# Patient Record
Sex: MALE | Race: WHITE | NOT HISPANIC OR LATINO | Employment: OTHER | URBAN - METROPOLITAN AREA
[De-identification: names, ages, dates, MRNs, and addresses within clinical notes are randomized per-mention and may not be internally consistent; named-entity substitution may affect disease eponyms.]

---

## 2017-12-01 ENCOUNTER — RECORDS - HEALTHEAST (OUTPATIENT)
Dept: LAB | Facility: CLINIC | Age: 62
End: 2017-12-01

## 2017-12-01 LAB
CHOLEST SERPL-MCNC: 260 MG/DL
FASTING STATUS PATIENT QL REPORTED: ABNORMAL
HDLC SERPL-MCNC: 42 MG/DL
LDLC SERPL CALC-MCNC: 181 MG/DL
PSA SERPL-MCNC: <0.1 NG/ML (ref 0–4.5)
TRIGL SERPL-MCNC: 187 MG/DL

## 2019-06-17 ENCOUNTER — RECORDS - HEALTHEAST (OUTPATIENT)
Dept: LAB | Facility: CLINIC | Age: 64
End: 2019-06-17

## 2019-06-17 LAB
ALBUMIN SERPL-MCNC: 3.8 G/DL (ref 3.5–5)
ALP SERPL-CCNC: 69 U/L (ref 45–120)
ALT SERPL W P-5'-P-CCNC: 16 U/L (ref 0–45)
ANION GAP SERPL CALCULATED.3IONS-SCNC: 6 MMOL/L (ref 5–18)
AST SERPL W P-5'-P-CCNC: 19 U/L (ref 0–40)
BILIRUB SERPL-MCNC: 0.8 MG/DL (ref 0–1)
BUN SERPL-MCNC: 14 MG/DL (ref 8–22)
CALCIUM SERPL-MCNC: 9.4 MG/DL (ref 8.5–10.5)
CHLORIDE BLD-SCNC: 108 MMOL/L (ref 98–107)
CHOLEST SERPL-MCNC: 209 MG/DL
CO2 SERPL-SCNC: 26 MMOL/L (ref 22–31)
CREAT SERPL-MCNC: 1.01 MG/DL (ref 0.7–1.3)
FASTING STATUS PATIENT QL REPORTED: ABNORMAL
GFR SERPL CREATININE-BSD FRML MDRD: >60 ML/MIN/1.73M2
GLUCOSE BLD-MCNC: 87 MG/DL (ref 70–125)
HDLC SERPL-MCNC: 48 MG/DL
LDLC SERPL CALC-MCNC: 133 MG/DL
POTASSIUM BLD-SCNC: 4.6 MMOL/L (ref 3.5–5)
PROT SERPL-MCNC: 6.8 G/DL (ref 6–8)
PSA SERPL-MCNC: <0.1 NG/ML (ref 0–4.5)
SODIUM SERPL-SCNC: 140 MMOL/L (ref 136–145)
TRIGL SERPL-MCNC: 140 MG/DL
URATE SERPL-MCNC: 7.4 MG/DL (ref 3–8)

## 2020-10-16 ENCOUNTER — HOSPITAL ENCOUNTER (OUTPATIENT)
Dept: LAB | Age: 65
Setting detail: SPECIMEN
Discharge: HOME | End: 2020-10-16

## 2020-10-16 ENCOUNTER — RECORDS - HEALTHEAST (OUTPATIENT)
Dept: LAB | Facility: CLINIC | Age: 65
End: 2020-10-16

## 2020-10-16 LAB
ALBUMIN SERPL-MCNC: 3.8 G/DL (ref 3.5–5)
ALP SERPL-CCNC: 71 U/L (ref 45–120)
ALT SERPL W P-5'-P-CCNC: 19 U/L (ref 0–45)
ANION GAP SERPL CALCULATED.3IONS-SCNC: 9 MMOL/L (ref 5–18)
AST SERPL W P-5'-P-CCNC: 20 U/L (ref 0–40)
BILIRUB SERPL-MCNC: 0.7 MG/DL (ref 0–1)
BUN SERPL-MCNC: 19 MG/DL (ref 8–22)
CALCIUM SERPL-MCNC: 9.3 MG/DL (ref 8.5–10.5)
CHLORIDE BLD-SCNC: 108 MMOL/L (ref 98–107)
CHOLEST SERPL-MCNC: 222 MG/DL
CO2 SERPL-SCNC: 24 MMOL/L (ref 22–31)
CREAT SERPL-MCNC: 1.1 MG/DL (ref 0.7–1.3)
FASTING STATUS PATIENT QL REPORTED: ABNORMAL
GFR SERPL CREATININE-BSD FRML MDRD: >60 ML/MIN/1.73M2
GLUCOSE BLD-MCNC: 75 MG/DL (ref 70–125)
HDLC SERPL-MCNC: 49 MG/DL
LDLC SERPL CALC-MCNC: 133 MG/DL
POTASSIUM BLD-SCNC: 4.9 MMOL/L (ref 3.5–5)
PROT SERPL-MCNC: 6.8 G/DL (ref 6–8)
PSA SERPL-MCNC: <0.1 NG/ML (ref 0–4.5)
SODIUM SERPL-SCNC: 141 MMOL/L (ref 136–145)
TRIGL SERPL-MCNC: 202 MG/DL
URATE SERPL-MCNC: 7.5 MG/DL (ref 3–8)

## 2020-10-19 LAB — COVID-19 ANTIBODY IGG: NEGATIVE

## 2021-10-06 ENCOUNTER — HOSPITAL ENCOUNTER (EMERGENCY)
Facility: CLINIC | Age: 66
Discharge: HOME | End: 2021-10-06
Attending: FAMILY MEDICINE | Admitting: FAMILY MEDICINE
Payer: COMMERCIAL

## 2021-10-06 ENCOUNTER — APPOINTMENT (OUTPATIENT)
Dept: CT IMAGING | Facility: CLINIC | Age: 66
End: 2021-10-06
Attending: FAMILY MEDICINE
Payer: COMMERCIAL

## 2021-10-06 VITALS
TEMPERATURE: 97.8 F | DIASTOLIC BLOOD PRESSURE: 95 MMHG | SYSTOLIC BLOOD PRESSURE: 144 MMHG | RESPIRATION RATE: 14 BRPM | OXYGEN SATURATION: 98 % | BODY MASS INDEX: 25.18 KG/M2 | HEIGHT: 73 IN | HEART RATE: 52 BPM | WEIGHT: 190 LBS

## 2021-10-06 DIAGNOSIS — R91.8 PULMONARY NODULES: ICD-10-CM

## 2021-10-06 DIAGNOSIS — R07.81 PLEURITIC CHEST PAIN: ICD-10-CM

## 2021-10-06 LAB
ALBUMIN SERPL-MCNC: 3.5 G/DL (ref 3.4–5)
ALBUMIN UR-MCNC: NEGATIVE MG/DL
ALP SERPL-CCNC: 56 U/L (ref 40–150)
ALT SERPL W P-5'-P-CCNC: 27 U/L (ref 0–70)
ANION GAP SERPL CALCULATED.3IONS-SCNC: 4 MMOL/L (ref 3–14)
APPEARANCE UR: CLEAR
AST SERPL W P-5'-P-CCNC: 23 U/L (ref 0–45)
BASOPHILS # BLD AUTO: 0.1 10E3/UL (ref 0–0.2)
BASOPHILS NFR BLD AUTO: 1 %
BILIRUB SERPL-MCNC: 1.1 MG/DL (ref 0.2–1.3)
BILIRUB UR QL STRIP: NEGATIVE
BUN SERPL-MCNC: 17 MG/DL (ref 7–30)
CALCIUM SERPL-MCNC: 8.8 MG/DL (ref 8.5–10.1)
CHLORIDE BLD-SCNC: 110 MMOL/L (ref 94–109)
CO2 SERPL-SCNC: 24 MMOL/L (ref 20–32)
COLOR UR AUTO: YELLOW
CREAT SERPL-MCNC: 1.02 MG/DL (ref 0.66–1.25)
D DIMER PPP FEU-MCNC: <0.27 UG/ML FEU (ref 0–0.5)
EOSINOPHIL # BLD AUTO: 0.2 10E3/UL (ref 0–0.7)
EOSINOPHIL NFR BLD AUTO: 3 %
ERYTHROCYTE [DISTWIDTH] IN BLOOD BY AUTOMATED COUNT: 13.3 % (ref 10–15)
GFR SERPL CREATININE-BSD FRML MDRD: 75 ML/MIN/1.73M2
GLUCOSE BLD-MCNC: 87 MG/DL (ref 70–99)
GLUCOSE UR STRIP-MCNC: NEGATIVE MG/DL
HCT VFR BLD AUTO: 44.4 % (ref 40–53)
HGB BLD-MCNC: 14.8 G/DL (ref 13.3–17.7)
HGB UR QL STRIP: NEGATIVE
IMM GRANULOCYTES # BLD: 0 10E3/UL
IMM GRANULOCYTES NFR BLD: 0 %
KETONES UR STRIP-MCNC: 5 MG/DL
LEUKOCYTE ESTERASE UR QL STRIP: NEGATIVE
LIPASE SERPL-CCNC: 121 U/L (ref 73–393)
LYMPHOCYTES # BLD AUTO: 2.2 10E3/UL (ref 0.8–5.3)
LYMPHOCYTES NFR BLD AUTO: 31 %
MCH RBC QN AUTO: 31.6 PG (ref 26.5–33)
MCHC RBC AUTO-ENTMCNC: 33.3 G/DL (ref 31.5–36.5)
MCV RBC AUTO: 95 FL (ref 78–100)
MONOCYTES # BLD AUTO: 0.5 10E3/UL (ref 0–1.3)
MONOCYTES NFR BLD AUTO: 7 %
MUCOUS THREADS #/AREA URNS LPF: PRESENT /LPF
NEUTROPHILS # BLD AUTO: 4.2 10E3/UL (ref 1.6–8.3)
NEUTROPHILS NFR BLD AUTO: 58 %
NITRATE UR QL: NEGATIVE
NRBC # BLD AUTO: 0 10E3/UL
NRBC BLD AUTO-RTO: 0 /100
PH UR STRIP: 6 [PH] (ref 5–7)
PLATELET # BLD AUTO: 232 10E3/UL (ref 150–450)
POTASSIUM BLD-SCNC: 4.2 MMOL/L (ref 3.4–5.3)
PROT SERPL-MCNC: 7.2 G/DL (ref 6.8–8.8)
RBC # BLD AUTO: 4.69 10E6/UL (ref 4.4–5.9)
RBC URINE: 0 /HPF
SODIUM SERPL-SCNC: 138 MMOL/L (ref 133–144)
SP GR UR STRIP: 1.01 (ref 1–1.03)
SQUAMOUS EPITHELIAL: <1 /HPF
UROBILINOGEN UR STRIP-MCNC: NORMAL MG/DL
WBC # BLD AUTO: 7.2 10E3/UL (ref 4–11)
WBC URINE: <1 /HPF

## 2021-10-06 PROCEDURE — 250N000011 HC RX IP 250 OP 636: Performed by: FAMILY MEDICINE

## 2021-10-06 PROCEDURE — 85025 COMPLETE CBC W/AUTO DIFF WBC: CPT | Performed by: FAMILY MEDICINE

## 2021-10-06 PROCEDURE — 83690 ASSAY OF LIPASE: CPT | Performed by: FAMILY MEDICINE

## 2021-10-06 PROCEDURE — 96374 THER/PROPH/DIAG INJ IV PUSH: CPT | Mod: 59 | Performed by: FAMILY MEDICINE

## 2021-10-06 PROCEDURE — 93010 ELECTROCARDIOGRAM REPORT: CPT | Performed by: FAMILY MEDICINE

## 2021-10-06 PROCEDURE — 36415 COLL VENOUS BLD VENIPUNCTURE: CPT | Performed by: FAMILY MEDICINE

## 2021-10-06 PROCEDURE — 250N000013 HC RX MED GY IP 250 OP 250 PS 637: Performed by: FAMILY MEDICINE

## 2021-10-06 PROCEDURE — 81001 URINALYSIS AUTO W/SCOPE: CPT | Performed by: FAMILY MEDICINE

## 2021-10-06 PROCEDURE — 99285 EMERGENCY DEPT VISIT HI MDM: CPT | Mod: 25 | Performed by: FAMILY MEDICINE

## 2021-10-06 PROCEDURE — 85379 FIBRIN DEGRADATION QUANT: CPT | Performed by: FAMILY MEDICINE

## 2021-10-06 PROCEDURE — 82040 ASSAY OF SERUM ALBUMIN: CPT | Performed by: FAMILY MEDICINE

## 2021-10-06 PROCEDURE — 71275 CT ANGIOGRAPHY CHEST: CPT

## 2021-10-06 PROCEDURE — 250N000009 HC RX 250: Performed by: FAMILY MEDICINE

## 2021-10-06 PROCEDURE — 93005 ELECTROCARDIOGRAM TRACING: CPT | Performed by: FAMILY MEDICINE

## 2021-10-06 RX ORDER — LIDOCAINE 4 G/G
1 PATCH TOPICAL
Status: DISCONTINUED | OUTPATIENT
Start: 2021-10-06 | End: 2021-10-06 | Stop reason: HOSPADM

## 2021-10-06 RX ORDER — KETOROLAC TROMETHAMINE 15 MG/ML
15 INJECTION, SOLUTION INTRAMUSCULAR; INTRAVENOUS ONCE
Status: COMPLETED | OUTPATIENT
Start: 2021-10-06 | End: 2021-10-06

## 2021-10-06 RX ORDER — IOPAMIDOL 755 MG/ML
81 INJECTION, SOLUTION INTRAVASCULAR ONCE
Status: COMPLETED | OUTPATIENT
Start: 2021-10-06 | End: 2021-10-06

## 2021-10-06 RX ADMIN — IOPAMIDOL 81 ML: 755 INJECTION, SOLUTION INTRAVENOUS at 10:26

## 2021-10-06 RX ADMIN — LIDOCAINE 1 PATCH: 560 PATCH PERCUTANEOUS; TOPICAL; TRANSDERMAL at 12:10

## 2021-10-06 RX ADMIN — KETOROLAC TROMETHAMINE 15 MG: 15 INJECTION, SOLUTION INTRAMUSCULAR; INTRAVENOUS at 09:42

## 2021-10-06 RX ADMIN — SODIUM CHLORIDE 100 ML: 9 INJECTION, SOLUTION INTRAVENOUS at 10:26

## 2021-10-06 ASSESSMENT — ENCOUNTER SYMPTOMS
SHORTNESS OF BREATH: 0
VOMITING: 0
BACK PAIN: 1
DIARRHEA: 0
FEVER: 0
SINUS PRESSURE: 0
FREQUENCY: 0
COUGH: 0
CHILLS: 0
ABDOMINAL PAIN: 0
CONSTIPATION: 0
NAUSEA: 0
BLOOD IN STOOL: 0
DIAPHORESIS: 0
PALPITATIONS: 0
DYSURIA: 0
HEADACHES: 0
SORE THROAT: 0
WHEEZING: 0

## 2021-10-06 NOTE — ED PROVIDER NOTES
"  History     Chief Complaint   Patient presents with     Back Pain     upper right, since Satuday woke up with, nothing tried to help pain     HPI  Saeid Santa is a 66 year old male who presents with onset on Saturday awakening with pain in the scapular region on the right side.  Severe with deep breathing.  No significant chest pain.  No obvious injury to the area.  He was doing lifting yesterday but his symptoms started well before this.  He also has pain in the flank region on the right side as well.  No history of VTE risks.  No known cardiovascular risk other than he does use chronic long-term tobacco about 20 years decreased recently.  Occasional alcohol.  No associated nausea vomiting.  No change in stools.  No blood in the stool or black tarry stools.  Pain does not appear to be exertional.  Noted no significant musculoskeletal pain and came because he thought there may be a secondary cause of his pain that was \"internal\"    Denies recent prolonged travel (>3 hours) by car or plane, history or FHx of venous thromboembolism, recent surgery (last 4 weeks), active cancer history, hypercoagulable state, estrogen or other medications/conditions causing VTE or  new unilateral swelling or pain in the legs or calves.      Allergies:  No Known Allergies    Problem List:    There are no problems to display for this patient.       Past Medical History:    No past medical history on file.    Past Surgical History:    No past surgical history on file.    Family History:    No family history on file.    Social History:  Marital Status:   [2]  Social History     Tobacco Use     Smoking status: Not on file   Substance Use Topics     Alcohol use: Not on file     Drug use: Not on file        Medications:    No current outpatient medications on file.        Review of Systems   Constitutional: Negative for chills, diaphoresis and fever.   HENT: Negative for ear pain, sinus pressure and sore throat.    Eyes: Negative " for visual disturbance.   Respiratory: Negative for cough, shortness of breath and wheezing.    Cardiovascular: Negative for chest pain and palpitations.   Gastrointestinal: Negative for abdominal pain, blood in stool, constipation, diarrhea, nausea and vomiting.   Genitourinary: Negative for dysuria, frequency and urgency.   Musculoskeletal: Positive for back pain.   Skin: Negative for rash.   Neurological: Negative for headaches.   All other systems reviewed and are negative.      Physical Exam   BP: (!) 150/98  Pulse: 66  Temp: 97.8  F (36.6  C)  Resp: 14  Weight: 86.2 kg (190 lb)  SpO2: 100 %      Physical Exam  Constitutional:       General: He is in acute distress.   HENT:      Head: Atraumatic.   Eyes:      Conjunctiva/sclera: Conjunctivae normal.   Cardiovascular:      Rate and Rhythm: Normal rate and regular rhythm.      Heart sounds: No murmur heard.     Pulmonary:      Effort: Pulmonary effort is normal. No respiratory distress.      Breath sounds: Normal breath sounds. No stridor. No wheezing or rhonchi.   Abdominal:      General: Abdomen is flat. Bowel sounds are normal. There is no distension.      Palpations: There is no mass.      Tenderness: There is no abdominal tenderness. There is no guarding.   Musculoskeletal:      Cervical back: Neck supple.      Left lower leg: No edema.   Skin:     Coloration: Skin is not pale.      Findings: No rash.   Neurological:      General: No focal deficit present.      Mental Status: He is alert.      Motor: No weakness.       There is significant nurse to palpation in the region of the lower thoracic spine midline and possibly paraspinous and one very focal area.  The remainder of his back is nontender and there is no obvious rash such as a shingles rash in this region.  Patient notes other than being tender in this area it is not the obvious  focus of pain    ED Course        Procedures                EKG Interpretation:      Interpreted by Deny Turk MD  EKG  done at 0903 demonstrates a sinus rhythm 52 bpm normal axis.  No ST change.  No T wave changes.  Normal R progression and no Q waves.  Normal intervals.  Normal conduction.  No ectopy.  Impression sinus rhythm 52 bpm no acute change      Critical Care time:  none               Results for orders placed or performed during the hospital encounter of 10/06/21 (from the past 24 hour(s))   CBC with platelets differential    Narrative    The following orders were created for panel order CBC with platelets differential.  Procedure                               Abnormality         Status                     ---------                               -----------         ------                     CBC with platelets and d...[[id number]]                      Final result                 Please view results for these tests on the individual orders.   Comprehensive metabolic panel   Result Value Ref Range    Sodium 138 133 - 144 mmol/L    Potassium 4.2 3.4 - 5.3 mmol/L    Chloride 110 (H) 94 - 109 mmol/L    Carbon Dioxide (CO2) 24 20 - 32 mmol/L    Anion Gap 4 3 - 14 mmol/L    Urea Nitrogen 17 7 - 30 mg/dL    Creatinine 1.02 0.66 - 1.25 mg/dL    Calcium 8.8 8.5 - 10.1 mg/dL    Glucose 87 70 - 99 mg/dL    Alkaline Phosphatase 56 40 - 150 U/L    AST 23 0 - 45 U/L    ALT 27 0 - 70 U/L    Protein Total 7.2 6.8 - 8.8 g/dL    Albumin 3.5 3.4 - 5.0 g/dL    Bilirubin Total 1.1 0.2 - 1.3 mg/dL    GFR Estimate 75 >60 mL/min/1.73m2   D dimer quantitative   Result Value Ref Range    D-Dimer Quantitative <0.27 0.00 - 0.50 ug/mL FEU    Narrative    This D-dimer assay is intended for use in conjunction with a clinical pretest probability assessment model to exclude pulmonary embolism (PE) and deep venous thrombosis (DVT) in outpatients suspected of PE or DVT. The cut-off value is 0.50 ug/mL FEU.   Lipase   Result Value Ref Range    Lipase 121 73 - 393 U/L   CBC with platelets and differential   Result Value Ref Range    WBC Count 7.2 4.0 - 11.0  10e3/uL    RBC Count 4.69 4.40 - 5.90 10e6/uL    Hemoglobin 14.8 13.3 - 17.7 g/dL    Hematocrit 44.4 40.0 - 53.0 %    MCV 95 78 - 100 fL    MCH 31.6 26.5 - 33.0 pg    MCHC 33.3 31.5 - 36.5 g/dL    RDW 13.3 10.0 - 15.0 %    Platelet Count 232 150 - 450 10e3/uL    % Neutrophils 58 %    % Lymphocytes 31 %    % Monocytes 7 %    % Eosinophils 3 %    % Basophils 1 %    % Immature Granulocytes 0 %    NRBCs per 100 WBC 0 <1 /100    Absolute Neutrophils 4.2 1.6 - 8.3 10e3/uL    Absolute Lymphocytes 2.2 0.8 - 5.3 10e3/uL    Absolute Monocytes 0.5 0.0 - 1.3 10e3/uL    Absolute Eosinophils 0.2 0.0 - 0.7 10e3/uL    Absolute Basophils 0.1 0.0 - 0.2 10e3/uL    Absolute Immature Granulocytes 0.0 <=0.0 10e3/uL    Absolute NRBCs 0.0 10e3/uL   CT Chest (PE) Abdomen Pelvis w Contrast    Narrative    CT CHEST PE ABDOMEN AND PELVIS WITH CONTRAST 10/6/2021 10:42 AM    CLINICAL HISTORY: Severe pleuritic chest pain, flank pain worse acute,  progressive over last 3 days.    TECHNIQUE: CT angiogram chest and routine CT abdomen pelvis with IV  contrast. Arterial phase through the chest and venous phase through  the abdomen and pelvis. 2D and 3D MIP reconstructions were performed  by the CT technologist. Dose reduction techniques were used.     CONTRAST: 81 mL Isovue 370    COMPARISON: None.    FINDINGS:  ANGIOGRAM CHEST: Pulmonary arteries are normal caliber and negative  for pulmonary emboli. Thoracic aorta is negative for dissection.    LUNGS AND PLEURA: No acute airspace disease. No lobar consolidation.  Mild bibasilar atelectasis. No effusions. No pneumothorax identified.  Anterior right lower lobe 0.6 cm solid nodule series 7 image 157.  Anterior right midlung subpleural nodule is 0.8 cm image 150. Anterior  right middle lobe 0.6 cm nodule image 194. There are several other  bilateral pulmonary nodules also identified.    MEDIASTINUM/AXILLAE: No suspicious enlarged lymph nodes. No acute  mediastinal abnormality identified.    CORONARY  ARTERY CALCIFICATION: Mild.    HEPATOBILIARY: Normal.    PANCREAS: Normal.    SPLEEN: Normal.    ADRENAL GLANDS: Normal.    KIDNEYS/BLADDER: No hydronephrosis or stones identified. A few  parapelvic renal cysts on the left not requiring specific imaging  follow-up. No bladder acute abnormality. No bladder stones.    BOWEL: No obstruction. Colonic diverticulosis distally without  convincing diverticulitis. No abscess or free air. Appendix not seen.  No signs of appendicitis.    LYMPH NODES: Normal.    PELVIC ORGANS: Normal.    OTHER: Vascular calcifications. No associated acute abnormality  identified.    MUSCULOSKELETAL: Mild spine degenerative changes. No acute  abnormality.      Impression    IMPRESSION:  1.  No evidence for pulmonary embolism, acute thoracic aortic  abnormality, or acute airspace disease. No acute abnormality seen  within the abdomen or pelvis.  2.  Several bilateral pulmonary nodules are indeterminant with one of  these measuring up to 0.8 cm as above. See below for follow up imaging  guidelines as neoplasm remains in the differential.  3.  Colonic diverticula without diverticulitis.  4.  Mild coronary artery calcification.    Recommendations for an incidental lung nodule = or > 6mm to 8mm:    Low risk patients: Initial follow-up CT at 6-12 months, then  consider CT at 18-24 months if no change.    High risk patients: Initial follow-up CT at 6-12 months, then CT at  18-24 months if no change.    *Low Risk: Minimal or absent history of smoking or other known risk  factors.  *Nonsolid (ground-glass) or partly solid nodules may require longer  follow-up to exclude indolent adenocarcinoma.  *Recommendations based on Guidelines for the Management of Incidental  Pulmonary Nodules Detected at CT: From the Fleischner Society 2017,  Radiology 2017.    NOBLE ESPOSITO MD         SYSTEM ID:  [id number]   UA with Microscopic reflex to Culture    Specimen: Urine, Clean Catch   Result Value Ref Range    Color Urine  Yellow Colorless, Straw, Light Yellow, Yellow    Appearance Urine Clear Clear    Glucose Urine Negative Negative mg/dL    Bilirubin Urine Negative Negative    Ketones Urine 5  (A) Negative mg/dL    Specific Gravity Urine 1.015 1.003 - 1.035    Blood Urine Negative Negative    pH Urine 6.0 5.0 - 7.0    Protein Albumin Urine Negative Negative mg/dL    Urobilinogen Urine Normal Normal, 2.0 mg/dL    Nitrite Urine Negative Negative    Leukocyte Esterase Urine Negative Negative    Mucus Urine Present (A) None Seen /LPF    RBC Urine 0 <=2 /HPF    WBC Urine <1 <=5 /HPF    Squamous Epithelials Urine <1 <=1 /HPF    Narrative    Urine Culture not indicated       Medications   ketorolac (TORADOL) injection 15 mg (has no administration in time range)   iopamidol (ISOVUE-370) solution 81 mL (has no administration in time range)   sodium chloride 0.9 % bag 500mL for CT scan flush use (has no administration in time range)       Assessments & Plan (with Medical Decision Making)     MDM: Saeid Santa is a 66 year old male presents with scapular pain and upper back pain worse with deep breathing.  Pain was severe on presentation and not completely reproduced on exam.  Possible associated dyspnea although difficult to determine related to the pleuritic pain.  Benign abdomen but tenderness in the upper back and flank region.  Differential diagnosis included pulmonary embolism.  No significant PE risks.  Coronary disease also possible, musculoskeletal pain etiologies and GI referred pain as well.    Laboratory testing was delayed and CT was obtained for PE protocol as well as flank region.  D-dimer was ultimately negative.  CT imaging was negative for acute changes.  We discussed differential diagnosis including musculoskeletal causes.  Have given precautions for return.  Additional recommendations as below.       I have reviewed the nursing notes.    I have reviewed the findings, diagnosis, plan and need for follow up with the  patient.       New Prescriptions    No medications on file       Final diagnoses:   Pleuritic chest pain - thoracic back - No serious findings on imaging or lab.  suspect this is due to either thoracic back trapezius/paraspinous muscles, lat dorsi muscle.  recommend applying lidoderm 4% patch.   take ibuprofen 400 mg orally every 6 hours with food or milk. take tylenol 1000 m,g every 6 hours.  maintain back range of motion. remove lidoderm patch in 12 hours, reapply every 12 of every 24 hours.   Pulmonary nodules - recheck 6-12 months - discuss with primary provider.       10/6/2021   [facility] EMERGENCY DEPT     Deny Turk MD  10/06/21 6630

## 2021-10-06 NOTE — DISCHARGE INSTRUCTIONS
ICD-10-CM    1. Pleuritic chest pain - thoracic back  R07.81     No serious findings on imaging or lab.  suspect this is due to either thoracic back trapezius/paraspinous muscles, lat dorsi muscle.  recommend applying lidoderm 4% patch.   take ibuprofen 400 mg orally every 6 hours with food or milk. take tylenol 1000 m,g every 6 hours.  maintain back range of motion. remove lidoderm patch in 12 hours, reapply every 12 of every 24 hours.   2. Pulmonary nodules  R91.8     recheck 6-12 months - discuss with primary provider.     Alysee - heal your own back, heal your own neck.

## 2021-10-29 ENCOUNTER — LAB REQUISITION (OUTPATIENT)
Dept: LAB | Facility: CLINIC | Age: 66
End: 2021-10-29

## 2021-10-29 DIAGNOSIS — E78.5 HYPERLIPIDEMIA, UNSPECIFIED: ICD-10-CM

## 2021-10-29 DIAGNOSIS — Z85.46 PERSONAL HISTORY OF MALIGNANT NEOPLASM OF PROSTATE: ICD-10-CM

## 2021-10-29 DIAGNOSIS — M1A.0790 IDIOPATHIC CHRONIC GOUT, UNSPECIFIED ANKLE AND FOOT, WITHOUT TOPHUS (TOPHI): ICD-10-CM

## 2021-10-29 LAB
ALBUMIN SERPL-MCNC: 3.7 G/DL (ref 3.5–5)
ALP SERPL-CCNC: 76 U/L (ref 45–120)
ALT SERPL W P-5'-P-CCNC: 18 U/L (ref 0–45)
ANION GAP SERPL CALCULATED.3IONS-SCNC: 11 MMOL/L (ref 5–18)
AST SERPL W P-5'-P-CCNC: 21 U/L (ref 0–40)
BILIRUB SERPL-MCNC: 0.5 MG/DL (ref 0–1)
BUN SERPL-MCNC: 16 MG/DL (ref 8–22)
CALCIUM SERPL-MCNC: 9.4 MG/DL (ref 8.5–10.5)
CHLORIDE BLD-SCNC: 108 MMOL/L (ref 98–107)
CHOLEST SERPL-MCNC: 231 MG/DL
CO2 SERPL-SCNC: 21 MMOL/L (ref 22–31)
CREAT SERPL-MCNC: 0.93 MG/DL (ref 0.7–1.3)
GFR SERPL CREATININE-BSD FRML MDRD: 84 ML/MIN/1.73M2
GLUCOSE BLD-MCNC: 97 MG/DL (ref 70–125)
HDLC SERPL-MCNC: 47 MG/DL
LDLC SERPL CALC-MCNC: 105 MG/DL
POTASSIUM BLD-SCNC: 4 MMOL/L (ref 3.5–5)
PROT SERPL-MCNC: 6.7 G/DL (ref 6–8)
PSA SERPL-MCNC: <0.1 UG/L (ref 0–4.5)
SODIUM SERPL-SCNC: 140 MMOL/L (ref 136–145)
TRIGL SERPL-MCNC: 396 MG/DL
URATE SERPL-MCNC: 7.5 MG/DL (ref 3–8)

## 2021-10-29 PROCEDURE — 80061 LIPID PANEL: CPT | Performed by: FAMILY MEDICINE

## 2021-10-29 PROCEDURE — 80053 COMPREHEN METABOLIC PANEL: CPT | Performed by: FAMILY MEDICINE

## 2021-10-29 PROCEDURE — 84550 ASSAY OF BLOOD/URIC ACID: CPT | Performed by: FAMILY MEDICINE

## 2021-10-29 PROCEDURE — 84153 ASSAY OF PSA TOTAL: CPT | Performed by: FAMILY MEDICINE

## 2022-11-29 ENCOUNTER — LAB REQUISITION (OUTPATIENT)
Dept: LAB | Facility: CLINIC | Age: 67
End: 2022-11-29

## 2022-11-29 DIAGNOSIS — Z85.46 PERSONAL HISTORY OF MALIGNANT NEOPLASM OF PROSTATE: ICD-10-CM

## 2022-11-29 DIAGNOSIS — E78.5 HYPERLIPIDEMIA, UNSPECIFIED: ICD-10-CM

## 2022-11-29 DIAGNOSIS — R03.0 ELEVATED BLOOD-PRESSURE READING, WITHOUT DIAGNOSIS OF HYPERTENSION: ICD-10-CM

## 2022-11-29 LAB
ALBUMIN SERPL BCG-MCNC: 4.3 G/DL (ref 3.5–5.2)
ALP SERPL-CCNC: 60 U/L (ref 40–129)
ALT SERPL W P-5'-P-CCNC: 22 U/L (ref 10–50)
ANION GAP SERPL CALCULATED.3IONS-SCNC: 13 MMOL/L (ref 7–15)
AST SERPL W P-5'-P-CCNC: 25 U/L (ref 10–50)
BILIRUB SERPL-MCNC: 0.9 MG/DL
BUN SERPL-MCNC: 17.3 MG/DL (ref 8–23)
CALCIUM SERPL-MCNC: 8.9 MG/DL (ref 8.8–10.2)
CHLORIDE SERPL-SCNC: 105 MMOL/L (ref 98–107)
CHOLEST SERPL-MCNC: 238 MG/DL
CREAT SERPL-MCNC: 1.08 MG/DL (ref 0.67–1.17)
DEPRECATED HCO3 PLAS-SCNC: 21 MMOL/L (ref 22–29)
GFR SERPL CREATININE-BSD FRML MDRD: 74 ML/MIN/1.73M2
GLUCOSE SERPL-MCNC: 80 MG/DL (ref 70–99)
HDLC SERPL-MCNC: 57 MG/DL
LDLC SERPL CALC-MCNC: 154 MG/DL
NONHDLC SERPL-MCNC: 181 MG/DL
POTASSIUM SERPL-SCNC: 4.1 MMOL/L (ref 3.4–5.3)
PROT SERPL-MCNC: 6.6 G/DL (ref 6.4–8.3)
PSA SERPL-MCNC: <0.01 NG/ML (ref 0–4.5)
SODIUM SERPL-SCNC: 139 MMOL/L (ref 136–145)
TRIGL SERPL-MCNC: 137 MG/DL

## 2022-11-29 PROCEDURE — 84153 ASSAY OF PSA TOTAL: CPT | Performed by: FAMILY MEDICINE

## 2022-11-29 PROCEDURE — 80053 COMPREHEN METABOLIC PANEL: CPT | Performed by: FAMILY MEDICINE

## 2022-11-29 PROCEDURE — 80061 LIPID PANEL: CPT | Performed by: FAMILY MEDICINE

## 2023-01-01 ENCOUNTER — INFUSION THERAPY VISIT (OUTPATIENT)
Dept: INFUSION THERAPY | Facility: CLINIC | Age: 68
End: 2023-01-01
Attending: FAMILY MEDICINE
Payer: COMMERCIAL

## 2023-01-01 ENCOUNTER — ANESTHESIA EVENT (OUTPATIENT)
Dept: SURGERY | Facility: CLINIC | Age: 68
DRG: 027 | End: 2023-01-01
Payer: COMMERCIAL

## 2023-01-01 ENCOUNTER — VIRTUAL VISIT (OUTPATIENT)
Dept: ONCOLOGY | Facility: CLINIC | Age: 68
End: 2023-01-01
Attending: STUDENT IN AN ORGANIZED HEALTH CARE EDUCATION/TRAINING PROGRAM
Payer: COMMERCIAL

## 2023-01-01 ENCOUNTER — TELEPHONE (OUTPATIENT)
Dept: NEUROSURGERY | Facility: CLINIC | Age: 68
End: 2023-01-01
Payer: COMMERCIAL

## 2023-01-01 ENCOUNTER — OFFICE VISIT (OUTPATIENT)
Dept: RADIATION ONCOLOGY | Facility: CLINIC | Age: 68
End: 2023-01-01
Attending: RADIOLOGY
Payer: COMMERCIAL

## 2023-01-01 ENCOUNTER — TELEPHONE (OUTPATIENT)
Dept: RADIATION ONCOLOGY | Facility: CLINIC | Age: 68
End: 2023-01-01
Payer: COMMERCIAL

## 2023-01-01 ENCOUNTER — HOSPITAL ENCOUNTER (INPATIENT)
Facility: CLINIC | Age: 68
LOS: 2 days | Discharge: HOME | DRG: 027 | End: 2023-12-23
Attending: NEUROLOGICAL SURGERY | Admitting: NEUROLOGICAL SURGERY
Payer: COMMERCIAL

## 2023-01-01 ENCOUNTER — HOSPITAL ENCOUNTER (OUTPATIENT)
Dept: ULTRASOUND IMAGING | Facility: HOSPITAL | Age: 68
Discharge: HOME | End: 2023-08-02
Attending: PHYSICIAN ASSISTANT | Admitting: PHYSICIAN ASSISTANT
Payer: COMMERCIAL

## 2023-01-01 ENCOUNTER — PRE VISIT (OUTPATIENT)
Dept: RADIATION ONCOLOGY | Facility: CLINIC | Age: 68
End: 2023-01-01

## 2023-01-01 ENCOUNTER — HOSPITAL ENCOUNTER (OUTPATIENT)
Dept: ULTRASOUND IMAGING | Facility: CLINIC | Age: 68
Discharge: HOME | End: 2023-09-27
Attending: FAMILY MEDICINE | Admitting: FAMILY MEDICINE
Payer: COMMERCIAL

## 2023-01-01 ENCOUNTER — MYC MEDICAL ADVICE (OUTPATIENT)
Dept: ONCOLOGY | Facility: CLINIC | Age: 68
End: 2023-01-01

## 2023-01-01 ENCOUNTER — HOSPITAL ENCOUNTER (OUTPATIENT)
Dept: MRI IMAGING | Facility: CLINIC | Age: 68
Discharge: HOME | End: 2023-12-07
Attending: RADIOLOGY
Payer: COMMERCIAL

## 2023-01-01 ENCOUNTER — PATIENT OUTREACH (OUTPATIENT)
Dept: ONCOLOGY | Facility: CLINIC | Age: 68
End: 2023-01-01
Payer: COMMERCIAL

## 2023-01-01 ENCOUNTER — TELEPHONE (OUTPATIENT)
Dept: FAMILY MEDICINE | Facility: CLINIC | Age: 68
End: 2023-01-01
Payer: COMMERCIAL

## 2023-01-01 ENCOUNTER — ONCOLOGY VISIT (OUTPATIENT)
Dept: ONCOLOGY | Facility: CLINIC | Age: 68
End: 2023-01-01
Attending: INTERNAL MEDICINE
Payer: COMMERCIAL

## 2023-01-01 ENCOUNTER — APPOINTMENT (OUTPATIENT)
Dept: OCCUPATIONAL THERAPY | Facility: CLINIC | Age: 68
DRG: 027 | End: 2023-01-01
Payer: COMMERCIAL

## 2023-01-01 ENCOUNTER — HOSPITAL ENCOUNTER (OUTPATIENT)
Dept: MRI IMAGING | Facility: CLINIC | Age: 68
Discharge: HOME | End: 2023-08-23
Attending: PHYSICIAN ASSISTANT | Admitting: PHYSICIAN ASSISTANT
Payer: COMMERCIAL

## 2023-01-01 ENCOUNTER — OFFICE VISIT (OUTPATIENT)
Dept: NEUROSURGERY | Facility: CLINIC | Age: 68
End: 2023-01-01
Payer: COMMERCIAL

## 2023-01-01 ENCOUNTER — OFFICE VISIT (OUTPATIENT)
Dept: FAMILY MEDICINE | Facility: CLINIC | Age: 68
End: 2023-01-01
Payer: COMMERCIAL

## 2023-01-01 ENCOUNTER — TELEPHONE (OUTPATIENT)
Dept: ONCOLOGY | Facility: CLINIC | Age: 68
End: 2023-01-01

## 2023-01-01 ENCOUNTER — DOCUMENTATION ONLY (OUTPATIENT)
Dept: NEUROSURGERY | Facility: CLINIC | Age: 68
End: 2023-01-01

## 2023-01-01 ENCOUNTER — ALLIED HEALTH/NURSE VISIT (OUTPATIENT)
Dept: NEUROSURGERY | Facility: CLINIC | Age: 68
End: 2023-01-01
Payer: COMMERCIAL

## 2023-01-01 ENCOUNTER — VIRTUAL VISIT (OUTPATIENT)
Dept: FAMILY MEDICINE | Facility: CLINIC | Age: 68
End: 2023-01-01
Payer: COMMERCIAL

## 2023-01-01 ENCOUNTER — LAB (OUTPATIENT)
Dept: LAB | Facility: CLINIC | Age: 68
End: 2023-01-01
Attending: RADIOLOGY
Payer: COMMERCIAL

## 2023-01-01 ENCOUNTER — HOSPITAL ENCOUNTER (OUTPATIENT)
Dept: MRI IMAGING | Facility: CLINIC | Age: 68
Discharge: HOME | End: 2023-09-08
Attending: RADIOLOGY
Payer: COMMERCIAL

## 2023-01-01 ENCOUNTER — HOSPITAL ENCOUNTER (OUTPATIENT)
Dept: PET IMAGING | Facility: CLINIC | Age: 68
Discharge: HOME | End: 2023-08-31
Attending: PHYSICIAN ASSISTANT
Payer: COMMERCIAL

## 2023-01-01 ENCOUNTER — LAB (OUTPATIENT)
Dept: LAB | Facility: CLINIC | Age: 68
End: 2023-01-01
Attending: FAMILY MEDICINE
Payer: COMMERCIAL

## 2023-01-01 ENCOUNTER — TELEPHONE (OUTPATIENT)
Dept: RADIATION ONCOLOGY | Facility: CLINIC | Age: 68
End: 2023-01-01

## 2023-01-01 ENCOUNTER — MYC MEDICAL ADVICE (OUTPATIENT)
Dept: FAMILY MEDICINE | Facility: CLINIC | Age: 68
End: 2023-01-01
Payer: COMMERCIAL

## 2023-01-01 ENCOUNTER — ANESTHESIA (OUTPATIENT)
Dept: SURGERY | Facility: CLINIC | Age: 68
DRG: 027 | End: 2023-01-01
Payer: COMMERCIAL

## 2023-01-01 ENCOUNTER — APPOINTMENT (OUTPATIENT)
Dept: PHYSICAL THERAPY | Facility: CLINIC | Age: 68
DRG: 027 | End: 2023-01-01
Payer: COMMERCIAL

## 2023-01-01 ENCOUNTER — HOSPITAL ENCOUNTER (OUTPATIENT)
Dept: CT IMAGING | Facility: CLINIC | Age: 68
Discharge: HOME | End: 2023-12-07
Attending: STUDENT IN AN ORGANIZED HEALTH CARE EDUCATION/TRAINING PROGRAM
Payer: COMMERCIAL

## 2023-01-01 ENCOUNTER — APPOINTMENT (OUTPATIENT)
Dept: LAB | Facility: CLINIC | Age: 68
End: 2023-01-01
Attending: INTERNAL MEDICINE
Payer: COMMERCIAL

## 2023-01-01 ENCOUNTER — LAB (OUTPATIENT)
Dept: LAB | Facility: CLINIC | Age: 68
End: 2023-01-01
Payer: COMMERCIAL

## 2023-01-01 ENCOUNTER — THERAPY VISIT (OUTPATIENT)
Dept: PHYSICAL THERAPY | Facility: CLINIC | Age: 68
End: 2023-01-01
Attending: FAMILY MEDICINE
Payer: COMMERCIAL

## 2023-01-01 ENCOUNTER — APPOINTMENT (OUTPATIENT)
Dept: MRI IMAGING | Facility: CLINIC | Age: 68
DRG: 027 | End: 2023-01-01
Payer: COMMERCIAL

## 2023-01-01 ENCOUNTER — PATIENT OUTREACH (OUTPATIENT)
Dept: CARE COORDINATION | Facility: CLINIC | Age: 68
End: 2023-01-01
Payer: COMMERCIAL

## 2023-01-01 VITALS
TEMPERATURE: 97.9 F | RESPIRATION RATE: 18 BRPM | WEIGHT: 180.2 LBS | HEART RATE: 108 BPM | BODY MASS INDEX: 23.77 KG/M2 | DIASTOLIC BLOOD PRESSURE: 69 MMHG | SYSTOLIC BLOOD PRESSURE: 106 MMHG

## 2023-01-01 VITALS
HEART RATE: 66 BPM | OXYGEN SATURATION: 94 % | SYSTOLIC BLOOD PRESSURE: 134 MMHG | BODY MASS INDEX: 27.89 KG/M2 | RESPIRATION RATE: 16 BRPM | HEIGHT: 73 IN | WEIGHT: 210.4 LBS | DIASTOLIC BLOOD PRESSURE: 80 MMHG | TEMPERATURE: 97.2 F

## 2023-01-01 VITALS
BODY MASS INDEX: 27.63 KG/M2 | OXYGEN SATURATION: 97 % | DIASTOLIC BLOOD PRESSURE: 82 MMHG | WEIGHT: 208 LBS | SYSTOLIC BLOOD PRESSURE: 138 MMHG | HEART RATE: 104 BPM

## 2023-01-01 VITALS
BODY MASS INDEX: 27.47 KG/M2 | SYSTOLIC BLOOD PRESSURE: 125 MMHG | RESPIRATION RATE: 16 BRPM | DIASTOLIC BLOOD PRESSURE: 75 MMHG | TEMPERATURE: 98.5 F | WEIGHT: 208.2 LBS | OXYGEN SATURATION: 99 % | HEART RATE: 98 BPM

## 2023-01-01 VITALS
DIASTOLIC BLOOD PRESSURE: 77 MMHG | WEIGHT: 208 LBS | HEART RATE: 75 BPM | OXYGEN SATURATION: 98 % | HEIGHT: 73 IN | BODY MASS INDEX: 27.57 KG/M2 | SYSTOLIC BLOOD PRESSURE: 128 MMHG

## 2023-01-01 VITALS
RESPIRATION RATE: 20 BRPM | HEART RATE: 95 BPM | OXYGEN SATURATION: 96 % | SYSTOLIC BLOOD PRESSURE: 126 MMHG | DIASTOLIC BLOOD PRESSURE: 87 MMHG

## 2023-01-01 VITALS
HEART RATE: 71 BPM | TEMPERATURE: 97.3 F | SYSTOLIC BLOOD PRESSURE: 132 MMHG | OXYGEN SATURATION: 100 % | DIASTOLIC BLOOD PRESSURE: 74 MMHG | WEIGHT: 208 LBS | RESPIRATION RATE: 16 BRPM | BODY MASS INDEX: 27.57 KG/M2 | HEIGHT: 73 IN

## 2023-01-01 VITALS — WEIGHT: 180 LBS | BODY MASS INDEX: 23.86 KG/M2 | HEIGHT: 73 IN

## 2023-01-01 VITALS — SYSTOLIC BLOOD PRESSURE: 134 MMHG | DIASTOLIC BLOOD PRESSURE: 88 MMHG | HEART RATE: 78 BPM | RESPIRATION RATE: 18 BRPM

## 2023-01-01 VITALS
BODY MASS INDEX: 26.64 KG/M2 | TEMPERATURE: 97.2 F | DIASTOLIC BLOOD PRESSURE: 86 MMHG | HEIGHT: 73 IN | RESPIRATION RATE: 24 BRPM | OXYGEN SATURATION: 99 % | SYSTOLIC BLOOD PRESSURE: 126 MMHG | HEART RATE: 96 BPM | WEIGHT: 201 LBS

## 2023-01-01 VITALS
OXYGEN SATURATION: 96 % | RESPIRATION RATE: 20 BRPM | DIASTOLIC BLOOD PRESSURE: 83 MMHG | HEART RATE: 92 BPM | SYSTOLIC BLOOD PRESSURE: 123 MMHG

## 2023-01-01 VITALS
OXYGEN SATURATION: 96 % | TEMPERATURE: 98.5 F | WEIGHT: 183.4 LBS | RESPIRATION RATE: 18 BRPM | SYSTOLIC BLOOD PRESSURE: 127 MMHG | DIASTOLIC BLOOD PRESSURE: 82 MMHG | BODY MASS INDEX: 24.2 KG/M2 | HEART RATE: 105 BPM

## 2023-01-01 VITALS
SYSTOLIC BLOOD PRESSURE: 180 MMHG | HEIGHT: 73 IN | WEIGHT: 180 LBS | DIASTOLIC BLOOD PRESSURE: 128 MMHG | HEART RATE: 110 BPM | BODY MASS INDEX: 23.86 KG/M2

## 2023-01-01 VITALS — HEART RATE: 110 BPM | DIASTOLIC BLOOD PRESSURE: 74 MMHG | SYSTOLIC BLOOD PRESSURE: 107 MMHG | OXYGEN SATURATION: 98 %

## 2023-01-01 VITALS
WEIGHT: 181 LBS | TEMPERATURE: 100 F | HEIGHT: 73 IN | DIASTOLIC BLOOD PRESSURE: 78 MMHG | HEART RATE: 107 BPM | RESPIRATION RATE: 24 BRPM | BODY MASS INDEX: 23.99 KG/M2 | SYSTOLIC BLOOD PRESSURE: 120 MMHG | OXYGEN SATURATION: 98 %

## 2023-01-01 VITALS — HEART RATE: 60 BPM | DIASTOLIC BLOOD PRESSURE: 75 MMHG | OXYGEN SATURATION: 98 % | SYSTOLIC BLOOD PRESSURE: 120 MMHG

## 2023-01-01 VITALS — WEIGHT: 200 LBS | BODY MASS INDEX: 26.51 KG/M2 | HEIGHT: 73 IN

## 2023-01-01 DIAGNOSIS — M79.89 LEG SWELLING: ICD-10-CM

## 2023-01-01 DIAGNOSIS — C79.31 METASTASIS TO BRAIN (H): Primary | ICD-10-CM

## 2023-01-01 DIAGNOSIS — G40.909 SEIZURE DISORDER (H): ICD-10-CM

## 2023-01-01 DIAGNOSIS — C43.9 METASTATIC MALIGNANT MELANOMA (H): ICD-10-CM

## 2023-01-01 DIAGNOSIS — C79.31 MALIGNANT NEOPLASM METASTATIC TO BRAIN (H): ICD-10-CM

## 2023-01-01 DIAGNOSIS — I10 PRIMARY HYPERTENSION: ICD-10-CM

## 2023-01-01 DIAGNOSIS — C79.31 METASTASIS TO BRAIN (H): ICD-10-CM

## 2023-01-01 DIAGNOSIS — G81.91 RIGHT HEMIPARESIS (H): ICD-10-CM

## 2023-01-01 DIAGNOSIS — G93.5 NEOPLASM OF BRAIN CAUSING MASS EFFECT AND BRAIN COMPRESSION ON ADJACENT STRUCTURES (H): ICD-10-CM

## 2023-01-01 DIAGNOSIS — I10 BENIGN ESSENTIAL HYPERTENSION: ICD-10-CM

## 2023-01-01 DIAGNOSIS — Z85.46 HISTORY OF PROSTATE CANCER: ICD-10-CM

## 2023-01-01 DIAGNOSIS — G93.5 NEOPLASM OF BRAIN CAUSING MASS EFFECT AND BRAIN COMPRESSION ON ADJACENT STRUCTURES (H): Primary | ICD-10-CM

## 2023-01-01 DIAGNOSIS — C43.9 METASTATIC MALIGNANT MELANOMA (H): Primary | ICD-10-CM

## 2023-01-01 DIAGNOSIS — D49.6 NEOPLASM OF BRAIN CAUSING MASS EFFECT AND BRAIN COMPRESSION ON ADJACENT STRUCTURES (H): ICD-10-CM

## 2023-01-01 DIAGNOSIS — D49.6 NEOPLASM OF BRAIN CAUSING MASS EFFECT AND BRAIN COMPRESSION ON ADJACENT STRUCTURES (H): Primary | ICD-10-CM

## 2023-01-01 DIAGNOSIS — C43.4 MALIGNANT MELANOMA OF SCALP (H): Primary | ICD-10-CM

## 2023-01-01 DIAGNOSIS — R50.9 FEVER, UNSPECIFIED FEVER CAUSE: ICD-10-CM

## 2023-01-01 DIAGNOSIS — C43.9 METASTATIC MELANOMA (H): Primary | ICD-10-CM

## 2023-01-01 DIAGNOSIS — I10 BENIGN ESSENTIAL HYPERTENSION: Primary | ICD-10-CM

## 2023-01-01 DIAGNOSIS — Z23 ENCOUNTER FOR IMMUNIZATION: ICD-10-CM

## 2023-01-01 DIAGNOSIS — M1A.9XX0 CHRONIC GOUT WITHOUT TOPHUS, UNSPECIFIED CAUSE, UNSPECIFIED SITE: Primary | ICD-10-CM

## 2023-01-01 DIAGNOSIS — C43.4 MALIGNANT MELANOMA OF SCALP (H): ICD-10-CM

## 2023-01-01 DIAGNOSIS — M1A.9XX0 CHRONIC GOUT WITHOUT TOPHUS, UNSPECIFIED CAUSE, UNSPECIFIED SITE: ICD-10-CM

## 2023-01-01 DIAGNOSIS — Z00.00 ENCOUNTER FOR MEDICARE ANNUAL WELLNESS EXAM: Primary | ICD-10-CM

## 2023-01-01 DIAGNOSIS — G81.91 RIGHT HEMIPARESIS (H): Primary | ICD-10-CM

## 2023-01-01 DIAGNOSIS — R53.83 OTHER FATIGUE: ICD-10-CM

## 2023-01-01 DIAGNOSIS — Z98.890 S/P CRANIOTOMY: Primary | ICD-10-CM

## 2023-01-01 DIAGNOSIS — D49.6 BRAIN TUMOR (H): ICD-10-CM

## 2023-01-01 DIAGNOSIS — C79.31 MALIGNANT NEOPLASM METASTATIC TO BRAIN (H): Primary | ICD-10-CM

## 2023-01-01 DIAGNOSIS — Z76.89 ENCOUNTER TO ESTABLISH CARE: Primary | ICD-10-CM

## 2023-01-01 DIAGNOSIS — R93.89 ABNORMAL FINDING ON IMAGING: ICD-10-CM

## 2023-01-01 DIAGNOSIS — E78.5 HYPERLIPIDEMIA LDL GOAL <100: ICD-10-CM

## 2023-01-01 DIAGNOSIS — G47.33 OSA (OBSTRUCTIVE SLEEP APNEA): ICD-10-CM

## 2023-01-01 DIAGNOSIS — M79.89 RIGHT LEG SWELLING: ICD-10-CM

## 2023-01-01 DIAGNOSIS — R53.83 OTHER FATIGUE: Primary | ICD-10-CM

## 2023-01-01 DIAGNOSIS — Z01.818 PREOP GENERAL PHYSICAL EXAM: Primary | ICD-10-CM

## 2023-01-01 DIAGNOSIS — R93.89 ABNORMAL FINDING ON IMAGING: Primary | ICD-10-CM

## 2023-01-01 DIAGNOSIS — E78.5 HYPERLIPIDEMIA LDL GOAL <100: Primary | ICD-10-CM

## 2023-01-01 DIAGNOSIS — Z79.2 NEED FOR PROPHYLACTIC ANTIBIOTIC: ICD-10-CM

## 2023-01-01 LAB
ABO/RH(D): NORMAL
ACTH PLAS-MCNC: 17 PG/ML
ALBUMIN SERPL BCG-MCNC: 3 G/DL (ref 3.5–5.2)
ALBUMIN SERPL BCG-MCNC: 3.5 G/DL (ref 3.5–5.2)
ALBUMIN SERPL BCG-MCNC: 3.7 G/DL (ref 3.5–5.2)
ALBUMIN SERPL BCG-MCNC: 4.3 G/DL (ref 3.5–5.2)
ALP SERPL-CCNC: 62 U/L (ref 40–129)
ALP SERPL-CCNC: 71 U/L (ref 40–129)
ALP SERPL-CCNC: 74 U/L (ref 40–150)
ALP SERPL-CCNC: 85 U/L (ref 40–129)
ALT SERPL W P-5'-P-CCNC: 14 U/L (ref 0–70)
ALT SERPL W P-5'-P-CCNC: 16 U/L (ref 0–70)
ALT SERPL W P-5'-P-CCNC: 33 U/L (ref 0–70)
ALT SERPL W P-5'-P-CCNC: 83 U/L (ref 0–70)
ANION GAP SERPL CALCULATED.3IONS-SCNC: 11 MMOL/L (ref 7–15)
ANION GAP SERPL CALCULATED.3IONS-SCNC: 14 MMOL/L (ref 7–15)
ANION GAP SERPL CALCULATED.3IONS-SCNC: 14 MMOL/L (ref 7–15)
ANION GAP SERPL CALCULATED.3IONS-SCNC: 9 MMOL/L (ref 7–15)
ANION GAP SERPL CALCULATED.3IONS-SCNC: 9 MMOL/L (ref 7–15)
ANTIBODY SCREEN: NEGATIVE
AST SERPL W P-5'-P-CCNC: 15 U/L (ref 0–45)
AST SERPL W P-5'-P-CCNC: 27 U/L (ref 0–45)
AST SERPL W P-5'-P-CCNC: 39 U/L (ref 0–45)
AST SERPL W P-5'-P-CCNC: 48 U/L (ref 0–45)
BASOPHILS # BLD AUTO: 0 10E3/UL (ref 0–0.2)
BASOPHILS # BLD AUTO: 0 10E3/UL (ref 0–0.2)
BASOPHILS # BLD AUTO: 0.1 10E3/UL (ref 0–0.2)
BASOPHILS # BLD AUTO: 0.1 10E3/UL (ref 0–0.2)
BASOPHILS # BLD MANUAL: 0 10E3/UL (ref 0–0.2)
BASOPHILS # BLD MANUAL: 0 10E3/UL (ref 0–0.2)
BASOPHILS NFR BLD AUTO: 0 %
BASOPHILS NFR BLD AUTO: 1 %
BASOPHILS NFR BLD MANUAL: 0 %
BASOPHILS NFR BLD MANUAL: 0 %
BILIRUB SERPL-MCNC: 0.4 MG/DL
BILIRUB SERPL-MCNC: 0.5 MG/DL
BILIRUB SERPL-MCNC: 0.5 MG/DL
BILIRUB SERPL-MCNC: 0.7 MG/DL
BUN SERPL-MCNC: 12.3 MG/DL (ref 8–23)
BUN SERPL-MCNC: 13.6 MG/DL (ref 8–23)
BUN SERPL-MCNC: 16.9 MG/DL (ref 8–23)
BUN SERPL-MCNC: 23.2 MG/DL (ref 8–23)
BUN SERPL-MCNC: 26.1 MG/DL (ref 8–23)
CALCIUM SERPL-MCNC: 8.8 MG/DL (ref 8.8–10.2)
CALCIUM SERPL-MCNC: 8.9 MG/DL (ref 8.8–10.2)
CALCIUM SERPL-MCNC: 9.3 MG/DL (ref 8.8–10.2)
CALCIUM SERPL-MCNC: 9.4 MG/DL (ref 8.8–10.2)
CALCIUM SERPL-MCNC: 9.4 MG/DL (ref 8.8–10.2)
CHLORIDE SERPL-SCNC: 102 MMOL/L (ref 98–107)
CHLORIDE SERPL-SCNC: 102 MMOL/L (ref 98–107)
CHLORIDE SERPL-SCNC: 105 MMOL/L (ref 98–107)
CHLORIDE SERPL-SCNC: 106 MMOL/L (ref 98–107)
CHLORIDE SERPL-SCNC: 106 MMOL/L (ref 98–107)
CHOLEST SERPL-MCNC: 322 MG/DL
CORTIS SERPL-MCNC: 29.7 UG/DL
CREAT SERPL-MCNC: 0.91 MG/DL (ref 0.67–1.17)
CREAT SERPL-MCNC: 0.93 MG/DL (ref 0.67–1.17)
CREAT SERPL-MCNC: 0.96 MG/DL (ref 0.67–1.17)
CREAT SERPL-MCNC: 0.97 MG/DL (ref 0.67–1.17)
CREAT SERPL-MCNC: 1.14 MG/DL (ref 0.67–1.17)
CRP SERPL-MCNC: 196.47 MG/L
DEPRECATED HCO3 PLAS-SCNC: 20 MMOL/L (ref 22–29)
DEPRECATED HCO3 PLAS-SCNC: 21 MMOL/L (ref 22–29)
DEPRECATED HCO3 PLAS-SCNC: 24 MMOL/L (ref 22–29)
DEPRECATED HCO3 PLAS-SCNC: 24 MMOL/L (ref 22–29)
DEPRECATED HCO3 PLAS-SCNC: 25 MMOL/L (ref 22–29)
EGFRCR SERPLBLD CKD-EPI 2021: 84 ML/MIN/1.73M2
EGFRCR SERPLBLD CKD-EPI 2021: 85 ML/MIN/1.73M2
EGFRCR SERPLBLD CKD-EPI 2021: 88 ML/MIN/1.73M2
EOSINOPHIL # BLD AUTO: 0 10E3/UL (ref 0–0.7)
EOSINOPHIL # BLD AUTO: 0.5 10E3/UL (ref 0–0.7)
EOSINOPHIL # BLD MANUAL: 0 10E3/UL (ref 0–0.7)
EOSINOPHIL # BLD MANUAL: 0 10E3/UL (ref 0–0.7)
EOSINOPHIL NFR BLD AUTO: 0 %
EOSINOPHIL NFR BLD AUTO: 4 %
EOSINOPHIL NFR BLD MANUAL: 0 %
EOSINOPHIL NFR BLD MANUAL: 0 %
ERYTHROCYTE [DISTWIDTH] IN BLOOD BY AUTOMATED COUNT: 13.4 % (ref 10–15)
ERYTHROCYTE [DISTWIDTH] IN BLOOD BY AUTOMATED COUNT: 13.8 % (ref 10–15)
ERYTHROCYTE [DISTWIDTH] IN BLOOD BY AUTOMATED COUNT: 13.9 % (ref 10–15)
ERYTHROCYTE [DISTWIDTH] IN BLOOD BY AUTOMATED COUNT: 15.3 % (ref 10–15)
ERYTHROCYTE [DISTWIDTH] IN BLOOD BY AUTOMATED COUNT: 15.6 % (ref 10–15)
ERYTHROCYTE [DISTWIDTH] IN BLOOD BY AUTOMATED COUNT: 16 % (ref 10–15)
ERYTHROCYTE [SEDIMENTATION RATE] IN BLOOD BY WESTERGREN METHOD: 64 MM/HR (ref 0–20)
GFR SERPL CREATININE-BSD FRML MDRD: 70 ML/MIN/1.73M2
GFR SERPL CREATININE-BSD FRML MDRD: >90 ML/MIN/1.73M2
GLUCOSE BLDC GLUCOMTR-MCNC: 115 MG/DL (ref 70–99)
GLUCOSE BLDC GLUCOMTR-MCNC: 115 MG/DL (ref 70–99)
GLUCOSE BLDC GLUCOMTR-MCNC: 125 MG/DL (ref 70–99)
GLUCOSE BLDC GLUCOMTR-MCNC: 132 MG/DL (ref 70–99)
GLUCOSE SERPL-MCNC: 107 MG/DL (ref 70–99)
GLUCOSE SERPL-MCNC: 107 MG/DL (ref 70–99)
GLUCOSE SERPL-MCNC: 128 MG/DL (ref 70–99)
GLUCOSE SERPL-MCNC: 134 MG/DL (ref 70–99)
GLUCOSE SERPL-MCNC: 92 MG/DL (ref 70–99)
GLUCOSE SERPL-MCNC: 92 MG/DL (ref 70–99)
HCT VFR BLD AUTO: 36.2 % (ref 40–53)
HCT VFR BLD AUTO: 36.6 % (ref 40–53)
HCT VFR BLD AUTO: 38 % (ref 40–53)
HCT VFR BLD AUTO: 38.6 % (ref 40–53)
HCT VFR BLD AUTO: 40.1 % (ref 40–53)
HCT VFR BLD AUTO: 43.5 % (ref 40–53)
HDLC SERPL-MCNC: 58 MG/DL
HGB BLD-MCNC: 11.8 G/DL (ref 13.3–17.7)
HGB BLD-MCNC: 12 G/DL (ref 13.3–17.7)
HGB BLD-MCNC: 12.5 G/DL (ref 13.3–17.7)
HGB BLD-MCNC: 12.7 G/DL (ref 13.3–17.7)
HGB BLD-MCNC: 13.2 G/DL (ref 13.3–17.7)
HGB BLD-MCNC: 14 G/DL (ref 13.3–17.7)
HOLD SPECIMEN: NORMAL
HOLD SPECIMEN: NORMAL
IMM GRANULOCYTES # BLD: 0.3 10E3/UL
IMM GRANULOCYTES # BLD: 0.3 10E3/UL
IMM GRANULOCYTES # BLD: 0.4 10E3/UL
IMM GRANULOCYTES # BLD: 0.5 10E3/UL
IMM GRANULOCYTES NFR BLD: 2 %
LDLC SERPL CALC-MCNC: 211 MG/DL
LYMPHOCYTES # BLD AUTO: 1.2 10E3/UL (ref 0.8–5.3)
LYMPHOCYTES # BLD AUTO: 1.4 10E3/UL (ref 0.8–5.3)
LYMPHOCYTES # BLD AUTO: 1.5 10E3/UL (ref 0.8–5.3)
LYMPHOCYTES # BLD AUTO: 2.4 10E3/UL (ref 0.8–5.3)
LYMPHOCYTES # BLD MANUAL: 1.6 10E3/UL (ref 0.8–5.3)
LYMPHOCYTES # BLD MANUAL: 2.5 10E3/UL (ref 0.8–5.3)
LYMPHOCYTES NFR BLD AUTO: 11 %
LYMPHOCYTES NFR BLD AUTO: 19 %
LYMPHOCYTES NFR BLD AUTO: 5 %
LYMPHOCYTES NFR BLD AUTO: 7 %
LYMPHOCYTES NFR BLD MANUAL: 21 %
LYMPHOCYTES NFR BLD MANUAL: 22 %
MAGNESIUM SERPL-MCNC: 2.5 MG/DL (ref 1.7–2.3)
MCH RBC QN AUTO: 29.2 PG (ref 26.5–33)
MCH RBC QN AUTO: 29.5 PG (ref 26.5–33)
MCH RBC QN AUTO: 29.6 PG (ref 26.5–33)
MCH RBC QN AUTO: 30.2 PG (ref 26.5–33)
MCH RBC QN AUTO: 30.4 PG (ref 26.5–33)
MCH RBC QN AUTO: 31.5 PG (ref 26.5–33)
MCHC RBC AUTO-ENTMCNC: 32.2 G/DL (ref 31.5–36.5)
MCHC RBC AUTO-ENTMCNC: 32.6 G/DL (ref 31.5–36.5)
MCHC RBC AUTO-ENTMCNC: 32.8 G/DL (ref 31.5–36.5)
MCHC RBC AUTO-ENTMCNC: 32.9 G/DL (ref 31.5–36.5)
MCV RBC AUTO: 90 FL (ref 78–100)
MCV RBC AUTO: 91 FL (ref 78–100)
MCV RBC AUTO: 91 FL (ref 78–100)
MCV RBC AUTO: 92 FL (ref 78–100)
MCV RBC AUTO: 93 FL (ref 78–100)
MCV RBC AUTO: 96 FL (ref 78–100)
MONOCYTES # BLD AUTO: 0.3 10E3/UL (ref 0–1.3)
MONOCYTES # BLD AUTO: 0.7 10E3/UL (ref 0–1.3)
MONOCYTES # BLD AUTO: 0.8 10E3/UL (ref 0–1.3)
MONOCYTES # BLD AUTO: 1.2 10E3/UL (ref 0–1.3)
MONOCYTES # BLD MANUAL: 0.3 10E3/UL (ref 0–1.3)
MONOCYTES # BLD MANUAL: 1.4 10E3/UL (ref 0–1.3)
MONOCYTES NFR BLD AUTO: 10 %
MONOCYTES NFR BLD AUTO: 3 %
MONOCYTES NFR BLD AUTO: 3 %
MONOCYTES NFR BLD AUTO: 4 %
MONOCYTES NFR BLD MANUAL: 12 %
MONOCYTES NFR BLD MANUAL: 4 %
NEUTROPHILS # BLD AUTO: 10.9 10E3/UL (ref 1.6–8.3)
NEUTROPHILS # BLD AUTO: 19 10E3/UL (ref 1.6–8.3)
NEUTROPHILS # BLD AUTO: 19.5 10E3/UL (ref 1.6–8.3)
NEUTROPHILS # BLD AUTO: 7.8 10E3/UL (ref 1.6–8.3)
NEUTROPHILS # BLD MANUAL: 5.8 10E3/UL (ref 1.6–8.3)
NEUTROPHILS # BLD MANUAL: 7.5 10E3/UL (ref 1.6–8.3)
NEUTROPHILS NFR BLD AUTO: 64 %
NEUTROPHILS NFR BLD AUTO: 84 %
NEUTROPHILS NFR BLD AUTO: 88 %
NEUTROPHILS NFR BLD AUTO: 89 %
NEUTROPHILS NFR BLD MANUAL: 66 %
NEUTROPHILS NFR BLD MANUAL: 75 %
NONHDLC SERPL-MCNC: 264 MG/DL
NRBC # BLD AUTO: 0 10E3/UL
NRBC BLD AUTO-RTO: 0 /100
PATH REPORT.COMMENTS IMP SPEC: ABNORMAL
PATH REPORT.COMMENTS IMP SPEC: YES
PATH REPORT.FINAL DX SPEC: ABNORMAL
PATH REPORT.GROSS SPEC: ABNORMAL
PATH REPORT.MICROSCOPIC SPEC OTHER STN: ABNORMAL
PATH REPORT.RELEVANT HX SPEC: ABNORMAL
PHOTO IMAGE: ABNORMAL
PLAT MORPH BLD: ABNORMAL
PLAT MORPH BLD: NORMAL
PLATELET # BLD AUTO: 241 10E3/UL (ref 150–450)
PLATELET # BLD AUTO: 302 10E3/UL (ref 150–450)
PLATELET # BLD AUTO: 314 10E3/UL (ref 150–450)
PLATELET # BLD AUTO: 317 10E3/UL (ref 150–450)
PLATELET # BLD AUTO: 338 10E3/UL (ref 150–450)
PLATELET # BLD AUTO: 352 10E3/UL (ref 150–450)
POTASSIUM SERPL-SCNC: 3.8 MMOL/L (ref 3.4–5.3)
POTASSIUM SERPL-SCNC: 3.9 MMOL/L (ref 3.4–5.3)
POTASSIUM SERPL-SCNC: 4 MMOL/L (ref 3.4–5.3)
POTASSIUM SERPL-SCNC: 4.1 MMOL/L (ref 3.4–5.3)
POTASSIUM SERPL-SCNC: 4.4 MMOL/L (ref 3.4–5.3)
POTASSIUM SERPL-SCNC: 4.5 MMOL/L (ref 3.4–5.3)
PROT SERPL-MCNC: 6.4 G/DL (ref 6.4–8.3)
PROT SERPL-MCNC: 6.5 G/DL (ref 6.4–8.3)
PROT SERPL-MCNC: 6.5 G/DL (ref 6.4–8.3)
PROT SERPL-MCNC: 7.3 G/DL (ref 6.4–8.3)
PSA SERPL DL<=0.01 NG/ML-MCNC: 0.03 NG/ML (ref 0–6.5)
RBC # BLD AUTO: 3.98 10E6/UL (ref 4.4–5.9)
RBC # BLD AUTO: 4 10E6/UL (ref 4.4–5.9)
RBC # BLD AUTO: 4.11 10E6/UL (ref 4.4–5.9)
RBC # BLD AUTO: 4.19 10E6/UL (ref 4.4–5.9)
RBC # BLD AUTO: 4.29 10E6/UL (ref 4.4–5.9)
RBC # BLD AUTO: 4.79 10E6/UL (ref 4.4–5.9)
RBC MORPH BLD: ABNORMAL
RBC MORPH BLD: NORMAL
SODIUM SERPL-SCNC: 137 MMOL/L (ref 135–145)
SODIUM SERPL-SCNC: 137 MMOL/L (ref 136–145)
SODIUM SERPL-SCNC: 139 MMOL/L (ref 136–145)
SODIUM SERPL-SCNC: 139 MMOL/L (ref 136–145)
SODIUM SERPL-SCNC: 140 MMOL/L (ref 135–145)
SPECIMEN EXPIRATION DATE: NORMAL
TESTOST SERPL-MCNC: 422 NG/DL (ref 240–950)
TRIGL SERPL-MCNC: 265 MG/DL
TSH SERPL DL<=0.005 MIU/L-ACNC: 1.46 UIU/ML (ref 0.3–4.2)
TSH SERPL DL<=0.005 MIU/L-ACNC: 1.81 UIU/ML (ref 0.3–4.2)
WBC # BLD AUTO: 11.3 10E3/UL (ref 4–11)
WBC # BLD AUTO: 12.3 10E3/UL (ref 4–11)
WBC # BLD AUTO: 12.9 10E3/UL (ref 4–11)
WBC # BLD AUTO: 21.8 10E3/UL (ref 4–11)
WBC # BLD AUTO: 22 10E3/UL (ref 4–11)
WBC # BLD AUTO: 7.7 10E3/UL (ref 4–11)

## 2023-01-01 PROCEDURE — 99442 PR PHYSICIAN TELEPHONE EVALUATION 11-20 MIN: CPT | Mod: 95 | Performed by: FAMILY MEDICINE

## 2023-01-01 PROCEDURE — 00U20JZ SUPPLEMENT DURA MATER WITH SYNTHETIC SUBSTITUTE, OPEN APPROACH: ICD-10-PCS | Performed by: NEUROLOGICAL SURGERY

## 2023-01-01 PROCEDURE — A9585 GADOBUTROL INJECTION: HCPCS | Mod: JZ | Performed by: RADIOLOGY

## 2023-01-01 PROCEDURE — 85025 COMPLETE CBC W/AUTO DIFF WBC: CPT | Performed by: STUDENT IN AN ORGANIZED HEALTH CARE EDUCATION/TRAINING PROGRAM

## 2023-01-01 PROCEDURE — 74177 CT ABD & PELVIS W/CONTRAST: CPT

## 2023-01-01 PROCEDURE — 250N000012 HC RX MED GY IP 250 OP 636 PS 637

## 2023-01-01 PROCEDURE — 250N000013 HC RX MED GY IP 250 OP 250 PS 637

## 2023-01-01 PROCEDURE — 99207 PR NO BILLABLE SERVICE THIS VISIT: CPT | Performed by: INTERNAL MEDICINE

## 2023-01-01 PROCEDURE — 77300 RADIATION THERAPY DOSE PLAN: CPT | Mod: 26 | Performed by: RADIOLOGY

## 2023-01-01 PROCEDURE — 250N000025 HC SEVOFLURANE, PER MIN: Performed by: NEUROLOGICAL SURGERY

## 2023-01-01 PROCEDURE — 36415 COLL VENOUS BLD VENIPUNCTURE: CPT | Performed by: ANESTHESIOLOGY

## 2023-01-01 PROCEDURE — 82947 ASSAY GLUCOSE BLOOD QUANT: CPT | Performed by: ANESTHESIOLOGY

## 2023-01-01 PROCEDURE — 250N000011 HC RX IP 250 OP 636: Mod: JZ | Performed by: NURSE ANESTHETIST, CERTIFIED REGISTERED

## 2023-01-01 PROCEDURE — 93970 EXTREMITY STUDY: CPT

## 2023-01-01 PROCEDURE — 90677 PCV20 VACCINE IM: CPT | Performed by: FAMILY MEDICINE

## 2023-01-01 PROCEDURE — 258N000003 HC RX IP 258 OP 636

## 2023-01-01 PROCEDURE — 250N000011 HC RX IP 250 OP 636: Performed by: STUDENT IN AN ORGANIZED HEALTH CARE EDUCATION/TRAINING PROGRAM

## 2023-01-01 PROCEDURE — 97165 OT EVAL LOW COMPLEX 30 MIN: CPT | Mod: GO | Performed by: OCCUPATIONAL THERAPIST

## 2023-01-01 PROCEDURE — 255N000002 HC RX 255 OP 636: Performed by: RADIOLOGY

## 2023-01-01 PROCEDURE — 99214 OFFICE O/P EST MOD 30 MIN: CPT | Mod: VID | Performed by: PHYSICIAN ASSISTANT

## 2023-01-01 PROCEDURE — 250N000009 HC RX 250: Performed by: ANESTHESIOLOGY

## 2023-01-01 PROCEDURE — 250N000011 HC RX IP 250 OP 636: Performed by: ANESTHESIOLOGY

## 2023-01-01 PROCEDURE — 96413 CHEMO IV INFUSION 1 HR: CPT

## 2023-01-01 PROCEDURE — 36415 COLL VENOUS BLD VENIPUNCTURE: CPT

## 2023-01-01 PROCEDURE — 70491 CT SOFT TISSUE NECK W/DYE: CPT

## 2023-01-01 PROCEDURE — 85007 BL SMEAR W/DIFF WBC COUNT: CPT

## 2023-01-01 PROCEDURE — 250N000013 HC RX MED GY IP 250 OP 250 PS 637: Performed by: NURSE PRACTITIONER

## 2023-01-01 PROCEDURE — 85007 BL SMEAR W/DIFF WBC COUNT: CPT | Performed by: FAMILY MEDICINE

## 2023-01-01 PROCEDURE — 80048 BASIC METABOLIC PNL TOTAL CA: CPT | Performed by: STUDENT IN AN ORGANIZED HEALTH CARE EDUCATION/TRAINING PROGRAM

## 2023-01-01 PROCEDURE — 99215 OFFICE O/P EST HI 40 MIN: CPT | Mod: VID | Performed by: STUDENT IN AN ORGANIZED HEALTH CARE EDUCATION/TRAINING PROGRAM

## 2023-01-01 PROCEDURE — C1763 CONN TISS, NON-HUMAN: HCPCS | Performed by: NEUROLOGICAL SURGERY

## 2023-01-01 PROCEDURE — 99215 OFFICE O/P EST HI 40 MIN: CPT | Mod: 95 | Performed by: STUDENT IN AN ORGANIZED HEALTH CARE EDUCATION/TRAINING PROGRAM

## 2023-01-01 PROCEDURE — A9552 F18 FDG: HCPCS | Performed by: PHYSICIAN ASSISTANT

## 2023-01-01 PROCEDURE — 255N000002 HC RX 255 OP 636: Performed by: NEUROLOGICAL SURGERY

## 2023-01-01 PROCEDURE — 97535 SELF CARE MNGMENT TRAINING: CPT | Mod: GO | Performed by: OCCUPATIONAL THERAPIST

## 2023-01-01 PROCEDURE — G0463 HOSPITAL OUTPT CLINIC VISIT: HCPCS | Performed by: INTERNAL MEDICINE

## 2023-01-01 PROCEDURE — 250N000013 HC RX MED GY IP 250 OP 250 PS 637: Performed by: INTERNAL MEDICINE

## 2023-01-01 PROCEDURE — 80061 LIPID PANEL: CPT | Performed by: FAMILY MEDICINE

## 2023-01-01 PROCEDURE — 255N000002 HC RX 255 OP 636: Performed by: PHYSICIAN ASSISTANT

## 2023-01-01 PROCEDURE — 77370 RADIATION PHYSICS CONSULT: CPT | Performed by: RADIOLOGY

## 2023-01-01 PROCEDURE — G0463 HOSPITAL OUTPT CLINIC VISIT: HCPCS | Performed by: RADIOLOGY

## 2023-01-01 PROCEDURE — 99223 1ST HOSP IP/OBS HIGH 75: CPT | Performed by: STUDENT IN AN ORGANIZED HEALTH CARE EDUCATION/TRAINING PROGRAM

## 2023-01-01 PROCEDURE — 70553 MRI BRAIN STEM W/O & W/DYE: CPT

## 2023-01-01 PROCEDURE — 70491 CT SOFT TISSUE NECK W/DYE: CPT | Mod: 26 | Performed by: RADIOLOGY

## 2023-01-01 PROCEDURE — 250N000011 HC RX IP 250 OP 636: Mod: JZ | Performed by: RADIOLOGY

## 2023-01-01 PROCEDURE — 99215 OFFICE O/P EST HI 40 MIN: CPT | Performed by: RADIOLOGY

## 2023-01-01 PROCEDURE — 250N000013 HC RX MED GY IP 250 OP 250 PS 637: Performed by: STUDENT IN AN ORGANIZED HEALTH CARE EDUCATION/TRAINING PROGRAM

## 2023-01-01 PROCEDURE — 77470 SPECIAL RADIATION TREATMENT: CPT | Performed by: RADIOLOGY

## 2023-01-01 PROCEDURE — P9045 ALBUMIN (HUMAN), 5%, 250 ML: HCPCS | Mod: JZ | Performed by: NURSE ANESTHETIST, CERTIFIED REGISTERED

## 2023-01-01 PROCEDURE — A9585 GADOBUTROL INJECTION: HCPCS | Performed by: PHYSICIAN ASSISTANT

## 2023-01-01 PROCEDURE — 99214 OFFICE O/P EST MOD 30 MIN: CPT | Performed by: FAMILY MEDICINE

## 2023-01-01 PROCEDURE — 85652 RBC SED RATE AUTOMATED: CPT | Performed by: FAMILY MEDICINE

## 2023-01-01 PROCEDURE — 82533 TOTAL CORTISOL: CPT | Performed by: PHYSICIAN ASSISTANT

## 2023-01-01 PROCEDURE — 36415 COLL VENOUS BLD VENIPUNCTURE: CPT | Performed by: INTERNAL MEDICINE

## 2023-01-01 PROCEDURE — 258N000003 HC RX IP 258 OP 636: Performed by: PHYSICIAN ASSISTANT

## 2023-01-01 PROCEDURE — 36415 COLL VENOUS BLD VENIPUNCTURE: CPT | Performed by: FAMILY MEDICINE

## 2023-01-01 PROCEDURE — 80053 COMPREHEN METABOLIC PANEL: CPT | Performed by: NURSE PRACTITIONER

## 2023-01-01 PROCEDURE — 8E09XBZ COMPUTER ASSISTED PROCEDURE OF HEAD AND NECK REGION: ICD-10-PCS | Performed by: NEUROLOGICAL SURGERY

## 2023-01-01 PROCEDURE — G0439 PPPS, SUBSEQ VISIT: HCPCS | Performed by: FAMILY MEDICINE

## 2023-01-01 PROCEDURE — 99024 POSTOP FOLLOW-UP VISIT: CPT | Performed by: NEUROLOGICAL SURGERY

## 2023-01-01 PROCEDURE — 99207 PR NO CHARGE NURSE ONLY: CPT

## 2023-01-01 PROCEDURE — 250N000009 HC RX 250: Performed by: NURSE ANESTHETIST, CERTIFIED REGISTERED

## 2023-01-01 PROCEDURE — 70552 MRI BRAIN STEM W/DYE: CPT | Mod: 26 | Performed by: RADIOLOGY

## 2023-01-01 PROCEDURE — 370N000017 HC ANESTHESIA TECHNICAL FEE, PER MIN: Performed by: NEUROLOGICAL SURGERY

## 2023-01-01 PROCEDURE — 86900 BLOOD TYPING SEROLOGIC ABO: CPT | Performed by: ANESTHESIOLOGY

## 2023-01-01 PROCEDURE — 78816 PET IMAGE W/CT FULL BODY: CPT | Mod: PS

## 2023-01-01 PROCEDURE — G0463 HOSPITAL OUTPT CLINIC VISIT: HCPCS | Performed by: STUDENT IN AN ORGANIZED HEALTH CARE EDUCATION/TRAINING PROGRAM

## 2023-01-01 PROCEDURE — 85027 COMPLETE CBC AUTOMATED: CPT | Performed by: FAMILY MEDICINE

## 2023-01-01 PROCEDURE — 77334 RADIATION TREATMENT AID(S): CPT | Performed by: RADIOLOGY

## 2023-01-01 PROCEDURE — 255N000002 HC RX 255 OP 636: Mod: JZ | Performed by: RADIOLOGY

## 2023-01-01 PROCEDURE — 86140 C-REACTIVE PROTEIN: CPT | Performed by: FAMILY MEDICINE

## 2023-01-01 PROCEDURE — 200N000001 HC R&B ICU

## 2023-01-01 PROCEDURE — 99213 OFFICE O/P EST LOW 20 MIN: CPT | Performed by: NEUROLOGICAL SURGERY

## 2023-01-01 PROCEDURE — 85025 COMPLETE CBC W/AUTO DIFF WBC: CPT | Performed by: INTERNAL MEDICINE

## 2023-01-01 PROCEDURE — 61796 SRS CRANIAL LESION SIMPLE: CPT | Mod: 58 | Performed by: NEUROLOGICAL SURGERY

## 2023-01-01 PROCEDURE — 77295 3-D RADIOTHERAPY PLAN: CPT | Performed by: RADIOLOGY

## 2023-01-01 PROCEDURE — 250N000009 HC RX 250: Performed by: RADIOLOGY

## 2023-01-01 PROCEDURE — 250N000011 HC RX IP 250 OP 636: Mod: JZ | Performed by: PHYSICIAN ASSISTANT

## 2023-01-01 PROCEDURE — 250N000011 HC RX IP 250 OP 636: Mod: JZ

## 2023-01-01 PROCEDURE — 77432 STEREOTACTIC RADIATION TRMT: CPT | Performed by: RADIOLOGY

## 2023-01-01 PROCEDURE — 250N000013 HC RX MED GY IP 250 OP 250 PS 637: Performed by: NURSE ANESTHETIST, CERTIFIED REGISTERED

## 2023-01-01 PROCEDURE — 250N000013 HC RX MED GY IP 250 OP 250 PS 637: Performed by: RADIOLOGY

## 2023-01-01 PROCEDURE — A9585 GADOBUTROL INJECTION: HCPCS | Performed by: RADIOLOGY

## 2023-01-01 PROCEDURE — 272N000001 HC OR GENERAL SUPPLY STERILE: Performed by: NEUROLOGICAL SURGERY

## 2023-01-01 PROCEDURE — 88341 IMHCHEM/IMCYTCHM EA ADD ANTB: CPT | Mod: 26 | Performed by: PATHOLOGY

## 2023-01-01 PROCEDURE — 77371 SRS MULTISOURCE: CPT | Performed by: RADIOLOGY

## 2023-01-01 PROCEDURE — 70553 MRI BRAIN STEM W/O & W/DYE: CPT | Mod: 26 | Performed by: RADIOLOGY

## 2023-01-01 PROCEDURE — 250N000013 HC RX MED GY IP 250 OP 250 PS 637: Performed by: HOSPITALIST

## 2023-01-01 PROCEDURE — 99215 OFFICE O/P EST HI 40 MIN: CPT | Performed by: INTERNAL MEDICINE

## 2023-01-01 PROCEDURE — 82310 ASSAY OF CALCIUM: CPT

## 2023-01-01 PROCEDURE — 120N000013 HC R&B IMCU

## 2023-01-01 PROCEDURE — 250N000011 HC RX IP 250 OP 636: Mod: JZ | Performed by: NEUROLOGICAL SURGERY

## 2023-01-01 PROCEDURE — 61800 APPLY SRS HEADFRAME ADD-ON: CPT | Performed by: NEUROLOGICAL SURGERY

## 2023-01-01 PROCEDURE — 88307 TISSUE EXAM BY PATHOLOGIST: CPT | Mod: 26 | Performed by: PATHOLOGY

## 2023-01-01 PROCEDURE — G0103 PSA SCREENING: HCPCS | Performed by: FAMILY MEDICINE

## 2023-01-01 PROCEDURE — 71260 CT THORAX DX C+: CPT | Mod: 26 | Performed by: RADIOLOGY

## 2023-01-01 PROCEDURE — 85025 COMPLETE CBC W/AUTO DIFF WBC: CPT | Performed by: NURSE PRACTITIONER

## 2023-01-01 PROCEDURE — 61510 CRNEC TREPH EXC BRN TUM STTL: CPT | Performed by: NEUROLOGICAL SURGERY

## 2023-01-01 PROCEDURE — 77334 RADIATION TREATMENT AID(S): CPT | Mod: 26 | Performed by: RADIOLOGY

## 2023-01-01 PROCEDURE — 77300 RADIATION THERAPY DOSE PLAN: CPT | Performed by: RADIOLOGY

## 2023-01-01 PROCEDURE — 80053 COMPREHEN METABOLIC PANEL: CPT | Performed by: PHYSICIAN ASSISTANT

## 2023-01-01 PROCEDURE — 97161 PT EVAL LOW COMPLEX 20 MIN: CPT | Mod: GP

## 2023-01-01 PROCEDURE — 00B70ZZ EXCISION OF CEREBRAL HEMISPHERE, OPEN APPROACH: ICD-10-PCS | Performed by: NEUROLOGICAL SURGERY

## 2023-01-01 PROCEDURE — 70552 MRI BRAIN STEM W/DYE: CPT

## 2023-01-01 PROCEDURE — 97110 THERAPEUTIC EXERCISES: CPT | Mod: GP | Performed by: PHYSICAL THERAPIST

## 2023-01-01 PROCEDURE — 82024 ASSAY OF ACTH: CPT | Performed by: PHYSICIAN ASSISTANT

## 2023-01-01 PROCEDURE — 36415 COLL VENOUS BLD VENIPUNCTURE: CPT | Performed by: PHYSICIAN ASSISTANT

## 2023-01-01 PROCEDURE — G0009 ADMIN PNEUMOCOCCAL VACCINE: HCPCS | Performed by: FAMILY MEDICINE

## 2023-01-01 PROCEDURE — 93971 EXTREMITY STUDY: CPT | Mod: RT

## 2023-01-01 PROCEDURE — 710N000009 HC RECOVERY PHASE 1, LEVEL 1, PER MIN: Performed by: NEUROLOGICAL SURGERY

## 2023-01-01 PROCEDURE — 88342 IMHCHEM/IMCYTCHM 1ST ANTB: CPT | Mod: 26 | Performed by: PATHOLOGY

## 2023-01-01 PROCEDURE — 99417 PROLNG OP E/M EACH 15 MIN: CPT | Performed by: STUDENT IN AN ORGANIZED HEALTH CARE EDUCATION/TRAINING PROGRAM

## 2023-01-01 PROCEDURE — 84443 ASSAY THYROID STIM HORMONE: CPT | Mod: GZ

## 2023-01-01 PROCEDURE — 250N000011 HC RX IP 250 OP 636

## 2023-01-01 PROCEDURE — 343N000001 HC RX 343: Performed by: PHYSICIAN ASSISTANT

## 2023-01-01 PROCEDURE — 83735 ASSAY OF MAGNESIUM: CPT | Performed by: STUDENT IN AN ORGANIZED HEALTH CARE EDUCATION/TRAINING PROGRAM

## 2023-01-01 PROCEDURE — 84132 ASSAY OF SERUM POTASSIUM: CPT | Performed by: ANESTHESIOLOGY

## 2023-01-01 PROCEDURE — 84403 ASSAY OF TOTAL TESTOSTERONE: CPT

## 2023-01-01 PROCEDURE — 250N000012 HC RX MED GY IP 250 OP 636 PS 637: Performed by: NURSE PRACTITIONER

## 2023-01-01 PROCEDURE — 99214 OFFICE O/P EST MOD 30 MIN: CPT | Performed by: NURSE PRACTITIONER

## 2023-01-01 PROCEDURE — 360N000079 HC SURGERY LEVEL 6, PER MIN: Performed by: NEUROLOGICAL SURGERY

## 2023-01-01 PROCEDURE — 999N000141 HC STATISTIC PRE-PROCEDURE NURSING ASSESSMENT: Performed by: NEUROLOGICAL SURGERY

## 2023-01-01 PROCEDURE — 258N000003 HC RX IP 258 OP 636: Performed by: NURSE ANESTHETIST, CERTIFIED REGISTERED

## 2023-01-01 PROCEDURE — 278N000051 HC OR IMPLANT GENERAL: Performed by: NEUROLOGICAL SURGERY

## 2023-01-01 PROCEDURE — 99215 OFFICE O/P EST HI 40 MIN: CPT | Performed by: STUDENT IN AN ORGANIZED HEALTH CARE EDUCATION/TRAINING PROGRAM

## 2023-01-01 PROCEDURE — 74177 CT ABD & PELVIS W/CONTRAST: CPT | Mod: 26 | Performed by: RADIOLOGY

## 2023-01-01 PROCEDURE — 36415 COLL VENOUS BLD VENIPUNCTURE: CPT | Performed by: NURSE PRACTITIONER

## 2023-01-01 PROCEDURE — 99213 OFFICE O/P EST LOW 20 MIN: CPT | Mod: 25 | Performed by: FAMILY MEDICINE

## 2023-01-01 PROCEDURE — 77295 3-D RADIOTHERAPY PLAN: CPT | Mod: 26 | Performed by: RADIOLOGY

## 2023-01-01 PROCEDURE — 84443 ASSAY THYROID STIM HORMONE: CPT | Performed by: PHYSICIAN ASSISTANT

## 2023-01-01 PROCEDURE — 99214 OFFICE O/P EST MOD 30 MIN: CPT | Mod: 95 | Performed by: RADIOLOGY

## 2023-01-01 PROCEDURE — 97530 THERAPEUTIC ACTIVITIES: CPT | Mod: GP | Performed by: PHYSICAL THERAPIST

## 2023-01-01 PROCEDURE — 80053 COMPREHEN METABOLIC PANEL: CPT | Performed by: INTERNAL MEDICINE

## 2023-01-01 PROCEDURE — A9585 GADOBUTROL INJECTION: HCPCS | Performed by: NEUROLOGICAL SURGERY

## 2023-01-01 PROCEDURE — 61781 SCAN PROC CRANIAL INTRA: CPT | Performed by: NEUROLOGICAL SURGERY

## 2023-01-01 PROCEDURE — 85027 COMPLETE CBC AUTOMATED: CPT

## 2023-01-01 PROCEDURE — 99232 SBSQ HOSP IP/OBS MODERATE 35: CPT | Performed by: HOSPITALIST

## 2023-01-01 PROCEDURE — C1713 ANCHOR/SCREW BN/BN,TIS/BN: HCPCS | Performed by: NEUROLOGICAL SURGERY

## 2023-01-01 PROCEDURE — 258N000003 HC RX IP 258 OP 636: Performed by: ANESTHESIOLOGY

## 2023-01-01 PROCEDURE — 88342 IMHCHEM/IMCYTCHM 1ST ANTB: CPT | Mod: TC | Performed by: NEUROLOGICAL SURGERY

## 2023-01-01 PROCEDURE — 250N000009 HC RX 250: Performed by: NEUROLOGICAL SURGERY

## 2023-01-01 DEVICE — IMPLANTABLE DEVICE: Type: IMPLANTABLE DEVICE | Site: CRANIAL | Status: FUNCTIONAL

## 2023-01-01 DEVICE — SCREW BONE NEURO 3 AXIS 4X1.5MM 56-15934: Type: IMPLANTABLE DEVICE | Site: CRANIAL | Status: FUNCTIONAL

## 2023-01-01 DEVICE — DURAGEN® DURAL GRAFT MATRIX 5PK, 2X2 DOM
Type: IMPLANTABLE DEVICE | Site: CRANIAL | Status: FUNCTIONAL
Brand: DURAGEN®

## 2023-01-01 RX ORDER — OXYCODONE HYDROCHLORIDE 5 MG/1
5 TABLET ORAL EVERY 4 HOURS PRN
Status: DISCONTINUED | OUTPATIENT
Start: 2023-01-01 | End: 2023-01-01 | Stop reason: HOSPADM

## 2023-01-01 RX ORDER — ROSUVASTATIN CALCIUM 20 MG/1
40 TABLET, COATED ORAL EVERY EVENING
Status: DISCONTINUED | OUTPATIENT
Start: 2023-01-01 | End: 2023-01-01 | Stop reason: HOSPADM

## 2023-01-01 RX ORDER — FENTANYL CITRATE 50 UG/ML
INJECTION, SOLUTION INTRAMUSCULAR; INTRAVENOUS PRN
Status: DISCONTINUED | OUTPATIENT
Start: 2023-01-01 | End: 2023-01-01

## 2023-01-01 RX ORDER — LORAZEPAM 0.5 MG/1
.5-1 TABLET ORAL
Status: DISCONTINUED | OUTPATIENT
Start: 2023-01-01 | End: 2023-01-01 | Stop reason: HOSPADM

## 2023-01-01 RX ORDER — ACETAMINOPHEN 325 MG/1
650 TABLET ORAL EVERY 4 HOURS PRN
Status: DISCONTINUED | OUTPATIENT
Start: 2023-01-01 | End: 2023-01-01 | Stop reason: HOSPADM

## 2023-01-01 RX ORDER — METHYLPREDNISOLONE SODIUM SUCCINATE 125 MG/2ML
125 INJECTION, POWDER, LYOPHILIZED, FOR SOLUTION INTRAMUSCULAR; INTRAVENOUS
Status: CANCELLED
Start: 2023-01-01

## 2023-01-01 RX ORDER — GADOBUTROL 604.72 MG/ML
8 INJECTION INTRAVENOUS ONCE
Status: COMPLETED | OUTPATIENT
Start: 2023-01-01 | End: 2023-01-01

## 2023-01-01 RX ORDER — DIPHENHYDRAMINE HYDROCHLORIDE 50 MG/ML
50 INJECTION INTRAMUSCULAR; INTRAVENOUS
Status: CANCELLED
Start: 2023-01-01

## 2023-01-01 RX ORDER — NALOXONE HYDROCHLORIDE 0.4 MG/ML
0.4 INJECTION, SOLUTION INTRAMUSCULAR; INTRAVENOUS; SUBCUTANEOUS
Status: DISCONTINUED | OUTPATIENT
Start: 2023-01-01 | End: 2023-01-01 | Stop reason: HOSPADM

## 2023-01-01 RX ORDER — ACETAMINOPHEN 325 MG/1
975 TABLET ORAL EVERY 8 HOURS
Qty: 27 TABLET | Refills: 0 | Status: DISCONTINUED | OUTPATIENT
Start: 2023-01-01 | End: 2023-01-01 | Stop reason: HOSPADM

## 2023-01-01 RX ORDER — ACETAMINOPHEN 325 MG/1
650 TABLET ORAL EVERY 4 HOURS PRN
Qty: 15 TABLET | Refills: 0 | COMMUNITY
Start: 2023-01-01

## 2023-01-01 RX ORDER — VARDENAFIL HYDROCHLORIDE 10 MG/1
10 TABLET ORAL DAILY PRN
COMMUNITY
End: 2023-01-01

## 2023-01-01 RX ORDER — LEVETIRACETAM 1000 MG/1
TABLET ORAL
Qty: 180 TABLET | Refills: 3 | Status: ON HOLD | OUTPATIENT
Start: 2023-01-01 | End: 2024-01-01

## 2023-01-01 RX ORDER — PREDNISONE 10 MG/1
TABLET ORAL
Qty: 259 TABLET | Refills: 0 | Status: SHIPPED | OUTPATIENT
Start: 2023-01-01 | End: 2023-01-01

## 2023-01-01 RX ORDER — EPINEPHRINE 1 MG/ML
0.3 INJECTION, SOLUTION INTRAMUSCULAR; SUBCUTANEOUS EVERY 5 MIN PRN
Status: CANCELLED | OUTPATIENT
Start: 2023-01-01

## 2023-01-01 RX ORDER — GADOBUTROL 604.72 MG/ML
8 INJECTION INTRAVENOUS ONCE
Status: CANCELLED | OUTPATIENT
Start: 2023-01-01 | End: 2023-01-01

## 2023-01-01 RX ORDER — DEXAMETHASONE 2 MG/1
TABLET ORAL
Qty: 60 TABLET | Refills: 1 | Status: SHIPPED | OUTPATIENT
Start: 2023-01-01 | End: 2024-01-01

## 2023-01-01 RX ORDER — POLYETHYLENE GLYCOL 3350 17 G/17G
17 POWDER, FOR SOLUTION ORAL DAILY
Status: DISCONTINUED | OUTPATIENT
Start: 2023-01-01 | End: 2023-01-01 | Stop reason: HOSPADM

## 2023-01-01 RX ORDER — OXYCODONE HYDROCHLORIDE 5 MG/1
10 TABLET ORAL EVERY 4 HOURS PRN
Status: DISCONTINUED | OUTPATIENT
Start: 2023-01-01 | End: 2023-01-01 | Stop reason: HOSPADM

## 2023-01-01 RX ORDER — HYDROMORPHONE HCL IN WATER/PF 6 MG/30 ML
0.4 PATIENT CONTROLLED ANALGESIA SYRINGE INTRAVENOUS
Status: DISCONTINUED | OUTPATIENT
Start: 2023-01-01 | End: 2023-01-01 | Stop reason: HOSPADM

## 2023-01-01 RX ORDER — ACETAMINOPHEN AND CODEINE PHOSPHATE 300; 30 MG/1; MG/1
1 TABLET ORAL EVERY 4 HOURS PRN
Status: DISCONTINUED | OUTPATIENT
Start: 2023-01-01 | End: 2023-01-01 | Stop reason: HOSPADM

## 2023-01-01 RX ORDER — PROPOFOL 10 MG/ML
INJECTION, EMULSION INTRAVENOUS CONTINUOUS PRN
Status: DISCONTINUED | OUTPATIENT
Start: 2023-01-01 | End: 2023-01-01

## 2023-01-01 RX ORDER — LIDOCAINE 40 MG/G
CREAM TOPICAL
Status: DISCONTINUED | OUTPATIENT
Start: 2023-01-01 | End: 2023-01-01 | Stop reason: HOSPADM

## 2023-01-01 RX ORDER — SODIUM CHLORIDE, SODIUM LACTATE, POTASSIUM CHLORIDE, CALCIUM CHLORIDE 600; 310; 30; 20 MG/100ML; MG/100ML; MG/100ML; MG/100ML
INJECTION, SOLUTION INTRAVENOUS CONTINUOUS
Status: DISCONTINUED | OUTPATIENT
Start: 2023-01-01 | End: 2023-01-01 | Stop reason: HOSPADM

## 2023-01-01 RX ORDER — GADOBUTROL 604.72 MG/ML
9 INJECTION INTRAVENOUS ONCE
Status: COMPLETED | OUTPATIENT
Start: 2023-01-01 | End: 2023-01-01

## 2023-01-01 RX ORDER — HEPARIN SODIUM (PORCINE) LOCK FLUSH IV SOLN 100 UNIT/ML 100 UNIT/ML
5 SOLUTION INTRAVENOUS
Status: CANCELLED | OUTPATIENT
Start: 2023-01-01

## 2023-01-01 RX ORDER — INDOMETHACIN 25 MG/1
CAPSULE ORAL
Qty: 30 CAPSULE | Refills: 5 | Status: SHIPPED | OUTPATIENT
Start: 2023-01-01 | End: 2023-01-01

## 2023-01-01 RX ORDER — DEXAMETHASONE SODIUM PHOSPHATE 4 MG/ML
4 INJECTION, SOLUTION INTRA-ARTICULAR; INTRALESIONAL; INTRAMUSCULAR; INTRAVENOUS; SOFT TISSUE EVERY 6 HOURS SCHEDULED
Status: DISCONTINUED | OUTPATIENT
Start: 2023-01-01 | End: 2023-01-01

## 2023-01-01 RX ORDER — FENTANYL CITRATE 0.05 MG/ML
50 INJECTION, SOLUTION INTRAMUSCULAR; INTRAVENOUS
Status: DISCONTINUED | OUTPATIENT
Start: 2023-01-01 | End: 2023-01-01 | Stop reason: HOSPADM

## 2023-01-01 RX ORDER — AMLODIPINE BESYLATE 10 MG/1
10 TABLET ORAL EVERY MORNING
Status: DISCONTINUED | OUTPATIENT
Start: 2023-01-01 | End: 2023-01-01 | Stop reason: HOSPADM

## 2023-01-01 RX ORDER — NALOXONE HYDROCHLORIDE 0.4 MG/ML
0.2 INJECTION, SOLUTION INTRAMUSCULAR; INTRAVENOUS; SUBCUTANEOUS
Status: DISCONTINUED | OUTPATIENT
Start: 2023-01-01 | End: 2023-01-01 | Stop reason: HOSPADM

## 2023-01-01 RX ORDER — HYDRALAZINE HYDROCHLORIDE 20 MG/ML
10-20 INJECTION INTRAMUSCULAR; INTRAVENOUS EVERY 30 MIN PRN
Status: DISCONTINUED | OUTPATIENT
Start: 2023-01-01 | End: 2023-01-01 | Stop reason: HOSPADM

## 2023-01-01 RX ORDER — HYDROMORPHONE HCL IN WATER/PF 6 MG/30 ML
0.2 PATIENT CONTROLLED ANALGESIA SYRINGE INTRAVENOUS
Status: DISCONTINUED | OUTPATIENT
Start: 2023-01-01 | End: 2023-01-01 | Stop reason: HOSPADM

## 2023-01-01 RX ORDER — LEVETIRACETAM 100 MG/ML
SOLUTION ORAL PRN
Status: DISCONTINUED | OUTPATIENT
Start: 2023-01-01 | End: 2023-01-01

## 2023-01-01 RX ORDER — LORAZEPAM 2 MG/ML
0.5 INJECTION INTRAMUSCULAR EVERY 4 HOURS PRN
Status: CANCELLED | OUTPATIENT
Start: 2023-01-01

## 2023-01-01 RX ORDER — MEPERIDINE HYDROCHLORIDE 25 MG/ML
25 INJECTION INTRAMUSCULAR; INTRAVENOUS; SUBCUTANEOUS EVERY 30 MIN PRN
Status: CANCELLED | OUTPATIENT
Start: 2023-01-01

## 2023-01-01 RX ORDER — PANTOPRAZOLE SODIUM 40 MG/1
40 TABLET, DELAYED RELEASE ORAL
Status: DISCONTINUED | OUTPATIENT
Start: 2023-01-01 | End: 2023-01-01 | Stop reason: HOSPADM

## 2023-01-01 RX ORDER — DEXMEDETOMIDINE HYDROCHLORIDE 4 UG/ML
INJECTION, SOLUTION INTRAVENOUS PRN
Status: DISCONTINUED | OUTPATIENT
Start: 2023-01-01 | End: 2023-01-01

## 2023-01-01 RX ORDER — PROPOFOL 10 MG/ML
INJECTION, EMULSION INTRAVENOUS PRN
Status: DISCONTINUED | OUTPATIENT
Start: 2023-01-01 | End: 2023-01-01

## 2023-01-01 RX ORDER — GADOBUTROL 604.72 MG/ML
10 INJECTION INTRAVENOUS ONCE
Status: COMPLETED | OUTPATIENT
Start: 2023-01-01 | End: 2023-01-01

## 2023-01-01 RX ORDER — SODIUM CHLORIDE, SODIUM LACTATE, POTASSIUM CHLORIDE, CALCIUM CHLORIDE 600; 310; 30; 20 MG/100ML; MG/100ML; MG/100ML; MG/100ML
INJECTION, SOLUTION INTRAVENOUS CONTINUOUS PRN
Status: DISCONTINUED | OUTPATIENT
Start: 2023-01-01 | End: 2023-01-01

## 2023-01-01 RX ORDER — DEXAMETHASONE 4 MG/1
4 TABLET ORAL EVERY 8 HOURS SCHEDULED
Status: DISCONTINUED | OUTPATIENT
Start: 2023-01-01 | End: 2023-01-01 | Stop reason: HOSPADM

## 2023-01-01 RX ORDER — ROSUVASTATIN CALCIUM 40 MG/1
40 TABLET, COATED ORAL DAILY
Qty: 90 TABLET | Refills: 3 | Status: SHIPPED | OUTPATIENT
Start: 2023-01-01

## 2023-01-01 RX ORDER — CEFAZOLIN SODIUM/WATER 2 G/20 ML
2 SYRINGE (ML) INTRAVENOUS
Status: COMPLETED | OUTPATIENT
Start: 2023-01-01 | End: 2023-01-01

## 2023-01-01 RX ORDER — EPHEDRINE SULFATE 50 MG/ML
INJECTION, SOLUTION INTRAMUSCULAR; INTRAVENOUS; SUBCUTANEOUS PRN
Status: DISCONTINUED | OUTPATIENT
Start: 2023-01-01 | End: 2023-01-01

## 2023-01-01 RX ORDER — ONDANSETRON 2 MG/ML
4 INJECTION INTRAMUSCULAR; INTRAVENOUS EVERY 30 MIN PRN
Status: DISCONTINUED | OUTPATIENT
Start: 2023-01-01 | End: 2023-01-01 | Stop reason: HOSPADM

## 2023-01-01 RX ORDER — LABETALOL 20 MG/4 ML (5 MG/ML) INTRAVENOUS SYRINGE
PRN
Status: DISCONTINUED | OUTPATIENT
Start: 2023-01-01 | End: 2023-01-01

## 2023-01-01 RX ORDER — METHOCARBAMOL 500 MG/1
500 TABLET, FILM COATED ORAL 4 TIMES DAILY
Status: DISCONTINUED | OUTPATIENT
Start: 2023-01-01 | End: 2023-01-01 | Stop reason: HOSPADM

## 2023-01-01 RX ORDER — HEPARIN SODIUM,PORCINE 10 UNIT/ML
5 VIAL (ML) INTRAVENOUS
Status: CANCELLED | OUTPATIENT
Start: 2023-01-01

## 2023-01-01 RX ORDER — PROCHLORPERAZINE MALEATE 5 MG
5 TABLET ORAL EVERY 6 HOURS PRN
Status: DISCONTINUED | OUTPATIENT
Start: 2023-01-01 | End: 2023-01-01 | Stop reason: HOSPADM

## 2023-01-01 RX ORDER — DEXAMETHASONE 4 MG/1
4 TABLET ORAL EVERY 6 HOURS SCHEDULED
Status: DISCONTINUED | OUTPATIENT
Start: 2023-01-01 | End: 2023-01-01

## 2023-01-01 RX ORDER — ALBUTEROL SULFATE 0.83 MG/ML
2.5 SOLUTION RESPIRATORY (INHALATION)
Status: CANCELLED | OUTPATIENT
Start: 2023-01-01

## 2023-01-01 RX ORDER — AMOXICILLIN 250 MG
1 CAPSULE ORAL 2 TIMES DAILY
Status: DISCONTINUED | OUTPATIENT
Start: 2023-01-01 | End: 2023-01-01 | Stop reason: HOSPADM

## 2023-01-01 RX ORDER — LIDOCAINE HYDROCHLORIDE 10 MG/ML
INJECTION, SOLUTION INFILTRATION; PERINEURAL
Status: COMPLETED | OUTPATIENT
Start: 2023-01-01 | End: 2023-01-01

## 2023-01-01 RX ORDER — INDOMETHACIN 25 MG/1
CAPSULE ORAL
Qty: 30 CAPSULE | Refills: 5 | Status: SHIPPED | OUTPATIENT
Start: 2023-01-01 | End: 2024-01-01

## 2023-01-01 RX ORDER — ONDANSETRON 4 MG/1
4 TABLET, ORALLY DISINTEGRATING ORAL EVERY 30 MIN PRN
Status: DISCONTINUED | OUTPATIENT
Start: 2023-01-01 | End: 2023-01-01 | Stop reason: HOSPADM

## 2023-01-01 RX ORDER — BISACODYL 10 MG
10 SUPPOSITORY, RECTAL RECTAL DAILY PRN
Status: DISCONTINUED | OUTPATIENT
Start: 2023-01-01 | End: 2023-01-01 | Stop reason: HOSPADM

## 2023-01-01 RX ORDER — LIDOCAINE HYDROCHLORIDE 20 MG/ML
INJECTION, SOLUTION INFILTRATION; PERINEURAL PRN
Status: DISCONTINUED | OUTPATIENT
Start: 2023-01-01 | End: 2023-01-01

## 2023-01-01 RX ORDER — ROSUVASTATIN CALCIUM 40 MG/1
40 TABLET, COATED ORAL DAILY
Qty: 90 TABLET | Refills: 3 | Status: SHIPPED | OUTPATIENT
Start: 2023-01-01 | End: 2023-01-01

## 2023-01-01 RX ORDER — HYDROCODONE BITARTRATE AND ACETAMINOPHEN 5; 325 MG/1; MG/1
1-2 TABLET ORAL EVERY 6 HOURS PRN
Status: DISCONTINUED | OUTPATIENT
Start: 2023-01-01 | End: 2023-01-01 | Stop reason: HOSPADM

## 2023-01-01 RX ORDER — FAMOTIDINE 10 MG
10 TABLET ORAL 2 TIMES DAILY PRN
Status: DISCONTINUED | OUTPATIENT
Start: 2023-01-01 | End: 2023-01-01

## 2023-01-01 RX ORDER — IOPAMIDOL 755 MG/ML
10-135 INJECTION, SOLUTION INTRAVASCULAR ONCE
Status: COMPLETED | OUTPATIENT
Start: 2023-01-01 | End: 2023-01-01

## 2023-01-01 RX ORDER — AMLODIPINE BESYLATE 10 MG/1
10 TABLET ORAL EVERY MORNING
Qty: 90 TABLET | Refills: 3 | Status: SHIPPED | OUTPATIENT
Start: 2023-01-01 | End: 2024-01-01

## 2023-01-01 RX ORDER — GABAPENTIN 300 MG/1
300 CAPSULE ORAL 3 TIMES DAILY PRN
Status: DISCONTINUED | OUTPATIENT
Start: 2023-01-01 | End: 2023-01-01 | Stop reason: HOSPADM

## 2023-01-01 RX ORDER — CEFAZOLIN SODIUM 1 G/3ML
1 INJECTION, POWDER, FOR SOLUTION INTRAMUSCULAR; INTRAVENOUS EVERY 8 HOURS
Qty: 15 ML | Refills: 0 | Status: COMPLETED | OUTPATIENT
Start: 2023-01-01 | End: 2023-01-01

## 2023-01-01 RX ORDER — HYDROMORPHONE HCL IN WATER/PF 6 MG/30 ML
0.4 PATIENT CONTROLLED ANALGESIA SYRINGE INTRAVENOUS EVERY 5 MIN PRN
Status: DISCONTINUED | OUTPATIENT
Start: 2023-01-01 | End: 2023-01-01 | Stop reason: HOSPADM

## 2023-01-01 RX ORDER — FENTANYL CITRATE 0.05 MG/ML
50 INJECTION, SOLUTION INTRAMUSCULAR; INTRAVENOUS EVERY 5 MIN PRN
Status: DISCONTINUED | OUTPATIENT
Start: 2023-01-01 | End: 2023-01-01 | Stop reason: HOSPADM

## 2023-01-01 RX ORDER — AMLODIPINE BESYLATE 10 MG/1
10 TABLET ORAL EVERY MORNING
Qty: 90 TABLET | Refills: 3 | Status: SHIPPED | OUTPATIENT
Start: 2023-01-01 | End: 2023-01-01 | Stop reason: DRUGHIGH

## 2023-01-01 RX ORDER — SODIUM CHLORIDE 9 MG/ML
INJECTION, SOLUTION INTRAVENOUS CONTINUOUS
Status: DISCONTINUED | OUTPATIENT
Start: 2023-01-01 | End: 2023-01-01 | Stop reason: HOSPADM

## 2023-01-01 RX ORDER — MAGNESIUM HYDROXIDE 1200 MG/15ML
LIQUID ORAL PRN
Status: DISCONTINUED | OUTPATIENT
Start: 2023-01-01 | End: 2023-01-01 | Stop reason: HOSPADM

## 2023-01-01 RX ORDER — ALBUTEROL SULFATE 90 UG/1
1-2 AEROSOL, METERED RESPIRATORY (INHALATION)
Status: CANCELLED
Start: 2023-01-01

## 2023-01-01 RX ORDER — ONDANSETRON 2 MG/ML
INJECTION INTRAMUSCULAR; INTRAVENOUS PRN
Status: DISCONTINUED | OUTPATIENT
Start: 2023-01-01 | End: 2023-01-01

## 2023-01-01 RX ORDER — ONDANSETRON 2 MG/ML
4 INJECTION INTRAMUSCULAR; INTRAVENOUS EVERY 6 HOURS PRN
Status: DISCONTINUED | OUTPATIENT
Start: 2023-01-01 | End: 2023-01-01 | Stop reason: HOSPADM

## 2023-01-01 RX ORDER — LORAZEPAM 0.5 MG/1
.5-2 TABLET ORAL
Status: COMPLETED | OUTPATIENT
Start: 2023-01-01 | End: 2023-01-01

## 2023-01-01 RX ORDER — FENTANYL CITRATE 0.05 MG/ML
25 INJECTION, SOLUTION INTRAMUSCULAR; INTRAVENOUS EVERY 5 MIN PRN
Status: DISCONTINUED | OUTPATIENT
Start: 2023-01-01 | End: 2023-01-01 | Stop reason: HOSPADM

## 2023-01-01 RX ORDER — ACETAMINOPHEN 325 MG/1
650 TABLET ORAL EVERY 4 HOURS PRN
Status: DISCONTINUED | OUTPATIENT
Start: 2023-01-01 | End: 2023-01-01

## 2023-01-01 RX ORDER — LIDOCAINE 40 MG/G
1 CREAM TOPICAL SEE ADMIN INSTRUCTIONS
Status: COMPLETED | OUTPATIENT
Start: 2023-01-01 | End: 2023-01-01

## 2023-01-01 RX ORDER — ONDANSETRON 4 MG/1
8 TABLET, ORALLY DISINTEGRATING ORAL EVERY 6 HOURS PRN
Status: DISCONTINUED | OUTPATIENT
Start: 2023-01-01 | End: 2023-01-01 | Stop reason: HOSPADM

## 2023-01-01 RX ORDER — PANTOPRAZOLE SODIUM 40 MG/1
40 TABLET, DELAYED RELEASE ORAL
Qty: 60 TABLET | Refills: 1 | Status: SHIPPED | OUTPATIENT
Start: 2023-01-01 | End: 2024-01-01

## 2023-01-01 RX ORDER — DEXAMETHASONE 4 MG/1
4 TABLET ORAL 2 TIMES DAILY WITH MEALS
Qty: 20 TABLET | Refills: 0 | Status: ON HOLD | OUTPATIENT
Start: 2023-01-01 | End: 2023-01-01

## 2023-01-01 RX ORDER — CALCIUM CARBONATE 500 MG/1
500 TABLET, CHEWABLE ORAL DAILY PRN
Status: DISCONTINUED | OUTPATIENT
Start: 2023-01-01 | End: 2023-01-01 | Stop reason: HOSPADM

## 2023-01-01 RX ORDER — ONDANSETRON 2 MG/ML
4 INJECTION INTRAMUSCULAR; INTRAVENOUS
Status: DISCONTINUED | OUTPATIENT
Start: 2023-01-01 | End: 2023-01-01 | Stop reason: HOSPADM

## 2023-01-01 RX ORDER — CEFAZOLIN SODIUM/WATER 2 G/20 ML
2 SYRINGE (ML) INTRAVENOUS SEE ADMIN INSTRUCTIONS
Status: DISCONTINUED | OUTPATIENT
Start: 2023-01-01 | End: 2023-01-01 | Stop reason: HOSPADM

## 2023-01-01 RX ORDER — SULFAMETHOXAZOLE AND TRIMETHOPRIM 400; 80 MG/1; MG/1
1 TABLET ORAL
Qty: 20 TABLET | Refills: 0 | Status: SHIPPED | OUTPATIENT
Start: 2023-01-01 | End: 2023-01-01

## 2023-01-01 RX ORDER — HYDROMORPHONE HCL IN WATER/PF 6 MG/30 ML
0.2 PATIENT CONTROLLED ANALGESIA SYRINGE INTRAVENOUS EVERY 5 MIN PRN
Status: DISCONTINUED | OUTPATIENT
Start: 2023-01-01 | End: 2023-01-01 | Stop reason: HOSPADM

## 2023-01-01 RX ORDER — DEXAMETHASONE SODIUM PHOSPHATE 4 MG/ML
INJECTION, SOLUTION INTRA-ARTICULAR; INTRALESIONAL; INTRAMUSCULAR; INTRAVENOUS; SOFT TISSUE PRN
Status: DISCONTINUED | OUTPATIENT
Start: 2023-01-01 | End: 2023-01-01

## 2023-01-01 RX ORDER — LEVETIRACETAM 500 MG/1
1000 TABLET ORAL 2 TIMES DAILY
Status: DISCONTINUED | OUTPATIENT
Start: 2023-01-01 | End: 2023-01-01 | Stop reason: HOSPADM

## 2023-01-01 RX ORDER — VASOPRESSIN IN 0.9 % NACL 2 UNIT/2ML
SYRINGE (ML) INTRAVENOUS PRN
Status: DISCONTINUED | OUTPATIENT
Start: 2023-01-01 | End: 2023-01-01

## 2023-01-01 RX ORDER — LABETALOL HYDROCHLORIDE 5 MG/ML
10-40 INJECTION, SOLUTION INTRAVENOUS EVERY 10 MIN PRN
Status: DISCONTINUED | OUTPATIENT
Start: 2023-01-01 | End: 2023-01-01 | Stop reason: HOSPADM

## 2023-01-01 RX ORDER — IOPAMIDOL 755 MG/ML
110 INJECTION, SOLUTION INTRAVASCULAR ONCE
Status: COMPLETED | OUTPATIENT
Start: 2023-01-01 | End: 2023-01-01

## 2023-01-01 RX ORDER — BACLOFEN 10 MG/1
1 TABLET ORAL 2 TIMES DAILY PRN
COMMUNITY

## 2023-01-01 RX ORDER — ONDANSETRON 4 MG/1
4 TABLET, ORALLY DISINTEGRATING ORAL EVERY 6 HOURS PRN
Status: DISCONTINUED | OUTPATIENT
Start: 2023-01-01 | End: 2023-01-01 | Stop reason: HOSPADM

## 2023-01-01 RX ORDER — BACITRACIN ZINC 500 [USP'U]/G
OINTMENT TOPICAL PRN
Status: DISCONTINUED | OUTPATIENT
Start: 2023-01-01 | End: 2023-01-01 | Stop reason: HOSPADM

## 2023-01-01 RX ADMIN — METHOCARBAMOL 500 MG: 500 TABLET ORAL at 19:35

## 2023-01-01 RX ADMIN — PROPOFOL 200 MG: 10 INJECTION, EMULSION INTRAVENOUS at 07:57

## 2023-01-01 RX ADMIN — ROCURONIUM BROMIDE 20 MG: 50 INJECTION, SOLUTION INTRAVENOUS at 08:36

## 2023-01-01 RX ADMIN — Medication 2 G: at 08:08

## 2023-01-01 RX ADMIN — Medication 1 LOZENGE: at 15:12

## 2023-01-01 RX ADMIN — SUGAMMADEX 200 MG: 100 INJECTION, SOLUTION INTRAVENOUS at 11:07

## 2023-01-01 RX ADMIN — ONDANSETRON 4 MG: 2 INJECTION INTRAMUSCULAR; INTRAVENOUS at 10:19

## 2023-01-01 RX ADMIN — SODIUM CHLORIDE, POTASSIUM CHLORIDE, SODIUM LACTATE AND CALCIUM CHLORIDE: 600; 310; 30; 20 INJECTION, SOLUTION INTRAVENOUS at 13:03

## 2023-01-01 RX ADMIN — CEFAZOLIN 1 G: 1 INJECTION, POWDER, FOR SOLUTION INTRAMUSCULAR; INTRAVENOUS at 15:51

## 2023-01-01 RX ADMIN — DOCUSATE SODIUM 50 MG AND SENNOSIDES 8.6 MG 1 TABLET: 8.6; 5 TABLET, FILM COATED ORAL at 20:13

## 2023-01-01 RX ADMIN — ACETAMINOPHEN 975 MG: 325 TABLET, FILM COATED ORAL at 14:03

## 2023-01-01 RX ADMIN — OXYCODONE HYDROCHLORIDE 5 MG: 5 TABLET ORAL at 19:34

## 2023-01-01 RX ADMIN — METHOCARBAMOL 500 MG: 500 TABLET ORAL at 20:13

## 2023-01-01 RX ADMIN — OXYCODONE HYDROCHLORIDE 5 MG: 5 TABLET ORAL at 02:06

## 2023-01-01 RX ADMIN — BENZOCAINE 6 MG-MENTHOL 10 MG LOZENGES 1 LOZENGE: at 14:23

## 2023-01-01 RX ADMIN — ROCURONIUM BROMIDE 20 MG: 50 INJECTION, SOLUTION INTRAVENOUS at 09:51

## 2023-01-01 RX ADMIN — Medication 5 MG: at 09:49

## 2023-01-01 RX ADMIN — DEXAMETHASONE 4 MG: 4 TABLET ORAL at 06:10

## 2023-01-01 RX ADMIN — OXYCODONE HYDROCHLORIDE 10 MG: 5 TABLET ORAL at 13:21

## 2023-01-01 RX ADMIN — DEXAMETHASONE 4 MG: 4 TABLET ORAL at 23:25

## 2023-01-01 RX ADMIN — PANTOPRAZOLE SODIUM 40 MG: 40 TABLET, DELAYED RELEASE ORAL at 08:20

## 2023-01-01 RX ADMIN — Medication 1 UNITS: at 09:14

## 2023-01-01 RX ADMIN — Medication 2.5 MG: at 10:34

## 2023-01-01 RX ADMIN — Medication 0.25 UNITS: at 08:46

## 2023-01-01 RX ADMIN — PHENYLEPHRINE HYDROCHLORIDE 100 MCG: 10 INJECTION INTRAVENOUS at 08:45

## 2023-01-01 RX ADMIN — DEXAMETHASONE SODIUM PHOSPHATE 10 MG: 4 INJECTION, SOLUTION INTRA-ARTICULAR; INTRALESIONAL; INTRAMUSCULAR; INTRAVENOUS; SOFT TISSUE at 08:15

## 2023-01-01 RX ADMIN — HYDROMORPHONE HYDROCHLORIDE 0.2 MG: 0.2 INJECTION, SOLUTION INTRAMUSCULAR; INTRAVENOUS; SUBCUTANEOUS at 04:36

## 2023-01-01 RX ADMIN — LIDOCAINE HYDROCHLORIDE,EPINEPHRINE BITARTRATE 30 ML: 20; .01 INJECTION, SOLUTION INFILTRATION; PERINEURAL at 05:36

## 2023-01-01 RX ADMIN — Medication 2.5 MG: at 09:07

## 2023-01-01 RX ADMIN — CEFAZOLIN 1 G: 1 INJECTION, POWDER, FOR SOLUTION INTRAMUSCULAR; INTRAVENOUS at 08:05

## 2023-01-01 RX ADMIN — GADOBUTROL 8 ML: 604.72 INJECTION INTRAVENOUS at 16:02

## 2023-01-01 RX ADMIN — PHENYLEPHRINE HYDROCHLORIDE 100 MCG: 10 INJECTION INTRAVENOUS at 08:34

## 2023-01-01 RX ADMIN — ACETAMINOPHEN 975 MG: 325 TABLET, FILM COATED ORAL at 06:10

## 2023-01-01 RX ADMIN — ROSUVASTATIN CALCIUM 40 MG: 20 TABLET, FILM COATED ORAL at 19:35

## 2023-01-01 RX ADMIN — LEVETIRACETAM 1000 MG: 500 TABLET, FILM COATED ORAL at 20:13

## 2023-01-01 RX ADMIN — DOCUSATE SODIUM 50 MG AND SENNOSIDES 8.6 MG 1 TABLET: 8.6; 5 TABLET, FILM COATED ORAL at 07:52

## 2023-01-01 RX ADMIN — SODIUM CHLORIDE, PRESERVATIVE FREE: 5 INJECTION INTRAVENOUS at 14:03

## 2023-01-01 RX ADMIN — LABETALOL 20 MG/4 ML (5 MG/ML) INTRAVENOUS SYRINGE 10 MG: at 11:14

## 2023-01-01 RX ADMIN — Medication 2.5 MG: at 09:11

## 2023-01-01 RX ADMIN — CALCIUM CARBONATE (ANTACID) CHEW TAB 500 MG 500 MG: 500 CHEW TAB at 23:08

## 2023-01-01 RX ADMIN — DEXAMETHASONE 4 MG: 4 TABLET ORAL at 18:23

## 2023-01-01 RX ADMIN — LEVETIRACETAM 1000 MG: 500 TABLET, FILM COATED ORAL at 07:51

## 2023-01-01 RX ADMIN — SODIUM CHLORIDE, PRESERVATIVE FREE: 5 INJECTION INTRAVENOUS at 04:01

## 2023-01-01 RX ADMIN — PANTOPRAZOLE SODIUM 40 MG: 40 TABLET, DELAYED RELEASE ORAL at 15:51

## 2023-01-01 RX ADMIN — PANTOPRAZOLE SODIUM 40 MG: 40 TABLET, DELAYED RELEASE ORAL at 06:10

## 2023-01-01 RX ADMIN — METHOCARBAMOL 500 MG: 500 TABLET ORAL at 12:26

## 2023-01-01 RX ADMIN — ALBUMIN HUMAN: 0.05 INJECTION, SOLUTION INTRAVENOUS at 10:04

## 2023-01-01 RX ADMIN — SODIUM CHLORIDE, POTASSIUM CHLORIDE, SODIUM LACTATE AND CALCIUM CHLORIDE: 600; 310; 30; 20 INJECTION, SOLUTION INTRAVENOUS at 06:48

## 2023-01-01 RX ADMIN — Medication 2.5 MG: at 10:30

## 2023-01-01 RX ADMIN — SODIUM CHLORIDE, POTASSIUM CHLORIDE, SODIUM LACTATE AND CALCIUM CHLORIDE: 600; 310; 30; 20 INJECTION, SOLUTION INTRAVENOUS at 08:05

## 2023-01-01 RX ADMIN — ACETAMINOPHEN 975 MG: 325 TABLET, FILM COATED ORAL at 22:05

## 2023-01-01 RX ADMIN — AMLODIPINE BESYLATE 10 MG: 10 TABLET ORAL at 08:01

## 2023-01-01 RX ADMIN — FENTANYL CITRATE 25 MCG: 50 INJECTION INTRAMUSCULAR; INTRAVENOUS at 10:59

## 2023-01-01 RX ADMIN — LIDOCAINE HYDROCHLORIDE 100 MG: 20 INJECTION, SOLUTION INFILTRATION; PERINEURAL at 07:56

## 2023-01-01 RX ADMIN — PROPOFOL 75 MCG/KG/MIN: 10 INJECTION, EMULSION INTRAVENOUS at 08:15

## 2023-01-01 RX ADMIN — CEFAZOLIN 1 G: 1 INJECTION, POWDER, FOR SOLUTION INTRAMUSCULAR; INTRAVENOUS at 23:25

## 2023-01-01 RX ADMIN — DOCUSATE SODIUM 50 MG AND SENNOSIDES 8.6 MG 1 TABLET: 8.6; 5 TABLET, FILM COATED ORAL at 19:35

## 2023-01-01 RX ADMIN — DEXAMETHASONE 4 MG: 4 TABLET ORAL at 00:42

## 2023-01-01 RX ADMIN — LIDOCAINE HYDROCHLORIDE 2 ML: 10 INJECTION, SOLUTION INFILTRATION; PERINEURAL at 07:00

## 2023-01-01 RX ADMIN — GADOBUTROL 9 ML: 604.72 INJECTION INTRAVENOUS at 13:13

## 2023-01-01 RX ADMIN — HYDROMORPHONE HYDROCHLORIDE 0.4 MG: 0.2 INJECTION, SOLUTION INTRAMUSCULAR; INTRAVENOUS; SUBCUTANEOUS at 13:20

## 2023-01-01 RX ADMIN — ROCURONIUM BROMIDE 60 MG: 50 INJECTION, SOLUTION INTRAVENOUS at 07:57

## 2023-01-01 RX ADMIN — DEXAMETHASONE 4 MG: 4 TABLET ORAL at 18:07

## 2023-01-01 RX ADMIN — METHOCARBAMOL 500 MG: 500 TABLET ORAL at 07:51

## 2023-01-01 RX ADMIN — PHENYLEPHRINE HYDROCHLORIDE 0.5 MCG/KG/MIN: 10 INJECTION INTRAVENOUS at 08:34

## 2023-01-01 RX ADMIN — LIDOCAINE 1 G: 40 CREAM TOPICAL at 05:36

## 2023-01-01 RX ADMIN — SODIUM CHLORIDE 250 ML: 9 INJECTION, SOLUTION INTRAVENOUS at 09:25

## 2023-01-01 RX ADMIN — ROCURONIUM BROMIDE 20 MG: 50 INJECTION, SOLUTION INTRAVENOUS at 09:02

## 2023-01-01 RX ADMIN — ACETAMINOPHEN 975 MG: 325 TABLET, FILM COATED ORAL at 06:02

## 2023-01-01 RX ADMIN — DEXMEDETOMIDINE HYDROCHLORIDE 8 MCG: 200 INJECTION INTRAVENOUS at 11:10

## 2023-01-01 RX ADMIN — METHOCARBAMOL 500 MG: 500 TABLET ORAL at 14:03

## 2023-01-01 RX ADMIN — MIDAZOLAM 2 MG: 1 INJECTION INTRAMUSCULAR; INTRAVENOUS at 07:14

## 2023-01-01 RX ADMIN — METHOCARBAMOL 500 MG: 500 TABLET ORAL at 08:01

## 2023-01-01 RX ADMIN — GADOBUTROL 8 ML: 604.72 INJECTION INTRAVENOUS at 07:07

## 2023-01-01 RX ADMIN — LORAZEPAM 1 MG: 0.5 TABLET ORAL at 05:36

## 2023-01-01 RX ADMIN — METHOCARBAMOL 500 MG: 500 TABLET ORAL at 16:35

## 2023-01-01 RX ADMIN — ACETAMINOPHEN 975 MG: 325 TABLET, FILM COATED ORAL at 22:11

## 2023-01-01 RX ADMIN — ACETAMINOPHEN 975 MG: 325 TABLET, FILM COATED ORAL at 15:09

## 2023-01-01 RX ADMIN — Medication 2.5 MG: at 10:07

## 2023-01-01 RX ADMIN — Medication 0.25 UNITS: at 08:48

## 2023-01-01 RX ADMIN — Medication 2.5 MG: at 10:04

## 2023-01-01 RX ADMIN — FENTANYL CITRATE 25 MCG: 50 INJECTION INTRAMUSCULAR; INTRAVENOUS at 11:01

## 2023-01-01 RX ADMIN — AMLODIPINE BESYLATE 10 MG: 10 TABLET ORAL at 07:51

## 2023-01-01 RX ADMIN — DEXAMETHASONE 4 MG: 4 TABLET ORAL at 14:03

## 2023-01-01 RX ADMIN — DEXAMETHASONE 4 MG: 4 TABLET ORAL at 12:26

## 2023-01-01 RX ADMIN — ROSUVASTATIN CALCIUM 40 MG: 20 TABLET, FILM COATED ORAL at 20:13

## 2023-01-01 RX ADMIN — Medication 5 MG: at 08:51

## 2023-01-01 RX ADMIN — LEVETIRACETAM 1000 MG: 500 TABLET, FILM COATED ORAL at 19:35

## 2023-01-01 RX ADMIN — IOPAMIDOL 110 ML: 755 INJECTION, SOLUTION INTRAVENOUS at 11:32

## 2023-01-01 RX ADMIN — FENTANYL CITRATE 50 MCG: 50 INJECTION, SOLUTION INTRAMUSCULAR; INTRAVENOUS at 11:52

## 2023-01-01 RX ADMIN — GABAPENTIN 300 MG: 300 CAPSULE ORAL at 18:23

## 2023-01-01 RX ADMIN — PHENYLEPHRINE HYDROCHLORIDE 100 MCG: 10 INJECTION INTRAVENOUS at 08:36

## 2023-01-01 RX ADMIN — IOPAMIDOL 100 ML: 755 INJECTION, SOLUTION INTRAVENOUS at 08:58

## 2023-01-01 RX ADMIN — LEVETIRACETAM 1000 MG: 500 TABLET, FILM COATED ORAL at 08:03

## 2023-01-01 RX ADMIN — LEVETIRACETAM 500 MG: 100 SOLUTION ORAL at 08:15

## 2023-01-01 RX ADMIN — DEXAMETHASONE 4 MG: 4 TABLET ORAL at 06:02

## 2023-01-01 RX ADMIN — DEXMEDETOMIDINE HYDROCHLORIDE 8 MCG: 200 INJECTION INTRAVENOUS at 08:11

## 2023-01-01 RX ADMIN — FLUDEOXYGLUCOSE F-18 13.15 MILLICURIE: 500 INJECTION, SOLUTION INTRAVENOUS at 08:58

## 2023-01-01 RX ADMIN — PHENYLEPHRINE HYDROCHLORIDE 50 MCG: 10 INJECTION INTRAVENOUS at 08:32

## 2023-01-01 RX ADMIN — Medication 0.5 UNITS: at 09:22

## 2023-01-01 RX ADMIN — GADOBUTROL 9 ML: 604.72 INJECTION INTRAVENOUS at 05:31

## 2023-01-01 RX ADMIN — PHENYLEPHRINE HYDROCHLORIDE 100 MCG: 10 INJECTION INTRAVENOUS at 08:39

## 2023-01-01 RX ADMIN — FENTANYL CITRATE 100 MCG: 50 INJECTION INTRAMUSCULAR; INTRAVENOUS at 07:57

## 2023-01-01 RX ADMIN — PROPOFOL 70 MG: 10 INJECTION, EMULSION INTRAVENOUS at 08:10

## 2023-01-01 RX ADMIN — SODIUM CHLORIDE 200 MG: 9 INJECTION, SOLUTION INTRAVENOUS at 09:25

## 2023-01-01 RX ADMIN — PROPOFOL 30 MG: 10 INJECTION, EMULSION INTRAVENOUS at 09:16

## 2023-01-01 RX ADMIN — DOCUSATE SODIUM 50 MG AND SENNOSIDES 8.6 MG 1 TABLET: 8.6; 5 TABLET, FILM COATED ORAL at 08:01

## 2023-01-01 ASSESSMENT — ENCOUNTER SYMPTOMS
FEVER: 0
JOINT SWELLING: 0
MUSCULOSKELETAL NEGATIVE: 1
PALPITATIONS: 0
HEMATURIA: 0
ALLERGIC/IMMUNOLOGIC NEGATIVE: 1
GASTROINTESTINAL NEGATIVE: 1
HEADACHES: 1
PSYCHIATRIC NEGATIVE: 1
DIZZINESS: 0
PARESTHESIAS: 0
SHORTNESS OF BREATH: 1
DIARRHEA: 0
DYSURIA: 0
ABDOMINAL PAIN: 0
NERVOUS/ANXIOUS: 0
SHORTNESS OF BREATH: 0
NEUROLOGICAL NEGATIVE: 1
HEMATOCHEZIA: 0
COUGH: 0
SHORTNESS OF BREATH: 0
NAUSEA: 0
MYALGIAS: 0
CONSTITUTIONAL NEGATIVE: 1
ABDOMINAL PAIN: 0
HEARTBURN: 0
COUGH: 0
EYES NEGATIVE: 1
SORE THROAT: 0
JOINT SWELLING: 0
PARESTHESIAS: 0
EYES NEGATIVE: 1
BLURRED VISION: 1
DIARRHEA: 0
CONSTIPATION: 0
EYE PAIN: 0
CONSTIPATION: 0
COUGH: 1
ARTHRALGIAS: 0
FREQUENCY: 0
CARDIOVASCULAR NEGATIVE: 1
WEAKNESS: 1
ALLERGIC/IMMUNOLOGIC NEGATIVE: 1
DIZZINESS: 0
PSYCHIATRIC NEGATIVE: 1
MYALGIAS: 0
WEAKNESS: 1
HEADACHES: 1
DYSURIA: 0
DIZZINESS: 0
ENDOCRINE NEGATIVE: 1
GASTROINTESTINAL NEGATIVE: 1
HEMATOCHEZIA: 0
FATIGUE: 1
HEADACHES: 0
ARTHRALGIAS: 1
FREQUENCY: 0
HEADACHES: 1
RESPIRATORY NEGATIVE: 1
SORE THROAT: 0
CARDIOVASCULAR NEGATIVE: 1
ENDOCRINE NEGATIVE: 1
FEVER: 1
EYE PAIN: 0
NERVOUS/ANXIOUS: 0
HEARTBURN: 0
WEAKNESS: 1
HEMATOLOGIC/LYMPHATIC NEGATIVE: 1
MUSCULOSKELETAL NEGATIVE: 1
NAUSEA: 0
CARDIOVASCULAR NEGATIVE: 1
CHILLS: 0
HEMATURIA: 0
SEIZURES: 1
CHILLS: 0
VOMITING: 0
FEVER: 0
RESPIRATORY NEGATIVE: 1
JOINT SWELLING: 1
PALPITATIONS: 0
NAUSEA: 0
ARTHRALGIAS: 0

## 2023-01-01 ASSESSMENT — ACTIVITIES OF DAILY LIVING (ADL)
ADLS_ACUITY_SCORE: 23
ADLS_ACUITY_SCORE: 20
ADLS_ACUITY_SCORE: 20
ADLS_ACUITY_SCORE: 26
ADLS_ACUITY_SCORE: 20
ADLS_ACUITY_SCORE: 26
ADLS_ACUITY_SCORE: 23
ADLS_ACUITY_SCORE: 26
ADLS_ACUITY_SCORE: 23
ADLS_ACUITY_SCORE: 20
ADLS_ACUITY_SCORE: 20
CURRENT_FUNCTION: TRANSPORTATION REQUIRES ASSISTANCE
CURRENT_FUNCTION: TRANSPORTATION REQUIRES ASSISTANCE
ADLS_ACUITY_SCORE: 26
ADLS_ACUITY_SCORE: 20
ADLS_ACUITY_SCORE: 20
ADLS_ACUITY_SCORE: 23
ADLS_ACUITY_SCORE: 22
ADLS_ACUITY_SCORE: 22
ADLS_ACUITY_SCORE: 26
ADLS_ACUITY_SCORE: 23
ADLS_ACUITY_SCORE: 23
ADLS_ACUITY_SCORE: 20
ADLS_ACUITY_SCORE: 26
ADLS_ACUITY_SCORE: 22
ADLS_ACUITY_SCORE: 23
ADLS_ACUITY_SCORE: 22
ADLS_ACUITY_SCORE: 22

## 2023-01-01 ASSESSMENT — PAIN SCALES - GENERAL
PAINLEVEL: NO PAIN (0)
PAINLEVEL: SEVERE PAIN (6)
PAINLEVEL: NO PAIN (0)
PAINLEVEL: MODERATE PAIN (4)
PAINLEVEL: NO PAIN (0)
PAINLEVEL: WORST PAIN (10)

## 2023-04-15 ENCOUNTER — APPOINTMENT (OUTPATIENT)
Dept: CT IMAGING | Facility: CLINIC | Age: 68
End: 2023-04-15
Attending: FAMILY MEDICINE
Payer: COMMERCIAL

## 2023-04-15 ENCOUNTER — APPOINTMENT (OUTPATIENT)
Dept: MRI IMAGING | Facility: CLINIC | Age: 68
End: 2023-04-15
Attending: FAMILY MEDICINE
Payer: COMMERCIAL

## 2023-04-15 ENCOUNTER — HOSPITAL ENCOUNTER (EMERGENCY)
Facility: CLINIC | Age: 68
Discharge: HOME | End: 2023-04-15
Attending: FAMILY MEDICINE | Admitting: FAMILY MEDICINE
Payer: COMMERCIAL

## 2023-04-15 VITALS
WEIGHT: 194 LBS | RESPIRATION RATE: 10 BRPM | HEIGHT: 73 IN | BODY MASS INDEX: 25.71 KG/M2 | SYSTOLIC BLOOD PRESSURE: 186 MMHG | DIASTOLIC BLOOD PRESSURE: 104 MMHG | HEART RATE: 62 BPM | OXYGEN SATURATION: 98 %

## 2023-04-15 DIAGNOSIS — R03.0 ELEVATED BLOOD PRESSURE READING WITHOUT DIAGNOSIS OF HYPERTENSION: ICD-10-CM

## 2023-04-15 DIAGNOSIS — G96.89 CNS MASS: ICD-10-CM

## 2023-04-15 DIAGNOSIS — R56.9 FOCAL SEIZURES (H): ICD-10-CM

## 2023-04-15 DIAGNOSIS — R51.9 ACUTE NONINTRACTABLE HEADACHE, UNSPECIFIED HEADACHE TYPE: ICD-10-CM

## 2023-04-15 LAB
ALBUMIN UR-MCNC: NEGATIVE MG/DL
AMPHETAMINES UR QL SCN: ABNORMAL
ANION GAP SERPL CALCULATED.3IONS-SCNC: 10 MMOL/L (ref 7–15)
APAP SERPL-MCNC: <5 UG/ML (ref 10–30)
APPEARANCE UR: CLEAR
APTT PPP: 29 SECONDS (ref 22–38)
BARBITURATES UR QL SCN: ABNORMAL
BASOPHILS # BLD AUTO: 0.1 10E3/UL (ref 0–0.2)
BASOPHILS NFR BLD AUTO: 1 %
BENZODIAZ UR QL SCN: ABNORMAL
BILIRUB UR QL STRIP: NEGATIVE
BUN SERPL-MCNC: 16 MG/DL (ref 8–23)
BZE UR QL SCN: ABNORMAL
CALCIUM SERPL-MCNC: 8.7 MG/DL (ref 8.8–10.2)
CANNABINOIDS UR QL SCN: ABNORMAL
CHLORIDE SERPL-SCNC: 105 MMOL/L (ref 98–107)
COHGB MFR BLD: 2.6 % (ref 0–2)
COLOR UR AUTO: NORMAL
CREAT SERPL-MCNC: 1.16 MG/DL (ref 0.67–1.17)
DEPRECATED HCO3 PLAS-SCNC: 23 MMOL/L (ref 22–29)
EOSINOPHIL # BLD AUTO: 0.3 10E3/UL (ref 0–0.7)
EOSINOPHIL NFR BLD AUTO: 3 %
ERYTHROCYTE [DISTWIDTH] IN BLOOD BY AUTOMATED COUNT: 13.4 % (ref 10–15)
ETHANOL SERPL-MCNC: <0.01 G/DL
GFR SERPL CREATININE-BSD FRML MDRD: 68 ML/MIN/1.73M2
GLUCOSE BLDC GLUCOMTR-MCNC: 140 MG/DL (ref 70–99)
GLUCOSE SERPL-MCNC: 115 MG/DL (ref 70–99)
GLUCOSE UR STRIP-MCNC: NEGATIVE MG/DL
HCT VFR BLD AUTO: 42.9 % (ref 40–53)
HGB BLD-MCNC: 14.2 G/DL (ref 13.3–17.7)
HGB UR QL STRIP: NEGATIVE
IMM GRANULOCYTES # BLD: 0 10E3/UL
IMM GRANULOCYTES NFR BLD: 0 %
INR PPP: 0.97 (ref 0.85–1.15)
KETONES UR STRIP-MCNC: NEGATIVE MG/DL
LACTATE SERPL-SCNC: 1.2 MMOL/L (ref 0.7–2)
LEUKOCYTE ESTERASE UR QL STRIP: NEGATIVE
LYMPHOCYTES # BLD AUTO: 2 10E3/UL (ref 0.8–5.3)
LYMPHOCYTES NFR BLD AUTO: 25 %
MCH RBC QN AUTO: 31.1 PG (ref 26.5–33)
MCHC RBC AUTO-ENTMCNC: 33.1 G/DL (ref 31.5–36.5)
MCV RBC AUTO: 94 FL (ref 78–100)
MONOCYTES # BLD AUTO: 0.6 10E3/UL (ref 0–1.3)
MONOCYTES NFR BLD AUTO: 7 %
NEUTROPHILS # BLD AUTO: 5.2 10E3/UL (ref 1.6–8.3)
NEUTROPHILS NFR BLD AUTO: 64 %
NITRATE UR QL: NEGATIVE
NRBC # BLD AUTO: 0 10E3/UL
NRBC BLD AUTO-RTO: 0 /100
OPIATES UR QL SCN: ABNORMAL
PCP QUAL URINE (ROCHE): ABNORMAL
PH UR STRIP: 7 [PH] (ref 5–7)
PLATELET # BLD AUTO: 239 10E3/UL (ref 150–450)
POTASSIUM SERPL-SCNC: 3.6 MMOL/L (ref 3.4–5.3)
RBC # BLD AUTO: 4.57 10E6/UL (ref 4.4–5.9)
SALICYLATES SERPL-MCNC: <0.3 MG/DL
SODIUM SERPL-SCNC: 138 MMOL/L (ref 136–145)
SP GR UR STRIP: 1.01 (ref 1–1.03)
TROPONIN T SERPL HS-MCNC: 12 NG/L
UROBILINOGEN UR STRIP-MCNC: NORMAL MG/DL
WBC # BLD AUTO: 8.1 10E3/UL (ref 4–11)

## 2023-04-15 PROCEDURE — 80179 DRUG ASSAY SALICYLATE: CPT | Performed by: FAMILY MEDICINE

## 2023-04-15 PROCEDURE — 70553 MRI BRAIN STEM W/O & W/DYE: CPT

## 2023-04-15 PROCEDURE — 250N000009 HC RX 250: Performed by: FAMILY MEDICINE

## 2023-04-15 PROCEDURE — 80048 BASIC METABOLIC PNL TOTAL CA: CPT | Performed by: FAMILY MEDICINE

## 2023-04-15 PROCEDURE — 85730 THROMBOPLASTIN TIME PARTIAL: CPT | Performed by: FAMILY MEDICINE

## 2023-04-15 PROCEDURE — 93010 ELECTROCARDIOGRAM REPORT: CPT | Performed by: FAMILY MEDICINE

## 2023-04-15 PROCEDURE — 70496 CT ANGIOGRAPHY HEAD: CPT

## 2023-04-15 PROCEDURE — 83605 ASSAY OF LACTIC ACID: CPT | Performed by: FAMILY MEDICINE

## 2023-04-15 PROCEDURE — 258N000003 HC RX IP 258 OP 636: Performed by: FAMILY MEDICINE

## 2023-04-15 PROCEDURE — 85025 COMPLETE CBC W/AUTO DIFF WBC: CPT | Performed by: FAMILY MEDICINE

## 2023-04-15 PROCEDURE — 84153 ASSAY OF PSA TOTAL: CPT | Performed by: NEUROLOGICAL SURGERY

## 2023-04-15 PROCEDURE — 250N000011 HC RX IP 250 OP 636: Performed by: FAMILY MEDICINE

## 2023-04-15 PROCEDURE — 255N000002 HC RX 255 OP 636: Performed by: FAMILY MEDICINE

## 2023-04-15 PROCEDURE — 93005 ELECTROCARDIOGRAM TRACING: CPT | Performed by: FAMILY MEDICINE

## 2023-04-15 PROCEDURE — 70450 CT HEAD/BRAIN W/O DYE: CPT

## 2023-04-15 PROCEDURE — 84484 ASSAY OF TROPONIN QUANT: CPT | Performed by: FAMILY MEDICINE

## 2023-04-15 PROCEDURE — 96365 THER/PROPH/DIAG IV INF INIT: CPT | Mod: 59 | Performed by: FAMILY MEDICINE

## 2023-04-15 PROCEDURE — 82375 ASSAY CARBOXYHB QUANT: CPT | Performed by: FAMILY MEDICINE

## 2023-04-15 PROCEDURE — 85610 PROTHROMBIN TIME: CPT | Performed by: FAMILY MEDICINE

## 2023-04-15 PROCEDURE — 36415 COLL VENOUS BLD VENIPUNCTURE: CPT | Performed by: FAMILY MEDICINE

## 2023-04-15 PROCEDURE — 250N000013 HC RX MED GY IP 250 OP 250 PS 637: Performed by: FAMILY MEDICINE

## 2023-04-15 PROCEDURE — 87040 BLOOD CULTURE FOR BACTERIA: CPT | Mod: XS | Performed by: FAMILY MEDICINE

## 2023-04-15 PROCEDURE — 80143 DRUG ASSAY ACETAMINOPHEN: CPT | Performed by: FAMILY MEDICINE

## 2023-04-15 PROCEDURE — 71250 CT THORAX DX C-: CPT

## 2023-04-15 PROCEDURE — 99285 EMERGENCY DEPT VISIT HI MDM: CPT | Performed by: FAMILY MEDICINE

## 2023-04-15 PROCEDURE — 82962 GLUCOSE BLOOD TEST: CPT

## 2023-04-15 PROCEDURE — 81003 URINALYSIS AUTO W/O SCOPE: CPT | Performed by: FAMILY MEDICINE

## 2023-04-15 PROCEDURE — 82077 ASSAY SPEC XCP UR&BREATH IA: CPT | Performed by: FAMILY MEDICINE

## 2023-04-15 PROCEDURE — 70498 CT ANGIOGRAPHY NECK: CPT

## 2023-04-15 PROCEDURE — 80307 DRUG TEST PRSMV CHEM ANLYZR: CPT | Performed by: FAMILY MEDICINE

## 2023-04-15 PROCEDURE — A9585 GADOBUTROL INJECTION: HCPCS | Performed by: FAMILY MEDICINE

## 2023-04-15 PROCEDURE — 99285 EMERGENCY DEPT VISIT HI MDM: CPT | Mod: 25 | Performed by: FAMILY MEDICINE

## 2023-04-15 RX ORDER — IOPAMIDOL 755 MG/ML
168 INJECTION, SOLUTION INTRAVASCULAR ONCE
Status: COMPLETED | OUTPATIENT
Start: 2023-04-15 | End: 2023-04-15

## 2023-04-15 RX ORDER — RIZATRIPTAN BENZOATE 5 MG/1
5 TABLET ORAL
Status: ON HOLD | COMMUNITY
Start: 2023-04-10 | End: 2023-04-25

## 2023-04-15 RX ORDER — TADALAFIL 5 MG/1
TABLET ORAL
COMMUNITY
Start: 2023-01-03 | End: 2023-04-19

## 2023-04-15 RX ORDER — LEVETIRACETAM 750 MG/1
750 TABLET ORAL 2 TIMES DAILY
Qty: 60 TABLET | Refills: 0 | Status: ON HOLD | OUTPATIENT
Start: 2023-04-15 | End: 2023-04-25

## 2023-04-15 RX ORDER — LABETALOL HYDROCHLORIDE 5 MG/ML
10 INJECTION, SOLUTION INTRAVENOUS ONCE
Status: DISCONTINUED | OUTPATIENT
Start: 2023-04-15 | End: 2023-04-15 | Stop reason: HOSPADM

## 2023-04-15 RX ORDER — GADOBUTROL 604.72 MG/ML
8.5 INJECTION INTRAVENOUS ONCE
Status: COMPLETED | OUTPATIENT
Start: 2023-04-15 | End: 2023-04-15

## 2023-04-15 RX ORDER — AMLODIPINE BESYLATE 5 MG/1
5 TABLET ORAL ONCE
Status: COMPLETED | OUTPATIENT
Start: 2023-04-15 | End: 2023-04-15

## 2023-04-15 RX ORDER — AMLODIPINE BESYLATE 5 MG/1
5 TABLET ORAL DAILY
Qty: 30 TABLET | Refills: 0 | Status: ON HOLD | OUTPATIENT
Start: 2023-04-15 | End: 2023-04-25

## 2023-04-15 RX ORDER — ACETAMINOPHEN 500 MG
1000 TABLET ORAL ONCE
Status: COMPLETED | OUTPATIENT
Start: 2023-04-15 | End: 2023-04-15

## 2023-04-15 RX ORDER — OXYCODONE HYDROCHLORIDE 5 MG/1
5 TABLET ORAL EVERY 6 HOURS PRN
Qty: 6 TABLET | Refills: 0 | Status: SHIPPED | OUTPATIENT
Start: 2023-04-15 | End: 2023-04-19

## 2023-04-15 RX ADMIN — GADOBUTROL 8.5 ML: 604.72 INJECTION INTRAVENOUS at 13:40

## 2023-04-15 RX ADMIN — SODIUM CHLORIDE 100 ML: 9 INJECTION, SOLUTION INTRAVENOUS at 12:11

## 2023-04-15 RX ADMIN — ACETAMINOPHEN 1000 MG: 500 TABLET ORAL at 14:29

## 2023-04-15 RX ADMIN — AMLODIPINE BESYLATE 5 MG: 5 TABLET ORAL at 15:57

## 2023-04-15 RX ADMIN — SODIUM CHLORIDE 1000 MG: 9 INJECTION, SOLUTION INTRAVENOUS at 15:03

## 2023-04-15 RX ADMIN — IOPAMIDOL 68 ML: 755 INJECTION, SOLUTION INTRAVENOUS at 12:10

## 2023-04-15 ASSESSMENT — ENCOUNTER SYMPTOMS
WHEEZING: 0
BLOOD IN STOOL: 0
COUGH: 0
PALPITATIONS: 0
FREQUENCY: 0
SORE THROAT: 0
NAUSEA: 1
CHILLS: 0
SHORTNESS OF BREATH: 0
ABDOMINAL PAIN: 0
CONSTIPATION: 0
DYSURIA: 0
DIARRHEA: 0
VOMITING: 0
HEADACHES: 1
FEVER: 0
DIAPHORESIS: 0
WEAKNESS: 1
SINUS PRESSURE: 0

## 2023-04-15 ASSESSMENT — ACTIVITIES OF DAILY LIVING (ADL)
ADLS_ACUITY_SCORE: 35
ADLS_ACUITY_SCORE: 35

## 2023-04-15 NOTE — ED NOTES
Bed: ED03  Expected date: 4/15/23  Expected time: 11:57 AM  Means of arrival:   Comments:  EMS - confusion

## 2023-04-15 NOTE — CONSULTS
"[facility]    Stroke Telephone Note    I was called by Deny Turk on 04/15/23 regarding patient Saeid Santa. The patient is a 67 year old male with hx of migraine presents to the hospital with headache , R leg weakness and confusion. Pt had a headache this morning at 0930. He left work at 11 AM and told his colleagues that he is going home. But later colleagues found him confused on the sitting on the grass.       Stroke Code Data (for stroke code without tele)  Stroke code activated 04/15/23   1205   Stroke provider first response  04/15/23   1206    04/15/23   1206   Last known normal 04/15/23   0930        Time of discovery   (or onset of symptoms) 04/15/23   0930   Head CT read by Stroke Neuro Dr/Provider 04/15/23   1224   Was stroke code de-escalated? Yes 04/15/23 1229          Imaging Findings   CT head showed hypodensity in L frontal region and L occipital region with associated vasogenic edema  CTA showed     Intravenous Thrombolysis  Not given due to:   - intracranial changes on CT already could be tumor    Endovascular Treatment  Not initiated due to absence of proximal vessel occlusion    Impression  Headache    Recommendations   Mri brain with and without contrast  CT chest abdomen and pelvis  Neurosurgery consultation for further recommendations and disposition  SBP goal about 20% reduction from initial BP which will be goal <160. Consider using single dose anti hypertensive med first before starting nicardipine.     My recommendations are based on the information provided over the phone by Saeid Santa's in-person providers. They are not intended to replace the clinical judgment of his in-person providers. I was not requested to personally see or examine the patient at this time.    Veda Garay MD  Vascular Neurology    To page me or covering stroke neurology team member, click here: [facility]  Choose \"On Call\" tab at top, then select \"NEUROLOGY/ALL SITES\" from " "middle drop-down box, press Enter, then look for \"stroke\" or \"telestroke\" for your site.           "

## 2023-04-15 NOTE — DISCHARGE INSTRUCTIONS
ICD-10-CM    1. CNS mass  G96.89 Neurosurgery Referral    unclear cause.  follow-up neurolosurgery monday - Dr. Tan will be facilitating follow-up monday, tuesday.   keep the dermatology appt for follow-up skin lesion needs biopsy      2. Focal seizures (H)  R56.9 Neurosurgery Referral    I suspect the leg symptoms are focal seizures and today's confusion was fue to post-ictal period.  you were given keppra load here 1000 mg.  then 750 orally twice daily.  no driving for 3 months from last seizure      3. Elevated blood pressure reading without diagnosis of hypertension  R03.0     start norvasc 5 mg orally daily and monitor BP.  goal <160 mmHg      4. Acute nonintractable headache, unspecified headache type  R51.9     this may be tumor related. take tylenol 650 mg orally every 6 hours.  take oxycodone for breakthrough pain

## 2023-04-15 NOTE — ED TRIAGE NOTES
HEADACHE STARTED AT 0930 THIS AM, HAD DOUBLE VISION, LIGHTS FLASHING AND TEMPORAL HEADACHE. PT TOOK MIGRAING MEDICATION. PT STATES LEFT WORK AT 1100, HAD TO PULL OVER AND WAS FOUND IN THE GRASS BEHIND ANOTHER BUSINESS LYING DOWN.      Triage Assessment     Row Name 04/15/23 1201       Triage Assessment (Adult)    Airway WDL WDL       Respiratory WDL    Respiratory WDL WDL       Skin Circulation/Temperature WDL    Skin Circulation/Temperature WDL WDL       Cardiac WDL    Cardiac WDL WDL       Peripheral/Neurovascular WDL    Peripheral Neurovascular WDL WDL       Cognitive/Neuro/Behavioral WDL    Cognitive/Neuro/Behavioral WDL WDL

## 2023-04-15 NOTE — ED PROVIDER NOTES
History     Chief Complaint   Patient presents with     Altered Mental Status     HPI  Saeid Santa is a 67 year old male who presents with a history of migraine headaches -and had another 1 onset today at 930 while he was at work at [employer].  He has migraine with aura and had a onset of scintillating scotomata in the right eye that was typical for his prior headaches.  Associated light sensitivity nausea.  He then drove back to work after he gotten his medication and continued working.  At 11:00 he felt he needed to go home so went out to the parking lot of the building.  He was found by coworkers sitting in the grass outside the building and appeared to be confused.  That history is unclear.  Glucose 160 in the ambulance.  Arrived here by EMS.  No known history of CVA.  Thursday, 3 to 4 days ago he was at his chiropractor and did have neck manipulation.  Headache more generalized and at the right orbit.  Also had today had spasms in the right lower extremity and a sense of heaviness or more weakness on this right leg.  No obvious other weakness.    Allergies:  No Known Allergies    Problem List:    There are no problems to display for this patient.       Past Medical History:    No past medical history on file.    Past Surgical History:    No past surgical history on file.    Family History:    No family history on file.    Social History:  Marital Status:   [2]        Medications:    No current outpatient medications on file.        Review of Systems   Constitutional: Negative for chills, diaphoresis and fever.   HENT: Negative for ear pain, sinus pressure and sore throat.    Eyes: Positive for visual disturbance.   Respiratory: Negative for cough, shortness of breath and wheezing.    Cardiovascular: Negative for chest pain and palpitations.   Gastrointestinal: Positive for nausea. Negative for abdominal pain, blood in stool, constipation, diarrhea and vomiting.   Genitourinary: Negative for dysuria,  "frequency and urgency.   Skin: Negative for rash.   Neurological: Positive for weakness and headaches.   All other systems reviewed and are negative.      Physical Exam   BP: (!) 202/110  Pulse: 80  Height: 185.4 cm (6' 1\")  Weight: 88 kg (194 lb)  SpO2: 98 %      Physical Exam  Constitutional:       General: He is in acute distress.      Appearance: He is not diaphoretic.   HENT:      Head: Atraumatic.   Eyes:      General: No visual field deficit.     Extraocular Movements: Extraocular movements intact.      Conjunctiva/sclera: Conjunctivae normal.   Cardiovascular:      Rate and Rhythm: Normal rate and regular rhythm.      Heart sounds: No murmur heard.  Pulmonary:      Effort: Pulmonary effort is normal. No respiratory distress.      Breath sounds: Normal breath sounds. No stridor. No wheezing or rhonchi.   Abdominal:      General: There is no distension.   Musculoskeletal:      Cervical back: Neck supple.      Right lower leg: No edema.      Left lower leg: No edema.   Skin:     Coloration: Skin is not pale.      Findings: No rash.   Neurological:      Mental Status: He is alert.      GCS: GCS eye subscore is 4. GCS verbal subscore is 5. GCS motor subscore is 6.      Cranial Nerves: No cranial nerve deficit, dysarthria or facial asymmetry.      Sensory: No sensory deficit.      Motor: Weakness (right leg) present.         ED Course                 Procedures                EKG Interpretation:      Interpreted by Deny Turk MD  EKG done at 1341 hrs. demonstrates a sinus rhythm at 62 bpm with a normal axis.  There is no significant ST change.  There may be a very marginal change of the ST segments laterally in V5 V6 V4.  Her current there is a normal R progression.  No Q waves.  Normal intervals.  Normal conduction with exception of an RSR prime lead V1.  No ectopy.  Impression sinus rhythm 62 bpm with very marginal ST change laterally.  This is nearly identical to what was seen in October 2021    Critical " Care time:  none     The patient has stroke symptoms:         ED Stroke specific documentation           NIHSS PDF     Patient last known well time: 0930  ED Provider first to bedside at: 1200  CT Results received at: 1218    Thrombolytics:   Not given due to:   - cns lesion    If treating with thrombolytics: Ensure SBP<180 and DBP<105 prior to treatment with thrombolytics.  Administering thrombolytics after treatment with IV labetalol, hydralazine, or nicardipine is reasonable once BP control is established.    Endovascular Retrieval:  Not initiated due to absence of proximal vessel occlusion    National Institutes of Health Stroke Scale (Baseline)  Time Performed: 1200     Score    Level of consciousness: (0)   Alert, keenly responsive    LOC questions: (0)   Answers both questions correctly    LOC commands: (0)   Performs both tasks correctly    Best gaze: (0)   Normal    Visual: (0)   No visual loss    Facial palsy: (0)   Normal symmetrical movements    Motor arm (left): (0)   No drift    Motor arm (right): (0)   No drift    Motor leg (left): (0)   No drift    Motor leg (right): (1)   Drift    Limb ataxia: (0)   Absent    Sensory: (0)   Normal- no sensory loss    Best language: (0)   Normal- no aphasia    Dysarthria: (0)   Normal    Extinction and inattention: (0)   No abnormality        Total Score:  1        Stroke Mimics were considered (including migraine headache, seizure disorder, hypoglycemia (or hyperglycemia), head or spinal trauma, CNS infection, Toxin ingestion and shock state (e.g. sepsis) .                     Results for orders placed or performed during the hospital encounter of 04/15/23 (from the past 24 hour(s))   Glucose by meter   Result Value Ref Range    GLUCOSE BY METER POCT 140 (H) 70 - 99 mg/dL   CBC with Platelets & Differential    Narrative    The following orders were created for panel order CBC with Platelets & Differential.  Procedure                               Abnormality          Status                     ---------                               -----------         ------                     CBC with platelets and d...[[id number]]                      Final result                 Please view results for these tests on the individual orders.   Basic metabolic panel   Result Value Ref Range    Sodium 138 136 - 145 mmol/L    Potassium 3.6 3.4 - 5.3 mmol/L    Chloride 105 98 - 107 mmol/L    Carbon Dioxide (CO2) 23 22 - 29 mmol/L    Anion Gap 10 7 - 15 mmol/L    Urea Nitrogen 16.0 8.0 - 23.0 mg/dL    Creatinine 1.16 0.67 - 1.17 mg/dL    Calcium 8.7 (L) 8.8 - 10.2 mg/dL    Glucose 115 (H) 70 - 99 mg/dL    GFR Estimate 68 >60 mL/min/1.73m2   INR   Result Value Ref Range    INR 0.97 0.85 - 1.15   Partial thromboplastin time   Result Value Ref Range    aPTT 29 22 - 38 Seconds   Troponin T, High Sensitivity   Result Value Ref Range    Troponin T, High Sensitivity 12 <=22 ng/L   Salicylate level   Result Value Ref Range    Salicylate <0.3   mg/dL   Acetaminophen level   Result Value Ref Range    Acetaminophen <5.0 (L) 10.0 - 30.0 ug/mL   Carbon monoxide   Result Value Ref Range    Carbon Monoxide 2.6 (H) 0.0 - 2.0 %   Alcohol level blood   Result Value Ref Range    Alcohol ethyl <0.01 <=0.01 g/dL   CBC with platelets and differential   Result Value Ref Range    WBC Count 8.1 4.0 - 11.0 10e3/uL    RBC Count 4.57 4.40 - 5.90 10e6/uL    Hemoglobin 14.2 13.3 - 17.7 g/dL    Hematocrit 42.9 40.0 - 53.0 %    MCV 94 78 - 100 fL    MCH 31.1 26.5 - 33.0 pg    MCHC 33.1 31.5 - 36.5 g/dL    RDW 13.4 10.0 - 15.0 %    Platelet Count 239 150 - 450 10e3/uL    % Neutrophils 64 %    % Lymphocytes 25 %    % Monocytes 7 %    % Eosinophils 3 %    % Basophils 1 %    % Immature Granulocytes 0 %    NRBCs per 100 WBC 0 <1 /100    Absolute Neutrophils 5.2 1.6 - 8.3 10e3/uL    Absolute Lymphocytes 2.0 0.8 - 5.3 10e3/uL    Absolute Monocytes 0.6 0.0 - 1.3 10e3/uL    Absolute Eosinophils 0.3 0.0 - 0.7 10e3/uL    Absolute Basophils  0.1 0.0 - 0.2 10e3/uL    Absolute Immature Granulocytes 0.0 <=0.4 10e3/uL    Absolute NRBCs 0.0 10e3/uL   Lactic acid whole blood   Result Value Ref Range    Lactic Acid 1.2 0.7 - 2.0 mmol/L   CT Head w/o Contrast    Narrative    EXAM: CT HEAD W/O CONTRAST  LOCATION: [facility]  DATE/TIME: 4/15/2023 12:18 PM CDT    INDICATION: Altered mental status.  COMPARISON: None.  TECHNIQUE: Routine CT Head without IV contrast. Multiplanar reformats. Dose reduction techniques were used.    FINDINGS:  INTRACRANIAL CONTENTS: There is an ovoid hypodense nodule in the high left frontal lobe measuring 1.5 cm in diameter with surrounding vasogenic edema. There is an ovoid hypodense nodule in the left occipital lobe measuring 1.7 cm long axis with   surrounding vasogenic edema. These represent intracranial cystic metastases or abscesses until proven. Brain MRI with contrast recommended for further evaluation. No CT evidence of acute infarct. Other than the hypodense nodules and vasogenic edema   detailed above, gray-white differentiation is normal. Normal ventricles and sulci.     VISUALIZED ORBITS/SINUSES/MASTOIDS: No intraorbital abnormality. No paranasal sinus mucosal disease. No middle ear or mastoid effusion.    BONES/SOFT TISSUES: There is an ovoid soft tissue density nodule in the left parietal scalp measuring 1.5 cm in diameter that could represent a sebaceous cyst.      Impression    IMPRESSION:  1.  Hypodense nodules in the left frontal and left occipital lobes with surrounding vasogenic edema. Differential diagnosis includes cystic metastases versus abscesses. Brain MRI with contrast recommended for better characterization.  2.  1.5 cm nodule in the left parietal scalp that could represent a sebaceous cyst.  3.  Otherwise, normal head CT.    These findings, conclusions and recommendations were discussed with the ordering physician, Dr. Deny Turk, on 04/15/2023 at 1226 hours.   CTA Head Neck  with Contrast    Narrative    EXAM: CTA HEAD NECK WITH CONTRAST  LOCATION: [facility]  DATE/TIME: 04/15/2023, 12:33 PM CDT    INDICATION: Altered mental status.  COMPARISON: None.  CONTRAST: 68 mL of Isovue 370.  TECHNIQUE: Head and neck CT angiogram with IV contrast. Axial helical CT images of the head and neck vessels obtained during the arterial phase of intravenous contrast administration. Axial 2D reconstructed images and multiplanar 3D MIP reconstructed   images of the head and neck vessels were performed by the technologist. Dose reduction techniques were used. All stenosis measurements made according to NASCET criteria unless otherwise specified.    FINDINGS:   HEAD CTA:  ANTERIOR CIRCULATION: There is mild short segment irregularity in contour and mild narrowing of the mid and distal aspects of the M1 segment of the left middle cerebral artery that could be atherosclerotic in nature. The anterior circulation is otherwise   unremarkable. Standard [place] anatomy.    POSTERIOR CIRCULATION: No stenosis/occlusion, aneurysm, or high-flow vascular malformation. Balanced vertebral arteries supply a normal basilar artery.     DURAL VENOUS SINUSES: Expected enhancement of the major dural venous sinuses.    NECK CTA:  RIGHT CAROTID: No measurable stenosis or dissection.    LEFT CAROTID: No measurable stenosis or dissection.    VERTEBRAL ARTERIES: No focal stenosis or dissection. Balanced vertebral arteries.    AORTIC ARCH: Classic aortic arch anatomy with no significant stenosis at the origin of the great vessels.    NONVASCULAR STRUCTURES: There is rim enhancement of the hypodense nodules noted on the accompanying head CT.      Impression    IMPRESSION:   HEAD CTA:   1.  Short segment mildly irregularity in contour and mild narrowing of the mid and distal aspects of the M1 segment of the left middle cerebral artery that could be atherosclerotic in nature.  2.  Incidentally noted  rim enhancement of the hypodense nodules in the left frontal and left occipital lobes that were described on the comparison head CT.  3.  Otherwise, normal head CTA.    NECK CTA:  1.  Normal neck CTA.       CT Chest Abdomen Pelvis w/o Contrast    Narrative    EXAM: CT CHEST ABDOMEN PELVIS W/O CONTRAST  LOCATION: [facility]  DATE/TIME: 4/15/2023 12:42 PM CDT    INDICATION: Metastatic brain lesions versus abscess.  COMPARISON: None.  TECHNIQUE: CT scan of the chest, abdomen, and pelvis was performed without IV contrast. Multiplanar reformats were obtained. Dose reduction techniques were used.   CONTRAST: None.    FINDINGS:   LUNGS AND PLEURA: No acute airspace disease, pleural effusion, or pneumothorax. Subpleural fibrosis in the lower lobes. 3 mm right lower lobe nodule image 216 and 6 mm right lower lobe nodule on image 195. There is a 6 mm right middle lobe nodule image   232. Additional pulmonary nodules are seen in the left upper lobe on images 156 and 194.    MEDIASTINUM/AXILLAE: No lymphadenopathy. Heart size and vasculature are normal. Normal esophagus. The axilla are normal.    CORONARY ARTERY CALCIFICATION: None.    HEPATOBILIARY: No significant mass or bile duct dilatation. No calcified gallstones.     PANCREAS: Normal.    SPLEEN: Normal.    ADRENAL GLANDS: Normal.    KIDNEYS/BLADDER: No significant mass, stone, or hydronephrosis. Normal bladder.    BOWEL: Diverticulosis of the colon. No acute inflammatory change. No obstruction.     LYMPH NODES: Normal.    VASCULATURE: Unremarkable.    PELVIC ORGANS: Prostatectomy.    MUSCULOSKELETAL: Normal.      Impression    IMPRESSION:  1.  There are bilateral pulmonary nodules measuring up to 6 mm. These are indeterminant and recommend continued surveillance.  2.  No mass or area of inflammation evident.  3.  Diverticulosis.   Urine Drugs of Abuse Screen    Narrative    The following orders were created for panel order Urine Drugs of Abuse  Screen.  Procedure                               Abnormality         Status                     ---------                               -----------         ------                     Drug abuse screen 77 uri...[[id number]]  Abnormal            Final result                 Please view results for these tests on the individual orders.   UA reflex to Microscopic and Culture    Specimen: Urine, Midstream   Result Value Ref Range    Color Urine Straw Colorless, Straw, Light Yellow, Yellow    Appearance Urine Clear Clear    Glucose Urine Negative Negative mg/dL    Bilirubin Urine Negative Negative    Ketones Urine Negative Negative mg/dL    Specific Gravity Urine 1.010 1.003 - 1.035    Blood Urine Negative Negative    pH Urine 7.0 5.0 - 7.0    Protein Albumin Urine Negative Negative mg/dL    Urobilinogen Urine Normal Normal, 2.0 mg/dL    Nitrite Urine Negative Negative    Leukocyte Esterase Urine Negative Negative    Narrative    Microscopic not indicated   Drug abuse screen 77 urine ([place], [place], SH)   Result Value Ref Range    Amphetamines Urine Screen Negative Screen Negative    Barbituates Urine Screen Negative Screen Negative    Benzodiazepine Urine Screen Negative Screen Negative    Cannabinoids Urine Screen Positive (A) Screen Negative    Opiates Urine Screen Negative Screen Negative    PCP Urine Screen Negative Screen Negative    Cocaine Urine Screen Negative Screen Negative   MR Brain w/o & w Contrast    Narrative    EXAM: MR BRAIN W/O and W CONTRAST  LOCATION: [facility]  DATE/TIME: 4/15/2023 1:40 PM CDT    INDICATION: cns lesions, confusion  COMPARISON: CT and CTA head neck same day.  CONTRAST: 8.5 mL Gadavist  TECHNIQUE: Routine multiplanar multisequence head MRI without and with intravenous contrast.    FINDINGS:    INTRACRANIAL CONTENTS: No diffusion restriction to suggest acute/subacute ischemia. There are 2 discrete ring-enhancing lesions corresponding with same-day comparison CT head  findings, which reside in the left paramedian posterior frontal lobe near the   precentral gyrus measuring approximately 2.2 x 1.7 x 1.9 cm and left occipital lobe near the lingual gyrus measuring approximately 2.2 x 2.1 x 2.0 cm. Occipital lesion encroaches upon the subependymal surface of the left lateral ventricle occipital horn   as well as contacts the superior surface of the medial left tentorium. The lesions are accompanied by moderate surrounding T2 prolongation compatible with vasogenic edema. Internally, lesions exhibit T1 hyperintense signal with incomplete signal   suppression and absence of diffusion restriction to suggest purulent contents. Mild local mass effect includes surrounding sulcal effacement. No herniation or extra-axial collection. Scattered nonspecific T2/FLAIR hyperintensities within the cerebral   white matter most consistent with mild chronic microvascular ischemic change. No ventriculomegaly. Normal position of the cerebellar tonsils.    SELLA: No abnormality accounting for technique.    OSSEOUS STRUCTURES/SOFT TISSUES: Normal marrow signal. The major intracranial vascular flow voids are maintained. Redemonstrated enhancing soft tissue left parietal scalp lesion measuring approximately 1.5 cm and corresponding fluid signal compatible   with sebaceous cyst.    ORBITS: No visible intraorbital abnormality.     SINUSES/MASTOIDS: Mild mucosal thickening scattered about the paranasal sinuses. No middle ear or mastoid effusion.      Impression    IMPRESSION:  1.  Ring-enhancing lesions in the left posterior frontal and occipital lobes corresponding with same day comparison CT findings are most suspicious for metastases, as detailed.   2.  Moderate edema surrounding lesions without gavi herniation.           Medications - No data to display    Assessments & Plan (with Medical Decision Making)     MDM: Saeid Santa is a 67 year old male presented with previously last known well time at around  9:30 AM.  No obvious head or neck injury although recently had chiropractic manipulation onset of the start of migraine with aura at around 930 and went home to take migraine meds.  Arrived here by EMS after he had returned to work but could only stand or until around 11 AM and then went back out to drive home but was found by coworker sitting in the grass.  He appeared to be confused.  Glucose normal in the ambulance.  His only physical finding now is the right leg weakness that is about a 4 out of 5 weakness.  He has an NIH stroke scale of 1.  I spoke to Jarrod   He recommended lowering patient's blood pressure -by about 20% with a goal BP less than 160.  I had intended to use labetalol but his heart rate is already 60 or less and therefore I will order Norvasc for discharge.    Findings on imaging are consistent with either metastases or with infectious lesions.  Radiologist based on MRI favors metastases.  CT chest abdomen pelvis was done and demonstrates no obvious sources.  He has a few lung nodules but nothing that is appreciable size.  He does have a scalp lesion that could be consistent with a malignancy.  Could be a squamous cell or an atypical non-pigmented melanoma.  There is a dermatology consultation that is pending for the 20th, 5 days from now in [place].  That may reveal underlying source of metastases.      I spoke with Dr. Monge in neurosurgery -he is forwarding the patient's information to get followed up in tumor clinic likely Monday or Tuesday.  I have also placed a consult to connect this.  We discussed the patient's episodic movement of his right leg and the possible postictal period today and this could be associated with seizure.  We will initiate Keppra load 1000 mg here and then 750 mg twice daily.      We did perform blood cultures here.  There is the potential that this could be a endocarditis source or similar.  Other testing demonstrated no obvious etiology.                  I  have reviewed the nursing notes.    I have reviewed the findings, diagnosis, plan and need for follow up with the patient.           Medical Decision Making  The patient's presentation was of high complexity (an acute health issue posing potential threat to life or bodily function).    The patient's evaluation involved:  ordering and/or review of 3+ test(s) in this encounter (see separate area of note for details)  discussion of management or test interpretation with another health professional (see separate area of note for details)    The patient's management necessitated moderate risk (prescription drug management including medications given in the ED).        Discharge Medication List as of 4/15/2023  3:50 PM      START taking these medications    Details   amLODIPine (NORVASC) 5 MG tablet Take 1 tablet (5 mg) by mouth daily, Disp-30 tablet, R-0, E-Prescribe      levETIRAcetam (KEPPRA) 750 MG tablet Take 1 tablet (750 mg) by mouth 2 times daily, Disp-60 tablet, R-0, E-Prescribe      oxyCODONE (ROXICODONE) 5 MG tablet Take 1 tablet (5 mg) by mouth every 6 hours as needed for severe pain, Disp-6 tablet, R-0, E-Prescribe             Final diagnoses:   CNS mass - unclear cause.  follow-up neurolosurgery monday - Dr. Tan will be facilitating follow-up monday, tuesday.   keep the dermatology appt for follow-up skin lesion needs biopsy   Focal seizures (H) - I suspect the leg symptoms are focal seizures and today's confusion was fue to post-ictal period.  you were given keppra load here 1000 mg.  then 750 orally twice daily.  no driving for 3 months from last seizure   Elevated blood pressure reading without diagnosis of hypertension - start norvasc 5 mg orally daily and monitor BP.  goal <160 mmHg   Acute nonintractable headache, unspecified headache type - this may be tumor related. take tylenol 650 mg orally every 6 hours.  take oxycodone for breakthrough pain       4/15/2023   [facility]  EMERGENCY DEPT     Deny Turk MD  04/15/23 [id number]

## 2023-04-17 ENCOUNTER — OFFICE VISIT (OUTPATIENT)
Dept: NEUROSURGERY | Facility: CLINIC | Age: 68
End: 2023-04-17
Attending: FAMILY MEDICINE
Payer: COMMERCIAL

## 2023-04-17 VITALS — SYSTOLIC BLOOD PRESSURE: 154 MMHG | OXYGEN SATURATION: 98 % | DIASTOLIC BLOOD PRESSURE: 81 MMHG | HEART RATE: 68 BPM

## 2023-04-17 DIAGNOSIS — L72.3 SEBACEOUS CYST: ICD-10-CM

## 2023-04-17 DIAGNOSIS — R56.9 FOCAL SEIZURES (H): ICD-10-CM

## 2023-04-17 DIAGNOSIS — G96.89 CNS MASS: Primary | ICD-10-CM

## 2023-04-17 LAB — PSA SERPL DL<=0.01 NG/ML-MCNC: <0.01 NG/ML (ref 0–4.5)

## 2023-04-17 PROCEDURE — 99205 OFFICE O/P NEW HI 60 MIN: CPT | Performed by: NEUROLOGICAL SURGERY

## 2023-04-17 RX ORDER — SULFAMETHOXAZOLE/TRIMETHOPRIM 800-160 MG
1 TABLET ORAL 2 TIMES DAILY
Qty: 14 TABLET | Refills: 0 | Status: ON HOLD | OUTPATIENT
Start: 2023-04-17 | End: 2023-04-25

## 2023-04-17 ASSESSMENT — PAIN SCALES - GENERAL: PAINLEVEL: WORST PAIN (10)

## 2023-04-17 NOTE — PATIENT INSTRUCTIONS
Please review COMPLETE information about your proposed surgery, pre-operative requirements, post-operative course and expectations - available in a folder provided to you in clinic!    Your surgery scheduler will call you within 3 business days to begin the process of scheduling your surgery and appointments.     Pre-Operative    Pre-operative physical / Lab work with primary care physician within 30 days of surgical date. We prefer the pre-op exam to be done 2 weeks prior to surgery. We also prefer pre-op lab work be done at [facility] outpatient lab, 2 weeks prior to surgery.     If all pre-op appointments/test are not completed prior to your surgery date, you will be asked to reschedule your surgery.           As part of preparation for your upcoming procedure your primary care doctor may order a test to rule out a COVID-19 infection. This is no longer a requirement prior to surgery.     Readiness Visits    Prior to surgery, you may have a telephone or in person readiness visit with our RN team to discuss your upcomming surgery, results of your pre-op physical, and lab work.   If you will require a collar/neck brace after surgery, you will be fitted for one at your readiness visit prior to surgery (scheduled by the surgery scheduler).     Shower procedure    Hibiclens shower: Use one packet the night before surgery and one packet the morning of surgery for a whole body shower. Avoid face, hair, and genitals.      Eating/Drinking    Stop all solid foods 8 hours before surgery.  Stop all clear liquids 2 hours prior to arrival time     Clear liquids include water, clear juice, black coffee, or clear tea without milk, Gatorade, clear soda.     Medications - please refer to the pre-operative medication instructions sheet in your folder    Hold Aspirin, NSAIDs (Advil/Ibuprofen, Indocin, Naproxen,Nuprin,Relafen/Nabumetone, Diclofenac,Meloxicam, Aleve, Celebrex) and all vitamins and supplements x  7-10 days prior to surgical date  You can take Tylenol (Acetaminophen) for pain up until the date of your surgery   Do not exceed 3,000 mg per day   Any other medications prescribed, please discuss with your primary care provider at your pre-operative physical. Please discuss when/if it is safe for you to stop taking blood thinners with your primary care provider.   We will NOT provide pain medications prior to surgery. We will prescribe post-op pain medications for up to 6 weeks after surgery.       FMLA/Short-term disability    If you are currently employed, you will likely need to be off work for 4-6 weeks for post-op recovery and healing.  Please fax any FMLA/short term disability paperwork to [phone], mail it into the clinic, drop it off in person, or send via a [employer] system message.   You may also call our clinic with the date in which you'd like to return to work, and we can provide a work letter to your employer  We will support Short-Term Disability up to 12 weeks, beginning the date of your surgery. We do NOT support Long-Term Disability/Social Security Benefits.     Pain Management after surgery    Dealing with pain    As your body heals, you might feel a stabbing, burning, or aching pain. You may also have some numbness.  Everyone feels pain differently, we may ask you to rate your pain using a pain scale. This will let us know how much pain you feel.   Keep in mind that medicine won't take away all of your pain. It helps to try other ways to relax and ease pain.     Things to help with pain    After surgery, we will give you medicine for your pain. These medications work well, but they can make you drowsy, itchy, or sick to your stomach, and constipated. Try to take narcotics with food if they cause nausea.   For mild to moderate pain, you can take medication such as Tylenol or Ibuprofen. These can be used with narcotics and muscle relaxants. *If you have had a fusion: do NOT use NSAIDs for 6 months  after surgery.   Do NOT drive while taking narcotic pain medication  Do NOT drink alcohol while using narcotic pain medication  You can utilize ice as needed (no longer than 20 minutes at one time) you may apply this over your covered incision  Utilize heat for muscle spasms, do not apply heat over your incision  If a muscle relaxer is prescribed, please do NOT take it at the same time as your narcotic pain medication. Take them at least 90 minutes apart.   You may also use pain cream/patches on sore muscles. Do NOT apply these on your incision. Patches may be cut in 1/2 if needed.     *After your surgery, if you will be staying in-patient, a nursing team will be monitoring you closely throughout your stay and communicate your health status to your surgeon and other providers.  You will be seen by Advanced Practice Providers (e.g., nurse practitioners, clinic nurse specialists, and physician assistants) who will check on you regularly to assess the status of your surgical recovery.     Incision Care    Look at your incision site every day. You  may need a mirror, or family member to help you.   Do not submerge your incision in water such as pools, hot tubs, baths for at least 6 weeks or until incision is healed  You may get your incision wet in the shower. Allow water and soap to run over incision, and gently pat dry.   Remove the dressing the day after you are discharged from the hospital. Keep the incision clean and dry at all times. This may require several bandage changes.   Contact us right away if you have:   Fever over 101 degrees farenheit  Green or yellow drainage (pus) from your incision or increased bloody drainage   Redness, swelling, or warmth at your surgery site   Notify the clinic [phone].    Activity Restrictions    For the first 6 weeks, no lifting,pushing, or pulling > 5-10 pounds, no bending, twisting.  Use the stairs in moderation   Walking: Walking is the best way to start exercise after  surgery. Take short frequent walks. You may gradually increase the distance as tolerated. If you feel pain, decrease your activity, but DO NOT stop walking. Walking will help you regain strength.  Avoid prolonged positioning for longer than 30 minutes (change positions frequently while awake)  No contact sports until after follow up visit  No high impact activities such as; running/jogging, snowmobile or 4 nesbitt riding or any other recreational vehicles until deemed safe by your surgeon/care team.   Please call the clinic if you develop any of the following symptoms:  Swelling and/or warmth in one or both legs  Pain or tenderness in your leg, ankle, foot, or arm   Red or discolored/pale skin     Post-Op Follow Up Appointments    We will call you to schedule these appointments after your discharge from the hospital.   Incision assessment within 2 weeks with a Registered Nurse   6 week post-op with a Nurse Practitioner/Physician Assistant. Your surgeon will be available on this day.

## 2023-04-17 NOTE — PROGRESS NOTES
NEUROSURGERY CONSULTATION NOTE  Neurosurgery was asked to see this patient by Deny Turk MD for evaluation of new onset seizure and imaging revealed left motor strip and left lingual gyrus lesion.      CONSULTATION ASSESSMENT AND PLAN:    Mr Santa is a 67-year-old right-handed gentleman with a significant past medical history of prostate cancer diagnosed back in 2009 underwent prostatectomy with no other adjuvant treatment.   He started noticing abnormal movement of the right lower extremity likely seizure with no loss of consciousness.  He had 3 such episodes the last 1 was 3 days ago.  He went to the ER and underwent MRI which showed 2 ring-enhancing lesion involving his left motor strip and left lingula with perilesional edema.    Given his history of prostate cancer and new onset seizures and MRI showing ring-enhancing lesions metastatic disease is highly likely.  I would recommend resection of his left frontal lesion with the goal of achieving diagnosis and possible to relieve seizures.  I recommend to do this as awake craniotomy with resection of the lesion.  I discussed the risk and benefits of the procedure including but not limited to seizures, hemorrhage, stroke with paralysis, weakness of the right leg hematoma, infection, incomplete resection, need for further surgeries, need for adjuvant treatment, dissemination of the tumor, MI, DVT/PE, or even death. He understands the risks and understands that the potential benefits far exceed the risks at this time.  He and his wife would like to proceed with the surgery.  I would schedule him for awake craniotomy and inform the family accordingly.    I would also get his serum PSA levels and would prescribe him Bactrim double strength for his sebaceous cyst in the area of the scalp overlying the tumor.  All the questions were answered and patient sounded understanding.  They can contact us if there are any questions or concerns.    I spent more than 60  minutes in this apt, examining the pt, reviewing the scans, reviewing notes from chart, discussing treatment options with risks and benefits and coordinating care.     Kye Garrido MD      HPI:   Mr Santa is a 67-year-old right-handed gentleman with a significant past medical history of prostate cancer diagnosed back in 2009 underwent prostatectomy with no other adjuvant treatment.  He was following with his medical oncologist for up to 5 years and then he is doing annual PSA the last PSA was done in November 2022 which showed levels within normal limits.    He started noticing abnormal movement of the right lower extremity a month ago which lasted for few minutes.  He did not go to the ER for the same he had another similar episode a week later.  Both of these episodes were not not associated with loss of consciousness, bladder or bowel incontinence or involvement of right upper extremity.  His most recent episode was on 4/15/2023 when he had similar episode at around 11 AM which involves abnormal movement of the right lower extremity which lasted for approximately 2 minutes he reports worsening headache after the ictus which lasted for few hours along with visual symptoms in the form of seeing floaters, stars following diabetes.  His episodes were also associated with confusion that lasted for approximately 20 to 30 minutes.  He went to the ER at this time following by episode where MRI was done which showed 2 ring-enhancing lesion involving the left motor strip in the leg area and left lingula measuring approximately 2 cm in size with significant perilesional edema.    He does not have any post ictal residual weakness in the right lower extremity.  He denies any weakness in his upper extremities or bladder or bowel symptoms.    He smokes approximately half a pack per day for last 20 years consumes alcohol socially and occasional use of cannabis.    He is retired but working a shop.  He denies any significant  cardiac or pulmonary history.  He is not on aspirin Plavix or any other anticoagulants.    He has a significant family history of pancreatic cancer in dad.   No other significant cardiac or pulmonary history.    No past medical history on file.    No past surgical history on file.    REVIEW OF SYSTEMS:  Review Of Systems  Skin: negative  Eyes: negative  Ears/Nose/Throat: negative  Respiratory: No shortness of breath, dyspnea on exertion, cough, or hemoptysis  Cardiovascular: negative  Gastrointestinal: negative  Genitourinary: negative  Musculoskeletal: negative  Neurologic: negative  Psychiatric: negative  Hematologic/Lymphatic/Immunologic: negative  Endocrine: negative    MEDICATIONS:    Current Outpatient Medications   Medication Sig Dispense Refill     amLODIPine (NORVASC) 5 MG tablet Take 1 tablet (5 mg) by mouth daily 30 tablet 0     levETIRAcetam (KEPPRA) 750 MG tablet Take 1 tablet (750 mg) by mouth 2 times daily 60 tablet 0     oxyCODONE (ROXICODONE) 5 MG tablet Take 1 tablet (5 mg) by mouth every 6 hours as needed for severe pain 6 tablet 0     sulfamethoxazole-trimethoprim (BACTRIM DS) 800-160 MG tablet Take 1 tablet by mouth 2 times daily 14 tablet 0     rizatriptan (MAXALT) 5 MG tablet 1 tablet Orally Once a day prn migraine (Patient not taking: Reported on 4/17/2023)       tadalafil (CIALIS) 5 MG tablet TAKE 1 TABLET BY MOUTH EVERY DAY 30 (Patient not taking: Reported on 4/17/2023)           ALLERGIES/SENSITIVITIES:     No Known Allergies    PERTINENT SOCIAL HISTORY: Chronic smoker  Social History     Socioeconomic History     Marital status:          FAMILY HISTORY:  No family history on file.     PHYSICAL EXAM:   Constitutional: BP (!) 154/81   Pulse 68   SpO2 98%      Mental Status: A & O in no acute distress.  Affect is appropriate.  Speech is fluent.  Recent and remote memory are intact.  Attention span and concentration are normal.        Cranial Nerves:   CN1: grossly intact per patient  recall.   CN2: No funduscopic exam performed.   CN3,4 & 6: Pupillary light response, lateral and vertical gaze normal.  No nystagmus.  Visual fields are full to confrontation.   CN5: Intact to touch   CN7: No facial weakness, smile, facial symmetry intact.   CN8: Intact to spoken voice.   CN9&10: Gag reflex, uvula midline, palate rises with phonation.   CN11: Shoulder shrug 5/5 intact bilaterally.   CN12: Tongue midline and moves freely from side to side.     Motor: No pronator drift of upper extremity.   Normal bulk and tone all muscle groups of upper and lower extremities.       Delt Bi Tri Hand Flex/  Ext Iliopsoas Quadriceps Tibialis Anterior EHL Gastroc     C5 C6 C7 C8/T1 L2 L3 L4 L5 S1   R 5 5 5 5 5 5 5 5 5   L 5 5 5 5 5 5 5 5 5          Sensory: Sensation intact bilaterally to light touch.     Coordination; finger to nose, heel to shin, rapid alternating movements smooth and rhythmic.   Romberg intact.   Heel/toe/tandem gait intact.    Normal gait and station.     Reflexes;                       Right              Left  Brachioradialis (C5,6)      2+                 2+  Biceps   (C5,6)                 2+                 2+  Triceps  (C7,8)                 2+                2+  Knee (L3,4)                      2+                2+  Ankle jerk (S1,2)              2+                2+    No hoffmans/babinski/ clonus.    IMAGING:  I personally reviewed all radiographic images and agree with neuroradiology report of left posterior frontal and occipital lobes lesions likely metastatic disease.     04/15/2023: MRI Brain:   1.  Ring-enhancing lesions in the left posterior frontal and occipital lobes corresponding with same day comparison CT findings are most suspicious for metastases, as detailed.   2.  Moderate edema surrounding lesions without gavi herniation.      04/15/2023: CT CAP:   1.  There are bilateral pulmonary nodules measuring up to 6 mm. These are indeterminant and recommend continued surveillance.  2.   No mass or area of inflammation evident.  3.  Diverticulosis.    Cc:   Asim Cervantes

## 2023-04-17 NOTE — LETTER
4/17/2023         RE: Saeid Santa  [address] [facility] [id number]        Dear Colleague,    Thank you for referring your patient, Saeid Santa, to the [facility]. Please see a copy of my visit note below.    NEUROSURGERY CONSULTATION NOTE  Neurosurgery was asked to see this patient by Deny Turk MD for evaluation of new onset seizure and imaging revealed left motor strip and left lingual gyrus lesion.      CONSULTATION ASSESSMENT AND PLAN:    Mr Santa is a 67-year-old right-handed gentleman with a significant past medical history of prostate cancer diagnosed back in 2009 underwent prostatectomy with no other adjuvant treatment.   He started noticing abnormal movement of the right lower extremity likely seizure with no loss of consciousness.  He had 3 such episodes the last 1 was 3 days ago.  He went to the ER and underwent MRI which showed 2 ring-enhancing lesion involving his left motor strip and left lingula with perilesional edema.    Given his history of prostate cancer and new onset seizures and MRI showing ring-enhancing lesions metastatic disease is highly likely.  I would recommend resection of his left frontal lesion with the goal of achieving diagnosis and possible to relieve seizures.  I recommend to do this as awake craniotomy with resection of the lesion.  I discussed the risk and benefits of the procedure including but not limited to seizures, hemorrhage, stroke with paralysis, weakness of the right leg hematoma, infection, incomplete resection, need for further surgeries, need for adjuvant treatment, dissemination of the tumor, MI, DVT/PE, or even death. He understands the risks and understands that the potential benefits far exceed the risks at this time.  He and his wife would like to proceed with the surgery.  I would schedule him for awake craniotomy and inform the family accordingly.    I would also get his serum PSA levels and would prescribe him  Bactrim double strength for his sebaceous cyst in the area of the scalp overlying the tumor.  All the questions were answered and patient sounded understanding.  They can contact us if there are any questions or concerns.    I spent more than 60 minutes in this apt, examining the pt, reviewing the scans, reviewing notes from chart, discussing treatment options with risks and benefits and coordinating care.     Kye Garrido MD      HPI:   Mr Santa is a 67-year-old right-handed gentleman with a significant past medical history of prostate cancer diagnosed back in 2009 underwent prostatectomy with no other adjuvant treatment.  He was following with his medical oncologist for up to 5 years and then he is doing annual PSA the last PSA was done in November 2022 which showed levels within normal limits.    He started noticing abnormal movement of the right lower extremity a month ago which lasted for few minutes.  He did not go to the ER for the same he had another similar episode a week later.  Both of these episodes were not not associated with loss of consciousness, bladder or bowel incontinence or involvement of right upper extremity.  His most recent episode was on 4/15/2023 when he had similar episode at around 11 AM which involves abnormal movement of the right lower extremity which lasted for approximately 2 minutes he reports worsening headache after the ictus which lasted for few hours along with visual symptoms in the form of seeing floaters, stars following diabetes.  His episodes were also associated with confusion that lasted for approximately 20 to 30 minutes.  He went to the ER at this time following by episode where MRI was done which showed 2 ring-enhancing lesion involving the left motor strip in the leg area and left lingula measuring approximately 2 cm in size with significant perilesional edema.    He does not have any post ictal residual weakness in the right lower extremity.  He denies any  weakness in his upper extremities or bladder or bowel symptoms.    He smokes approximately half a pack per day for last 20 years consumes alcohol socially and occasional use of cannabis.    He is retired but working a shop.  He denies any significant cardiac or pulmonary history.  He is not on aspirin Plavix or any other anticoagulants.    He has a significant family history of pancreatic cancer in dad.   No other significant cardiac or pulmonary history.    No past medical history on file.    No past surgical history on file.    REVIEW OF SYSTEMS:  Review Of Systems  Skin: negative  Eyes: negative  Ears/Nose/Throat: negative  Respiratory: No shortness of breath, dyspnea on exertion, cough, or hemoptysis  Cardiovascular: negative  Gastrointestinal: negative  Genitourinary: negative  Musculoskeletal: negative  Neurologic: negative  Psychiatric: negative  Hematologic/Lymphatic/Immunologic: negative  Endocrine: negative    MEDICATIONS:    Current Outpatient Medications   Medication Sig Dispense Refill     amLODIPine (NORVASC) 5 MG tablet Take 1 tablet (5 mg) by mouth daily 30 tablet 0     levETIRAcetam (KEPPRA) 750 MG tablet Take 1 tablet (750 mg) by mouth 2 times daily 60 tablet 0     oxyCODONE (ROXICODONE) 5 MG tablet Take 1 tablet (5 mg) by mouth every 6 hours as needed for severe pain 6 tablet 0     sulfamethoxazole-trimethoprim (BACTRIM DS) 800-160 MG tablet Take 1 tablet by mouth 2 times daily 14 tablet 0     rizatriptan (MAXALT) 5 MG tablet 1 tablet Orally Once a day prn migraine (Patient not taking: Reported on 4/17/2023)       tadalafil (CIALIS) 5 MG tablet TAKE 1 TABLET BY MOUTH EVERY DAY 30 (Patient not taking: Reported on 4/17/2023)           ALLERGIES/SENSITIVITIES:     No Known Allergies    PERTINENT SOCIAL HISTORY: Chronic smoker  Social History     Socioeconomic History     Marital status:          FAMILY HISTORY:  No family history on file.     PHYSICAL EXAM:   Constitutional: BP (!) 154/81    Pulse 68   SpO2 98%      Mental Status: A & O in no acute distress.  Affect is appropriate.  Speech is fluent.  Recent and remote memory are intact.  Attention span and concentration are normal.        Cranial Nerves:   CN1: grossly intact per patient recall.   CN2: No funduscopic exam performed.   CN3,4 & 6: Pupillary light response, lateral and vertical gaze normal.  No nystagmus.  Visual fields are full to confrontation.   CN5: Intact to touch   CN7: No facial weakness, smile, facial symmetry intact.   CN8: Intact to spoken voice.   CN9&10: Gag reflex, uvula midline, palate rises with phonation.   CN11: Shoulder shrug 5/5 intact bilaterally.   CN12: Tongue midline and moves freely from side to side.     Motor: No pronator drift of upper extremity.   Normal bulk and tone all muscle groups of upper and lower extremities.       Delt Bi Tri Hand Flex/  Ext Iliopsoas Quadriceps Tibialis Anterior EHL Gastroc     C5 C6 C7 C8/T1 L2 L3 L4 L5 S1   R 5 5 5 5 5 5 5 5 5   L 5 5 5 5 5 5 5 5 5          Sensory: Sensation intact bilaterally to light touch.     Coordination; finger to nose, heel to shin, rapid alternating movements smooth and rhythmic.   Romberg intact.   Heel/toe/tandem gait intact.    Normal gait and station.     Reflexes;                       Right              Left  Brachioradialis (C5,6)      2+                 2+  Biceps   (C5,6)                 2+                 2+  Triceps  (C7,8)                 2+                2+  Knee (L3,4)                      2+                2+  Ankle jerk (S1,2)              2+                2+    No hoffmans/babinski/ clonus.    IMAGING:  I personally reviewed all radiographic images and agree with neuroradiology report of left posterior frontal and occipital lobes lesions likely metastatic disease.     04/15/2023: MRI Brain:   1.  Ring-enhancing lesions in the left posterior frontal and occipital lobes corresponding with same day comparison CT findings are most suspicious  for metastases, as detailed.   2.  Moderate edema surrounding lesions without gavi herniation.      04/15/2023: CT CAP:   1.  There are bilateral pulmonary nodules measuring up to 6 mm. These are indeterminant and recommend continued surveillance.  2.  No mass or area of inflammation evident.  3.  Diverticulosis.    Cc:   Asim Cervantes               Again, thank you for allowing me to participate in the care of your patient.        Sincerely,        Kye Garrido MD

## 2023-04-19 ENCOUNTER — OFFICE VISIT (OUTPATIENT)
Dept: FAMILY MEDICINE | Facility: CLINIC | Age: 68
End: 2023-04-19
Payer: COMMERCIAL

## 2023-04-19 VITALS
HEIGHT: 73 IN | SYSTOLIC BLOOD PRESSURE: 138 MMHG | RESPIRATION RATE: 16 BRPM | HEART RATE: 84 BPM | DIASTOLIC BLOOD PRESSURE: 86 MMHG | BODY MASS INDEX: 25.45 KG/M2 | WEIGHT: 192 LBS | TEMPERATURE: 97.6 F | OXYGEN SATURATION: 98 %

## 2023-04-19 DIAGNOSIS — I10 HYPERTENSION, UNSPECIFIED TYPE: ICD-10-CM

## 2023-04-19 DIAGNOSIS — G96.89 CNS MASS: ICD-10-CM

## 2023-04-19 DIAGNOSIS — Z01.818 PREOP GENERAL PHYSICAL EXAM: Primary | ICD-10-CM

## 2023-04-19 PROCEDURE — 99203 OFFICE O/P NEW LOW 30 MIN: CPT | Performed by: FAMILY MEDICINE

## 2023-04-19 ASSESSMENT — PAIN SCALES - GENERAL: PAINLEVEL: NO PAIN (0)

## 2023-04-19 NOTE — PATIENT INSTRUCTIONS
For informational purposes only. Not to replace the advice of your health care provider. Copyright   2003,  [employer] [facility]. All rights reserved. Clinically reviewed by Meredith Atkins MD. [employer]ync[employer] [id number] - REV .  Preparing for Your Surgery  Getting started  A nurse will call you to review your health history and instructions. They will give you an arrival time based on your scheduled surgery time. Please be ready to share:    Your doctor's clinic name and phone number    Your medical, surgical, and anesthesia history    A list of allergies and sensitivities    A list of medicines, including herbal treatments and over-the-counter drugs    Whether the patient has a legal guardian (ask how to send us the papers in advance)  Please tell us if you're pregnant--or if there's any chance you might be pregnant. Some surgeries may injure a fetus (unborn baby), so they require a pregnancy test. Surgeries that are safe for a fetus don't always need a test, and you can choose whether to have one.   If you have a child who's having surgery, please ask for a copy of Preparing for Your Child's Surgery.    Preparing for surgery    Within 10 to 30 days of surgery: Have a pre-op exam (sometimes called an H&P, or History and Physical). This can be done at a clinic or pre-operative center.  ? If you're having a , you may not need this exam. Talk to your care team.    At your pre-op exam, talk to your care team about all medicines you take. If you need to stop any medicines before surgery, ask when to start taking them again.  ? We do this for your safety. Many medicines can make you bleed too much during surgery. Some change how well surgery (anesthesia) drugs work.    Call your insurance company to let them know you're having surgery. (If you don't have insurance, call [phone].)    Call your clinic if there's any change in your health. This includes signs of a cold or flu (sore throat, runny nose,  cough, rash, fever). It also includes a scrape or scratch near the surgery site.    If you have questions on the day of surgery, call your hospital or surgery center.  Eating and drinking guidelines  For your safety: Unless your surgeon tells you otherwise, follow the guidelines below.    Eat and drink as usual until 8 hours before you arrive for surgery. After that, no food or milk.    Drink clear liquids until 2 hours before you arrive. These are liquids you can see through, like water, Gatorade, and Propel Water. They also include plain black coffee and tea (no cream or milk), candy, and breath mints. You can spit out gum when you arrive.    If you drink alcohol: Stop drinking it the night before surgery.    If your care team tells you to take medicine on the morning of surgery, it's okay to take it with a sip of water.  Preventing infection    Shower or bathe the night before and morning of your surgery. Follow the instructions your clinic gave you. (If no instructions, use regular soap.)    Don't shave or clip hair near your surgery site. We'll remove the hair if needed.    Don't smoke or vape the morning of surgery. You may chew nicotine gum up to 2 hours before surgery. A nicotine patch is okay.  ? Note: Some surgeries require you to completely quit smoking and nicotine. Check with your surgeon.    Your care team will make every effort to keep you safe from infection. We will:  ? Clean our hands often with soap and water (or an alcohol-based hand rub).  ? Clean the skin at your surgery site with a special soap that kills germs.  ? Give you a special gown to keep you warm. (Cold raises the risk of infection.)  ? Wear special hair covers, masks, gowns and gloves during surgery.  ? Give antibiotic medicine, if prescribed. Not all surgeries need antibiotics.  What to bring on the day of surgery    Photo ID and insurance card    Copy of your health care directive, if you have one    Glasses and hearing aids (bring  cases)  ? You can't wear contacts during surgery    Inhaler and eye drops, if you use them (tell us about these when you arrive)    CPAP machine or breathing device, if you use them    A few personal items, if spending the night    If you have . . .  ? A pacemaker, ICD (cardiac defibrillator) or other implant: Bring the ID card.  ? An implanted stimulator: Bring the remote control.  ? A legal guardian: Bring a copy of the certified (court-stamped) guardianship papers.  Please remove any jewelry, including body piercings. Leave jewelry and other valuables at home.  If you're going home the day of surgery    You must have a responsible adult drive you home. They should stay with you overnight as well.    If you don't have someone to stay with you, and you aren't safe to go home alone, we may keep you overnight. Insurance often won't pay for this.  After surgery  If it's hard to control your pain or you need more pain medicine, please call your surgeon's office.  Questions?   If you have any questions for your care team, list them here: _________________________________________________________________________________________________________________________________________________________________________ ____________________________________ ____________________________________ ____________________________________

## 2023-04-19 NOTE — PROGRESS NOTES
[facility]  [address]  [facility] [address]  Phone: [phone]  Primary Provider: Asim Cervantes  Pre-op Performing Provider: CHARISSE PARRA    PREOPERATIVE EVALUATION:  Today's date: 4/19/2023    Saeid Santa is a 67 year old male who presents for a preoperative evaluation.      4/19/2023     2:57 PM   Additional Questions   Roomed by Kelly RUIZ MA   Accompanied by Self     Surgical Information:  Surgery/Procedure: stealth assisted awake craniotomy resection of motor strip tumor  Surgery Location: [facility]  Surgeon: Kye Garrido MD  Surgery Date: 4/21/2023  Time of Surgery: 1:50 PM  Where patient plans to recover: At home with family  Fax number for surgical facility: Note does not need to be faxed, will be available electronically in Epic.    Assessment & Plan     The proposed surgical procedure is considered HIGH risk.    Preop general physical exam      CNS mass  Following with neurosurgery.  On imaging in emergency room did not pulmonary nodules, referral to oncology for ongoing treatment.  Will likely need biopsies from his upcoming procedures to tailor treatment  - Adult Oncology/Hematology  Referral    Hypertension, unspecified type  Well-controlled.  Continue current regimen        Possible Sleep Apnea: Reports was diagnosed with sleep apnea about 20 years ago, he has been off of his CPAP for about that time.  Noted for risk during anesthesia       Risks and Recommendations:  The patient has the following additional risks and recommendations for perioperative complications:   - No identified additional risk factors other than previously addressed    Medication Instructions:  Patient is to take all scheduled medications on the day of surgery    RECOMMENDATION:  APPROVAL GIVEN to proceed with proposed procedure, without further diagnostic evaluation.        Subjective     HPI related to upcoming procedure:     When seeing his PCP-BP in 140s.  Most  recent reading at they are was elevated 200s over 100.  Blood pressure today much improved.  He is tolerating medication.    Met with neurosurgery and they are planning for CT scan morning of surgery.    No new symptoms or recurrent episodes of seizure-like activity.  He continues on his Keppra          4/19/2023     2:57 PM   Preop Questions   1. Have you ever had a heart attack or stroke? No   2. Have you ever had surgery on your heart or blood vessels, such as a stent placement, a coronary artery bypass, or surgery on an artery in your head, neck, heart, or legs? No   3. Do you have chest pain with activity? No   4. Do you have a history of  heart failure? No   5. Do you currently have a cold, bronchitis or symptoms of other infection? No   6. Do you have a cough, shortness of breath, or wheezing? No   7. Do you or anyone in your family have previous history of blood clots? No   8. Do you or does anyone in your family have a serious bleeding problem such as prolonged bleeding following surgeries or cuts? No   9. Have you ever had problems with anemia or been told to take iron pills? No   10. Have you had any abnormal blood loss such as black, tarry or bloody stools? No   11. Have you ever had a blood transfusion? YES -after prostate surgery   11a. Have you ever had a transfusion reaction? No   12. Are you willing to have a blood transfusion if it is medically needed before, during, or after your surgery? Yes   13. Have you or any of your relatives ever had problems with anesthesia? No   14. Do you have sleep apnea, excessive snoring or daytime drowsiness? YES - he has known JUSTEN-diagnosed 24 years ago, does not have CPAP    14a. Do you have a CPAP machine? No   15. Do you have any artifical heart valves or other implanted medical devices like a pacemaker, defibrillator, or continuous glucose monitor? No   16. Do you have artificial joints? No   17. Are you allergic to latex? No       Health Care  "Directive:  Patient does not have a Health Care Directive or Living Will: Discussed advance care planning with patient; however, patient declined at this time.    Preoperative Review of :   reviewed - controlled substances reflected in medication list.      Review of Systems  Constitutional, neuro, ENT, endocrine, pulmonary, cardiac, gastrointestinal, genitourinary, musculoskeletal, integument and psychiatric systems are negative, except as otherwise noted.    There are no problems to display for this patient.     No past medical history on file.  No past surgical history on file.  Current Outpatient Medications   Medication Sig Dispense Refill     amLODIPine (NORVASC) 5 MG tablet Take 1 tablet (5 mg) by mouth daily 30 tablet 0     levETIRAcetam (KEPPRA) 750 MG tablet Take 1 tablet (750 mg) by mouth 2 times daily 60 tablet 0     sulfamethoxazole-trimethoprim (BACTRIM DS) 800-160 MG tablet Take 1 tablet by mouth 2 times daily 14 tablet 0     rizatriptan (MAXALT) 5 MG tablet 1 tablet Orally Once a day prn migraine (Patient not taking: Reported on 4/17/2023)         No Known Allergies     Social History     Tobacco Use     Smoking status: Former     Types: Cigarettes     Smokeless tobacco: Never   Vaping Use     Vaping status: Never Used   Substance Use Topics     Alcohol use: Not on file     No family history on file.  History   Drug Use Not on file         Objective     /86 (BP Location: Right arm, Patient Position: Chair, Cuff Size: Adult Large)   Pulse 84   Temp 97.6  F (36.4  C) (Tympanic)   Resp 16   Ht 1.854 m (6' 1\")   Wt 87.1 kg (192 lb)   SpO2 98%   BMI 25.33 kg/m      Physical Exam  Constitutional:       Appearance: Normal appearance.   HENT:      Head: Normocephalic.   Eyes:      Conjunctiva/sclera: Conjunctivae normal.   Cardiovascular:      Rate and Rhythm: Normal rate and regular rhythm.   Pulmonary:      Effort: Pulmonary effort is normal.      Breath sounds: Normal breath sounds. "   Abdominal:      General: Bowel sounds are normal.   Skin:     General: Skin is warm and dry.   Neurological:      Mental Status: He is alert.   Psychiatric:         Thought Content: Thought content normal.         Judgment: Judgment normal.         Recent Labs   Lab Test 04/15/23  1206 11/29/22  1025 10/29/21  1632 10/06/21  0942   HGB 14.2  --   --  14.8     --   --  232   INR 0.97  --   --   --     139   < > 138   POTASSIUM 3.6 4.1   < > 4.2   CR 1.16 1.08   < > 1.02    < > = values in this interval not displayed.        Diagnostics:  No labs were ordered during this visit.   No EKG this visit, completed in the last 90 days.    Revised Cardiac Risk Index (RCRI):  The patient has the following serious cardiovascular risks for perioperative complications:   - No serious cardiac risks = 0 points     RCRI Interpretation: 0 points: Class I (very low risk - 0.4% complication rate)           Signed Electronically by: CHARISSE PARRA DO  Copy of this evaluation report is provided to requesting physician.

## 2023-04-20 ENCOUNTER — TELEPHONE (OUTPATIENT)
Dept: NEUROSURGERY | Facility: CLINIC | Age: 68
End: 2023-04-20
Payer: COMMERCIAL

## 2023-04-20 ENCOUNTER — ANESTHESIA EVENT (OUTPATIENT)
Dept: SURGERY | Facility: CLINIC | Age: 68
DRG: 025 | End: 2023-04-20
Payer: COMMERCIAL

## 2023-04-20 LAB
BACTERIA BLD CULT: NO GROWTH
BACTERIA BLD CULT: NO GROWTH

## 2023-04-20 NOTE — TELEPHONE ENCOUNTER
Called patient gave him new check in time for 4/21 surgery of 10:30 am . He verbalized understanding.

## 2023-04-21 ENCOUNTER — APPOINTMENT (OUTPATIENT)
Dept: CT IMAGING | Facility: CLINIC | Age: 68
DRG: 025 | End: 2023-04-21
Attending: STUDENT IN AN ORGANIZED HEALTH CARE EDUCATION/TRAINING PROGRAM
Payer: COMMERCIAL

## 2023-04-21 ENCOUNTER — ANESTHESIA (OUTPATIENT)
Dept: SURGERY | Facility: CLINIC | Age: 68
DRG: 025 | End: 2023-04-21
Payer: COMMERCIAL

## 2023-04-21 ENCOUNTER — HOSPITAL ENCOUNTER (INPATIENT)
Facility: CLINIC | Age: 68
LOS: 6 days | Discharge: NURSING, REHAB OR LONG-TERM CARE | DRG: 025 | End: 2023-04-27
Attending: NEUROLOGICAL SURGERY | Admitting: NEUROLOGICAL SURGERY
Payer: COMMERCIAL

## 2023-04-21 DIAGNOSIS — Z98.890 S/P CRANIOTOMY: Primary | ICD-10-CM

## 2023-04-21 PROBLEM — D49.6 BRAIN TUMOR (H): Status: ACTIVE | Noted: 2023-04-21

## 2023-04-21 LAB
ABO/RH(D): NORMAL
ANTIBODY SCREEN: NEGATIVE
GLUCOSE BLDC GLUCOMTR-MCNC: 118 MG/DL (ref 70–99)
GLUCOSE BLDC GLUCOMTR-MCNC: 87 MG/DL (ref 70–99)
SPECIMEN EXPIRATION DATE: NORMAL

## 2023-04-21 PROCEDURE — 250N000013 HC RX MED GY IP 250 OP 250 PS 637: Performed by: STUDENT IN AN ORGANIZED HEALTH CARE EDUCATION/TRAINING PROGRAM

## 2023-04-21 PROCEDURE — 258N000003 HC RX IP 258 OP 636: Performed by: STUDENT IN AN ORGANIZED HEALTH CARE EDUCATION/TRAINING PROGRAM

## 2023-04-21 PROCEDURE — 250N000011 HC RX IP 250 OP 636: Performed by: PHYSICIAN ASSISTANT

## 2023-04-21 PROCEDURE — 250N000011 HC RX IP 250 OP 636: Performed by: STUDENT IN AN ORGANIZED HEALTH CARE EDUCATION/TRAINING PROGRAM

## 2023-04-21 PROCEDURE — 250N000012 HC RX MED GY IP 250 OP 636 PS 637: Performed by: STUDENT IN AN ORGANIZED HEALTH CARE EDUCATION/TRAINING PROGRAM

## 2023-04-21 PROCEDURE — 70450 CT HEAD/BRAIN W/O DYE: CPT | Mod: 26 | Performed by: RADIOLOGY

## 2023-04-21 PROCEDURE — C1713 ANCHOR/SCREW BN/BN,TIS/BN: HCPCS | Performed by: NEUROLOGICAL SURGERY

## 2023-04-21 PROCEDURE — 360N000079 HC SURGERY LEVEL 6, PER MIN: Performed by: NEUROLOGICAL SURGERY

## 2023-04-21 PROCEDURE — 88305 TISSUE EXAM BY PATHOLOGIST: CPT | Mod: 26 | Performed by: SPECIALIST

## 2023-04-21 PROCEDURE — 250N000009 HC RX 250: Performed by: STUDENT IN AN ORGANIZED HEALTH CARE EDUCATION/TRAINING PROGRAM

## 2023-04-21 PROCEDURE — C1763 CONN TISS, NON-HUMAN: HCPCS | Performed by: NEUROLOGICAL SURGERY

## 2023-04-21 PROCEDURE — 88341 IMHCHEM/IMCYTCHM EA ADD ANTB: CPT | Mod: 26 | Performed by: SPECIALIST

## 2023-04-21 PROCEDURE — 250N000011 HC RX IP 250 OP 636: Performed by: ANESTHESIOLOGY

## 2023-04-21 PROCEDURE — 250N000009 HC RX 250: Performed by: NEUROLOGICAL SURGERY

## 2023-04-21 PROCEDURE — 88307 TISSUE EXAM BY PATHOLOGIST: CPT | Mod: 26 | Performed by: SPECIALIST

## 2023-04-21 PROCEDURE — 61781 SCAN PROC CRANIAL INTRA: CPT | Performed by: NEUROLOGICAL SURGERY

## 2023-04-21 PROCEDURE — 61510 CRNEC TREPH EXC BRN TUM STTL: CPT | Mod: GC | Performed by: NEUROLOGICAL SURGERY

## 2023-04-21 PROCEDURE — 4A1034Z MONITORING OF CENTRAL NERVOUS ELECTRICAL ACTIVITY, PERCUTANEOUS APPROACH: ICD-10-PCS | Performed by: NEUROLOGICAL SURGERY

## 2023-04-21 PROCEDURE — 272N000002 HC OR SUPPLY OTHER OPNP: Performed by: NEUROLOGICAL SURGERY

## 2023-04-21 PROCEDURE — 200N000002 HC R&B ICU UMMC

## 2023-04-21 PROCEDURE — 86850 RBC ANTIBODY SCREEN: CPT | Performed by: STUDENT IN AN ORGANIZED HEALTH CARE EDUCATION/TRAINING PROGRAM

## 2023-04-21 PROCEDURE — 8E09XBZ COMPUTER ASSISTED PROCEDURE OF HEAD AND NECK REGION: ICD-10-PCS | Performed by: NEUROLOGICAL SURGERY

## 2023-04-21 PROCEDURE — 36415 COLL VENOUS BLD VENIPUNCTURE: CPT | Performed by: STUDENT IN AN ORGANIZED HEALTH CARE EDUCATION/TRAINING PROGRAM

## 2023-04-21 PROCEDURE — 88305 TISSUE EXAM BY PATHOLOGIST: CPT | Mod: TC | Performed by: NEUROLOGICAL SURGERY

## 2023-04-21 PROCEDURE — 250N000011 HC RX IP 250 OP 636: Performed by: NEUROLOGICAL SURGERY

## 2023-04-21 PROCEDURE — 710N000010 HC RECOVERY PHASE 1, LEVEL 2, PER MIN: Performed by: NEUROLOGICAL SURGERY

## 2023-04-21 PROCEDURE — 88331 PATH CONSLTJ SURG 1 BLK 1SPC: CPT | Mod: 26 | Performed by: PATHOLOGY

## 2023-04-21 PROCEDURE — 00B70ZZ EXCISION OF CEREBRAL HEMISPHERE, OPEN APPROACH: ICD-10-PCS | Performed by: NEUROLOGICAL SURGERY

## 2023-04-21 PROCEDURE — 0HB0XZZ EXCISION OF SCALP SKIN, EXTERNAL APPROACH: ICD-10-PCS | Performed by: NEUROLOGICAL SURGERY

## 2023-04-21 PROCEDURE — 250N000011 HC RX IP 250 OP 636: Performed by: NURSE ANESTHETIST, CERTIFIED REGISTERED

## 2023-04-21 PROCEDURE — 272N000480 CT HEAD W/O CONTRAST

## 2023-04-21 PROCEDURE — 370N000017 HC ANESTHESIA TECHNICAL FEE, PER MIN: Performed by: NEUROLOGICAL SURGERY

## 2023-04-21 PROCEDURE — 272N000001 HC OR GENERAL SUPPLY STERILE: Performed by: NEUROLOGICAL SURGERY

## 2023-04-21 PROCEDURE — 88342 IMHCHEM/IMCYTCHM 1ST ANTB: CPT | Mod: 26 | Performed by: SPECIALIST

## 2023-04-21 PROCEDURE — 999N000141 HC STATISTIC PRE-PROCEDURE NURSING ASSESSMENT: Performed by: NEUROLOGICAL SURGERY

## 2023-04-21 PROCEDURE — 88331 PATH CONSLTJ SURG 1 BLK 1SPC: CPT | Mod: TC | Performed by: NEUROLOGICAL SURGERY

## 2023-04-21 DEVICE — SCREW BONE NEURO 3 AXIS 4X1.5MM 56-15934: Type: IMPLANTABLE DEVICE | Site: CRANIAL | Status: FUNCTIONAL

## 2023-04-21 DEVICE — GRAFT DURAGEN 2X2" ID220: Type: IMPLANTABLE DEVICE | Site: BRAIN | Status: FUNCTIONAL

## 2023-04-21 DEVICE — IMPLANTABLE DEVICE: Type: IMPLANTABLE DEVICE | Site: CRANIAL | Status: FUNCTIONAL

## 2023-04-21 RX ORDER — SODIUM CHLORIDE, SODIUM LACTATE, POTASSIUM CHLORIDE, CALCIUM CHLORIDE 600; 310; 30; 20 MG/100ML; MG/100ML; MG/100ML; MG/100ML
INJECTION, SOLUTION INTRAVENOUS CONTINUOUS
Status: DISCONTINUED | OUTPATIENT
Start: 2023-04-21 | End: 2023-04-21 | Stop reason: HOSPADM

## 2023-04-21 RX ORDER — LIDOCAINE 40 MG/G
CREAM TOPICAL
Status: DISCONTINUED | OUTPATIENT
Start: 2023-04-21 | End: 2023-04-21 | Stop reason: HOSPADM

## 2023-04-21 RX ORDER — ACETAMINOPHEN 325 MG/1
975 TABLET ORAL ONCE
Status: COMPLETED | OUTPATIENT
Start: 2023-04-21 | End: 2023-04-21

## 2023-04-21 RX ORDER — CEFAZOLIN SODIUM 1 G/3ML
1 INJECTION, POWDER, FOR SOLUTION INTRAMUSCULAR; INTRAVENOUS EVERY 8 HOURS
Status: COMPLETED | OUTPATIENT
Start: 2023-04-21 | End: 2023-04-22

## 2023-04-21 RX ORDER — LIDOCAINE HYDROCHLORIDE 20 MG/ML
JELLY TOPICAL PRN
Status: DISCONTINUED | OUTPATIENT
Start: 2023-04-21 | End: 2023-04-21 | Stop reason: HOSPADM

## 2023-04-21 RX ORDER — SODIUM CHLORIDE 9 MG/ML
INJECTION, SOLUTION INTRAVENOUS CONTINUOUS
Status: DISCONTINUED | OUTPATIENT
Start: 2023-04-21 | End: 2023-04-22

## 2023-04-21 RX ORDER — FENTANYL CITRATE 50 UG/ML
50 INJECTION, SOLUTION INTRAMUSCULAR; INTRAVENOUS EVERY 5 MIN PRN
Status: DISCONTINUED | OUTPATIENT
Start: 2023-04-21 | End: 2023-04-21 | Stop reason: HOSPADM

## 2023-04-21 RX ORDER — HYDRALAZINE HYDROCHLORIDE 20 MG/ML
10-20 INJECTION INTRAMUSCULAR; INTRAVENOUS EVERY 30 MIN PRN
Status: DISCONTINUED | OUTPATIENT
Start: 2023-04-21 | End: 2023-04-27 | Stop reason: HOSPADM

## 2023-04-21 RX ORDER — LIDOCAINE HYDROCHLORIDE 20 MG/ML
INJECTION, SOLUTION INFILTRATION; PERINEURAL PRN
Status: DISCONTINUED | OUTPATIENT
Start: 2023-04-21 | End: 2023-04-21

## 2023-04-21 RX ORDER — ONDANSETRON 2 MG/ML
4 INJECTION INTRAMUSCULAR; INTRAVENOUS EVERY 6 HOURS PRN
Status: DISCONTINUED | OUTPATIENT
Start: 2023-04-21 | End: 2023-04-27 | Stop reason: HOSPADM

## 2023-04-21 RX ORDER — FENTANYL CITRATE 50 UG/ML
25 INJECTION, SOLUTION INTRAMUSCULAR; INTRAVENOUS EVERY 5 MIN PRN
Status: DISCONTINUED | OUTPATIENT
Start: 2023-04-21 | End: 2023-04-21 | Stop reason: HOSPADM

## 2023-04-21 RX ORDER — POLYETHYLENE GLYCOL 3350 17 G/17G
17 POWDER, FOR SOLUTION ORAL DAILY
Status: DISCONTINUED | OUTPATIENT
Start: 2023-04-22 | End: 2023-04-27 | Stop reason: HOSPADM

## 2023-04-21 RX ORDER — AMLODIPINE BESYLATE 5 MG/1
5 TABLET ORAL DAILY
Status: DISCONTINUED | OUTPATIENT
Start: 2023-04-22 | End: 2023-04-27 | Stop reason: HOSPADM

## 2023-04-21 RX ORDER — CEFAZOLIN SODIUM/WATER 2 G/20 ML
2 SYRINGE (ML) INTRAVENOUS
Status: COMPLETED | OUTPATIENT
Start: 2023-04-21 | End: 2023-04-21

## 2023-04-21 RX ORDER — ONDANSETRON 2 MG/ML
4 INJECTION INTRAMUSCULAR; INTRAVENOUS EVERY 30 MIN PRN
Status: DISCONTINUED | OUTPATIENT
Start: 2023-04-21 | End: 2023-04-21 | Stop reason: HOSPADM

## 2023-04-21 RX ORDER — LEVETIRACETAM 10 MG/ML
1000 INJECTION INTRAVASCULAR
Status: COMPLETED | OUTPATIENT
Start: 2023-04-21 | End: 2023-04-21

## 2023-04-21 RX ORDER — LABETALOL HYDROCHLORIDE 5 MG/ML
10-40 INJECTION, SOLUTION INTRAVENOUS EVERY 10 MIN PRN
Status: DISCONTINUED | OUTPATIENT
Start: 2023-04-21 | End: 2023-04-27 | Stop reason: HOSPADM

## 2023-04-21 RX ORDER — CEFAZOLIN SODIUM/WATER 2 G/20 ML
2 SYRINGE (ML) INTRAVENOUS SEE ADMIN INSTRUCTIONS
Status: DISCONTINUED | OUTPATIENT
Start: 2023-04-21 | End: 2023-04-21 | Stop reason: HOSPADM

## 2023-04-21 RX ORDER — ONDANSETRON 4 MG/1
4 TABLET, ORALLY DISINTEGRATING ORAL EVERY 6 HOURS PRN
Status: DISCONTINUED | OUTPATIENT
Start: 2023-04-21 | End: 2023-04-27 | Stop reason: HOSPADM

## 2023-04-21 RX ORDER — ONDANSETRON 2 MG/ML
INJECTION INTRAMUSCULAR; INTRAVENOUS PRN
Status: DISCONTINUED | OUTPATIENT
Start: 2023-04-21 | End: 2023-04-21

## 2023-04-21 RX ORDER — OXYCODONE HYDROCHLORIDE 10 MG/1
10 TABLET ORAL EVERY 4 HOURS PRN
Status: DISCONTINUED | OUTPATIENT
Start: 2023-04-21 | End: 2023-04-27 | Stop reason: HOSPADM

## 2023-04-21 RX ORDER — PROCHLORPERAZINE MALEATE 5 MG
5 TABLET ORAL EVERY 6 HOURS PRN
Status: DISCONTINUED | OUTPATIENT
Start: 2023-04-21 | End: 2023-04-27 | Stop reason: HOSPADM

## 2023-04-21 RX ORDER — DEXMEDETOMIDINE HYDROCHLORIDE 4 UG/ML
.2-1 INJECTION, SOLUTION INTRAVENOUS CONTINUOUS
Status: DISCONTINUED | OUTPATIENT
Start: 2023-04-21 | End: 2023-04-21 | Stop reason: HOSPADM

## 2023-04-21 RX ORDER — DEXAMETHASONE 4 MG/1
4 TABLET ORAL EVERY 12 HOURS SCHEDULED
Status: DISCONTINUED | OUTPATIENT
Start: 2023-04-23 | End: 2023-04-22

## 2023-04-21 RX ORDER — PROPOFOL 10 MG/ML
INJECTION, EMULSION INTRAVENOUS PRN
Status: DISCONTINUED | OUTPATIENT
Start: 2023-04-21 | End: 2023-04-21

## 2023-04-21 RX ORDER — NALOXONE HYDROCHLORIDE 0.4 MG/ML
0.4 INJECTION, SOLUTION INTRAMUSCULAR; INTRAVENOUS; SUBCUTANEOUS
Status: DISCONTINUED | OUTPATIENT
Start: 2023-04-21 | End: 2023-04-27 | Stop reason: HOSPADM

## 2023-04-21 RX ORDER — LIDOCAINE 40 MG/G
CREAM TOPICAL
Status: DISCONTINUED | OUTPATIENT
Start: 2023-04-21 | End: 2023-04-27 | Stop reason: HOSPADM

## 2023-04-21 RX ORDER — BISACODYL 10 MG
10 SUPPOSITORY, RECTAL RECTAL DAILY PRN
Status: DISCONTINUED | OUTPATIENT
Start: 2023-04-21 | End: 2023-04-27 | Stop reason: HOSPADM

## 2023-04-21 RX ORDER — AMOXICILLIN 250 MG
1 CAPSULE ORAL 2 TIMES DAILY
Status: DISCONTINUED | OUTPATIENT
Start: 2023-04-21 | End: 2023-04-27 | Stop reason: HOSPADM

## 2023-04-21 RX ORDER — ACETAMINOPHEN 325 MG/1
975 TABLET ORAL EVERY 8 HOURS
Status: COMPLETED | OUTPATIENT
Start: 2023-04-21 | End: 2023-04-24

## 2023-04-21 RX ORDER — DEXAMETHASONE 4 MG/1
4 TABLET ORAL EVERY 8 HOURS SCHEDULED
Status: DISCONTINUED | OUTPATIENT
Start: 2023-04-23 | End: 2023-04-22

## 2023-04-21 RX ORDER — NALOXONE HYDROCHLORIDE 0.4 MG/ML
0.2 INJECTION, SOLUTION INTRAMUSCULAR; INTRAVENOUS; SUBCUTANEOUS
Status: DISCONTINUED | OUTPATIENT
Start: 2023-04-21 | End: 2023-04-27 | Stop reason: HOSPADM

## 2023-04-21 RX ORDER — DEXAMETHASONE 4 MG/1
4 TABLET ORAL EVERY 6 HOURS SCHEDULED
Status: COMPLETED | OUTPATIENT
Start: 2023-04-22 | End: 2023-04-22

## 2023-04-21 RX ORDER — DEXAMETHASONE SODIUM PHOSPHATE 4 MG/ML
10 INJECTION, SOLUTION INTRA-ARTICULAR; INTRALESIONAL; INTRAMUSCULAR; INTRAVENOUS; SOFT TISSUE
Status: DISCONTINUED | OUTPATIENT
Start: 2023-04-21 | End: 2023-04-21 | Stop reason: HOSPADM

## 2023-04-21 RX ORDER — OXYCODONE HYDROCHLORIDE 5 MG/1
5 TABLET ORAL EVERY 4 HOURS PRN
Status: DISCONTINUED | OUTPATIENT
Start: 2023-04-21 | End: 2023-04-27 | Stop reason: HOSPADM

## 2023-04-21 RX ORDER — ACETAMINOPHEN 325 MG/1
650 TABLET ORAL EVERY 4 HOURS PRN
Status: DISCONTINUED | OUTPATIENT
Start: 2023-04-24 | End: 2023-04-27 | Stop reason: HOSPADM

## 2023-04-21 RX ORDER — HYDROMORPHONE HCL IN WATER/PF 6 MG/30 ML
0.4 PATIENT CONTROLLED ANALGESIA SYRINGE INTRAVENOUS EVERY 5 MIN PRN
Status: DISCONTINUED | OUTPATIENT
Start: 2023-04-21 | End: 2023-04-21 | Stop reason: HOSPADM

## 2023-04-21 RX ORDER — FENTANYL CITRATE 50 UG/ML
INJECTION, SOLUTION INTRAMUSCULAR; INTRAVENOUS PRN
Status: DISCONTINUED | OUTPATIENT
Start: 2023-04-21 | End: 2023-04-21

## 2023-04-21 RX ORDER — PROPOFOL 10 MG/ML
INJECTION, EMULSION INTRAVENOUS CONTINUOUS PRN
Status: DISCONTINUED | OUTPATIENT
Start: 2023-04-21 | End: 2023-04-21

## 2023-04-21 RX ORDER — DEXAMETHASONE 4 MG/1
4 TABLET ORAL DAILY
Status: DISCONTINUED | OUTPATIENT
Start: 2023-04-25 | End: 2023-04-22

## 2023-04-21 RX ORDER — DEXAMETHASONE SODIUM PHOSPHATE 4 MG/ML
INJECTION, SOLUTION INTRA-ARTICULAR; INTRALESIONAL; INTRAMUSCULAR; INTRAVENOUS; SOFT TISSUE PRN
Status: DISCONTINUED | OUTPATIENT
Start: 2023-04-21 | End: 2023-04-21

## 2023-04-21 RX ORDER — LEVETIRACETAM 750 MG/1
750 TABLET ORAL 2 TIMES DAILY
Status: DISCONTINUED | OUTPATIENT
Start: 2023-04-21 | End: 2023-04-22

## 2023-04-21 RX ORDER — ONDANSETRON 4 MG/1
4 TABLET, ORALLY DISINTEGRATING ORAL EVERY 30 MIN PRN
Status: DISCONTINUED | OUTPATIENT
Start: 2023-04-21 | End: 2023-04-21 | Stop reason: HOSPADM

## 2023-04-21 RX ORDER — HYDROMORPHONE HCL IN WATER/PF 6 MG/30 ML
0.2 PATIENT CONTROLLED ANALGESIA SYRINGE INTRAVENOUS EVERY 5 MIN PRN
Status: DISCONTINUED | OUTPATIENT
Start: 2023-04-21 | End: 2023-04-21 | Stop reason: HOSPADM

## 2023-04-21 RX ORDER — DEXMEDETOMIDINE HYDROCHLORIDE 4 UG/ML
INJECTION, SOLUTION INTRAVENOUS CONTINUOUS PRN
Status: DISCONTINUED | OUTPATIENT
Start: 2023-04-21 | End: 2023-04-21

## 2023-04-21 RX ADMIN — FENTANYL CITRATE 25 MCG: 50 INJECTION, SOLUTION INTRAMUSCULAR; INTRAVENOUS at 17:03

## 2023-04-21 RX ADMIN — LEVETIRACETAM 750 MG: 750 TABLET, FILM COATED ORAL at 20:30

## 2023-04-21 RX ADMIN — FENTANYL CITRATE 25 MCG: 50 INJECTION, SOLUTION INTRAMUSCULAR; INTRAVENOUS at 17:06

## 2023-04-21 RX ADMIN — FENTANYL CITRATE 25 MCG: 50 INJECTION, SOLUTION INTRAMUSCULAR; INTRAVENOUS at 18:08

## 2023-04-21 RX ADMIN — FENTANYL CITRATE 25 MCG: 50 INJECTION, SOLUTION INTRAMUSCULAR; INTRAVENOUS at 17:17

## 2023-04-21 RX ADMIN — PROPOFOL 30 MG: 10 INJECTION, EMULSION INTRAVENOUS at 13:54

## 2023-04-21 RX ADMIN — PROPOFOL 30 MG: 10 INJECTION, EMULSION INTRAVENOUS at 14:04

## 2023-04-21 RX ADMIN — FENTANYL CITRATE 25 MCG: 50 INJECTION, SOLUTION INTRAMUSCULAR; INTRAVENOUS at 17:15

## 2023-04-21 RX ADMIN — SENNOSIDES AND DOCUSATE SODIUM 1 TABLET: 50; 8.6 TABLET ORAL at 20:30

## 2023-04-21 RX ADMIN — DEXAMETHASONE 4 MG: 4 TABLET ORAL at 23:10

## 2023-04-21 RX ADMIN — OXYCODONE HYDROCHLORIDE 5 MG: 5 TABLET ORAL at 18:29

## 2023-04-21 RX ADMIN — ACETAMINOPHEN 975 MG: 325 TABLET ORAL at 11:32

## 2023-04-21 RX ADMIN — LEVETIRACETAM 1 G: 10 INJECTION INTRAVENOUS at 13:52

## 2023-04-21 RX ADMIN — PHENYLEPHRINE HYDROCHLORIDE 0.5 MCG/KG/MIN: 10 INJECTION INTRAVENOUS at 14:54

## 2023-04-21 RX ADMIN — PROPOFOL 50 MCG/KG/MIN: 10 INJECTION, EMULSION INTRAVENOUS at 13:25

## 2023-04-21 RX ADMIN — ACETAMINOPHEN 975 MG: 325 TABLET ORAL at 18:29

## 2023-04-21 RX ADMIN — Medication 2 G: at 14:03

## 2023-04-21 RX ADMIN — PROPOFOL 20 MG: 10 INJECTION, EMULSION INTRAVENOUS at 14:23

## 2023-04-21 RX ADMIN — CEFAZOLIN 1 G: 1 INJECTION, POWDER, FOR SOLUTION INTRAMUSCULAR; INTRAVENOUS at 22:12

## 2023-04-21 RX ADMIN — HYDRALAZINE HYDROCHLORIDE 10 MG: 20 INJECTION INTRAMUSCULAR; INTRAVENOUS at 23:10

## 2023-04-21 RX ADMIN — SODIUM CHLORIDE: 9 INJECTION, SOLUTION INTRAVENOUS at 18:12

## 2023-04-21 RX ADMIN — PHENYLEPHRINE HYDROCHLORIDE 100 MCG: 10 INJECTION INTRAVENOUS at 14:39

## 2023-04-21 RX ADMIN — SODIUM CHLORIDE, POTASSIUM CHLORIDE, SODIUM LACTATE AND CALCIUM CHLORIDE: 600; 310; 30; 20 INJECTION, SOLUTION INTRAVENOUS at 13:11

## 2023-04-21 RX ADMIN — ONDANSETRON 4 MG: 2 INJECTION INTRAMUSCULAR; INTRAVENOUS at 16:58

## 2023-04-21 RX ADMIN — Medication 0.2 MCG/KG/HR: at 14:12

## 2023-04-21 RX ADMIN — LIDOCAINE HYDROCHLORIDE 80 MG: 20 INJECTION, SOLUTION INFILTRATION; PERINEURAL at 13:25

## 2023-04-21 RX ADMIN — DEXAMETHASONE SODIUM PHOSPHATE 10 MG: 4 INJECTION, SOLUTION INTRA-ARTICULAR; INTRALESIONAL; INTRAMUSCULAR; INTRAVENOUS; SOFT TISSUE at 14:07

## 2023-04-21 RX ADMIN — REMIFENTANIL HYDROCHLORIDE 0.01 MCG/KG/MIN: 1 INJECTION, POWDER, LYOPHILIZED, FOR SOLUTION INTRAVENOUS at 13:26

## 2023-04-21 ASSESSMENT — VISUAL ACUITY
OU: NORMAL ACUITY

## 2023-04-21 ASSESSMENT — ACTIVITIES OF DAILY LIVING (ADL)
ADLS_ACUITY_SCORE: 20
ADLS_ACUITY_SCORE: 18

## 2023-04-21 ASSESSMENT — ENCOUNTER SYMPTOMS: SEIZURES: 1

## 2023-04-21 ASSESSMENT — LIFESTYLE VARIABLES: TOBACCO_USE: 1

## 2023-04-21 NOTE — ANESTHESIA PREPROCEDURE EVALUATION
Anesthesia Pre-Procedure Evaluation    Patient: Saeid Santa   MRN: [id number] : 1955        Procedure : Procedure(s):  stealth assisted awake craniotomy resection of motor strip tumor          History reviewed. No pertinent past medical history.   History reviewed. No pertinent surgical history.   No Known Allergies   Social History     Tobacco Use     Smoking status: Former     Packs/day: 0.50     Years: 20.00     Pack years: 10.00     Types: Cigarettes     Smokeless tobacco: Never   Vaping Use     Vaping status: Never Used   Substance Use Topics     Alcohol use: Not Currently      Wt Readings from Last 1 Encounters:   23 87.1 kg (192 lb)        Anesthesia Evaluation   Pt has had prior anesthetic. Type: General.    No history of anesthetic complications       ROS/MED HX  ENT/Pulmonary:     (+) sleep apnea, mild, doesn't use CPAP, tobacco use, Current use, 10  Pack-Year Hx,      Neurologic:     (+) seizures, last seizure: ~ per chart revire, features: RLE abnormal movement,     Cardiovascular:     (+) hypertension-range: -200s/ ----Previous cardiac testing   Echo: Date: Results:    Stress Test: Date: Results:    ECG Reviewed: Date: 4/15/2023 Results:  RSR', SR, 62 bpm  Cath: Date: Results:      METS/Exercise Tolerance:     Hematologic:     (+) history of blood transfusion, no previous transfusion reaction,  (-) anemia   Musculoskeletal:  - neg musculoskeletal ROS     GI/Hepatic:  - neg GI/hepatic ROS     Renal/Genitourinary:  - neg Renal ROS     Endo:  - neg endo ROS  (-) obesity   Psychiatric/Substance Use:     (+) Recreational drug usage: Cannabis.    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        Other:            Physical Exam    Airway        Mallampati:  II    Neck ROM: full     Respiratory Devices and Support         Dental       (+) Minor Abnormalities - some fillings, tiny chips      Cardiovascular   cardiovascular exam normal          Pulmonary   pulmonary exam normal                OUTSIDE LABS:  CBC:   Lab Results   Component Value Date    WBC 8.1 04/15/2023    WBC 7.2 10/06/2021    HGB 14.2 04/15/2023    HGB 14.8 10/06/2021    HCT 42.9 04/15/2023    HCT 44.4 10/06/2021     04/15/2023     10/06/2021     BMP:   Lab Results   Component Value Date     04/15/2023     11/29/2022    POTASSIUM 3.6 04/15/2023    POTASSIUM 4.1 11/29/2022    CHLORIDE 105 04/15/2023    CHLORIDE 105 11/29/2022    CO2 23 04/15/2023    CO2 21 (L) 11/29/2022    BUN 16.0 04/15/2023    BUN 17.3 11/29/2022    CR 1.16 04/15/2023    CR 1.08 11/29/2022     (H) 04/15/2023     (H) 04/15/2023     COAGS:   Lab Results   Component Value Date    PTT 29 04/15/2023    INR 0.97 04/15/2023     POC: No results found for: BGM, HCG, HCGS  HEPATIC:   Lab Results   Component Value Date    ALBUMIN 4.3 11/29/2022    PROTTOTAL 6.6 11/29/2022    ALT 22 11/29/2022    AST 25 11/29/2022    ALKPHOS 60 11/29/2022    BILITOTAL 0.9 11/29/2022     OTHER:   Lab Results   Component Value Date    LACT 1.2 04/15/2023    JOHN PAUL 8.7 (L) 04/15/2023    LIPASE 121 10/06/2021       Anesthesia Plan    ASA Status:  3   NPO Status:  NPO Appropriate    Anesthesia Type: MAC.     - Reason for MAC: straight local not clinically adequate   Induction: Intravenous.   Maintenance: TIVA.   Techniques and Equipment:     - Lines/Monitors: 2nd IV, Arterial Line     - Drips/Meds: Dexmed. infusion, Phenylephrine, Remifentanil (propofol gtt)     Consents    Anesthesia Plan(s) and associated risks, benefits, and realistic alternatives discussed. Questions answered and patient/representative(s) expressed understanding.     - Discussed: Risks, Benefits and Alternatives for BOTH SEDATION and the PROCEDURE were  discussed     - Discussed with:  Patient    Use of blood products discussed: Yes.     - Discussed with: Patient.     Postoperative Care    Pain management: IV analgesics, Oral pain medications.   PONV prophylaxis: Ondansetron (or other 5HT-3)     Comments:                Shadi Hills MD

## 2023-04-21 NOTE — ANESTHESIA POSTPROCEDURE EVALUATION
Patient: Saeid Santa    Procedure: Procedure(s):  stealth assisted awake craniotomy resection of motor strip tumor       Anesthesia Type:  MAC    Note:  Disposition: Inpatient   Postop Pain Control: Uneventful            Sign Out: Well controlled pain   PONV: No   Neuro/Psych: Uneventful            Sign Out: Acceptable/Baseline neuro status   Airway/Respiratory: Uneventful            Sign Out: Acceptable/Baseline resp. status   CV/Hemodynamics: Uneventful            Sign Out: Acceptable CV status; No obvious hypovolemia; No obvious fluid overload   Other NRE: NONE   DID A NON-ROUTINE EVENT OCCUR? No           Last vitals:  Vitals Value Taken Time   /80 04/21/23 1745   Temp     Pulse 72 04/21/23 1751   Resp 13 04/21/23 1751   SpO2 94 % 04/21/23 1751   Vitals shown include unvalidated device data.    Electronically Signed By: Humble Sanchez MD  April 21, 2023  5:52 PM

## 2023-04-21 NOTE — OP NOTE
Procedure Date: 04/21/2023     PREOPERATIVE DIAGNOSIS:   Left post-central tumor  Cerebral Oedema     POSTOPERATIVE DIAGNOSIS:    1.Left post-central tumor  2.Cerebral Oedema     PROCEDURE:    1. Left stealth awake craniotomy (sleep-awake-sleep) for tumor resection.  2. Resection of scalp lesion  3. Intraoperative Neuro-monitoring using phase reversal, cortical stimulation, electrocorticography and neurophysiology.  4.  Intraoperative microdissection using operative microscope.  5.  Intraoperative ultrasound  6. Scalp block using 1% lidocaine with 1:100,000 epi + 0.25% bupivacaine (1:1)     ANESTHESIA:  Monitored anesthesia care plus local anesthetic.     SURGEON:  Kye Garrido MD     ASSISTANT:  Shantel Castro MD     INDICATIONS FOR PROCEDURE: Mr Santa is a 67-year-old right-handed gentleman with a significant past medical history of prostate cancer diagnosed back in 2009 underwent prostatectomy with no other adjuvant treatment. He started noticing abnormal movement of the right lower extremity likely seizure with no loss of consciousness. He had 3 such episodes the last 1 was 3 days ago. He went to the ER and underwent MRI which showed 2 ring-enhancing lesion involving his left motor strip and left lingula with perilesional edema. Given his history of prostate cancer and new onset seizures and MRI showing ring-enhancing lesions, surgery was recommended. Of note, patient also had a scalp lesion that has been draining and was noted to be increasing in size. Plan was offered resect it during the surgery. He agreed to proceed after signing informed consent.      DESCRIPTION OF PROCEDURE:  The patient was brought to the operating room. Monitored anesthesia care was begun. A correa was placed. Scalp block was performed using 1% lidocaine with 1:100,000 epi + 0.25% bupivacaine (1:1) blocking 7 nerves on each side. The patient was then  placed in the [place] head quintanilla and the Stealth neuronavigation system was  registered and used to esa the superior sagittal sinus and the tumor, as well as to plan the incision and craniotomy. Copious local anesthetic was used to perform field and local blockade of the planned incision. The head was prepped and draped in a sterile fashion and a lazy S-shaped incision was made over the left frontal region, behind the coronal suture, slightly crossing midline. Timeout was performed.     Incision was made using #10 blade down to the bone. The scalp was held with retractors. The Stealth system was used to confirm the area of the craniotomy.  Three small sandee holes were then made with the high speed drill, one lateral and two medial adjacent to the superior sagittal sinus. The dura was stripped with penfield #3. A craniotomy flap was elevated with the B1 footplate. It was saved for replacement later. The dura was anesthetized with local anesthetic and then opened in a C-shaped fashion and reflected medially towards the superior sagittal sinus with neurolon sutures. We used stealth navigation and intraoperative ultrasound to identify the tumor. We placed a strip electrode on the cortical surface directed posteriorly but did not get any signal, so we re-directed the strip anteriorly and we were able to obtain phase reversal thus confirming that we were in the sensory cortex. We also used direct stimulation and obtained sensory responses with tingling in the right calf and ankle, but no motor movement in the right leg or foot. Using stealth navigation, we proceeded with corticectomy using bipolar cautery. At this point, we brought the microscope for microscopic dissection. Going deeper into the cortex, we encountered the cystic fluid and evacuated it, then identified the tumor capsule and resected it in a piecemeal fashion with bipolar and suction. The tumor was very vascular and we coagulated several feeding vessels. We the inspected the cavity in all direction and removed any residual tumor.  Samples were sent for frozen pathology which came back as differential of metastatic carcinoma, lymphoma, or melanoma. The rest of the samples were sent for permanent pathology. After complete removal of the tumor, we achieved hemostasis with bipolar cautery, floseal, and patties. We then laid surgical circumferentially in the resection cavity. A duragen layer was placed as an inlay underneath the dura, which was then reapproximated with 4-0 Nurolon. The bone flap was plated back in place with the [employer] cranial plating system.  Galea was closed with 2-0 inverted interrupted Vicryl and a running 3-0 monocryl stitch was used for skin.The incision was cleaned with wet and dry gauze, followed by chloraprep. Exofin was applied followed by sterile dressing. We then excised the scalp lesion using elliptical incision and lesion was excised in total and sent for pathological examination. Incision was closed using 2-0 vicryl and 3-0 monocryl. The patient was then [place] of [place] and taken to the recovery room in good condition.     Dr. Garrido scrubbed throughout the entirety of the procedure.     All counts of instruments, needles, and gauze, were correct x2.     -----------------------------------  Shantel Castro MD, MS  Neurosurgery PGY-6

## 2023-04-21 NOTE — BRIEF OP NOTE
"[facility]    Brief Operative Note    Pre-operative diagnosis: CNS mass [G96.89]  Post-operative diagnosis Left parietal mass    Procedure: Procedure(s):  stealth assisted awake craniotomy resection of motor strip tumor  Surgeon: Surgeon(s) and Role:     * Kye Garrido MD - Primary     * Shantel Castro MD - Resident - Assisting  Anesthesia: MAC   Estimated Blood Loss: Less than 50 ml    Drains: None  Specimens:   ID Type Source Tests Collected by Time Destination   1 : Left Frontal Brain Tumor Tissue Other SURGICAL PATHOLOGY EXAM Kye Garrido MD 4/21/2023  3:36 PM    2 : Left Frontal Brain Tumor Tissue Other SURGICAL PATHOLOGY EXAM Kye Garrido MD 4/21/2023  4:30 PM    3 : Skin Lesion Tissue Scalp SURGICAL PATHOLOGY EXAM Kye Garrido MD 4/21/2023  5:01 PM      Findings:   Tumor was vascular, well defined and distinct from the white matter.  Complications: None.  Implants:   Implant Name Type Inv. Item Serial No.  Lot No. LRB No. Used Action   GRAFT DURAGEN 2X2\"  - [id number]  GRAFT DURAGEN 2X2\"   [employer] [id number] Left 1 Implanted   [id number]    [employer] N/A Left 3 Implanted   SCREW BN 4MM 1.5MM SLF DRL AX STAB NS UNV NEURO 3 CRNL LF - [id number] Metallic Hardware/[place] SCREW BN 4MM 1.5MM SLF DRL AX STAB NS UNV NEURO 3 CRNL   [place] N/A Left 9 Implanted           "

## 2023-04-21 NOTE — LETTER
Transition Communication Hand-off for Care Transitions to Next Level of Care Provider    Name: Saeid Santa  : 1955  MRN #: [id number]  Primary Care Provider: Asim Cervantes     Primary Clinic: [address]  [facility] [address]     Reason for Hospitalization:  CNS mass [G96.89]  Brain tumor (H) [D49.6]  Admit Date/Time: 2023 10:25 AM  Discharge Date:   2023  Payor Source: Payor: UCARE / Plan: [employer] / Product Type: HMO /              Reason for Communication Hand-off Referral:   Notification of the discharge plan    Discharge Plan:     Care Management Discharge Note     Discharge Date:  2023        Discharge Disposition:   [facility] ([phone])     Discharge Services:  Acute rehab placement at [facility]     Discharge DME:  Not applicable at this time     Discharge Transportation:  PETE spoke with pt's floor nurse (Eduardo) who states that pt is an assist of 1 and is appropriate to travel by w/c or by car with family.    PETE updated pt who states that he is not willing to pay privately for transport and states that the only mode of transportation he will except is traveling by car with family.  SW asked to call pt's family to arrange transport.  Pt was opposed to this stating that he will indep arrange the transport with his family.  PETE explained that [facility] has indicated that pt needs to arrive no later than 2pm.  PETE has informed pt that he would need to leave [facility] by 11am to make this deadline.   PETE has also informed pt that he cannot leave [facility] until confirmation of receipt of prior auth from insurance is received.  PETE has suggested that pt's family arrive to [facility] between 9 and 11am on 2023.     Private pay costs discussed:  Not applicable at this time     PAS Confirmation Code:    Not required as pt is admitting to a ARU setting  Patient/family educated on Medicare website which has  "current facility and service quality ratings:  yes     Education Provided on the Discharge Plan:  Yes   Persons Notified of Discharge Plans:   Pt, 6A nursing, and Edouard Guzman NP  Patient/Family in Agreement with the Plan:    yes     Handoff Referral Completed: Yes     Additional Information:  - Edouard Guzman NP has confirmed readiness for discharge, Edouard has confirmed that pt will not receive chemo or radiation during ARU stay  - Admissions (Gray) at [facility] has confirmed acceptance for admit pending receipt of prior auth from insurance  -  will fax pt's completed/signed discharge orders and discharge summary to [facility] in the am when documents become available.  -  has asked 6A Community Health Worker (Tania) to complete \"Important Message from Medicare\" with pt  -  will arrange for pt's 4/27/2023 floor nurse to phone report to [facility].       BETTE Marie  Social Work, 6A  Phone:  [phone]  Pager:  [phone]  4/26/2023     "

## 2023-04-21 NOTE — ANESTHESIA CARE TRANSFER NOTE
Patient: Saeid Santa    Procedure: Procedure(s):  stealth assisted awake craniotomy resection of motor strip tumor       Diagnosis: CNS mass [G96.89]  Diagnosis Additional Information: No value filed.    Anesthesia Type:   MAC     Note:    Oropharynx: oropharynx clear of all foreign objects and spontaneously breathing  Level of Consciousness: awake  Oxygen Supplementation: room air    Independent Airway: airway patency satisfactory and stable  Dentition: dentition unchanged  Vital Signs Stable: post-procedure vital signs reviewed and stable  Report to RN Given: handoff report given  Patient transferred to: PACU    Handoff Report: Identifed the Patient, Identified the Reponsible Provider, Reviewed the pertinent medical history, Discussed the surgical course, Reviewed Intra-OP anesthesia mangement and issues during anesthesia, Set expectations for post-procedure period and Allowed opportunity for questions and acknowledgement of understanding      Vitals:  Vitals Value Taken Time   /71 04/21/23 1723   Temp     Pulse 65 04/21/23 1724   Resp 12 04/21/23 1724   SpO2 96 % 04/21/23 1724   Vitals shown include unvalidated device data.    Electronically Signed By: ALIREZA Solares CRNA  April 21, 2023  5:25 PM

## 2023-04-22 ENCOUNTER — APPOINTMENT (OUTPATIENT)
Dept: MRI IMAGING | Facility: CLINIC | Age: 68
DRG: 025 | End: 2023-04-22
Attending: STUDENT IN AN ORGANIZED HEALTH CARE EDUCATION/TRAINING PROGRAM
Payer: COMMERCIAL

## 2023-04-22 ENCOUNTER — APPOINTMENT (OUTPATIENT)
Dept: NEUROLOGY | Facility: CLINIC | Age: 68
DRG: 025 | End: 2023-04-22
Attending: STUDENT IN AN ORGANIZED HEALTH CARE EDUCATION/TRAINING PROGRAM
Payer: COMMERCIAL

## 2023-04-22 ENCOUNTER — APPOINTMENT (OUTPATIENT)
Dept: OCCUPATIONAL THERAPY | Facility: CLINIC | Age: 68
DRG: 025 | End: 2023-04-22
Attending: STUDENT IN AN ORGANIZED HEALTH CARE EDUCATION/TRAINING PROGRAM
Payer: COMMERCIAL

## 2023-04-22 LAB
ANION GAP SERPL CALCULATED.3IONS-SCNC: 13 MMOL/L (ref 7–15)
BASOPHILS # BLD AUTO: 0 10E3/UL (ref 0–0.2)
BASOPHILS NFR BLD AUTO: 0 %
BUN SERPL-MCNC: 15.4 MG/DL (ref 8–23)
CALCIUM SERPL-MCNC: 8.3 MG/DL (ref 8.8–10.2)
CHLORIDE SERPL-SCNC: 109 MMOL/L (ref 98–107)
CREAT SERPL-MCNC: 1.02 MG/DL (ref 0.67–1.17)
DEPRECATED HCO3 PLAS-SCNC: 15 MMOL/L (ref 22–29)
EOSINOPHIL # BLD AUTO: 0 10E3/UL (ref 0–0.7)
EOSINOPHIL NFR BLD AUTO: 0 %
ERYTHROCYTE [DISTWIDTH] IN BLOOD BY AUTOMATED COUNT: 13.5 % (ref 10–15)
GFR SERPL CREATININE-BSD FRML MDRD: 80 ML/MIN/1.73M2
GLUCOSE BLDC GLUCOMTR-MCNC: 119 MG/DL (ref 70–99)
GLUCOSE SERPL-MCNC: 113 MG/DL (ref 70–99)
HCT VFR BLD AUTO: 43.1 % (ref 40–53)
HGB BLD-MCNC: 14.4 G/DL (ref 13.3–17.7)
IMM GRANULOCYTES # BLD: 0.1 10E3/UL
IMM GRANULOCYTES NFR BLD: 1 %
LYMPHOCYTES # BLD AUTO: 0.7 10E3/UL (ref 0.8–5.3)
LYMPHOCYTES NFR BLD AUTO: 6 %
MAGNESIUM SERPL-MCNC: 1.8 MG/DL (ref 1.7–2.3)
MCH RBC QN AUTO: 31.2 PG (ref 26.5–33)
MCHC RBC AUTO-ENTMCNC: 33.4 G/DL (ref 31.5–36.5)
MCV RBC AUTO: 93 FL (ref 78–100)
MONOCYTES # BLD AUTO: 0.2 10E3/UL (ref 0–1.3)
MONOCYTES NFR BLD AUTO: 2 %
NEUTROPHILS # BLD AUTO: 9.6 10E3/UL (ref 1.6–8.3)
NEUTROPHILS NFR BLD AUTO: 91 %
NRBC # BLD AUTO: 0 10E3/UL
NRBC BLD AUTO-RTO: 0 /100
PHOSPHATE SERPL-MCNC: 3.1 MG/DL (ref 2.5–4.5)
PLATELET # BLD AUTO: 246 10E3/UL (ref 150–450)
POTASSIUM SERPL-SCNC: 4 MMOL/L (ref 3.4–5.3)
RBC # BLD AUTO: 4.62 10E6/UL (ref 4.4–5.9)
SODIUM SERPL-SCNC: 137 MMOL/L (ref 136–145)
WBC # BLD AUTO: 10.6 10E3/UL (ref 4–11)

## 2023-04-22 PROCEDURE — 97165 OT EVAL LOW COMPLEX 30 MIN: CPT | Mod: GO

## 2023-04-22 PROCEDURE — 255N000002 HC RX 255 OP 636: Performed by: NEUROLOGICAL SURGERY

## 2023-04-22 PROCEDURE — 99207 EEG VIDEO 2-12 HRS UNMONITORED: CPT | Performed by: PSYCHIATRY & NEUROLOGY

## 2023-04-22 PROCEDURE — 85025 COMPLETE CBC W/AUTO DIFF WBC: CPT | Performed by: STUDENT IN AN ORGANIZED HEALTH CARE EDUCATION/TRAINING PROGRAM

## 2023-04-22 PROCEDURE — 250N000011 HC RX IP 250 OP 636: Performed by: STUDENT IN AN ORGANIZED HEALTH CARE EDUCATION/TRAINING PROGRAM

## 2023-04-22 PROCEDURE — 258N000003 HC RX IP 258 OP 636: Performed by: STUDENT IN AN ORGANIZED HEALTH CARE EDUCATION/TRAINING PROGRAM

## 2023-04-22 PROCEDURE — 70553 MRI BRAIN STEM W/O & W/DYE: CPT

## 2023-04-22 PROCEDURE — 95718 EEG PHYS/QHP 2-12 HR W/VEEG: CPT | Performed by: PSYCHIATRY & NEUROLOGY

## 2023-04-22 PROCEDURE — 84100 ASSAY OF PHOSPHORUS: CPT | Performed by: NEUROLOGICAL SURGERY

## 2023-04-22 PROCEDURE — 250N000012 HC RX MED GY IP 250 OP 636 PS 637: Performed by: STUDENT IN AN ORGANIZED HEALTH CARE EDUCATION/TRAINING PROGRAM

## 2023-04-22 PROCEDURE — 95711 VEEG 2-12 HR UNMONITORED: CPT

## 2023-04-22 PROCEDURE — 97530 THERAPEUTIC ACTIVITIES: CPT | Mod: GO

## 2023-04-22 PROCEDURE — 250N000013 HC RX MED GY IP 250 OP 250 PS 637: Performed by: STUDENT IN AN ORGANIZED HEALTH CARE EDUCATION/TRAINING PROGRAM

## 2023-04-22 PROCEDURE — 83735 ASSAY OF MAGNESIUM: CPT | Performed by: NEUROLOGICAL SURGERY

## 2023-04-22 PROCEDURE — 200N000002 HC R&B ICU UMMC

## 2023-04-22 PROCEDURE — 97535 SELF CARE MNGMENT TRAINING: CPT | Mod: GO

## 2023-04-22 PROCEDURE — 80048 BASIC METABOLIC PNL TOTAL CA: CPT | Performed by: STUDENT IN AN ORGANIZED HEALTH CARE EDUCATION/TRAINING PROGRAM

## 2023-04-22 PROCEDURE — A9585 GADOBUTROL INJECTION: HCPCS | Performed by: NEUROLOGICAL SURGERY

## 2023-04-22 PROCEDURE — 70553 MRI BRAIN STEM W/O & W/DYE: CPT | Mod: 26 | Performed by: RADIOLOGY

## 2023-04-22 RX ORDER — DEXAMETHASONE 1.5 MG/1
3 TABLET ORAL EVERY 12 HOURS SCHEDULED
Status: DISCONTINUED | OUTPATIENT
Start: 2023-04-30 | End: 2023-04-23

## 2023-04-22 RX ORDER — DEXAMETHASONE 2 MG/1
2 TABLET ORAL EVERY 12 HOURS SCHEDULED
Status: DISCONTINUED | OUTPATIENT
Start: 2023-05-04 | End: 2023-04-23

## 2023-04-22 RX ORDER — GADOBUTROL 604.72 MG/ML
0.1 INJECTION INTRAVENOUS ONCE
Status: COMPLETED | OUTPATIENT
Start: 2023-04-22 | End: 2023-04-22

## 2023-04-22 RX ORDER — LEVETIRACETAM 500 MG/1
1000 TABLET ORAL 2 TIMES DAILY
Status: DISCONTINUED | OUTPATIENT
Start: 2023-04-22 | End: 2023-04-27 | Stop reason: HOSPADM

## 2023-04-22 RX ORDER — DEXAMETHASONE 4 MG/1
4 TABLET ORAL EVERY 8 HOURS SCHEDULED
Status: DISCONTINUED | OUTPATIENT
Start: 2023-04-22 | End: 2023-04-23

## 2023-04-22 RX ORDER — DEXAMETHASONE 4 MG/1
4 TABLET ORAL EVERY 12 HOURS SCHEDULED
Status: DISCONTINUED | OUTPATIENT
Start: 2023-04-26 | End: 2023-04-23

## 2023-04-22 RX ADMIN — DEXAMETHASONE 4 MG: 4 TABLET ORAL at 22:13

## 2023-04-22 RX ADMIN — CEFAZOLIN 1 G: 1 INJECTION, POWDER, FOR SOLUTION INTRAMUSCULAR; INTRAVENOUS at 05:16

## 2023-04-22 RX ADMIN — ACETAMINOPHEN 975 MG: 325 TABLET ORAL at 02:10

## 2023-04-22 RX ADMIN — CEFAZOLIN 1 G: 1 INJECTION, POWDER, FOR SOLUTION INTRAMUSCULAR; INTRAVENOUS at 14:01

## 2023-04-22 RX ADMIN — HYDRALAZINE HYDROCHLORIDE 10 MG: 20 INJECTION INTRAMUSCULAR; INTRAVENOUS at 00:22

## 2023-04-22 RX ADMIN — ACETAMINOPHEN 975 MG: 325 TABLET ORAL at 17:08

## 2023-04-22 RX ADMIN — SENNOSIDES AND DOCUSATE SODIUM 1 TABLET: 50; 8.6 TABLET ORAL at 20:27

## 2023-04-22 RX ADMIN — DEXAMETHASONE 4 MG: 4 TABLET ORAL at 17:08

## 2023-04-22 RX ADMIN — OXYCODONE HYDROCHLORIDE 5 MG: 5 TABLET ORAL at 14:07

## 2023-04-22 RX ADMIN — DEXAMETHASONE 4 MG: 4 TABLET ORAL at 05:16

## 2023-04-22 RX ADMIN — LEVETIRACETAM 750 MG: 750 TABLET, FILM COATED ORAL at 07:29

## 2023-04-22 RX ADMIN — LABETALOL HYDROCHLORIDE 20 MG: 5 INJECTION, SOLUTION INTRAVENOUS at 05:21

## 2023-04-22 RX ADMIN — LEVETIRACETAM 1000 MG: 500 TABLET, FILM COATED ORAL at 20:27

## 2023-04-22 RX ADMIN — AMLODIPINE BESYLATE 5 MG: 5 TABLET ORAL at 07:29

## 2023-04-22 RX ADMIN — GADOBUTROL 8.5 ML: 604.72 INJECTION INTRAVENOUS at 12:55

## 2023-04-22 RX ADMIN — OXYCODONE HYDROCHLORIDE 5 MG: 5 TABLET ORAL at 21:21

## 2023-04-22 RX ADMIN — HYDRALAZINE HYDROCHLORIDE 20 MG: 20 INJECTION INTRAMUSCULAR; INTRAVENOUS at 08:51

## 2023-04-22 RX ADMIN — LEVETIRACETAM 2000 MG: 100 INJECTION, SOLUTION INTRAVENOUS at 11:08

## 2023-04-22 RX ADMIN — LABETALOL HYDROCHLORIDE 20 MG: 5 INJECTION, SOLUTION INTRAVENOUS at 01:04

## 2023-04-22 RX ADMIN — ACETAMINOPHEN 975 MG: 325 TABLET ORAL at 07:29

## 2023-04-22 RX ADMIN — DEXAMETHASONE 4 MG: 4 TABLET ORAL at 11:51

## 2023-04-22 RX ADMIN — ACETAMINOPHEN 975 MG: 325 TABLET ORAL at 23:31

## 2023-04-22 RX ADMIN — SENNOSIDES AND DOCUSATE SODIUM 1 TABLET: 50; 8.6 TABLET ORAL at 07:29

## 2023-04-22 ASSESSMENT — ACTIVITIES OF DAILY LIVING (ADL)
ADLS_ACUITY_SCORE: 20

## 2023-04-22 ASSESSMENT — VISUAL ACUITY
OU: NORMAL ACUITY

## 2023-04-22 NOTE — PLAN OF CARE
Admitted/transferred from: PACU  Reason for admission/transfer: Post op monitoring  2 RN skin assessment: completed by Margareth WALKER and Yolanad HUDSON  Result of skin assessment and interventions/actions: Midline head incision covered w/primopore dressing. R wrist scab. Scattered bruising. Bruising around art line insertion point.   Height, weight, drug calc weight: Done  Patient belongings (see Flowsheet)  MDRO education added to care planN/A  ?

## 2023-04-22 NOTE — PLAN OF CARE
Major Shift Events:  Q 1hr neuro checks. Neuro intact. PERRLA intact. Moves all extremities. Slight weakness and numbness/tingling in RLE, baseline before surgery. Sinus/sinus verónica. SBP <140. PRNS hydralazine and labetalol given to meet goal. L radial Art line in place. Afebrile. VS stable. Room air. Aragon removed. Adequate urine output. No BM this shift. Tolerating clear liquids.   Plan: MRI today. Continue neuro assessments. Monitor and treat per plan of care.   For vital signs and complete assessments, please see documentation flowsheets.     The patient is a 1y7m Female complaining of ankle pain/injury.

## 2023-04-22 NOTE — PROGRESS NOTES
[facility], [place]   04/22/2023  Neurosurgery Progress Note:    Assessment:  Saeid Santa is a 67-year-old right-handed gentleman with a significant past medical history of prostate cancer diagnosed back in 2009 underwent prostatectomy with no other adjuvant treatment. He started noticing abnormal movement of the right lower extremity likely seizure with no loss of consciousness. He had 3 such episodes the last 1 was 3 days ago. He went to the ER and underwent MRI which showed 2 ring-enhancing lesion involving his left motor strip and left lingula with perilesional edema. Given his history of prostate cancer and new onset seizures and MRI showing ring-enhancing lesions, surgery was recommended.     Patient is POD-1 s/p Left stealth awake craniotomy (sleep-awake-sleep) for tumor resection, and Resection of scalp lesion.    Plan:  - MRI Brain with/without contrast- today  - Decadron 4q6h- 4 day taper  - Serial neuro exams  - Pain control  - HOB > 30 degrees  - Advance diet as tolerates  - Bowel regimen  - PRN antiemetics  - IVF until taking adequate PO  - PT/OT  - SCDs for DVT proph  - Okay to transfer to the floor later today  -----------------------------------  Oscar Dodson  Neurosurgery Resident PGY2    Interval History: No acute events overnight. Stable exam.    Objective:   Temp:  [97.3  F (36.3  C)-98  F (36.7  C)] 98  F (36.7  C)  Pulse:  [48-95] 85  Resp:  [10-24] 18  BP: (115-148)/() 136/89  MAP:  [67 mmHg-100 mmHg] 100 mmHg  Arterial Line BP: (118-157)/(45-74) 135/66  SpO2:  [94 %-99 %] 97 %  I/O last 3 completed shifts:  In: 2862.86 [P.O.:480; I.V.:2282.86; IV Piggyback:100]  Out: 3340 [Urine:3290; Blood:50]    Gen: Appears comfortable, NAD  Neurologic:  - Alert & Oriented to person, place, time, and situation  - Follows commands briskly  - Speech fluent, spontaneous. No aphasia or dysarthria.  - No gaze preference. No apparent hemineglect.  - PERRL, EOMI  - Face  symmetric with sensation intact to light touch  - Palate elevates symmetrically, uvula midline, tongue protrudes midline  - Trapezii muscles 5/5 bilaterally  - No pronator drift     Del Tr Bi WE WF Gr   R 5 5 5 5 5 5   L 5 5 5 5 5 5    HF KE KF DF PF EHL   R 4+ 5 5 5 5 5   L 5 5 5 5 5 5     Reflexes 2+ throughout    Baseline RLE numbness.    LABS:  Recent Labs   Lab 04/22/23  0612 04/22/23  0408 04/21/23  1959 04/21/23  1127 04/15/23  1206   NA  --  137  --   --  138   POTASSIUM  --  4.0  --   --  3.6   CHLORIDE  --  109*  --   --  105   CO2  --  15*  --   --  23   ANIONGAP  --  13  --   --  10   * 113* 118*   < > 115*   BUN  --  15.4  --   --  16.0   CR  --  1.02  --   --  1.16   JOHN PAUL  --  8.3*  --   --  8.7*    < > = values in this interval not displayed.       Recent Labs   Lab 04/22/23  0408   WBC 10.6   RBC 4.62   HGB 14.4   HCT 43.1   MCV 93   MCH 31.2   MCHC 33.4   RDW 13.5          IMAGING:  Recent Results (from the past 24 hour(s))   CT Head w/o Contrast    Narrative    Stealth CT imaging for purposes of stereotactic evaluation    Provided History: CT stealth with fiducials, for pre-op planning  Comparison: 4/15/2023 MRI, 4/15/2023 CT and CTA    Technique: CT imaging performed with axial, sagittal, and coronal  reconstructed images obtained without intravenous contrast.    Findings: Limited imaging for stereotactic localization demonstrates:    Ill-defined lesions in the high left parietal and low left occipital  region with associated hypodensity and surrounding hypodense edema is  grossly unchanged from prior exam. No resultant midline shift.  Ventricles are unchanged in size from prior and normal in size.  Unchanged scalp nodule along the left frontal vertex. No acute  calvarial fractures. Mild mucosal thickening in the ethmoid air cells  and frontal sinuses. Mastoid air cells are clear.      Impression    Impression: Limited imaging performed primarily for the purposes of  stereotactic  localization. Unchanged cystic intracranial lesions in  the left frontal and left occipital lobes. Unchanged high left frontal  scalp lesion.    I have personally reviewed the examination and initial interpretation  and I agree with the findings.    DANNY ADORNO MD         SYSTEM ID:  [id number]

## 2023-04-22 NOTE — PROGRESS NOTES
04/22/23 0935   Appointment Info   Signing Clinician's Name / Credentials (OT) Hui Heard OTR/L   Rehab Comments (OT) crani, R lateral lean       Present no   Living Environment   People in Home spouse   Current Living Arrangements house   Home Accessibility stairs to enter home   Number of Stairs, Main Entrance 8   Stair Railings, Main Entrance railing on right side (ascending)   Transportation Anticipated car, drives self;family or friend will provide   Living Environment Comments Pt reports living in house with spouse, ~8 MARY with handrail on R-side, tub/shower unit.   Self-Care   Usual Activity Tolerance good   Current Activity Tolerance moderate   Equipment Currently Used at Home none   Fall history within last six months yes   Number of times patient has fallen within last six months 3  (per chart)   Activity/Exercise/Self-Care Comment Pt reports ADL IND at baseline, typically no AD for functional mobility.   Instrumental Activities of Daily Living (IADL)   Previous Responsibilities driving;meal prep;yardwork   IADL Comments Pt reports IADL IND at baseline, drives self, does not work.   General Information   Onset of Illness/Injury or Date of Surgery 04/21/23   Referring Physician Shantel Castro MD   Patient/Family Therapy Goal Statement (OT) Return home and to PLOF   Additional Occupational Profile Info/Pertinent History of Current Problem Per EMR,Saeid Santa is a 67-year-old right-handed gentleman with a significant past medical history of prostate cancer diagnosed back in 2009 underwent prostatectomy with no other adjuvant treatment. He started noticing abnormal movement of the right lower extremity likely seizure with no loss of consciousness. He had 3 such episodes the last 1 was 3 days ago. He went to the ER and underwent MRI which showed 2 ring-enhancing lesion involving his left motor strip and left lingula with perilesional edema. Given his history of prostate cancer  [address] [address]  Phone: [phone]  Fax: [phone]    Rahul Aguilar MD        January 24, 2023     Patient: Kameron Castillo   YOB: 2008   Date of Visit: 1/24/2023       To Whom It May Concern: It is my medical opinion that Kameron Castillo may return to school  on  01/24/2023 with no physical activity and or physical therapy or dance. If you have any questions or concerns, please don't hesitate to call.     Sincerely,        Rahul Aguilar MD and new onset seizures and MRI showing ring-enhancing lesions, surgery was recommended.      Patient is POD-1 s/p Left stealth awake craniotomy (sleep-awake-sleep) for tumor resection, and Resection of scalp lesion.   Existing Precautions/Restrictions fall;lifting;seizures;other (see comments)  (crani)   Limitations/Impairments safety/cognitive   Left Upper Extremity (Weight-bearing Status) partial weight-bearing (PWB)  (no > 10 lb)   Right Upper Extremity (Weight-bearing Status) partial weight-bearing (PWB)  (no > 10 lb)   Left Lower Extremity (Weight-bearing Status) full weight-bearing (FWB)   Right Lower Extremity (Weight-bearing Status) full weight-bearing (FWB)   General Observations and Info Activity: Up With Assist   Cognitive Status Examination   Orientation Status person;place   Cognitive Status Comments Pt cognition appears grossly intact, however slight increased processing speed and decreased awareness of deficits, will monitor   Visual Perception   Visual Impairment/Limitations WFL;corrective lenses for reading   Impact of Vision Impairment on Function (Vision) Denies acute vision changes   Sensory   Sensory Quick Adds left-sided sensation intact   Sensory Comments Denies L UE/LE numbness/tingling; R UE/LE numbness, increased in LE compared to UE   Pain Assessment   Patient Currently in Pain Yes, see Vital Sign flowsheet   Posture   Posture forward head position   Posture Comments While sitting at EOB, intermittent significant R lateral lean, delayed response of R UE/LE placement to correct posture   Range of Motion Comprehensive   Comment, General Range of Motion L UE ROM WFL, R UE ROM presents with decreased motor control and speed of movement   Strength Comprehensive (MMT)   Comment, General Manual Muscle Testing (MMT) Assessment L UE strength WFL, R UE presents with decreased strength, formal MMT not assessed due to post-surgical precautions   Muscle Tone Assessment   Muscle Tone Quick Adds No  deficits were identified   Coordination   Coordination Comments R UE GMC and FMC impaired, will monitor   Bed Mobility   Bed Mobility sit-supine   Comment (Bed Mobility) mod A for sit>supine, assist to maneuver R LE into bed   Transfers   Transfers sit-stand transfer;toilet transfer   Sit-Stand Transfer   Sit/Stand Transfer Comments mod A STS from EOB, R LE knee buckling   Toilet Transfer   Type (Toilet Transfer) sit-stand   [place] Level (Toilet Transfer) moderate assist (50% patient effort);verbal cues   Toilet Transfer Comments per clinical judgement   Balance   Balance Assessment sitting balance: static   Balance Comments min-mod A static sitting balance at EOB, R lateral lean   Activities of Daily Living   BADL Assessment/Intervention bathing;lower body dressing;toileting   Bathing Assessment/Intervention   [place] Level (Bathing) moderate assist (50% patient effort);set up   Comment, (Bathing) per clinical judgement   Assistive Devices (Bathing) shower chair   Lower Body Dressing Assessment/Training   Comment, (Lower Body Dressing) per clinical judgement   [place] Level (Lower Body Dressing) moderate assist (50% patient effort)   Toileting   Comment, (Toileting) per clinical judgement   [place] Level (Toileting) moderate assist (50% patient effort);maximum assist (25% patient effort)   Clinical Impression   Criteria for Skilled Therapeutic Interventions Met (OT) Yes, treatment indicated   OT Diagnosis Decreased ADL IND/safety, cognition, functional mobility, R-side neuro re-education, muscular strength/endurance   OT Problem List-Impairments impacting ADL problems related to;activity tolerance impaired;balance;cognition;coordination;mobility;motor control;range of motion (ROM);strength;sensation;pain;post-surgical precautions;postural control   Assessment of Occupational Performance 5 or more Performance Deficits   Identified Performance Deficits dressing, bathing, toileting, functional  mobility, standing g/h tasks, cognition, IADL tasks, leisure   Planned Therapy Interventions (OT) ADL retraining;IADL retraining;balance training;bed mobility training;cognition;motor coordination training;neuromuscular re-education;ROM;strengthening;transfer training;progressive activity/exercise   Clinical Decision Making Complexity (OT) low complexity   Anticipated Equipment Needs Upon Discharge (OT) other (see comments)  (TBD)   Risk & Benefits of therapy have been explained evaluation/treatment results reviewed;care plan/treatment goals reviewed;participants included;patient   Clinical Impression Comments Pt below baseline function and will benefit from continued skilled OT during IP stay to progress ADL IND/safety, cognition, and return to PLOF   OT Total Evaluation Time   OT Eval, Low Complexity Minutes (70620) 6   OT Goals   Therapy Frequency (OT) 6 times/wk   OT Predicted Duration/Target Date for Goal Attainment 05/05/23   OT Goals Hygiene/Grooming;Lower Body Dressing;Toilet Transfer/Toileting;Cognition;OT Goal 1   OT: Hygiene/Grooming supervision/stand-by assist;within precautions;while standing   OT: Lower Body Dressing Supervision/stand-by assist;including set-up/clothing retrieval;within precautions   OT: Toilet Transfer/Toileting Supervision/stand-by assist;toilet transfer;cleaning and garment management;within precautions   OT: Cognitive Patient/caregiver will verbalize understanding of cognitive assessment results/recommendations as needed for safe discharge planning   OT: Goal 1 Pt will be able to verbalize and demonstrate 100% post-surgical craniotomy precautions in regard to ADL/IADL tasks prior to discharge home.   OT Discharge Planning   OT Plan OT: review precautions (handout), EOB sitting balance, seated ADLs, R-side neuro re-education   OT Discharge Recommendation (DC Rec) Acute Rehab Center-Motivated patient will benefit from intensive, interdisciplinary therapy.  Anticipate will be able to  tolerate 3 hours of therapy per day;home with assist;home with home care occupational therapy   OT Rationale for DC Rec Pt below baseline function for ADL tasks, functional mobility, cognition. Pt reports IND with ADL tasks and functional mobility at baseline. Currently, pt significantly limited by R UE/LE numbness and requires mod A for STS from EOB, presents as high falls risk. Would recommend continued intense rehab at ARU this date to progress IND/safety and return to PLOF. Pending on IP progress and LOS, may progress to discharge home with assist from spouse and home OT/PT. Will monitor   OT Brief overview of current status mod A STS from EOB, mod A supine>sit EOB   Total Session Time   Total Session Time (sum of timed and untimed services) 6

## 2023-04-22 NOTE — PLAN OF CARE
Major Shift Events: Pt reported right-sided weakness/numbness today after working w/ OT, RN unable to feel weakness compared to left side, this is a normal finding after the surgery per Dr. Garrido and should resolve. Pt reported RLE muscle jerking again (see provider notification for previous episode), EEG ordered and to be put on tonight. Pt unable to stand safely d/t numbness in RLE. Hydralazine given x1. Other VSS. Voiding adequately. No BM. Regular diet, drinking lots of fluids.   Plan: Switch neuros to Q4hr. Transfer to floor when a bed becomes available.   For vital signs and complete assessments, please see documentation flowsheets.       Goal Outcome Evaluation:      Plan of Care Reviewed With: patient    Overall Patient Progress: improving

## 2023-04-22 NOTE — PROVIDER NOTIFICATION
Notified Neurosurgery MD that pt told RN that he has been having RLE muscle jerking for approx. 10 min, thinks it's part of his seizures. 2g Keppra load ordered.

## 2023-04-23 ENCOUNTER — APPOINTMENT (OUTPATIENT)
Dept: PHYSICAL THERAPY | Facility: CLINIC | Age: 68
DRG: 025 | End: 2023-04-23
Attending: STUDENT IN AN ORGANIZED HEALTH CARE EDUCATION/TRAINING PROGRAM
Payer: COMMERCIAL

## 2023-04-23 ENCOUNTER — APPOINTMENT (OUTPATIENT)
Dept: NEUROLOGY | Facility: CLINIC | Age: 68
DRG: 025 | End: 2023-04-23
Attending: STUDENT IN AN ORGANIZED HEALTH CARE EDUCATION/TRAINING PROGRAM
Payer: COMMERCIAL

## 2023-04-23 LAB
ANION GAP SERPL CALCULATED.3IONS-SCNC: 17 MMOL/L (ref 7–15)
BUN SERPL-MCNC: 23.4 MG/DL (ref 8–23)
CALCIUM SERPL-MCNC: 9.7 MG/DL (ref 8.8–10.2)
CHLORIDE SERPL-SCNC: 106 MMOL/L (ref 98–107)
CREAT SERPL-MCNC: 0.97 MG/DL (ref 0.67–1.17)
DEPRECATED HCO3 PLAS-SCNC: 15 MMOL/L (ref 22–29)
GFR SERPL CREATININE-BSD FRML MDRD: 85 ML/MIN/1.73M2
GLUCOSE BLDC GLUCOMTR-MCNC: 120 MG/DL (ref 70–99)
GLUCOSE SERPL-MCNC: 123 MG/DL (ref 70–99)
MAGNESIUM SERPL-MCNC: 2.2 MG/DL (ref 1.7–2.3)
POTASSIUM SERPL-SCNC: 4 MMOL/L (ref 3.4–5.3)
SARS-COV-2 RNA RESP QL NAA+PROBE: NEGATIVE
SODIUM SERPL-SCNC: 138 MMOL/L (ref 136–145)

## 2023-04-23 PROCEDURE — 83735 ASSAY OF MAGNESIUM: CPT | Performed by: NEUROLOGICAL SURGERY

## 2023-04-23 PROCEDURE — 250N000011 HC RX IP 250 OP 636: Performed by: STUDENT IN AN ORGANIZED HEALTH CARE EDUCATION/TRAINING PROGRAM

## 2023-04-23 PROCEDURE — 95720 EEG PHY/QHP EA INCR W/VEEG: CPT | Performed by: PSYCHIATRY & NEUROLOGY

## 2023-04-23 PROCEDURE — 250N000013 HC RX MED GY IP 250 OP 250 PS 637: Performed by: STUDENT IN AN ORGANIZED HEALTH CARE EDUCATION/TRAINING PROGRAM

## 2023-04-23 PROCEDURE — 97161 PT EVAL LOW COMPLEX 20 MIN: CPT | Mod: GP | Performed by: PHYSICAL THERAPIST

## 2023-04-23 PROCEDURE — 97530 THERAPEUTIC ACTIVITIES: CPT | Mod: GP | Performed by: PHYSICAL THERAPIST

## 2023-04-23 PROCEDURE — 80048 BASIC METABOLIC PNL TOTAL CA: CPT | Performed by: NEUROLOGICAL SURGERY

## 2023-04-23 PROCEDURE — 250N000011 HC RX IP 250 OP 636

## 2023-04-23 PROCEDURE — 97110 THERAPEUTIC EXERCISES: CPT | Mod: GP | Performed by: PHYSICAL THERAPIST

## 2023-04-23 PROCEDURE — 95714 VEEG EA 12-26 HR UNMNTR: CPT

## 2023-04-23 PROCEDURE — 36415 COLL VENOUS BLD VENIPUNCTURE: CPT | Performed by: NEUROLOGICAL SURGERY

## 2023-04-23 PROCEDURE — U0005 INFEC AGEN DETEC AMPLI PROBE: HCPCS | Performed by: NEUROLOGICAL SURGERY

## 2023-04-23 PROCEDURE — 120N000002 HC R&B MED SURG/OB UMMC

## 2023-04-23 PROCEDURE — 250N000012 HC RX MED GY IP 250 OP 636 PS 637: Performed by: STUDENT IN AN ORGANIZED HEALTH CARE EDUCATION/TRAINING PROGRAM

## 2023-04-23 RX ORDER — DEXAMETHASONE SODIUM PHOSPHATE 4 MG/ML
4 INJECTION, SOLUTION INTRA-ARTICULAR; INTRALESIONAL; INTRAMUSCULAR; INTRAVENOUS; SOFT TISSUE EVERY 6 HOURS
Status: DISCONTINUED | OUTPATIENT
Start: 2023-04-23 | End: 2023-04-25

## 2023-04-23 RX ADMIN — ACETAMINOPHEN 975 MG: 325 TABLET ORAL at 16:02

## 2023-04-23 RX ADMIN — AMLODIPINE BESYLATE 5 MG: 5 TABLET ORAL at 07:47

## 2023-04-23 RX ADMIN — DEXAMETHASONE SODIUM PHOSPHATE 4 MG: 4 INJECTION, SOLUTION INTRA-ARTICULAR; INTRALESIONAL; INTRAMUSCULAR; INTRAVENOUS; SOFT TISSUE at 18:24

## 2023-04-23 RX ADMIN — HYDRALAZINE HYDROCHLORIDE 20 MG: 20 INJECTION INTRAMUSCULAR; INTRAVENOUS at 10:44

## 2023-04-23 RX ADMIN — SENNOSIDES AND DOCUSATE SODIUM 1 TABLET: 50; 8.6 TABLET ORAL at 20:01

## 2023-04-23 RX ADMIN — SENNOSIDES AND DOCUSATE SODIUM 1 TABLET: 50; 8.6 TABLET ORAL at 07:46

## 2023-04-23 RX ADMIN — LEVETIRACETAM 1000 MG: 500 TABLET, FILM COATED ORAL at 07:47

## 2023-04-23 RX ADMIN — DEXAMETHASONE SODIUM PHOSPHATE 4 MG: 4 INJECTION, SOLUTION INTRA-ARTICULAR; INTRALESIONAL; INTRAMUSCULAR; INTRAVENOUS; SOFT TISSUE at 12:47

## 2023-04-23 RX ADMIN — LABETALOL HYDROCHLORIDE 20 MG: 5 INJECTION, SOLUTION INTRAVENOUS at 12:47

## 2023-04-23 RX ADMIN — ACETAMINOPHEN 975 MG: 325 TABLET ORAL at 23:59

## 2023-04-23 RX ADMIN — HYDRALAZINE HYDROCHLORIDE 20 MG: 20 INJECTION INTRAMUSCULAR; INTRAVENOUS at 10:35

## 2023-04-23 RX ADMIN — LABETALOL HYDROCHLORIDE 20 MG: 5 INJECTION, SOLUTION INTRAVENOUS at 14:11

## 2023-04-23 RX ADMIN — DEXAMETHASONE 4 MG: 4 TABLET ORAL at 05:57

## 2023-04-23 RX ADMIN — POLYETHYLENE GLYCOL 3350 17 G: 17 POWDER, FOR SOLUTION ORAL at 07:47

## 2023-04-23 RX ADMIN — DEXAMETHASONE SODIUM PHOSPHATE 4 MG: 4 INJECTION, SOLUTION INTRA-ARTICULAR; INTRALESIONAL; INTRAMUSCULAR; INTRAVENOUS; SOFT TISSUE at 23:58

## 2023-04-23 RX ADMIN — ACETAMINOPHEN 975 MG: 325 TABLET ORAL at 07:47

## 2023-04-23 RX ADMIN — LEVETIRACETAM 1000 MG: 500 TABLET, FILM COATED ORAL at 20:01

## 2023-04-23 ASSESSMENT — ACTIVITIES OF DAILY LIVING (ADL)
ADLS_ACUITY_SCORE: 20
ADLS_ACUITY_SCORE: 22
ADLS_ACUITY_SCORE: 20

## 2023-04-23 ASSESSMENT — VISUAL ACUITY
OU: NORMAL ACUITY

## 2023-04-23 NOTE — PHARMACY-ADMISSION MEDICATION HISTORY
Pharmacist Admission Medication History    Admission medication history is complete. The information provided in this note is only as accurate as the sources available at the time of the update.    Medication reconciliation/reorder completed by provider prior to medication history? Yes    Information Source(s): Patient, Patient's pharmacy and Clinic records via phone    Pertinent Information: None    Changes made to PTA medication list:None      Allergies reviewed with patient and updates made in EHR: yes    Medication History Completed By: Tri Muse RP 4/23/2023 3:34 PM    Prior to Admission medications    Medication Sig Last Dose Taking? Auth Provider Long Term End Date   amLODIPine (NORVASC) 5 MG tablet Take 1 tablet (5 mg) by mouth daily 4/21/2023 at 0620 Yes Deny Turk MD Yes    levETIRAcetam (KEPPRA) 750 MG tablet Take 1 tablet (750 mg) by mouth 2 times daily 4/21/2023 at 0620 Yes Deny Turk MD Yes    rizatriptan (MAXALT) 5 MG tablet Take 5 mg by mouth at onset of headache for migraine Past Week Yes Reported, Patient     sulfamethoxazole-trimethoprim (BACTRIM DS) 800-160 MG tablet Take 1 tablet by mouth 2 times daily 4/21/2023 at 0620 Yes Kye Garrido MD

## 2023-04-23 NOTE — PLAN OF CARE
Status: POD #2 of Left parietal craniotomy for tumor resection. Decrease RLE movement . vEEG monitor d/t muscle jerking.   Vitals: VSS on RA.  PRN Labetalol and hydralazine for SBP>140.  Neuros: A&O X4. RLE 3/5, 5/5 strength throughout. N/T at RLE from Toes>hip at baseline. Weakness on R side of the body. HA worsen with movement.   IV: PIV X 2 R and L, both  SL.    Labs/Electrolytes:  WNL   Resp/trach: LSC. Denies SOB. Continue pulse ox in high 90s.   Diet: Regular. Pt didn't order hospital food instead ate food brought from home. Fair appetite.   Bowel status: LBM PTA. Senna given.   : Voiding via urinal w/o difficulty at bedside.   Skin: Crani incision, L wrist bruise, R wrist scab. EEG leads in place.   Pain: Denies.   Activity: AX2 and GB for pivot to bedside commode. R knee jesse when weight bearing position.   Social: No visitor this shift.   Plan: Continue to monitor neurological status, EEG. PTA keppra increased 1g BID. No events of seizure this shift.   Arrived from: 4A  Belongings/meds:Bag, Clothes, Phone.   2 RN Skin Assessment Completed by: Humaira CAMPUZANO and Сергей CAMPUZANO.     Non-intact findings documented (yes/no/NA): Yes

## 2023-04-23 NOTE — PROVIDER NOTIFICATION
Unwitnessed fall without injury. Patient was sitting at side of bed to use urinal when he slipped and fell on his bottom. Patient denies hitting head or pain associated with fall. Neuro status unchanged, remains intact. Neuro surg provider notified.

## 2023-04-23 NOTE — PROGRESS NOTES
[facility], [place]   04/23/2023  Neurosurgery Progress Note:    Assessment:  Saeid Santa is a 67-year-old right-handed gentleman with a significant past medical history of prostate cancer diagnosed back in 2009 underwent prostatectomy with no other adjuvant treatment. He started noticing abnormal movement of the right lower extremity likely seizure with no loss of consciousness. He had 3 such episodes the last 1 was 3 days ago. He went to the ER and underwent MRI which showed 2 ring-enhancing lesion involving his left motor strip and left lingula with perilesional edema. Given his history of prostate cancer and new onset seizures and MRI showing ring-enhancing lesions, surgery was recommended.     Patient is POD-2 s/p Left stealth awake craniotomy (sleep-awake-sleep) for tumor resection, and Resection of scalp lesion.    Plan:  - Decadron 4q6h- 8 day taper  - PTA keppra increased 1g BID  - EEG   - Serial neuro exams  - Pain control  - HOB > 30 degrees  - Advance diet as tolerates  - Bowel regimen  - PRN antiemetics  - PT/OT  - SCDs for DVT proph    -----------------------------------  Damaris Farrell MD  Neurosurgery PGY3    Please contact neurosurgery resident on call with questions.    Dial * * *[phone], enter [phone] when prompted.      Interval History: Controlled fall with no head strike overnight, frustrated with RLE sensory changes.     Objective:   Temp:  [97.9  F (36.6  C)-98.1  F (36.7  C)] 97.9  F (36.6  C)  Pulse:  [] 73  Resp:  [10-48] 20  BP: (110-151)/(56-90) 129/73  SpO2:  [94 %-97 %] 96 %  I/O last 3 completed shifts:  In: 585 [P.O.:540; I.V.:45]  Out: 1725 [Urine:1725]    Gen: Appears comfortable, NAD  Neurologic:  - Alert & Oriented to person, place, time, and situation  - Follows commands briskly  - Speech fluent, spontaneous. No aphasia or dysarthria.  - No gaze preference. No apparent hemineglect.  - PERRL, EOMI  - Face symmetric with sensation intact to light  touch  - Palate elevates symmetrically, uvula midline, tongue protrudes midline  - Trapezii muscles 5/5 bilaterally  - No pronator drift     Del Tr Bi WE WF Gr   R 5 5 5 5 5 5   L 5 5 5 5 5 5    HF KE KF DF PF EHL   R 4+ 5 5 5 5 5   L 5 5 5 5 5 5     Reflexes 2+ throughout    RLE numbness.    LABS:  Recent Labs   Lab 04/23/23  1117 04/23/23  0600 04/22/23  0612 04/22/23  0408     --   --  137   POTASSIUM 4.0  --   --  4.0   CHLORIDE 106  --   --  109*   CO2 15*  --   --  15*   ANIONGAP 17*  --   --  13   * 120* 119* 113*   BUN 23.4*  --   --  15.4   CR 0.97  --   --  1.02   JOHN PAUL 9.7  --   --  8.3*       Recent Labs   Lab 04/22/23  0408   WBC 10.6   RBC 4.62   HGB 14.4   HCT 43.1   MCV 93   MCH 31.2   MCHC 33.4   RDW 13.5          IMAGING:  No results found for this or any previous visit (from the past 24 hour(s)).

## 2023-04-23 NOTE — PLAN OF CARE
Neuro: vEEG in place. A&Ox4, intact neurologically. 5/5 strength in all extremities except RLE - numbness, tingling, weakness and instability present.     CV: SR 80, ST up to 130s with activity. Afebrile. 20mg labetalol given x 2, hydralazine for systolic goal <140.     Resp: LS clear bilaterally, on RA,     GI/: Adequate UO via urinal. No BM this shift. Tolerating regular diet. Pt c/o hiccups.     Access: PIV x 2 on L and R, both saline locked.    Skin: Crani incision, L wrist bruise, R wrist scab.     Plan: Continue to monitor neurological status, notify team of significant changes.     Samantha Little RN on 4/23/2023 at 2:42 PM

## 2023-04-23 NOTE — PLAN OF CARE
Major Shift Events:  EEG started at the beginning of shift for muscle jerking during the day. Neuro status remains unchanged. Weakness, numbness and tingling remains in RLE. Patient had unwitnessed fall while sitting at edge of bed to use urinal. Patient stated R leg gave out from underneath him. Patient denies hitting head or any pain associated with fall. Sinus/sinus verónica when sleeping. VS stable. SBP <140. Afebrile. Room air. Reg diet, poor appetite today. Uses urinal to void. No BM. Pain managed with PRN oxycodone and tylenol.   Plan: Continue EEG. Work with PT/OT. Manage pain. Monitor and treat per plan of care.  For vital signs and complete assessments, please see documentation flowsheets.

## 2023-04-24 ENCOUNTER — APPOINTMENT (OUTPATIENT)
Dept: PHYSICAL THERAPY | Facility: CLINIC | Age: 68
DRG: 025 | End: 2023-04-24
Attending: NEUROLOGICAL SURGERY
Payer: COMMERCIAL

## 2023-04-24 ENCOUNTER — APPOINTMENT (OUTPATIENT)
Dept: NEUROLOGY | Facility: CLINIC | Age: 68
DRG: 025 | End: 2023-04-24
Attending: STUDENT IN AN ORGANIZED HEALTH CARE EDUCATION/TRAINING PROGRAM
Payer: COMMERCIAL

## 2023-04-24 ENCOUNTER — APPOINTMENT (OUTPATIENT)
Dept: OCCUPATIONAL THERAPY | Facility: CLINIC | Age: 68
DRG: 025 | End: 2023-04-24
Attending: NEUROLOGICAL SURGERY
Payer: COMMERCIAL

## 2023-04-24 PROCEDURE — 250N000011 HC RX IP 250 OP 636: Performed by: STUDENT IN AN ORGANIZED HEALTH CARE EDUCATION/TRAINING PROGRAM

## 2023-04-24 PROCEDURE — 95720 EEG PHY/QHP EA INCR W/VEEG: CPT | Performed by: PSYCHIATRY & NEUROLOGY

## 2023-04-24 PROCEDURE — 250N000013 HC RX MED GY IP 250 OP 250 PS 637: Performed by: STUDENT IN AN ORGANIZED HEALTH CARE EDUCATION/TRAINING PROGRAM

## 2023-04-24 PROCEDURE — 97116 GAIT TRAINING THERAPY: CPT | Mod: GP | Performed by: PHYSICAL THERAPIST

## 2023-04-24 PROCEDURE — 999N000128 HC STATISTIC PERIPHERAL IV START W/O US GUIDANCE

## 2023-04-24 PROCEDURE — 120N000002 HC R&B MED SURG/OB UMMC

## 2023-04-24 PROCEDURE — 99024 POSTOP FOLLOW-UP VISIT: CPT

## 2023-04-24 PROCEDURE — 250N000011 HC RX IP 250 OP 636

## 2023-04-24 PROCEDURE — 97110 THERAPEUTIC EXERCISES: CPT | Mod: GO

## 2023-04-24 PROCEDURE — 97530 THERAPEUTIC ACTIVITIES: CPT | Mod: GP | Performed by: PHYSICAL THERAPIST

## 2023-04-24 PROCEDURE — 97530 THERAPEUTIC ACTIVITIES: CPT | Mod: GO

## 2023-04-24 PROCEDURE — 95714 VEEG EA 12-26 HR UNMNTR: CPT

## 2023-04-24 PROCEDURE — 97110 THERAPEUTIC EXERCISES: CPT | Mod: GP | Performed by: PHYSICAL THERAPIST

## 2023-04-24 RX ORDER — HEPARIN SODIUM 5000 [USP'U]/.5ML
5000 INJECTION, SOLUTION INTRAVENOUS; SUBCUTANEOUS EVERY 8 HOURS
Status: DISCONTINUED | OUTPATIENT
Start: 2023-04-24 | End: 2023-04-27 | Stop reason: HOSPADM

## 2023-04-24 RX ADMIN — HEPARIN SODIUM 5000 UNITS: 5000 INJECTION, SOLUTION INTRAVENOUS; SUBCUTANEOUS at 12:34

## 2023-04-24 RX ADMIN — ACETAMINOPHEN 650 MG: 325 TABLET ORAL at 05:08

## 2023-04-24 RX ADMIN — LEVETIRACETAM 1000 MG: 500 TABLET, FILM COATED ORAL at 08:27

## 2023-04-24 RX ADMIN — LEVETIRACETAM 1000 MG: 500 TABLET, FILM COATED ORAL at 19:53

## 2023-04-24 RX ADMIN — AMLODIPINE BESYLATE 5 MG: 5 TABLET ORAL at 08:27

## 2023-04-24 RX ADMIN — HEPARIN SODIUM 5000 UNITS: 5000 INJECTION, SOLUTION INTRAVENOUS; SUBCUTANEOUS at 18:30

## 2023-04-24 RX ADMIN — DEXAMETHASONE SODIUM PHOSPHATE 4 MG: 4 INJECTION, SOLUTION INTRA-ARTICULAR; INTRALESIONAL; INTRAMUSCULAR; INTRAVENOUS; SOFT TISSUE at 18:17

## 2023-04-24 RX ADMIN — SENNOSIDES AND DOCUSATE SODIUM 1 TABLET: 50; 8.6 TABLET ORAL at 08:26

## 2023-04-24 RX ADMIN — DEXAMETHASONE SODIUM PHOSPHATE 4 MG: 4 INJECTION, SOLUTION INTRA-ARTICULAR; INTRALESIONAL; INTRAMUSCULAR; INTRAVENOUS; SOFT TISSUE at 05:01

## 2023-04-24 RX ADMIN — HYDRALAZINE HYDROCHLORIDE 20 MG: 20 INJECTION INTRAMUSCULAR; INTRAVENOUS at 11:07

## 2023-04-24 RX ADMIN — ACETAMINOPHEN 975 MG: 325 TABLET ORAL at 08:26

## 2023-04-24 RX ADMIN — SENNOSIDES AND DOCUSATE SODIUM 1 TABLET: 50; 8.6 TABLET ORAL at 19:53

## 2023-04-24 RX ADMIN — DEXAMETHASONE SODIUM PHOSPHATE 4 MG: 4 INJECTION, SOLUTION INTRA-ARTICULAR; INTRALESIONAL; INTRAMUSCULAR; INTRAVENOUS; SOFT TISSUE at 12:34

## 2023-04-24 ASSESSMENT — ACTIVITIES OF DAILY LIVING (ADL)
ADLS_ACUITY_SCORE: 22
ADLS_ACUITY_SCORE: 24
ADLS_ACUITY_SCORE: 22
ADLS_ACUITY_SCORE: 24
ADLS_ACUITY_SCORE: 24
ADLS_ACUITY_SCORE: 22

## 2023-04-24 NOTE — PLAN OF CARE
Status: POD #2 of Left parietal craniotomy for tumor resection. Decrease RLE movement . VEEG monitor d/t muscle jerking.   Vitals: Htn and given hydralazine x1 with effectiveness to keep SBP under 140  Neuros: A&O X4. RLE 3/5, 5/5 strength throughout. N/T at RLE from Toes>hip at baseline. Weakness on R side of the body. HA worsen with movement.   IV: PIV SL'd  Labs/Electrolytes: WNL  Resp/trach: WNL  Diet: tolerating regular diet without difficulty  Bowel status: BM this shift, loose, continent and large. Uses commode  : voiding without difficulty  Skin: Crani incision, L wrist bruise, R wrist scab. EEG leads in place  Pain: denied HA this shift  Activity: up with assist of 2, GB, walker to pivot  Social: wife and siblings at bedside, supportive and updated  Plan: continue with VEEG monitoring for events.   Updates this shift: pt pleasant and cooperative

## 2023-04-24 NOTE — PROGRESS NOTES
[facility], [place]   04/24/2023  Neurosurgery Progress Note:    Assessment:  Saeid Santa is a 67-year-old right-handed gentleman with a significant past medical history of prostate cancer diagnosed back in 2009 underwent prostatectomy with no other adjuvant treatment. He started noticing abnormal movement of the right lower extremity likely seizure with no loss of consciousness. He had 3 such episodes the last 1 was 3 days ago. He went to the ER and underwent MRI which showed 2 ring-enhancing lesion involving his left motor strip and left lingula with perilesional edema. Given his history of prostate cancer and new onset seizures and MRI showing ring-enhancing lesions, surgery was recommended.     Patient is POD-3 s/p Left stealth awake craniotomy (sleep-awake-sleep) for tumor resection, and Resection of scalp lesion.    Plan:  - Decadron 4q6h  - PTA keppra increased 1g BID  - Continue to follow EEG   - Serial neuro exams  - Pain control  - HOB > 30 degrees  - Advance diet as tolerates  - Bowel regimen  - PRN antiemetics  - PT/OT  - Subcutaneous Heparin and SCDs for DVT proph    -----------------------------------  Kellen Alvares PA-C  Neurosurgery Department  Pager: [phone]        Interval History: Patient reports continued, unchanged decreased sensation and weakness of right lower extremity. Denies headaches, nausea, or vision changes. Stable neurologic exam. Will continue to follow EEG read.     Objective:   Temp:  [97.4  F (36.3  C)-98  F (36.7  C)] 97.6  F (36.4  C)  Pulse:  [] 56  Resp:  [15-48] 18  BP: (119-153)/(65-98) 153/98  SpO2:  [95 %-97 %] 96 %  I/O last 3 completed shifts:  In: 350 [P.O.:350]  Out: 1150 [Urine:1150]    Gen: Appears comfortable, NAD  Neurologic:  - Alert & Oriented to person, place, time, and situation  - Follows commands briskly  - Speech fluent, spontaneous. No aphasia or dysarthria.  - No gaze preference. No apparent hemineglect.  -  PERRL, EOMI  - Face symmetric with sensation intact to light touch  - Palate elevates symmetrically, uvula midline, tongue protrudes midline  - Trapezii muscles 5/5 bilaterally  - No pronator drift     Del Tr Bi WE WF Gr   R 5 5 5 5 5 5   L 5 5 5 5 5 5    HF KE KF DF PF EHL   R 4+ 4+ 4+ 4+ 4+ 5   L 5 5 5 5 5 5     Reflexes 3+ bilateral patellar. 1 beat of clonus right side    RLE numbness from level of hip to toe.    LABS:  Recent Labs   Lab 04/23/23  1117 04/23/23  0600 04/22/23  0612 04/22/23  0408     --   --  137   POTASSIUM 4.0  --   --  4.0   CHLORIDE 106  --   --  109*   CO2 15*  --   --  15*   ANIONGAP 17*  --   --  13   * 120* 119* 113*   BUN 23.4*  --   --  15.4   CR 0.97  --   --  1.02   JOHN PAUL 9.7  --   --  8.3*       Recent Labs   Lab 04/22/23  0408   WBC 10.6   RBC 4.62   HGB 14.4   HCT 43.1   MCV 93   MCH 31.2   MCHC 33.4   RDW 13.5          IMAGING:  No results found for this or any previous visit (from the past 24 hour(s)).

## 2023-04-24 NOTE — PROGRESS NOTES
"EEG CLINICAL NEUROPHYSIOLOGY PRELIMINARY REPORT    EEG through 6 AM today reviewed.  Normal background.  Intermittent left frontal parietal slowing.  No epileptiform discharges.  Patient pressed event marker on more than 60 occasions reporting a \"seizure-like feeling in his legs\".  EEG did not show seizure discharges during these periods of time.  Video during many of these was reviewed and no clear clinical changes were noted.  A hypnic jerk was seen prior to event marked on 1 occasion.  Electrographic seizures were not seen during other portions of the recording either.    Findings are abnormal.  1) some left frontoparietal slowing is seen consistent with known structural abnormalities in this area.  2) there were no epileptiform discharges.  3) patient marked multiple events.  No seizure activity was seen on EEG during these events.  Video of a sample of these was reviewed and no clear clinical changes consistent with seizures were noted either.  Lack of video EEG changes does not rule out seizures because simple partial (focal aware) sensory seizures may lack ictal EEG changes or clinical features that may be noted on video.  The findings argue strongly against complex partial or generalized seizures.  Clinical correlation is needed to delineate patient experiences and confirm that target events are being recorded.    Gurvinder Hoffman MD  Contact information for physicians covering Epilepsy and EEG is available on [facility].  Click search, enter neurology adult/[facility] in group name box, click on neurology adult/[facility], then click Staff Epilepsy and EEG.       "

## 2023-04-24 NOTE — ANESTHESIA PROCEDURE NOTES
Arterial Line Procedure Note    Pre-Procedure   Staff -        Anesthesiologist:  Aguilar Diaz MD       Resident/Fellow: Shadi Hills MD       Performed By: resident       Location: OR       Pre-Anesthestic Checklist: patient identified, IV checked, risks and benefits discussed, informed consent, monitors and equipment checked, pre-op evaluation and at physician/surgeon's request  Timeout:       Correct Patient: Yes        Correct Procedure: Yes        Correct Site: Yes        Correct Position: Yes   Line Placement:   This line was placed Pre Induction starting at 4/21/2023 1:30 PM and ending at 4/24/2023 1:45 PM  Procedure   Procedure: arterial line       Diagnosis: Hemodynamic monitoring       Laterality: left       Insertion Site: radial.  Sterile Prep        Standard elements of sterile barrier followed       Skin prep: Chloraprep  Insertion/Injection        Technique: ultrasound guided        1. Ultrasound was used to evaluate the access site.       2. Artery evaluated via ultrasound for patency/adequacy.       3. Using real-time ultrasound the needle/catheter was observed entering the artery/vein.       5. The visualized structures were anatomically normal.       6. There were no apparent abnormal pathologic findings.       Catheter Type/Size: 20 G, 12 cm  Narrative        Tegaderm dressing used.       Complications: None apparent,        Arterial waveform: Yes        IBP within 10% of NIBP: Yes

## 2023-04-24 NOTE — PROGRESS NOTES
Status: Hx of Left parietal craniotomy for tumor resection this admission.   Vitals: VSS,Has PRN Labetalol and hydralazine available for SBP>140. Did not need this shift.  Neuros: Alert and oriented x4, Neuros intact, (see flowsheet).   Labs/Electrolytes:  WDL  Resp/trach: LSC. Denies SOB. Continue pulse ox in high 90s.   Diet: Regular. Did not eat overnight.   Resp: Lung sounds CTA, on room air.   Bowel status: Last BM-   : Voiding spontaneously via urinal w/o difficulty  Skin: EEG leads, craniotomy incision - covered.   Pain: PRN tylenol given for headache, sleeping between cares without difficulty.    Activity: AX2 and GB for pivot to bedside commode. Did not get out of bed this shift. Per nursing report knees buckle when walking/transfering.   Plan: Continue POC, EEG for 24 hours.

## 2023-04-25 ENCOUNTER — APPOINTMENT (OUTPATIENT)
Dept: PHYSICAL THERAPY | Facility: CLINIC | Age: 68
DRG: 025 | End: 2023-04-25
Attending: NEUROLOGICAL SURGERY
Payer: COMMERCIAL

## 2023-04-25 ENCOUNTER — APPOINTMENT (OUTPATIENT)
Dept: OCCUPATIONAL THERAPY | Facility: CLINIC | Age: 68
DRG: 025 | End: 2023-04-25
Attending: NEUROLOGICAL SURGERY
Payer: COMMERCIAL

## 2023-04-25 ENCOUNTER — APPOINTMENT (OUTPATIENT)
Dept: NEUROLOGY | Facility: CLINIC | Age: 68
DRG: 025 | End: 2023-04-25
Attending: STUDENT IN AN ORGANIZED HEALTH CARE EDUCATION/TRAINING PROGRAM
Payer: COMMERCIAL

## 2023-04-25 PROCEDURE — 97112 NEUROMUSCULAR REEDUCATION: CPT | Mod: GP | Performed by: PHYSICAL THERAPIST

## 2023-04-25 PROCEDURE — 250N000011 HC RX IP 250 OP 636

## 2023-04-25 PROCEDURE — 250N000013 HC RX MED GY IP 250 OP 250 PS 637: Performed by: STUDENT IN AN ORGANIZED HEALTH CARE EDUCATION/TRAINING PROGRAM

## 2023-04-25 PROCEDURE — 97530 THERAPEUTIC ACTIVITIES: CPT | Mod: GP | Performed by: PHYSICAL THERAPIST

## 2023-04-25 PROCEDURE — 97116 GAIT TRAINING THERAPY: CPT | Mod: GP | Performed by: PHYSICAL THERAPIST

## 2023-04-25 PROCEDURE — 97535 SELF CARE MNGMENT TRAINING: CPT | Mod: GO

## 2023-04-25 PROCEDURE — 99024 POSTOP FOLLOW-UP VISIT: CPT

## 2023-04-25 PROCEDURE — 95711 VEEG 2-12 HR UNMONITORED: CPT

## 2023-04-25 PROCEDURE — 95718 EEG PHYS/QHP 2-12 HR W/VEEG: CPT | Performed by: PSYCHIATRY & NEUROLOGY

## 2023-04-25 PROCEDURE — 250N000012 HC RX MED GY IP 250 OP 636 PS 637: Performed by: STUDENT IN AN ORGANIZED HEALTH CARE EDUCATION/TRAINING PROGRAM

## 2023-04-25 PROCEDURE — 120N000002 HC R&B MED SURG/OB UMMC

## 2023-04-25 RX ORDER — DEXAMETHASONE 4 MG/1
4 TABLET ORAL EVERY 6 HOURS SCHEDULED
Status: DISCONTINUED | OUTPATIENT
Start: 2023-04-25 | End: 2023-04-27 | Stop reason: HOSPADM

## 2023-04-25 RX ORDER — POLYETHYLENE GLYCOL 3350 17 G/17G
17 POWDER, FOR SOLUTION ORAL DAILY
Qty: 510 G | Refills: 0 | DISCHARGE
Start: 2023-04-25 | End: 2023-05-12

## 2023-04-25 RX ORDER — FAMOTIDINE 20 MG/1
20 TABLET, FILM COATED ORAL 2 TIMES DAILY
Status: DISCONTINUED | OUTPATIENT
Start: 2023-04-25 | End: 2023-04-27 | Stop reason: HOSPADM

## 2023-04-25 RX ORDER — HEPARIN SODIUM 5000 [USP'U]/.5ML
5000 INJECTION, SOLUTION INTRAVENOUS; SUBCUTANEOUS EVERY 8 HOURS
DISCHARGE
Start: 2023-04-25 | End: 2023-05-12

## 2023-04-25 RX ORDER — AMLODIPINE BESYLATE 5 MG/1
5 TABLET ORAL DAILY
Qty: 30 TABLET | Refills: 0 | COMMUNITY
Start: 2023-04-25 | End: 2023-05-15

## 2023-04-25 RX ORDER — ACETAMINOPHEN 325 MG/1
650 TABLET ORAL EVERY 4 HOURS PRN
DISCHARGE
Start: 2023-04-25 | End: 2023-07-07

## 2023-04-25 RX ORDER — LEVETIRACETAM 1000 MG/1
1000 TABLET ORAL 2 TIMES DAILY
DISCHARGE
Start: 2023-04-25 | End: 2023-05-15

## 2023-04-25 RX ORDER — AMOXICILLIN 250 MG
1 CAPSULE ORAL 2 TIMES DAILY
DISCHARGE
Start: 2023-04-25 | End: 2023-05-12

## 2023-04-25 RX ORDER — DEXAMETHASONE 4 MG/1
TABLET ORAL
Refills: 0 | DISCHARGE
Start: 2023-04-25 | End: 2023-05-12

## 2023-04-25 RX ORDER — FAMOTIDINE 20 MG/1
20 TABLET, FILM COATED ORAL 2 TIMES DAILY
DISCHARGE
Start: 2023-04-25 | End: 2023-05-12

## 2023-04-25 RX ADMIN — LEVETIRACETAM 1000 MG: 500 TABLET, FILM COATED ORAL at 08:54

## 2023-04-25 RX ADMIN — AMLODIPINE BESYLATE 5 MG: 5 TABLET ORAL at 08:54

## 2023-04-25 RX ADMIN — DEXAMETHASONE 4 MG: 4 TABLET ORAL at 17:47

## 2023-04-25 RX ADMIN — HEPARIN SODIUM 5000 UNITS: 5000 INJECTION, SOLUTION INTRAVENOUS; SUBCUTANEOUS at 19:46

## 2023-04-25 RX ADMIN — HEPARIN SODIUM 5000 UNITS: 5000 INJECTION, SOLUTION INTRAVENOUS; SUBCUTANEOUS at 12:37

## 2023-04-25 RX ADMIN — DEXAMETHASONE 4 MG: 4 TABLET ORAL at 12:37

## 2023-04-25 RX ADMIN — OXYCODONE HYDROCHLORIDE 5 MG: 5 TABLET ORAL at 03:37

## 2023-04-25 RX ADMIN — LEVETIRACETAM 1000 MG: 500 TABLET, FILM COATED ORAL at 19:46

## 2023-04-25 RX ADMIN — FAMOTIDINE 20 MG: 20 TABLET ORAL at 06:50

## 2023-04-25 RX ADMIN — HEPARIN SODIUM 5000 UNITS: 5000 INJECTION, SOLUTION INTRAVENOUS; SUBCUTANEOUS at 03:38

## 2023-04-25 RX ADMIN — DEXAMETHASONE 4 MG: 4 TABLET ORAL at 06:49

## 2023-04-25 RX ADMIN — ACETAMINOPHEN 650 MG: 325 TABLET ORAL at 00:26

## 2023-04-25 RX ADMIN — DEXAMETHASONE SODIUM PHOSPHATE 4 MG: 4 INJECTION, SOLUTION INTRA-ARTICULAR; INTRALESIONAL; INTRAMUSCULAR; INTRAVENOUS; SOFT TISSUE at 00:19

## 2023-04-25 RX ADMIN — ACETAMINOPHEN 650 MG: 325 TABLET ORAL at 19:46

## 2023-04-25 RX ADMIN — FAMOTIDINE 20 MG: 20 TABLET ORAL at 19:46

## 2023-04-25 ASSESSMENT — ACTIVITIES OF DAILY LIVING (ADL)
ADLS_ACUITY_SCORE: 25
ADLS_ACUITY_SCORE: 24
ADLS_ACUITY_SCORE: 24
ADLS_ACUITY_SCORE: 25
ADLS_ACUITY_SCORE: 25
ADLS_ACUITY_SCORE: 24
ADLS_ACUITY_SCORE: 24
ADLS_ACUITY_SCORE: 25
ADLS_ACUITY_SCORE: 24
ADLS_ACUITY_SCORE: 24
DEPENDENT_IADLS:: INDEPENDENT
ADLS_ACUITY_SCORE: 24
ADLS_ACUITY_SCORE: 24

## 2023-04-25 NOTE — DISCHARGE SUMMARY
[facility] Discharge Summary and Instructions    Saeid Santa MRN# [id number]   Age: 67 year old YOB: 1955     Date of Admission:  4/21/2023  Date of Discharge::  4/27/2023  Admitting Physician:  Kye Garrido MD  Discharge Physician:  Kye Garrido MD          Admission Diagnoses:   CNS mass [G96.89]  Brain tumor (H) [D49.6]          Discharge Diagnosis:     CNS mass [G96.89]  Brain tumor (H) [D49.6]            Procedures:   4/21/23  1. Left stealth awake craniotomy (sleep-awake-sleep) for tumor resection.  2. Resection of scalp lesion  3. Intraoperative Neuro-monitoring using phase reversal, cortical stimulation, electrocorticography and neurophysiology.  4.  Intraoperative microdissection using operative microscope.  5.  Intraoperative ultrasound  6. Scalp block using 1% lidocaine with 1:100,000 epi + 0.25% bupivacaine (1:1)           Brief History of Illness:   Mr Santa is a 67-year-old right-handed gentleman with a significant past medical history of prostate cancer diagnosed back in 2009 underwent prostatectomy with no other adjuvant treatment. He started noticing abnormal movement of the right lower extremity likely seizure with no loss of consciousness. He had 3 such episodes the last 1 was 3 days ago. He went to the ER and underwent MRI which showed 2 ring-enhancing lesion involving his left motor strip and left lingula with perilesional edema. Given his history of prostate cancer and new onset seizures and MRI showing ring-enhancing lesions, surgery was recommended. Of note, patient also had a scalp lesion that has been draining and was noted to be increasing in size. Plan was offered resect it during the surgery. Patient has elected to undergo above-mentioned procedure.           Hospital Course:   Patient underwent above-mentioned procedure on 4/21/23.   Following surgery the patient was monitored in the Neurosurgical ICU, patient remained medically and neurologically stable.   Patient complained of incisional pain and headache, denied any nausea/vomiting or aphasia. Following surgery, patient does endorse continued RLE weakness with decreased sensation of right leg from level of hip to toes.  On POD 2, patient developed RLE twitching and was started on Keppra, EEG was negative for seizure activity.  Post operatively MRI was obtained and revealed expected postoperative changes with no obvious residual tumor and stable peripherally enhancing cystic lesion in the left occipital lobe.    Patient was started on Decadron taper post operatively and will continue this upon discharge.  Patient also started on Protonix for GI prophylaxis.   Patient was evaluated by PT and OT who felt the patient would benefit from ARU rehab stay.   Surgical pathology was pending upon discharge.   Patient will need to follow up in Neurosurgical clinic in 2 weeks for wound check.    Patient will follow up with Neuro Oncology and Radiation Oncology in 2-3 weeks to discuss future treatment plan.   Prior to discharge patient was medically stable and at neurologic baseline, tolerating diet, ambulating safely, voiding, passing flatus, and pain control was achieved with oral medications.     Exam at Discharge:    Temp:  [97.7  F (36.5  C)-98.4  F (36.9  C)] 97.7  F (36.5  C)  Pulse:  [55-78] 55  Resp:  [16-18] 18  BP: (131-157)/(85-93) 152/89  SpO2:  [95 %-99 %] 96 %  No intake/output data recorded.    .Gen: Appears comfortable, NAD  Neurologic:  - Alert & Oriented to person, place, time, and situation  - Follows commands briskly  - Speech fluent, spontaneous. No aphasia or dysarthria.  - No gaze preference. No apparent hemineglect.  - PERRL, EOMI  - Face symmetric with sensation intact to light touch  - Palate elevates symmetrically, uvula midline, tongue protrudes midline  - Trapezii muscles 5/5 bilaterally  - No pronator drift       Del Tr Bi WE WF Gr   R 5 5 5 5 5 5   L 5 5 5 5 5 5     HF KE KF DF PF EHL   R 4+ 4+ 4+  4+ 4+ 5   L 5 5 5 5 5 5         RLE decreased sensation from level of hip to toe.            Discharge Medications:     Current Discharge Medication List      START taking these medications    Details   acetaminophen (TYLENOL) 325 MG tablet Take 2 tablets (650 mg) by mouth every 4 hours as needed for other (For optimal non-opioid multimodal pain management to improve pain control.)    Associated Diagnoses: S/P craniotomy      dexamethasone (DECADRON) 4 MG tablet Take 1 tablet (4 mg) by mouth every 8 hours for 3 days, THEN 1 tablet (4 mg) 2 times daily (with meals) for 3 days, THEN 0.75 tablets (3 mg) 2 times daily (with meals) for 2 days, THEN 0.5 tablets (2 mg) 2 times daily (with meals) for 2 days, THEN 0.5 tablets (2 mg) daily (with breakfast) for 2 days, THEN 0.25 tablets (1 mg) daily (with breakfast) for 2 days.  Refills: 0    Comments: For swelling  Associated Diagnoses: S/P craniotomy      famotidine (PEPCID) 20 MG tablet Take 1 tablet (20 mg) by mouth 2 times daily    Comments: For GI protection  Associated Diagnoses: S/P craniotomy      Heparin Sodium, Porcine, (HEPARIN ANTICOAGULANT) 5000 UNIT/0.5ML injection Inject 0.5 mLs (5,000 Units) Subcutaneous every 8 hours    Comments: For DVT prophylaxis  Associated Diagnoses: S/P craniotomy      polyethylene glycol (MIRALAX) 17 GM/Dose powder Take 17 g by mouth daily  Qty: 510 g, Refills: 0    Comments: For constipation  Associated Diagnoses: S/P craniotomy      senna-docusate (SENOKOT-S/PERICOLACE) 8.6-50 MG tablet Take 1 tablet by mouth 2 times daily    Comments: For constipation  Associated Diagnoses: S/P craniotomy         CONTINUE these medications which have CHANGED    Details   amLODIPine (NORVASC) 5 MG tablet Take 1 tablet (5 mg) by mouth daily  Qty: 30 tablet, Refills: 0      levETIRAcetam (KEPPRA) 1000 MG tablet 1 tablet (1,000 mg) by Oral or Feeding Tube route 2 times daily    Comments: For seizures  Associated Diagnoses: S/P craniotomy         STOP  taking these medications       rizatriptan (MAXALT) 5 MG tablet Comments:   Reason for Stopping:         sulfamethoxazole-trimethoprim (BACTRIM DS) 800-160 MG tablet Comments:   Reason for Stopping:                       Discharge Instructions and Follow-Up:     Discharge diet: Regular   Discharge activity: You may advance activity as tolerated. No strenuous exercise or heay lifting greater than 10 lbs for 4 weeks or until seen and cleared in clinic.     Discharge follow-up: Follow-up with Neurosurgery nurse on 5/1/23 for wound check/post-hospital evaluation. If still in rehab facility, OK for ARU provider to check wound.    Follow-up Dr. Kye Garrido MD on 6/5/2023, all additional follow-up visits to be determined by Dr. Kye Garrido MD.    Neurology referral placed for 3 month follow up.    Radiation oncology and hematology oncology referrals placed for 2-3 week follow up.       Wound care: Ok to shower,however no scrubbing of the wound and no soaking of the wound, meaning no bathtubs or swimming pools. Pat dry only. Leave wound open to air.  Patient to have wound checked 2 weeks following surgery.    Wound location: Scalp  Closure technique: Absorbable sutures  Dressing needs: None  Post-op care at follow-up: Keep dry and clean         Please call if you have:  1. increased pain, redness, drainage, swelling at your incision  2. fevers > 101.5 F degrees  3. with any questions or concerns.  You may reach the Neurosurgery clinic at [phone] during regular work hours. ER at [phone].    and ask for the Neurosurgery Resident on call at [phone], during off hours or weekends.         Discharge Disposition:     Discharged to short-term care facility        Kellen Alvares PA-C  Neurosurgery Department  Pager: [phone]

## 2023-04-25 NOTE — CONSULTS
Care Management Initial Consult    General Information  Assessment completed with: Patient, VM-chart reviewTim  Type of CM/SW Visit: Initial Assessment    Primary Care Provider verified and updated as needed: Yes   Readmission within the last 30 days: no previous admission in last 30 days      Reason for Consult: discharge planning  Advance Care Planning: Advance Care Planning Reviewed: no concerns identified, other (see comments) (Pt reports having a HCD at home. SW encouraged pt to have spouse bring it in to upload to pt's chart.)          Communication Assessment  Patient's communication style: spoken language (English or Bilingual)    Hearing Difficulty or Deaf: no   Wear Glasses or Blind: yes    Cognitive  Cognitive/Neuro/Behavioral: WDL  Level of Consciousness: alert  Arousal Level: opens eyes spontaneously  Orientation: oriented x 4  Mood/Behavior: calm, cooperative  Best Language: 0 - No aphasia  Speech: clear, spontaneous, logical    Living Environment:   People in home: spouse  Smiley (Spouse)  Current living Arrangements: house      Able to return to prior arrangements: yes       Family/Social Support:  Care provided by: self, spouse/significant other  Provides care for: no one, pet(s)  Marital Status:   Wife, Children, Sibling(s)  Smiley       Description of Support System: Supportive, Involved         Current Resources:   Patient receiving home care services: No     Community Resources: None  Equipment currently used at home: none  Supplies currently used at home: None    Employment/Financial:  Employment Status: retired        Financial Concerns: No concerns identified, other (see comments) (Pt reports having some concerns about medical bills but reports that pt spouse has gotten some resources from [employer]. Notes that he also has a limit to his deductible.)   Referral to Financial Worker: No    Functional Status:  Prior to admission patient needed assistance:   Dependent ADLs::  "Independent  Dependent IADLs:: Independent       Mental Health Status:  Mental Health Status: Current Concern (Pt reports feeling more \"emotional\" lately d/t health. Declined psych visit at this time.)  Mental Health Management: Other (see comment) (Pt reports that he is coping with emotions by crying.)    Chemical Dependency Status:  Chemical Dependency Status: No Current Concerns             Values/Beliefs:  Spiritual, Cultural Beliefs, Baptism Practices, Values that affect care:                 Additional Information:  Per H&P, \"Saeid Santa is a 67-year-old right-handed gentleman with a significant past medical history of prostate cancer diagnosed back in 2009 underwent prostatectomy with no other adjuvant treatment. He started noticing abnormal movement of the right lower extremity likely seizure with no loss of consciousness. He had 3 such episodes the last 1 was 3 days ago. He went to the ER and underwent MRI which showed 2 ring-enhancing lesion involving his left motor strip and left lingula with perilesional edema. Given his history of prostate cancer and new onset seizures and MRI showing ring-enhancing lesions, surgery was recommended. \"     PETE met with pt at bedside to complete case management assessment d/t discharge planning. PT/OT currently recommending ARU at discharge. SW discussed this recommendation with pt and provided him with a Medicare Care Compare list. Pt is agreeable to going to ARU and is particularly interested in [facility]. Pt will review list with his spouse (Smiley) when she comes to the hospital later today. SW discussed that a requirement of ARU is having 24/7 supervision at discharge from ARU. Per pt, Smiley is currently recovering from frozen shoulder injury and receiving therapies for this. She would likely not be able to provide physical assistance at discharge. PETE discussed potential other family members or supportive people in pt's life that could provide some support. Pt reported " "that he has 3 siblings around the [facility] area that have offered assistance or possibly letting pt stay with them at discharge. Pt reported that he also has 4 adult children in the [facility] that may be able to assist. Pt to discuss 24/7 assist plan with Smiley and other family members tonight.     Pt was living at home PTA with his spouse (Smiley) and two dogs. Pt reports about 8 MARY house with railing on the right side and all needs can be met on the main level. Pt's children assisted with moving furniture around main floor of home. Pt reports being independent with all ADLs and IADLs PTA and no hx of home care or community services. Pt also reported no medical equipment or supplies at home PTA. Pt reports having a HCD at home and SW encouraged pt to have family bring copy in when they visit for SW to upload to chart. Pt reports feeling more \"emotional\" d/t health changes and likes to cope with emotions by crying and letting feelings out. Pt made aware of health psych services while in hospital but declined at this time. Pt reported feeling more hopeful today than he has in the last four days. No chemical dependency concerns at this time.     Pt interested in referral being sent to  ARU. SW encouraged pt to review Medicare Care Compare list for other ARU preferences. Pt to discuss 24/7 assistance/supervision plan at discharge from ARU with Smiley and family annalise.     PETE reached out to  Rehab Admissions (Eddie) to see if she could review pt for ARU. Eddie reported that pt's insurance is out of network with  ARU. Pt will need to go to an [facility] ARU.     Chinyere Bliss, PETE  [facility]   [facility]       "

## 2023-04-25 NOTE — PLAN OF CARE
Status: underwent Left parietal craniotomy for tumor resection on 4/21. Decrease RLE movement . VEEG monitor d/t muscle jerking.   Vitals: Htn and given hydralazine x1 with effectiveness to keep SBP under 140  Neuros: A&O X4. RLE 3/5, 5/5 strength throughout. N/T at RLE from Toes>hip at baseline. Weakness on R side of the body. HA worsen with movement.   IV: PIV SL'd  Labs/Electrolytes: WNL  Resp/trach: WNL  Diet: tolerating regular diet without difficulty  Bowel status: BM this shift, loose, continent and large. Uses commode  : voiding without difficulty  Skin: Crani incision, L wrist bruise, R wrist scab. EEG leads in place  Pain: denied HA this shift  Activity: up with assist of 2, GB, walker to pivot  Social: wife and siblings at bedside, supportive and updated  Plan: continue with VEEG monitoring for events.   Updates this shift: pt pleasant and cooperative

## 2023-04-25 NOTE — PROGRESS NOTES
[facility], [place]   04/25/2023  Neurosurgery Progress Note:    Assessment:  Saeid Santa is a 67-year-old right-handed gentleman with a significant past medical history of prostate cancer diagnosed back in 2009 underwent prostatectomy with no other adjuvant treatment. He started noticing abnormal movement of the right lower extremity likely seizure with no loss of consciousness. He had 3 such episodes the last 1 was 3 days ago. He went to the ER and underwent MRI which showed 2 ring-enhancing lesion involving his left motor strip and left lingula with perilesional edema. Given his history of prostate cancer and new onset seizures and MRI showing ring-enhancing lesions, surgery was recommended.     Patient is POD-4 s/p Left stealth awake craniotomy (sleep-awake-sleep) for tumor resection, and Resection of scalp lesion.    Plan:  - Decadron 4q6h  - PTA keppra increased 1g BID  - Continue to follow EEG   - Serial neuro exams  - Pain control  - HOB > 30 degrees  - Advance diet as tolerates  - Bowel regimen  - PRN antiemetics  - PT/OT  - Subcutaneous Heparin and SCDs for DVT proph    -----------------------------------  Kellen Alvares PA-C  Neurosurgery Department  Pager: [phone]        Interval History: Patient reports continued, unchanged decreased sensation and weakness of right lower extremity. Denies headaches, nausea, or vision changes. Stable neurologic exam. Will continue to follow EEG read.     Objective:   Temp:  [97.6  F (36.4  C)-98.8  F (37.1  C)] 98.2  F (36.8  C)  Pulse:  [] 65  Resp:  [16-18] 16  BP: (122-163)/() 153/110  SpO2:  [95 %-98 %] 95 %  I/O last 3 completed shifts:  In: 530 [P.O.:530]  Out: 1150 [Urine:1150]    Gen: Appears comfortable, NAD  Neurologic:  - Alert & Oriented to person, place, time, and situation  - Follows commands briskly  - Speech fluent, spontaneous. No aphasia or dysarthria.  - No gaze preference. No apparent hemineglect.  -  PERRL, EOMI  - Face symmetric with sensation intact to light touch  - Palate elevates symmetrically, uvula midline, tongue protrudes midline  - Trapezii muscles 5/5 bilaterally  - No pronator drift     Del Tr Bi WE WF Gr   R 5 5 5 5 5 5   L 5 5 5 5 5 5    HF KE KF DF PF EHL   R 4+ 4+ 4+ 4+ 4+ 5   L 5 5 5 5 5 5     Reflexes 3+ bilateral patellar. 1 beat of clonus right side    RLE numbness from level of hip to toe.    LABS:  Recent Labs   Lab 04/23/23  1117 04/23/23  0600 04/22/23  0612 04/22/23  0408     --   --  137   POTASSIUM 4.0  --   --  4.0   CHLORIDE 106  --   --  109*   CO2 15*  --   --  15*   ANIONGAP 17*  --   --  13   * 120* 119* 113*   BUN 23.4*  --   --  15.4   CR 0.97  --   --  1.02   JOHN PAUL 9.7  --   --  8.3*       Recent Labs   Lab 04/22/23  0408   WBC 10.6   RBC 4.62   HGB 14.4   HCT 43.1   MCV 93   MCH 31.2   MCHC 33.4   RDW 13.5          IMAGING:  No results found for this or any previous visit (from the past 24 hour(s)).

## 2023-04-25 NOTE — PLAN OF CARE
VSS on RA.  PRN Tylenol and Oxy given x 1 for incisional head pain and HA.  A&O x 4.  RLE strength 4/5 and n/t to RLE.  VEEG leads in place; no RLE jerking episodes noted or reported.  Head incision covered with primapore; c/d/I.  C/o intermittent hiccups t/o the night.  R PIV SL.  On a regular diet.  Voiding spontaneously.  Last BM on 4/24.  Up with 2, GB, walker to pivot.  Did not sleep well overnight.  PT/OT following.  ARU when medically ready.  Continue with POC.       Goal Outcome Evaluation:      Plan of Care Reviewed With: patient    Overall Patient Progress: no change

## 2023-04-25 NOTE — PLAN OF CARE
Status: POD #3 of Left parietal craniotomy for tumor resection. Decrease RLE movement . vEEG monitor d/t muscle jerking.   Vitals: VSS on RA.  PRN Labetalol and hydralazine for SBP>160.  Neuros: A&O X4. RLE 4/5, 5/5 strength throughout. N/T at RLE from Toes>hip at baseline. Weakness on R side of the body.   IV: R PIV SL. Get HA with movement.   Labs/Electrolytes:  WNL   Resp/trach: LSC. Denies SOB. Continue pulse ox in high 90s.   Diet: Regular.   Bowel status: LBM 4/24. Senna given.   : Voiding via urinal w/o difficulty at bedside.   Skin: Crani incision, L wrist bruise, R wrist scab. EEG leads in place.   Pain: Denies.   Activity: AX2 and GB for pivot to bedside commode. R knee jesse when weight bearing position.   Social:  Pt's wife at bedside this evening.  Plan: Continue to monitor neurological status, EEG for events. PTA keppra increased 1g BID.     Updates: NO events this shift.

## 2023-04-26 ENCOUNTER — APPOINTMENT (OUTPATIENT)
Dept: PHYSICAL THERAPY | Facility: CLINIC | Age: 68
DRG: 025 | End: 2023-04-26
Attending: NEUROLOGICAL SURGERY
Payer: COMMERCIAL

## 2023-04-26 ENCOUNTER — APPOINTMENT (OUTPATIENT)
Dept: OCCUPATIONAL THERAPY | Facility: CLINIC | Age: 68
DRG: 025 | End: 2023-04-26
Attending: NEUROLOGICAL SURGERY
Payer: COMMERCIAL

## 2023-04-26 PROCEDURE — 120N000002 HC R&B MED SURG/OB UMMC

## 2023-04-26 PROCEDURE — 250N000013 HC RX MED GY IP 250 OP 250 PS 637: Performed by: STUDENT IN AN ORGANIZED HEALTH CARE EDUCATION/TRAINING PROGRAM

## 2023-04-26 PROCEDURE — 250N000011 HC RX IP 250 OP 636

## 2023-04-26 PROCEDURE — 97116 GAIT TRAINING THERAPY: CPT | Mod: GP | Performed by: PHYSICAL THERAPIST

## 2023-04-26 PROCEDURE — 250N000012 HC RX MED GY IP 250 OP 636 PS 637: Performed by: STUDENT IN AN ORGANIZED HEALTH CARE EDUCATION/TRAINING PROGRAM

## 2023-04-26 PROCEDURE — 97112 NEUROMUSCULAR REEDUCATION: CPT | Mod: GP | Performed by: PHYSICAL THERAPIST

## 2023-04-26 PROCEDURE — 97535 SELF CARE MNGMENT TRAINING: CPT | Mod: GO

## 2023-04-26 RX ADMIN — LEVETIRACETAM 1000 MG: 500 TABLET, FILM COATED ORAL at 07:25

## 2023-04-26 RX ADMIN — HEPARIN SODIUM 5000 UNITS: 5000 INJECTION, SOLUTION INTRAVENOUS; SUBCUTANEOUS at 12:40

## 2023-04-26 RX ADMIN — ACETAMINOPHEN 650 MG: 325 TABLET ORAL at 05:14

## 2023-04-26 RX ADMIN — FAMOTIDINE 20 MG: 20 TABLET ORAL at 21:06

## 2023-04-26 RX ADMIN — DEXAMETHASONE 4 MG: 4 TABLET ORAL at 12:40

## 2023-04-26 RX ADMIN — ACETAMINOPHEN 650 MG: 325 TABLET ORAL at 16:30

## 2023-04-26 RX ADMIN — FAMOTIDINE 20 MG: 20 TABLET ORAL at 07:25

## 2023-04-26 RX ADMIN — DEXAMETHASONE 4 MG: 4 TABLET ORAL at 06:40

## 2023-04-26 RX ADMIN — DEXAMETHASONE 4 MG: 4 TABLET ORAL at 23:40

## 2023-04-26 RX ADMIN — POLYETHYLENE GLYCOL 3350 17 G: 17 POWDER, FOR SOLUTION ORAL at 07:25

## 2023-04-26 RX ADMIN — HEPARIN SODIUM 5000 UNITS: 5000 INJECTION, SOLUTION INTRAVENOUS; SUBCUTANEOUS at 21:06

## 2023-04-26 RX ADMIN — DEXAMETHASONE 4 MG: 4 TABLET ORAL at 00:26

## 2023-04-26 RX ADMIN — SENNOSIDES AND DOCUSATE SODIUM 1 TABLET: 50; 8.6 TABLET ORAL at 07:26

## 2023-04-26 RX ADMIN — LEVETIRACETAM 1000 MG: 500 TABLET, FILM COATED ORAL at 21:06

## 2023-04-26 RX ADMIN — DEXAMETHASONE 4 MG: 4 TABLET ORAL at 18:59

## 2023-04-26 RX ADMIN — ACETAMINOPHEN 650 MG: 325 TABLET ORAL at 00:28

## 2023-04-26 RX ADMIN — AMLODIPINE BESYLATE 5 MG: 5 TABLET ORAL at 07:25

## 2023-04-26 RX ADMIN — SENNOSIDES AND DOCUSATE SODIUM 1 TABLET: 50; 8.6 TABLET ORAL at 21:06

## 2023-04-26 RX ADMIN — HEPARIN SODIUM 5000 UNITS: 5000 INJECTION, SOLUTION INTRAVENOUS; SUBCUTANEOUS at 04:57

## 2023-04-26 ASSESSMENT — ACTIVITIES OF DAILY LIVING (ADL)
ADLS_ACUITY_SCORE: 25
ADLS_ACUITY_SCORE: 22
ADLS_ACUITY_SCORE: 25
ADLS_ACUITY_SCORE: 24
ADLS_ACUITY_SCORE: 22
ADLS_ACUITY_SCORE: 22
ADLS_ACUITY_SCORE: 24
ADLS_ACUITY_SCORE: 25
ADLS_ACUITY_SCORE: 24
ADLS_ACUITY_SCORE: 24

## 2023-04-26 ASSESSMENT — VISUAL ACUITY: OU: GLASSES

## 2023-04-26 NOTE — PROGRESS NOTES
Care Management Follow Up    Length of Stay (days): 5    Expected Discharge Date: 04/26/2023     Concerns to be Addressed:     Disposition planning  Patient plan of care discussed at interdisciplinary rounds: Yes    Anticipated Discharge Disposition:   Occupational and Physical Therapy are recommending an acute rehab stay     Anticipated Discharge Services: Short term acute rehab placement  Anticipated Discharge DME:  Not applicable at this time    Patient/family educated on Medicare website which has current facility and service quality ratings:   yes  Education Provided on the Discharge Plan:  yes  Patient/Family in Agreement with the Plan:  yes    Referrals Placed by CM/SW:  Post acute care facility's  Private pay costs discussed:  Private pay transport to the accepting facility was discussed.  Pt is hopeful that he would be performing well enough to transport with family    Additional Information:  SW is following pt for discharge planning .  OT and Physical Therapy are recommending an acute rehab stay.  Per Meghan Alberto NP (on 4/25/2023), pt will not have any chemo/radiation  (if indicated) for at least 2 weeks.  Per Edouard Guzman NP (on 4/26/2023) pt is medically ready for discharge.  Pt has a [facility] Plan policy.  Within a 250 mile radius of pt's home zip code, there is only 1 in-network ARU ([facility] in [place]).  PETE met with pt this am and informed of this.  Pt disappointed that there is not a in-network ARU closer to home.  Pt is agreeable to a referral.  PETE phoned Admissions ([facility] [phone]) and referred pt.  PETE informed [facility] that they are the only in -network ARU within 250 miles of pt's home zip code.   Gray states that he will keep this in mind when assessing.   [facility]E informed [facility] that pt is medically ready for discharge.   PETE faxed assessment materials to [facility].   PETE will await a decision from [facility] regarding acceptance.      PETE will continue to follow for  discharge planning.    BETTE Marie  Social Work, 6A  Phone:  [phone]  Pager:  [phone]  4/26/2023          TARI Dey

## 2023-04-26 NOTE — PLAN OF CARE
[id number]      Pt admitted after L parietal crani for tumor resection on 4/21. AOx4. RLE 4/5 with moderate dorsi/plantar. RLE numb at baseline. PIV saline locked. Regular diet. BM 4/26. A1 pivot to commode, A2 for longer distances. Tylenol x1 for 2/10 headache pain. Plan to discharge to ARU tomorrow. Continue with POC.

## 2023-04-26 NOTE — PLAN OF CARE
Status: POD 4 L parietal craniotomy for tumor resection  Vitals: VSS on RA, HTN within parameters   Neuros: A&Ox4. Strengths 5/5 t/o ex RLE 4/5. Baseline n/t in RLE from toes to hip improving per pt.    IV: PIV SL  Resp/trach: LSC. On continuous pulse ox.   Diet: Regular diet, adequate intake  Bowel status: BS+. LBM 4/25, loose stools. Bowel meds held  : Voiding spontaneously  Skin: Crani incision   Pain: HA with activity. Managed with prn tylenol    Activity: A1-2 GB W   Social: Son visited this shift.  Plan: Discharge tomorrow to [facility].   Updates: vEEG discontinued this evening

## 2023-04-26 NOTE — PROGRESS NOTES
[facility], [place]   04/26/2023  Neurosurgery Progress Note:    Assessment:  Saeid Santa is a 67-year-old right-handed gentleman with a significant past medical history of prostate cancer diagnosed back in 2009 underwent prostatectomy with no other adjuvant treatment. He started noticing abnormal movement of the right lower extremity likely seizure with no loss of consciousness. He had 3 such episodes the last 1 was 3 days ago. He went to the ER and underwent MRI which showed 2 ring-enhancing lesion involving his left motor strip and left lingula with perilesional edema. Given his history of prostate cancer and new onset seizures and MRI showing ring-enhancing lesions, surgery was recommended.     Patient is POD-5 s/p Left stealth awake craniotomy (sleep-awake-sleep) for tumor resection, and Resection of scalp lesion.    Plan:  - Decadron 4q6h  - Keppra 1g BID  - Continue to follow EEG   - Serial neuro exams  - Pain control  - HOB > 30 degrees  - Advance diet as tolerates  - Bowel regimen  - PRN antiemetics  - PT/OT  - Subcutaneous Heparin and SCDs for DVT proph    -----------------------------------  ALIREZA Orona, CNP  Neurosurgery  Pager [phone]      Interval History: Patient reports mild improvement in decreased sensation and weakness of right lower extremity. Denies headaches, nausea, or vision changes. Stable neurologic exam. Pending possible ARU placement.     Objective:   Temp:  [97.7  F (36.5  C)-98.4  F (36.9  C)] 97.9  F (36.6  C)  Pulse:  [61-95] 87  Resp:  [16-18] 16  BP: (135-159)/(78-96) 141/78  SpO2:  [95 %-98 %] 98 %  I/O last 3 completed shifts:  In: 170 [P.O.:170]  Out: -     Gen: Appears comfortable, NAD  Neurologic:  - Alert & Oriented to person, place, time, and situation  - Follows commands briskly  - Speech fluent, spontaneous. No aphasia or dysarthria.  - No gaze preference. No apparent hemineglect.  - PERRL, EOMI  - Face symmetric with sensation intact to  light touch  - Palate elevates symmetrically, uvula midline, tongue protrudes midline  - Trapezii muscles 5/5 bilaterally  - No pronator drift     Del Tr Bi WE WF Gr   R 5 5 5 5 5 5   L 5 5 5 5 5 5    HF KE KF DF PF EHL   R 4+ 4+ 4+ 4+ 4+ 5   L 5 5 5 5 5 5     Reflexes 3+ bilateral patellar. 1 beat of clonus right side    RLE numbness from level of hip to toe.    LABS:  Recent Labs   Lab 04/23/23  1117 04/23/23  0600 04/22/23  0612 04/22/23  0408     --   --  137   POTASSIUM 4.0  --   --  4.0   CHLORIDE 106  --   --  109*   CO2 15*  --   --  15*   ANIONGAP 17*  --   --  13   * 120* 119* 113*   BUN 23.4*  --   --  15.4   CR 0.97  --   --  1.02   JOHN PAUL 9.7  --   --  8.3*       Recent Labs   Lab 04/22/23  0408   WBC 10.6   RBC 4.62   HGB 14.4   HCT 43.1   MCV 93   MCH 31.2   MCHC 33.4   RDW 13.5          IMAGING:  No results found for this or any previous visit (from the past 24 hour(s)).

## 2023-04-26 NOTE — PLAN OF CARE
VSS on RA.  PRN Tylenol given x 2 for dull frontal headache.  A&O x 4.  RLE strength 4/5 and RLE n/t-improving.  Head incision covered with primapore; c/d/I.  R PIV SL.  On a regular diet, denied nausea.  Voiding spontaneously.  Last BM on 4/25.  Up with 1-2, GB, walker.  PT/OT following.  ARU when medically ready.  Continue with POC.      Goal Outcome Evaluation:      Plan of Care Reviewed With: patient    Overall Patient Progress: improving

## 2023-04-26 NOTE — PROGRESS NOTES
Care Management Discharge Note    Discharge Date:  4/27/2023       Discharge Disposition:   [facility] ([phone])    Discharge Services:  Acute rehab placement at [facility]    Discharge DME:  Not applicable at this time    Discharge Transportation:  PETE spoke with pt's floor nurse (Eduardo) who states that pt is an assist of 1 and is appropriate to travel by w/c or by car with family.    PETE updated pt who states that he is not willing to pay privately for transport and states that the only mode of transportation he will except is traveling by car with family.  SW asked to call pt's family to arrange transport.  Pt was opposed to this stating that he will indep arrange the transport with his family.  PETE explained that [facility] has indicated that pt needs to arrive no later than 2pm.  PETE has informed pt that he would need to leave [facility] by 11am to make this deadline.   PETE has also informed pt that he cannot leave [facility] until confirmation of receipt of prior auth from insurance is received.  PETE has suggested that pt's family arrive to [facility] between 9 and 11am on 4/27/2023.    Private pay costs discussed:  Not applicable at this time    PAS Confirmation Code:    Not required as pt is admitting to a ARU setting  Patient/family educated on Medicare website which has current facility and service quality ratings:  yes    Education Provided on the Discharge Plan:  Yes   Persons Notified of Discharge Plans:   Pt, 6A nursing, and Edouard Guzman NP  Patient/Family in Agreement with the Plan:    yes    Handoff Referral Completed: Yes    Additional Information:  - Edouard Guzman NP has confirmed readiness for discharge, Edouard has confirmed that pt will not receive chemo or radiation during ARU stay  - Admissions ([facility]) at [facility] has confirmed acceptance for admit pending receipt of prior auth from insurance  - PETE will fax pt's  "completed/signed discharge orders and discharge summary to [facility] in the am when documents become available.  -  has asked 6A Community Health Worker (Tania) to complete \"Important Message from Medicare\" with pt  - PETE will arrange for pt's 4/27/2023 floor nurse to phone report to [facility].      BETTE Marie  Social Work, 6A  Phone:  [phone]  Pager:  [phone]  4/26/2023            TARI Dey      "

## 2023-04-26 NOTE — PLAN OF CARE
Status: underwent Left parietal craniotomy for tumor resection on 4/21. Decrease RLE movement . VEEG monitor d/t muscle jerking.   Vitals: Htn within parameters except during activity at times. Pt SBP returns WDL after 5-10 min of rest. NSG aware.  Neuros: A&O X4. RLE 4/5, 5/5 strength throughout. N/T at RLE from hip to toes.  IV: PIV SL'd  Labs/Electrolytes: WNL  Resp/trach: WNL  Diet: tolerating regular diet without difficulty  Bowel status: BM this shift, loose, continent. Walks to bathroom.  : voiding without difficulty  Skin: Crani incision covered with primapore, old dried drainage.  Pain: denied HA this shift  Activity: up with assist of 1, GB, walker to pivot  Social: wife and siblings at bedside, supportive and updated  Plan: continue with VEEG monitoring for events.   Updates this shift: pt pleasant and cooperative

## 2023-04-27 VITALS
DIASTOLIC BLOOD PRESSURE: 89 MMHG | TEMPERATURE: 97.7 F | WEIGHT: 189.82 LBS | BODY MASS INDEX: 25.16 KG/M2 | HEIGHT: 73 IN | SYSTOLIC BLOOD PRESSURE: 152 MMHG | HEART RATE: 55 BPM | RESPIRATION RATE: 18 BRPM | OXYGEN SATURATION: 96 %

## 2023-04-27 PROCEDURE — 250N000013 HC RX MED GY IP 250 OP 250 PS 637: Performed by: STUDENT IN AN ORGANIZED HEALTH CARE EDUCATION/TRAINING PROGRAM

## 2023-04-27 PROCEDURE — 250N000012 HC RX MED GY IP 250 OP 636 PS 637: Performed by: STUDENT IN AN ORGANIZED HEALTH CARE EDUCATION/TRAINING PROGRAM

## 2023-04-27 PROCEDURE — 250N000011 HC RX IP 250 OP 636

## 2023-04-27 RX ORDER — OXYCODONE HYDROCHLORIDE 5 MG/1
5 TABLET ORAL EVERY 4 HOURS PRN
Refills: 0 | Status: SHIPPED | DISCHARGE
Start: 2023-04-27 | End: 2023-05-12

## 2023-04-27 RX ADMIN — HEPARIN SODIUM 5000 UNITS: 5000 INJECTION, SOLUTION INTRAVENOUS; SUBCUTANEOUS at 03:37

## 2023-04-27 RX ADMIN — LEVETIRACETAM 1000 MG: 500 TABLET, FILM COATED ORAL at 08:57

## 2023-04-27 RX ADMIN — DEXAMETHASONE 4 MG: 4 TABLET ORAL at 05:38

## 2023-04-27 RX ADMIN — AMLODIPINE BESYLATE 5 MG: 5 TABLET ORAL at 08:57

## 2023-04-27 RX ADMIN — ACETAMINOPHEN 650 MG: 325 TABLET ORAL at 05:40

## 2023-04-27 RX ADMIN — FAMOTIDINE 20 MG: 20 TABLET ORAL at 08:57

## 2023-04-27 RX ADMIN — OXYCODONE HYDROCHLORIDE 5 MG: 5 TABLET ORAL at 08:57

## 2023-04-27 ASSESSMENT — ACTIVITIES OF DAILY LIVING (ADL)
ADLS_ACUITY_SCORE: 22

## 2023-04-27 NOTE — PROGRESS NOTES
Brief entry:  SW received a 9:04am call from Admissions ([facility]) at [facility] stating that he received prior auth from pt's insurance company for pt's ARU stay.  PETE updated pt, pt's brother (Jose Luis) who is present to provide transport, 6A nursing and Edouard Guzman NP.  Edouard states that he will complete discharge orders and summary.  Would anticipate that pt will be able to discharge by 10am today to [facility].  PETE has provided pt/brother with address and phone number to [facility] and have instructed them to call upon arrival so that a nurse can come down with a w/c to assist pt out of the car and into the facility.  PETE has informed pt/brother that pt should remain in the car the entire length of the trip and use urinal (which pt plans to take from [facility]) for toileting.   Pt and brother confirm that pt will adhere to this instruction.   PETE faxed pt's completed discharge orders, discharge summary and narcotic script to [facility].  PETE provided pt's floor nurse (Kinza) with the phone number to call RN to RN report.    BETTE Marie  Social Work, 6A  Phone:  [phone]  Pager:  [phone]  4/27/2023

## 2023-04-27 NOTE — PLAN OF CARE
Occupational Therapy Discharge Summary    Reason for therapy discharge:    Discharged to acute rehabilitation facility.    Progress towards therapy goal(s). See goals on Care Plan in [facility] electronic health record for goal details.  Goals partially met.  Barriers to achieving goals:   discharge from facility.    Therapy recommendation(s):    Continued therapy is recommended.  Rationale/Recommendations:  Patient would benefit from continued OT services to promote strength, safety, and independence in ADLs.

## 2023-04-27 NOTE — PLAN OF CARE
Status: S/p left parietal craniotomy for tumor resection on 4/21.   Vitals: VSS.  Neuros: RLE 4/5 with N/T at RLE from hip to toes.  IV: PIV SL  Labs/Electrolytes: WNL  Resp/trach: WNL  Diet: Regular die.  Bowel status: Last BM 4/26  : voiding without difficulty  Skin: Crani incision covered with primapore, old dried drainage.  Pain: Denies  Activity: up 1/gb/walker  Plan: discharge rehab in [place] between 9am to 11am.   Updates this shift: Uneventful.

## 2023-04-27 NOTE — PLAN OF CARE
Discharge time/date: 4/27 @1045  Walked or Wheelchair: wheelchair  PIV removed: yes  Reviewed AVS with patient: paperwork faxed & sent to facility     Pt admitted after L parietal crani for tumor resection on 4/21. AOx4. RLE 4/5 with moderate dorsi/plantar. RLE numb at baseline. Regular diet. BM 4/26. A1 pivot to commode, A2 for longer distances. Oxycodone given for HA this morning. [facility]. Family transporting patient to [place] today. Continue with POC.

## 2023-04-28 ENCOUNTER — CARE COORDINATION (OUTPATIENT)
Dept: ONCOLOGY | Facility: CLINIC | Age: 68
End: 2023-04-28
Payer: COMMERCIAL

## 2023-04-28 NOTE — PROGRESS NOTES
[facility]: Cancer Care                                                                   Hem/Onc  Referral reviewed:    Medical Oncology  Radiation Oncology    PLAN                                                      Oncology Navigation team aware of referrals, path from brain biopsy is pending  Consults in 2-3 weeks will be arranged with pt / ARU staff when pathology result is available.  Care team members can call the Patient Access number below for interval questions/concerns.    Syl Santiago, RN, BSN, OCN  Hematology/Oncology Nurse Navigator  [facility]  [phone]

## 2023-05-01 ENCOUNTER — PATIENT OUTREACH (OUTPATIENT)
Dept: ONCOLOGY | Facility: CLINIC | Age: 68
End: 2023-05-01

## 2023-05-01 LAB
INTERPRETATION: NORMAL
LAB DIRECTOR COMMENTS: NORMAL
LAB DIRECTOR DISCLAIMER: NORMAL
LAB DIRECTOR INTERPRETATION: NORMAL
LAB DIRECTOR METHODOLOGY: NORMAL
LAB DIRECTOR RESULTS: NORMAL
PATH REPORT.COMMENTS IMP SPEC: ABNORMAL
PATH REPORT.COMMENTS IMP SPEC: YES
PATH REPORT.FINAL DX SPEC: ABNORMAL
PATH REPORT.GROSS SPEC: ABNORMAL
PATH REPORT.INTRAOP OBS SPEC DOC: ABNORMAL
PATH REPORT.MICROSCOPIC SPEC OTHER STN: ABNORMAL
PATH REPORT.RELEVANT HX SPEC: ABNORMAL
PHOTO IMAGE: ABNORMAL
SIGNIFICANT RESULTS: NORMAL
SPECIMEN DESCRIPTION: NORMAL
SPECIMEN DESCRIPTION: NORMAL
TEST DETAILS, MDL: NORMAL

## 2023-05-01 PROCEDURE — G0452 MOLECULAR PATHOLOGY INTERPR: HCPCS | Mod: 26 | Performed by: PATHOLOGY

## 2023-05-01 PROCEDURE — 81455 SO/HL 51/>GSAP DNA/DNA&RNA: CPT | Performed by: NEUROLOGICAL SURGERY

## 2023-05-01 NOTE — PROGRESS NOTES
Addendum     Writer spoke with [facility] Path dept and dx on brain bx is melanoma per Dr Ritter, pathologist. Report will be finalized in the next few days.  Syl Santiago, RN, BSN, OCN  Hematology/Oncology Nurse Navigator  [facility] [place] [facility]  [phone] / [phone]

## 2023-05-01 NOTE — PROGRESS NOTES
[facility]: Cancer Care                                                                   Hem/Onc  Referral reviewed:    Referred by:  Kellen Alvares PA-C   [address] [address]   Phone: [phone]   Fax: [phone]   Diagnoses: S/P craniotomy   Order: Radiation Therapy Referral     Referred by:    Kellen Alvares PA-C   [address] [id number]   Phone: [phone]   Fax: [phone]   Diagnoses: S/P craniotomy   Order: Adult Oncology/Hematology            ASSESSMENT      Clinical History (per Nurse review of records provided):    67 year old male patient with metastatic melanoma to brain.     Lives:  [id number]  [facility] [address]    Insurance:  Payor: [employer] / Plan: [employer] / Product Type: HMO /     PCP:   Asim Cervantes      Records Location: [facility]     Pertinent labs -- BOOKMARKED    Pertinent imaging -- BOOKMARKED    Referring provider note(s)-- BOOKMARKED    INTERVENTION(S)                                                      Oncology Nurse Navigator called patient to introduce the role or the nurse navigator and to inform them that a referral for [facility] Hematology/Oncology/Radiation department has been received for dx of metastatic melanoma from Kellen Alvares PA-C.     Patient did not answer the phone so a detailed message was left for them including NPS number. Requested callback to speak to a  to set the consult date/time/location. Explained to patient in the message that they will receive a call from our new patient scheduling team (NPS number below, hours are Monday - Friday 8am - 4:30 pm) in the next 1-2 business days to schedule the consultation. Encouraged them to call back with any questions.       Preferred [facility] Hem/Onc clinic location:  [facility] with [place] or patient's preference.   Radiation patient's preference.     Records team will contact pt directly if ROIs  needed for records, imaging, slides    PLAN                                                      Hem/Onc consult:   Message sent to NPS to schedule within 1-2 weeks at location of patient's preference.     5/2/23: Message received from NPS. Patient is currently in a rehab facility and would like to wait until he is discharged from there before scheduling. At this time patient is unsure of when that will be. Message sent to referring MD as a head up.     5/9/23: Nurse Navigator called patient this morning in follow up referrals to Medical Oncology and Radiation Oncology. Was noted that patient is not yet scheduled for these referrals. Patient did not answer his phone so a detailed message was left for him requesting him to call back.       Jayla Dunn, RN, BSN  Hematology/Oncology New Patient Nurse Navigator   [facility]  [phone]  [phone]    Next 5 appointments (look out 90 days)    May 01, 2023  1:00 PM  (Arrive by 12:45 PM)  Nurse Only with HETAL MPLW NURSE  [facility] ([facility] ) [address] [place] [id number]  [phone]   Jun 05, 2023  1:20 PM  (Arrive by 1:05 PM)  Return Visit with Kye Garrido MD  [facility] ([facility] ) [address] [place] [id number]  [phone]   Jul 17, 2023  1:20 PM  (Arrive by 1:05 PM)  Return Visit with Kye Garrido MD  [facility] ([facility] ) [address] [place] [phone]  [phone]

## 2023-05-02 ENCOUNTER — TELEPHONE (OUTPATIENT)
Dept: NEUROSURGERY | Facility: CLINIC | Age: 68
End: 2023-05-02
Payer: COMMERCIAL

## 2023-05-02 PROCEDURE — G0452 MOLECULAR PATHOLOGY INTERPR: HCPCS | Mod: 26 | Performed by: PATHOLOGY

## 2023-05-02 PROCEDURE — 81456 SO/HL 51/>GSAP RNA ALYS: CPT | Performed by: NEUROLOGICAL SURGERY

## 2023-05-02 NOTE — TELEPHONE ENCOUNTER
I called Mr. Santa at 11:15 AM to discuss the pathology findings and further steps.  He is doing well in the rehab and reported improvement in his right lower extremity strength and sensations.  He mentioned that his incision is healing well as well.    I went over the surgical pathology findings with him and explained him the diagnosis of metastatic melanoma.  I discussed with him the need to schedule appointments with medical oncologist and radiation oncologist to discuss further steps.  I also explained to the patient that we discussed his case in the tumor board on 5/1/2023.    He is likely to be discharged from acute rehabilitation next week on 5/9/2023.  I will schedule an appointment with me on 5/10/2023 at the [facility].  Patient agreed with the plan to discuss the treatment options for the left lingula lesion.  All questions were answered.

## 2023-05-02 NOTE — TELEPHONE ENCOUNTER
Writer called patient to r/s wound check appt with nurse and to Count includes the [facility] with Dr. Garrido on 5/8/2023 for pathology results .

## 2023-05-05 ENCOUNTER — TELEPHONE (OUTPATIENT)
Dept: NEUROSURGERY | Facility: CLINIC | Age: 68
End: 2023-05-05

## 2023-05-05 NOTE — TELEPHONE ENCOUNTER
Patient was told to reschedule his appt with  and patient is not available until after May 9th and said he doesn't want to be seen in the next available appt on 5/15 he said to call him back when next available appt is before 5/15 and after 5/10. He hung up the phone when he said that. Patient said he's not available on 5/9/23 because he will be in rehab. Not sure what to do about this.

## 2023-05-05 NOTE — CONFIDENTIAL NOTE
Per chart review patient was scheduled on 5/10 per Dr. Garrido. Appointment needs to be rescheduled due to change in provider schedule.     Keyade message sent to patient encouraging him to call to reschedule.

## 2023-05-08 ENCOUNTER — TRANSCRIBE ORDERS (OUTPATIENT)
Dept: OTHER | Age: 68
End: 2023-05-08

## 2023-05-08 DIAGNOSIS — D49.6 BRAIN TUMOR (H): ICD-10-CM

## 2023-05-08 DIAGNOSIS — G81.91 RIGHT HEMIPARESIS (H): ICD-10-CM

## 2023-05-08 DIAGNOSIS — Z98.890 S/P BRAIN SURGERY: Primary | ICD-10-CM

## 2023-05-10 ENCOUNTER — TRANSCRIBE ORDERS (OUTPATIENT)
Dept: OTHER | Age: 68
End: 2023-05-10

## 2023-05-10 DIAGNOSIS — G81.91 RIGHT HEMIPARESIS (H): ICD-10-CM

## 2023-05-10 DIAGNOSIS — Z98.890 S/P BRAIN SURGERY: Primary | ICD-10-CM

## 2023-05-10 DIAGNOSIS — D49.6 BRAIN TUMOR (H): ICD-10-CM

## 2023-05-10 DIAGNOSIS — R41.89 COGNITIVE DEFICITS: ICD-10-CM

## 2023-05-11 NOTE — TELEPHONE ENCOUNTER
RECORDS STATUS - ALL OTHER DIAGNOSIS      RECORDS RECEIVED FROM: Epic   DATE RECEIVED:    NOTES STATUS DETAILS   OFFICE NOTE from referring provider [facility] Kellen Alvares PA-C   OFFICE NOTE from other specialist Epic 04/17/23: Dr. 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

## 2023-05-12 ENCOUNTER — ONCOLOGY VISIT (OUTPATIENT)
Dept: ONCOLOGY | Facility: CLINIC | Age: 68
End: 2023-05-12
Payer: COMMERCIAL

## 2023-05-12 ENCOUNTER — PRE VISIT (OUTPATIENT)
Dept: ONCOLOGY | Facility: CLINIC | Age: 68
End: 2023-05-12
Payer: COMMERCIAL

## 2023-05-12 VITALS
TEMPERATURE: 98 F | SYSTOLIC BLOOD PRESSURE: 125 MMHG | WEIGHT: 181.8 LBS | DIASTOLIC BLOOD PRESSURE: 78 MMHG | BODY MASS INDEX: 24.09 KG/M2 | HEIGHT: 73 IN | OXYGEN SATURATION: 98 % | HEART RATE: 97 BPM

## 2023-05-12 DIAGNOSIS — C79.31 MALIGNANT NEOPLASM METASTATIC TO BRAIN (H): Primary | ICD-10-CM

## 2023-05-12 DIAGNOSIS — C43.9 METASTATIC MALIGNANT MELANOMA (H): ICD-10-CM

## 2023-05-12 DIAGNOSIS — C79.31 MALIGNANT MELANOMA METASTATIC TO BRAIN (H): ICD-10-CM

## 2023-05-12 DIAGNOSIS — C43.4 MALIGNANT MELANOMA OF SKIN OF SCALP (H): ICD-10-CM

## 2023-05-12 DIAGNOSIS — Z98.890 S/P CRANIOTOMY: ICD-10-CM

## 2023-05-12 PROBLEM — Z85.46 HISTORY OF PROSTATE CANCER: Status: ACTIVE | Noted: 2023-04-27

## 2023-05-12 PROBLEM — I10 PRIMARY HYPERTENSION: Status: ACTIVE | Noted: 2023-04-27

## 2023-05-12 PROBLEM — L72.9 SCALP CYST: Status: ACTIVE | Noted: 2022-08-16

## 2023-05-12 PROBLEM — G47.33 OSA (OBSTRUCTIVE SLEEP APNEA): Status: ACTIVE | Noted: 2023-04-27

## 2023-05-12 PROBLEM — G40.909 SEIZURE DISORDER (H): Status: ACTIVE | Noted: 2023-03-15

## 2023-05-12 PROCEDURE — G0463 HOSPITAL OUTPT CLINIC VISIT: HCPCS | Performed by: INTERNAL MEDICINE

## 2023-05-12 PROCEDURE — 99205 OFFICE O/P NEW HI 60 MIN: CPT | Performed by: INTERNAL MEDICINE

## 2023-05-12 RX ORDER — LEVETIRACETAM 1000 MG/1
TABLET ORAL
COMMUNITY
Start: 2023-05-09 | End: 2023-01-01

## 2023-05-12 RX ORDER — AMLODIPINE BESYLATE 10 MG/1
TABLET ORAL
Status: ON HOLD | COMMUNITY
Start: 2023-05-09 | End: 2023-06-16

## 2023-05-12 ASSESSMENT — PAIN SCALES - GENERAL: PAINLEVEL: NO PAIN (0)

## 2023-05-12 NOTE — PROGRESS NOTES
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 Saeid Santa is a very pleasant 67 year old man with a diagnosis of melanoma of the scalp with metastasis to the brain. We reviewed the association between scalp melanoma primary tumors and brain metastasis, and we discussed the likelihood that the scalp is the primary origin of the melanoma. Pathologically, it also appears possible the resected scalp lesion may be metastatic, given lack of an identifiable epidermal component.     Staging studies performed during his recent hospitalization include CT-CAP, which shows small, bilateral indeterminate pulmonary nodules and MRI-brain. A neck CT-angio was obtained, which also appears to show some enlarged lymph nodes and additional scalp lesions consistent with head and neck local regional disease. I would like to obtain a PET-CT to better stage the extent of disease.    He will see radiation oncology regarding treatment for the occipital lobe brain lesion as well as likely post-op RT to the left frontal resection cavity. I have indicated to Mr. Santa that radiation therapy will help maximize local control in brain. Once he has completed the recommended course of radiation therapy I would plan to immediately follow with systemic immunotherapy for metastatic disease. While additional surgeries could be considered, primary surgery is unlikely to play role in management of his disease. We only briefly discussed the options for systemic therapy, which could take the form of checkpoint inhibitor monotherapy or dual checkpoint blockade. We will be better able to address following PET-CT scan.    PLAN  1. Obtain staging PET-CT scan ASAP  2. Radiation oncology consult pending  3. See back in clinic on 5/18 virtual visit for review  4. Hermila Nova RN to reach out re: coordination of care    #2  Hypertension  Continue amlodipine daily    #3 Seizure, secondary to brain metastases  Continue Keppra 1000 mg twice daily    #3 History of prostate cancer ([place] 3 + 3) s/p prostatectomy (2009)  He had no adverse features and did not receive adjuvant therapy. PSA has remained negative. Continue with yearly PSA.    Abdullahi Lynch M.D.   of Medicine  Hematology, Oncology and Transplantation  Pager: [phone]    ------------------------------------------------------------    Chief Complaint: metastatic melanoma to brain    History of Present Illness:  Saeid Santa is a 67 year old right-handed male from [place], [place] with history of prostate cancer ([place] 3 + 3)  status-post prostatectomy (2009) with no adjuvant therapy and without evidence of recurrence, as well as a very recent diagnosis of metastatic melanoma. His recent oncologic history follows:    --8/16/22, he brings a left parietal scalp lesion to attention of his GP. It is initially observed.    --November 2022, the left parietal scalp lesion was felt to be a cyst, and the left parietal lesion was lanced and drained via scalp incision.    --January 2023, the left parietal scalp cyst would occasionally break open, drain, and bleed, largely at night.     --February 2023, he notes some mild difficulty clearing obstacles with right leg and foot.    --March/April 2023, he has 3 progressively worsening episodes over a span 3 weeks including the right leg. At first he notes the onset of right leg numbness and heaviness, like he was carrying 50 lbs of water on the leg. Then, about a week or so later, the leg began to jerk and converse while he was in the basement. Neither episode associated with loss of consciousness    --4/15/23, he recalls the right leg numbness, heaviness, jerking while driving between work. He had been found by coworkers in the parking lot, sitting in the grass, appearing confused. He was brought to the ER by  EMS. CT-CAP, showed small (<6mm), bilateral indeterminate pulmonary nodules. CT-head and MRI brain showed 2 discrete ring-enhancing lesions in the left paramedian posterior frontal lobe near the precentral gyrus measuring approximately 2.2 x 1.7 x 1.9 cm and left occipital lobe near the lingual gyrus measuring approximately 2.2 x 2.1 x 2.0 cm. The occipital lesion encroaches upon the subependymal surface of the left lateral ventricle occipital horn and contacts the superior surface of the medial left tentorium. The lesions are accompanied by moderate surrounding vasogenic edema. Surgery was recommended.    --4/21/23, patient underwent left awake (sleep-awake-sleep) craniotomy for tumor resection, he has resection of the left frontal, motor strip tumor as well asan elliptical incision of the left parietal scalp lesion. On pathology, both the scalp excision and brain mass are positive for malignant melanoma. Tumor cells are positive for S-100 and Melan-A, while they are negative for cytokeratin AE1/AE3, NKX3.1, GFAP, ERG, CD3, and CD20.  CD3 highlights brisk infiltration of the tumor by T lymphocytes, while CD20 highlights scattered positive B lymphocytes. NGS identifies a BRAF G466A mutation, a class 3 mutation insensitive to BRAF kinase inhibitor monotherapy. TMB is high at 48.754 muts/Mb.     --4/22/23, post-operative MRI shows post-operative changes of the parietal craniotomy of the left frontal lobe metastasis. The other 1.7 x 1.1 cm cystic lesion in the left occipital lobe is also seen. No new lesions appreciated.    Patient was subsequently discharged from the hospital on 4/25/23 to acute rehab stay at [facility] in [place]. He participated in a comprehensive rehabilitation program consisting of PT, OT, Speech, Psychology and Recreation Therapy. He did well and was discharged home with use of a walker on 5/9/23. He notes his last dose of steroids was on 5/10. He is on Keppra 1000 mg bod for seizure prophylaxis  and amlodipine for hypertension. Blood pressures now typically running in the normal range I.e. 111/77 pulse 80s this morning.    He presents to medical oncology for evaluation and planning of next steps in his care. He is scheduled to meet with radiation oncology in the coming week.    Review of Systems:  A 12-point ROS negative except as in HPI    Current Outpatient Medications   Medication Sig Dispense Refill     amLODIPine (NORVASC) 10 MG tablet 1 tablet Orally Once a day       levETIRAcetam (KEPPRA) 1000 MG tablet 1 tablet Orally every 12 hrs       acetaminophen (TYLENOL) 325 MG tablet Take 2 tablets (650 mg) by mouth every 4 hours as needed for other (For optimal non-opioid multimodal pain management to improve pain control.)       amLODIPine (NORVASC) 5 MG tablet Take 1 tablet (5 mg) by mouth daily 30 tablet 0     levETIRAcetam (KEPPRA) 1000 MG tablet 1 tablet (1,000 mg) by Oral or Feeding Tube route 2 times daily         No Known Allergies  Immunization History   Administered Date(s) Administered     COVID-19 Bivalent 12+ (Pfizer) 12/26/2022     COVID-19 Monovalent 12+ (Pfizer 2022) 05/10/2022     COVID-19 Vaccine ([employer]) 03/08/2021       No past medical history on file.    Past Surgical History:   Procedure Laterality Date     OPTICAL TRACKING SYSTEM CRANIOTOMY, EXCISE TUMOR, COMBINED Left 4/21/2023    Procedure: stealth assisted awake craniotomy resection of motor strip tumor;  Surgeon: Kye Garrido MD;  Location:  OR       Social History: He is . Works for [employer]. His wife has BRCA gene mutation in her family and has managed cancer in the family previously.  History   Smoking Status     Former     Packs/day: 0.50     Years: 20.00     Types: Cigarettes   Smokeless Tobacco     Never    Social History    Substance and Sexual Activity      Alcohol use: Not Currently     History   Drug Use     Types: Marijuana     Comment: typically smoked daily but no use for past two weeks as of 4/20/23  "      Family History:  No family history of melanoma.    Physical Examination:  /78 (BP Location: Right arm, Patient Position: Sitting, Cuff Size: Adult Regular)   Pulse 97   Temp 98  F (36.7  C) (Oral)   Ht 1.85 m (6' 0.84\")   Wt 82.5 kg (181 lb 12.8 oz)   SpO2 98%   BMI 24.09 kg/m    Wt Readings from Last 5 Encounters:   04/23/23 86.1 kg (189 lb 13.1 oz)   04/19/23 87.1 kg (192 lb)   04/15/23 88 kg (194 lb)   10/06/21 86.2 kg (190 lb)   GENERAL: Well appearing, alert and no distress  EYES: Eyes grossly normal to inspection.  No discharge or erythema, or obvious scleral/conjunctival abnormalities.  HENT: Normal cephalic/atraumatic. Craniotomy incision evident left paramedian fronto-parietal scalp, cdi.  External ears, nose and mouth without ulcers or lesions.  No nasal drainage visible.  NECK: No asymmetry, visible masses or scars. No significant palpable adenopathy.  RESP: No audible wheeze, cough, or visible cyanosis.  No visible retractions or increased work of breathing.    SKIN: Visible skin clear. No significant rash, abnormal pigmentation or lesions. There are palpable subcutaneous scalp lesions in the right and left parietal scalp.  NEURO: Cranial nerves grossly intact.  Mentation and speech appropriate for age. Uses a walker to get around and transfers to wheelchair well. Has some mild foot drop on the right.  PSYCH: Mentation appears normal, affect normal/bright, judgement and insight intact, normal speech and appearance well-groomed.      Laboratory Data:  Case Report   Surgical Pathology Report                         Case: [id number]                                   Authorizing Provider:  Kye Garrido MD          Collected:           04/21/2023 03:36 PM           Ordering Location:     [facility]                 Received:            04/21/2023 03:41 PM           Pathologist:           Dima Ritter MD                                                         Intraop:               " Joseph Melara MD                                                       Specimens:   A) - Other, Left Frontal Brain Tumor                                                                 B) - Other, Left Frontal Brain Tumor                                                                 C) - Scalp, Skin Lesion                                                                    Final Diagnosis   A-B. Brain, left frontal brain tumor, biopsy and resection:     - Malignant melanoma. See comment.     C. Skin, left parietal scalp, excision:     - Malignant melanoma. See comment.     - Tumor measures 10 mm in greatest dimension on histologic slides.     - Deep and lateral margins are involved by tumor.   Electronically signed by Dima Ritter MD on 5/1/2023 at 12:17 PM         Comments:   This is an appended report. These results have been appended to a previously preliminary verified report.      Comment     Both the scalp excision and brain mass are positive for malignant melanoma, though neither lesion represents a definitive primary melanoma. Clinical evaluation to exclude a different potential primary site is recommended.     The scalp lesion appears histologically as a well-circumscribed nodule based in the dermis with epidermotropism and in the absence of an epidermal melanoma in situ, suggesting the possibility that this represents a cutaneous metastasis rather than a true primary invasive melanoma of the scalp. The brain mass is favored to represent a metastatic rather than primary melanoma, as multiple discrete intracranial lesions are present on brain MRI and primary intracranial melanoma is rare. NGS study is pending and the results will be reported in an addendum.     Slides from part C (left parietal scalp) were reviewed with Dr. John Wood (dermatologist/dermatopathologist), who agrees with the provided impression.   Comment: This is an appended report. These results have been appended to a  "previously preliminary verified report.   Clinical Information     This 67-year-old man underwent prostatectomy in 2009 for prostate cancer and has not received additional adjuvant treatment.  He presented after three episodes of abnormal movement of the right lower extremity without loss of consciousness, concerning for seizure.  Preoperative MRI demonstrated two discrete peripherally enhancing masses, one in the left paramedian posterior frontal lobe near the precentral gyrus, and one in the left occipital lobe near the lingual gyrus.  Additionally, he was noted to have a left parietal scalp lesion which has been increasing in size and was enhancing on preoperative MRI.     Procedure:  stealth assisted awake craniotomy resection of motor strip tumor - Left  Pre-op Diagnosis: CNS mass [G96.89]  Post-op Diagnosis: G96.89 - CNS mass [ICD-10-CM]   Comment: This is an appended report. These results have been appended to a previously preliminary verified report.   Intraoperative Consultation     A(1). Other, Left Frontal Brain Tumor:  AFS1: Malignant neoplasm, differential diagnosis metastatic melanoma, metastatic carcinoma or lymphoma,  Defer to permanent sections         Gross Description     A(1). Other, Left Frontal Brain Tumor:  The specimen is received fresh for frozen section, with proper patient identification, labeled \"left frontal brain tumor\".  It consists of 2 pieces of red-tan soft tissue, 0.2 x 0.2 x 0.2 cm and 0.1 x 0.1 x 0.1 cm.  Touch preps are performed, and the specimen is submitted entirely for frozen section.  The remainder of the frozen section block is submitted as A FS.  B(2). Other, Left Frontal Brain Tumor:  The specimen is received in formalin with proper patient identification, labeled \"left frontal brain tumor\".  The specimen consists of a 2.6 x 1.8 x 1.5 cm aggregate of irregular tan-white to red-brown, hemorrhagic soft tissue.  The specimen is wrapped, and submitted entirely in blocks " "B1-B2.        C(3). Scalp, Skin Lesion:  The specimen is received in formalin with proper patient identification, labeled \"skin lesion, scalp\".  The specimen consists of a 1.5 x 1.4 cm excised to depth of 1.0 cm unoriented, ovoid skin excision.  The epidermis displays a 0.9 x 0.9 cm tan-white, ill-defined, slightly wrinkled lesion which is 0.2 cm from the nearest peripheral skin edge.  The deep aspect is slightly ragged.  The resection margin is inked blue, the specimen is sectioned, and submitted entirely as follows:  Cassette summary:  C1: Skin tips  C2-C3: Body          Comment: This is an appended report. These results have been appended to a previously preliminary verified report.   Microscopic Description     Parts A and B: Histologic sections show a high-grade neoplasm with tumor cells showing significant variation in nuclear size and shape.  Prominent nucleoli are present in numerous tumor cells.  There is no cytoplasmic pigment within tumor cells.  The tumor is associated with significant hemorrhage, and it shows a pushing border with the adjacent nonneoplastic brain parenchyma.  By immunohistochemistry, tumor cells are positive for S-100 and Melan-A, while they are negative for cytokeratin AE1/AE3, NKX3.1, GFAP, ERG, CD3, and CD20.  CD3 highlights brisk infiltration of the tumor by T lymphocytes, while CD20 highlights scattered positive B lymphocytes.  All immunohistochemistry is performed on block B2 with appropriate controls.  These findings support the intraoperative impression.     Part C: Histologic sections show a dermal nodule of malignant tumor cells with severe cytologic atypia, prominent nucleoli, and at least a portion of tumor which show spindle cell morphology.  There are more dispersed tumor cells outside of the main body of this nodule superficially.  There is epidermal hyperplasia of the overlying epidermis with mild melanocytic hyperplasia without significant atypia.  There is disruption " between a portion of hyperplastic epidermis and the tumor nodule in block C3, but no connection of the tumor nodule to the epidermis or epidermal melanoma in situ is identified after evaluation of multiple deeper levels.  By immunohistochemistry, tumor cells are positive for S100 and Melan-A, while they are negative for CD34, ERG, cytokeratin AE1/AE3, NKX3.1, and CD20.     Immunohistochemistry is performed as follows:  Block B2: Melan-A, S-100, cytokeratin AE1/AE3, NKX3.1, GFAP, ERG, CD3, CD20  Block C2: Melan-A, S-100, cytokeratin AE1/AE3, NKX3.1, GFAP, early, CD34, CD20  Block C3: Melan-A   Comment: This is an appended report. These results have been appended to a previously preliminary verified report.         Imaging Studies:     MR BRAIN W/O & W CONTRAST 4/22/2023 1:43 PM     Provided History: s/p resection of metastatic tumor.  ICD-10:     Comparison: MRI: 4/15/2023.     CT HEAD: 4/21/2023..     Technique: Multiplanar T1-weighted, axial FLAIR, and susceptibility  images were obtained without intravenous contrast. Following  intravenous gadolinium-based contrast administration, axial  T2-weighted, diffusion, and T1-weighted images (in multiple planes)  were obtained.     Contrast: 8.5 mL of Gadavist intravenous contrast      Findings:  Postoperative changes of parietal craniotomy for resection of  previously seen cystic metastasis in the high left frontal lobe. Mild  postoperative pneumocephalus causing minimal mass effect on the  adjacent left frontal lobe. Focus of diffusion restriction along the  periphery of the resection site, which may be representative of focal  postoperative infarct. There is adjacent mild vasogenic edema.     There is an additional 1.7 x 1.1 cm cystic lesion in the left  occipital lobe with enhancement and mild diffusion restriction along  the periphery (series 3, image 75) with ongoing vasogenic edema,  similar to prior exams. No new lesions identified. No midline shift.  The basilar  cisterns are patent.     The ventricles are proportionate to the cerebral sulci.  Periventricular and subcortical white matter hyperintensities, likely  chronic small vessel ischemic disease.     No abnormality of the skull marrow signal. The visualized portions of  paranasal sinuses, and mastoid air cells are relatively clear. The  orbits are grossly unremarkable.                                                                      Impression:  1. Postoperative changes left parietal craniotomy for resection of  previously seen cystic metastasis in the  high left frontal lobe.  Focus of diffusion restriction along the periphery of the resection  site, likely representative of focal postoperative cytotoxic edema.  Stable ongoing vasogenic edema. No obvious residual tumor.     2. Stable peripherally enhancing cystic lesion in the left occipital  lobe with ongoing vasogenic edema, similar to prior.     I have personally reviewed the examination and initial interpretation  and I agree with the findings.      EXAM: CT CHEST ABDOMEN PELVIS W/O CONTRAST  LOCATION: [facility]  DATE/TIME: 4/15/2023 12:42 PM CDT     INDICATION: Metastatic brain lesions versus abscess.  COMPARISON: None.  TECHNIQUE: CT scan of the chest, abdomen, and pelvis was performed without IV contrast. Multiplanar reformats were obtained. Dose reduction techniques were used.   CONTRAST: None.     FINDINGS:   LUNGS AND PLEURA: No acute airspace disease, pleural effusion, or pneumothorax. Subpleural fibrosis in the lower lobes. 3 mm right lower lobe nodule image 216 and 6 mm right lower lobe nodule on image 195. There is a 6 mm right middle lobe nodule image   232. Additional pulmonary nodules are seen in the left upper lobe on images 156 and 194.     MEDIASTINUM/AXILLAE: No lymphadenopathy. Heart size and vasculature are normal. Normal esophagus. The axilla are normal.     CORONARY ARTERY CALCIFICATION: None.     HEPATOBILIARY:  No significant mass or bile duct dilatation. No calcified gallstones.      PANCREAS: Normal.     SPLEEN: Normal.     ADRENAL GLANDS: Normal.     KIDNEYS/BLADDER: No significant mass, stone, or hydronephrosis. Normal bladder.     BOWEL: Diverticulosis of the colon. No acute inflammatory change. No obstruction.      LYMPH NODES: Normal.     VASCULATURE: Unremarkable.     PELVIC ORGANS: Prostatectomy.     MUSCULOSKELETAL: Normal.                                                                      IMPRESSION:  1.  There are bilateral pulmonary nodules measuring up to 6 mm. These are indeterminant and recommend continued surveillance.  2.  No mass or area of inflammation evident.  3.  Diverticulosis.

## 2023-05-12 NOTE — LETTER
5/12/2023         RE: Saeid Santa  [address] [address]  [facility] [address]        Dear Colleague,    Thank you for referring your patient, Saeid Santa, to the [facility]. Please see a copy of my visit note below.    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 Saeid Santa is a very pleasant 67 year old man with a diagnosis of melanoma of the scalp with metastasis to the brain. We reviewed the association between scalp melanoma primary tumors and brain metastasis, and we discussed the likelihood that the scalp is the primary origin of the melanoma. Pathologically, it also appears possible the resected scalp lesion may be metastatic, given lack of an identifiable epidermal component.     Staging studies performed during his recent hospitalization include CT-CAP, which shows small, bilateral indeterminate pulmonary nodules and MRI-brain. A neck CT-angio was obtained, which also appears to show some enlarged lymph nodes and additional scalp lesions consistent with head and neck local regional disease. I would like to obtain a PET-CT to better stage the extent of disease.    He will see radiation oncology regarding treatment for the occipital lobe brain lesion as well as likely post-op RT to the left frontal resection cavity. I have indicated to Mr. Santa that radiation therapy will help maximize local control in brain. Once he has completed the recommended course of radiation therapy I would plan to immediately follow with systemic immunotherapy for metastatic disease. While additional surgeries could be considered, primary surgery is unlikely to play role in management of his disease. We only briefly discussed the options for systemic therapy, which could take the form of checkpoint inhibitor monotherapy or dual checkpoint blockade. We will be better able to address  following PET-CT scan.    PLAN  1. Obtain staging PET-CT scan ASAP  2. Radiation oncology consult pending  3. See back in clinic on 5/18 virtual visit for review  4. Hermila Nova RN to reach out re: coordination of care    #2 Hypertension  Continue amlodipine daily    #3 Seizure, secondary to brain metastases  Continue Keppra 1000 mg twice daily    #3 History of prostate cancer (Winter 3 + 3) s/p prostatectomy (2009)  He had no adverse features and did not receive adjuvant therapy. PSA has remained negative. Continue with yearly PSA.    Abdullahi Lynch M.D.   of Medicine  Hematology, Oncology and Transplantation  Pager: [phone]    ------------------------------------------------------------    Chief Complaint: metastatic melanoma to brain    History of Present Illness:  Saeid Santa is a 67 year old right-handed male from [place], [place] with history of prostate cancer (Winter 3 + 3)  status-post prostatectomy (2009) with no adjuvant therapy and without evidence of recurrence, as well as a very recent diagnosis of metastatic melanoma. His recent oncologic history follows:    --8/16/22, he brings a left parietal scalp lesion to attention of his GP. It is initially observed.    --November 2022, the left parietal scalp lesion was felt to be a cyst, and the left parietal lesion was lanced and drained via scalp incision.    --January 2023, the left parietal scalp cyst would occasionally break open, drain, and bleed, largely at night.     --February 2023, he notes some mild difficulty clearing obstacles with right leg and foot.    --March/April 2023, he has 3 progressively worsening episodes over a span 3 weeks including the right leg. At first he notes the onset of right leg numbness and heaviness, like he was carrying 50 lbs of water on the leg. Then, about a week or so later, the leg began to jerk and converse while he was in the basement. Neither episode associated with loss of  consciousness    --4/15/23, he recalls the right leg numbness, heaviness, jerking while driving between work. He had been found by coworkers in the parking lot, sitting in the grass, appearing confused. He was brought to the ER by EMS. CT-CAP, showed small (<6mm), bilateral indeterminate pulmonary nodules. CT-head and MRI brain showed 2 discrete ring-enhancing lesions in the left paramedian posterior frontal lobe near the precentral gyrus measuring approximately 2.2 x 1.7 x 1.9 cm and left occipital lobe near the lingual gyrus measuring approximately 2.2 x 2.1 x 2.0 cm. The occipital lesion encroaches upon the subependymal surface of the left lateral ventricle occipital horn and contacts the superior surface of the medial left tentorium. The lesions are accompanied by moderate surrounding vasogenic edema. Surgery was recommended.    --4/21/23, patient underwent left awake (sleep-awake-sleep) craniotomy for tumor resection, he has resection of the left frontal, motor strip tumor as well asan elliptical incision of the left parietal scalp lesion. On pathology, both the scalp excision and brain mass are positive for malignant melanoma. Tumor cells are positive for S-100 and Melan-A, while they are negative for cytokeratin AE1/AE3, NKX3.1, GFAP, ERG, CD3, and CD20.  CD3 highlights brisk infiltration of the tumor by T lymphocytes, while CD20 highlights scattered positive B lymphocytes. NGS identifies a BRAF G466A mutation, a class 3 mutation insensitive to BRAF kinase inhibitor monotherapy. TMB is high at 48.754 muts/Mb.     --4/22/23, post-operative MRI shows post-operative changes of the parietal craniotomy of the left frontal lobe metastasis. The other 1.7 x 1.1 cm cystic lesion in the left occipital lobe is also seen. No new lesions appreciated.    Patient was subsequently discharged from the hospital on 4/25/23 to acute rehab stay at [facility] in [place]. He participated in a comprehensive rehabilitation program  consisting of PT, OT, Speech, Psychology and Recreation Therapy. He did well and was discharged home with use of a walker on 5/9/23. He notes his last dose of steroids was on 5/10. He is on Keppra 1000 mg bod for seizure prophylaxis and amlodipine for hypertension. Blood pressures now typically running in the normal range I.e. 111/77 pulse 80s this morning.    He presents to medical oncology for evaluation and planning of next steps in his care. He is scheduled to meet with radiation oncology in the coming week.    Review of Systems:  A 12-point ROS negative except as in HPI    Current Outpatient Medications   Medication Sig Dispense Refill    amLODIPine (NORVASC) 10 MG tablet 1 tablet Orally Once a day      levETIRAcetam (KEPPRA) 1000 MG tablet 1 tablet Orally every 12 hrs      acetaminophen (TYLENOL) 325 MG tablet Take 2 tablets (650 mg) by mouth every 4 hours as needed for other (For optimal non-opioid multimodal pain management to improve pain control.)      amLODIPine (NORVASC) 5 MG tablet Take 1 tablet (5 mg) by mouth daily 30 tablet 0    levETIRAcetam (KEPPRA) 1000 MG tablet 1 tablet (1,000 mg) by Oral or Feeding Tube route 2 times daily         No Known Allergies  Immunization History   Administered Date(s) Administered    COVID-19 Bivalent 12+ (Pfizer) 12/26/2022    COVID-19 Monovalent 12+ (Pfizer 2022) 05/10/2022    COVID-19 Vaccine (Chadd) 03/08/2021       No past medical history on file.    Past Surgical History:   Procedure Laterality Date    OPTICAL TRACKING SYSTEM CRANIOTOMY, EXCISE TUMOR, COMBINED Left 4/21/2023    Procedure: stealth assisted awake craniotomy resection of motor strip tumor;  Surgeon: Kye Garrido MD;  Location:  OR       Social History: He is . Works for [employer]. His wife has BRCA gene mutation in her family and has managed cancer in the family previously.  History   Smoking Status    Former    Packs/day: 0.50    Years: 20.00    Types: Cigarettes   Smokeless Tobacco     "Never    Social History    Substance and Sexual Activity      Alcohol use: Not Currently     History   Drug Use    Types: Marijuana     Comment: typically smoked daily but no use for past two weeks as of 4/20/23       Family History:  No family history of melanoma.    Physical Examination:  /78 (BP Location: Right arm, Patient Position: Sitting, Cuff Size: Adult Regular)   Pulse 97   Temp 98  F (36.7  C) (Oral)   Ht 1.85 m (6' 0.84\")   Wt 82.5 kg (181 lb 12.8 oz)   SpO2 98%   BMI 24.09 kg/m    Wt Readings from Last 5 Encounters:   04/23/23 86.1 kg (189 lb 13.1 oz)   04/19/23 87.1 kg (192 lb)   04/15/23 88 kg (194 lb)   10/06/21 86.2 kg (190 lb)   GENERAL: Well appearing, alert and no distress  EYES: Eyes grossly normal to inspection.  No discharge or erythema, or obvious scleral/conjunctival abnormalities.  HENT: Normal cephalic/atraumatic. Craniotomy incision evident left paramedian fronto-parietal scalp, cdi.  External ears, nose and mouth without ulcers or lesions.  No nasal drainage visible.  NECK: No asymmetry, visible masses or scars. No significant palpable adenopathy.  RESP: No audible wheeze, cough, or visible cyanosis.  No visible retractions or increased work of breathing.    SKIN: Visible skin clear. No significant rash, abnormal pigmentation or lesions. There are palpable subcutaneous scalp lesions in the right and left parietal scalp.  NEURO: Cranial nerves grossly intact.  Mentation and speech appropriate for age. Uses a walker to get around and transfers to wheelchair well. Has some mild foot drop on the right.  PSYCH: Mentation appears normal, affect normal/bright, judgement and insight intact, normal speech and appearance well-groomed.      Laboratory Data:  Case Report   Surgical Pathology Report                         Case: [id number]                                   Authorizing Provider:  Kye Garrido MD          Collected:           04/21/2023 03:36 PM           Ordering " Location:      MAIN OR                 Received:            04/21/2023 03:41 PM           Pathologist:           Dima Ritter MD                                                         Intraop:               Joseph Melara MD                                                       Specimens:   A) - Other, Left Frontal Brain Tumor                                                                 B) - Other, Left Frontal Brain Tumor                                                                 C) - Scalp, Skin Lesion                                                                    Final Diagnosis   A-B. Brain, left frontal brain tumor, biopsy and resection:     - Malignant melanoma. See comment.     C. Skin, left parietal scalp, excision:     - Malignant melanoma. See comment.     - Tumor measures 10 mm in greatest dimension on histologic slides.     - Deep and lateral margins are involved by tumor.   Electronically signed by Dima Ritter MD on 5/1/2023 at 12:17 PM         Comments:   This is an appended report. These results have been appended to a previously preliminary verified report.      Comment     Both the scalp excision and brain mass are positive for malignant melanoma, though neither lesion represents a definitive primary melanoma. Clinical evaluation to exclude a different potential primary site is recommended.     The scalp lesion appears histologically as a well-circumscribed nodule based in the dermis with epidermotropism and in the absence of an epidermal melanoma in situ, suggesting the possibility that this represents a cutaneous metastasis rather than a true primary invasive melanoma of the scalp. The brain mass is favored to represent a metastatic rather than primary melanoma, as multiple discrete intracranial lesions are present on brain MRI and primary intracranial melanoma is rare. NGS study is pending and the results will be reported in an addendum.     Slides from part C  "(left parietal scalp) were reviewed with Dr. John Wood (dermatologist/dermatopathologist), who agrees with the provided impression.   Comment: This is an appended report. These results have been appended to a previously preliminary verified report.   Clinical Information     This 67-year-old man underwent prostatectomy in 2009 for prostate cancer and has not received additional adjuvant treatment.  He presented after three episodes of abnormal movement of the right lower extremity without loss of consciousness, concerning for seizure.  Preoperative MRI demonstrated two discrete peripherally enhancing masses, one in the left paramedian posterior frontal lobe near the precentral gyrus, and one in the left occipital lobe near the lingual gyrus.  Additionally, he was noted to have a left parietal scalp lesion which has been increasing in size and was enhancing on preoperative MRI.     Procedure:  stealth assisted awake craniotomy resection of motor strip tumor - Left  Pre-op Diagnosis: CNS mass [G96.89]  Post-op Diagnosis: G96.89 - CNS mass [ICD-10-CM]   Comment: This is an appended report. These results have been appended to a previously preliminary verified report.   Intraoperative Consultation     A(1). Other, Left Frontal Brain Tumor:  AFS1: Malignant neoplasm, differential diagnosis metastatic melanoma, metastatic carcinoma or lymphoma,  Defer to permanent sections         Gross Description     A(1). Other, Left Frontal Brain Tumor:  The specimen is received fresh for frozen section, with proper patient identification, labeled \"left frontal brain tumor\".  It consists of 2 pieces of red-tan soft tissue, 0.2 x 0.2 x 0.2 cm and 0.1 x 0.1 x 0.1 cm.  Touch preps are performed, and the specimen is submitted entirely for frozen section.  The remainder of the frozen section block is submitted as A FS.  B(2). Other, Left Frontal Brain Tumor:  The specimen is received in formalin with proper patient identification, " "labeled \"left frontal brain tumor\".  The specimen consists of a 2.6 x 1.8 x 1.5 cm aggregate of irregular tan-white to red-brown, hemorrhagic soft tissue.  The specimen is wrapped, and submitted entirely in blocks B1-B2.        C(3). Scalp, Skin Lesion:  The specimen is received in formalin with proper patient identification, labeled \"skin lesion, scalp\".  The specimen consists of a 1.5 x 1.4 cm excised to depth of 1.0 cm unoriented, ovoid skin excision.  The epidermis displays a 0.9 x 0.9 cm tan-white, ill-defined, slightly wrinkled lesion which is 0.2 cm from the nearest peripheral skin edge.  The deep aspect is slightly ragged.  The resection margin is inked blue, the specimen is sectioned, and submitted entirely as follows:  Cassette summary:  C1: Skin tips  C2-C3: Body          Comment: This is an appended report. These results have been appended to a previously preliminary verified report.   Microscopic Description     Parts A and B: Histologic sections show a high-grade neoplasm with tumor cells showing significant variation in nuclear size and shape.  Prominent nucleoli are present in numerous tumor cells.  There is no cytoplasmic pigment within tumor cells.  The tumor is associated with significant hemorrhage, and it shows a pushing border with the adjacent nonneoplastic brain parenchyma.  By immunohistochemistry, tumor cells are positive for S-100 and Melan-A, while they are negative for cytokeratin AE1/AE3, NKX3.1, GFAP, ERG, CD3, and CD20.  CD3 highlights brisk infiltration of the tumor by T lymphocytes, while CD20 highlights scattered positive B lymphocytes.  All immunohistochemistry is performed on block B2 with appropriate controls.  These findings support the intraoperative impression.     Part C: Histologic sections show a dermal nodule of malignant tumor cells with severe cytologic atypia, prominent nucleoli, and at least a portion of tumor which show spindle cell morphology.  There are more " dispersed tumor cells outside of the main body of this nodule superficially.  There is epidermal hyperplasia of the overlying epidermis with mild melanocytic hyperplasia without significant atypia.  There is disruption between a portion of hyperplastic epidermis and the tumor nodule in block C3, but no connection of the tumor nodule to the epidermis or epidermal melanoma in situ is identified after evaluation of multiple deeper levels.  By immunohistochemistry, tumor cells are positive for S100 and Melan-A, while they are negative for CD34, ERG, cytokeratin AE1/AE3, NKX3.1, and CD20.     Immunohistochemistry is performed as follows:  Block B2: Melan-A, S-100, cytokeratin AE1/AE3, NKX3.1, GFAP, ERG, CD3, CD20  Block C2: Melan-A, S-100, cytokeratin AE1/AE3, NKX3.1, GFAP, early, CD34, CD20  Block C3: Melan-A   Comment: This is an appended report. These results have been appended to a previously preliminary verified report.         Imaging Studies:     MR BRAIN W/O & W CONTRAST 4/22/2023 1:43 PM     Provided History: s/p resection of metastatic tumor.  ICD-10:     Comparison: MRI: 4/15/2023.     CT HEAD: 4/21/2023..     Technique: Multiplanar T1-weighted, axial FLAIR, and susceptibility  images were obtained without intravenous contrast. Following  intravenous gadolinium-based contrast administration, axial  T2-weighted, diffusion, and T1-weighted images (in multiple planes)  were obtained.     Contrast: 8.5 mL of Gadavist intravenous contrast      Findings:  Postoperative changes of parietal craniotomy for resection of  previously seen cystic metastasis in the high left frontal lobe. Mild  postoperative pneumocephalus causing minimal mass effect on the  adjacent left frontal lobe. Focus of diffusion restriction along the  periphery of the resection site, which may be representative of focal  postoperative infarct. There is adjacent mild vasogenic edema.     There is an additional 1.7 x 1.1 cm cystic lesion in the  left  occipital lobe with enhancement and mild diffusion restriction along  the periphery (series 3, image 75) with ongoing vasogenic edema,  similar to prior exams. No new lesions identified. No midline shift.  The basilar cisterns are patent.     The ventricles are proportionate to the cerebral sulci.  Periventricular and subcortical white matter hyperintensities, likely  chronic small vessel ischemic disease.     No abnormality of the skull marrow signal. The visualized portions of  paranasal sinuses, and mastoid air cells are relatively clear. The  orbits are grossly unremarkable.                                                                      Impression:  1. Postoperative changes left parietal craniotomy for resection of  previously seen cystic metastasis in the  high left frontal lobe.  Focus of diffusion restriction along the periphery of the resection  site, likely representative of focal postoperative cytotoxic edema.  Stable ongoing vasogenic edema. No obvious residual tumor.     2. Stable peripherally enhancing cystic lesion in the left occipital  lobe with ongoing vasogenic edema, similar to prior.     I have personally reviewed the examination and initial interpretation  and I agree with the findings.      EXAM: CT CHEST ABDOMEN PELVIS W/O CONTRAST  LOCATION: [facility]  DATE/TIME: 4/15/2023 12:42 PM CDT     INDICATION: Metastatic brain lesions versus abscess.  COMPARISON: None.  TECHNIQUE: CT scan of the chest, abdomen, and pelvis was performed without IV contrast. Multiplanar reformats were obtained. Dose reduction techniques were used.   CONTRAST: None.     FINDINGS:   LUNGS AND PLEURA: No acute airspace disease, pleural effusion, or pneumothorax. Subpleural fibrosis in the lower lobes. 3 mm right lower lobe nodule image 216 and 6 mm right lower lobe nodule on image 195. There is a 6 mm right middle lobe nodule image   232. Additional pulmonary nodules are seen in the  left upper lobe on images 156 and 194.     MEDIASTINUM/AXILLAE: No lymphadenopathy. Heart size and vasculature are normal. Normal esophagus. The axilla are normal.     CORONARY ARTERY CALCIFICATION: None.     HEPATOBILIARY: No significant mass or bile duct dilatation. No calcified gallstones.      PANCREAS: Normal.     SPLEEN: Normal.     ADRENAL GLANDS: Normal.     KIDNEYS/BLADDER: No significant mass, stone, or hydronephrosis. Normal bladder.     BOWEL: Diverticulosis of the colon. No acute inflammatory change. No obstruction.      LYMPH NODES: Normal.     VASCULATURE: Unremarkable.     PELVIC ORGANS: Prostatectomy.     MUSCULOSKELETAL: Normal.                                                                      IMPRESSION:  1.  There are bilateral pulmonary nodules measuring up to 6 mm. These are indeterminant and recommend continued surveillance.  2.  No mass or area of inflammation evident.  3.  Diverticulosis.

## 2023-05-12 NOTE — NURSING NOTE
"Oncology Rooming Note    May 12, 2023 2:54 PM   Saeid Santa is a 67 year old male who presents for:    Chief Complaint   Patient presents with     Oncology Clinic Visit     Brain tumor      Initial Vitals: /78 (BP Location: Right arm, Patient Position: Sitting, Cuff Size: Adult Regular)   Pulse 97   Temp 98  F (36.7  C) (Oral)   Ht 1.85 m (6' 0.84\")   Wt 82.5 kg (181 lb 12.8 oz)   SpO2 98%   BMI 24.09 kg/m   Estimated body mass index is 24.09 kg/m  as calculated from the following:    Height as of this encounter: 1.85 m (6' 0.84\").    Weight as of this encounter: 82.5 kg (181 lb 12.8 oz). Body surface area is 2.06 meters squared.  No Pain (0) Comment: Data Unavailable   No LMP for male patient.  Allergies reviewed: Yes  Medications reviewed: Yes    Medications: Medication refills not needed today.  Pharmacy name entered into [facility]: [facility] - [facility] [id number] [facility]    Clinical concerns: none.       Art Ortiz              "

## 2023-05-15 ENCOUNTER — OFFICE VISIT (OUTPATIENT)
Dept: NEUROSURGERY | Facility: CLINIC | Age: 68
End: 2023-05-15
Payer: COMMERCIAL

## 2023-05-15 VITALS
WEIGHT: 181 LBS | OXYGEN SATURATION: 98 % | DIASTOLIC BLOOD PRESSURE: 69 MMHG | BODY MASS INDEX: 23.99 KG/M2 | HEART RATE: 78 BPM | HEIGHT: 73 IN | SYSTOLIC BLOOD PRESSURE: 114 MMHG

## 2023-05-15 DIAGNOSIS — C43.9 METASTATIC MELANOMA (H): Primary | ICD-10-CM

## 2023-05-15 DIAGNOSIS — C79.31 METASTASIS TO BRAIN (H): Primary | ICD-10-CM

## 2023-05-15 PROCEDURE — 99024 POSTOP FOLLOW-UP VISIT: CPT | Performed by: NEUROLOGICAL SURGERY

## 2023-05-15 NOTE — LETTER
"    5/15/2023         RE: Saeid Santa  [address]  [facility] [address]        Dear Colleague,    Thank you for referring your patient, Saeid Santa, to the [facility]. Please see a copy of my visit note below.    NEUROSURGERY FOLLOWUP  NOTE    Saeid Santa comes today in f/u s/p 04/21/2023: Stealth assisted awake craniotomy with resection of motor/sensory strip lesion.  Final pathology: Metastatic melanoma    Overall he is doing well since the surgery and recovering well.  He still has numbness involving his right lower extremity and right foot dorsiflexion weakness.  He has significantly improved with physical therapy and using AFP.  He is working with his medical oncologist and is scheduled to have PET scan on 5/17.  He is also following with radiation oncologist to plan for gamma knife for the frontal lesion and preoperative gamma knife for the left parietal occipital lesion.  He reports no issues with the incision.  He is currently ambulating with a cane and able to carry out his activities of daily living.        PHYSICAL EXAM:   Constitutional: /69   Pulse 78   Ht 1.85 m (6' 0.84\")   Wt 82.1 kg (181 lb)   SpO2 98%   BMI 23.99 kg/m       Mental Status: A & O in no acute distress.  Affect is appropriate.  Speech is fluent.  Recent and remote memory are intact.  Attention span and concentration are normal.     Motor:  Normal bulk and tone all muscle groups of upper and lower extremities.  Right foot dorsiflexion -4/5, rest intact     Sensory: Sensation intact bilaterally to light touch.      Coordination:   Gait: High-stepping using a cane     Reflexes; no Gan clonus    Incision healing well.    IMAGING:   No new images today.         CONSULTATION ASSESSMENT AND PLAN:    Saeid Santa comes today in f/u s/p 04/21/2023: Stealth assisted awake craniotomy with resection of motor/sensory strip lesion.  Final pathology: Metastatic melanoma    He is recovering " well from the surgery.  He still has some sensory issues and weakness of the right dorsiflexion.  His incision is healing well.    He is working with a medical oncologist to evaluate the extent of systemic disease and plan on starting immunotherapy.  In terms of radiation to the brain lesions, he is scheduled to have MRI brain on 4/19/2023 at 7 AM at the  and we will plan gamma knife treatment to the resection cavity and preoperative gamma knife to the left parietal occipital lesion next week.He is also scheduled to have PET scan on 5/17/2023.    All questions were answered and patient agreed with the plan.  He can contact us if there are any questions or concerns or worsening neurological deficits.    I spent more than 20 minutes in this apt, examining the pt, reviewing the scans, reviewing notes from chart, discussing treatment options with risks and benefits and coordinating care.     Kye Garrido MD      CC:     Asim Cervantes  [address]  [facility] [address]        Again, thank you for allowing me to participate in the care of your patient.        Sincerely,        Kye Garrido MD

## 2023-05-15 NOTE — PROGRESS NOTES
"NEUROSURGERY FOLLOWUP  NOTE    Saeid Santa comes today in f/u s/p 04/21/2023: Stealth assisted awake craniotomy with resection of motor/sensory strip lesion.  Final pathology: Metastatic melanoma    Overall he is doing well since the surgery and recovering well.  He still has numbness involving his right lower extremity and right foot dorsiflexion weakness.  He has significantly improved with physical therapy and using AFP.  He is working with his medical oncologist and is scheduled to have PET scan on 5/17.  He is also following with radiation oncologist to plan for gamma knife for the frontal lesion and preoperative gamma knife for the left parietal occipital lesion.  He reports no issues with the incision.  He is currently ambulating with a cane and able to carry out his activities of daily living.        PHYSICAL EXAM:   Constitutional: /69   Pulse 78   Ht 1.85 m (6' 0.84\")   Wt 82.1 kg (181 lb)   SpO2 98%   BMI 23.99 kg/m       Mental Status: A & O in no acute distress.  Affect is appropriate.  Speech is fluent.  Recent and remote memory are intact.  Attention span and concentration are normal.     Motor:  Normal bulk and tone all muscle groups of upper and lower extremities.  Right foot dorsiflexion -4/5, rest intact     Sensory: Sensation intact bilaterally to light touch.      Coordination:   Gait: High-stepping using a cane     Reflexes; no Gan clonus    Incision healing well.    IMAGING:   No new images today.         CONSULTATION ASSESSMENT AND PLAN:    Saeid Santa comes today in f/u s/p 04/21/2023: Stealth assisted awake craniotomy with resection of motor/sensory strip lesion.  Final pathology: Metastatic melanoma    He is recovering well from the surgery.  He still has some sensory issues and weakness of the right dorsiflexion.  His incision is healing well.    He is working with a medical oncologist to evaluate the extent of systemic disease and plan on starting immunotherapy.  In " terms of radiation to the brain lesions, he is scheduled to have MRI brain on 4/19/2023 at 7 AM at the  and we will plan gamma knife treatment to the resection cavity and preoperative gamma knife to the left parietal occipital lesion next week.He is also scheduled to have PET scan on 5/17/2023.    All questions were answered and patient agreed with the plan.  He can contact us if there are any questions or concerns or worsening neurological deficits.    I spent more than 20 minutes in this apt, examining the pt, reviewing the scans, reviewing notes from chart, discussing treatment options with risks and benefits and coordinating care.     Kye Garrido MD      CC:     Asim Cervantes  [address]  [facility] [id number]

## 2023-05-16 ENCOUNTER — MYC MEDICAL ADVICE (OUTPATIENT)
Dept: NEUROSURGERY | Facility: CLINIC | Age: 68
End: 2023-05-16
Payer: COMMERCIAL

## 2023-05-16 ENCOUNTER — PRE VISIT (OUTPATIENT)
Dept: RADIATION ONCOLOGY | Facility: CLINIC | Age: 68
End: 2023-05-16
Payer: COMMERCIAL

## 2023-05-16 DIAGNOSIS — Z85.46 HISTORY OF PROSTATE CANCER: ICD-10-CM

## 2023-05-16 DIAGNOSIS — C79.31 MALIGNANT NEOPLASM METASTATIC TO BRAIN (H): Primary | ICD-10-CM

## 2023-05-16 NOTE — NURSING NOTE
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 Kingston Greene, Dr. Kye Garrido    Pain at time of consult:  Does pt have a living will:  Does pt have implanted cardiac device:    Has pt fallen in past week:  Does pt feel steady on his feet:       Review Since Diagnosis:    Hx ; Prostate Cancer, Prostatectomy, no adjuvant therapy    22; left parietal scalp lesion shown to his primary, initially observed    ; left parietal scalp lesion felt to be a cyst, lanced and drained     ; the left parietal scalp lesion area would occasionally break open and bleed     ; right leg and foot not working totally right     2023; three episodes of right leg feeling heavy, last episode jerking of leg, no loss of consciousness     4/15/23; pt driving to work, had right leg numbness/heaviness and jerking.  Found by coworkers in parking lot-sitting in grass and confused.  To ER    4/15/23; Brain MRI, 2.2 x 1.7 x 1.9 cm lesion left paramedian posterior frontal lobe near the precentral gyrus                                    2.2 x 2.1 x 2.0 cm lesion left occipital lobe near the lingual gyrus    4/15/23; CT CAP, bilateral pulmonary nodules measuring up to 0.6 cm-indeterminate     Neck CT/Angio, enlarged lymph nodes and additional scalp lesions consistent with head and neck local regional disease     23; crani by Dr. Garrido, biopsy of left frontal brain lesion showed malignant melanoma, this was resected                   Biopsy of left parietal scalp lesion showed malignant melanoma, excision of  this lesion    4/25/23; discharged to acute rehab [facility] in [place]     5/9/23; discharged home with use of walker, remains on keppra    5/10/23; last dose of steroids     5/12/23; pt saw Dr. Greene, unsure scalp lesion is primary or a metastatic lesion.   Wanting PET/CT for extent of disease.  Plan Rad/Onc for brain lesion and surgical cavity and then plan to immediately follow with systemic immunotherapy.  Will know better which agents will be used once PET/CT completed.      5/15/23; pt saw Dr. Garrido, still some numbness right leg and foot.  Currently ambulating with a cane     5/30/23; PET scheduled         Chief Complaint: at consult

## 2023-05-16 NOTE — PROGRESS NOTES
Department of Radiation Oncology                   [place] [address]  [address] [place]  [address]  Office:  [phone]  Fax:  [phone]   Radiation Oncology Clinic  [address] [place] [address]  Phone:  [phone]  Fax:  [phone]     RE: Saeid Santa : 1955   MRN: [id number] CANDY: 2023     OUTPATIENT VISIT NOTE       PROBLEM: Brain metastasis secondary to melanoma      was seen for initial consultation in the Dept of Radiation Oncology on 2023 at the request of Dr. Greene.    HISTORY OF PRESENT ILLNESS: Mr. Santa is a 68 yo male with newly found brain metastases secondary to melanoma.      He has a history of prostate cancer diagnosed in , treated with prostatectomy with no evidence of recurrence. Approximately 2 months ago, he began to notice episodic abnormal movement of the right leg. On 4/15/2023, he developed what he though was aura for his migraine headache, and decided to go home. On the way home, his right leg started shaking again and he pulled over. His colleague followed him an found him sitting in the grass in a park, appeared to be confused, though he did not lose consciousness. He was taken to the ED where a head CT was performed. Imaging revealed hypodense nodules in the left frontal and left occipital lobes with surrounding vasogenic edema. Additionally, there was a 1.5 cm nodule in the left parietal scalp that could represent a sebaceous cyst. CT angiogram showed mild narrowing of the mid and distal M1 segments of the left middle cerebral artery. CT of the chest/abdomen/pelvis revealed indeterminate bilateral pulmonary nodules up to 6 mm.     Mr. Santa was then evaluated with a brain MRI. There were 2 discrete ring-enhancing lesions, one of which was in the left paramedian posterior frontal lobe near the precentral gyrus, measuring 2.2 x 1.7 x 1.9 cm and the other in the left occipital lobe near the lingual gyrus,  measuring 2.2 x 2.1 x 2.0 cm. There were moderate vasogenic edema. He was discharged with Keppra and plan to follow up with Neurosurgery.     He met with Dr. Garrido on 4/17/2023, who recommended resection of the left frontal lesion for tissue diagnosis and possible relief of seizures. He underwent craniotomy on 4/21/2023 for resection of the lesion. Additionally a scalp lesion was also resected as it had been draining, bleeding and growing in size. Pathology revealed:  1) Left frontal tumor: malignant melanoma  2) left parietal scalp excision: malignant melanoma, 10 mm in greatest dimension, deep and lateral margin involved.    Pathologist commented that neither lesion represented a definitive primary melanoma. The scalp lesion was without epidermal melanoma in situ, suggesting the possibility that this could represent a cutaneous metastasis.     Post-op, he was in rehab from 4/27-5/9/2023.     On 5/12/2023, Mr. Santa met with Dr. Greene for a consultation. PET/CT was recommended to complete staging. Dr. Greene discussed immunotherapy.    He followed up with Dr. Garrido on 5/15/2023. He reported sensory issues and weakness of the right dorsiflexion.     Mr. Santa is here today to discuss GK SRS to his brain metastases. He is accompanied by his wife. They were in a hurry to leave the clinic as their PET/CT scan is scheduled this afternoon at 2:15 in [place]. On interview, he states that his right leg is still quite weak and he uses a cane for walking. He also has numbness in his right leg from hip down. He additionally reports sharp sensation in the left lower chest wall. He has not had any partial seizures since the ED visit. He is on 1000 mg Keppra BID. He has lost about 20 lb since his surgery, which he attributes to a decreased appetite. He may have some loss of short-term memory. He denies nausea/vomiting, persistent visual changes, incontinence.          PAST MEDICAL HISTORY:   Past Medical History:  "  Diagnosis Date     HTN (hypertension)      Metastatic melanoma (H)      Prostate cancer (H)      Seizures (H)        Past Surgical History:   Procedure Laterality Date     OPTICAL TRACKING SYSTEM CRANIOTOMY, EXCISE TUMOR, COMBINED Left 04/21/2023    Procedure: stealth assisted awake craniotomy resection of motor strip tumor;  Surgeon: Kye Garrido MD;  Location: [facility] OR     PROSTATECTOMY      2009       CHEMOTHERAPY HISTORY: None. He will be starting systemic therapy by Dr. Greene.     PAST RADIATION THERAPY HISTORY: None    MEDICATIONS:   Current Outpatient Medications   Medication Sig Dispense Refill     acetaminophen (TYLENOL) 325 MG tablet Take 2 tablets (650 mg) by mouth every 4 hours as needed for other (For optimal non-opioid multimodal pain management to improve pain control.)       amLODIPine (NORVASC) 10 MG tablet 1 tablet Orally Once a day       levETIRAcetam (KEPPRA) 1000 MG tablet 1 tablet Orally every 12 hrs         ALLERGIES:  No Known Allergies.    SOCIAL HISTORY:   Former smoker, 0.5 PPD x 20 years    FAMILY HISTORY:   family history is not on file.   No family history of melanoma    Wife has BRCA mutation    REVIEW OF SYMPTOMS:  A full 14-point review of systems was performed. See HPI    PHYSICAL EXAMINATION:    /76   Pulse 78   Resp 16   Ht 1.854 m (6' 1\")   Wt 81.6 kg (180 lb)   SpO2 98%   BMI 23.75 kg/m     Gen: No acute distress, alert and oriented, speech fluent  HEENT: well healed surgical scar across the frontal scalp. No signs of infection; face symmetric, facial muscle movements intact  CV: well perfused  Resp: breathing comfortably on room air  Neuro: RLE 4-5 out of 5. Still decent strength of hip flexion and knee extension, but requires more efforts compared to the left. Decreased sensation to light touch over the right LE.      IMAGING:  Pre-op:      Post-op      ASSESSMENT AND PLAN: In summary, Mr. Santa is a 66 yo gentleman with a newly diagnosed metastatic " melanoma. The primary site could be the scalp, but more likely, from another cutaneous site. He is completing a staging PET/CT later this afternoon. He will be starting systemic therapy with Dr. Greene.    With regard to his brain metastases, he has 2 cystic lesions. The frontal lesion was removed for tissue diagnosis as well as to possibly alleviate seizure. The left occipital lesion is not removed and he is asymptomatic from it.     We discussed that surgical resection of brain metastases is associated with a high risk of local recurrence due to the nature of the surgery. We therefore recommend GK SRS to the surgical cavity to improve local control. His left frontal surgical cavity will be treated with 30 Gy given over 5 fractions. The left occipital lesion is not asymptomatic, and no urgent surgery is needed. We will be treating this lesion with 27 Gy given over 3 fractions.     The logistics and the side effects of the radiation therapy were discussed. He voiced an understanding of potential radiation necrosis.     Mr. Santa will return tomorrow for mask making and treatment planning MRI. He will start treatment next week.     Thank you for allowing us to participate in this patient's care.  Please feel free to call with any questions or concerns.       Benedicto Rose M.D./Ph.D.  Radiation Oncologist   Department of Radiation Oncology  [facility]  Phone: [phone]       I reviewed patient's chart, internal/external medical records, imaging studies (including actual images), labs and pathology reports.  I interviewed and counseled the patient face to face.  I additionally ordered tests and discussed the case with patient's referring physicians and care team.                 Benedicto Rose MD

## 2023-05-18 ENCOUNTER — OFFICE VISIT (OUTPATIENT)
Dept: RADIATION ONCOLOGY | Facility: CLINIC | Age: 68
End: 2023-05-18
Attending: RADIOLOGY
Payer: COMMERCIAL

## 2023-05-18 ENCOUNTER — HOSPITAL ENCOUNTER (OUTPATIENT)
Dept: PET IMAGING | Facility: CLINIC | Age: 68
Discharge: HOME | End: 2023-05-18
Attending: INTERNAL MEDICINE
Payer: COMMERCIAL

## 2023-05-18 VITALS
OXYGEN SATURATION: 98 % | HEIGHT: 73 IN | WEIGHT: 180 LBS | DIASTOLIC BLOOD PRESSURE: 76 MMHG | RESPIRATION RATE: 16 BRPM | BODY MASS INDEX: 23.86 KG/M2 | SYSTOLIC BLOOD PRESSURE: 119 MMHG | HEART RATE: 78 BPM

## 2023-05-18 DIAGNOSIS — C79.31 MALIGNANT NEOPLASM METASTATIC TO BRAIN (H): ICD-10-CM

## 2023-05-18 DIAGNOSIS — Z85.46 HISTORY OF PROSTATE CANCER: ICD-10-CM

## 2023-05-18 DIAGNOSIS — C79.31 METASTASIS TO BRAIN (H): Primary | ICD-10-CM

## 2023-05-18 PROCEDURE — 99204 OFFICE O/P NEW MOD 45 MIN: CPT | Performed by: RADIOLOGY

## 2023-05-18 PROCEDURE — 74177 CT ABD & PELVIS W/CONTRAST: CPT

## 2023-05-18 PROCEDURE — 78816 PET IMAGE W/CT FULL BODY: CPT | Mod: PI

## 2023-05-18 PROCEDURE — G0463 HOSPITAL OUTPT CLINIC VISIT: HCPCS | Mod: 25 | Performed by: RADIOLOGY

## 2023-05-18 PROCEDURE — 70491 CT SOFT TISSUE NECK W/DYE: CPT

## 2023-05-18 PROCEDURE — 250N000011 HC RX IP 250 OP 636: Performed by: INTERNAL MEDICINE

## 2023-05-18 PROCEDURE — A9552 F18 FDG: HCPCS | Performed by: INTERNAL MEDICINE

## 2023-05-18 PROCEDURE — 343N000001 HC RX 343: Performed by: INTERNAL MEDICINE

## 2023-05-18 RX ORDER — IOPAMIDOL 755 MG/ML
10-135 INJECTION, SOLUTION INTRAVASCULAR ONCE
Status: COMPLETED | OUTPATIENT
Start: 2023-05-18 | End: 2023-05-18

## 2023-05-18 RX ADMIN — IOPAMIDOL 100 ML: 755 INJECTION, SOLUTION INTRAVENOUS at 14:41

## 2023-05-18 RX ADMIN — FLUDEOXYGLUCOSE F-18 11.7 MILLICURIE: 500 INJECTION, SOLUTION INTRAVENOUS at 14:35

## 2023-05-18 ASSESSMENT — ENCOUNTER SYMPTOMS
RESPIRATORY NEGATIVE: 1
DOUBLE VISION: 0
VOMITING: 0
BLOOD IN STOOL: 0
FEVER: 0
PHOTOPHOBIA: 1
ABDOMINAL PAIN: 0
CONSTIPATION: 0
SEIZURES: 1
WEIGHT LOSS: 1
BLURRED VISION: 0
DEPRESSION: 0
DIARRHEA: 0
HEADACHES: 1
MUSCULOSKELETAL NEGATIVE: 1

## 2023-05-18 NOTE — PROGRESS NOTES
HPI    Date: 2023   Age: 67 year old  Ethnicity:     Sex: male  : 1955   Lives In: [place]      Diagnosis: Stage IV Melanoma of the scalp, BRAF G466A mutated, brain mets    Prior radiation therapy:   Never, as a child radiated plantars warts    Prior chemotherapy:   Never     RN time with patient: 55 minutes in person  Educated on Gamma Knife: yes    Doctors: Dr. Kingston Greene, Dr. Kye Garrido, Dr. Asim Cervantes-[facility]    Pain at time of consult: pain off and on left side, below left rib cage   Does pt have a living will: pt states he has  Does pt have implanted cardiac device: pt has no implanted cardiac device    Has pt fallen in past week: pt has not fallen  Does pt feel steady on his feet:  Pt uses a cane and walker      Review Since Diagnosis:    2009; Prostate Cancer, Prostatectomy, no adjuvant therapy    2022; pt was in for annual physical, wanted him to look at lesion    ; left parietal scalp lesion felt to be a cyst, lanced and drained     ; the left parietal scalp lesion area would occasionally break open and bleed     ; right leg and foot not working totally right     March/2023; three episodes of right leg feeling heavy, last episode jerking of leg, lasted a minute or two, no loss of consciousness     4/15/23; pt driving from workm as had a migraine , had right leg numbness/heaviness and jerking.  A co worker was following on the same route and noticed pt having problems so assisted him when  in parking lot-sitting in grass and confused.  To ER via ambulance to [place]    4/15/23; Brain MRI, 2.2 x 1.7 x 1.9 cm lesion left paramedian posterior frontal lobe near the precentral gyrus                                    2.2 x 2.1 x 2.0 cm lesion left occipital lobe near the lingual gyrus    4/15/23; CT CAP, bilateral pulmonary nodules measuring up to 0.6 cm-indeterminate     Neck CT/Angio, enlarged lymph nodes and additional scalp lesions  consistent with head and neck local regional disease    Pt was not admitted but referred to Dr. Garrido     4/21/23; crani by Dr. Garrido, biopsy of left frontal brain lesion showed malignant melanoma, this was resected                      Biopsy of left parietal scalp lesion showed malignant melanoma, excision of this lesion    4/25/23; discharged to acute rehab [facility]     5/9/23; discharged home with use of walker, remains on keppra    5/10/23; last dose of steroids     5/12/23; pt saw Dr. Greene, unsure scalp lesion is primary or a metastatic lesion.   Wanting PET/CT for extent of disease.  Plan Rad/Onc for brain lesion and surgical cavity and then plan to immediately follow with systemic immunotherapy.  Will know better which agents will be used once PET/CT completed.      5/15/23; pt saw Dr. Garrido, still some numbness right leg and foot.  Currently ambulating with a cane     5/18/23; PET         Chief Complaint:seizures, headaches associated with the seizure activity but otherwise no, pt having srobbing lights right eye only occurring with sezures, maybe one episode that did not coincide with seizure, no seizure since 4/15/23, pt notes speech therapist in acute rehab felt some short term memory  Review of Systems   Constitutional: Positive for malaise/fatigue and weight loss. Negative for fever.        Pt lost 20 lbs since crani, pt was having decrease in appetite   HENT: Negative for hearing loss.    Eyes: Positive for photophobia. Negative for blurred vision and double vision.   Respiratory: Negative.    Cardiovascular: Negative for chest pain and leg swelling.   Gastrointestinal: Negative for abdominal pain, blood in stool, constipation, diarrhea and vomiting.   Genitourinary: Negative.    Musculoskeletal: Negative.    Neurological: Positive for seizures and headaches.   Psychiatric/Behavioral: Negative for depression.

## 2023-05-18 NOTE — LETTER
2023         RE: Saeid Santa  [address] [facility] [address]        Dear Colleague,    Thank you for referring your patient, Saeid Santa, to the [facility] RADIATION ONCOLOGY GAMMA KNIFE. Please see a copy of my visit note below.    Department of Radiation Oncology                   [place] [address] [place]  [address]  Office:  [phone]  Fax:  [phone]   Radiation Oncology Clinic  [address] [place] [address]  Phone:  [phone]  Fax:  [phone]     RE: Saeid Santa : 1955   MRN: [id number] CANDY: 2023     OUTPATIENT VISIT NOTE       PROBLEM: Brain metastasis secondary to melanoma      was seen for initial consultation in the Dept of Radiation Oncology on 2023 at the request of Dr. Greene.    HISTORY OF PRESENT ILLNESS: Mr. Santa is a 68 yo male with newly found brain metastases secondary to melanoma.      He has a history of prostate cancer diagnosed in , treated with prostatectomy with no evidence of recurrence. Approximately 2 months ago, he began to notice episodic abnormal movement of the right leg. On 4/15/2023, he developed what he though was aura for his migraine headache, and decided to go home. On the way home, his right leg started shaking again and he pulled over. His colleague followed him an found him sitting in the grass in a park, appeared to be confused, though he did not lose consciousness. He was taken to the ED where a head CT was performed. Imaging revealed hypodense nodules in the left frontal and left occipital lobes with surrounding vasogenic edema. Additionally, there was a 1.5 cm nodule in the left parietal scalp that could represent a sebaceous cyst. CT angiogram showed mild narrowing of the mid and distal M1 segments of the left middle cerebral artery. CT of the chest/abdomen/pelvis revealed indeterminate bilateral pulmonary nodules up to 6 mm.     Mr. Santa was then  evaluated with a brain MRI. There were 2 discrete ring-enhancing lesions, one of which was in the left paramedian posterior frontal lobe near the precentral gyrus, measuring 2.2 x 1.7 x 1.9 cm and the other in the left occipital lobe near the lingual gyrus, measuring 2.2 x 2.1 x 2.0 cm. There were moderate vasogenic edema. He was discharged with Keppra and plan to follow up with Neurosurgery.     He met with Dr. Garrido on 4/17/2023, who recommended resection of the left frontal lesion for tissue diagnosis and possible relief of seizures. He underwent craniotomy on 4/21/2023 for resection of the lesion. Additionally a scalp lesion was also resected as it had been draining, bleeding and growing in size. Pathology revealed:  1) Left frontal tumor: malignant melanoma  2) left parietal scalp excision: malignant melanoma, 10 mm in greatest dimension, deep and lateral margin involved.    Pathologist commented that neither lesion represented a definitive primary melanoma. The scalp lesion was without epidermal melanoma in situ, suggesting the possibility that this could represent a cutaneous metastasis.     Post-op, he was in rehab from 4/27-5/9/2023.     On 5/12/2023, Mr. Santa met with Dr. Greene for a consultation. PET/CT was recommended to complete staging. Dr. Greene discussed immunotherapy.    He followed up with Dr. Garrido on 5/15/2023. He reported sensory issues and weakness of the right dorsiflexion.     Mr. Santa is here today to discuss GK SRS to his brain metastases. He is accompanied by his wife. They were in a hurry to leave the clinic as their PET/CT scan is scheduled this afternoon at 2:15 in [place]. On interview, he states that his right leg is still quite weak and he uses a cane for walking. He also has numbness in his right leg from hip down. He additionally reports sharp sensation in the left lower chest wall. He has not had any partial seizures since the ED visit. He is on 1000 mg Keppra BID. He  "has lost about 20 lb since his surgery, which he attributes to a decreased appetite. He may have some loss of short-term memory. He denies nausea/vomiting, persistent visual changes, incontinence.          PAST MEDICAL HISTORY:   Past Medical History:   Diagnosis Date    HTN (hypertension)     Metastatic melanoma (H)     Prostate cancer (H)     Seizures (H)        Past Surgical History:   Procedure Laterality Date    OPTICAL TRACKING SYSTEM CRANIOTOMY, EXCISE TUMOR, COMBINED Left 04/21/2023    Procedure: stealth assisted awake craniotomy resection of motor strip tumor;  Surgeon: Kye Garrido MD;  Location: [facility] OR    PROSTATECTOMY      2009       CHEMOTHERAPY HISTORY: None. He will be starting systemic therapy by Dr. Greene.     PAST RADIATION THERAPY HISTORY: None    MEDICATIONS:   Current Outpatient Medications   Medication Sig Dispense Refill    acetaminophen (TYLENOL) 325 MG tablet Take 2 tablets (650 mg) by mouth every 4 hours as needed for other (For optimal non-opioid multimodal pain management to improve pain control.)      amLODIPine (NORVASC) 10 MG tablet 1 tablet Orally Once a day      levETIRAcetam (KEPPRA) 1000 MG tablet 1 tablet Orally every 12 hrs         ALLERGIES:  No Known Allergies.    SOCIAL HISTORY:   Former smoker, 0.5 PPD x 20 years    FAMILY HISTORY:   family history is not on file.   No family history of melanoma    Wife has BRCA mutation    REVIEW OF SYMPTOMS:  A full 14-point review of systems was performed. See HPI    PHYSICAL EXAMINATION:    /76   Pulse 78   Resp 16   Ht 1.854 m (6' 1\")   Wt 81.6 kg (180 lb)   SpO2 98%   BMI 23.75 kg/m     Gen: No acute distress, alert and oriented, speech fluent  HEENT: well healed surgical scar across the frontal scalp. No signs of infection; face symmetric, facial muscle movements intact  CV: well perfused  Resp: breathing comfortably on room air  Neuro: RLE 4-5 out of 5. Still decent strength of hip flexion and knee " extension, but requires more efforts compared to the left. Decreased sensation to light touch over the right LE.      IMAGING:  Pre-op:      Post-op      ASSESSMENT AND PLAN: In summary, Mr. Santa is a 68 yo gentleman with a newly diagnosed metastatic melanoma. The primary site could be the scalp, but more likely, from another cutaneous site. He is completing a staging PET/CT later this afternoon. He will be starting systemic therapy with Dr. Greene.    With regard to his brain metastases, he has 2 cystic lesions. The frontal lesion was removed for tissue diagnosis as well as to possibly alleviate seizure. The left occipital lesion is not removed and he is asymptomatic from it.     We discussed that surgical resection of brain metastases is associated with a high risk of local recurrence due to the nature of the surgery. We therefore recommend GK SRS to the surgical cavity to improve local control. His left frontal surgical cavity will be treated with 30 Gy given over 5 fractions. The left occipital lesion is not asymptomatic, and no urgent surgery is needed. We will be treating this lesion with 27 Gy given over 3 fractions.     The logistics and the side effects of the radiation therapy were discussed. He voiced an understanding of potential radiation necrosis.     Mr. Santa will return tomorrow for mask making and treatment planning MRI. He will start treatment next week.     Thank you for allowing us to participate in this patient's care.  Please feel free to call with any questions or concerns.       Benedicto Rose M.D./Ph.D.  Radiation Oncologist   Department of Radiation Oncology  [facility]  Phone: [phone]       I reviewed patient's chart, internal/external medical records, imaging studies (including actual images), labs and pathology reports.  I interviewed and counseled the patient face to face.  I additionally ordered tests and discussed the case with patient's referring  physicians and care team.                 Benedicto Rose MD      HPI    Date: 2023   Age: 67 year old  Ethnicity:     Sex: male  : 1955   Lives In: [facility] [place]      Diagnosis: Stage IV Melanoma of the scalp, BRAF G466A mutated, brain mets    Prior radiation therapy:   Never, as a child radiated plantars warts    Prior chemotherapy:   Never     RN time with patient: 55 minutes in person  Educated on Gamma Knife: yes    Doctors: Dr. Kingston Greene, Dr. Kye Garrido, Dr. Asim [facility]    Pain at time of consult: pain off and on left side, below left rib cage   Does pt have a living will: pt states he has  Does pt have implanted cardiac device: pt has no implanted cardiac device    Has pt fallen in past week: pt has not fallen  Does pt feel steady on his feet:  Pt uses a cane and walker      Review Since Diagnosis:  2009; Prostate Cancer, Prostatectomy, no adjuvant therapy  2022; pt was in for annual physical, wanted him to look at lesion  ; left parietal scalp lesion felt to be a cyst, lanced and drained   ; the left parietal scalp lesion area would occasionally break open and bleed   ; right leg and foot not working totally right   2023; three episodes of right leg feeling heavy, last episode jerking of leg, lasted a minute or two, no loss of consciousness   4/15/23; pt driving from workm as had a migraine , had right leg numbness/heaviness and jerking.  A co worker was following on the same route and noticed pt having problems so assisted him when  in parking lot-sitting in grass and confused.  To ER via ambulance to [place]  4/15/23; Brain MRI, 2.2 x 1.7 x 1.9 cm lesion left paramedian posterior frontal lobe near the precentral gyrus                                  2.2 x 2.1 x 2.0 cm lesion left occipital lobe near the lingual gyrus  4/15/23; CT CAP, bilateral pulmonary nodules measuring up to 0.6 cm-indeterminate   Neck CT/Angio, enlarged lymph  nodes and additional scalp lesions consistent with head and neck local regional disease  Pt was not admitted but referred to Dr. Garrido   4/21/23; crani by Dr. Garrido, biopsy of left frontal brain lesion showed malignant melanoma, this was resected                  Biopsy of left parietal scalp lesion showed malignant melanoma, excision of this lesion  4/25/23; discharged to acute rehab [place] in [place]   5/9/23; discharged home with use of walker, remains on keppra  5/10/23; last dose of steroids   5/12/23; pt saw Dr. Greene, unsure scalp lesion is primary or a metastatic lesion.   Wanting PET/CT for extent of disease.  Plan Rad/Onc for brain lesion and surgical cavity and then plan to immediately follow with systemic immunotherapy.  Will know better which agents will be used once PET/CT completed.    5/15/23; pt saw Dr. Garrido, still some numbness right leg and foot.  Currently ambulating with a cane   5/18/23; PET       Chief Complaint:seizures, headaches associated with the seizure activity but otherwise no, pt having srobbing lights right eye only occurring with sezures, maybe one episode that did not coincide with seizure, no seizure since 4/15/23, pt notes speech therapist in acute rehab felt some short term memory  Review of Systems   Constitutional: Positive for malaise/fatigue and weight loss. Negative for fever.        Pt lost 20 lbs since crani, pt was having decrease in appetite   HENT: Negative for hearing loss.    Eyes: Positive for photophobia. Negative for blurred vision and double vision.   Respiratory: Negative.    Cardiovascular: Negative for chest pain and leg swelling.   Gastrointestinal: Negative for abdominal pain, blood in stool, constipation, diarrhea and vomiting.   Genitourinary: Negative.    Musculoskeletal: Negative.    Neurological: Positive for seizures and headaches.   Psychiatric/Behavioral: Negative for depression.                   Again, thank you for allowing me to  participate in the care of your patient.        Sincerely,        Benedicto Rose MD

## 2023-05-19 ENCOUNTER — VIRTUAL VISIT (OUTPATIENT)
Dept: ONCOLOGY | Facility: CLINIC | Age: 68
End: 2023-05-19
Attending: RADIOLOGY
Payer: COMMERCIAL

## 2023-05-19 ENCOUNTER — HOSPITAL ENCOUNTER (OUTPATIENT)
Dept: MRI IMAGING | Facility: CLINIC | Age: 68
Discharge: HOME | End: 2023-05-19
Attending: RADIOLOGY
Payer: COMMERCIAL

## 2023-05-19 ENCOUNTER — OFFICE VISIT (OUTPATIENT)
Dept: RADIATION ONCOLOGY | Facility: CLINIC | Age: 68
End: 2023-05-19
Attending: RADIOLOGY
Payer: COMMERCIAL

## 2023-05-19 DIAGNOSIS — C79.31 METASTASIS TO BRAIN (H): Primary | ICD-10-CM

## 2023-05-19 DIAGNOSIS — C79.31 MALIGNANT NEOPLASM METASTATIC TO BRAIN (H): ICD-10-CM

## 2023-05-19 DIAGNOSIS — C79.31 METASTASIS TO BRAIN (H): ICD-10-CM

## 2023-05-19 DIAGNOSIS — C43.4 MALIGNANT MELANOMA OF SCALP (H): Primary | ICD-10-CM

## 2023-05-19 PROCEDURE — 77334 RADIATION TREATMENT AID(S): CPT | Performed by: RADIOLOGY

## 2023-05-19 PROCEDURE — 99215 OFFICE O/P EST HI 40 MIN: CPT | Mod: 95 | Performed by: INTERNAL MEDICINE

## 2023-05-19 PROCEDURE — 77334 RADIATION TREATMENT AID(S): CPT | Mod: 26 | Performed by: RADIOLOGY

## 2023-05-19 PROCEDURE — 255N000002 HC RX 255 OP 636: Performed by: RADIOLOGY

## 2023-05-19 PROCEDURE — 77290 THER RAD SIMULAJ FIELD CPLX: CPT | Performed by: RADIOLOGY

## 2023-05-19 PROCEDURE — 77290 THER RAD SIMULAJ FIELD CPLX: CPT | Mod: 26 | Performed by: RADIOLOGY

## 2023-05-19 PROCEDURE — 70552 MRI BRAIN STEM W/DYE: CPT | Mod: 26 | Performed by: STUDENT IN AN ORGANIZED HEALTH CARE EDUCATION/TRAINING PROGRAM

## 2023-05-19 PROCEDURE — A9585 GADOBUTROL INJECTION: HCPCS | Performed by: RADIOLOGY

## 2023-05-19 PROCEDURE — 70552 MRI BRAIN STEM W/DYE: CPT

## 2023-05-19 RX ORDER — LORAZEPAM 2 MG/ML
0.5 INJECTION INTRAMUSCULAR EVERY 4 HOURS PRN
Status: CANCELLED | OUTPATIENT
Start: 2023-06-07

## 2023-05-19 RX ORDER — ALBUTEROL SULFATE 90 UG/1
1-2 AEROSOL, METERED RESPIRATORY (INHALATION)
Status: CANCELLED
Start: 2023-06-07

## 2023-05-19 RX ORDER — MEPERIDINE HYDROCHLORIDE 25 MG/ML
25 INJECTION INTRAMUSCULAR; INTRAVENOUS; SUBCUTANEOUS EVERY 30 MIN PRN
Status: CANCELLED | OUTPATIENT
Start: 2023-06-07

## 2023-05-19 RX ORDER — METHYLPREDNISOLONE SODIUM SUCCINATE 125 MG/2ML
125 INJECTION, POWDER, LYOPHILIZED, FOR SOLUTION INTRAMUSCULAR; INTRAVENOUS
Status: CANCELLED
Start: 2023-06-07

## 2023-05-19 RX ORDER — HEPARIN SODIUM,PORCINE 10 UNIT/ML
5 VIAL (ML) INTRAVENOUS
Status: CANCELLED | OUTPATIENT
Start: 2023-06-07

## 2023-05-19 RX ORDER — HEPARIN SODIUM (PORCINE) LOCK FLUSH IV SOLN 100 UNIT/ML 100 UNIT/ML
5 SOLUTION INTRAVENOUS
Status: CANCELLED | OUTPATIENT
Start: 2023-06-07

## 2023-05-19 RX ORDER — GADOBUTROL 604.72 MG/ML
10 INJECTION INTRAVENOUS ONCE
Status: COMPLETED | OUTPATIENT
Start: 2023-05-19 | End: 2023-05-19

## 2023-05-19 RX ORDER — ALBUTEROL SULFATE 0.83 MG/ML
2.5 SOLUTION RESPIRATORY (INHALATION)
Status: CANCELLED | OUTPATIENT
Start: 2023-06-07

## 2023-05-19 RX ORDER — EPINEPHRINE 1 MG/ML
0.3 INJECTION, SOLUTION INTRAMUSCULAR; SUBCUTANEOUS EVERY 5 MIN PRN
Status: CANCELLED | OUTPATIENT
Start: 2023-06-07

## 2023-05-19 RX ORDER — DIPHENHYDRAMINE HYDROCHLORIDE 50 MG/ML
50 INJECTION INTRAMUSCULAR; INTRAVENOUS
Status: CANCELLED
Start: 2023-06-07

## 2023-05-19 RX ADMIN — GADOBUTROL 8 ML: 604.72 INJECTION INTRAVENOUS at 07:21

## 2023-05-19 NOTE — LETTER
2023         RE: Saeid Santa  [address]  [facility] [address]        Dear Colleague,    Thank you for referring your patient, Saeid Santa, to the [facility] RADIATION ONCOLOGY GAMMA KNIFE. Please see a copy of my visit note below.    Name: Saeid Santa  :1955    Medical Record #: [id number]  Diagnosis: Melanoma  Date of Treatment Plannin2023    Gamma Knife Simulation Note       After a thorough review of this patient s clinical and radiographic data, I determined that fractionated stereotactic radiosurgery using the ICON Gamma Knife was indicated as explained in my consult with the patient.     The patient was brought to the Gamma Knife suite.  The mask was fabricated consisting of an accuform mold at the posterior skull and a special aquaplast mask over the face and chin.        The High Definition Motion Management (HDMM) system consists of an infrared stereoscopic camera, a set of reference markers, and a patient marker will be placed.    A cone beam CT scan will be done to use as reference and for fusion.      A stereotactic brain planning MRI with gadolinium  will be performed.  The MRI images will be transferred electronically to the Gamma Knife treatment planning computer.  The [employer] Gamma Knife treatment planning software will be used to design the optimal treatment plan.      procedures will be done prior to treatment.        Aubree Saldana MD      Again, thank you for allowing me to participate in the care of your patient.        Sincerely,        Aubree Saldana MD

## 2023-05-19 NOTE — PROGRESS NOTES
Name: Saeid Santa  :1955    Medical Record #: [id number]  Diagnosis: Melanoma  Date of Treatment Plannin2023    Gamma Knife Simulation Note       After a thorough review of this patient s clinical and radiographic data, I determined that fractionated stereotactic radiosurgery using the ICON Gamma Knife was indicated as explained in my consult with the patient.     The patient was brought to the Gamma Knife suite.  The mask was fabricated consisting of an accuform mold at the posterior skull and a special aquaplast mask over the face and chin.        The High Definition Motion Management (HDMM) system consists of an infrared stereoscopic camera, a set of reference markers, and a patient marker will be placed.    A cone beam CT scan will be done to use as reference and for fusion.      A stereotactic brain planning MRI with gadolinium  will be performed.  The MRI images will be transferred electronically to the Gamma Knife treatment planning computer.  The [employer] Gamma Knife treatment planning software will be used to design the optimal treatment plan.      procedures will be done prior to treatment.        Aubree Saldana MD

## 2023-05-19 NOTE — LETTER
5/19/2023         RE: Saeid Santa  [address]  [facility] [address]        Dear Colleague,    Thank you for referring your patient, Saeid Santa, to the [facility]. Please see a copy of my visit note below.    Virtual Visit Details    Type of service:  Video Visit   Video Start Time: 2:08 PM  Video End Time: 2:45 PM    Originating Location (pt. Location): Home    Distant Location (provider location):  On-site  Platform used for Video Visit: [facility] ONCOLOGY PROGRESS NOTE  Melanoma Clinic  May 19, 2023    Chief Complaint: metastatic melanoma to brain    Melanoma History:  --8/16/22, he brings a left parietal scalp lesion to attention of his GP. It is initially observed.  --November 2022, the left parietal scalp lesion was felt to be a cyst, and the left parietal lesion was lanced and drained via scalp incision.  --January 2023, the left parietal scalp cyst would occasionally break open, drain, and bleed, largely at night.   --February 2023, he notes some mild difficulty clearing obstacles with right leg and foot.  --March/April 2023, he has 3 progressively worsening episodes over a span 3 weeks including the right leg. At first he notes the onset of right leg numbness and heaviness, like he was carrying 50 lbs of water on the leg. Then, about a week or so later, the leg began to jerk and converse while he was in the basement. Neither episode associated with loss of consciousness  --4/15/23, he recalls the right leg numbness, heaviness, jerking while driving between work. He had been found by coworkers in the parking lot, sitting in the grass, appearing confused. He was brought to the ER by EMS. CT-CAP, showed small (<6mm), bilateral indeterminate pulmonary nodules. CT-head and MRI brain showed 2 discrete ring-enhancing lesions in the left paramedian posterior frontal lobe near the precentral gyrus measuring approximately 2.2 x 1.7 x 1.9 cm and left occipital lobe  near the lingual gyrus measuring approximately 2.2 x 2.1 x 2.0 cm. The occipital lesion encroaches upon the subependymal surface of the left lateral ventricle occipital horn and contacts the superior surface of the medial left tentorium. The lesions are accompanied by moderate surrounding vasogenic edema. Surgery was recommended.  --4/21/23, underwent left awake (sleep-awake-sleep) craniotomy for tumor resection, he has resection of the left frontal, motor strip tumor as well asan elliptical incision of the left parietal scalp lesion. On pathology, both the scalp excision and brain mass are positive for malignant melanoma. Tumor cells are positive for S-100 and Melan-A, and negative for cytokeratin AE1/AE3, NKX3.1, GFAP, ERG, CD3, and CD20.  CD3 highlights brisk infiltration of the tumor by T lymphocytes, while CD20 highlights scattered positive B lymphocytes. NGS identifies a BRAF G466A mutation, a class 3 mutation insensitive to BRAF kinase inhibitor monotherapy. TMB is high at 48.754 muts/Mb.   --4/22/23, post-operative MRI shows post-operative changes of the parietal craniotomy of the left frontal lobe metastasis. The other 1.7 x 1.1 cm cystic lesion in the left occipital lobe is also seen. No new lesions appreciated.  -- 4/25/23 to 5/9/23, discharged from the hospital to acute rehab stay at [facility] in [place]. He participated in a comprehensive rehabilitation program consisting of PT, OT, Speech, Psychology and Recreation Therapy. He did well and was discharged home with use of a walker.      Interval History:  Saeid Santa is a 67 year old right-handed male from [place], [place] with melanoma of the scalp, metastatic to brain. He presents via video visit today.    He had PET-CT prior to today's visit due to concern for potentially involved neck lymph nodes. He has mild FDG uptake in soft tissue nodules in the scalp, in the known left occipital brain metastasis, as well as in the left lower neck  region.    He has no new complaints. He has met with radiation oncology and they plan gamma knife radiation to 30 Gy in 5 fractions to the surgical cavity and 27 Gy in 3 fractions to the occipital lobe lesion.      Review of Systems:  A 12-point ROS negative except as in HPI    Current Outpatient Medications   Medication Sig Dispense Refill    acetaminophen (TYLENOL) 325 MG tablet Take 2 tablets (650 mg) by mouth every 4 hours as needed for other (For optimal non-opioid multimodal pain management to improve pain control.)      amLODIPine (NORVASC) 10 MG tablet 1 tablet Orally Once a day      levETIRAcetam (KEPPRA) 1000 MG tablet 1 tablet Orally every 12 hrs         No Known Allergies  Immunization History   Administered Date(s) Administered    COVID-19 Bivalent 12+ (Pfizer) 12/26/2022    COVID-19 Monovalent 12+ (Pfizer 2022) 05/10/2022    COVID-19 Vaccine (Chadd) 03/08/2021       Past Medical History:   Diagnosis Date    HTN (hypertension)     Metastatic melanoma (H)     Prostate cancer (H)     Seizures (H)        Past Surgical History:   Procedure Laterality Date    OPTICAL TRACKING SYSTEM CRANIOTOMY, EXCISE TUMOR, COMBINED Left 04/21/2023    Procedure: stealth assisted awake craniotomy resection of motor strip tumor;  Surgeon: Kye Garrido MD;  Location: [facility] OR    PROSTATECTOMY      2009       Social History: He is . Works for [employer]. His wife has BRCA gene mutation in her family and has managed cancer in the family previously.  History   Smoking Status    Former    Packs/day: 0.50    Years: 20.00    Types: Cigarettes    Quit date: 1/4/2017   Smokeless Tobacco    Never    Social History    Substance and Sexual Activity      Alcohol use: Yes        Comment: socially     History   Drug Use    Types: Marijuana     Comment: typically smoked daily but no use for past two weeks as of 4/20/23       Family History:  No family history of melanoma.    Physical Examination:  There were no vitals taken for this  visit.  Wt Readings from Last 5 Encounters:   05/18/23 81.6 kg (180 lb)   05/15/23 82.1 kg (181 lb)   05/12/23 82.5 kg (181 lb 12.8 oz)   04/23/23 86.1 kg (189 lb 13.1 oz)   04/19/23 87.1 kg (192 lb)   GENERAL: Well appearing, alert and no distress  EYES: Eyes grossly normal to inspection.  No discharge or erythema, or obvious scleral/conjunctival abnormalities.  HENT: Normal cephalic/atraumatic. Craniotomy incision evident left paramedian fronto-parietal scalp, cdi.  External ears, nose and mouth without ulcers or lesions.  No nasal drainage visible.  NECK: No asymmetry, visible masses or scars. No significant palpable adenopathy.  RESP: No audible wheeze, cough, or visible cyanosis.  No visible retractions or increased work of breathing.    SKIN: Visible skin clear. No significant rash, abnormal pigmentation or lesions. There are palpable subcutaneous scalp lesions in the right and left parietal scalp.  NEURO: Cranial nerves grossly intact.  Mentation and speech appropriate for age. Uses a walker to get around and transfers to wheelchair well. Has some mild foot drop on the right.  PSYCH: Mentation appears normal, affect normal/bright, judgement and insight intact, normal speech and appearance well-groomed.      Laboratory Data:  N/A    Imaging Studies:    EXAM: PET ONCOLOGY WHOLE BODY, CT CHEST/ABDOMEN/PELVIS W CONTRAST, CT SOFT TISSUE NECK W CONTRAST  LOCATION: [facility]  DATE/TIME: 5/18/2023 3:58 PM CDT     INDICATION: Melanoma, staging. Malignant melanoma of scalp and neck. Malignant neoplasm metastatic to brain. Malignant neoplasm of frontal lobe. Status post craniotomy and tumor resection 04/21/2023 demonstrating melanoma metastasis. Prostate cancer,   restaging. Malignant neoplasm of prostate.  COMPARISON: MRI brain 04/22/2023, CT chest abdomen pelvis 04/15/2023 reviewed.  CONTRAST: 100 mL Isovue-370  TECHNIQUE: Serum glucose level 80 mg/dL. One hour post intravenous  administration of 11.7 mCi F-18 FDG, PET imaging was performed from the skull vertex to feet, utilizing attenuation correction with concurrent axial CT and PET/CT image fusion. Separate   diagnostic CT of the neck, chest, abdomen, and pelvis was performed. Dose reduction techniques were used.     PET/CT FINDINGS: Focal brain lesion involving the left occipital lobe with markedly FDG avid soft tissue along its medial aspect (SUVmax 18.7), consistent with a metastasis. Postoperative changes left frontal craniotomy with area of photopenia at left   frontal lesion resection site. A small but asymmetric and FDG avid lymph node in the left lower neck demonstrates moderate uptake (SUVmax 6.4), suspicious for a metastasis (axial fused image 102). Normal physiologic FDG uptake elsewhere. No FDG avid   adenopathy elsewhere. No suspicious focal uptake in the liver or skeleton.     CT FINDINGS: Mild reticular scarring or atelectasis in the lower lungs. Few scattered tiny pulmonary nodules are unchanged without FDG activity although too small for PET characterization. Mild colonic diverticulosis. Mild atherosclerotic calcifications.   Mild scattered degenerative changes in the spine.                                                                      IMPRESSION:  Findings suspicious melanoma metastases involving the left occipital lobe and a lymph node in the left lower neck.     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 Saeid Santa is a very pleasant 67 year old man with a diagnosis of melanoma of the scalp with metastasis to the brain. We reviewed the association between scalp melanoma primary tumors and brain metastasis, and we discussed the likelihood that the scalp is the primary origin of the melanoma.    Staging studies performed during his recent hospitalization include CT-CAP, which shows small, bilateral indeterminate pulmonary nodules and MRI-brain. A neck  CT-angio was obtained, which also appears to show some enlarged lymph nodes and additional scalp lesions consistent with head and neck local regional disease. PET-CT for restaging showed an additional FDG avid lymph node in the left lower neck.    He has seen radiation therapy and they plan to start radiation therapy in the next week or so. He is scheduled to be done with radiation by 5/31.    We will plan to start immunotherapy the following week. We discussed monotherapy versus dual checkpoint blockade. I recommended he consider starting with checkpoint inhibitor monotherapy given 40% response rate and risk of toxicity of 10-20%. He also has high TMB making his response potentially higher yet. I contrasted anti-PD1 therapy with dual checkpoint blockade, which has higher response rates of up about 47% with Opdualag or 65% Ipi/Nivo, but higher overall toxicity I.e. 50% risk of diarrhea/colitis, 15% hepatitis, 5% risk of hypophysitis with ipi/nivo, which in general is associated with about twice the risk of toxicity as with anti-PD1 monotherapy. Similarly, Opdualag is intermediate to single agent therapy and ipi/nivo.    He is inclined to start less aggressive therapy and increase aggressiveness as needed.    PLAN  1. Return first week of June with AMANDA visit prior to infusion of Keytruda  2. RTC to see me 3 months after treatment start, with CT-head, neck, CAP.    #2 Hypertension  Continue amlodipine daily    #3 Seizure, secondary to brain metastases  Continue Keppra 1000 mg twice daily    #3 History of prostate cancer (Xiomara 3 + 3) s/p prostatectomy (2009)  He had no adverse features and did not receive adjuvant therapy. PSA has remained negative. Continue with yearly PSA.    Abdullahi Lynch M.D.   of Medicine  Hematology, Oncology and Transplantation  Pager: [phone]

## 2023-05-19 NOTE — PROGRESS NOTES
Virtual Visit Details    Type of service:  Video Visit   Video Start Time: 2:08 PM  Video End Time: 2:45 PM    Originating Location (pt. Location): Home    Distant Location (provider location):  On-site  Platform used for Video Visit: [facility] ONCOLOGY PROGRESS NOTE  Melanoma Clinic  May 19, 2023    Chief Complaint: metastatic melanoma to brain    Melanoma History:  --8/16/22, he brings a left parietal scalp lesion to attention of his GP. It is initially observed.  --November 2022, the left parietal scalp lesion was felt to be a cyst, and the left parietal lesion was lanced and drained via scalp incision.  --January 2023, the left parietal scalp cyst would occasionally break open, drain, and bleed, largely at night.   --February 2023, he notes some mild difficulty clearing obstacles with right leg and foot.  --March/April 2023, he has 3 progressively worsening episodes over a span 3 weeks including the right leg. At first he notes the onset of right leg numbness and heaviness, like he was carrying 50 lbs of water on the leg. Then, about a week or so later, the leg began to jerk and converse while he was in the basement. Neither episode associated with loss of consciousness  --4/15/23, he recalls the right leg numbness, heaviness, jerking while driving between work. He had been found by coworkers in the parking lot, sitting in the grass, appearing confused. He was brought to the ER by EMS. CT-CAP, showed small (<6mm), bilateral indeterminate pulmonary nodules. CT-head and MRI brain showed 2 discrete ring-enhancing lesions in the left paramedian posterior frontal lobe near the precentral gyrus measuring approximately 2.2 x 1.7 x 1.9 cm and left occipital lobe near the lingual gyrus measuring approximately 2.2 x 2.1 x 2.0 cm. The occipital lesion encroaches upon the subependymal surface of the left lateral ventricle occipital horn and contacts the superior surface of the medial left tentorium. The lesions are  accompanied by moderate surrounding vasogenic edema. Surgery was recommended.  --4/21/23, underwent left awake (sleep-awake-sleep) craniotomy for tumor resection, he has resection of the left frontal, motor strip tumor as well asan elliptical incision of the left parietal scalp lesion. On pathology, both the scalp excision and brain mass are positive for malignant melanoma. Tumor cells are positive for S-100 and Melan-A, and negative for cytokeratin AE1/AE3, NKX3.1, GFAP, ERG, CD3, and CD20.  CD3 highlights brisk infiltration of the tumor by T lymphocytes, while CD20 highlights scattered positive B lymphocytes. NGS identifies a BRAF G466A mutation, a class 3 mutation insensitive to BRAF kinase inhibitor monotherapy. TMB is high at 48.754 muts/Mb.   --4/22/23, post-operative MRI shows post-operative changes of the parietal craniotomy of the left frontal lobe metastasis. The other 1.7 x 1.1 cm cystic lesion in the left occipital lobe is also seen. No new lesions appreciated.  -- 4/25/23 to 5/9/23, discharged from the hospital to acute rehab stay at [facility] in [place]. He participated in a comprehensive rehabilitation program consisting of PT, OT, Speech, Psychology and Recreation Therapy. He did well and was discharged home with use of a walker.      Interval History:  Saeid Snata is a 67 year old right-handed male from [place], [place] with melanoma of the scalp, metastatic to brain. He presents via video visit today.    He had PET-CT prior to today's visit due to concern for potentially involved neck lymph nodes. He has mild FDG uptake in soft tissue nodules in the scalp, in the known left occipital brain metastasis, as well as in the left lower neck region.    He has no new complaints. He has met with radiation oncology and they plan gamma knife radiation to 30 Gy in 5 fractions to the surgical cavity and 27 Gy in 3 fractions to the occipital lobe lesion.      Review of Systems:  A 12-point ROS negative except  as in HPI    Current Outpatient Medications   Medication Sig Dispense Refill     acetaminophen (TYLENOL) 325 MG tablet Take 2 tablets (650 mg) by mouth every 4 hours as needed for other (For optimal non-opioid multimodal pain management to improve pain control.)       amLODIPine (NORVASC) 10 MG tablet 1 tablet Orally Once a day       levETIRAcetam (KEPPRA) 1000 MG tablet 1 tablet Orally every 12 hrs         No Known Allergies  Immunization History   Administered Date(s) Administered     COVID-19 Bivalent 12+ (Pfizer) 12/26/2022     COVID-19 Monovalent 12+ (Pfizer 2022) 05/10/2022     COVID-19 Vaccine (Chadd) 03/08/2021       Past Medical History:   Diagnosis Date     HTN (hypertension)      Metastatic melanoma (H)      Prostate cancer (H)      Seizures (H)        Past Surgical History:   Procedure Laterality Date     OPTICAL TRACKING SYSTEM CRANIOTOMY, EXCISE TUMOR, COMBINED Left 04/21/2023    Procedure: stealth assisted awake craniotomy resection of motor strip tumor;  Surgeon: Kye Garrido MD;  Location: [facility] OR     PROSTATECTOMY      2009       Social History: He is . Works for [employer]. His wife has BRCA gene mutation in her family and has managed cancer in the family previously.  History   Smoking Status     Former     Packs/day: 0.50     Years: 20.00     Types: Cigarettes     Quit date: 1/4/2017   Smokeless Tobacco     Never    Social History    Substance and Sexual Activity      Alcohol use: Yes        Comment: socially     History   Drug Use     Types: Marijuana     Comment: typically smoked daily but no use for past two weeks as of 4/20/23       Family History:  No family history of melanoma.    Physical Examination:  There were no vitals taken for this visit.  Wt Readings from Last 5 Encounters:   05/18/23 81.6 kg (180 lb)   05/15/23 82.1 kg (181 lb)   05/12/23 82.5 kg (181 lb 12.8 oz)   04/23/23 86.1 kg (189 lb 13.1 oz)   04/19/23 87.1 kg (192 lb)   GENERAL: Well appearing, alert and no  distress  EYES: Eyes grossly normal to inspection.  No discharge or erythema, or obvious scleral/conjunctival abnormalities.  HENT: Normal cephalic/atraumatic. Craniotomy incision evident left paramedian fronto-parietal scalp, cdi.  External ears, nose and mouth without ulcers or lesions.  No nasal drainage visible.  NECK: No asymmetry, visible masses or scars. No significant palpable adenopathy.  RESP: No audible wheeze, cough, or visible cyanosis.  No visible retractions or increased work of breathing.    SKIN: Visible skin clear. No significant rash, abnormal pigmentation or lesions. There are palpable subcutaneous scalp lesions in the right and left parietal scalp.  NEURO: Cranial nerves grossly intact.  Mentation and speech appropriate for age. Uses a walker to get around and transfers to wheelchair well. Has some mild foot drop on the right.  PSYCH: Mentation appears normal, affect normal/bright, judgement and insight intact, normal speech and appearance well-groomed.      Laboratory Data:  N/A    Imaging Studies:    EXAM: PET ONCOLOGY WHOLE BODY, CT CHEST/ABDOMEN/PELVIS W CONTRAST, CT SOFT TISSUE NECK W CONTRAST  LOCATION: [facility]  DATE/TIME: 5/18/2023 3:58 PM CDT     INDICATION: Melanoma, staging. Malignant melanoma of scalp and neck. Malignant neoplasm metastatic to brain. Malignant neoplasm of frontal lobe. Status post craniotomy and tumor resection 04/21/2023 demonstrating melanoma metastasis. Prostate cancer,   restaging. Malignant neoplasm of prostate.  COMPARISON: MRI brain 04/22/2023, CT chest abdomen pelvis 04/15/2023 reviewed.  CONTRAST: 100 mL Isovue-370  TECHNIQUE: Serum glucose level 80 mg/dL. One hour post intravenous administration of 11.7 mCi F-18 FDG, PET imaging was performed from the skull vertex to feet, utilizing attenuation correction with concurrent axial CT and PET/CT image fusion. Separate   diagnostic CT of the neck, chest, abdomen, and pelvis was  performed. Dose reduction techniques were used.     PET/CT FINDINGS: Focal brain lesion involving the left occipital lobe with markedly FDG avid soft tissue along its medial aspect (SUVmax 18.7), consistent with a metastasis. Postoperative changes left frontal craniotomy with area of photopenia at left   frontal lesion resection site. A small but asymmetric and FDG avid lymph node in the left lower neck demonstrates moderate uptake (SUVmax 6.4), suspicious for a metastasis (axial fused image 102). Normal physiologic FDG uptake elsewhere. No FDG avid   adenopathy elsewhere. No suspicious focal uptake in the liver or skeleton.     CT FINDINGS: Mild reticular scarring or atelectasis in the lower lungs. Few scattered tiny pulmonary nodules are unchanged without FDG activity although too small for PET characterization. Mild colonic diverticulosis. Mild atherosclerotic calcifications.   Mild scattered degenerative changes in the spine.                                                                      IMPRESSION:  Findings suspicious melanoma metastases involving the left occipital lobe and a lymph node in the left lower neck.     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 Saeid Santa is a very pleasant 67 year old man with a diagnosis of melanoma of the scalp with metastasis to the brain. We reviewed the association between scalp melanoma primary tumors and brain metastasis, and we discussed the likelihood that the scalp is the primary origin of the melanoma.    Staging studies performed during his recent hospitalization include CT-CAP, which shows small, bilateral indeterminate pulmonary nodules and MRI-brain. A neck CT-angio was obtained, which also appears to show some enlarged lymph nodes and additional scalp lesions consistent with head and neck local regional disease. PET-CT for restaging showed an additional FDG avid lymph node in the left lower  neck.    He has seen radiation therapy and they plan to start radiation therapy in the next week or so. He is scheduled to be done with radiation by 5/31.    We will plan to start immunotherapy the following week. We discussed monotherapy versus dual checkpoint blockade. I recommended he consider starting with checkpoint inhibitor monotherapy given 40% response rate and risk of toxicity of 10-20%. He also has high TMB making his response potentially higher yet. I contrasted anti-PD1 therapy with dual checkpoint blockade, which has higher response rates of up about 47% with Opdualag or 65% Ipi/Nivo, but higher overall toxicity I.e. 50% risk of diarrhea/colitis, 15% hepatitis, 5% risk of hypophysitis with ipi/nivo, which in general is associated with about twice the risk of toxicity as with anti-PD1 monotherapy. Similarly, Opdualag is intermediate to single agent therapy and ipi/nivo.    He is inclined to start less aggressive therapy and increase aggressiveness as needed.    PLAN  1. Return first week of June with AMANDA visit prior to infusion of Keytruda  2. RTC to see me 3 months after treatment start, with CT-head, neck, CAP.    #2 Hypertension  Continue amlodipine daily    #3 Seizure, secondary to brain metastases  Continue Keppra 1000 mg twice daily    #3 History of prostate cancer (Xiomara 3 + 3) s/p prostatectomy (2009)  He had no adverse features and did not receive adjuvant therapy. PSA has remained negative. Continue with yearly PSA.    Abdullahi Lynch M.D.   of Medicine  Hematology, Oncology and Transplantation  Pager: [phone]

## 2023-05-19 NOTE — NURSING NOTE
Is the patient currently in the state of [place]? YES    Visit mode:VIDEO    If the visit is dropped, the patient can be reconnected by: VIDEO VISIT: Text to cell phone: [phone]    Will anyone else be joining the visit? Yes, Wife (carolina) will be with the pt per pt.      How would you like to obtain your AVS? MyChart    Are changes needed to the allergy or medication list? NO    Reason for visit: Video Visit      Nathalie Carcamo VF

## 2023-05-24 ENCOUNTER — OFFICE VISIT (OUTPATIENT)
Dept: RADIATION ONCOLOGY | Facility: CLINIC | Age: 68
End: 2023-05-24
Attending: RADIOLOGY
Payer: COMMERCIAL

## 2023-05-24 DIAGNOSIS — C79.31 METASTASIS TO BRAIN (H): Primary | ICD-10-CM

## 2023-05-24 PROCEDURE — 77334 RADIATION TREATMENT AID(S): CPT | Performed by: RADIOLOGY

## 2023-05-24 PROCEDURE — 77300 RADIATION THERAPY DOSE PLAN: CPT | Mod: 26 | Performed by: RADIOLOGY

## 2023-05-24 PROCEDURE — 77295 3-D RADIOTHERAPY PLAN: CPT | Performed by: RADIOLOGY

## 2023-05-24 PROCEDURE — 77370 RADIATION PHYSICS CONSULT: CPT | Performed by: RADIOLOGY

## 2023-05-24 PROCEDURE — 77300 RADIATION THERAPY DOSE PLAN: CPT | Performed by: RADIOLOGY

## 2023-05-24 PROCEDURE — 77373 STRTCTC BDY RAD THER TX DLVR: CPT | Performed by: RADIOLOGY

## 2023-05-24 PROCEDURE — 99024 POSTOP FOLLOW-UP VISIT: CPT | Performed by: NEUROLOGICAL SURGERY

## 2023-05-24 PROCEDURE — 77334 RADIATION TREATMENT AID(S): CPT | Mod: 26 | Performed by: RADIOLOGY

## 2023-05-24 PROCEDURE — 77263 THER RADIOLOGY TX PLNG CPLX: CPT | Performed by: RADIOLOGY

## 2023-05-24 PROCEDURE — 77295 3-D RADIOTHERAPY PLAN: CPT | Mod: 26 | Performed by: RADIOLOGY

## 2023-05-24 NOTE — PROGRESS NOTES
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 Garrido, Tumor Registry    GAMMA KNIFE DAILY TREATMENT PROCEDURE NOTE     Fraction 1 of 5  Treatment Summary:  Radiation Oncology - Course: 1 Protocol:    Treatment Site Current Dose Modality From To Elapsed Days Fx.   1a L Parietal Cavity 500 cGy [place] 60 23 0    1b L Parietal-Occipi 500 cGy [place] 60 23 0                 DESCRIPTION OF PROCEDURE:    On 2023 the patient was brought to the [facility] Gamma Knife IconTM suite at [facility] and treated with fractionated stereotactic radiotherapy.     The [employer] Gamma Knife IconTM Plan software was used to create a highly conformal dose distribution using the number and size collimators detailed above.     TREATMENT:    The patient was brought to the Gamma Knife suite. A timeout was performed to confirm the correct patient and correct procedure.    Patient was identified by 2 methods.  Site was verified.    The mask was placed and a cone beam CT was done and fused to the previously approved plan.        The physicist and I evaluated the fusion and confirmed the target coverage after auto-registration.      The treatment was delivered using the [facility] Gamma Knife IconTM without complication.      The mask was removed and the patient was discharged home in stable condition.    The patient tolerated the treatment well and had no complications.      Approved by:  Benedicto Rose MD/PhD

## 2023-05-24 NOTE — PROGRESS NOTES
Gamma Knife Operative Note     MRN: [id number]     Name: Saeid Santa     : 1955     Date of procedure: 2023     Surgeon:  Kye Garrido MD     Pre-operative Diagnosis:  Multiple melanoma Brain metastases    Post-operative Diagnosis:  Same        Procedure: Gamma Knife Radiosurgery     Indication:  Mr Santa is a 67 year old man who was diagnosed to have left parietal and left parietal occipital lesion during work-up for seizures.  He subsequently underwents/p 2023: Stealth assisted awake craniotomy with resection of motor/sensory strip lesion.  He did very well from the surgery and his right lower extremity function recovered very well.  He is scheduled to have gamma knife to the resection cavity of the left frontoparietal lesion and to the left parietal occipital lesion.    He was indicated for abovementioned procedure.  He agreed with the procedure and signed informed consent.   After case review in the brain tumor conference, the joint recommendation was to treat the patient with radiosurgery. Standard measurements were obtained for coordinate calculation to facilitate SRS delivery.     The treatment is planned on the Gamma plan system based on the MRI.  Treatment parameters were verified by myself, the treating radiation oncologist, and the physicist. Treatment was done using face mask based procedure.      The left parietal resection cavity  was treated at 25 Gy in 5 fractions and the left parietal occipital lesion was treated at 30 Gy in 5 fractions. None of the lesions was near the brain stem or optic nerve.      The dose was delivered in a highly conformal fashion. The treatment was performed at the [facility] gamma knife center.      I was present and performed the key portions of this procedure.     Kye Garrido MD

## 2023-05-24 NOTE — LETTER
2023         RE: Saeid Santa  [address] [facility] [address]        Dear Colleague,    Thank you for referring your patient, Saeid Santa, to the [facility] RADIATION ONCOLOGY GAMMA KNIFE. Please see a copy of my visit note below.    Name: Saeid Santa  : 1955 Medical Record #: [id number]  Diagnosis: C79.31 Secondary malignant neoplasm of brain  Date of Treatment: May 24, 2023  Referring Physicians: Dr. Garrido, Tumor Registry    GAMMA KNIFE DAILY TREATMENT PROCEDURE NOTE     Fraction 1 of 5  Treatment Summary:  Radiation Oncology - Course: 1 Protocol:    Treatment Site Current Dose Modality From To Elapsed Days Fx.   1a L Parietal Cavity 500 cGy [place] 60 23 0    1b L Parietal-Occipi 500 cGy [place] 60 23 0                 DESCRIPTION OF PROCEDURE:    On 2023 the patient was brought to the [facility] Gamma Knife IconTM suite at [facility] and treated with fractionated stereotactic radiotherapy.     The [employer] Gamma Knife IconTM Plan software was used to create a highly conformal dose distribution using the number and size collimators detailed above.     TREATMENT:    The patient was brought to the Gamma Knife suite. A timeout was performed to confirm the correct patient and correct procedure.    Patient was identified by 2 methods.  Site was verified.    The mask was placed and a cone beam CT was done and fused to the previously approved plan.        The physicist and I evaluated the fusion and confirmed the target coverage after auto-registration.      The treatment was delivered using the [employer] Gamma Knife IconTM without complication.      The mask was removed and the patient was discharged home in stable condition.    The patient tolerated the treatment well and had no complications.      Approved by:  Benedicto Rose MD/PhD        Again, thank you for allowing me to participate in the care of your  patient.        Sincerely,        Benedicto Rose MD

## 2023-05-24 NOTE — LETTER
2023         RE: Saeid Santa  [address] [facility] [address]        Dear Colleague,    Thank you for referring your patient, Saeid Santa, to the [facility] RADIATION ONCOLOGY GAMMA KNIFE. Please see a copy of my visit note below.    Gamma Knife Operative Note     MRN: [id number]     Name: Saeid Santa     : 1955     Date of procedure: 2023     Surgeon:  Kye Garrido MD     Pre-operative Diagnosis:  Multiple melanoma Brain metastases    Post-operative Diagnosis:  Same        Procedure: Gamma Knife Radiosurgery     Indication:  Mr Santa is a 67 year old man who was diagnosed to have left parietal and left parietal occipital lesion during work-up for seizures.  He subsequently underwents/p 2023: Stealth assisted awake craniotomy with resection of motor/sensory strip lesion.  He did very well from the surgery and his right lower extremity function recovered very well.  He is scheduled to have gamma knife to the resection cavity of the left frontoparietal lesion and to the left parietal occipital lesion.    He was indicated for abovementioned procedure.  He agreed with the procedure and signed informed consent.   After case review in the brain tumor conference, the joint recommendation was to treat the patient with radiosurgery. Standard measurements were obtained for coordinate calculation to facilitate SRS delivery.     The treatment is planned on the Gamma plan system based on the MRI.  Treatment parameters were verified by myself, the treating radiation oncologist, and the physicist. Treatment was done using face mask based procedure.      The left parietal resection cavity  was treated at 25 Gy in 5 fractions and the left parietal occipital lesion was treated at 30 Gy in 5 fractions. None of the lesions was near the brain stem or optic nerve.      The dose was delivered in a highly conformal fashion. The treatment was performed at the [facility] gamma knife center.       I was present and performed the key portions of this procedure.     Kye Garrido MD      Again, thank you for allowing me to participate in the care of your patient.        Sincerely,        Kye Garrido MD

## 2023-05-25 ENCOUNTER — OFFICE VISIT (OUTPATIENT)
Dept: RADIATION ONCOLOGY | Facility: CLINIC | Age: 68
End: 2023-05-25
Attending: RADIOLOGY
Payer: COMMERCIAL

## 2023-05-25 ENCOUNTER — MYC MEDICAL ADVICE (OUTPATIENT)
Dept: NEUROSURGERY | Facility: CLINIC | Age: 68
End: 2023-05-25
Payer: COMMERCIAL

## 2023-05-25 DIAGNOSIS — C79.31 METASTASIS TO BRAIN (H): Primary | ICD-10-CM

## 2023-05-25 PROCEDURE — 77373 STRTCTC BDY RAD THER TX DLVR: CPT | Performed by: RADIOLOGY

## 2023-05-25 NOTE — PROGRESS NOTES
Name: Saeid Santa  : 1955 Medical Record #: [id number]  Diagnosis: C79.31 Secondary malignant neoplasm of brain  Date of Treatment: May 25, 2023  Referring Physicians: Dr. Garrido, Tumor Registry    GAMMA KNIFE DAILY TREATMENT PROCEDURE NOTE     Fraction 2 of 5  Treatment Summary:  Radiation Oncology - Course: 1 Protocol:    Treatment Site Current Dose Modality From To Elapsed Days Fx.   1a L Parietal Cavity 1,000 cGy [place] 60 23 1    1b L Parietal-Occipi 1,000 cGy [place] 60 23 1                 DESCRIPTION OF PROCEDURE:    On 2023 the patient was brought to the [facility] Gamma Knife IconTM suite at [facility] and treated with fractionated stereotactic radiotherapy.     The [employer] Gamma Knife IconTM Plan software was used to create a highly conformal dose distribution using the number and size collimators detailed above.     TREATMENT:    The patient was brought to the Gamma Knife suite. A timeout was performed to confirm the correct patient and correct procedure.    Patient was identified by 2 methods.  Site was verified.    The mask was placed and a cone beam CT was done and fused to the previously approved plan.        The physicist and I evaluated the fusion and confirmed the target coverage after auto-registration.      The treatment was delivered using the [facility] Gamma Knife IconTM without complication.      The mask was removed and the patient was discharged home in stable condition.    The patient tolerated the treatment well and had no complications.      Approved by:  Benedicto Rose MD/PhD

## 2023-05-25 NOTE — LETTER
2023         RE: Saeid Santa  [address] [address]  [place] [address]        Dear Colleague,    Thank you for referring your patient, Saeid Santa, to the [facility] RADIATION ONCOLOGY GAMMA KNIFE. Please see a copy of my visit note below.    Name: Saeid Santa  : 1955 Medical Record #: [id number]  Diagnosis: C79.31 Secondary malignant neoplasm of brain  Date of Treatment: May 25, 2023  Referring Physicians: Dr. Garrido, Tumor Registry    GAMMA KNIFE DAILY TREATMENT PROCEDURE NOTE     Fraction 2 of 5  Treatment Summary:  Radiation Oncology - Course: 1 Protocol:    Treatment Site Current Dose Modality From To Elapsed Days Fx.   1a L Parietal Cavity 1,000 cGy [place] 60 23 1    1b L Parietal-Occipi 1,000 cGy [place] 60 23 1                 DESCRIPTION OF PROCEDURE:    On 2023 the patient was brought to the [facility] Gamma Knife IconTM suite at [facility] and treated with fractionated stereotactic radiotherapy.     The [employer] Gamma Knife IconTM Plan software was used to create a highly conformal dose distribution using the number and size collimators detailed above.     TREATMENT:    The patient was brought to the Gamma Knife suite. A timeout was performed to confirm the correct patient and correct procedure.    Patient was identified by 2 methods.  Site was verified.    The mask was placed and a cone beam CT was done and fused to the previously approved plan.        The physicist and I evaluated the fusion and confirmed the target coverage after auto-registration.      The treatment was delivered using the [employer] Gamma Knife IconTM without complication.      The mask was removed and the patient was discharged home in stable condition.    The patient tolerated the treatment well and had no complications.      Approved by:  Benedicto Rose MD/PhD      Again, thank you for allowing me to participate in the care of your  patient.        Sincerely,        Benedicto Rose MD

## 2023-05-26 ENCOUNTER — THERAPY VISIT (OUTPATIENT)
Dept: PHYSICAL THERAPY | Facility: CLINIC | Age: 68
End: 2023-05-26
Payer: COMMERCIAL

## 2023-05-26 ENCOUNTER — OFFICE VISIT (OUTPATIENT)
Dept: RADIATION ONCOLOGY | Facility: CLINIC | Age: 68
End: 2023-05-26
Attending: RADIOLOGY
Payer: COMMERCIAL

## 2023-05-26 DIAGNOSIS — Z98.890 S/P BRAIN SURGERY: ICD-10-CM

## 2023-05-26 DIAGNOSIS — D49.6 BRAIN TUMOR (H): ICD-10-CM

## 2023-05-26 DIAGNOSIS — G81.91 RIGHT HEMIPARESIS (H): ICD-10-CM

## 2023-05-26 DIAGNOSIS — C79.31 METASTASIS TO BRAIN (H): Primary | ICD-10-CM

## 2023-05-26 PROCEDURE — 97112 NEUROMUSCULAR REEDUCATION: CPT | Mod: GP | Performed by: PHYSICAL THERAPIST

## 2023-05-26 PROCEDURE — 77373 STRTCTC BDY RAD THER TX DLVR: CPT | Performed by: RADIOLOGY

## 2023-05-26 PROCEDURE — 97110 THERAPEUTIC EXERCISES: CPT | Mod: GP | Performed by: PHYSICAL THERAPIST

## 2023-05-26 PROCEDURE — 97161 PT EVAL LOW COMPLEX 20 MIN: CPT | Mod: GP | Performed by: PHYSICAL THERAPIST

## 2023-05-26 RX ORDER — DEXAMETHASONE 2 MG/1
TABLET ORAL
Qty: 40 TABLET | Refills: 1 | Status: SHIPPED | OUTPATIENT
Start: 2023-05-26 | End: 2023-06-06

## 2023-05-26 NOTE — PROGRESS NOTES
PHYSICAL THERAPY EVALUATION  Type of Visit: Evaluation    See electronic medical record for Abuse and Falls Screening details.    Subjective      Presenting condition or subjective complaint: right leg recovery from  motor drive brain surgery  Date of onset: 04/21/23    Relevant medical history:     PMH:  prostate cancer diagnosed back in 2009 underwent prostatectomy with no other adjuvant treatment. He started noticing abnormal movement of the right lower extremity likely seizure with no loss of consciousness; initially thought to be radicular sx from LB dysfunction. He had 3 such episodes the last resulted in being found down outside . He went to the ER and underwent MRI which showed 2 ring-enhancing lesion involving his left motor strip and left lingula with perilesional edema.  Dates & types of surgery:  ; Stealth assisted awake craniotomy with resection of motor/sensory strip lesion.   Prior diagnostic imaging/testing results: MRI; CT scan     Prior therapy history for the same diagnosis, illness or injury: Yes may 2023 - june 2023    Prior Level of Function Independent in all ADL    Living Environment  Social support: With a significant other or spouse   Type of home: House   Stairs to enter the home: Yes 12 Is there a railing: Yes   Ramp: No   Stairs inside the home: Yes 12 Is there a railing: Yes   Help at home: Self Cares (home health aide/personal care attendant, family, etc)  Equipment owned: Walker; Grab bars     Employment: No    Hobbies/Interests: gardening, cooking    Patient goals for therapy: recover strength & overcome right foot numbness to eventually drive again    Pain assessment: tingling R foot/lower leg     Objective   Cognitive Status Examination  Alert, oriented appropriate for PT     OBSERVATION: R AFO post leaf; ambulates with cane in L hand  RANGE OF MOTION: WNL  STRENGTH: R LE HF: 4/5; KE 5-/5; KD 3-/5 in prone; HE in prone 3-/5; H ABD 3-/5; DF 4; PF 3-/5  In standing would roll ankle  "laterally   BED MOBILITY: independnet    TRANSFERS: independent    GAIT: Independent household distances with R AFO PLS and \"hurry cane\"; slow meena  Stairs: UP reciprocal with heavy reliance on rail ~6 steps; DOWN marking time    BALANCE: SLS  x ~5 sec  Full balance assessments to follow  MUSCLE TONE: hypotonic R LE      Assessment & Plan   CLINICAL IMPRESSIONS   Medical Diagnosis: R hemiparesis; s/p tumor resection    Treatment Diagnosis: R hemiparesis   Impression/Assessment: Patient is a 67 year old male with R LE weakness and general fatigue complaints.  The following significant findings have been identified: R LE weakness. These impairments interfere with their ability to perform ADLs  as compared to previous level of function.     Clinical Decision Making (Complexity):   Clinical Presentation: Stable/Uncomplicated  Clinical Presentation Rationale: based on medical and personal factors listed in PT evaluation  Clinical Decision Making (Complexity): Low complexity    PLAN OF CARE  Treatment Interventions:  Interventions: Gait Training, Manual Therapy, Neuromuscular Re-education, Therapeutic Activity, Therapeutic Exercise    Long Term Goals     PT Goal 1  Goal Description: ambulate community distances with no AFO or assistive device  Rationale: to maximize safety and independence within the community  Target Date: 08/18/23  PT Goal 2  Goal Description: UP/Down full flight of stairs reciprocal pattern no rail to carry items in/out of home and upstairs  Rationale: to maximize safety and independence within the home  Target Date: 08/18/23  PT Goal 3  Goal Description: increase R LE strength 5/5 (HF; KE ;HE; HABD; DF; PF) to return to previous activity level and lifestyle  Rationale: to maximize safety and independence within the community  Target Date: 08/18/23  PT Goal 4  Goal Description: Independent HEP to continue stretching and strengthening on own  Rationale: to maximize safety and independence with " performance of ADLs and functional tasks  Target Date: 08/18/23      Frequency of Treatment: 1x/wk  Duration of Treatment: 12 weeks    Recommended Referrals to Other Professionals: Speech Language Pathology  Education Assessment:   Learner/Method: Patient    Risks and benefits of evaluation/treatment have been explained.   Patient/Family/caregiver agrees with Plan of Care.     Evaluation Time:     PT Eval, Low Complexity Minutes (88921): 20    Signing Clinician: Che Merlos, PEYTON      [facility]                                                                                   OUTPATIENT PHYSICAL THERAPY      PLAN OF TREATMENT FOR OUTPATIENT REHABILITATION   Patient's Last Name, First Name, MATTIELUIZA  SantaSaeid GUY YOB: 1955   Provider's Name   [facility]   Medical Record No.  [id number]     Onset Date: 04/21/23 4/21/23 Start of Care Date: 05/26/235/26/23     Medical Diagnosis:  R hemiparesis; s/p tumor resectionR hemiparesis; s/p tumor resection       PT Treatment Diagnosis:  R hemiparesisR hemiparesis Plan of Treatment  Frequency/Duration: 1x/wk 1x/wk for 12 weeks  Certification date from 05/26/235/26/2023 to 08/18/23         See note for plan of treatment details and functional goals     Che Merlos, PT DPT                         I CERTIFY THE NEED FOR THESE SERVICES FURNISHED UNDER        THIS PLAN OF TREATMENT AND WHILE UNDER MY CARE .             Physician Signature               Date    X_____________________________________________________                        Referring Provider:  Magnus Sutton  DO      Initial Assessment  See Epic Evaluation- Start of Care Date: 05/26/23

## 2023-05-26 NOTE — PATIENT INSTRUCTIONS
DECADRON SCHEDULE    Patient Name: Saeid Santa    Decadron (Dexamethasone) is a drug, a type of steroid, which is often used in patients with brain tumors to reduce swelling in the brain.  It must be taken carefully and cannot be stopped abruptly.  The dose must be gradually reduced or tapered.  The table below provides exact instructions regarding how to take it.      Day Date Total Daily Dose (mg) Number of 2 mg tablets to take at      Breakfast Lunch Supper Bedtime   1 5/26/23 8 0 0 2 2   2 5/27/23 12 2 2 2 0   3 5/28/23 8 2 0 2 0   4 5/29/23 8 2 0 2 0   5 5/30/23 8 2 0 2 0   6 5/31/23 6 1 1 1 0   7 6/1/23 6 1 1 1 0   8 6/2/23 6 1 1 1 0   9 6/3/23 4 1 0 1 0   10 6/4/23 4 1 0 1 0   11 6/5/23 2 1 0 0 0   12 6/6/23 2 1 0 0 0   13 6/7/23  DONE                  Other Instructions: Call if any questions  [phone]      LORENA Mcknight

## 2023-05-26 NOTE — PROGRESS NOTES
Saeid Santa returns today for his third fraction of gamma knife SRS.      He has noticed a visual field cut over the past 24 hours.  He describes only seeing the left half of a face when he is looking out of his right eye.  His left eye is fine.  He also reports increased unsteady gait over the past 1 to 2 days.  For headaches, he is taking acetaminophen.  After discussion with Dr. Garrido, we will start him on a dexamethasone taper with the goal of completing the taper by June 7, 2023 when he is currently scheduled to start immunotherapy with Dr. Greene.    Dexamethasone taper was prescribed.    Aubree Saldana MD  Department of Radiation Oncology

## 2023-05-26 NOTE — LETTER
2023         RE: Saeid Santa  [address] [facility] [address]        Dear Colleague,    Thank you for referring your patient, Saeid Santa, to the [facility] RADIATION ONCOLOGY GAMMA KNIFE. Please see a copy of my visit note below.    Saeid Santa returns today for his third fraction of gamma knife SRS.      He has noticed a visual field cut over the past 24 hours.  He describes only seeing the left half of a face when he is looking out of his right eye.  His left eye is fine.  He also reports increased unsteady gait over the past 1 to 2 days.  For headaches, he is taking acetaminophen.  After discussion with Dr. Garrido, we will start him on a dexamethasone taper with the goal of completing the taper by 2023 when he is currently scheduled to start immunotherapy with Dr. Greene.    Dexamethasone taper was prescribed.    Aubree Saldana MD  Department of Radiation Oncology        Name: Saeid Santa  : 1955   Medical Record #: [id number]  Diagnosis: C79.31 Secondary malignant neoplasm of brain  Date of Treatment: May 26, 2023  Referring Physicians: Dr. Garrido, Tumor Registry    GAMMA KNIFE DAILY TREATMENT PROCEDURE NOTE     Fraction 3 of 5  Treatment Summary:  Radiation Oncology - Course: 1:   Treatment Site Current Dose Modality From To Elapsed Days Fx.  1a L Parietal Cavity 1,500 cGy [place] 60 23 2 3/5  1b L Parietal-Occipi 1,500 cGy [place] 60 23 2 3/5    DESCRIPTION OF PROCEDURE:    On 2023, Saeid Santa was brought to the [facility] Gamma Knife IconTM suite at [facility] and treated with fractionated stereotactic radiotherapy.     The [employer] Gamma Knife IconTM Plan software was used to create a highly conformal dose distribution using the number and size collimators detailed above.     TREATMENT:    The patient was brought to the Gamma Knife suite. A timeout was performed to confirm the correct  patient and correct procedure.    Patient was identified by 2 methods. Site was verified.    The mask was placed and a cone beam CT was done and fused to the previously approved plan.        The physicist and I evaluated the fusion and confirmed the target coverage after auto-registration.      The treatment was delivered using the [employer] Gamma Knife IconTM without complication.     The mask was removed and the patient was discharged home in stable condition.    The patient tolerated the treatment well and had no complications.      Approved by:  Aubree Saldana MD      Again, thank you for allowing me to participate in the care of your patient.        Sincerely,        Aubree Saldana MD

## 2023-05-26 NOTE — PROGRESS NOTES
Name: Saeid Santa  : 1955   Medical Record #: [id number]  Diagnosis: C79.31 Secondary malignant neoplasm of brain  Date of Treatment: May 26, 2023  Referring Physicians: Dr. Garrido, Tumor Registry    GAMMA KNIFE DAILY TREATMENT PROCEDURE NOTE     Fraction 3 of 5  Treatment Summary:  Radiation Oncology - Course: 1:   Treatment Site Current Dose Modality From To Elapsed Days Fx.  1a L Parietal Cavity 1,500 cGy [place] 60 23 2 3/5  1b L Parietal-Occipi 1,500 cGy [place] 60 23 2 3/5    DESCRIPTION OF PROCEDURE:    On 2023, Saeid Santa was brought to the [facility] Gamma Knife IconTM suite at [facility] and treated with fractionated stereotactic radiotherapy.     The [employer] Gamma Knife IconTM Plan software was used to create a highly conformal dose distribution using the number and size collimators detailed above.     TREATMENT:    The patient was brought to the Gamma Knife suite. A timeout was performed to confirm the correct patient and correct procedure.    Patient was identified by 2 methods. Site was verified.    The mask was placed and a cone beam CT was done and fused to the previously approved plan.        The physicist and I evaluated the fusion and confirmed the target coverage after auto-registration.      The treatment was delivered using the [facility] Gamma Knife IconTM without complication.     The mask was removed and the patient was discharged home in stable condition.    The patient tolerated the treatment well and had no complications.      Approved by:  Aubree Saldana MD

## 2023-05-28 ENCOUNTER — MYC MEDICAL ADVICE (OUTPATIENT)
Dept: NEUROSURGERY | Facility: CLINIC | Age: 68
End: 2023-05-28
Payer: COMMERCIAL

## 2023-05-30 ENCOUNTER — OFFICE VISIT (OUTPATIENT)
Dept: RADIATION ONCOLOGY | Facility: CLINIC | Age: 68
End: 2023-05-30
Attending: RADIOLOGY
Payer: COMMERCIAL

## 2023-05-30 DIAGNOSIS — C79.31 MALIGNANT NEOPLASM METASTATIC TO BRAIN (H): Primary | ICD-10-CM

## 2023-05-30 PROCEDURE — 77373 STRTCTC BDY RAD THER TX DLVR: CPT | Performed by: RADIOLOGY

## 2023-05-30 NOTE — LETTER
2023         RE: Saeid Santa  [address] [facility] [address]        Dear Colleague,    Thank you for referring your patient, Saeid Santa, to the [facility] RADIATION ONCOLOGY GAMMA KNIFE. Please see a copy of my visit note below.      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 Garrido, Tumor Registry    GAMMA KNIFE DAILY TREATMENT PROCEDURE NOTE     Fraction 4 of 5  Treatment Summary:  Radiation Oncology - Course: 1:   Treatment Site Current Dose Modality From To Elapsed Days Fx.  1a L Parietal Cavity 2,000 cGy [place] 60 23 6   1b L Parietal-Occipi 2,000 cGy [place] 60 23 6     DESCRIPTION OF PROCEDURE:    On 2023, Saeid Santa was brought to the [facility] Gamma Knife IconTM suite at [facility] and treated with fractionated stereotactic radiotherapy.     The [facility] Gamma Knife IconTM Plan software was used to create a highly conformal dose distribution using the number and size collimators detailed above.     TREATMENT:    The patient was brought to the Gamma Knife suite. A timeout was performed to confirm the correct patient and correct procedure.    Patient was identified by 2 methods. Site was verified.    The mask was placed and a cone beam CT was done and fused to the previously approved plan.        The physicist and I evaluated the fusion and confirmed the target coverage after auto-registration.      The treatment was delivered using the Le[place]ell Gamma Knife IconTM without complication.     The mask was removed and the patient was discharged home in stable condition.    The patient tolerated the treatment well and had no complications.      Approved by:  CAROLIN Davis MD      Again, thank you for allowing me to participate in the care of your patient.         Sincerely,        Meghan Davis MD

## 2023-05-30 NOTE — NURSING NOTE
Tim came in today for his fourth of five fractionated Gamma Knife treatments.  Tim had left an In Basket message on 5/27/23 regarding the dexamethasone he was started on on 5/26/23 causing severe hiccups.  Due to no response from the In Basket message over the weekend, Tim said he adjusted the dexamethasone on his own.  Tim stated that on 5/26/23 he took one of the 2 mg tablets, he said he got home too late to take 1 tablet 2x as directed.  On 5/27/23 he took one 2 mg tablet bid, on 5/28/23 he took one 2 mg tablet bid, on 5/29/23 he took 1/2 of a 2 mg tablet bid as he is going to do today 5/30/23 also.  Tim said he would take a 1/2 tablet of the 2 mg tablet in the morning on 5/31/23 and then we will confer with Dr. Rose.  Tim stated that once he started to taper the dexamethasone the hiccups nearly resolved.  Tim stated that his vision returned to normal the following day after starting the dexamethasone, the field cut resolved.  Tim stated today he was feeling good, no complaints.

## 2023-05-31 ENCOUNTER — OFFICE VISIT (OUTPATIENT)
Dept: RADIATION ONCOLOGY | Facility: CLINIC | Age: 68
End: 2023-05-31
Attending: RADIOLOGY
Payer: COMMERCIAL

## 2023-05-31 VITALS
OXYGEN SATURATION: 97 % | HEART RATE: 65 BPM | RESPIRATION RATE: 16 BRPM | SYSTOLIC BLOOD PRESSURE: 118 MMHG | DIASTOLIC BLOOD PRESSURE: 70 MMHG

## 2023-05-31 DIAGNOSIS — C79.31 METASTASIS TO BRAIN (H): Primary | ICD-10-CM

## 2023-05-31 PROCEDURE — 77435 SBRT MANAGEMENT: CPT | Performed by: RADIOLOGY

## 2023-05-31 PROCEDURE — 77373 STRTCTC BDY RAD THER TX DLVR: CPT | Performed by: RADIOLOGY

## 2023-05-31 PROCEDURE — 77336 RADIATION PHYSICS CONSULT: CPT | Performed by: RADIOLOGY

## 2023-05-31 NOTE — LETTER
2023         RE: Saeid Santa  [address] [facility] [address]        Dear Colleague,    Thank you for referring your patient, Saeid Santa, to the [facility] RADIATION ONCOLOGY GAMMA KNIFE. Please see a copy of my visit note below.      Name: Saeid Santa  : 1955   Medical Record #: [id number]  Diagnosis: C79.31 Secondary malignant neoplasm of brain  Date of Treatment: May 31, 2023  Referring Physicians: Dr. Garrido, Tumor Registry    GAMMA KNIFE DAILY TREATMENT PROCEDURE NOTE     Fraction 5 of 5  Treatment Summary:  Radiation Oncology - Course: 1:   Treatment Site Current Dose Modality From To Elapsed Days Fx.  1a L Parietal Cavity 2,500 cGy [place] 60 23 7   1b L Parietal-Occipi 2,500 cGy [place] 60 23 7     DESCRIPTION OF PROCEDURE:    On 2023, Saedi Santa was brought to the [facility] Gamma Knife IconTM suite at [facility] and treated with fractionated stereotactic radiotherapy.     The Le[employer]ell Gamma Knife IconTM Plan software was used to create a highly conformal dose distribution using the number and size collimators detailed above.     TREATMENT:    The patient was brought to the Gamma Knife suite. A timeout was performed to confirm the correct patient and correct procedure.    Patient was identified by 2 methods. Site was verified.    The mask was placed and a cone beam CT was done and fused to the previously approved plan.        The physicist and I evaluated the fusion and confirmed the target coverage after auto-registration.      The treatment was delivered using the Le[place]ell Gamma Knife IconTM without complication.     The mask was removed and the patient was discharged home in stable condition.    The patient tolerated the treatment well and had no complications.      Approved by:  Aubree Saldana MD      Again, thank you for allowing me to participate in the care of your patient.         Sincerely,        Aubree Saldana MD

## 2023-05-31 NOTE — PROGRESS NOTES
Name: Saeid Santa  : 1955   Medical Record #: [id number]  Diagnosis: C79.31 Secondary malignant neoplasm of brain  Date of Treatment: May 31, 2023  Referring Physicians: Dr. Garrido, Tumor Registry    GAMMA KNIFE DAILY TREATMENT PROCEDURE NOTE     Fraction 5 of 5  Treatment Summary:  Radiation Oncology - Course: 1:   Treatment Site Current Dose Modality From To Elapsed Days Fx.  1a L Parietal Cavity 2,500 cGy [place] 60 23 7   1b L Parietal-Occipi 2,500 cGy [place] 60 23 7     DESCRIPTION OF PROCEDURE:    On 2023, Saeid Santa was brought to the [facility] Gamma Knife IconTM suite at [facility] and treated with fractionated stereotactic radiotherapy.     The Le[employer]ell Gamma Knife IconTM Plan software was used to create a highly conformal dose distribution using the number and size collimators detailed above.     TREATMENT:    The patient was brought to the Gamma Knife suite. A timeout was performed to confirm the correct patient and correct procedure.    Patient was identified by 2 methods. Site was verified.    The mask was placed and a cone beam CT was done and fused to the previously approved plan.        The physicist and I evaluated the fusion and confirmed the target coverage after auto-registration.      The treatment was delivered using the [place] Gamma Knife IconTM without complication.     The mask was removed and the patient was discharged home in stable condition.    The patient tolerated the treatment well and had no complications.      Approved by:  Aubree Saldana MD

## 2023-06-01 ENCOUNTER — ONCOLOGY VISIT (OUTPATIENT)
Dept: RADIATION ONCOLOGY | Facility: CLINIC | Age: 68
End: 2023-06-01
Payer: COMMERCIAL

## 2023-06-01 ENCOUNTER — TELEPHONE (OUTPATIENT)
Dept: RADIATION ONCOLOGY | Facility: CLINIC | Age: 68
End: 2023-06-01
Payer: COMMERCIAL

## 2023-06-01 DIAGNOSIS — C79.31 METASTASIS TO BRAIN (H): Primary | ICD-10-CM

## 2023-06-01 NOTE — Clinical Note
6/1/2023         RE: Saeid Santa  [address] [place] [address]        Dear Colleague,    Thank you for referring your patient, Saeid Santa, to the [facility] RADIATION ONCOLOGY. Please see a copy of my visit note below.    No notes on file    Again, thank you for allowing me to participate in the care of your patient.        Sincerely,        Benedicto Rose MD

## 2023-06-01 NOTE — TELEPHONE ENCOUNTER
A call was placed to Tim in follow up to his fractionated Gamma Knife treatments completing on 5/31/23.  Tim said he was having a very good day, denied headache or nausea.  Tim said his vision remains good and truly today he is feeling quite well.

## 2023-06-02 ENCOUNTER — THERAPY VISIT (OUTPATIENT)
Dept: PHYSICAL THERAPY | Facility: CLINIC | Age: 68
End: 2023-06-02
Payer: COMMERCIAL

## 2023-06-02 DIAGNOSIS — G81.91 RIGHT HEMIPARESIS (H): Primary | ICD-10-CM

## 2023-06-02 PROCEDURE — 97530 THERAPEUTIC ACTIVITIES: CPT | Mod: GP | Performed by: PHYSICAL THERAPIST

## 2023-06-02 PROCEDURE — 97110 THERAPEUTIC EXERCISES: CPT | Mod: GP | Performed by: PHYSICAL THERAPIST

## 2023-06-02 NOTE — PROCEDURES
Radiotherapy Treatment Summary          Date of Report: 2023     PATIENT: IKER SANTA  MEDICAL RECORD NO: [id number]  : 1955     DIAGNOSIS: C79.31 Secondary malignant neoplasm of brain  INTENT OF RADIOTHERAPY: Local control  PATHOLOGY:  Melanoma                                 STAGE: IV  CONCURRENT SYSTEMIC THERAPY:  None     Details of the treatments summarized below are found in records kept in the Department of Radiation Oncology at [facility].     Treatment Summary:  Radiation Oncology - Course: 1 Protocol:   Treatment Site Current Dose Modality From To Elapsed Days Fx.  1a L Parietal Cavity  2,500 cGy [place] 60  2023   7  5  1b L Parietal-Occipi  3,000 cGy [place] 60  2023   7  5                Dose per Fraction: 500 cGy to the resection cavity; 600 cGy to the intact lesion in the left parietal occipital   lobe  Total Dose:      2500 cGy to the cavity; 3000 cGy to the L parietal-occipital lesion.         COMMENTS:        Mr. Santa is a 66 yo male with newly found brain metastases secondary to melanoma. He   presented with confusion and was brought to ED. Work up revealed 2 discrete ring-enhancing lesions, one of   which was in the left paramedian posterior frontal lobe near the precentral gyrus, measuring 2.2 x 1.7 x 1.9   cm and the other in the left occipital lobe near the lingual gyrus, measuring 2.2 x 2.1 x 2.0 cm. There were   moderate vasogenic edema. He was started on Keppra and Decadron.   Mr. Santa underwent craniotomy on 2023 for resection of the lesion. Additionally a scalp lesion was also   resected as it had been draining, bleeding and growing in size. Pathology revealed melanoma in both specimens.   Pathologist commented that neither lesion represented a definitive primary melanoma. The scalp lesion was   without epidermal melanoma in situ, suggesting the possibility that this could represent a cutaneous metastasis.      Post-op, Mr. Santa  reported sensory issues and weakness of the right dorsiflexion.       He was then referred to us and proceeded with GK SRS to the resection cavity and the unresected lesion in the   left occipital lobe. Both areas were treated with 5 fractions. He tolerated the treatment well with no acute   toxicities.                     ED visits/hospitalizations: None     Missed treatments: None     Acute Toxicity Profile by CTC v5.0: None     PAIN MANAGEMENT:  Not required.                    FOLLOW UP PLAN:         1. Follow up with Dr. Greene for systemic therapy.  2. Follow up with us in 3 months with repeat brain MRI.                       Staff Physician: Benedicto Rose M.D.     CC: Abdullahi Greene MD                 Radiation Oncology:  [facility] [address] [place] [address]

## 2023-06-05 ENCOUNTER — OFFICE VISIT (OUTPATIENT)
Dept: NEUROSURGERY | Facility: CLINIC | Age: 68
End: 2023-06-05
Payer: COMMERCIAL

## 2023-06-05 VITALS
WEIGHT: 180 LBS | HEART RATE: 72 BPM | OXYGEN SATURATION: 96 % | BODY MASS INDEX: 23.86 KG/M2 | DIASTOLIC BLOOD PRESSURE: 61 MMHG | SYSTOLIC BLOOD PRESSURE: 130 MMHG | HEIGHT: 73 IN

## 2023-06-05 DIAGNOSIS — C43.9 METASTATIC MELANOMA (H): Primary | ICD-10-CM

## 2023-06-05 PROCEDURE — 99024 POSTOP FOLLOW-UP VISIT: CPT | Performed by: NEUROLOGICAL SURGERY

## 2023-06-05 NOTE — PROGRESS NOTES
"NEUROSURGERY FOLLOWUP  NOTE    Saeid Santa comes today in f/u  6 weeks s/p 04/21/2023: Stealth assisted awake craniotomy with resection of motor/sensory strip lesion.  Final pathology: Metastatic melanoma  05/24/2023: GK to the rsection cavity and parieto-occipital lesion     Overall he is doing well since the surgery and recovering well.  He still has some residual weakness involving his right foot dorsiflexion with improvement in numbness involving his right lower extremity.    He has significantly improved with physical therapy and using AFP.    His incision has healed well.  He is currently ambulating without a cane and able to carry out his activities of daily living.    Following the first fraction of gamma knife treatment he developed right hemianopsia which improved following the treatment with steroids.  He never had such episode after that.    He reports no other new neurological issues today.  He is scheduled to see his medical oncologist and start immunotherapy on 6/8/2023.    PHYSICAL EXAM:   Constitutional: /61   Pulse 72   Ht 6' 1\" (1.854 m)   Wt 180 lb (81.6 kg)   SpO2 96%   BMI 23.75 kg/m       Mental Status: A & O in no acute distress.  Affect is appropriate.  Speech is fluent.  Recent and remote memory are intact.  Attention span and concentration are normal.     Motor:  Normal bulk and tone all muscle groups of upper and lower extremities.  Right foot dorsiflexion +4/5, rest intact     Sensory: Sensation intact bilaterally to light touch.      Coordination:  Gait: High-stepping gait without cane     Reflexes; no Gan's or  clonus     Incision healed well     IMAGING:   No new images today.          CONSULTATION ASSESSMENT AND PLAN:    Saeid Santa comes today in f/u  6 weeks s/p 04/21/2023: Stealth assisted awake craniotomy with resection of motor/sensory strip lesion.  Final pathology: Metastatic melanoma  05/24/2023: GK to the rsection cavity and parieto-occipital " lesion    He has recovered well from the surgery with improvement in weakness of right dorsiflexion.  His incision has healed well.  Following the first fraction of gamma knife he has right hemianopsia which improved with steroids.  He has no new neurological issues during his clinic visit today with his spouse.     He is scheduled to start immunotherapy on 6/8/2023.  He is also scheduled to have MRI brain with and without contrast in August 2023.  I will schedule him to see me after the MRI in August 2023.  I explained to the patient and his spouse to contact us if there are any new onset symptoms such as worsening headache, nausea, vomiting, blurring of vision double vision, hemianopsia or increased sleepiness.     All questions were answered and patient agreed with the plan.  He can contact us if there are any questions or concerns or worsening neurological deficits.     I spent more than 20 minutes in this apt, examining the pt, reviewing the scans, reviewing notes from chart, discussing treatment options with risks and benefits and coordinating care.      Kye Garrido MD      CC:     Asim Cervantes  [address]  [facility] [address]

## 2023-06-05 NOTE — LETTER
"    6/5/2023         RE: Saeid Santa  [address]  [place] [facility] [address]        Dear Colleague,    Thank you for referring your patient, Saeid Santa, to the [facility] SPINE AND NEUROSURGERY. Please see a copy of my visit note below.    NEUROSURGERY FOLLOWUP  NOTE    Saeid Santa comes today in f/u  6 weeks s/p 04/21/2023: Stealth assisted awake craniotomy with resection of motor/sensory strip lesion.  Final pathology: Metastatic melanoma  05/24/2023: GK to the rsection cavity and parieto-occipital lesion     Overall he is doing well since the surgery and recovering well.  He still has some residual weakness involving his right foot dorsiflexion with improvement in numbness involving his right lower extremity.    He has significantly improved with physical therapy and using AFP.    His incision has healed well.  He is currently ambulating without a cane and able to carry out his activities of daily living.    Following the first fraction of gamma knife treatment he developed right hemianopsia which improved following the treatment with steroids.  He never had such episode after that.    He reports no other new neurological issues today.  He is scheduled to see his medical oncologist and start immunotherapy on 6/8/2023.    PHYSICAL EXAM:   Constitutional: /61   Pulse 72   Ht 6' 1\" (1.854 m)   Wt 180 lb (81.6 kg)   SpO2 96%   BMI 23.75 kg/m       Mental Status: A & O in no acute distress.  Affect is appropriate.  Speech is fluent.  Recent and remote memory are intact.  Attention span and concentration are normal.     Motor:  Normal bulk and tone all muscle groups of upper and lower extremities.  Right foot dorsiflexion +4/5, rest intact     Sensory: Sensation intact bilaterally to light touch.      Coordination:  Gait: High-stepping gait without cane     Reflexes; no Gan's or  clonus     Incision healed well     IMAGING:   No new images today.          CONSULTATION ASSESSMENT AND PLAN:  "   Saeid Santa comes today in f/u  6 weeks s/p 04/21/2023: Stealth assisted awake craniotomy with resection of motor/sensory strip lesion.  Final pathology: Metastatic melanoma  05/24/2023: GK to the rsection cavity and parieto-occipital lesion    He has recovered well from the surgery with improvement in weakness of right dorsiflexion.  His incision has healed well.  Following the first fraction of gamma knife he has right hemianopsia which improved with steroids.  He has no new neurological issues during his clinic visit today with his spouse.     He is scheduled to start immunotherapy on 6/8/2023.  He is also scheduled to have MRI brain with and without contrast in August 2023.  I will schedule him to see me after the MRI in August 2023.  I explained to the patient and his spouse to contact us if there are any new onset symptoms such as worsening headache, nausea, vomiting, blurring of vision double vision, hemianopsia or increased sleepiness.     All questions were answered and patient agreed with the plan.  He can contact us if there are any questions or concerns or worsening neurological deficits.     I spent more than 20 minutes in this apt, examining the pt, reviewing the scans, reviewing notes from chart, discussing treatment options with risks and benefits and coordinating care.      Kye Garrido MD      CC:     Asim Cervantes  [address]  [facility] [address]        Again, thank you for allowing me to participate in the care of your patient.        Sincerely,        Kye Garrido MD

## 2023-06-06 ENCOUNTER — VIRTUAL VISIT (OUTPATIENT)
Dept: ONCOLOGY | Facility: CLINIC | Age: 68
End: 2023-06-06
Attending: STUDENT IN AN ORGANIZED HEALTH CARE EDUCATION/TRAINING PROGRAM
Payer: COMMERCIAL

## 2023-06-06 DIAGNOSIS — C43.4 MALIGNANT MELANOMA OF SCALP (H): Primary | ICD-10-CM

## 2023-06-06 PROCEDURE — 99215 OFFICE O/P EST HI 40 MIN: CPT | Mod: 95 | Performed by: STUDENT IN AN ORGANIZED HEALTH CARE EDUCATION/TRAINING PROGRAM

## 2023-06-06 NOTE — PROGRESS NOTES
Name: Saeid Santa  : 1955   Medical Record #: [id number]  Diagnosis: C79.31 Secondary malignant neoplasm of brain  Date of Treatment: May 30, 2023  Referring Physicians: Dr. Garrdio, Tumor Registry    GAMMA KNIFE DAILY TREATMENT PROCEDURE NOTE     Fraction 4 of 5  Treatment Summary:  Radiation Oncology - Course: 1:   Treatment Site Current Dose Modality From To Elapsed Days Fx.  1a L Parietal Cavity 2,000 cGy [place] 60 23 6   1b L Parietal-Occipi 2,000 cGy [place] 60 23 6     DESCRIPTION OF PROCEDURE:    On 2023, Saeid Santa was brought to the [facility] Gamma Knife IconTM suite at [facility] and treated with fractionated stereotactic radiotherapy.     The Le[employer]ell Gamma Knife IconTM Plan software was used to create a highly conformal dose distribution using the number and size collimators detailed above.     TREATMENT:    The patient was brought to the Gamma Knife suite. A timeout was performed to confirm the correct patient and correct procedure.    Patient was identified by 2 methods. Site was verified.    The mask was placed and a cone beam CT was done and fused to the previously approved plan.        The physicist and I evaluated the fusion and confirmed the target coverage after auto-registration.      The treatment was delivered using the [place]ell Gamma Knife IconTM without complication.     The mask was removed and the patient was discharged home in stable condition.    The patient tolerated the treatment well and had no complications.      Approved by:  CAROLIN Davis MD

## 2023-06-06 NOTE — PROGRESS NOTES
Virtual Visit Details    Type of service:  Video Visit   Video Start Time: 9:09AM  Video End Time:9:38AM    Originating Location (pt. Location): Home  Distant Location (provider location):  On-site  Platform used for Video Visit: [facility] ONCOLOGY PROGRESS NOTE  Melanoma Clinic  Jun 6, 2023    Chief Complaint: metastatic melanoma to brain    Melanoma History:  --8/16/22, he brings a left parietal scalp lesion to attention of his GP. It is initially observed.  --November 2022, the left parietal scalp lesion was felt to be a cyst, and the left parietal lesion was lanced and drained via scalp incision.  --January 2023, the left parietal scalp cyst would occasionally break open, drain, and bleed, largely at night.   --February 2023, he notes some mild difficulty clearing obstacles with right leg and foot.  --March/April 2023, he has 3 progressively worsening episodes over a span 3 weeks including the right leg. At first he notes the onset of right leg numbness and heaviness, like he was carrying 50 lbs of water on the leg. Then, about a week or so later, the leg began to jerk and converse while he was in the basement. Neither episode associated with loss of consciousness  --4/15/23, he recalls the right leg numbness, heaviness, jerking while driving between work. He had been found by coworkers in the parking lot, sitting in the grass, appearing confused. He was brought to the ER by EMS. CT-CAP, showed small (<6mm), bilateral indeterminate pulmonary nodules. CT-head and MRI brain showed 2 discrete ring-enhancing lesions in the left paramedian posterior frontal lobe near the precentral gyrus measuring approximately 2.2 x 1.7 x 1.9 cm and left occipital lobe near the lingual gyrus measuring approximately 2.2 x 2.1 x 2.0 cm. The occipital lesion encroaches upon the subependymal surface of the left lateral ventricle occipital horn and contacts the superior surface of the medial left tentorium. The lesions are  accompanied by moderate surrounding vasogenic edema. Surgery was recommended.  --4/21/23, underwent left awake (sleep-awake-sleep) craniotomy for tumor resection, he has resection of the left frontal, motor strip tumor as well asan elliptical incision of the left parietal scalp lesion. On pathology, both the scalp excision and brain mass are positive for malignant melanoma. Tumor cells are positive for S-100 and Melan-A, and negative for cytokeratin AE1/AE3, NKX3.1, GFAP, ERG, CD3, and CD20.  CD3 highlights brisk infiltration of the tumor by T lymphocytes, while CD20 highlights scattered positive B lymphocytes. NGS identifies a BRAF G466A mutation, a class 3 mutation insensitive to BRAF kinase inhibitor monotherapy. TMB is high at 48.754 muts/Mb.   --4/22/23, post-operative MRI shows post-operative changes of the parietal craniotomy of the left frontal lobe metastasis. The other 1.7 x 1.1 cm cystic lesion in the left occipital lobe is also seen. No new lesions appreciated.  -- 4/25/23 to 5/9/23, discharged from the hospital to acute rehab stay at [facility] in [place]. He participated in a comprehensive rehabilitation program consisting of PT, OT, Speech, Psychology and Recreation Therapy. He did well and was discharged home with use of a walker.  --PET-CT on 5/18/23 with potentially involved neck lymph nodes. He has mild FDG uptake in soft tissue nodules in the scalp, in the known left occipital brain metastasis, as well as in the left lower neck region.  -5/24-/5/31/23, He was treated with gamma knife: 2500 cGy to the left resection cavity; 3000 cGy to the L parietal-occipital lesion.    Interval History:  -Saeid presents today prior to cycle of systemic treatment with Keytruda. He is accompanied by his wife, Smiley.   -Saeid is doing fairly well. He feels he tolerated radiation without significant side effects. He initially had some blurred vision after first treatment but was started on steroids and this  resolved. He completed his decadron yesterday so is now off that.  -he continues to work with PT/OT. He feels his right foot drop is improving. He does use his brace about 4 hours per day. He is moving more independently and relying less on the cane.  -He thinks is craniotomy incision has healed.  -He reports no seizure activity.   -New new lumps or bumps.  -Appetite is good.   -No other concerns.     Review of Systems:  Patient denies any of the following except if noted above: fevers, chills, difficulty with energy, vision changes, chest pain, dyspnea, abdominal pain, nausea, vomiting, diarrhea, constipation, urinary concerns. He also denies lumps, bumps.    Physical Examination:  There were no vitals taken for this visit.  Wt Readings from Last 5 Encounters:   06/05/23 81.6 kg (180 lb)   05/18/23 81.6 kg (180 lb)   05/15/23 82.1 kg (181 lb)   05/12/23 82.5 kg (181 lb 12.8 oz)   04/23/23 86.1 kg (189 lb 13.1 oz)   Video physical exam  General: Patient appears well in no acute distress.   Skin: No visualized rash or lesions on visualized skin  Eyes: EOMI, no erythema, sclera icterus or discharge noted  Resp: Appears to be breathing comfortably without accessory muscle usage, speaking in full sentences, no cough  Neurologic: No apparent tremors, facial movements symmetric, no dysarthria  Psych: affect appropriate, alert and oriented. Pleasant, talkative.     Laboratory Data:  [facility]     Imaging Studies:  Reviewed PET findings from 5/18/23.      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 Saeid Santa is a very pleasant 67 year old man with a diagnosis of melanoma of the scalp with metastasis to the brain. Staging studies performed during hospitalization included CT-CAP, which showed small, bilateral indeterminate pulmonary nodules and MRI-brain. A neck CT-angio was obtained, which also showed some enlarged  lymph nodes and additional scalp lesions consistent with head and neck local regional disease. PET-CT for restaging showed an additional FDG avid lymph node in the left lower neck.    He was treated with gamma knife: 2500 cGy to the left resection cavity; 3000 cGy to the L parietal-occipital lesion.    He will be starting with checkpoint inhibitor monotherapy. Plan would be to consider increasing aggressiveness with use of dual checkpoint blockade as needed pending response (see Dr. Greene's note from 5/19/23).     He is planned to begin treatment with keytruda (200mg every 3 weeks) tomorrow and he will have labs prior. We discussed anticipated dosing schedule as well as some of the common less concerning side effects such as fatigue, joint/muscle aches, dry skin, low appetite and loose stools as well as management. I also reviewed the potential for more significant side effects and the potential for the immune system to target organs or tissue within the body including but not limited to thyroiditis, hepatitis, nephritis, pneumonitis and hypopituitarism. I provided the clinic phone # for him should he develop any concerning symptoms. I also messaged CC to send written educational materials.     We discussed that the immunotherapy works systemically and will also hopefully treat any microscopic disease not apparent on imaging. Ultimately, we will need generally a few months of treatment to know if we are getting adequate response. We did discuss that the goal of treatment is control rather than cure in the setting of metastatic disease. Saeid and Smiley verbalize understanding and agreement with the plan,     PLAN  -Proceed with Keytruda tomorrow pending labs prior  -Repeat imaging 3 months after treatment start, with CT-head, neck, CAP.    #Seizure, secondary to brain metastases  Continue Keppra 1000 mg twice daily  No recent activity concerning for seizures.     Not explictly discussed today:  # History of  prostate cancer ([place] 3 + 3) s/p prostatectomy (2009)  He had no adverse features and did not receive adjuvant therapy. PSA has remained negative. Continue with yearly PSA. No urinary concerns today.     55 minutes spent on the date of the encounter doing chart review, review of outside records, review of test results, interpretation of tests, patient visit and documentation     Amber Scheierl, CNP  [facility]  [address] [place] [id number] [phone]

## 2023-06-06 NOTE — NURSING NOTE
Is the patient currently in the state of [place]? YES    Visit mode:VIDEO    If the visit is dropped, the patient can be reconnected by: VIDEO VISIT: Text to cell phone: [phone]    Will anyone else be joining the visit? NO      How would you like to obtain your AVS? MyChart    Are changes needed to the allergy or medication list? NO    Reason for visit: RECHECK (Follow up - questions about norvasc and keppra)

## 2023-06-06 NOTE — LETTER
6/6/2023         RE: Saeid Santa  [address]  [place] [address]        Dear Colleague,    Thank you for referring your patient, Saeid Santa, to the [facility]. Please see a copy of my visit note below.    Virtual Visit Details    Type of service:  Video Visit   Video Start Time: 9:09AM  Video End Time:9:38AM    Originating Location (pt. Location): Home  Distant Location (provider location):  On-site  Platform used for Video Visit: [facility] ONCOLOGY PROGRESS NOTE  Melanoma Clinic  Jun 6, 2023    Chief Complaint: metastatic melanoma to brain    Melanoma History:  --8/16/22, he brings a left parietal scalp lesion to attention of his GP. It is initially observed.  --November 2022, the left parietal scalp lesion was felt to be a cyst, and the left parietal lesion was lanced and drained via scalp incision.  --January 2023, the left parietal scalp cyst would occasionally break open, drain, and bleed, largely at night.   --February 2023, he notes some mild difficulty clearing obstacles with right leg and foot.  --March/April 2023, he has 3 progressively worsening episodes over a span 3 weeks including the right leg. At first he notes the onset of right leg numbness and heaviness, like he was carrying 50 lbs of water on the leg. Then, about a week or so later, the leg began to jerk and converse while he was in the basement. Neither episode associated with loss of consciousness  --4/15/23, he recalls the right leg numbness, heaviness, jerking while driving between work. He had been found by coworkers in the parking lot, sitting in the grass, appearing confused. He was brought to the ER by EMS. CT-CAP, showed small (<6mm), bilateral indeterminate pulmonary nodules. CT-head and MRI brain showed 2 discrete ring-enhancing lesions in the left paramedian posterior frontal lobe near the precentral gyrus measuring approximately 2.2 x 1.7 x 1.9 cm and left occipital lobe near the  lingual gyrus measuring approximately 2.2 x 2.1 x 2.0 cm. The occipital lesion encroaches upon the subependymal surface of the left lateral ventricle occipital horn and contacts the superior surface of the medial left tentorium. The lesions are accompanied by moderate surrounding vasogenic edema. Surgery was recommended.  --4/21/23, underwent left awake (sleep-awake-sleep) craniotomy for tumor resection, he has resection of the left frontal, motor strip tumor as well asan elliptical incision of the left parietal scalp lesion. On pathology, both the scalp excision and brain mass are positive for malignant melanoma. Tumor cells are positive for S-100 and Melan-A, and negative for cytokeratin AE1/AE3, NKX3.1, GFAP, ERG, CD3, and CD20.  CD3 highlights brisk infiltration of the tumor by T lymphocytes, while CD20 highlights scattered positive B lymphocytes. NGS identifies a BRAF G466A mutation, a class 3 mutation insensitive to BRAF kinase inhibitor monotherapy. TMB is high at 48.754 muts/Mb.   --4/22/23, post-operative MRI shows post-operative changes of the parietal craniotomy of the left frontal lobe metastasis. The other 1.7 x 1.1 cm cystic lesion in the left occipital lobe is also seen. No new lesions appreciated.  -- 4/25/23 to 5/9/23, discharged from the hospital to acute rehab stay at [facility] in [place]. He participated in a comprehensive rehabilitation program consisting of PT, OT, Speech, Psychology and Recreation Therapy. He did well and was discharged home with use of a walker.  --PET-CT on 5/18/23 with potentially involved neck lymph nodes. He has mild FDG uptake in soft tissue nodules in the scalp, in the known left occipital brain metastasis, as well as in the left lower neck region.  -5/24-/5/31/23, He was treated with gamma knife: 2500 cGy to the left resection cavity; 3000 cGy to the L parietal-occipital lesion.    Interval History:  -Saeid presents today prior to cycle of systemic treatment with  Keytruda. He is accompanied by his wife, Smiley.   -Saeid is doing fairly well. He feels he tolerated radiation without significant side effects. He initially had some blurred vision after first treatment but was started on steroids and this resolved. He completed his decadron yesterday so is now off that.  -he continues to work with PT/OT. He feels his right foot drop is improving. He does use his brace about 4 hours per day. He is moving more independently and relying less on the cane.  -He thinks is craniotomy incision has healed.  -He reports no seizure activity.   -New new lumps or bumps.  -Appetite is good.   -No other concerns.     Review of Systems:  Patient denies any of the following except if noted above: fevers, chills, difficulty with energy, vision changes, chest pain, dyspnea, abdominal pain, nausea, vomiting, diarrhea, constipation, urinary concerns. He also denies lumps, bumps.    Physical Examination:  There were no vitals taken for this visit.  Wt Readings from Last 5 Encounters:   06/05/23 81.6 kg (180 lb)   05/18/23 81.6 kg (180 lb)   05/15/23 82.1 kg (181 lb)   05/12/23 82.5 kg (181 lb 12.8 oz)   04/23/23 86.1 kg (189 lb 13.1 oz)   Video physical exam  General: Patient appears well in no acute distress.   Skin: No visualized rash or lesions on visualized skin  Eyes: EOMI, no erythema, sclera icterus or discharge noted  Resp: Appears to be breathing comfortably without accessory muscle usage, speaking in full sentences, no cough  Neurologic: No apparent tremors, facial movements symmetric, no dysarthria  Psych: affect appropriate, alert and oriented. Pleasant, talkative.     Laboratory Data:  [facility]     Imaging Studies:  Reviewed PET findings from 5/18/23.      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 Saeid Santa is a very pleasant 67 year old man with a diagnosis of melanoma of the  scalp with metastasis to the brain. Staging studies performed during hospitalization included CT-CAP, which showed small, bilateral indeterminate pulmonary nodules and MRI-brain. A neck CT-angio was obtained, which also showed some enlarged lymph nodes and additional scalp lesions consistent with head and neck local regional disease. PET-CT for restaging showed an additional FDG avid lymph node in the left lower neck.    He was treated with gamma knife: 2500 cGy to the left resection cavity; 3000 cGy to the L parietal-occipital lesion.    He will be starting with checkpoint inhibitor monotherapy. Plan would be to consider increasing aggressiveness with use of dual checkpoint blockade as needed pending response (see Dr. Greene's note from 5/19/23).     He is planned to begin treatment with keytruda (200mg every 3 weeks) tomorrow and he will have labs prior. We discussed anticipated dosing schedule as well as some of the common less concerning side effects such as fatigue, joint/muscle aches, dry skin, low appetite and loose stools as well as management. I also reviewed the potential for more significant side effects and the potential for the immune system to target organs or tissue within the body including but not limited to thyroiditis, hepatitis, nephritis, pneumonitis and hypopituitarism. I provided the clinic phone # for him should he develop any concerning symptoms. I also messaged CC to send written educational materials.     We discussed that the immunotherapy works systemically and will also hopefully treat any microscopic disease not apparent on imaging. Ultimately, we will need generally a few months of treatment to know if we are getting adequate response. We did discuss that the goal of treatment is control rather than cure in the setting of metastatic disease. Saeid and Smiley verbalize understanding and agreement with the plan,     PLAN  -Proceed with Keytruda tomorrow pending labs prior  -Repeat  imaging 3 months after treatment start, with CT-head, neck, CAP.    #Seizure, secondary to brain metastases  Continue Keppra 1000 mg twice daily  No recent activity concerning for seizures.     Not explictly discussed today:  # History of prostate cancer ([place] 3 + 3) s/p prostatectomy (2009)  He had no adverse features and did not receive adjuvant therapy. PSA has remained negative. Continue with yearly PSA. No urinary concerns today.     55 minutes spent on the date of the encounter doing chart review, review of outside records, review of test results, interpretation of tests, patient visit and documentation     Amber Scheierl, CNP  [facility]  [address] [place] [id number]  [phone]

## 2023-06-07 ENCOUNTER — INFUSION THERAPY VISIT (OUTPATIENT)
Dept: ONCOLOGY | Facility: CLINIC | Age: 68
End: 2023-06-07
Attending: INTERNAL MEDICINE
Payer: COMMERCIAL

## 2023-06-07 ENCOUNTER — APPOINTMENT (OUTPATIENT)
Dept: LAB | Facility: CLINIC | Age: 68
End: 2023-06-07
Attending: INTERNAL MEDICINE
Payer: COMMERCIAL

## 2023-06-07 VITALS
DIASTOLIC BLOOD PRESSURE: 71 MMHG | OXYGEN SATURATION: 98 % | SYSTOLIC BLOOD PRESSURE: 116 MMHG | WEIGHT: 182.7 LBS | HEART RATE: 70 BPM | TEMPERATURE: 97.8 F | RESPIRATION RATE: 16 BRPM | BODY MASS INDEX: 24.1 KG/M2

## 2023-06-07 DIAGNOSIS — C43.4 MALIGNANT MELANOMA OF SCALP (H): Primary | ICD-10-CM

## 2023-06-07 DIAGNOSIS — C79.31 MALIGNANT NEOPLASM METASTATIC TO BRAIN (H): ICD-10-CM

## 2023-06-07 LAB
ALBUMIN SERPL BCG-MCNC: 3.4 G/DL (ref 3.5–5.2)
ALP SERPL-CCNC: 71 U/L (ref 40–129)
ALT SERPL W P-5'-P-CCNC: 12 U/L (ref 10–50)
ANION GAP SERPL CALCULATED.3IONS-SCNC: 10 MMOL/L (ref 7–15)
AST SERPL W P-5'-P-CCNC: 15 U/L (ref 10–50)
BILIRUB SERPL-MCNC: 0.8 MG/DL
BUN SERPL-MCNC: 18.3 MG/DL (ref 8–23)
CALCIUM SERPL-MCNC: 8.7 MG/DL (ref 8.8–10.2)
CHLORIDE SERPL-SCNC: 108 MMOL/L (ref 98–107)
CREAT SERPL-MCNC: 0.9 MG/DL (ref 0.67–1.17)
DEPRECATED HCO3 PLAS-SCNC: 22 MMOL/L (ref 22–29)
GFR SERPL CREATININE-BSD FRML MDRD: >90 ML/MIN/1.73M2
GLUCOSE SERPL-MCNC: 81 MG/DL (ref 70–99)
POTASSIUM SERPL-SCNC: 3.5 MMOL/L (ref 3.4–5.3)
PROT SERPL-MCNC: 6 G/DL (ref 6.4–8.3)
SODIUM SERPL-SCNC: 140 MMOL/L (ref 136–145)
TSH SERPL DL<=0.005 MIU/L-ACNC: 1.9 UIU/ML (ref 0.3–4.2)

## 2023-06-07 PROCEDURE — 36415 COLL VENOUS BLD VENIPUNCTURE: CPT | Performed by: INTERNAL MEDICINE

## 2023-06-07 PROCEDURE — 258N000003 HC RX IP 258 OP 636: Performed by: INTERNAL MEDICINE

## 2023-06-07 PROCEDURE — 80053 COMPREHEN METABOLIC PANEL: CPT | Performed by: INTERNAL MEDICINE

## 2023-06-07 PROCEDURE — 84443 ASSAY THYROID STIM HORMONE: CPT | Performed by: INTERNAL MEDICINE

## 2023-06-07 PROCEDURE — 250N000011 HC RX IP 250 OP 636: Performed by: INTERNAL MEDICINE

## 2023-06-07 PROCEDURE — 96413 CHEMO IV INFUSION 1 HR: CPT

## 2023-06-07 RX ADMIN — SODIUM CHLORIDE 250 ML: 9 INJECTION, SOLUTION INTRAVENOUS at 10:21

## 2023-06-07 RX ADMIN — SODIUM CHLORIDE 200 MG: 9 INJECTION, SOLUTION INTRAVENOUS at 10:23

## 2023-06-07 ASSESSMENT — PAIN SCALES - GENERAL: PAINLEVEL: NO PAIN (0)

## 2023-06-07 NOTE — NURSING NOTE
Chief Complaint   Patient presents with     Blood Draw     Labs drawn via PIV by RN in lab.  VS taken        Labs drawn from PIV placed by RN. Line flushed with saline. Vitals taken. Pt checked in for appointment(s).    Shruthi Remy RN

## 2023-06-07 NOTE — PROGRESS NOTES
Infusion Nursing Note:  Saeid Santa presents today for Cycle 1, Day 1- Keytruda Infusion.    Patient seen by provider today: Yes: Amber Scheierl, CNP on 6/6/23   present during visit today: Not Applicable.    Note: Patient new to oncology infusion room and is receiving Keytruda for the first time. Pt oriented to infusion room, including chair, nutrition station and call light. New patient teaching done previously by Amber Scheierl, CNP. Writer reinforced chemotherapy teaching/side effects and schedule. Given print out of medication education. Questions answered.    Pt instructed to call triagwith chills/temp >=100.4, questions/concerns. Pt stated understanding of plan.     Patient reports feeling well on arrival to infusion suite. Denies any signs of infection: no fever, chills, cough, rash, chest pain, or shortness of breath.       Intravenous Access:  Peripheral IV placed in lab.    Treatment Conditions:  Lab Results   Component Value Date     06/07/2023    POTASSIUM 3.5 06/07/2023    MAG 2.2 04/23/2023    CR 0.90 06/07/2023    JOHN PAUL 8.7 (L) 06/07/2023    BILITOTAL 0.8 06/07/2023    ALBUMIN 3.4 (L) 06/07/2023    ALT 12 06/07/2023    AST 15 06/07/2023     Results reviewed, labs MET treatment parameters, ok to proceed with treatment.      Post Infusion Assessment:  Patient tolerated infusion without incident.  Blood return noted pre and post infusion.  Site patent and intact, free from redness, edema or discomfort.  No evidence of extravasations.  Access discontinued per protocol.       Discharge Plan:   Patient declined prescription refills.  Discharge instructions reviewed with: Patient.  Patient and/or family verbalized understanding of discharge instructions and all questions answered.  Copy of AVS reviewed with patient and/or family.  Patient will return 6/28/23 for next appointment.  Patient discharged in stable condition accompanied by: self.  Departure Mode: Ambulatory.      Deedee COSME  Armaan RN

## 2023-06-07 NOTE — PATIENT INSTRUCTIONS
[facility] Triage and after hours / weekends / holidays:  [phone]    Please call the triage or after hours line if you experience a temperature greater than or equal to 100.4, shaking chills, have uncontrolled nausea, vomiting and/or diarrhea, dizziness, shortness of breath, chest pain, bleeding, unexplained bruising, or if you have any other new/concerning symptoms, questions or concerns.      If you are having any concerning symptoms or wish to speak to a provider before your next infusion visit, please call triage to notify them so we can adequately serve you.     If you need a refill on a narcotic prescription or other medication, please call before your infusion appointment.                June 2023 Sunday Monday Tuesday Wednesday Thursday Friday Saturday                       1     2    NEURO TREATMENT  11:30 AM   (60 min.)   Che Merlos, PT   [facility] 3       4     5    RETURN   1:05 PM   (20 min.)   Kye Garrido MD   [facility] 6    RETURN CCSL   8:45 AM   (45 min.)   Scheierl, Amber J, APRN CNP   [facility] 7    LAB PERIPHERAL   8:45 AM   (15 min.)   [facility] DRAW   [facility]    ONC INFUSION 1 HR (60 MIN)   9:30 AM   (60 min.)    ONC INFUSION NURSE   [facility] 8    NEURO TREATMENT  10:30 AM   (30 min.)   Priya Bean PT   [facility] 9     10       11     12     13     14     15     16     17       18     19     20     21     22     23    NEURO TREATMENT   2:00 PM   (60 min.)   Che Merlos, PT   [facility] 24       25     26     27     28    LAB PERIPHERAL   9:15 AM   (15 min.)   [facility] DRAW   [facility]    RETURN CCSL   9:45 AM   (45 min.)   Meghan Rinaldi PA-C   [facility]  [facility]    ONC INFUSION 1 HR (60 MIN)  11:30 AM   (60 min.)    ONC INFUSION NURSE   [facility] 30 July 2023 Sunday Monday Tuesday Wednesday Thursday Friday Saturday                                 1       2     3     4     5     6     7     8       9     10     11     12     13     14     15       16     17     18     19    LAB PERIPHERAL   9:00 AM   (15 min.)   [facility] DRAW   [facility]    RETURN CCSL   9:15 AM   (45 min.)   Scheierl, Amber J, APRN CNP   [facility]    ONC INFUSION 1 HR (60 MIN)  11:00 AM   (60 min.)    ONC INFUSION NURSE   [facility] 20     21     22       23     24     25     26     27     28     29       30     31                                                 Recent Results (from the past 24 hour(s))   Comprehensive metabolic panel    Collection Time: 06/07/23  9:15 AM   Result Value Ref Range    Sodium 140 136 - 145 mmol/L    Potassium 3.5 3.4 - 5.3 mmol/L    Chloride 108 (H) 98 - 107 mmol/L    Carbon Dioxide (CO2) 22 22 - 29 mmol/L    Anion Gap 10 7 - 15 mmol/L    Urea Nitrogen 18.3 8.0 - 23.0 mg/dL    Creatinine 0.90 0.67 - 1.17 mg/dL    Calcium 8.7 (L) 8.8 - 10.2 mg/dL    Glucose 81 70 - 99 mg/dL    Alkaline Phosphatase 71 40 - 129 U/L    AST 15 10 - 50 U/L    ALT 12 10 - 50 U/L    Protein Total 6.0 (L) 6.4 - 8.3 g/dL    Albumin 3.4 (L) 3.5 - 5.2 g/dL    Bilirubin Total 0.8 <=1.2 mg/dL    GFR Estimate >90 >60 mL/min/1.73m2   TSH with free T4 reflex    Collection Time: 06/07/23  9:15 AM   Result Value Ref Range    TSH 1.90 0.30 - 4.20 uIU/mL

## 2023-06-08 ENCOUNTER — THERAPY VISIT (OUTPATIENT)
Dept: PHYSICAL THERAPY | Facility: CLINIC | Age: 68
End: 2023-06-08
Payer: COMMERCIAL

## 2023-06-08 DIAGNOSIS — G81.91 RIGHT HEMIPARESIS (H): Primary | ICD-10-CM

## 2023-06-08 PROCEDURE — 97110 THERAPEUTIC EXERCISES: CPT | Mod: GP

## 2023-06-13 ENCOUNTER — MYC MEDICAL ADVICE (OUTPATIENT)
Dept: ONCOLOGY | Facility: CLINIC | Age: 68
End: 2023-06-13
Payer: COMMERCIAL

## 2023-06-14 ENCOUNTER — HOSPITAL ENCOUNTER (INPATIENT)
Facility: CLINIC | Age: 68
LOS: 2 days | Discharge: HOME | DRG: 054 | End: 2023-06-16
Attending: EMERGENCY MEDICINE | Admitting: STUDENT IN AN ORGANIZED HEALTH CARE EDUCATION/TRAINING PROGRAM
Payer: COMMERCIAL

## 2023-06-14 ENCOUNTER — APPOINTMENT (OUTPATIENT)
Dept: CT IMAGING | Facility: CLINIC | Age: 68
DRG: 054 | End: 2023-06-14
Attending: EMERGENCY MEDICINE
Payer: COMMERCIAL

## 2023-06-14 ENCOUNTER — APPOINTMENT (OUTPATIENT)
Dept: MRI IMAGING | Facility: CLINIC | Age: 68
DRG: 054 | End: 2023-06-14
Attending: EMERGENCY MEDICINE
Payer: COMMERCIAL

## 2023-06-14 DIAGNOSIS — Z11.52 ENCOUNTER FOR SCREENING LABORATORY TESTING FOR SEVERE ACUTE RESPIRATORY SYNDROME CORONAVIRUS 2 (SARS-COV-2): ICD-10-CM

## 2023-06-14 DIAGNOSIS — G93.6 CEREBRAL EDEMA (H): ICD-10-CM

## 2023-06-14 DIAGNOSIS — C43.9 METASTATIC MALIGNANT MELANOMA (H): ICD-10-CM

## 2023-06-14 DIAGNOSIS — D49.6 BRAIN TUMOR (H): Primary | ICD-10-CM

## 2023-06-14 LAB
ANION GAP SERPL CALCULATED.3IONS-SCNC: 12 MMOL/L (ref 7–15)
APTT PPP: 26 SECONDS (ref 22–38)
BASOPHILS # BLD AUTO: 0 10E3/UL (ref 0–0.2)
BASOPHILS NFR BLD AUTO: 0 %
BUN SERPL-MCNC: 13.9 MG/DL (ref 8–23)
CALCIUM SERPL-MCNC: 8.9 MG/DL (ref 8.8–10.2)
CHLORIDE SERPL-SCNC: 105 MMOL/L (ref 98–107)
CREAT SERPL-MCNC: 0.99 MG/DL (ref 0.67–1.17)
DEPRECATED HCO3 PLAS-SCNC: 22 MMOL/L (ref 22–29)
EOSINOPHIL # BLD AUTO: 0.5 10E3/UL (ref 0–0.7)
EOSINOPHIL NFR BLD AUTO: 6 %
ERYTHROCYTE [DISTWIDTH] IN BLOOD BY AUTOMATED COUNT: 14 % (ref 10–15)
GFR SERPL CREATININE-BSD FRML MDRD: 82 ML/MIN/1.73M2
GLUCOSE SERPL-MCNC: 77 MG/DL (ref 70–99)
HCT VFR BLD AUTO: 40 % (ref 40–53)
HGB BLD-MCNC: 13 G/DL (ref 13.3–17.7)
HOLD SPECIMEN: NORMAL
IMM GRANULOCYTES # BLD: 0 10E3/UL
IMM GRANULOCYTES NFR BLD: 0 %
INR PPP: 0.97 (ref 0.85–1.15)
LYMPHOCYTES # BLD AUTO: 1.5 10E3/UL (ref 0.8–5.3)
LYMPHOCYTES NFR BLD AUTO: 17 %
MCH RBC QN AUTO: 30.7 PG (ref 26.5–33)
MCHC RBC AUTO-ENTMCNC: 32.5 G/DL (ref 31.5–36.5)
MCV RBC AUTO: 95 FL (ref 78–100)
MONOCYTES # BLD AUTO: 1.3 10E3/UL (ref 0–1.3)
MONOCYTES NFR BLD AUTO: 14 %
NEUTROPHILS # BLD AUTO: 5.5 10E3/UL (ref 1.6–8.3)
NEUTROPHILS NFR BLD AUTO: 63 %
NRBC # BLD AUTO: 0 10E3/UL
NRBC BLD AUTO-RTO: 0 /100
PLATELET # BLD AUTO: 218 10E3/UL (ref 150–450)
POTASSIUM SERPL-SCNC: 3.9 MMOL/L (ref 3.4–5.3)
RBC # BLD AUTO: 4.23 10E6/UL (ref 4.4–5.9)
SARS-COV-2 RNA RESP QL NAA+PROBE: NEGATIVE
SODIUM SERPL-SCNC: 139 MMOL/L (ref 136–145)
WBC # BLD AUTO: 9 10E3/UL (ref 4–11)

## 2023-06-14 PROCEDURE — 99285 EMERGENCY DEPT VISIT HI MDM: CPT | Performed by: EMERGENCY MEDICINE

## 2023-06-14 PROCEDURE — 70450 CT HEAD/BRAIN W/O DYE: CPT

## 2023-06-14 PROCEDURE — 36415 COLL VENOUS BLD VENIPUNCTURE: CPT | Performed by: EMERGENCY MEDICINE

## 2023-06-14 PROCEDURE — 120N000005 HC R&B MS OVERFLOW UMMC

## 2023-06-14 PROCEDURE — 85041 AUTOMATED RBC COUNT: CPT | Performed by: EMERGENCY MEDICINE

## 2023-06-14 PROCEDURE — 85610 PROTHROMBIN TIME: CPT | Performed by: EMERGENCY MEDICINE

## 2023-06-14 PROCEDURE — 87635 SARS-COV-2 COVID-19 AMP PRB: CPT | Performed by: STUDENT IN AN ORGANIZED HEALTH CARE EDUCATION/TRAINING PROGRAM

## 2023-06-14 PROCEDURE — 70450 CT HEAD/BRAIN W/O DYE: CPT | Mod: 26 | Performed by: RADIOLOGY

## 2023-06-14 PROCEDURE — 99222 1ST HOSP IP/OBS MODERATE 55: CPT | Mod: AI | Performed by: STUDENT IN AN ORGANIZED HEALTH CARE EDUCATION/TRAINING PROGRAM

## 2023-06-14 PROCEDURE — 99285 EMERGENCY DEPT VISIT HI MDM: CPT | Mod: 25

## 2023-06-14 PROCEDURE — 70553 MRI BRAIN STEM W/O & W/DYE: CPT | Mod: 26 | Performed by: STUDENT IN AN ORGANIZED HEALTH CARE EDUCATION/TRAINING PROGRAM

## 2023-06-14 PROCEDURE — 250N000013 HC RX MED GY IP 250 OP 250 PS 637

## 2023-06-14 PROCEDURE — 250N000013 HC RX MED GY IP 250 OP 250 PS 637: Performed by: EMERGENCY MEDICINE

## 2023-06-14 PROCEDURE — 70553 MRI BRAIN STEM W/O & W/DYE: CPT

## 2023-06-14 PROCEDURE — 82310 ASSAY OF CALCIUM: CPT | Performed by: EMERGENCY MEDICINE

## 2023-06-14 PROCEDURE — 250N000011 HC RX IP 250 OP 636: Performed by: STUDENT IN AN ORGANIZED HEALTH CARE EDUCATION/TRAINING PROGRAM

## 2023-06-14 PROCEDURE — 85730 THROMBOPLASTIN TIME PARTIAL: CPT | Performed by: EMERGENCY MEDICINE

## 2023-06-14 PROCEDURE — A9585 GADOBUTROL INJECTION: HCPCS | Performed by: EMERGENCY MEDICINE

## 2023-06-14 PROCEDURE — 85014 HEMATOCRIT: CPT | Performed by: EMERGENCY MEDICINE

## 2023-06-14 PROCEDURE — 255N000002 HC RX 255 OP 636: Performed by: EMERGENCY MEDICINE

## 2023-06-14 PROCEDURE — 250N000013 HC RX MED GY IP 250 OP 250 PS 637: Performed by: STUDENT IN AN ORGANIZED HEALTH CARE EDUCATION/TRAINING PROGRAM

## 2023-06-14 RX ORDER — ACETAMINOPHEN 325 MG/1
975 TABLET ORAL ONCE
Status: COMPLETED | OUTPATIENT
Start: 2023-06-14 | End: 2023-06-14

## 2023-06-14 RX ORDER — ACETAMINOPHEN 325 MG/1
975 TABLET ORAL EVERY 8 HOURS PRN
Status: DISCONTINUED | OUTPATIENT
Start: 2023-06-14 | End: 2023-06-16 | Stop reason: HOSPADM

## 2023-06-14 RX ORDER — LIDOCAINE 40 MG/G
CREAM TOPICAL
Status: DISCONTINUED | OUTPATIENT
Start: 2023-06-14 | End: 2023-06-16 | Stop reason: HOSPADM

## 2023-06-14 RX ORDER — GADOBUTROL 604.72 MG/ML
8 INJECTION INTRAVENOUS ONCE
Status: COMPLETED | OUTPATIENT
Start: 2023-06-14 | End: 2023-06-14

## 2023-06-14 RX ORDER — AMLODIPINE BESYLATE 5 MG/1
10 TABLET ORAL DAILY
Status: DISCONTINUED | OUTPATIENT
Start: 2023-06-15 | End: 2023-06-16 | Stop reason: HOSPADM

## 2023-06-14 RX ORDER — LEVETIRACETAM 500 MG/1
1000 TABLET ORAL 2 TIMES DAILY
Status: DISCONTINUED | OUTPATIENT
Start: 2023-06-14 | End: 2023-06-16 | Stop reason: HOSPADM

## 2023-06-14 RX ORDER — LEVETIRACETAM 1000 MG/1
100 TABLET ORAL 2 TIMES DAILY
Status: DISCONTINUED | OUTPATIENT
Start: 2023-06-14 | End: 2023-06-14

## 2023-06-14 RX ORDER — DEXAMETHASONE SODIUM PHOSPHATE 10 MG/ML
4 INJECTION, SOLUTION INTRAMUSCULAR; INTRAVENOUS EVERY 6 HOURS
Status: DISCONTINUED | OUTPATIENT
Start: 2023-06-14 | End: 2023-06-16

## 2023-06-14 RX ORDER — LORAZEPAM 2 MG/ML
1 INJECTION INTRAMUSCULAR EVERY 5 MIN PRN
Status: DISCONTINUED | OUTPATIENT
Start: 2023-06-14 | End: 2023-06-16 | Stop reason: HOSPADM

## 2023-06-14 RX ADMIN — DEXAMETHASONE SODIUM PHOSPHATE 4 MG: 10 INJECTION, SOLUTION INTRAMUSCULAR; INTRAVENOUS at 22:08

## 2023-06-14 RX ADMIN — ACETAMINOPHEN 975 MG: 325 TABLET, FILM COATED ORAL at 14:48

## 2023-06-14 RX ADMIN — Medication 1 MG: at 22:13

## 2023-06-14 RX ADMIN — DEXAMETHASONE SODIUM PHOSPHATE 4 MG: 10 INJECTION, SOLUTION INTRAMUSCULAR; INTRAVENOUS at 17:00

## 2023-06-14 RX ADMIN — LEVETIRACETAM 1000 MG: 500 TABLET, FILM COATED ORAL at 20:54

## 2023-06-14 RX ADMIN — GADOBUTROL 8 ML: 604.72 INJECTION INTRAVENOUS at 15:35

## 2023-06-14 ASSESSMENT — ACTIVITIES OF DAILY LIVING (ADL)
ADLS_ACUITY_SCORE: 35

## 2023-06-14 NOTE — H&P
[facility]    History and Physical - Hospitalist Service, GOLD TEAM        Date of Admission:  6/14/2023    Assessment & Plan     Saeid Santa is a 67-year-old M with a history of metastatic melanoma complicated by seizures s/p XRT (last on 5/31/23), craniotomy w/ resection of met, and Keytruda (last infusion 6/7/23) who presented on 6/14 to the ED 3 days of increased LLE weakness, headache, and vision changes, found on brain imaging to have evidence of disease progression and admitted for management of cerebral edema and progression of metastatic melanoma.     # Melanoma metastatic to the brain, s/p craniotomy for resection of motor strip lesion and XRT, on Keytruda  # Seizure disorder, secondary to brain metastases  Follows with with Cecilia Greene (onc), Benedicto Rose (rad onc), and Kye Garrido (neurosurg) as an outpatient.   - Neurosurgery consulted, appreciate recommendations   - Any operative options pending discussion between neurosurg and medical oncology   - NPO at midnight  - Radiation oncology consulted, appreciate recommendations  - Oncology consulted, appreciate recommendations    - Dexamethasone 4mg q6h for cerebral edema   - Continue PTA 1g Keppra BID  - Seizure precautions ordered; Ativan 1mg q5h PRN for seizures >5 minutes    # Hypertension  - Continue PTA amlodipine      # Obstructive sleep apnea  Not on home CPAP.      Diet: Regular Diet Adult  NPO per Anesthesia Guidelines for Procedure/Surgery Except for: Meds  DVT Prophylaxis: Pneumatic Compression Devices, avoiding chemoprophylaxis for now given possible operative management and brain mets  Aragon Catheter: Not present  Lines: None     Cardiac Monitoring: None  Code Status: Full Code, confirmed with patient and his wife on admission.       Disposition Plan      Expected Discharge Date: 06/16/2023                  Erica Desir MD  Hospitalist Service, GOLD TEAM   [facility]  "[facility]  Securely message with [employer] (more info)  Text page via [facility] Paging/Directory   See signed in provider for up to date coverage information    ______________________________________________________________________    Chief Complaint   Increased LLE weakness and confusion    History is obtained from the patient    History of Present Illness   Saeid Santa is a 67-year-old M with a history of metastatic melanoma complicated by seizures s/p XRT (last on 5/31/23), craniotomy w/ resection of met, and Keytruda (last infusion 6/7/23) who presented to the ED today with 3 days of increased LLE weakness and confusion.     Mr. Santa is with his wife, who reports that she has noticed that he is completely dragging his left leg now, whereas before, he had a gait where he would sort of swing his left leg when he walked.  Patient himself confirms increased weakness as well as some disorientation for the past 3 days.  Specifically, Smiley notes that her  was having some trouble operating his phone and doing tasks on his phone, as he has had some trouble with his vision when he is reading and trying to focus on close objects.  Mostly, she notes that he is a bit \"off\" lately.  He is, however, able to get himself out of bed into the bathroom and back, and he has had no incontinence.  Also having some worsened headaches over the past few days.    Mr. Santa and his wife reached out via [employer] to neurosurgery yesterday and were advised to come in for further evaluation.  They arrived in the ED, and Mr. Santa was noted to be afebrile and hemodynamically stable.  He underwent CT head, which showed significantly increased size of left occipital cystic and solid masses, with associated adjacent edema.  MRI has been done, the final read is still pending at this time.  Neurosurgery was consulted and saw the patient in the ED--recommended admission to medicine for optimization, as well as " medical oncology and radiation oncology consults.  The medical oncology team has started steroids for the cerebral edema.  Saeid Santa is being admitted to internal medicine for management of metastatic melanoma with evidence of progression.    Past Medical History    Metastatic melanoma  Seizure disorder  Hypertension  JUSTEN (not on CPAP)    Past Surgical History   Past Surgical History:   Procedure Laterality Date     OPTICAL TRACKING SYSTEM CRANIOTOMY, EXCISE TUMOR, COMBINED Left 2023    Procedure: stealth assisted awake craniotomy resection of motor strip tumor;  Surgeon: Kye Garrido MD;  Location: [facility] OR     PROSTATECTOMY             Prior to Admission Medications   Prior to Admission Medications   Prescriptions Last Dose Informant Patient Reported? Taking?   acetaminophen (TYLENOL) 325 MG tablet   No No   Sig: Take 2 tablets (650 mg) by mouth every 4 hours as needed for other (For optimal non-opioid multimodal pain management to improve pain control.)   amLODIPine (NORVASC) 10 MG tablet   Yes No   Si tablet Orally Once a day   levETIRAcetam (KEPPRA) 1000 MG tablet   Yes No   Si tablet Orally every 12 hrs      Facility-Administered Medications: None        Social History   I have reviewed this patient's social history and updated it with pertinent information if needed.  Social History     Tobacco Use     Smoking status: Former     Packs/day: 0.50     Years: 20.00     Pack years: 10.00     Types: Cigarettes     Quit date: 2017     Years since quittin.4     Smokeless tobacco: Never   Vaping Use     Vaping status: Never Used   Substance Use Topics     Alcohol use: Yes     Comment: socially     Drug use: Yes     Types: Marijuana     Comment: typically smoked daily but no use for past two weeks as of 23       Allergies   No Known Allergies     Physical Exam   Vital Signs: Temp: 97.8  F (36.6  C) Temp src: Oral BP: 103/65 Pulse: 106   Resp: 18 SpO2: 95 % O2 Device: None (Room air)     Weight: 182 lbs 0 oz    GENERAL: The patient is awake and alert, in no apparent distress, appropriate, pleasant and cooperative. No dysarthria is noted. No discomfort on presentation is noted.  HEAD: Atraumatic, normocephalic. No icterus. No facial droop. No slurred speech.  NOSE: Without deformity, bleeding or discharge.   ORAL CAVITY: No swelling or abnormality to the lip or teeth.   LUNGS: Clear. No increased work of breathing. No wheezing. On RA.  SKIN: No rashes. No jaundice. Pink and warm with good turgor.   PSYCHIATRIC: The patient is oriented x4. Mood and affect are appropriate. No dysarthria is noted.     Data     I have personally reviewed the following data over the past 24 hrs:    9.0  \   13.0 (L)   / 218     139 105 13.9 /  77   3.9 22 0.99 \       INR:  0.97 PTT:  26   D-dimer:  N/A Fibrinogen:  N/A

## 2023-06-14 NOTE — ED TRIAGE NOTES
67 yr old male ambulatory with walker to triage with vision changes - unable to read x 3 days ago. Fell yesterday - denies head strike. Wife concerned with mental status change. Referred for emergent MRI of head.      Triage Assessment     Row Name 06/14/23 [id number]       Triage Assessment (Adult)    Airway WDL WDL       Respiratory WDL    Respiratory WDL WDL       Skin Circulation/Temperature WDL    Skin Circulation/Temperature WDL WDL       Cardiac WDL    Cardiac WDL WDL       Peripheral/Neurovascular WDL    Peripheral Neurovascular WDL WDL       Cognitive/Neuro/Behavioral WDL    Cognitive/Neuro/Behavioral WDL WDL

## 2023-06-14 NOTE — ED PROVIDER NOTES
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  Patient reports he has been having memory difficulty, difficulty reading, and difficulty seeing over the past 3 days.  He also reported headaches during this time and a fall 2 days ago in the bathroom, denied head trauma.  Patient was referred in by oncology/neurosurgery for an MRI.    Here he denies fevers or cough.  He reports he has been eating and drinking normally.  He denies nausea or vomiting.  He does endorse some left-sided temporal pain which he feels has been present since starting Keytruda, but has been more frequent.    Past Medical History  Past Medical History:   Diagnosis Date     HTN (hypertension)      Metastatic melanoma (H)      Prostate cancer (H)      Seizures (H)      Past Surgical History:   Procedure Laterality Date     OPTICAL TRACKING SYSTEM CRANIOTOMY, EXCISE TUMOR, COMBINED Left 2023    Procedure: stealth assisted awake craniotomy resection of motor strip tumor;  Surgeon: Kye Garrido MD;  Location: [facility] OR     PROSTATECTOMY           No current outpatient medications on file.    No Known Allergies  Family History  Family History   Problem Relation Age of Onset     Pancreatic Cancer Father      Diabetes Paternal Grandmother      Social History   Social History     Tobacco Use     Smoking status: Former     Packs/day: 0.50     Years: 20.00     Pack years: 10.00     Types: Cigarettes     Quit date: 2017     Years since quittin.4     Smokeless tobacco: Never   Vaping Use  "    Vaping status: Never Used   Substance Use Topics     Alcohol use: Yes     Comment: socially     Drug use: Yes     Types: Marijuana     Comment: typically smoked daily but no use for past two weeks as of 4/20/23         A medically appropriate review of systems was performed with pertinent positives and negatives noted in the HPI, and all other systems negative.    Physical Exam   BP: 121/69  Pulse: 102  Temp: 97.8  F (36.6  C)  Resp: 18  Height: 185.4 cm (6' 1\")  Weight: 82.6 kg (182 lb)  SpO2: 98 %  Physical Exam  Vitals reviewed.   Constitutional:       General: He is not in acute distress.     Appearance: He is not diaphoretic.   HENT:      Head: Normocephalic and atraumatic.      Right Ear: External ear normal.      Left Ear: External ear normal.      Nose: Nose normal. No rhinorrhea.      Mouth/Throat:      Mouth: Mucous membranes are moist.   Eyes:      General: No scleral icterus.     Extraocular Movements: Extraocular movements intact.      Conjunctiva/sclera: Conjunctivae normal.   Cardiovascular:      Rate and Rhythm: Normal rate and regular rhythm.      Heart sounds: Normal heart sounds.   Pulmonary:      Effort: No respiratory distress.      Breath sounds: Normal breath sounds.   Abdominal:      General: Bowel sounds are normal.      Palpations: Abdomen is soft.      Tenderness: There is no abdominal tenderness.   Musculoskeletal:         General: No deformity. Normal range of motion.      Cervical back: Normal range of motion and neck supple.   Skin:     General: Skin is warm.      Findings: No rash.   Neurological:      Comments: Drowsy, mild confusion.  No focal neurological defects   Psychiatric:         Mood and Affect: Mood normal.         Behavior: Behavior normal.       ED Course, Procedures, & Data     12:17 PM  The patient was seen and examined by Carlos Manuel Bhat DO in Room ED23.   Procedures       Results for orders placed or performed during the hospital encounter of 06/14/23   MR" Brain w/o & w Contrast     Status: None (Preliminary result)    Impression    RESIDENT PRELIMINARY INTERPRETATION  IMPRESSION:  Increased size of the left occipital cystic and solid masses with  associated cerebral edema. Perfusion imaging is pending.   CT Head w/o Contrast     Status: None    Narrative    CT HEAD W/O CONTRAST 6/14/2023 1:16 PM    History: fall confusion     Comparison: MRI 5/19/2023, 4/22/2023    Technique: Using multidetector thin collimation helical acquisition  technique, axial, coronal and sagittal CT images from the skull base  to the vertex were obtained without intravenous contrast.   (topogram) image(s) also obtained and reviewed.    Findings:   No acute traumatic abnormality.    Surgical changes of left parietal craniotomy for resection of left  parietal mass. Significantly increased size of 2 adjacent cystic  masses centered in the left occipital lobe. Superior cystic lesion  measures 1.6 x 1.3 x 1.4 cm, previously 1.0 x 1.2 x 0.6 cm. Inferior  cystic and solid lesion measures 3.3 x 3.1 x 2.9 cm, previously 2.7 x  2.8 x 2.6 cm. Significantly increased extent of confluent edema  surrounding these lesions and extending into the adjacent left  parietal, temporal, and posterior frontal white matter. Associated  mass effect with effacement of the atrium of the left lateral  ventricle and 4 mm of rightward midline shift.    Orbits are unremarkable. Paranasal sinuses and mastoid air cells are  clear.      Impression    Impression:  1. No acute traumatic abnormality.  2. Significantly increased size of left occipital cystic and solid  masses and associated adjacent edema.    I have personally reviewed the examination and initial interpretation  and I agree with the findings.    MARC KOO MD         SYSTEM ID:  [id number]   [place] Draw     Status: None    Narrative    The following orders were created for panel order [place] Draw.  Procedure                               Abnormality          Status                     ---------                               -----------         ------                     Extra Blue Top Tube[[id number]]                              Final result               Extra Red Top Tube[[id number]]                               Final result               Extra Green Top (Lithium...[[id number]]                      Final result               Extra Purple Top Tube[[id number]]                            Final result                 Please view results for these tests on the individual orders.   Extra Blue Top Tube     Status: None   Result Value Ref Range    Hold Specimen JIC    Extra Red Top Tube     Status: None   Result Value Ref Range    Hold Specimen JIC    Extra Green Top (Lithium Heparin) Tube     Status: None   Result Value Ref Range    Hold Specimen JIC    Extra Purple Top Tube     Status: None   Result Value Ref Range    Hold Specimen JIC    Basic metabolic panel     Status: Normal   Result Value Ref Range    Sodium 139 136 - 145 mmol/L    Potassium 3.9 3.4 - 5.3 mmol/L    Chloride 105 98 - 107 mmol/L    Carbon Dioxide (CO2) 22 22 - 29 mmol/L    Anion Gap 12 7 - 15 mmol/L    Urea Nitrogen 13.9 8.0 - 23.0 mg/dL    Creatinine 0.99 0.67 - 1.17 mg/dL    Calcium 8.9 8.8 - 10.2 mg/dL    Glucose 77 70 - 99 mg/dL    GFR Estimate 82 >60 mL/min/1.73m2   INR     Status: Normal   Result Value Ref Range    INR 0.97 0.85 - 1.15   Partial thromboplastin time     Status: Normal   Result Value Ref Range    aPTT 26 22 - 38 Seconds   CBC with platelets and differential     Status: Abnormal   Result Value Ref Range    WBC Count 9.0 4.0 - 11.0 10e3/uL    RBC Count 4.23 (L) 4.40 - 5.90 10e6/uL    Hemoglobin 13.0 (L) 13.3 - 17.7 g/dL    Hematocrit 40.0 40.0 - 53.0 %    MCV 95 78 - 100 fL    MCH 30.7 26.5 - 33.0 pg    MCHC 32.5 31.5 - 36.5 g/dL    RDW 14.0 10.0 - 15.0 %    Platelet Count 218 150 - 450 10e3/uL    % Neutrophils 63 %    % Lymphocytes 17 %    % Monocytes 14 %    % Eosinophils 6 %     % Basophils 0 %    % Immature Granulocytes 0 %    NRBCs per 100 WBC 0 <1 /100    Absolute Neutrophils 5.5 1.6 - 8.3 10e3/uL    Absolute Lymphocytes 1.5 0.8 - 5.3 10e3/uL    Absolute Monocytes 1.3 0.0 - 1.3 10e3/uL    Absolute Eosinophils 0.5 0.0 - 0.7 10e3/uL    Absolute Basophils 0.0 0.0 - 0.2 10e3/uL    Absolute Immature Granulocytes 0.0 <=0.4 10e3/uL    Absolute NRBCs 0.0 10e3/uL   Asymptomatic COVID-19 Virus (Coronavirus) by PCR Nose     Status: Normal    Specimen: Nose; Swab   Result Value Ref Range    SARS CoV2 PCR Negative Negative    Narrative    Testing was performed using the [employer] Xpress SARS-CoV-2 Assay on the Cepheid Gene-Xpert Instrument Systems. Additional information about this Emergency Use Authorization (EUA) assay can be found via the Lab Guide. This test should be ordered for the detection of SARS-CoV-2 in individuals who meet SARS-CoV-2 clinical and/or epidemiological criteria as well as from individuals without symptoms or other reasons to suspect COVID-19. Test performance for asymptomatic patients has only been established in anterior nasal swab specimens. This test is for in vitro diagnostic use under the FDA EUA for laboratories certified under CLIA to perform high complexity testing. This test has not been FDA cleared or approved. A negative result does not rule out the presence of PCR inhibitors in the specimen or target RNA concentration below the limit of detection for the assay. The possibility of a false negative should be considered if the patient's recent exposure or clinical presentation suggests COVID-19. This test was validated by [facility]. These Laboratories are certified under the Clinical Laboratory Improvement Amendments (CLIA) as qualified to perform high complexity testing.     CBC with platelets differential     Status: Abnormal    Narrative    The following orders were created for panel order CBC with platelets differential.  Procedure                                Abnormality         Status                     ---------                               -----------         ------                     CBC with platelets and d...[[id number]]  Abnormal            Final result                 Please view results for these tests on the individual orders.                   Results for orders placed or performed during the hospital encounter of 06/14/23   MR Brain w/o & w Contrast     Status: None (Preliminary result)    Impression    RESIDENT PRELIMINARY INTERPRETATION  IMPRESSION:  Increased size of the left occipital cystic and solid masses with  associated cerebral edema. Perfusion imaging is pending.   CT Head w/o Contrast     Status: None    Narrative    CT HEAD W/O CONTRAST 6/14/2023 1:16 PM    History: fall confusion     Comparison: MRI 5/19/2023, 4/22/2023    Technique: Using multidetector thin collimation helical acquisition  technique, axial, coronal and sagittal CT images from the skull base  to the vertex were obtained without intravenous contrast.   (topogram) image(s) also obtained and reviewed.    Findings:   No acute traumatic abnormality.    Surgical changes of left parietal craniotomy for resection of left  parietal mass. Significantly increased size of 2 adjacent cystic  masses centered in the left occipital lobe. Superior cystic lesion  measures 1.6 x 1.3 x 1.4 cm, previously 1.0 x 1.2 x 0.6 cm. Inferior  cystic and solid lesion measures 3.3 x 3.1 x 2.9 cm, previously 2.7 x  2.8 x 2.6 cm. Significantly increased extent of confluent edema  surrounding these lesions and extending into the adjacent left  parietal, temporal, and posterior frontal white matter. Associated  mass effect with effacement of the atrium of the left lateral  ventricle and 4 mm of rightward midline shift.    Orbits are unremarkable. Paranasal sinuses and mastoid air cells are  clear.      Impression    Impression:  1. No acute traumatic abnormality.  2. Significantly increased  size of left occipital cystic and solid  masses and associated adjacent edema.    I have personally reviewed the examination and initial interpretation  and I agree with the findings.    MARC KOO MD         SYSTEM ID:  [id number]   [place] Draw     Status: None    Narrative    The following orders were created for panel order [place] Draw.  Procedure                               Abnormality         Status                     ---------                               -----------         ------                     Extra Blue Top Tube[[id number]]                              Final result               Extra Red Top Tube[[id number]]                               Final result               Extra Green Top (Lithium...[[id number]]                      Final result               Extra Purple Top Tube[[id number]]                            Final result                 Please view results for these tests on the individual orders.   Extra Blue Top Tube     Status: None   Result Value Ref Range    Hold Specimen JIC    Extra Red Top Tube     Status: None   Result Value Ref Range    Hold Specimen JIC    Extra Green Top (Lithium Heparin) Tube     Status: None   Result Value Ref Range    Hold Specimen JIC    Extra Purple Top Tube     Status: None   Result Value Ref Range    Hold Specimen JIC    Basic metabolic panel     Status: Normal   Result Value Ref Range    Sodium 139 136 - 145 mmol/L    Potassium 3.9 3.4 - 5.3 mmol/L    Chloride 105 98 - 107 mmol/L    Carbon Dioxide (CO2) 22 22 - 29 mmol/L    Anion Gap 12 7 - 15 mmol/L    Urea Nitrogen 13.9 8.0 - 23.0 mg/dL    Creatinine 0.99 0.67 - 1.17 mg/dL    Calcium 8.9 8.8 - 10.2 mg/dL    Glucose 77 70 - 99 mg/dL    GFR Estimate 82 >60 mL/min/1.73m2   INR     Status: Normal   Result Value Ref Range    INR 0.97 0.85 - 1.15   Partial thromboplastin time     Status: Normal   Result Value Ref Range    aPTT 26 22 - 38 Seconds   CBC with platelets and differential     Status: Abnormal    Result Value Ref Range    WBC Count 9.0 4.0 - 11.0 10e3/uL    RBC Count 4.23 (L) 4.40 - 5.90 10e6/uL    Hemoglobin 13.0 (L) 13.3 - 17.7 g/dL    Hematocrit 40.0 40.0 - 53.0 %    MCV 95 78 - 100 fL    MCH 30.7 26.5 - 33.0 pg    MCHC 32.5 31.5 - 36.5 g/dL    RDW 14.0 10.0 - 15.0 %    Platelet Count 218 150 - 450 10e3/uL    % Neutrophils 63 %    % Lymphocytes 17 %    % Monocytes 14 %    % Eosinophils 6 %    % Basophils 0 %    % Immature Granulocytes 0 %    NRBCs per 100 WBC 0 <1 /100    Absolute Neutrophils 5.5 1.6 - 8.3 10e3/uL    Absolute Lymphocytes 1.5 0.8 - 5.3 10e3/uL    Absolute Monocytes 1.3 0.0 - 1.3 10e3/uL    Absolute Eosinophils 0.5 0.0 - 0.7 10e3/uL    Absolute Basophils 0.0 0.0 - 0.2 10e3/uL    Absolute Immature Granulocytes 0.0 <=0.4 10e3/uL    Absolute NRBCs 0.0 10e3/uL   Asymptomatic COVID-19 Virus (Coronavirus) by PCR Nose     Status: Normal    Specimen: Nose; Swab   Result Value Ref Range    SARS CoV2 PCR Negative Negative    Narrative    Testing was performed using the Xpert Xpress SARS-CoV-2 Assay on the Cepheid Gene-Xpert Instrument Systems. Additional information about this Emergency Use Authorization (EUA) assay can be found via the Lab Guide. This test should be ordered for the detection of SARS-CoV-2 in individuals who meet SARS-CoV-2 clinical and/or epidemiological criteria as well as from individuals without symptoms or other reasons to suspect COVID-19. Test performance for asymptomatic patients has only been established in anterior nasal swab specimens. This test is for in vitro diagnostic use under the FDA EUA for laboratories certified under CLIA to perform high complexity testing. This test has not been FDA cleared or approved. A negative result does not rule out the presence of PCR inhibitors in the specimen or target RNA concentration below the limit of detection for the assay. The possibility of a false negative should be considered if the patient's recent exposure or clinical  presentation suggests COVID-19. This test was validated by [facility]. These Laboratories are certified under the Clinical Laboratory Improvement Amendments (CLIA) as qualified to perform high complexity testing.     CBC with platelets differential     Status: Abnormal    Narrative    The following orders were created for panel order CBC with platelets differential.  Procedure                               Abnormality         Status                     ---------                               -----------         ------                     CBC with platelets and d...[[id number]]  Abnormal            Final result                 Please view results for these tests on the individual orders.     Medications   levETIRAcetam (KEPPRA) tablet 1,000 mg (1,000 mg Oral $Given 6/14/23 2054)   dexamethasone PF (DECADRON) injection 4 mg (4 mg Intravenous $Given 6/15/23 0430)   amLODIPine (NORVASC) tablet 10 mg (has no administration in time range)   lidocaine 1 % 0.1-1 mL (has no administration in time range)   lidocaine (LMX4) cream (has no administration in time range)   sodium chloride (PF) 0.9% PF flush 3 mL ( Intracatheter Canceled Entry 6/15/23 0031)   sodium chloride (PF) 0.9% PF flush 3 mL (has no administration in time range)   melatonin tablet 1 mg (1 mg Oral $Given 6/14/23 2213)   acetaminophen (TYLENOL) tablet 975 mg (has no administration in time range)   LORazepam (ATIVAN) injection 1 mg (has no administration in time range)   acetaminophen (TYLENOL) tablet 975 mg (975 mg Oral $Given 6/14/23 1448)   gadobutrol (GADAVIST) injection 8 mL (8 mLs Intravenous $Given 6/14/23 1535)     Labs Ordered and Resulted from Time of ED Arrival to Time of ED Departure   CBC WITH PLATELETS AND DIFFERENTIAL - Abnormal       Result Value    WBC Count 9.0      RBC Count 4.23 (*)     Hemoglobin 13.0 (*)     Hematocrit 40.0      MCV 95      MCH 30.7      MCHC 32.5      RDW 14.0      Platelet Count 218      % Neutrophils  63      % Lymphocytes 17      % Monocytes 14      % Eosinophils 6      % Basophils 0      % Immature Granulocytes 0      NRBCs per 100 WBC 0      Absolute Neutrophils 5.5      Absolute Lymphocytes 1.5      Absolute Monocytes 1.3      Absolute Eosinophils 0.5      Absolute Basophils 0.0      Absolute Immature Granulocytes 0.0      Absolute NRBCs 0.0     BASIC METABOLIC PANEL - Normal    Sodium 139      Potassium 3.9      Chloride 105      Carbon Dioxide (CO2) 22      Anion Gap 12      Urea Nitrogen 13.9      Creatinine 0.99      Calcium 8.9      Glucose 77      GFR Estimate 82     INR - Normal    INR 0.97     PARTIAL THROMBOPLASTIN TIME - Normal    aPTT 26     COVID-19 VIRUS (CORONAVIRUS) BY PCR - Normal    SARS CoV2 PCR Negative       MR Brain w/o & w Contrast   Preliminary Result   RESIDENT PRELIMINARY INTERPRETATION   IMPRESSION:   Increased size of the left occipital cystic and solid masses with   associated cerebral edema. Perfusion imaging is pending.      CT Head w/o Contrast   Final Result   Impression:   1. No acute traumatic abnormality.   2. Significantly increased size of left occipital cystic and solid   masses and associated adjacent edema.      I have personally reviewed the examination and initial interpretation   and I agree with the findings.      MARC KOO MD            SYSTEM ID:  [id number]      MR Brain Perfusion wo & w Contrast    (Results Pending)          Critical care was performed.   Critical Care Addendum  My initial assessment, based on my review of vital signs, focused history and physical exam, established a high suspicion that Saeid Santa has altered mental status and And cerebral edema, which requires immediate intervention, and therefore he is critically ill.     After the initial assessment, the care team initiated multiple lab tests, initiated IV fluid administration and initiated medication therapy with High-dose Solu-Medrol to provide stabilization care. Due to the  critical nature of this patient, I reassessed nursing observations, vital signs, physical exam, mental status and neurologic status multiple times prior to his disposition.     Time also spent performing documentation, discussion with family to obtain medical information for decision making, reviewing test results and discussion with consultants.     Critical care time (excluding teaching time and procedures): 60 minutes.     Assessment & Plan    67-year-old male presents to us with a chief complaint of altered mental status.  He has a history of malignant melanoma intracranially.  Discussed with neurosurgery who recommended MRI as well as CT scan and who evaluated the patient in the department.  Labs are reassuring.  Scan show evidence of cerebral edema and enlargement of tumor burden.  Management was discussed with both neurosurgery as well as neurology.  Neurology recommended high-dose steroids.  Patient will be admitted to the hospital.  Neurosurgery suspect the patient will need a debulking surgery on this admission.    I have reviewed the nursing notes. I have reviewed the findings, diagnosis, plan and need for follow up with the patient.    Current Discharge Medication List          Final diagnoses:   Cerebral edema (H)   Metastatic malignant melanoma (H)     IJohnson, am serving as a trained medical scribe to document services personally performed by Carlos Manuel Bhat DO, based on the provider's statements to me.     ICarlos Manuel DO, was physically present and have reviewed and verified the accuracy of this note documented by Johnson Shipley.    Carlos Manuel Bhat DO  [facility] EMERGENCY DEPARTMENT  6/14/2023     Carlos Manuel Bhat DO  06/15/23 0734

## 2023-06-14 NOTE — PROGRESS NOTES
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    Scalp lesion first brought to attention in Aug 2022, subsequently developed R leg numbness and abnormal movement. MR brain (4/15/2023) showed two enhancing lesions in left paramedian posterior frontal lobe and left occipital lobe near the lingual gyrus. Underwent craniotomy with resection of the left frontal tumor and L parietal scalp lesion excision (4/21/2023) pathology consistent with melanoma. PET/CT (5/18/2023) with FDG avid LN in the left lower neck in addition to known left occipital lobe lesion. Few scattered tiny pulmonary nodules are without FDG activity. Subsequently received treatment with gamma knife: 2500 cGy to the left resection cavity; 3000 cGy to the L parietal-occipital lesion 5/24-/5/31/2023 and most recently started on treatment with pembrolizumab, C1D1 6/7/2023.     MR brain (6/14/2023) showed significantly increased size of left occipital cystic and solid masses (upto 3.3 cm) and associated vasogenic edema with mass effect and 4 mm of rightward midline shift.    Would benefit from systemic steroid, dexamethasone 4 mg q6h (ordered for you).  Plan to admit to medicine service with oncology consult.    Discussed with .    Desiree Purvis MD  Hematology/Medical Oncology (PGY-4)  P: [phone]

## 2023-06-14 NOTE — CONSULTS
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 He is s/p resection of a left motor strip lesion and left parietal scalp lesion via left sided awake crani with Dr. Garrido on 4/21/23.    He was recently started on keytruda and has had 5 sessions of gamma knife radiation to the resection cavity as well as a left parieto-occipital lesion. Of note, he developed a right hemianopsia after his first session of gamma knife radiation 5/24/23 which improved with steroids. He underwent 5 gamma knife treatments from 5/25-5/31. Steroids ended 6/5/23. Immunotherapy started 6/8/23.    He presents today to ED reporting speech and vision changes for 2-3 days as well as a fall two days ago in his bathroom. He states that he's unable to read or use a keyboard because he only sees \"partial words.\" He also reported headaches during this time and a fall 2 days ago in the bathroom, denied head trauma. He reports that it is difficult to get words out, and his speech is slower. No nausea/vomiting. No lethargy. No fevers.    PAST MEDICAL HISTORY:   Past Medical History:   Diagnosis Date     HTN (hypertension)      Metastatic melanoma (H)      Prostate cancer (H)      Seizures (H)        PAST SURGICAL HISTORY:   Past Surgical History:   Procedure Laterality Date     OPTICAL TRACKING SYSTEM CRANIOTOMY, EXCISE TUMOR, COMBINED Left 04/21/2023    Procedure: stealth assisted awake craniotomy resection of motor strip tumor;  Surgeon: Kye Garrido MD;  Location:  OR     " "PROSTATECTOMY      2009       FAMILY HISTORY:   Family History   Problem Relation Age of Onset     Pancreatic Cancer Father      Diabetes Paternal Grandmother        SOCIAL HISTORY:   Social History     Tobacco Use     Smoking status: Former     Packs/day: 0.50     Years: 20.00     Pack years: 10.00     Types: Cigarettes     Quit date: 2017     Years since quittin.4     Smokeless tobacco: Never   Vaping Use     Vaping status: Never Used   Substance Use Topics     Alcohol use: Yes     Comment: socially       MEDICATIONS:  Current Outpatient Medications   Medication Instructions     acetaminophen (TYLENOL) 650 mg, Oral, EVERY 4 HOURS PRN     amLODIPine (NORVASC) 10 MG tablet 1 tablet Orally Once a day     levETIRAcetam (KEPPRA) 1000 MG tablet 1 tablet Orally every 12 hrs         Allergies:  No Known Allergies    ROS: 10 point ROS of systems including Constitutional, Eyes, Respiratory, Cardiovascular, Gastroenterology, Genitourinary, Integumentary, Muscularskeletal, Psychiatric were all negative except for pertinent positives noted in my HPI.    Physical exam:   Blood pressure 103/65, pulse 106, temperature 97.8  F (36.6  C), temperature source Oral, resp. rate 18, height 1.854 m (6' 1\"), weight 82.6 kg (182 lb), SpO2 95 %.  CV: RRR per monitor  PULM: breathing comfortably on room air  ABD: soft, non-distended  NEUROLOGIC:  -- Awake; Alert; oriented x 3  -- Follows commands briskly  -- +repetition, calculation, and naming  -- Speech fluent but slowed. No aphasia or dysarthria.  -- no gaze preference. No apparent hemineglect.  Cranial Nerves:  -- narrowed peripheral vision on R, PERRL 3-2mm bilat and brisk, extraocular movements intact  -- face symmetrical, tongue midline  -- sensory V1-V3 intact bilaterally  -- palate elevates symmetrically, uvula midline  -- hearing grossly intact bilat  -- Trapezii 5/5 strength bilat symmetric    Motor:  Normal bulk / tone; no tremor, rigidity, or bradykinesia.  No muscle " wasting or fasciculations  No Pronator Drift     Delt Bi Tri Hand Flexion/  Extension Iliopsoas Quadriceps Hamstrings Tibialis Anterior Gastroc    C5 C6 C7 C8/T1 L2 L3 L4-S1 L4 S1   R 5 5 5 5 4 5 5 4 5   L 5 5 5 5 5 5 5 5 5   Sensory:  intact to LT x 4 extremities, decreased in RLE relative to LLE       LABS:  Last Comprehensive Metabolic Panel:  Sodium   Date Value Ref Range Status   06/14/2023 139 136 - 145 mmol/L Final     Potassium   Date Value Ref Range Status   06/14/2023 3.9 3.4 - 5.3 mmol/L Final     Comment:     Specimen slightly hemolyzed, potassium may be falsely elevated.   10/29/2021 4.0 3.5 - 5.0 mmol/L Final     Chloride   Date Value Ref Range Status   06/14/2023 105 98 - 107 mmol/L Final   10/29/2021 108 (H) 98 - 107 mmol/L Final     Carbon Dioxide (CO2)   Date Value Ref Range Status   06/14/2023 22 22 - 29 mmol/L Final   10/29/2021 21 (L) 22 - 31 mmol/L Final     Anion Gap   Date Value Ref Range Status   06/14/2023 12 7 - 15 mmol/L Final   10/29/2021 11 5 - 18 mmol/L Final     Glucose   Date Value Ref Range Status   06/14/2023 77 70 - 99 mg/dL Final   10/29/2021 97 70 - 125 mg/dL Final     GLUCOSE BY METER POCT   Date Value Ref Range Status   04/23/2023 120 (H) 70 - 99 mg/dL Final     Urea Nitrogen   Date Value Ref Range Status   06/14/2023 13.9 8.0 - 23.0 mg/dL Final   10/29/2021 16 8 - 22 mg/dL Final     Creatinine   Date Value Ref Range Status   06/14/2023 0.99 0.67 - 1.17 mg/dL Final     GFR Estimate   Date Value Ref Range Status   06/14/2023 82 >60 mL/min/1.73m2 Final     Comment:     eGFR calculated using 2021 CKD-EPI equation.   10/16/2020 >60 >60 mL/min/1.73m2 Final     Calcium   Date Value Ref Range Status   06/14/2023 8.9 8.8 - 10.2 mg/dL Final     Lab Results   Component Value Date    WBC 10.6 04/22/2023     Lab Results   Component Value Date    RBC 4.62 04/22/2023     Lab Results   Component Value Date    HGB 14.4 04/22/2023     Lab Results   Component Value Date    HCT 43.1 04/22/2023      Lab Results   Component Value Date    MCV 93 04/22/2023     Lab Results   Component Value Date    MCH 31.2 04/22/2023     Lab Results   Component Value Date    MCHC 33.4 04/22/2023     Lab Results   Component Value Date    RDW 13.5 04/22/2023     Lab Results   Component Value Date     04/22/2023     IMAGING:  Recent Results (from the past 24 hour(s))   CT Head w/o Contrast    Narrative    CT HEAD W/O CONTRAST 6/14/2023 1:16 PM    History: fall confusion     Comparison: MRI 5/19/2023, 4/22/2023    Technique: Using multidetector thin collimation helical acquisition  technique, axial, coronal and sagittal CT images from the skull base  to the vertex were obtained without intravenous contrast.   (topogram) image(s) also obtained and reviewed.    Findings:   No acute traumatic abnormality.    Surgical changes of left parietal craniotomy for resection of left  parietal mass. Significantly increased size of 2 adjacent cystic  masses centered in the left occipital lobe. Superior cystic lesion  measures 1.6 x 1.3 x 1.4 cm, previously 1.0 x 1.2 x 0.6 cm. Inferior  cystic and solid lesion measures 3.3 x 3.1 x 2.9 cm, previously 2.7 x  2.8 x 2.6 cm. Significantly increased extent of confluent edema  surrounding these lesions and extending into the adjacent left  parietal, temporal, and posterior frontal white matter. Associated  mass effect with effacement of the atrium of the left lateral  ventricle and 4 mm of rightward midline shift.    Orbits are unremarkable. Paranasal sinuses and mastoid air cells are  clear.      Impression    Impression:  1. No acute traumatic abnormality.  2. Significantly increased size of left occipital cystic and solid  masses and associated adjacent edema.    I have personally reviewed the examination and initial interpretation  and I agree with the findings.    MARC KOO MD         SYSTEM ID:  [id number]   MR Brain w/o & w Contrast    Impression    RESIDENT PRELIMINARY  INTERPRETATION  IMPRESSION:  Increased size of the left occipital cystic and solid masses with  associated cerebral edema. Perfusion imaging is pending.       ASSESSMENT:  Saeid Santa is a 68 yo male 2 months s/p L awake crani for motor strip lesion who recently started keytruda. His motor exam is stable to improved from the time of surgery, but he is struggling with vision changes and slowed speech. MRI is concerning for significant progression of his parietoccipital lesion with symptomatic cerebral edema.    Clinically Significant Risk Factors Present on Admission                  # Hypertension: Noted on problem list            # Cerebral edema      RECOMMENDATIONS:    - Recommend medicine admission for optimization of management.  - Recommend medical oncology and radiation oncology consults.  - Steroids could be beneficial for his cerebral edema, but NSGY would ultimately defer to medical oncology given the patient's current treatment with immunotherapy. We do NOT recommend starting steroids until evaluation by medical oncology.  - Will need to discuss operative timing with medical oncology in the context of his other anti-cancer therapies.  - Please continue patient's PTA keppra (ordered for you).    The patient was discussed with Dr. Serrano, neurosurgery chief resident, and Dr. Garrido, neurosurgery staff, and they agree with the above.    Jenny Collins MD, PhD  PGY-1 Neurosurgery    Please contact neurosurgery resident on call with questions.    Dial * * [phone], enter [phone] when prompted.

## 2023-06-14 NOTE — TELEPHONE ENCOUNTER
"Oncology Nurse Triage - Reporting Symptoms  Situation:   Saeid reporting the following symptoms: vision problems, fall last night     Background:   Treating Provider: Dr. Greene     Date of last office visit: 6/6/23 with Amber Scheierl    Recent treatments: Yes: Cycle 1, Day 1- Keytruda    Assessment  Onset of symptoms: 1 week  -Speech and reading are difficult for pt- states he is comprehending what he reads but is reading 3-4 letters at one time, cannot see text. He states vision \"is fine when just looking\", but in last 3 days when trying to read things he cannot well. Dr. Garrido concerning of vision changing d/t tumors.   -Headaches he has to constantly manage with 250mg Excedrin Migraine 2 tabs- states this is the only thing that helps, has tried Tylenol and it was not helpful. Using Excedrin max 4x/day. Headache is constant no matter of position.   -fall last night in bathroom- has some bruising, did not hit head, wife helped him up. He said pt it was \"traumatic\" d/t size of bathroom.   - PT is on hold d/t therapist on leave for 2 weeks  -Temperature around 97, no fevers, but having chills. Pt denies chest pain, shortness of breath. Pt states wife keeps him fed and hydrated. Pt denies difficulties going to the bathroom- denies burning or frequency, constipation, diarrhea, N/V, seizure activity.     Recommendations:   Writer reviewed non-emergent lift assist option with pt- informed local  or fire dept would come help pt up from ground, as to not risk hurting spouse or himself. Pt appreciative of info. Writer reviewed common side effects of Keytruda.  [id number] message sent to Amber Scheierl to review symptoms and informed pt someone will call him back with recommendations. Pt voiced understanding.  0986 return call from Mila, who states speech and vision changes could be r/t inflammation from Keytruda. Mila would like neuro surg to weigh in to help determine plan.   [phone] paged Dr. Kye Garrido   IB message " sent to Dr. Walters at [phone]  1045 page to Jaycob Serrano, resident chief adult neurosurgery.  1057 return call from [place], on call with neurosurg team, who states she already talked to pt and instructed him to come in for MRI. No further follow up required by triage.     Encounter routed to Amber Scheierl and Hermila, RNCC.

## 2023-06-15 ENCOUNTER — APPOINTMENT (OUTPATIENT)
Dept: PHYSICAL THERAPY | Facility: CLINIC | Age: 68
DRG: 054 | End: 2023-06-15
Attending: STUDENT IN AN ORGANIZED HEALTH CARE EDUCATION/TRAINING PROGRAM
Payer: COMMERCIAL

## 2023-06-15 LAB
ANION GAP SERPL CALCULATED.3IONS-SCNC: 13 MMOL/L (ref 7–15)
BUN SERPL-MCNC: 17.1 MG/DL (ref 8–23)
C DIFF TOX B STL QL: NEGATIVE
CALCIUM SERPL-MCNC: 9.5 MG/DL (ref 8.8–10.2)
CHLORIDE SERPL-SCNC: 102 MMOL/L (ref 98–107)
CREAT SERPL-MCNC: 0.84 MG/DL (ref 0.67–1.17)
DEPRECATED HCO3 PLAS-SCNC: 21 MMOL/L (ref 22–29)
ERYTHROCYTE [DISTWIDTH] IN BLOOD BY AUTOMATED COUNT: 13.5 % (ref 10–15)
GFR SERPL CREATININE-BSD FRML MDRD: >90 ML/MIN/1.73M2
GLUCOSE SERPL-MCNC: 175 MG/DL (ref 70–99)
HCT VFR BLD AUTO: 41.6 % (ref 40–53)
HGB BLD-MCNC: 13.8 G/DL (ref 13.3–17.7)
MCH RBC QN AUTO: 30.5 PG (ref 26.5–33)
MCHC RBC AUTO-ENTMCNC: 33.2 G/DL (ref 31.5–36.5)
MCV RBC AUTO: 92 FL (ref 78–100)
PLATELET # BLD AUTO: 245 10E3/UL (ref 150–450)
POTASSIUM SERPL-SCNC: 4 MMOL/L (ref 3.4–5.3)
RBC # BLD AUTO: 4.53 10E6/UL (ref 4.4–5.9)
SODIUM SERPL-SCNC: 136 MMOL/L (ref 136–145)
WBC # BLD AUTO: 8.9 10E3/UL (ref 4–11)

## 2023-06-15 PROCEDURE — 97530 THERAPEUTIC ACTIVITIES: CPT | Mod: GP

## 2023-06-15 PROCEDURE — 99223 1ST HOSP IP/OBS HIGH 75: CPT | Mod: GC | Performed by: INTERNAL MEDICINE

## 2023-06-15 PROCEDURE — 36415 COLL VENOUS BLD VENIPUNCTURE: CPT | Performed by: STUDENT IN AN ORGANIZED HEALTH CARE EDUCATION/TRAINING PROGRAM

## 2023-06-15 PROCEDURE — 250N000013 HC RX MED GY IP 250 OP 250 PS 637

## 2023-06-15 PROCEDURE — 85027 COMPLETE CBC AUTOMATED: CPT | Performed by: STUDENT IN AN ORGANIZED HEALTH CARE EDUCATION/TRAINING PROGRAM

## 2023-06-15 PROCEDURE — 250N000011 HC RX IP 250 OP 636: Performed by: STUDENT IN AN ORGANIZED HEALTH CARE EDUCATION/TRAINING PROGRAM

## 2023-06-15 PROCEDURE — 80048 BASIC METABOLIC PNL TOTAL CA: CPT | Performed by: STUDENT IN AN ORGANIZED HEALTH CARE EDUCATION/TRAINING PROGRAM

## 2023-06-15 PROCEDURE — 97161 PT EVAL LOW COMPLEX 20 MIN: CPT | Mod: GP

## 2023-06-15 PROCEDURE — 99232 SBSQ HOSP IP/OBS MODERATE 35: CPT | Performed by: STUDENT IN AN ORGANIZED HEALTH CARE EDUCATION/TRAINING PROGRAM

## 2023-06-15 PROCEDURE — 999N000128 HC STATISTIC PERIPHERAL IV START W/O US GUIDANCE

## 2023-06-15 PROCEDURE — 250N000013 HC RX MED GY IP 250 OP 250 PS 637: Performed by: PHYSICIAN ASSISTANT

## 2023-06-15 PROCEDURE — 250N000013 HC RX MED GY IP 250 OP 250 PS 637: Performed by: STUDENT IN AN ORGANIZED HEALTH CARE EDUCATION/TRAINING PROGRAM

## 2023-06-15 PROCEDURE — 87493 C DIFF AMPLIFIED PROBE: CPT | Performed by: INTERNAL MEDICINE

## 2023-06-15 PROCEDURE — 120N000005 HC R&B MS OVERFLOW UMMC

## 2023-06-15 PROCEDURE — 97116 GAIT TRAINING THERAPY: CPT | Mod: GP

## 2023-06-15 PROCEDURE — 99231 SBSQ HOSP IP/OBS SF/LOW 25: CPT | Mod: 24 | Performed by: NURSE PRACTITIONER

## 2023-06-15 RX ORDER — BACLOFEN 5 MG/1
5 TABLET ORAL 3 TIMES DAILY
Status: DISCONTINUED | OUTPATIENT
Start: 2023-06-15 | End: 2023-06-16 | Stop reason: HOSPADM

## 2023-06-15 RX ADMIN — DEXAMETHASONE SODIUM PHOSPHATE 4 MG: 10 INJECTION, SOLUTION INTRAMUSCULAR; INTRAVENOUS at 17:24

## 2023-06-15 RX ADMIN — DEXAMETHASONE SODIUM PHOSPHATE 4 MG: 10 INJECTION, SOLUTION INTRAMUSCULAR; INTRAVENOUS at 04:30

## 2023-06-15 RX ADMIN — BACLOFEN 5 MG: 5 TABLET ORAL at 19:41

## 2023-06-15 RX ADMIN — LEVETIRACETAM 1000 MG: 500 TABLET, FILM COATED ORAL at 07:50

## 2023-06-15 RX ADMIN — Medication 1 MG: at 23:29

## 2023-06-15 RX ADMIN — DEXAMETHASONE SODIUM PHOSPHATE 4 MG: 10 INJECTION, SOLUTION INTRAMUSCULAR; INTRAVENOUS at 10:52

## 2023-06-15 RX ADMIN — DEXAMETHASONE SODIUM PHOSPHATE 4 MG: 10 INJECTION, SOLUTION INTRAMUSCULAR; INTRAVENOUS at 23:29

## 2023-06-15 RX ADMIN — LEVETIRACETAM 1000 MG: 500 TABLET, FILM COATED ORAL at 19:41

## 2023-06-15 RX ADMIN — AMLODIPINE BESYLATE 10 MG: 5 TABLET ORAL at 07:50

## 2023-06-15 ASSESSMENT — ACTIVITIES OF DAILY LIVING (ADL)
ADLS_ACUITY_SCORE: 37
EQUIPMENT_CURRENTLY_USED_AT_HOME: WALKER, ROLLING
ADLS_ACUITY_SCORE: 37
CONCENTRATING,_REMEMBERING_OR_MAKING_DECISIONS_DIFFICULTY: NO
FALL_HISTORY_WITHIN_LAST_SIX_MONTHS: YES
HEARING_DIFFICULTY_OR_DEAF: NO
WALKING_OR_CLIMBING_STAIRS: AMBULATION DIFFICULTY, REQUIRES EQUIPMENT
ADLS_ACUITY_SCORE: 23
ADLS_ACUITY_SCORE: 37
ADLS_ACUITY_SCORE: 37
VISION_MANAGEMENT: GLASSES
CHANGE_IN_FUNCTIONAL_STATUS_SINCE_ONSET_OF_CURRENT_ILLNESS/INJURY: NO
ADLS_ACUITY_SCORE: 37
ADLS_ACUITY_SCORE: 37
TRANSFERRING: 0-->INDEPENDENT
WEAR_GLASSES_OR_BLIND: YES
DIFFICULTY_EATING/SWALLOWING: NO
TOILETING_ISSUES: NO
NUMBER_OF_TIMES_PATIENT_HAS_FALLEN_WITHIN_LAST_SIX_MONTHS: 1
DRESSING/BATHING_DIFFICULTY: NO
ADLS_ACUITY_SCORE: 23
DIFFICULTY_COMMUNICATING: NO
TRANSFERRING: 0-->INDEPENDENT
DOING_ERRANDS_INDEPENDENTLY_DIFFICULTY: NO
ADLS_ACUITY_SCORE: 37
ADLS_ACUITY_SCORE: 37
WALKING_OR_CLIMBING_STAIRS_DIFFICULTY: YES
ADLS_ACUITY_SCORE: 37
ADLS_ACUITY_SCORE: 23

## 2023-06-15 NOTE — CONSULTS
Department of Radiation Oncology  [facility]  [address] [place] [address]  [phone]       Radiation Oncology Follow-up Visit  Stephany 15, 2023    Saeid Santa  MRN: [id number]  : 1955    DISEASE TREATED:   Metastatic melanoma    RADIATION THERAPY DELIVERED:   GK-SRS 25Gy in 5 fractions to posterior frontal lobe lesion      GK-SRS 30Gy in 5 fractions to left occipital lobe lesion      SYSTEMIC THERAPY:  Currently Keytruda which was started after GK-SRS    INTERVAL SINCE COMPLETION OF RADIATION THERAPY:   2 weeks, completed on 23    SUBJECTIVE:   Mr. Santa is a 68 yo male with newly found brain metastases secondary to melanoma.       He has a history of prostate cancer diagnosed in , treated with prostatectomy with no evidence of recurrence. Approximately 2 months ago, he began to notice episodic abnormal movement of the right leg. On 4/15/2023, he developed what he though was aura for his migraine headache, and decided to go home. On the way home, his right leg started shaking again and he pulled over. His colleague followed him an found him sitting in the grass in a park, appeared to be confused, though he did not lose consciousness. He was taken to the ED where a head CT was performed. Imaging revealed hypodense nodules in the left frontal and left occipital lobes with surrounding vasogenic edema. Additionally, there was a 1.5 cm nodule in the left parietal scalp that could represent a sebaceous cyst. CT angiogram showed mild narrowing of the mid and distal M1 segments of the left middle cerebral artery. CT of the chest/abdomen/pelvis revealed indeterminate bilateral pulmonary nodules up to 6 mm.      Mr. Santa was then evaluated with a brain MRI. There were 2 discrete ring-enhancing lesions, one of which was in the left paramedian posterior frontal lobe near the precentral gyrus, measuring 2.2 x 1.7 x 1.9 cm and the other in the left occipital  lobe near the lingual gyrus, measuring 2.2 x 2.1 x 2.0 cm. There were moderate vasogenic edema. He was discharged with Keppra and plan to follow up with Neurosurgery.      He met with Dr. Garrido on 4/17/2023, who recommended resection of the left frontal lesion for tissue diagnosis and possible relief of seizures. He underwent craniotomy on 4/21/2023 for resection of the lesion. Additionally a scalp lesion was also resected as it had been draining, bleeding and growing in size. Pathology showed left frontal tumor: malignant melanoma and left parietal scalp excision: malignant melanoma, 10 mm in greatest dimension, deep and lateral margin involved. Pathologist commented that neither lesion represented a definitive primary melanoma. The scalp lesion was without epidermal melanoma in situ, suggesting the possibility that this could represent a cutaneous metastasis.      Post-op, he was in rehab from 4/27-5/9/2023.      On 5/12/2023, Mr. Santa met with Dr. Greene for a consultation. PET/CT was recommended to complete staging. Dr. Greene discussed immunotherapy.     He followed up with Dr. Garrido on 5/15/2023. He reported sensory issues and weakness of the right dorsiflexion.      PET CT (5-18-23) showed melanoma metastases in the left occipital lobe and a lymph node in the left lower neck.    He received GK-SRS to the left occipital and posterior frontal metastases as detailed above, completed on 5-31-23. He received his first dose of Keytruda on 6-7-23.    He developed gait ataxia and worsening visual deficits around 6-11-23. He presented to ER 6-14-23 for evaluation. On arrival, CT head showed significantly increased size of left occipital cystic and solid masses and associated adjacent edema. Subsequent MRI showed increased size of the left occipital cystic and solid masses with associated cerebral edema.    We are consulted for evaluation. Patient reports that he is feeling better after starting Decadron 4mg q6h  "yesterday. His vision is improved and his gait is less off balance.     REVIEW OF SYSTEMS:    General:  No fever/chills, no weight loss, no fatigue, no anorexia  HEENT:  No sore throat, no earache, no rhinitis, + vision changes  Skin:  No rash, no itch, no jaundice  Neurologic:  No headache, no dizziness, no numbness or tingling, + weakness  Musculoskeletal:  No arthralgias, no myalgias  Psychiatric:  No anxiety, + depression      OBJECTIVE:  /78 (BP Location: Left arm)   Pulse 88   Temp 97.6  F (36.4  C) (Oral)   Resp 16   Ht 1.854 m (6' 1\")   Wt 79.3 kg (174 lb 12.8 oz)   SpO2 98%   BMI 23.06 kg/m    PHYSICAL EXAMINATION:    General:  Well-developed, well-appearing, NAD, sitting up in chair at table  HEENT:  NCAT, anicteric sclera, moist oral mucosa  Skin:  Pink, warm, no rash  Neurologic:  CN II - XII grossly intact, moving upper and lower extremities, sensation intact, alert and oriented x 3  MSK:  normal muscular bulk and tone, no tender bones or joints  Psychiatric:  normal affect, normal attention    LABS AND IMAGING:  MRI 6-14-23  Marked vasogenic edema      Increased enhancement of solid component versus hemorrhage    Improvement in parietal metastasis surgical bed        IMPRESSION:   Mr. Santa is a 68yo man with metastatic melanoma s/p GK-SRS (5-31-23) who presents with worsening vision and gait ataxia related to vasogenic edema and possible hemorrhage of a parieto-occipital brain metastasis.    PLAN:   We recommend continuing steroids in the setting of marked vasogenic edema with symptoms including gait ataxia and visual disturbance. We will defer to medical oncology for tapering the steroids and reinitiating Keytruda.    We discussed with Dr. Garrido and are in agreement with plan to monitor the lesion with repeat MRI in 2 months.     If patient develops worsening headache, nausea, vomiting or weakness, he should proceed to ER for evaluation. Patient understands that if he develops " progressive symptoms, he may need emergent surgical resection of the parieto-occipital lesion.    The patient was seen and examined with Dr. Rose who agrees with the above assessment and plan.    Laura Geronimo, PGY 3  Radiation Oncology, [facility]      I saw and discussed the patient with the resident. I agree with the above documentation.     I also discussed his case with Dr. Garrido. In light of the recent completion of GK SRS with anticipated response, we decided to repeat MRI in short interval for monitor as long as his symptoms are controlled. If he has worsening neurologic symptoms, surgical intervention may be required. Mr. Santa has improved quite a bit with Decadron, and feels comfortable with the above plan.    Benedicto Rose MD  Radiation Oncology  [phone] (pager)

## 2023-06-15 NOTE — PLAN OF CARE
"/81 (BP Location: Left arm)   Pulse 107   Temp 97.5  F (36.4  C) (Oral)   Resp 16   Ht 1.854 m (6' 1\")   Wt 79.3 kg (174 lb 12.8 oz)   SpO2 97%   BMI 23.06 kg/m    VSS, AOx4, up ad perfecto with 4WW, C-diff negative so removed isolation status.  Pt states significant Hx of painful hiccups with dexamethasone, baclofen order received, will start tonight.  Continue POC.  Problem: Plan of Care - These are the overarching goals to be used throughout the patient stay.    Goal: Plan of Care Review  Description: The Plan of Care Review/Shift note should be completed every shift.  The Outcome Evaluation is a brief statement about your assessment that the patient is improving, declining, or no change.  This information will be displayed automatically on your shift note.  Outcome: Progressing  Flowsheets (Taken 6/15/2023 9354)  Plan of Care Reviewed With:   patient   spouse  Overall Patient Progress: improving  Goal: Patient-Specific Goal (Individualized)  Description: You can add care plan individualizations to a care plan. Examples of Individualization might be:  \"Parent requests to be called daily at 9am for status\", \"I have a hard time hearing out of my right ear\", or \"Do not touch me to wake me up as it startles me\".  Outcome: Progressing  Goal: Absence of Hospital-Acquired Illness or Injury  Outcome: Progressing  Intervention: Identify and Manage Fall Risk  Recent Flowsheet Documentation  Taken 6/15/2023 1700 by Raymundo Baldwin, RN  Safety Promotion/Fall Prevention: nonskid shoes/slippers when out of bed  Taken 6/15/2023 1219 by Raymundo Baldwin, RN  Safety Promotion/Fall Prevention: nonskid shoes/slippers when out of bed  Taken 6/15/2023 0750 by Raymundo Baldwin, RN  Safety Promotion/Fall Prevention:   activity supervised   assistive device/personal items within reach   clutter free environment maintained   increased rounding and observation   increase visualization of patient   nonskid shoes/slippers when " out of bed   mobility aid in reach   lighting adjusted   patient and family education  Intervention: Prevent Skin Injury  Recent Flowsheet Documentation  Taken 6/15/2023 0750 by Raymundo Baldwin, RN  Body Position:   position changed independently   supine, head elevated   lower extremity elevated  Goal: Optimal Comfort and Wellbeing  Outcome: Progressing  Goal: Readiness for Transition of Care  Outcome: Progressing   Goal Outcome Evaluation:      Plan of Care Reviewed With: patient, spouse    Overall Patient Progress: improvingOverall Patient Progress: improving

## 2023-06-15 NOTE — CONSULTS
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 MR brain (4/15/2023) showed two enhancing lesions in left paramedian posterior frontal lobe and left occipital lobe near the lingual gyrus. Underwent craniotomy with resection of the left frontal tumor and L parietal scalp lesion excision (4/21/2023) pathology consistent with melanoma. PET/CT (5/18/2023) withFDG avid LN in the left lower neck in addition to known left occipital lobe lesion. Few scattered tiny pulmonary nodules are without FDG activity. Subsequently received treatment with gamma knife: 2500 cGy to the left resection cavity; 3000 cGy to the L parietal-occipital lesion 5/24-/5/31/2023. Started on treatment with pembrolizumab, C1D1 6/7/2023.    Patient presented this time with vision changes, slow speech, and AMS. CT and MR brain (6/14/2023) showed significantly increased size of left occipital cystic and solid masses (upto 3.3 cm) and associated vasogenic edema with mass effect and 4 mm of rightward midline shift.    Although it is possible to have pseudo-progression with recent radiation and immune-checkpoint inhibitor treatment, true progression is of major concerns at this  time and the patient is highly symptomatic from the brain lesion. Per discussion with neurosurgery, tentative plan for surgical resection next week. No special consideration from a medical oncology standpoint in view of recent immunotherapy treatment as this should not preclude the patient from receiving surgical treatment and would not impair would healing. Recommend to discuss with radiation oncology to ensure no contraindication with recent GKS treatment.    Recommendations:   - Continue dexamethasone 4 mg q6h  - Continue Keppra per NSGY recommendations  - Radiation oncology consult  - No plan for inpatient cancer-directed treatment. Plan to resume treatment with pembrolizumab outpatient. Oncology service will assist with outpatient clinic follow-up after discharged.    We will continue to follow this patient. Please do not hesitate to page with any questions or concerns.    Patient was seen and plan of care was discussed with attending physician Dr. Colvin.    Desiree Purvis MD  Hematology/Medical Oncology (PGY-4)  P: [phone]  -------------------------------------------------------------------------------------------------------------------------------------------    History of Present Illness:    Saeid Santa is a 67 year old year old male with PMHx significant for metastatic melanoma to LN and brain with seizure secondary to brain metastases and history of prostate cancer s/p prostatectomy (2009) who was admitted 6/14/2023 with progression of CNS metastasis lesions.    Patient reports about 3 days history of headache, incoordination of R leg, increased weakness, difficulty speaking, and some disorientation. Also reports vision problems and was having some trouble operating his phone and doing tasks on his phone, as well as reading and trying to focus on close objects.    At the time of visit, patient states that headache and vision problems now improved with steroids. He is feeling well and is  wondering if he needs to stay in the hospital. Has questions about CT scan results yesterday and is concerned that cancer is progression despite recent treatment.    Review of Systems:  A comprehensive ROS was performed and found to be negative or non-contributory with the exception of that noted in the HPI above.    Past Medical History:  Past Medical History:   Diagnosis Date     HTN (hypertension)      Metastatic melanoma (H)      Prostate cancer (H)      Seizures (H)      Past Surgical History:  Past Surgical History:   Procedure Laterality Date     OPTICAL TRACKING SYSTEM CRANIOTOMY, EXCISE TUMOR, COMBINED Left 2023    Procedure: stealth assisted awake craniotomy resection of motor strip tumor;  Surgeon: Kye Garrido MD;  Location: [facility] OR     PROSTATECTOMY           Social History:  Social History     Socioeconomic History     Marital status:      Spouse name: Smiley     Number of children: 4   Occupational History     Occupation: [employer]     Comment: hobby job   Tobacco Use     Smoking status: Former     Packs/day: 0.50     Years: 20.00     Pack years: 10.00     Types: Cigarettes     Quit date: 2017     Years since quittin.4     Smokeless tobacco: Never   Vaping Use     Vaping status: Never Used   Substance and Sexual Activity     Alcohol use: Yes     Comment: socially     Drug use: Yes     Types: Marijuana     Comment: typically smoked daily but no use for past two weeks as of 23      Family History  Family History   Problem Relation Age of Onset     Pancreatic Cancer Father      Diabetes Paternal Grandmother      Outpatient Medications:  No current facility-administered medications on file prior to encounter.  acetaminophen (TYLENOL) 325 MG tablet, Take 2 tablets (650 mg) by mouth every 4 hours as needed for other (For optimal non-opioid multimodal pain management to improve pain control.)  amLODIPine (NORVASC) 10 MG tablet, 1 tablet Orally Once a day  levETIRAcetam  "(KEPPRA) 1000 MG tablet, 1 tablet Orally every 12 hrs    Physical Exam:    Blood pressure 107/72, pulse 87, temperature 97.7  F (36.5  C), temperature source Oral, resp. rate 16, height 1.854 m (6' 1\"), weight 79.3 kg (174 lb 12.8 oz), SpO2 98 %.  General: No acute distress  HEENT: Sclera anicteric  CV: Warm and well-perfused, no edema  Resp: Normal respiratory effort on ambient air  Skin: No rashes or petechiae  Neuro: AOx3    Labs & Studies: I personally reviewed the following studies:  ROUTINE LABS (Last four results):  CMP  Recent Labs   Lab 06/14/23  1220      POTASSIUM 3.9   CHLORIDE 105   CO2 22   ANIONGAP 12   GLC 77   BUN 13.9   CR 0.99   GFRESTIMATED 82   JOHN PAUL 8.9     CBC  Recent Labs   Lab 06/14/23  1220   WBC 9.0   RBC 4.23*   HGB 13.0*   HCT 40.0   MCV 95   MCH 30.7   MCHC 32.5   RDW 14.0        INR  Recent Labs   Lab 06/14/23  1220   INR 0.97     Labs, images, and pathology results were reviewed and discussed as pertinent in oncologic history and patient assessment.    "

## 2023-06-15 NOTE — PROVIDER NOTIFICATION
MD coverage paged: Holly Bean PA-C.  Requesting something for painful hiccups following dex injections. Or switch to oral to see if that doesn't trigger hiccups.

## 2023-06-15 NOTE — PROGRESS NOTES
[facility]    Medicine Progress Note - Hospitalist Service, GOLD TEAM 7    Date of Admission:  6/14/2023    Assessment & Plan   Saeid Santa is a 67-year-old M with a history of metastatic melanoma complicated by seizures s/p XRT (last on 5/31/23), craniotomy w/ resection of met, and Keytruda (last infusion 6/7/23) who presented on 6/14 to the ED 3 days of increased LLE weakness, headache, and vision changes, found on brain imaging to have evidence of disease progression and admitted for management of cerebral edema and progression of metastatic melanoma. Pending NSG and onc plan, possible inpatient intervention thus holding AC/VTE ppx.     # Melanoma metastatic to the brain, s/p craniotomy for resection of motor strip lesion and XRT, on Keytruda  # Seizure disorder, secondary to brain metastases  # Acute vision changes, headache, LLE weakness  #Cerebral edema with midline shift / brain compression  Follows with with Cecilia Greene (onc), Benedicto Rose (rad onc), and Kye Garrido (neurosurg) as an outpatient. CT scan on admission 6/14 with cerebral edema, cystic and solid masses with associated mass effect and 4mm rightward midline shift.   - Neurosurgery consulted, appreciate recommendations                 - Any operative options pending discussion between neurosurg and medical oncology                 - Okay for diet today, NPO at midnight   - Hold AC in case surgery is warranted per NSG recs  - Radiation oncology consulted, appreciate recommendations  - Oncology consulted, appreciate recommendations                  - Dexamethasone 4mg q6h for cerebral edema   - Continue PTA 1g Keppra BID  - Seizure precautions ordered; Ativan 1mg q5h PRN for seizures >5 minutes     # Hypertension  - Continue PTA amlodipine       # Obstructive sleep apnea  Not on home CPAP.        Diet: Regular Diet Adult    DVT Prophylaxis: Holding per NSG recs and impending possible  procedure  Aragon Catheter: Not present  Lines: None     Cardiac Monitoring: None  Code Status: Full Code      Clinically Significant Risk Factors Present on Admission                  # Hypertension: Noted on problem list               Disposition Plan      Expected Discharge Date: 06/16/2023                  Mason Benoit DO  Hospitalist Service, GOLD TEAM 7  [facility]  Securely message with AnonymAsk (more info)  Text page via [employer] Paging/Directory   See signed in provider for up to date coverage information  ______________________________________________________________________    Interval History   Pt today states his sx are actually much better than when he came in. Vision improved, LE weakness is improved. He thinks the steroids are really helping. He is curious about the NSG and onc plan going forward. No SOB, chest pain, N/V, or any other new clinical sx. His wife came into the room at the end of the interview and had many questions. She also wanted to talk to me about her rotator cuff tear in which I deferred to her outpatient provider.     Physical Exam   Vital Signs: Temp: 97.7  F (36.5  C) Temp src: Oral BP: 107/72 Pulse: 87   Resp: 16 SpO2: 98 % O2 Device: None (Room air)    Weight: 174 lbs 12.8 oz    Gen: No acute distress, non-toxic, alert  Head: Normocephalic, atraumatic, no facial droop  CV: Regular rate, regular rhythm  Pulm: Clear to auscultate bilaterally, breathing non-labored  Ext: Moves all extremities, non-cyanotic  Psych: Normal mood and affect    Medical Decision Making       40 MINUTES SPENT BY ME on the date of service doing chart review, history, exam, documentation & further activities per the note.      Data     I have personally reviewed the following data over the past 24 hrs:    8.9  \   13.8   / 245     136 102 17.1 /  175 (H)   4.0 21 (L) 0.84 \       Imaging results reviewed over the past 24 hrs:   Recent Results (from the past 24  hour(s))   MR Brain w/o & w Contrast    Impression    RESIDENT PRELIMINARY INTERPRETATION  IMPRESSION:  Increased size of the left occipital cystic and solid masses with  associated cerebral edema. Perfusion imaging is pending.

## 2023-06-15 NOTE — PLAN OF CARE
PRN:melatonin  O2: RA  Mobility: Up ad perfecto    A: Neuro: A/O x 4.  Call light appropriate.  Able to make needs known.  Respiratory:  On room air.  Lung sounds clear.  Denies shortness of breath at rest.  Cardiac: VSS.    GI: Last BM 6/14.  No report of nausea or vomiting.  : Urinating adequate amounts of clear, yellow urine  LDA:  R. PIV SL  Pain: Denies  Isolation:  Enteric  Precautions  Tests/procedures: None performed overnight.  Diet: NPO  Sleep:  No sleep disturbances noted or reported.    P: Continue to monitor Pt status and report changes to Primary provider.

## 2023-06-15 NOTE — PROGRESS NOTES
[facility], [place]   Neurosurgery Progress Note:    Date of service: 6/15/2023    Assessment: Saeid Santa is a 67 year old male with a PMH of stage IV melanoma of the scalp with brain metastases currently undergoing chemotherapy with Keytruda and gamma knife radiation, seizures 2/2 brain metastases (on Keppra), right hemiparesis, HTN, and history of prostate cancer s/p prostatectomy (2009) who presents to the ER for evaluation of speech, vision, and memory changes over the last 3-4 days. He is s/p resection of a left motor strip lesion and left parietal scalp lesion via left sided awake crani with Dr. Garrido on 4/21/23.  Repeat MRI performed with concern     Clinically Significant Risk Factors Present on Admission                    Plan:  Appreciate medical oncology and radiation oncology consults.    Plan to discuss operative timing with medical oncology in the context of his other anti-cancer therapies.  Steroids per Oncology   Plan to discuss operative timing with medical oncology in the context of his other anti-cancer therapies.  Please continue patient's PTA keppra (ordered for you).  Ok for diet per Neurosurgery perspective.    Please hold anticoagulation until surgical plan is finalized.          Meghan Looney, ALIREZA, CNP  6/15/2023  Department of Neurosurgery  Pager: [phone]    I have spent a total of 25 minutes total time counseling, coordination, and chart review, over 50% of the time was spent in direct patient care.       Interval History:  No acute events overnight.  MRI obtained concerning for progression of occipital lesion.  Plan to discuss timing of surgery with Oncology.           Objective:   Temp:  [97.5  F (36.4  C)-98.2  F (36.8  C)] 97.7  F (36.5  C)  Pulse:  [] 87  Resp:  [16-18] 16  BP: (103-126)/(65-82) 107/72  SpO2:  [92 %-98 %] 98 %  I/O last 3 completed shifts:  In: 480 [P.O.:480]  Out: 1250 [Urine:1250]    NEUROLOGIC:  -- Awake; Alert;  oriented x 3  -- Follows commands briskly  -- +repetition, calculation, and naming  -- Speech fluent but slowed. No aphasia or dysarthria.  -- no gaze preference. No apparent hemineglect.  Cranial Nerves:  -- narrowed peripheral vision on R, PERRL 3-2mm bilat and brisk, extraocular movements intact  -- face symmetrical, tongue midline  -- sensory V1-V3 intact bilaterally  -- palate elevates symmetrically, uvula midline  -- hearing grossly intact bilat  -- Trapezii 5/5 strength bilat symmetric     Motor:  Normal bulk / tone; no tremor, rigidity, or bradykinesia.  No muscle wasting or fasciculations  No Pronator Drift       Delt Bi Tri Hand Flexion/  Extension Iliopsoas Quadriceps Hamstrings Tibialis Anterior Gastroc     C5 C6 C7 C8/T1 L2 L3 L4-S1 L4 S1   R 5 5 5 5 4 5 5 4 5   L 5 5 5 5 5 5 5 5 5   Sensory:  intact to LT x 4 extremities, decreased in RLE relative to LLE          LABS  Recent Labs   Lab Test 06/14/23  1220 04/22/23  0408 04/15/23  1206   WBC 9.0 10.6 8.1   HGB 13.0* 14.4 14.2   MCV 95 93 94    246 239       Recent Labs   Lab Test 06/14/23  1220 06/07/23  0915 04/23/23  1117    140 138   POTASSIUM 3.9 3.5 4.0   CHLORIDE 105 108* 106   CO2 22 22 15*   BUN 13.9 18.3 23.4*   CR 0.99 0.90 0.97   ANIONGAP 12 10 17*   JOHN PAUL 8.9 8.7* 9.7   GLC 77 81 123*       IMAGING    Recent Results (from the past 24 hour(s))   CT Head w/o Contrast    Narrative    CT HEAD W/O CONTRAST 6/14/2023 1:16 PM    History: fall confusion     Comparison: MRI 5/19/2023, 4/22/2023    Technique: Using multidetector thin collimation helical acquisition  technique, axial, coronal and sagittal CT images from the skull base  to the vertex were obtained without intravenous contrast.   (topogram) image(s) also obtained and reviewed.    Findings:   No acute traumatic abnormality.    Surgical changes of left parietal craniotomy for resection of left  parietal mass. Significantly increased size of 2 adjacent cystic  masses  centered in the left occipital lobe. Superior cystic lesion  measures 1.6 x 1.3 x 1.4 cm, previously 1.0 x 1.2 x 0.6 cm. Inferior  cystic and solid lesion measures 3.3 x 3.1 x 2.9 cm, previously 2.7 x  2.8 x 2.6 cm. Significantly increased extent of confluent edema  surrounding these lesions and extending into the adjacent left  parietal, temporal, and posterior frontal white matter. Associated  mass effect with effacement of the atrium of the left lateral  ventricle and 4 mm of rightward midline shift.    Orbits are unremarkable. Paranasal sinuses and mastoid air cells are  clear.      Impression    Impression:  1. No acute traumatic abnormality.  2. Significantly increased size of left occipital cystic and solid  masses and associated adjacent edema.    I have personally reviewed the examination and initial interpretation  and I agree with the findings.    MARC KOO MD         SYSTEM ID:  [id number]   MR Brain w/o & w Contrast    Impression    RESIDENT PRELIMINARY INTERPRETATION  IMPRESSION:  Increased size of the left occipital cystic and solid masses with  associated cerebral edema. Perfusion imaging is pending.

## 2023-06-16 VITALS
HEIGHT: 73 IN | TEMPERATURE: 97.5 F | RESPIRATION RATE: 16 BRPM | WEIGHT: 175.4 LBS | SYSTOLIC BLOOD PRESSURE: 125 MMHG | OXYGEN SATURATION: 99 % | DIASTOLIC BLOOD PRESSURE: 84 MMHG | BODY MASS INDEX: 23.25 KG/M2 | HEART RATE: 77 BPM

## 2023-06-16 LAB
ANION GAP SERPL CALCULATED.3IONS-SCNC: 12 MMOL/L (ref 7–15)
APTT PPP: 28 SECONDS (ref 22–38)
BUN SERPL-MCNC: 21.6 MG/DL (ref 8–23)
CALCIUM SERPL-MCNC: 9.2 MG/DL (ref 8.8–10.2)
CHLORIDE SERPL-SCNC: 109 MMOL/L (ref 98–107)
CREAT SERPL-MCNC: 0.86 MG/DL (ref 0.67–1.17)
DEPRECATED HCO3 PLAS-SCNC: 19 MMOL/L (ref 22–29)
ERYTHROCYTE [DISTWIDTH] IN BLOOD BY AUTOMATED COUNT: 13.5 % (ref 10–15)
GFR SERPL CREATININE-BSD FRML MDRD: >90 ML/MIN/1.73M2
GLUCOSE SERPL-MCNC: 142 MG/DL (ref 70–99)
HCT VFR BLD AUTO: 36.5 % (ref 40–53)
HGB BLD-MCNC: 12.4 G/DL (ref 13.3–17.7)
INR PPP: 1.06 (ref 0.85–1.15)
MCH RBC QN AUTO: 30.6 PG (ref 26.5–33)
MCHC RBC AUTO-ENTMCNC: 34 G/DL (ref 31.5–36.5)
MCV RBC AUTO: 90 FL (ref 78–100)
PLATELET # BLD AUTO: 239 10E3/UL (ref 150–450)
POTASSIUM SERPL-SCNC: 4.2 MMOL/L (ref 3.4–5.3)
RBC # BLD AUTO: 4.05 10E6/UL (ref 4.4–5.9)
SODIUM SERPL-SCNC: 140 MMOL/L (ref 136–145)
WBC # BLD AUTO: 14 10E3/UL (ref 4–11)

## 2023-06-16 PROCEDURE — 85610 PROTHROMBIN TIME: CPT | Performed by: STUDENT IN AN ORGANIZED HEALTH CARE EDUCATION/TRAINING PROGRAM

## 2023-06-16 PROCEDURE — 85730 THROMBOPLASTIN TIME PARTIAL: CPT | Performed by: STUDENT IN AN ORGANIZED HEALTH CARE EDUCATION/TRAINING PROGRAM

## 2023-06-16 PROCEDURE — 85027 COMPLETE CBC AUTOMATED: CPT | Performed by: STUDENT IN AN ORGANIZED HEALTH CARE EDUCATION/TRAINING PROGRAM

## 2023-06-16 PROCEDURE — 85041 AUTOMATED RBC COUNT: CPT | Performed by: STUDENT IN AN ORGANIZED HEALTH CARE EDUCATION/TRAINING PROGRAM

## 2023-06-16 PROCEDURE — 250N000013 HC RX MED GY IP 250 OP 250 PS 637: Performed by: PHYSICIAN ASSISTANT

## 2023-06-16 PROCEDURE — 80048 BASIC METABOLIC PNL TOTAL CA: CPT | Performed by: STUDENT IN AN ORGANIZED HEALTH CARE EDUCATION/TRAINING PROGRAM

## 2023-06-16 PROCEDURE — 250N000011 HC RX IP 250 OP 636: Performed by: STUDENT IN AN ORGANIZED HEALTH CARE EDUCATION/TRAINING PROGRAM

## 2023-06-16 PROCEDURE — 36415 COLL VENOUS BLD VENIPUNCTURE: CPT | Performed by: STUDENT IN AN ORGANIZED HEALTH CARE EDUCATION/TRAINING PROGRAM

## 2023-06-16 PROCEDURE — 250N000013 HC RX MED GY IP 250 OP 250 PS 637

## 2023-06-16 PROCEDURE — 99239 HOSP IP/OBS DSCHRG MGMT >30: CPT | Performed by: STUDENT IN AN ORGANIZED HEALTH CARE EDUCATION/TRAINING PROGRAM

## 2023-06-16 RX ORDER — DEXAMETHASONE 4 MG/1
4 TABLET ORAL 2 TIMES DAILY
Qty: 60 TABLET | Refills: 0 | Status: ON HOLD | OUTPATIENT
Start: 2023-06-19 | End: 2023-07-09

## 2023-06-16 RX ORDER — BACLOFEN 5 MG/1
5 TABLET ORAL 3 TIMES DAILY PRN
Qty: 60 TABLET | Refills: 0 | Status: SHIPPED | OUTPATIENT
Start: 2023-06-16 | End: 2023-07-07

## 2023-06-16 RX ORDER — DEXAMETHASONE 4 MG/1
4 TABLET ORAL 3 TIMES DAILY
Status: DISCONTINUED | OUTPATIENT
Start: 2023-06-16 | End: 2023-06-16 | Stop reason: HOSPADM

## 2023-06-16 RX ORDER — DEXAMETHASONE 4 MG/1
4 TABLET ORAL 3 TIMES DAILY
Qty: 8 TABLET | Refills: 0 | Status: SHIPPED | OUTPATIENT
Start: 2023-06-16 | End: 2023-06-21

## 2023-06-16 RX ORDER — DEXAMETHASONE 4 MG/1
4 TABLET ORAL 2 TIMES DAILY
Status: DISCONTINUED | OUTPATIENT
Start: 2023-06-19 | End: 2023-06-16 | Stop reason: HOSPADM

## 2023-06-16 RX ORDER — BACLOFEN 5 MG/1
5 TABLET ORAL 3 TIMES DAILY PRN
Qty: 60 TABLET | Refills: 0 | Status: SHIPPED | OUTPATIENT
Start: 2023-06-16 | End: 2023-06-16

## 2023-06-16 RX ADMIN — BACLOFEN 5 MG: 5 TABLET ORAL at 03:32

## 2023-06-16 RX ADMIN — LEVETIRACETAM 1000 MG: 500 TABLET, FILM COATED ORAL at 08:05

## 2023-06-16 RX ADMIN — DEXAMETHASONE SODIUM PHOSPHATE 4 MG: 10 INJECTION, SOLUTION INTRAMUSCULAR; INTRAVENOUS at 05:37

## 2023-06-16 ASSESSMENT — ACTIVITIES OF DAILY LIVING (ADL)
ADLS_ACUITY_SCORE: 23

## 2023-06-16 NOTE — PLAN OF CARE
PRN:melatonin  O2: RA  Mobility: Up ad perfecto    A: Neuro: A/O x 4.  Call light appropriate.  Able to make needs known.  Respiratory:  On room air.  Lung sounds clear.  Denies shortness of breath at rest.  Cardiac: VSS.    GI: Last BM 6/15.  No report of nausea or vomiting.  : Urinating adequate amounts of clear, yellow urine  LDA:  R. PIV SL  Pain: Denies  Isolation:  Standard Precautions  Tests/procedures: None performed overnight.  Diet: NPO  Sleep:  No sleep disturbances noted or reported.    P: Continue to monitor Pt status and report changes to Primary provider.

## 2023-06-16 NOTE — PLAN OF CARE
"/84 (BP Location: Left arm)   Pulse 77   Temp 97.5  F (36.4  C) (Oral)   Resp 16   Ht 1.854 m (6' 1\")   Wt 79.6 kg (175 lb 6.4 oz)   SpO2 99%   BMI 23.14 kg/m      VSS except low SBP in AM; provider notified; scheduled Amlodipine held per provider. Alert and Oriented x4. Patient denies pain. Patient denies chest pain, SOB, or dizziness. Patient is Independent w/walker. Last BM 06/16. Voiding adequately. Continue with plan of care.     Goal Outcome Evaluation:      Plan of Care Reviewed With: patient    Overall Patient Progress: improving    Problem: Plan of Care - These are the overarching goals to be used throughout the patient stay.    Goal: Plan of Care Review  Description: The Plan of Care Review/Shift note should be completed every shift.  The Outcome Evaluation is a brief statement about your assessment that the patient is improving, declining, or no change.  This information will be displayed automatically on your shift note.  Outcome: Adequate for Care Transition  Flowsheets (Taken 6/16/2023 1155)  Plan of Care Reviewed With: patient  Overall Patient Progress: improving  Goal: Patient-Specific Goal (Individualized)  Description: You can add care plan individualizations to a care plan. Examples of Individualization might be:  \"Parent requests to be called daily at 9am for status\", \"I have a hard time hearing out of my right ear\", or \"Do not touch me to wake me up as it startles me\".  Outcome: Adequate for Care Transition  Goal: Absence of Hospital-Acquired Illness or Injury  Outcome: Adequate for Care Transition  Intervention: Identify and Manage Fall Risk  Recent Flowsheet Documentation  Taken 6/16/2023 0900 by Irish Cummings, RN  Safety Promotion/Fall Prevention: nonskid shoes/slippers when out of bed  Intervention: Prevent Skin Injury  Recent Flowsheet Documentation  Taken 6/16/2023 0900 by Irish Cummings, RN  Body Position:    position changed independently    supine, head elevated    " lower extremity elevated  Intervention: Prevent and Manage VTE (Venous Thromboembolism) Risk  Recent Flowsheet Documentation  Taken 6/16/2023 0900 by Irish Cummings RN  VTE Prevention/Management: SCDs (sequential compression devices) off  Goal: Optimal Comfort and Wellbeing  Outcome: Adequate for Care Transition  Goal: Readiness for Transition of Care  Outcome: Adequate for Care Transition     Problem: Coping Ineffective (Oncology Care)  Goal: Effective Coping  Outcome: Adequate for Care Transition     Problem: Fatigue (Oncology Care)  Goal: Improved Activity Tolerance  Outcome: Adequate for Care Transition  Intervention: Promote Improved Energy  Recent Flowsheet Documentation  Taken 6/16/2023 0900 by Irish Cummings RN  Activity Management:    activity adjusted per tolerance    activity encouraged     Problem: Oral Intake Altered (Oncology Care)  Goal: Optimal Oral Intake  Outcome: Adequate for Care Transition     Problem: Oral Mucositis (Oncology Care)  Goal: Improved Oral Mucous Membrane Integrity  Outcome: Adequate for Care Transition     Problem: Pain Acute (Oncology Care)  Goal: Optimal Pain Control  Outcome: Adequate for Care Transition  Intervention: Prevent or Manage Pain  Recent Flowsheet Documentation  Taken 6/16/2023 0900 by Irish Cummings RN  Medication Review/Management: medications reviewed

## 2023-06-16 NOTE — PROGRESS NOTES
Pt discharged to: Home  Via: private car  Time: 1400  Reason not before 11am: waiting for discharge order; and medications  Accompanied by: friend  Belongings: remain with patient  Teaching: completed  Clinic appointment: June 21st  Report called/faxed: N/A  Local housing: N/A

## 2023-06-16 NOTE — DISCHARGE SUMMARY
[facility]  Hospitalist Discharge Summary      Date of Admission:  6/14/2023  Date of Discharge:  6/16/2023  2:45 PM  Discharging Provider: Mason Benoit DO  Discharge Service: Hospitalist Service, GOLD TEAM 7    Discharge Diagnoses   As below    Clinically Significant Risk Factors          Follow-ups Needed After Discharge   Follow-up Appointments     Adult [facility] Follow-up and recommended labs and tests      You will be scheduled for follow up in 3-4 weeks with Dr. Garrido with   repeat MRI to be obtained prior to this appointment.    Appointments on [place] and/or [facility] (with [facility] or [facility]   provider or service). Call [phone] if you haven't heard regarding   these appointments within 7 days of discharge.         Adult [facility] Follow-up and recommended labs and tests      -Follow up with primary care provider, Asim Cervantes, within 7 days   for hospital follow- up.  The following labs/tests are recommended: CBC,   BMP.    -Follow up with oncology per their recommendation. You have an appointment   on 6/28 but they will try to move this to a sooner date and communicate   with you if this is the case.   -Follow up with neurosurgery (Dr. Garrido) in 3-4 weeks with repeat MRI    Appointments on [place] and/or [facility] (with [facility] or [facility]   provider or service). Call [phone] if you haven't heard regarding   these appointments within 7 days of discharge.             Unresulted Labs Ordered in the Past 30 Days of this Admission     No orders found from 5/15/2023 to 6/15/2023.      These results will be followed up by N /A    Discharge Disposition   Discharged to home  Condition at discharge: Good    Hospital Course   Saeid Santa is a 67-year-old M with a history of metastatic melanoma complicated by seizures s/p XRT (last on 5/31/23), craniotomy w/ resection of met, and Keytruda (last infusion 6/7/23) who presented on 6/14 to the ED 3  days of increased LLE weakness, headache, and vision changes, found on brain imaging to have evidence of disease progression and admitted for management of cerebral edema and progression of metastatic melanoma. Neurosurgery, Onc, and Rad/Onc all consulted and followed for treatment plan. Pt was given IV decadron with rapid improvement in symptoms to near baseline, pt was ambulating independently with improved vision and neurological status, tolerating PO. Discharge plan was made with care teams and patient to discharge on PO steroids with outpatient follow up. Pt in agreement with this plan and understood return precautions.     # Melanoma metastatic to the brain, s/p craniotomy for resection of motor strip lesion and XRT, on Keytruda  # Seizure disorder, secondary to brain metastases  # Acute vision changes, headache, LLE weakness  #Cerebral edema with midline shift / brain compression  Follows with with Cecilia Greene (onc), Benedicto Rose (rad onc), and Kye Garrido (neurosurg) as an outpatient. CT scan on admission 6/14 with cerebral edema, cystic and solid masses with associated mass effect and 4mm rightward midline shift.   - Neurosurgery, Onc, and Rad/Onc all consulted and followed for treatment plan. Pt was given IV decadron with rapid improvement in symptoms to near baseline, pt was ambulating independently with improved vision and neurological status, tolerating PO. Discharge plan was made with care teams and patient to discharge on PO steroids with outpatient follow up. Pt in agreement with this plan and understood return precautions.  -PO decadron 4mg TID x3 days (starting today), then BID until Onc follow up as above  - NSG follow up as above  -Baclofen for hiccups, spoke to pt about this and agreed to have me write a script for it and take it PRN and follow up outpatient regarding this issue.      - Continue PTA 1g Keppra BID     # Hypertension  - Held PTA amlodipine on discharge for soft-normal  BP. Follow up outpatient.      # Obstructive sleep apnea  Not on home CPAP.       Consultations This Hospital Stay   NEUROSURGERY ADULT IP CONSULT  ONCOLOGY ADULT IP CONSULT  PHYSICAL THERAPY ADULT IP CONSULT  RADIATION ONCOLOGY IP CONSULT  NURSING TO CONSULT FOR VASCULAR ACCESS CARE IP CONSULT    Code Status   Prior    Time Spent on this Encounter   I, Mason Benoit DO, personally saw the patient today and spent greater than 30 minutes discharging this patient.       Mason Benoit DO  [facility] [address] [place] [id number]  Phone: [phone]  Fax: [phone]  ______________________________________________________________________    Physical Exam   Vital Signs: Temp: 97.5  F (36.4  C) Temp src: Oral BP: 125/84 Pulse: 77   Resp: 16 SpO2: 99 % O2 Device: None (Room air)    Weight: 175 lbs 6.4 oz  Gen: No acute distress, non-toxic, alert  Head: Normocephalic, atraumatic, no facial droop  CV: Regular rate, regular rhythm  Pulm: Clear to auscultate bilaterally, breathing non-labored  Ext: Moves all extremities, non-cyanotic  Psych: Normal mood and affect        Primary Care Physician   Asim Cervantes    Discharge Orders      MR Brain w/o & w Contrast    follow tumor progression; please include tumor protocol and perfusion imaging     Adult [facility] Follow-up and recommended labs and tests    You will be scheduled for follow up in 3-4 weeks with Dr. Garrido with repeat MRI to be obtained prior to this appointment.    Appointments on [place] and/or [facility] (with [facility] or [facility] provider or service). Call [phone] if you haven't heard regarding these appointments within 7 days of discharge.     Activity    Your activity upon discharge: activity as tolerated     Reason for your hospital stay    Neurologic and vision changes     Adult [facility] Follow-up and recommended labs and tests    -Follow up with primary care provider, Asim Cervantes, within 7  days for hospital follow- up.  The following labs/tests are recommended: CBC, BMP.    -Follow up with oncology per their recommendation. You have an appointment on 6/28 but they will try to move this to a sooner date and communicate with you if this is the case.   -Follow up with neurosurgery (Dr. Garrido) in 3-4 weeks with repeat MRI    Appointments on [place] and/or [facility] (with [facility] or [facility] provider or service). Call [phone] if you haven't heard regarding these appointments within 7 days of discharge.     Diet    Follow this diet upon discharge:       Regular Diet Adult       Significant Results and Procedures   Most Recent 3 CBC's:Recent Labs   Lab Test 06/16/23  0437 06/15/23  1107 06/14/23  1220   WBC 14.0* 8.9 9.0   HGB 12.4* 13.8 13.0*   MCV 90 92 95    245 218     Most Recent 3 BMP's:Recent Labs   Lab Test 06/16/23  0437 06/15/23  1107 06/14/23  1220    136 139   POTASSIUM 4.2 4.0 3.9   CHLORIDE 109* 102 105   CO2 19* 21* 22   BUN 21.6 17.1 13.9   CR 0.86 0.84 0.99   ANIONGAP 12 13 12   JOHN PAUL 9.2 9.5 8.9   * 175* 77   ,   Results for orders placed or performed during the hospital encounter of 06/14/23   MR Brain w/o & w Contrast    Impression    RESIDENT PRELIMINARY INTERPRETATION  IMPRESSION:  Increased size of the left occipital cystic and solid masses with  associated cerebral edema. Perfusion imaging is pending.   CT Head w/o Contrast    Narrative    CT HEAD W/O CONTRAST 6/14/2023 1:16 PM    History: fall confusion     Comparison: MRI 5/19/2023, 4/22/2023    Technique: Using multidetector thin collimation helical acquisition  technique, axial, coronal and sagittal CT images from the skull base  to the vertex were obtained without intravenous contrast.   (topogram) image(s) also obtained and reviewed.    Findings:   No acute traumatic abnormality.    Surgical changes of left parietal craniotomy for resection of left  parietal mass. Significantly increased size of 2  adjacent cystic  masses centered in the left occipital lobe. Superior cystic lesion  measures 1.6 x 1.3 x 1.4 cm, previously 1.0 x 1.2 x 0.6 cm. Inferior  cystic and solid lesion measures 3.3 x 3.1 x 2.9 cm, previously 2.7 x  2.8 x 2.6 cm. Significantly increased extent of confluent edema  surrounding these lesions and extending into the adjacent left  parietal, temporal, and posterior frontal white matter. Associated  mass effect with effacement of the atrium of the left lateral  ventricle and 4 mm of rightward midline shift.    Orbits are unremarkable. Paranasal sinuses and mastoid air cells are  clear.      Impression    Impression:  1. No acute traumatic abnormality.  2. Significantly increased size of left occipital cystic and solid  masses and associated adjacent edema.    I have personally reviewed the examination and initial interpretation  and I agree with the findings.    MARC KOO MD         SYSTEM ID:  [id number]       Discharge Medications   Discharge Medication List as of 6/16/2023 12:55 PM      START taking these medications    Details   !! dexamethasone (DECADRON) 4 MG tablet Take 1 tablet (4 mg) by mouth 3 times daily for 8 doses, Disp-8 tablet, R-0, E-Prescribe      !! dexamethasone (DECADRON) 4 MG tablet Take 1 tablet (4 mg) by mouth 2 times daily, Disp-60 tablet, R-0, E-Prescribe       !! - Potential duplicate medications found. Please discuss with provider.      CONTINUE these medications which have CHANGED    Details   Baclofen (LIORESAL) 5 MG tablet Take 1 tablet (5 mg) by mouth 3 times daily as needed for muscle spasms (hiccups), Disp-60 tablet, R-0, E-PrescribeOkay to substitute for equivalent if cost prohibitive to patient         CONTINUE these medications which have NOT CHANGED    Details   acetaminophen (TYLENOL) 325 MG tablet Take 2 tablets (650 mg) by mouth every 4 hours as needed for other (For optimal non-opioid multimodal pain management to improve pain control.),  Transitional      levETIRAcetam (KEPPRA) 1000 MG tablet 1 tablet Orally every 12 hrs, Historical         STOP taking these medications       amLODIPine (NORVASC) 10 MG tablet Comments:   Reason for Stopping:             Allergies   No Known Allergies

## 2023-06-16 NOTE — PROVIDER NOTIFICATION
Kaycee Nickerson DO notified of low blood pressure at 0749 of 90/67; Question asked if morning Amlodipine should be held; Provider held morning dose of Amlodipine.

## 2023-06-17 NOTE — PLAN OF CARE
Physical Therapy Discharge Summary    Reason for therapy discharge:    Discharged to home with outpatient therapy.    Progress towards therapy goal(s). See goals on Care Plan in [facility] electronic health record for goal details.  Goals not met.  Barriers to achieving goals:   discharge from facility.    Therapy recommendation(s):    Continued therapy is recommended.  Rationale/Recommendations:  Home with assist and resume OP PT.

## 2023-06-19 DIAGNOSIS — C43.9 METASTATIC MALIGNANT MELANOMA (H): Primary | ICD-10-CM

## 2023-06-21 ENCOUNTER — APPOINTMENT (OUTPATIENT)
Dept: LAB | Facility: CLINIC | Age: 68
End: 2023-06-21
Attending: STUDENT IN AN ORGANIZED HEALTH CARE EDUCATION/TRAINING PROGRAM
Payer: COMMERCIAL

## 2023-06-21 ENCOUNTER — ONCOLOGY VISIT (OUTPATIENT)
Dept: ONCOLOGY | Facility: CLINIC | Age: 68
End: 2023-06-21
Attending: STUDENT IN AN ORGANIZED HEALTH CARE EDUCATION/TRAINING PROGRAM
Payer: COMMERCIAL

## 2023-06-21 VITALS
OXYGEN SATURATION: 100 % | RESPIRATION RATE: 16 BRPM | HEART RATE: 56 BPM | SYSTOLIC BLOOD PRESSURE: 122 MMHG | BODY MASS INDEX: 23.3 KG/M2 | WEIGHT: 176.6 LBS | DIASTOLIC BLOOD PRESSURE: 76 MMHG | TEMPERATURE: 97.7 F

## 2023-06-21 DIAGNOSIS — C43.9 METASTATIC MALIGNANT MELANOMA (H): ICD-10-CM

## 2023-06-21 LAB
ALBUMIN SERPL BCG-MCNC: 3.6 G/DL (ref 3.5–5.2)
ALP SERPL-CCNC: 67 U/L (ref 40–129)
ALT SERPL W P-5'-P-CCNC: 34 U/L (ref 0–70)
ANION GAP SERPL CALCULATED.3IONS-SCNC: 10 MMOL/L (ref 7–15)
AST SERPL W P-5'-P-CCNC: 16 U/L (ref 0–45)
BASOPHILS # BLD MANUAL: 0.3 10E3/UL (ref 0–0.2)
BASOPHILS NFR BLD MANUAL: 2 %
BILIRUB SERPL-MCNC: 0.6 MG/DL
BUN SERPL-MCNC: 23 MG/DL (ref 8–23)
CALCIUM SERPL-MCNC: 9 MG/DL (ref 8.8–10.2)
CHLORIDE SERPL-SCNC: 105 MMOL/L (ref 98–107)
CREAT SERPL-MCNC: 0.99 MG/DL (ref 0.67–1.17)
DEPRECATED HCO3 PLAS-SCNC: 25 MMOL/L (ref 22–29)
EOSINOPHIL # BLD MANUAL: 0.2 10E3/UL (ref 0–0.7)
EOSINOPHIL NFR BLD MANUAL: 1 %
ERYTHROCYTE [DISTWIDTH] IN BLOOD BY AUTOMATED COUNT: 13.5 % (ref 10–15)
GFR SERPL CREATININE-BSD FRML MDRD: 82 ML/MIN/1.73M2
GLUCOSE SERPL-MCNC: 120 MG/DL (ref 70–99)
HCT VFR BLD AUTO: 39.8 % (ref 40–53)
HGB BLD-MCNC: 13.1 G/DL (ref 13.3–17.7)
LYMPHOCYTES # BLD MANUAL: 1.6 10E3/UL (ref 0.8–5.3)
LYMPHOCYTES NFR BLD MANUAL: 10 %
MCH RBC QN AUTO: 30.1 PG (ref 26.5–33)
MCHC RBC AUTO-ENTMCNC: 32.9 G/DL (ref 31.5–36.5)
MCV RBC AUTO: 92 FL (ref 78–100)
MONOCYTES # BLD MANUAL: 1.6 10E3/UL (ref 0–1.3)
MONOCYTES NFR BLD MANUAL: 10 %
NEUTROPHILS # BLD MANUAL: 11.9 10E3/UL (ref 1.6–8.3)
NEUTROPHILS NFR BLD MANUAL: 77 %
PLAT MORPH BLD: ABNORMAL
PLATELET # BLD AUTO: 356 10E3/UL (ref 150–450)
POTASSIUM SERPL-SCNC: 4.3 MMOL/L (ref 3.4–5.3)
PROT SERPL-MCNC: 6.2 G/DL (ref 6.4–8.3)
RBC # BLD AUTO: 4.35 10E6/UL (ref 4.4–5.9)
RBC MORPH BLD: ABNORMAL
SODIUM SERPL-SCNC: 140 MMOL/L (ref 136–145)
WBC # BLD AUTO: 15.5 10E3/UL (ref 4–11)

## 2023-06-21 PROCEDURE — 99215 OFFICE O/P EST HI 40 MIN: CPT | Performed by: STUDENT IN AN ORGANIZED HEALTH CARE EDUCATION/TRAINING PROGRAM

## 2023-06-21 PROCEDURE — 80053 COMPREHEN METABOLIC PANEL: CPT | Performed by: STUDENT IN AN ORGANIZED HEALTH CARE EDUCATION/TRAINING PROGRAM

## 2023-06-21 PROCEDURE — 36415 COLL VENOUS BLD VENIPUNCTURE: CPT | Performed by: STUDENT IN AN ORGANIZED HEALTH CARE EDUCATION/TRAINING PROGRAM

## 2023-06-21 PROCEDURE — 85014 HEMATOCRIT: CPT | Performed by: STUDENT IN AN ORGANIZED HEALTH CARE EDUCATION/TRAINING PROGRAM

## 2023-06-21 PROCEDURE — G0463 HOSPITAL OUTPT CLINIC VISIT: HCPCS | Performed by: STUDENT IN AN ORGANIZED HEALTH CARE EDUCATION/TRAINING PROGRAM

## 2023-06-21 PROCEDURE — 85007 BL SMEAR W/DIFF WBC COUNT: CPT | Performed by: STUDENT IN AN ORGANIZED HEALTH CARE EDUCATION/TRAINING PROGRAM

## 2023-06-21 RX ORDER — BACLOFEN 10 MG/1
10 TABLET ORAL DAILY PRN
COMMUNITY
Start: 2023-06-16 | End: 2023-01-01

## 2023-06-21 RX ORDER — DEXAMETHASONE 2 MG/1
TABLET ORAL
Qty: 15 TABLET | Refills: 0 | Status: SHIPPED | OUTPATIENT
Start: 2023-06-30 | End: 2023-07-07

## 2023-06-21 ASSESSMENT — PAIN SCALES - GENERAL: PAINLEVEL: NO PAIN (0)

## 2023-06-21 NOTE — PATIENT INSTRUCTIONS
-Decadron:  -Starting 6/22/23 in the morning: Take Decadron 6 mg (1 and a half tablets of the 4 mg) once daily for 3 days.   -Then take 1 tablet (4 mg) daily for 5 days.   -Then take 3 mg (I will send a prescription of 2 mg tablets for this to start on 6/30/23 so you will take 1 and half tablets of the 2 mg tablets to equal 3mg) for 5 days  -Then take 1 tablet (2mg) once daily for 5 days, then 1 mg (half tablet) once daily for 5 days, then stop.   -During this taper if you develop any worsening neurological symptoms such as left leg weakness, headaches, vision changes, speech or mentation changes please let us know right way.

## 2023-06-21 NOTE — NURSING NOTE
Chief Complaint   Patient presents with     Blood Draw     Labs drawn via  by rn in lab. VS taken.     Labs collected from venipuncture by RN. Vitals taken. Checked in for appointment(s).    Peña Gomez RN

## 2023-06-21 NOTE — LETTER
6/21/2023         RE: Saeid Santa  [address]  [facility] [address]        Dear Colleague,    Thank you for referring your patient, Saeid Santa, to the [facility]. Please see a copy of my visit note below.      MEDICAL ONCOLOGY PROGRESS NOTE  Melanoma Clinic  Jun 21, 2023    Chief Complaint: metastatic melanoma to brain    Melanoma History:  --8/16/22, he brings a left parietal scalp lesion to attention of his GP. It is initially observed.  --November 2022, the left parietal scalp lesion was felt to be a cyst, and the left parietal lesion was lanced and drained via scalp incision.  --January 2023, the left parietal scalp cyst would occasionally break open, drain, and bleed, largely at night.   --February 2023, he notes some mild difficulty clearing obstacles with right leg and foot.  --March/April 2023, he has 3 progressively worsening episodes over a span 3 weeks including the right leg. At first he notes the onset of right leg numbness and heaviness, like he was carrying 50 lbs of water on the leg. Then, about a week or so later, the leg began to jerk and converse while he was in the basement. Neither episode associated with loss of consciousness  --4/15/23, he recalls the right leg numbness, heaviness, jerking while driving between work. He had been found by coworkers in the parking lot, sitting in the grass, appearing confused. He was brought to the ER by EMS. CT-CAP, showed small (<6mm), bilateral indeterminate pulmonary nodules. CT-head and MRI brain showed 2 discrete ring-enhancing lesions in the left paramedian posterior frontal lobe near the precentral gyrus measuring approximately 2.2 x 1.7 x 1.9 cm and left occipital lobe near the lingual gyrus measuring approximately 2.2 x 2.1 x 2.0 cm. The occipital lesion encroaches upon the subependymal surface of the left lateral ventricle occipital horn and contacts the superior surface of the medial left tentorium. The  lesions are accompanied by moderate surrounding vasogenic edema. Surgery was recommended.  --4/21/23, underwent left awake (sleep-awake-sleep) craniotomy for tumor resection, he has resection of the left frontal, motor strip tumor as well asan elliptical incision of the left parietal scalp lesion. On pathology, both the scalp excision and brain mass are positive for malignant melanoma. Tumor cells are positive for S-100 and Melan-A, and negative for cytokeratin AE1/AE3, NKX3.1, GFAP, ERG, CD3, and CD20.  CD3 highlights brisk infiltration of the tumor by T lymphocytes, while CD20 highlights scattered positive B lymphocytes. NGS identifies a BRAF G466A mutation, a class 3 mutation insensitive to BRAF kinase inhibitor monotherapy. TMB is high at 48.754 muts/Mb.   --4/22/23, post-operative MRI shows post-operative changes of the parietal craniotomy of the left frontal lobe metastasis. The other 1.7 x 1.1 cm cystic lesion in the left occipital lobe is also seen. No new lesions appreciated.  -- 4/25/23 to 5/9/23, discharged from the hospital to acute rehab stay at [facility] in [place]. He participated in a comprehensive rehabilitation program consisting of PT, OT, Speech, Psychology and Recreation Therapy. He did well and was discharged home with use of a walker.  --PET-CT on 5/18/23 with potentially involved neck lymph nodes. He has mild FDG uptake in soft tissue nodules in the scalp, in the known left occipital brain metastasis, as well as in the left lower neck region.  -5/24-/5/31/23, He was treated with gamma knife: 2500 cGy to the left resection cavity; 3000 cGy to the L parietal-occipital lesion.  -6/7/2023, Cycle 1 of pembrolizumab 200mg every 3 weeks  -6/14/23-6/16/23, admitted for vision change, reduced comprehension, speech change, fall and headache. MRI showing likely progression of left occipital mass with associated edema. Improved on steroids and discharged on oral decadron. Plan for repeat MRI and  "neurosurgery follow up in 1 month.    Interval History:  -Saeid presents today for hospital follow up. He is accompanied by his wife. Saeid is doing ok today. He is currently on decadron 4 mg twice daily which he decreased from three times a day on Monday. Since the dose decrease he has not noticed any worsening of symptoms. His right leg weakness is still present but has improved since hospitalization but not yet at baseline pre-hospitalization. He is currently ambulating with a cane which is an improvement from the walker.  -Had 1 brief episode of \"aura\" triangle in right vision but no seizure activity. Compliant with Keppra.  -Some numbness/tingling of right ankle which is not new.   -No headaches or dizziness.  -Appetite is ok.  -Maybe slightly more irritable on steroids.  -Some bilateral leg swelling.     Review of Systems:  Patient denies any of the following except if noted above: fevers, chills, difficulty with energy, vision changes, chest pain, dyspnea, nausea, vomiting, diarrhea, constipation, urinary concerns, headaches, numbness, tingling.     Physical Examination:  /76   Pulse 56   Temp 97.7  F (36.5  C)   Resp 16   Wt 80.1 kg (176 lb 9.6 oz)   SpO2 100%   BMI 23.30 kg/m    Wt Readings from Last 5 Encounters:   06/21/23 80.1 kg (176 lb 9.6 oz)   06/16/23 79.6 kg (175 lb 6.4 oz)   06/07/23 82.9 kg (182 lb 11.2 oz)   06/05/23 81.6 kg (180 lb)   05/18/23 81.6 kg (180 lb)   General: Well-appearing male in no acute distress.  Eyes: EOMI, PERRL. No scleral icterus.  ENT: Oral mucosa is moist without lesions or thrush.   Cardiovascular: RRR No murmurs. Trace peripheral edema of BLE.  Respiratory: CTA bilaterally. No wheezes or crackles.  Gastrointestinal: BS +. Abdomen soft, non-tender.   Neurologic: Cranial nerves II through XII are grossly intact. PERRL, EOMI. No dysarthria. Hearing intact. Tongue extension midline. Shoulder shrug symmetric. No pronation or drift. Finger to nose intact. " Sensation to RLE slight reduced as compared to left.  strength and hip flexion 5/5. Comprehension seems to be intact but mildly forgetful.   Skin: No rashes, petechiae, or bruising noted on exposed skin.  Psych: Affect appropriate.     Laboratory Data:  Most Recent 3 CBC's:  Recent Labs   Lab Test 06/21/23  1313 06/16/23  0437 06/15/23  1107 06/14/23  1220 04/22/23  0408 04/15/23  1206   WBC 15.5* 14.0* 8.9 9.0 10.6 8.1   HGB 13.1* 12.4* 13.8 13.0* 14.4 14.2   MCV 92 90 92 95 93 94    239 245 218 246 239   ANEUTAUTO  --   --   --  5.5 9.6* 5.2     Most Recent 3 BMP's:  Recent Labs   Lab Test 06/21/23  1313 06/16/23  0437 06/15/23  1107 06/14/23  1220 06/07/23  0915 04/15/23  1201 11/29/22  1025    140 136   < > 140   < > 139   POTASSIUM 4.3 4.2 4.0   < > 3.5   < > 4.1   CHLORIDE 105 109* 102   < > 108*   < > 105   CO2 25 19* 21*   < > 22   < > 21*   BUN 23.0 21.6 17.1   < > 18.3   < > 17.3   CR 0.99 0.86 0.84   < > 0.90   < > 1.08   ANIONGAP 10 12 13   < > 10   < > 13   JOHN PAUL 9.0 9.2 9.5   < > 8.7*   < > 8.9   * 142* 175*   < > 81   < > 80   PROTTOTAL 6.2*  --   --   --  6.0*  --  6.6   ALBUMIN 3.6  --   --   --  3.4*  --  4.3    < > = values in this interval not displayed.    Most Recent 3 LFT's:  Recent Labs   Lab Test 06/21/23  1313 06/07/23  0915 11/29/22  1025   AST 16 15 25   ALT 34 12 22   ALKPHOS 67 71 60   BILITOTAL 0.6 0.8 0.9    Most Recent 2 TSH and T4:  Recent Labs   Lab Test 06/07/23  0915   TSH 1.90     I reviewed the above labs today.    Imaging Studies:  Reviewed MRI brain from 6/14/23.      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 Saeid Santa is a very pleasant 67 year old man with a diagnosis of melanoma of the scalp with metastasis to the brain. Staging studies performed during hospitalization included CT-CAP, which showed small, bilateral indeterminate pulmonary nodules and MRI-brain. A neck CT-angio was obtained,  which also showed some enlarged lymph nodes and additional scalp lesions consistent with head and neck local regional disease. PET-CT for restaging showed an additional FDG avid lymph node in the left lower neck.    He was treated with gamma knife: 2500 cGy to the left resection cavity; 3000 cGy to the L parietal-occipital lesion.    He started checkpoint inhibitor monotherapy. Plan would be to consider increasing aggressiveness with use of dual checkpoint blockade as needed pending response (see Dr. Greene's note from 5/19/23).     He began treatment with keytruda (200mg every 3 weeks) cycle 1 on 6/7/23.    He was admitted from 6/14/23-6/16/23 for vision change, reduced comprehension, speech change, fall and headache. Brain MRI showing likely progression of left occipital mass with associated edema. RT and neurosurgery were consulted. He improved clinically on steroids and discharged on oral decadron 4 mg BID. No immediate plans for intervention and plan was for repeat MRI and neurosurgery follow up in 1 month.     Today Saeid is doing well with no worsening neuro symptoms since reducing to decadron 4 mg BID 3 days ago    Labs today with Hgb stable at 13. WBC elevated at 15.5, presumable s/t steroids in absence of infectious symptoms    Given that he has only received 1 cycle of Keytruda and imaging on 6/14 was compared to 5/19 but Keytruda was started on 6/7, it's a bit challenging to evaluate any efficacy of Keytruda at this point. As such we will plan to taper steroids as outlined below. Once he is on less than 1.5mg per day of decadron, we will continue Keytruda with close follow up with MRI in ~ 1 month and neurosurgery visit to follow. I will reach out to Dr. Garrido to confirm that Keytruda would not interfere with any potential surgical plans; though there does not seem to be any clear plan for this at this time.     PLAN  -Taper decadron as follows:  -Starting 6/22/23 in the morning: Take Decadron 6 mg (1  and a half tablets of the 4 mg) once daily for 3 days.   -Then take 1 tablet (4 mg) daily for 5 days.   -Then take 3 mg (I will send a prescription of 2 mg tablets for this to start on 6/30/23 so you will take 1 and half tablets of the 2 mg tablets to equal 3mg) for 5 days  -Then take 1 tablet (2mg) once daily for 5 days, then 1 mg (half tablet) once daily for 5 days, then stop.   -Educated on need to notify us if symptoms worsen during taper  -Virutal AMANDA in 1 week to assess tolerance of taper and consider any adjustments to taper pending clinical symptoms  -Tentative plan for labs, AMANDA and cycle 2 Keytruda in 3 weeks pending taper tolerance (requested with scheduling)  -Repeat brain MRI in ~ 1 month as recommended by neurosurgery while inpatient  -Repeat other imaging 3 months after treatment start, with CT-head, neck, CAP.    #Seizure, secondary to brain metastases  Continue Keppra 1000 mg twice daily.   1 episode of aura but no seizure activity. No dose adjustments at this time.     # History of prostate cancer ([place] 3 + 3) s/p prostatectomy (2009)  He had no adverse features and did not receive adjuvant therapy. PSA has remained negative. Continue with yearly PSA. No voiding concerns today.    60 minutes spent on the date of the encounter doing chart review, review of test results, interpretation of tests, patient visit and documentation     Amber Scheierl, CNP  [facility]  [address] [place] [id number]  [phone]

## 2023-06-21 NOTE — PROGRESS NOTES
MEDICAL ONCOLOGY PROGRESS NOTE  Melanoma Clinic  Jun 21, 2023    Chief Complaint: metastatic melanoma to brain    Melanoma History:  --8/16/22, he brings a left parietal scalp lesion to attention of his GP. It is initially observed.  --November 2022, the left parietal scalp lesion was felt to be a cyst, and the left parietal lesion was lanced and drained via scalp incision.  --January 2023, the left parietal scalp cyst would occasionally break open, drain, and bleed, largely at night.   --February 2023, he notes some mild difficulty clearing obstacles with right leg and foot.  --March/April 2023, he has 3 progressively worsening episodes over a span 3 weeks including the right leg. At first he notes the onset of right leg numbness and heaviness, like he was carrying 50 lbs of water on the leg. Then, about a week or so later, the leg began to jerk and converse while he was in the basement. Neither episode associated with loss of consciousness  --4/15/23, he recalls the right leg numbness, heaviness, jerking while driving between work. He had been found by coworkers in the parking lot, sitting in the grass, appearing confused. He was brought to the ER by EMS. CT-CAP, showed small (<6mm), bilateral indeterminate pulmonary nodules. CT-head and MRI brain showed 2 discrete ring-enhancing lesions in the left paramedian posterior frontal lobe near the precentral gyrus measuring approximately 2.2 x 1.7 x 1.9 cm and left occipital lobe near the lingual gyrus measuring approximately 2.2 x 2.1 x 2.0 cm. The occipital lesion encroaches upon the subependymal surface of the left lateral ventricle occipital horn and contacts the superior surface of the medial left tentorium. The lesions are accompanied by moderate surrounding vasogenic edema. Surgery was recommended.  --4/21/23, underwent left awake (sleep-awake-sleep) craniotomy for tumor resection, he has resection of the left frontal, motor strip tumor as well asan elliptical  incision of the left parietal scalp lesion. On pathology, both the scalp excision and brain mass are positive for malignant melanoma. Tumor cells are positive for S-100 and Melan-A, and negative for cytokeratin AE1/AE3, NKX3.1, GFAP, ERG, CD3, and CD20.  CD3 highlights brisk infiltration of the tumor by T lymphocytes, while CD20 highlights scattered positive B lymphocytes. NGS identifies a BRAF G466A mutation, a class 3 mutation insensitive to BRAF kinase inhibitor monotherapy. TMB is high at 48.754 muts/Mb.   --4/22/23, post-operative MRI shows post-operative changes of the parietal craniotomy of the left frontal lobe metastasis. The other 1.7 x 1.1 cm cystic lesion in the left occipital lobe is also seen. No new lesions appreciated.  -- 4/25/23 to 5/9/23, discharged from the hospital to acute rehab stay at [facility] in [place]. He participated in a comprehensive rehabilitation program consisting of PT, OT, Speech, Psychology and Recreation Therapy. He did well and was discharged home with use of a walker.  --PET-CT on 5/18/23 with potentially involved neck lymph nodes. He has mild FDG uptake in soft tissue nodules in the scalp, in the known left occipital brain metastasis, as well as in the left lower neck region.  -5/24-/5/31/23, He was treated with gamma knife: 2500 cGy to the left resection cavity; 3000 cGy to the L parietal-occipital lesion.  -6/7/2023, Cycle 1 of pembrolizumab 200mg every 3 weeks  -6/14/23-6/16/23, admitted for vision change, reduced comprehension, speech change, fall and headache. MRI showing likely progression of left occipital mass with associated edema. Improved on steroids and discharged on oral decadron. Plan for repeat MRI and neurosurgery follow up in 1 month.    Interval History:  -Saeid presents today for hospital follow up. He is accompanied by his wife. Saeid is doing ok today. He is currently on decadron 4 mg twice daily which he decreased from three times a day on Monday.  "Since the dose decrease he has not noticed any worsening of symptoms. His right leg weakness is still present but has improved since hospitalization but not yet at baseline pre-hospitalization. He is currently ambulating with a cane which is an improvement from the walker.  -Had 1 brief episode of \"aura\" triangle in right vision but no seizure activity. Compliant with Keppra.  -Some numbness/tingling of right ankle which is not new.   -No headaches or dizziness.  -Appetite is ok.  -Maybe slightly more irritable on steroids.  -Some bilateral leg swelling.     Review of Systems:  Patient denies any of the following except if noted above: fevers, chills, difficulty with energy, vision changes, chest pain, dyspnea, nausea, vomiting, diarrhea, constipation, urinary concerns, headaches, numbness, tingling.     Physical Examination:  /76   Pulse 56   Temp 97.7  F (36.5  C)   Resp 16   Wt 80.1 kg (176 lb 9.6 oz)   SpO2 100%   BMI 23.30 kg/m    Wt Readings from Last 5 Encounters:   06/21/23 80.1 kg (176 lb 9.6 oz)   06/16/23 79.6 kg (175 lb 6.4 oz)   06/07/23 82.9 kg (182 lb 11.2 oz)   06/05/23 81.6 kg (180 lb)   05/18/23 81.6 kg (180 lb)   General: Well-appearing male in no acute distress.  Eyes: EOMI, PERRL. No scleral icterus.  ENT: Oral mucosa is moist without lesions or thrush.   Cardiovascular: RRR No murmurs. Trace peripheral edema of BLE.  Respiratory: CTA bilaterally. No wheezes or crackles.  Gastrointestinal: BS +. Abdomen soft, non-tender.   Neurologic: Cranial nerves II through XII are grossly intact. PERRL, EOMI. No dysarthria. Hearing intact. Tongue extension midline. Shoulder shrug symmetric. No pronation or drift. Finger to nose intact. Sensation to RLE slight reduced as compared to left.  strength and hip flexion 5/5. Comprehension seems to be intact but mildly forgetful.   Skin: No rashes, petechiae, or bruising noted on exposed skin.  Psych: Affect appropriate.     Laboratory Data:  Most " Recent 3 CBC's:  Recent Labs   Lab Test 06/21/23  1313 06/16/23  0437 06/15/23  1107 06/14/23  1220 04/22/23  0408 04/15/23  1206   WBC 15.5* 14.0* 8.9 9.0 10.6 8.1   HGB 13.1* 12.4* 13.8 13.0* 14.4 14.2   MCV 92 90 92 95 93 94    239 245 218 246 239   ANEUTAUTO  --   --   --  5.5 9.6* 5.2     Most Recent 3 BMP's:  Recent Labs   Lab Test 06/21/23  1313 06/16/23  0437 06/15/23  1107 06/14/23  1220 06/07/23  0915 04/15/23  1201 11/29/22  1025    140 136   < > 140   < > 139   POTASSIUM 4.3 4.2 4.0   < > 3.5   < > 4.1   CHLORIDE 105 109* 102   < > 108*   < > 105   CO2 25 19* 21*   < > 22   < > 21*   BUN 23.0 21.6 17.1   < > 18.3   < > 17.3   CR 0.99 0.86 0.84   < > 0.90   < > 1.08   ANIONGAP 10 12 13   < > 10   < > 13   JOHN PAUL 9.0 9.2 9.5   < > 8.7*   < > 8.9   * 142* 175*   < > 81   < > 80   PROTTOTAL 6.2*  --   --   --  6.0*  --  6.6   ALBUMIN 3.6  --   --   --  3.4*  --  4.3    < > = values in this interval not displayed.    Most Recent 3 LFT's:  Recent Labs   Lab Test 06/21/23  1313 06/07/23  0915 11/29/22  1025   AST 16 15 25   ALT 34 12 22   ALKPHOS 67 71 60   BILITOTAL 0.6 0.8 0.9    Most Recent 2 TSH and T4:  Recent Labs   Lab Test 06/07/23  0915   TSH 1.90     I reviewed the above labs today.    Imaging Studies:  Reviewed MRI brain from 6/14/23.      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 Saeid Santa is a very pleasant 67 year old man with a diagnosis of melanoma of the scalp with metastasis to the brain. Staging studies performed during hospitalization included CT-CAP, which showed small, bilateral indeterminate pulmonary nodules and MRI-brain. A neck CT-angio was obtained, which also showed some enlarged lymph nodes and additional scalp lesions consistent with head and neck local regional disease. PET-CT for restaging showed an additional FDG avid lymph node in the left lower neck.    He was treated with gamma knife: 2500 cGy to  the left resection cavity; 3000 cGy to the L parietal-occipital lesion.    He started checkpoint inhibitor monotherapy. Plan would be to consider increasing aggressiveness with use of dual checkpoint blockade as needed pending response (see Dr. Greene's note from 5/19/23).     He began treatment with keytruda (200mg every 3 weeks) cycle 1 on 6/7/23.    He was admitted from 6/14/23-6/16/23 for vision change, reduced comprehension, speech change, fall and headache. Brain MRI showing likely progression of left occipital mass with associated edema. RT and neurosurgery were consulted. He improved clinically on steroids and discharged on oral decadron 4 mg BID. No immediate plans for intervention and plan was for repeat MRI and neurosurgery follow up in 1 month.     Today Saeid is doing well with no worsening neuro symptoms since reducing to decadron 4 mg BID 3 days ago    Labs today with Hgb stable at 13. WBC elevated at 15.5, presumable s/t steroids in absence of infectious symptoms    Given that he has only received 1 cycle of Keytruda and imaging on 6/14 was compared to 5/19 but Keytruda was started on 6/7, it's a bit challenging to evaluate any efficacy of Keytruda at this point. As such we will plan to taper steroids as outlined below. Once he is on less than 1.5mg per day of decadron, we will continue Keytruda with close follow up with MRI in ~ 1 month and neurosurgery visit to follow. I will reach out to Dr. Garrido to confirm that Keytruda would not interfere with any potential surgical plans; though there does not seem to be any clear plan for this at this time.     PLAN  -Taper decadron as follows:  -Starting 6/22/23 in the morning: Take Decadron 6 mg (1 and a half tablets of the 4 mg) once daily for 3 days.   -Then take 1 tablet (4 mg) daily for 5 days.   -Then take 3 mg (I will send a prescription of 2 mg tablets for this to start on 6/30/23 so you will take 1 and half tablets of the 2 mg tablets to equal  3mg) for 5 days  -Then take 1 tablet (2mg) once daily for 5 days, then 1 mg (half tablet) once daily for 5 days, then stop.   -Educated on need to notify us if symptoms worsen during taper  -Virutal AMANDA in 1 week to assess tolerance of taper and consider any adjustments to taper pending clinical symptoms  -Tentative plan for labs, AMANDA and cycle 2 Keytruda in 3 weeks pending taper tolerance (requested with scheduling)  -Repeat brain MRI in ~ 1 month as recommended by neurosurgery while inpatient  -Repeat other imaging 3 months after treatment start, with CT-head, neck, CAP.    #Seizure, secondary to brain metastases  Continue Keppra 1000 mg twice daily.   1 episode of aura but no seizure activity. No dose adjustments at this time.     # History of prostate cancer ([place] 3 + 3) s/p prostatectomy (2009)  He had no adverse features and did not receive adjuvant therapy. PSA has remained negative. Continue with yearly PSA. No voiding concerns today.    60 minutes spent on the date of the encounter doing chart review, review of test results, interpretation of tests, patient visit and documentation     Amber Scheierl, CNP  [facility]  [address] [place] [id number]  [phone]

## 2023-06-28 ENCOUNTER — VIRTUAL VISIT (OUTPATIENT)
Dept: ONCOLOGY | Facility: CLINIC | Age: 68
End: 2023-06-28
Attending: PHYSICIAN ASSISTANT
Payer: COMMERCIAL

## 2023-06-28 DIAGNOSIS — C43.9 METASTATIC MALIGNANT MELANOMA (H): Primary | ICD-10-CM

## 2023-06-28 PROCEDURE — 99214 OFFICE O/P EST MOD 30 MIN: CPT | Mod: VID | Performed by: PHYSICIAN ASSISTANT

## 2023-06-28 NOTE — LETTER
6/28/2023         RE: Saeid Santa  [address]  [facility] [address]        Dear Colleague,    Thank you for referring your patient, Saeid Santa, to the [facility]. Please see a copy of my visit note below.    Virtual Visit Details    Type of service:  Video Visit   Video Start Time: 10:08 AM  Video End Time:10:17 AM    Originating Location (pt. Location): Home  Distant Location (provider location):  On-site  Platform used for Video Visit: [facility] ONCOLOGY PROGRESS NOTE  Melanoma Clinic  Jun 28, 2023    Chief Complaint: metastatic melanoma to brain    Melanoma History:  --8/16/22, he brings a left parietal scalp lesion to attention of his GP. It is initially observed.  --November 2022, the left parietal scalp lesion was felt to be a cyst, and the left parietal lesion was lanced and drained via scalp incision.  --January 2023, the left parietal scalp cyst would occasionally break open, drain, and bleed, largely at night.   --February 2023, he notes some mild difficulty clearing obstacles with right leg and foot.  --March/April 2023, he has 3 progressively worsening episodes over a span 3 weeks including the right leg. At first he notes the onset of right leg numbness and heaviness, like he was carrying 50 lbs of water on the leg. Then, about a week or so later, the leg began to jerk and converse while he was in the basement. Neither episode associated with loss of consciousness  --4/15/23, he recalls the right leg numbness, heaviness, jerking while driving between work. He had been found by coworkers in the parking lot, sitting in the grass, appearing confused. He was brought to the ER by EMS. CT-CAP, showed small (<6mm), bilateral indeterminate pulmonary nodules. CT-head and MRI brain showed 2 discrete ring-enhancing lesions in the left paramedian posterior frontal lobe near the precentral gyrus measuring approximately 2.2 x 1.7 x 1.9 cm and left occipital lobe  near the lingual gyrus measuring approximately 2.2 x 2.1 x 2.0 cm. The occipital lesion encroaches upon the subependymal surface of the left lateral ventricle occipital horn and contacts the superior surface of the medial left tentorium. The lesions are accompanied by moderate surrounding vasogenic edema. Surgery was recommended.  --4/21/23, underwent left awake (sleep-awake-sleep) craniotomy for tumor resection, he has resection of the left frontal, motor strip tumor as well asan elliptical incision of the left parietal scalp lesion. On pathology, both the scalp excision and brain mass are positive for malignant melanoma. Tumor cells are positive for S-100 and Melan-A, and negative for cytokeratin AE1/AE3, NKX3.1, GFAP, ERG, CD3, and CD20.  CD3 highlights brisk infiltration of the tumor by T lymphocytes, while CD20 highlights scattered positive B lymphocytes. NGS identifies a BRAF G466A mutation, a class 3 mutation insensitive to BRAF kinase inhibitor monotherapy. TMB is high at 48.754 muts/Mb.   --4/22/23, post-operative MRI shows post-operative changes of the parietal craniotomy of the left frontal lobe metastasis. The other 1.7 x 1.1 cm cystic lesion in the left occipital lobe is also seen. No new lesions appreciated.  -- 4/25/23 to 5/9/23, discharged from the hospital to acute rehab stay at [facility] in [place]. He participated in a comprehensive rehabilitation program consisting of PT, OT, Speech, Psychology and Recreation Therapy. He did well and was discharged home with use of a walker.  --PET-CT on 5/18/23 with potentially involved neck lymph nodes. He has mild FDG uptake in soft tissue nodules in the scalp, in the known left occipital brain metastasis, as well as in the left lower neck region.  -5/24-/5/31/23, He was treated with gamma knife: 2500 cGy to the left resection cavity; 3000 cGy to the L parietal-occipital lesion.  -6/7/2023, Cycle 1 of pembrolizumab 200mg every 3 weeks  -6/14/23-6/16/23,  admitted for vision change, reduced comprehension, speech change, fall and headache. MRI showing likely progression of left occipital mass with associated edema. Improved on steroids and discharged on oral decadron. Plan for repeat MRI and neurosurgery follow up in 1 month.    Interval History:  -Feels agitated at times with the steroids.  -Now on 4 mg daily.   -Denies any headaches, lightheadedness or dizziness,   -Has ongoing issues with feeling off balance since April, worse in the ED, now back to April baseline.   -Vision is more foggy gradually. Last eye exam was years ago.   -Denies issues with speech.  -Fell once in the last week when trying to throw something into his shed. Denies any serious injury, but did have some bruises.   -Eating and drinking well.  -Bowels are working well.  -Denies any rashes.  -Denies any dyspnea.   -Leg swelling is intermittently present.   -Denies any seizure activity.   -Has noticed an increase in his sweet tooth.     Objective:  General: patient appears well in no acute distress, alert and oriented, speech clear and fluid  Skin: no visualized rash or lesions on visualized skin  Resp: Appears to be breathing comfortably without accessory muscle usage, speaking in full sentences, no audible wheezes or cough.  Psych: Coherent speech, normal rate and volume, able to articulate logical thoughts, able to abstract reason, no tangential thoughts, no hallucinations or delusions  Patient's affect is appropriate.    Laboratory Data:  Most Recent 3 CBC's:  Recent Labs   Lab Test 06/21/23  1313 06/16/23  0437 06/15/23  1107 06/14/23  1220 04/22/23  0408 04/15/23  1206   WBC 15.5* 14.0* 8.9 9.0 10.6 8.1   HGB 13.1* 12.4* 13.8 13.0* 14.4 14.2   MCV 92 90 92 95 93 94    239 245 218 246 239   ANEUTAUTO  --   --   --  5.5 9.6* 5.2     Most Recent 3 BMP's:  Recent Labs   Lab Test 06/21/23  1313 06/16/23  0437 06/15/23  1107 06/14/23  1220 06/07/23  0915 04/15/23  1201 11/29/22  1025   NA  140 140 136   < > 140   < > 139   POTASSIUM 4.3 4.2 4.0   < > 3.5   < > 4.1   CHLORIDE 105 109* 102   < > 108*   < > 105   CO2 25 19* 21*   < > 22   < > 21*   BUN 23.0 21.6 17.1   < > 18.3   < > 17.3   CR 0.99 0.86 0.84   < > 0.90   < > 1.08   ANIONGAP 10 12 13   < > 10   < > 13   JOHN PAUL 9.0 9.2 9.5   < > 8.7*   < > 8.9   * 142* 175*   < > 81   < > 80   PROTTOTAL 6.2*  --   --   --  6.0*  --  6.6   ALBUMIN 3.6  --   --   --  3.4*  --  4.3    < > = values in this interval not displayed.    Most Recent 3 LFT's:  Recent Labs   Lab Test 06/21/23  1313 06/07/23  0915 11/29/22  1025   AST 16 15 25   ALT 34 12 22   ALKPHOS 67 71 60   BILITOTAL 0.6 0.8 0.9    Most Recent 2 TSH and T4:  Recent Labs   Lab Test 06/07/23  0915   TSH 1.90     I reviewed the above labs today.     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 Saeid Santa is a very pleasant 67 year old man with a diagnosis of melanoma of the scalp with metastasis to the brain. Staging studies performed during hospitalization included CT-CAP, which showed small, bilateral indeterminate pulmonary nodules and MRI-brain. A neck CT-angio was obtained, which also showed some enlarged lymph nodes and additional scalp lesions consistent with head and neck local regional disease. PET-CT for restaging showed an additional FDG avid lymph node in the left lower neck.    He was treated with gamma knife: 2500 cGy to the left resection cavity; 3000 cGy to the L parietal-occipital lesion.    He started checkpoint inhibitor monotherapy. Plan would be to consider increasing aggressiveness with use of dual checkpoint blockade as needed pending response (see Dr. Greene's note from 5/19/23).     He began treatment with keytruda (200mg every 3 weeks) cycle 1 on 6/7/23.    He was admitted from 6/14/23-6/16/23 for vision change, reduced comprehension, speech change, fall and headache. Brain MRI showing likely progression of left  occipital mass with associated edema. RT and neurosurgery were consulted. He improved clinically on steroids and discharged on oral decadron 4 mg BID. No immediate plans for intervention and plan was for repeat MRI and neurosurgery follow up in 1 month.     Today Saeid is doing well with no worsening neuro symptoms since reducing to decadron 4 mg daily.    Given that he has only received 1 cycle of Keytruda and imaging on 6/14 was compared to 5/19 but Keytruda was started on 6/7, it's a bit challenging to evaluate any efficacy of Keytruda at this point. As such we will plan to taper steroids as outlined below. Once he is on less than or = 1.5 mg per day of decadron, we will continue Keytruda with close follow up with MRI in mid-July and neurosurgery visit to follow.     PLAN  -Taper decadron as follows:  -Starting 6/22/23 in the morning: Take Decadron 6 mg (1 and a half tablets of the 4 mg) once daily for 3 days.COMPLETED   -Then take 1 tablet (4 mg) daily for 5 days.   -Then take 3 mg (I will send a prescription of 2 mg tablets for this to start on 6/30/23 so you will take 1 and half tablets of the 2 mg tablets to equal 3mg) for 5 days  -Then take 1 tablet (2mg) once daily for 5 days, then 1 mg (half tablet) once daily for 5 days, then stop.   -Again educated on need to notify us if symptoms worsen during taper  -Tentative plan for labs, AMANDA and cycle 2 Keytruda in 2 weeks pending taper tolerance   -Repeat brain MRI in mid-July as recommended by neurosurgery while inpatient  -Repeat other imaging 3 months after treatment start, with CT-head, neck, CAP.    #Seizure, secondary to brain metastases  Continue Keppra 1000 mg twice daily.  No auras or seizure activity recently. No dose adjustments at this time.     # History of prostate cancer ([place] 3 + 3) s/p prostatectomy (2009)  He had no adverse features and did not receive adjuvant therapy. PSA has remained negative. Continue with yearly PSA. No voiding concerns  today.    # Blurred vision  If persists after completing steroid taper, would recommend an eye exam.     Meghan Rinaldi PA-C  [facility]  [address], [place] [id number] [phone]    20 minutes spent on the date of the encounter doing chart review, review of test results, interpretation of tests, patient visit and documentation

## 2023-06-28 NOTE — NURSING NOTE
Is the patient currently in the state of [place]? YES    Visit mode:VIDEO    If the visit is dropped, the patient can be reconnected by: VIDEO VISIT: Text to cell phone: [phone]    Will anyone else be joining the visit? YES: How would they like to receive their invitation?       How would you like to obtain your AVS? MyChart    Are changes needed to the allergy or medication list? NO    Reason for visit: Recheck

## 2023-06-28 NOTE — PROGRESS NOTES
Virtual Visit Details    Type of service:  Video Visit   Video Start Time: 10:08 AM  Video End Time:10:17 AM    Originating Location (pt. Location): Home  Distant Location (provider location):  On-site  Platform used for Video Visit: [facility] ONCOLOGY PROGRESS NOTE  Melanoma Clinic  Jun 28, 2023    Chief Complaint: metastatic melanoma to brain    Melanoma History:  --8/16/22, he brings a left parietal scalp lesion to attention of his GP. It is initially observed.  --November 2022, the left parietal scalp lesion was felt to be a cyst, and the left parietal lesion was lanced and drained via scalp incision.  --January 2023, the left parietal scalp cyst would occasionally break open, drain, and bleed, largely at night.   --February 2023, he notes some mild difficulty clearing obstacles with right leg and foot.  --March/April 2023, he has 3 progressively worsening episodes over a span 3 weeks including the right leg. At first he notes the onset of right leg numbness and heaviness, like he was carrying 50 lbs of water on the leg. Then, about a week or so later, the leg began to jerk and converse while he was in the basement. Neither episode associated with loss of consciousness  --4/15/23, he recalls the right leg numbness, heaviness, jerking while driving between work. He had been found by coworkers in the parking lot, sitting in the grass, appearing confused. He was brought to the ER by EMS. CT-CAP, showed small (<6mm), bilateral indeterminate pulmonary nodules. CT-head and MRI brain showed 2 discrete ring-enhancing lesions in the left paramedian posterior frontal lobe near the precentral gyrus measuring approximately 2.2 x 1.7 x 1.9 cm and left occipital lobe near the lingual gyrus measuring approximately 2.2 x 2.1 x 2.0 cm. The occipital lesion encroaches upon the subependymal surface of the left lateral ventricle occipital horn and contacts the superior surface of the medial left tentorium. The lesions  are accompanied by moderate surrounding vasogenic edema. Surgery was recommended.  --4/21/23, underwent left awake (sleep-awake-sleep) craniotomy for tumor resection, he has resection of the left frontal, motor strip tumor as well asan elliptical incision of the left parietal scalp lesion. On pathology, both the scalp excision and brain mass are positive for malignant melanoma. Tumor cells are positive for S-100 and Melan-A, and negative for cytokeratin AE1/AE3, NKX3.1, GFAP, ERG, CD3, and CD20.  CD3 highlights brisk infiltration of the tumor by T lymphocytes, while CD20 highlights scattered positive B lymphocytes. NGS identifies a BRAF G466A mutation, a class 3 mutation insensitive to BRAF kinase inhibitor monotherapy. TMB is high at 48.754 muts/Mb.   --4/22/23, post-operative MRI shows post-operative changes of the parietal craniotomy of the left frontal lobe metastasis. The other 1.7 x 1.1 cm cystic lesion in the left occipital lobe is also seen. No new lesions appreciated.  -- 4/25/23 to 5/9/23, discharged from the hospital to acute rehab stay at [facility] in [place]. He participated in a comprehensive rehabilitation program consisting of PT, OT, Speech, Psychology and Recreation Therapy. He did well and was discharged home with use of a walker.  --PET-CT on 5/18/23 with potentially involved neck lymph nodes. He has mild FDG uptake in soft tissue nodules in the scalp, in the known left occipital brain metastasis, as well as in the left lower neck region.  -5/24-/5/31/23, He was treated with gamma knife: 2500 cGy to the left resection cavity; 3000 cGy to the L parietal-occipital lesion.  -6/7/2023, Cycle 1 of pembrolizumab 200mg every 3 weeks  -6/14/23-6/16/23, admitted for vision change, reduced comprehension, speech change, fall and headache. MRI showing likely progression of left occipital mass with associated edema. Improved on steroids and discharged on oral decadron. Plan for repeat MRI and neurosurgery  follow up in 1 month.    Interval History:  -Feels agitated at times with the steroids.  -Now on 4 mg daily.   -Denies any headaches, lightheadedness or dizziness,   -Has ongoing issues with feeling off balance since April, worse in the ED, now back to April baseline.   -Vision is more foggy gradually. Last eye exam was years ago.   -Denies issues with speech.  -Fell once in the last week when trying to throw something into his shed. Denies any serious injury, but did have some bruises.   -Eating and drinking well.  -Bowels are working well.  -Denies any rashes.  -Denies any dyspnea.   -Leg swelling is intermittently present.   -Denies any seizure activity.   -Has noticed an increase in his sweet tooth.     Objective:  General: patient appears well in no acute distress, alert and oriented, speech clear and fluid  Skin: no visualized rash or lesions on visualized skin  Resp: Appears to be breathing comfortably without accessory muscle usage, speaking in full sentences, no audible wheezes or cough.  Psych: Coherent speech, normal rate and volume, able to articulate logical thoughts, able to abstract reason, no tangential thoughts, no hallucinations or delusions  Patient's affect is appropriate.    Laboratory Data:  Most Recent 3 CBC's:  Recent Labs   Lab Test 06/21/23  1313 06/16/23  0437 06/15/23  1107 06/14/23  1220 04/22/23  0408 04/15/23  1206   WBC 15.5* 14.0* 8.9 9.0 10.6 8.1   HGB 13.1* 12.4* 13.8 13.0* 14.4 14.2   MCV 92 90 92 95 93 94    239 245 218 246 239   ANEUTAUTO  --   --   --  5.5 9.6* 5.2     Most Recent 3 BMP's:  Recent Labs   Lab Test 06/21/23  1313 06/16/23  0437 06/15/23  1107 06/14/23  1220 06/07/23  0915 04/15/23  1201 11/29/22  1025    140 136   < > 140   < > 139   POTASSIUM 4.3 4.2 4.0   < > 3.5   < > 4.1   CHLORIDE 105 109* 102   < > 108*   < > 105   CO2 25 19* 21*   < > 22   < > 21*   BUN 23.0 21.6 17.1   < > 18.3   < > 17.3   CR 0.99 0.86 0.84   < > 0.90   < > 1.08   ANIONGAP  10 12 13   < > 10   < > 13   JOHN PAUL 9.0 9.2 9.5   < > 8.7*   < > 8.9   * 142* 175*   < > 81   < > 80   PROTTOTAL 6.2*  --   --   --  6.0*  --  6.6   ALBUMIN 3.6  --   --   --  3.4*  --  4.3    < > = values in this interval not displayed.    Most Recent 3 LFT's:  Recent Labs   Lab Test 06/21/23  1313 06/07/23  0915 11/29/22  1025   AST 16 15 25   ALT 34 12 22   ALKPHOS 67 71 60   BILITOTAL 0.6 0.8 0.9    Most Recent 2 TSH and T4:  Recent Labs   Lab Test 06/07/23  0915   TSH 1.90     I reviewed the above labs today.     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 Saeid Santa is a very pleasant 67 year old man with a diagnosis of melanoma of the scalp with metastasis to the brain. Staging studies performed during hospitalization included CT-CAP, which showed small, bilateral indeterminate pulmonary nodules and MRI-brain. A neck CT-angio was obtained, which also showed some enlarged lymph nodes and additional scalp lesions consistent with head and neck local regional disease. PET-CT for restaging showed an additional FDG avid lymph node in the left lower neck.    He was treated with gamma knife: 2500 cGy to the left resection cavity; 3000 cGy to the L parietal-occipital lesion.    He started checkpoint inhibitor monotherapy. Plan would be to consider increasing aggressiveness with use of dual checkpoint blockade as needed pending response (see Dr. Greene's note from 5/19/23).     He began treatment with keytruda (200mg every 3 weeks) cycle 1 on 6/7/23.    He was admitted from 6/14/23-6/16/23 for vision change, reduced comprehension, speech change, fall and headache. Brain MRI showing likely progression of left occipital mass with associated edema. RT and neurosurgery were consulted. He improved clinically on steroids and discharged on oral decadron 4 mg BID. No immediate plans for intervention and plan was for repeat MRI and neurosurgery follow up in 1  month.     Today Saeid is doing well with no worsening neuro symptoms since reducing to decadron 4 mg daily.    Given that he has only received 1 cycle of Keytruda and imaging on 6/14 was compared to 5/19 but Keytruda was started on 6/7, it's a bit challenging to evaluate any efficacy of Keytruda at this point. As such we will plan to taper steroids as outlined below. Once he is on less than or = 1.5 mg per day of decadron, we will continue Keytruda with close follow up with MRI in mid-July and neurosurgery visit to follow.     PLAN  -Taper decadron as follows:  -Starting 6/22/23 in the morning: Take Decadron 6 mg (1 and a half tablets of the 4 mg) once daily for 3 days.COMPLETED   -Then take 1 tablet (4 mg) daily for 5 days.   -Then take 3 mg (I will send a prescription of 2 mg tablets for this to start on 6/30/23 so you will take 1 and half tablets of the 2 mg tablets to equal 3mg) for 5 days  -Then take 1 tablet (2mg) once daily for 5 days, then 1 mg (half tablet) once daily for 5 days, then stop.   -Again educated on need to notify us if symptoms worsen during taper  -Tentative plan for labs, AMANDA and cycle 2 Keytruda in 2 weeks pending taper tolerance   -Repeat brain MRI in mid-July as recommended by neurosurgery while inpatient  -Repeat other imaging 3 months after treatment start, with CT-head, neck, CAP.    #Seizure, secondary to brain metastases  Continue Keppra 1000 mg twice daily.  No auras or seizure activity recently. No dose adjustments at this time.     # History of prostate cancer ([place] 3 + 3) s/p prostatectomy (2009)  He had no adverse features and did not receive adjuvant therapy. PSA has remained negative. Continue with yearly PSA. No voiding concerns today.    # Blurred vision  If persists after completing steroid taper, would recommend an eye exam.     Meghan Rinaldi PA-C  [facility]  [address], [place] [id number] [phone]    20 minutes spent on the date of the  encounter doing chart review, review of test results, interpretation of tests, patient visit and documentation

## 2023-07-07 ENCOUNTER — APPOINTMENT (OUTPATIENT)
Dept: MRI IMAGING | Facility: CLINIC | Age: 68
DRG: 054 | End: 2023-07-07
Attending: PHYSICIAN ASSISTANT
Payer: COMMERCIAL

## 2023-07-07 ENCOUNTER — TELEPHONE (OUTPATIENT)
Dept: NEUROSURGERY | Facility: CLINIC | Age: 68
End: 2023-07-07

## 2023-07-07 ENCOUNTER — HOSPITAL ENCOUNTER (EMERGENCY)
Facility: CLINIC | Age: 68
Discharge: ANOTHER HOSPITAL | End: 2023-07-07
Attending: EMERGENCY MEDICINE | Admitting: EMERGENCY MEDICINE
Payer: COMMERCIAL

## 2023-07-07 ENCOUNTER — HOSPITAL ENCOUNTER (INPATIENT)
Facility: CLINIC | Age: 68
LOS: 2 days | Discharge: HOME | DRG: 054 | End: 2023-07-09
Attending: INTERNAL MEDICINE | Admitting: INTERNAL MEDICINE
Payer: COMMERCIAL

## 2023-07-07 ENCOUNTER — APPOINTMENT (OUTPATIENT)
Dept: CT IMAGING | Facility: CLINIC | Age: 68
End: 2023-07-07
Attending: EMERGENCY MEDICINE
Payer: COMMERCIAL

## 2023-07-07 VITALS
DIASTOLIC BLOOD PRESSURE: 72 MMHG | HEIGHT: 73 IN | RESPIRATION RATE: 11 BRPM | BODY MASS INDEX: 23.86 KG/M2 | HEART RATE: 87 BPM | SYSTOLIC BLOOD PRESSURE: 122 MMHG | WEIGHT: 180 LBS | OXYGEN SATURATION: 97 % | TEMPERATURE: 97.7 F

## 2023-07-07 DIAGNOSIS — D49.6 BRAIN TUMOR (H): ICD-10-CM

## 2023-07-07 DIAGNOSIS — G93.6 CEREBRAL EDEMA (H): ICD-10-CM

## 2023-07-07 DIAGNOSIS — C43.9 METASTATIC MELANOMA (H): ICD-10-CM

## 2023-07-07 DIAGNOSIS — C79.31 MALIGNANT MELANOMA METASTATIC TO BRAIN (H): Primary | ICD-10-CM

## 2023-07-07 DIAGNOSIS — G93.2 INCREASED INTRACRANIAL PRESSURE: ICD-10-CM

## 2023-07-07 PROBLEM — G93.5: Status: ACTIVE | Noted: 2023-07-07

## 2023-07-07 LAB
ALBUMIN SERPL BCG-MCNC: 3.6 G/DL (ref 3.5–5.2)
ALP SERPL-CCNC: 69 U/L (ref 40–129)
ALT SERPL W P-5'-P-CCNC: 26 U/L (ref 0–70)
ANION GAP SERPL CALCULATED.3IONS-SCNC: 11 MMOL/L (ref 7–15)
ANION GAP SERPL CALCULATED.3IONS-SCNC: 12 MMOL/L (ref 7–15)
ANION GAP SERPL CALCULATED.3IONS-SCNC: 8 MMOL/L (ref 7–15)
APTT PPP: 24 SECONDS (ref 22–38)
AST SERPL W P-5'-P-CCNC: 18 U/L (ref 0–45)
BASOPHILS # BLD AUTO: 0.1 10E3/UL (ref 0–0.2)
BASOPHILS NFR BLD AUTO: 1 %
BILIRUB SERPL-MCNC: 0.5 MG/DL
BUN SERPL-MCNC: 15.2 MG/DL (ref 8–23)
BUN SERPL-MCNC: 16.7 MG/DL (ref 8–23)
BUN SERPL-MCNC: 25.9 MG/DL (ref 8–23)
CALCIUM SERPL-MCNC: 8.5 MG/DL (ref 8.8–10.2)
CALCIUM SERPL-MCNC: 8.8 MG/DL (ref 8.8–10.2)
CALCIUM SERPL-MCNC: 8.9 MG/DL (ref 8.8–10.2)
CHLORIDE SERPL-SCNC: 101 MMOL/L (ref 98–107)
CHLORIDE SERPL-SCNC: 108 MMOL/L (ref 98–107)
CHLORIDE SERPL-SCNC: 99 MMOL/L (ref 98–107)
CREAT SERPL-MCNC: 0.75 MG/DL (ref 0.67–1.17)
CREAT SERPL-MCNC: 0.8 MG/DL (ref 0.67–1.17)
CREAT SERPL-MCNC: 1.05 MG/DL (ref 0.67–1.17)
DEPRECATED HCO3 PLAS-SCNC: 21 MMOL/L (ref 22–29)
DEPRECATED HCO3 PLAS-SCNC: 22 MMOL/L (ref 22–29)
DEPRECATED HCO3 PLAS-SCNC: 25 MMOL/L (ref 22–29)
EOSINOPHIL # BLD AUTO: 0.2 10E3/UL (ref 0–0.7)
EOSINOPHIL NFR BLD AUTO: 2 %
ERYTHROCYTE [DISTWIDTH] IN BLOOD BY AUTOMATED COUNT: 14.9 % (ref 10–15)
GFR SERPL CREATININE-BSD FRML MDRD: 77 ML/MIN/1.73M2
GFR SERPL CREATININE-BSD FRML MDRD: >90 ML/MIN/1.73M2
GFR SERPL CREATININE-BSD FRML MDRD: >90 ML/MIN/1.73M2
GLUCOSE BLDC GLUCOMTR-MCNC: 113 MG/DL (ref 70–99)
GLUCOSE BLDC GLUCOMTR-MCNC: 122 MG/DL (ref 70–99)
GLUCOSE BLDC GLUCOMTR-MCNC: 130 MG/DL (ref 70–99)
GLUCOSE BLDC GLUCOMTR-MCNC: 136 MG/DL (ref 70–99)
GLUCOSE SERPL-MCNC: 134 MG/DL (ref 70–99)
GLUCOSE SERPL-MCNC: 143 MG/DL (ref 70–99)
GLUCOSE SERPL-MCNC: 94 MG/DL (ref 70–99)
HCT VFR BLD AUTO: 40.7 % (ref 40–53)
HGB BLD-MCNC: 13.2 G/DL (ref 13.3–17.7)
HOLD SPECIMEN: NORMAL
IMM GRANULOCYTES # BLD: 0.3 10E3/UL
IMM GRANULOCYTES NFR BLD: 3 %
INR PPP: 0.83 (ref 0.85–1.15)
LYMPHOCYTES # BLD AUTO: 3.1 10E3/UL (ref 0.8–5.3)
LYMPHOCYTES NFR BLD AUTO: 30 %
MCH RBC QN AUTO: 31.2 PG (ref 26.5–33)
MCHC RBC AUTO-ENTMCNC: 32.4 G/DL (ref 31.5–36.5)
MCV RBC AUTO: 96 FL (ref 78–100)
MONOCYTES # BLD AUTO: 0.6 10E3/UL (ref 0–1.3)
MONOCYTES NFR BLD AUTO: 6 %
NEUTROPHILS # BLD AUTO: 6 10E3/UL (ref 1.6–8.3)
NEUTROPHILS NFR BLD AUTO: 58 %
NRBC # BLD AUTO: 0 10E3/UL
NRBC BLD AUTO-RTO: 0 /100
PLATELET # BLD AUTO: 164 10E3/UL (ref 150–450)
POTASSIUM SERPL-SCNC: 3.9 MMOL/L (ref 3.4–5.3)
POTASSIUM SERPL-SCNC: 4.2 MMOL/L (ref 3.4–5.3)
POTASSIUM SERPL-SCNC: 4.7 MMOL/L (ref 3.4–5.3)
PROT SERPL-MCNC: 5.7 G/DL (ref 6.4–8.3)
RBC # BLD AUTO: 4.23 10E6/UL (ref 4.4–5.9)
SODIUM SERPL-SCNC: 132 MMOL/L (ref 136–145)
SODIUM SERPL-SCNC: 132 MMOL/L (ref 136–145)
SODIUM SERPL-SCNC: 134 MMOL/L (ref 136–145)
SODIUM SERPL-SCNC: 141 MMOL/L (ref 136–145)
WBC # BLD AUTO: 10.2 10E3/UL (ref 4–11)

## 2023-07-07 PROCEDURE — 70553 MRI BRAIN STEM W/O & W/DYE: CPT

## 2023-07-07 PROCEDURE — 96376 TX/PRO/DX INJ SAME DRUG ADON: CPT | Performed by: EMERGENCY MEDICINE

## 2023-07-07 PROCEDURE — A9585 GADOBUTROL INJECTION: HCPCS | Performed by: INTERNAL MEDICINE

## 2023-07-07 PROCEDURE — 99291 CRITICAL CARE FIRST HOUR: CPT | Performed by: EMERGENCY MEDICINE

## 2023-07-07 PROCEDURE — 70552 MRI BRAIN STEM W/DYE: CPT

## 2023-07-07 PROCEDURE — 250N000011 HC RX IP 250 OP 636: Mod: JZ | Performed by: INTERNAL MEDICINE

## 2023-07-07 PROCEDURE — 96374 THER/PROPH/DIAG INJ IV PUSH: CPT | Performed by: EMERGENCY MEDICINE

## 2023-07-07 PROCEDURE — 250N000009 HC RX 250: Performed by: INTERNAL MEDICINE

## 2023-07-07 PROCEDURE — 80053 COMPREHEN METABOLIC PANEL: CPT | Performed by: EMERGENCY MEDICINE

## 2023-07-07 PROCEDURE — 250N000011 HC RX IP 250 OP 636

## 2023-07-07 PROCEDURE — 36415 COLL VENOUS BLD VENIPUNCTURE: CPT

## 2023-07-07 PROCEDURE — 272N000452 HC KIT SHRLOCK 5FR POWER PICC TRIPLE LUMEN

## 2023-07-07 PROCEDURE — 36415 COLL VENOUS BLD VENIPUNCTURE: CPT | Performed by: EMERGENCY MEDICINE

## 2023-07-07 PROCEDURE — 258N000003 HC RX IP 258 OP 636

## 2023-07-07 PROCEDURE — 36415 COLL VENOUS BLD VENIPUNCTURE: CPT | Performed by: INTERNAL MEDICINE

## 2023-07-07 PROCEDURE — 36569 INSJ PICC 5 YR+ W/O IMAGING: CPT

## 2023-07-07 PROCEDURE — 250N000011 HC RX IP 250 OP 636: Performed by: EMERGENCY MEDICINE

## 2023-07-07 PROCEDURE — 85730 THROMBOPLASTIN TIME PARTIAL: CPT | Performed by: EMERGENCY MEDICINE

## 2023-07-07 PROCEDURE — 96375 TX/PRO/DX INJ NEW DRUG ADDON: CPT | Performed by: EMERGENCY MEDICINE

## 2023-07-07 PROCEDURE — 99291 CRITICAL CARE FIRST HOUR: CPT | Mod: 25 | Performed by: EMERGENCY MEDICINE

## 2023-07-07 PROCEDURE — 99207 PR APP CREDIT; MD BILLING SHARED VISIT: CPT | Performed by: INTERNAL MEDICINE

## 2023-07-07 PROCEDURE — 85610 PROTHROMBIN TIME: CPT | Performed by: EMERGENCY MEDICINE

## 2023-07-07 PROCEDURE — 70450 CT HEAD/BRAIN W/O DYE: CPT

## 2023-07-07 PROCEDURE — 99223 1ST HOSP IP/OBS HIGH 75: CPT

## 2023-07-07 PROCEDURE — 200N000001 HC R&B ICU

## 2023-07-07 PROCEDURE — 80048 BASIC METABOLIC PNL TOTAL CA: CPT | Performed by: INTERNAL MEDICINE

## 2023-07-07 PROCEDURE — 84295 ASSAY OF SERUM SODIUM: CPT | Performed by: INTERNAL MEDICINE

## 2023-07-07 PROCEDURE — 85004 AUTOMATED DIFF WBC COUNT: CPT | Performed by: EMERGENCY MEDICINE

## 2023-07-07 PROCEDURE — 255N000002 HC RX 255 OP 636: Performed by: INTERNAL MEDICINE

## 2023-07-07 PROCEDURE — 250N000011 HC RX IP 250 OP 636: Mod: JZ | Performed by: EMERGENCY MEDICINE

## 2023-07-07 PROCEDURE — 250N000013 HC RX MED GY IP 250 OP 250 PS 637

## 2023-07-07 RX ORDER — NALOXONE HYDROCHLORIDE 0.4 MG/ML
0.2 INJECTION, SOLUTION INTRAMUSCULAR; INTRAVENOUS; SUBCUTANEOUS
Status: DISCONTINUED | OUTPATIENT
Start: 2023-07-07 | End: 2023-07-09 | Stop reason: HOSPADM

## 2023-07-07 RX ORDER — ASPIRIN 325 MG
325 TABLET, DELAYED RELEASE (ENTERIC COATED) ORAL DAILY PRN
Status: ON HOLD | COMMUNITY
End: 2023-07-09

## 2023-07-07 RX ORDER — DEXAMETHASONE 2 MG/1
2 TABLET ORAL DAILY
Status: ON HOLD | COMMUNITY
End: 2023-07-09

## 2023-07-07 RX ORDER — HYDRALAZINE HYDROCHLORIDE 20 MG/ML
10-20 INJECTION INTRAMUSCULAR; INTRAVENOUS EVERY 6 HOURS PRN
Status: DISCONTINUED | OUTPATIENT
Start: 2023-07-07 | End: 2023-07-09 | Stop reason: HOSPADM

## 2023-07-07 RX ORDER — NALOXONE HYDROCHLORIDE 0.4 MG/ML
0.4 INJECTION, SOLUTION INTRAMUSCULAR; INTRAVENOUS; SUBCUTANEOUS
Status: DISCONTINUED | OUTPATIENT
Start: 2023-07-07 | End: 2023-07-09 | Stop reason: HOSPADM

## 2023-07-07 RX ORDER — LEVETIRACETAM 10 MG/ML
1000 INJECTION INTRAVASCULAR 2 TIMES DAILY
Status: DISCONTINUED | OUTPATIENT
Start: 2023-07-07 | End: 2023-07-09 | Stop reason: HOSPADM

## 2023-07-07 RX ORDER — AMLODIPINE BESYLATE 10 MG/1
10 TABLET ORAL EVERY MORNING
COMMUNITY
End: 2023-01-01

## 2023-07-07 RX ORDER — BACLOFEN 10 MG/1
10 TABLET ORAL DAILY PRN
Status: DISCONTINUED | OUTPATIENT
Start: 2023-07-07 | End: 2023-07-09 | Stop reason: HOSPADM

## 2023-07-07 RX ORDER — OXYCODONE HYDROCHLORIDE 5 MG/1
5 TABLET ORAL EVERY 4 HOURS PRN
Status: DISCONTINUED | OUTPATIENT
Start: 2023-07-07 | End: 2023-07-09 | Stop reason: HOSPADM

## 2023-07-07 RX ORDER — DEXAMETHASONE SODIUM PHOSPHATE 4 MG/ML
6 INJECTION, SOLUTION INTRA-ARTICULAR; INTRALESIONAL; INTRAMUSCULAR; INTRAVENOUS; SOFT TISSUE EVERY 6 HOURS
Status: DISCONTINUED | OUTPATIENT
Start: 2023-07-07 | End: 2023-07-09 | Stop reason: HOSPADM

## 2023-07-07 RX ORDER — HYDROMORPHONE HYDROCHLORIDE 1 MG/ML
0.5 INJECTION, SOLUTION INTRAMUSCULAR; INTRAVENOUS; SUBCUTANEOUS
Status: COMPLETED | OUTPATIENT
Start: 2023-07-07 | End: 2023-07-07

## 2023-07-07 RX ORDER — DEXAMETHASONE SODIUM PHOSPHATE 4 MG/ML
4 INJECTION, SOLUTION INTRA-ARTICULAR; INTRALESIONAL; INTRAMUSCULAR; INTRAVENOUS; SOFT TISSUE EVERY 6 HOURS
Status: DISCONTINUED | OUTPATIENT
Start: 2023-07-07 | End: 2023-07-07

## 2023-07-07 RX ORDER — ACETAMINOPHEN 650 MG/1
650 SUPPOSITORY RECTAL EVERY 4 HOURS PRN
Status: DISCONTINUED | OUTPATIENT
Start: 2023-07-07 | End: 2023-07-09 | Stop reason: HOSPADM

## 2023-07-07 RX ORDER — LIDOCAINE 40 MG/G
CREAM TOPICAL
Status: DISCONTINUED | OUTPATIENT
Start: 2023-07-07 | End: 2023-07-09 | Stop reason: HOSPADM

## 2023-07-07 RX ORDER — NICOTINE POLACRILEX 4 MG
15-30 LOZENGE BUCCAL
Status: DISCONTINUED | OUTPATIENT
Start: 2023-07-07 | End: 2023-07-09 | Stop reason: HOSPADM

## 2023-07-07 RX ORDER — DEXTROSE, SODIUM CHLORIDE, SODIUM LACTATE, POTASSIUM CHLORIDE, AND CALCIUM CHLORIDE 5; .6; .31; .03; .02 G/100ML; G/100ML; G/100ML; G/100ML; G/100ML
INJECTION, SOLUTION INTRAVENOUS CONTINUOUS
Status: DISCONTINUED | OUTPATIENT
Start: 2023-07-07 | End: 2023-07-07 | Stop reason: HOSPADM

## 2023-07-07 RX ORDER — PROCHLORPERAZINE 25 MG
12.5 SUPPOSITORY, RECTAL RECTAL EVERY 12 HOURS PRN
Status: DISCONTINUED | OUTPATIENT
Start: 2023-07-07 | End: 2023-07-09 | Stop reason: HOSPADM

## 2023-07-07 RX ORDER — DEXTROSE MONOHYDRATE 25 G/50ML
25-50 INJECTION, SOLUTION INTRAVENOUS
Status: DISCONTINUED | OUTPATIENT
Start: 2023-07-07 | End: 2023-07-09 | Stop reason: HOSPADM

## 2023-07-07 RX ORDER — 3% SODIUM CHLORIDE 3 G/100ML
INJECTION, SOLUTION INTRAVENOUS CONTINUOUS
Status: DISCONTINUED | OUTPATIENT
Start: 2023-07-07 | End: 2023-07-09

## 2023-07-07 RX ORDER — HYDROMORPHONE HYDROCHLORIDE 1 MG/ML
0.3 INJECTION, SOLUTION INTRAMUSCULAR; INTRAVENOUS; SUBCUTANEOUS
Status: DISCONTINUED | OUTPATIENT
Start: 2023-07-07 | End: 2023-07-07

## 2023-07-07 RX ORDER — ONDANSETRON 2 MG/ML
4 INJECTION INTRAMUSCULAR; INTRAVENOUS EVERY 30 MIN PRN
Status: DISCONTINUED | OUTPATIENT
Start: 2023-07-07 | End: 2023-07-07 | Stop reason: HOSPADM

## 2023-07-07 RX ORDER — PROCHLORPERAZINE MALEATE 5 MG
5 TABLET ORAL EVERY 6 HOURS PRN
Status: DISCONTINUED | OUTPATIENT
Start: 2023-07-07 | End: 2023-07-09 | Stop reason: HOSPADM

## 2023-07-07 RX ORDER — ONDANSETRON 2 MG/ML
4 INJECTION INTRAMUSCULAR; INTRAVENOUS EVERY 6 HOURS PRN
Status: DISCONTINUED | OUTPATIENT
Start: 2023-07-07 | End: 2023-07-09 | Stop reason: HOSPADM

## 2023-07-07 RX ORDER — AMLODIPINE BESYLATE 10 MG/1
10 TABLET ORAL EVERY MORNING
Status: DISCONTINUED | OUTPATIENT
Start: 2023-07-07 | End: 2023-07-09 | Stop reason: HOSPADM

## 2023-07-07 RX ORDER — GADOBUTROL 604.72 MG/ML
15 INJECTION INTRAVENOUS ONCE
Status: COMPLETED | OUTPATIENT
Start: 2023-07-07 | End: 2023-07-07

## 2023-07-07 RX ORDER — ONDANSETRON 4 MG/1
4 TABLET, ORALLY DISINTEGRATING ORAL EVERY 6 HOURS PRN
Status: DISCONTINUED | OUTPATIENT
Start: 2023-07-07 | End: 2023-07-09 | Stop reason: HOSPADM

## 2023-07-07 RX ORDER — HYDROMORPHONE HYDROCHLORIDE 1 MG/ML
.3-.5 INJECTION, SOLUTION INTRAMUSCULAR; INTRAVENOUS; SUBCUTANEOUS
Status: DISCONTINUED | OUTPATIENT
Start: 2023-07-07 | End: 2023-07-09 | Stop reason: HOSPADM

## 2023-07-07 RX ORDER — ACETAMINOPHEN 325 MG/1
650 TABLET ORAL EVERY 4 HOURS PRN
Status: DISCONTINUED | OUTPATIENT
Start: 2023-07-07 | End: 2023-07-09 | Stop reason: HOSPADM

## 2023-07-07 RX ORDER — SODIUM CHLORIDE 9 MG/ML
INJECTION, SOLUTION INTRAVENOUS CONTINUOUS
Status: DISCONTINUED | OUTPATIENT
Start: 2023-07-07 | End: 2023-07-07

## 2023-07-07 RX ORDER — PEMBROLIZUMAB 25 MG/ML
200 INJECTION, SOLUTION INTRAVENOUS
COMMUNITY
End: 2023-01-01

## 2023-07-07 RX ORDER — DEXAMETHASONE SODIUM PHOSPHATE 10 MG/ML
10 INJECTION, SOLUTION INTRAMUSCULAR; INTRAVENOUS ONCE
Status: COMPLETED | OUTPATIENT
Start: 2023-07-07 | End: 2023-07-07

## 2023-07-07 RX ORDER — LABETALOL HYDROCHLORIDE 5 MG/ML
20 INJECTION, SOLUTION INTRAVENOUS EVERY 6 HOURS PRN
Status: DISCONTINUED | OUTPATIENT
Start: 2023-07-07 | End: 2023-07-09 | Stop reason: HOSPADM

## 2023-07-07 RX ADMIN — HYDRALAZINE HYDROCHLORIDE 20 MG: 20 INJECTION INTRAMUSCULAR; INTRAVENOUS at 17:19

## 2023-07-07 RX ADMIN — DEXAMETHASONE SODIUM PHOSPHATE 10 MG: 10 INJECTION, SOLUTION INTRAMUSCULAR; INTRAVENOUS at 09:18

## 2023-07-07 RX ADMIN — NICARDIPINE HYDROCHLORIDE 7 MG/HR: 0.2 INJECTION INTRAVENOUS at 09:37

## 2023-07-07 RX ADMIN — PROCHLORPERAZINE EDISYLATE 5 MG: 5 INJECTION INTRAMUSCULAR; INTRAVENOUS at 12:35

## 2023-07-07 RX ADMIN — ONDANSETRON 4 MG: 2 INJECTION INTRAMUSCULAR; INTRAVENOUS at 09:32

## 2023-07-07 RX ADMIN — HYDROMORPHONE HYDROCHLORIDE 0.5 MG: 1 INJECTION, SOLUTION INTRAMUSCULAR; INTRAVENOUS; SUBCUTANEOUS at 06:39

## 2023-07-07 RX ADMIN — HYDROMORPHONE HYDROCHLORIDE 0.5 MG: 1 INJECTION, SOLUTION INTRAMUSCULAR; INTRAVENOUS; SUBCUTANEOUS at 12:35

## 2023-07-07 RX ADMIN — AMLODIPINE BESYLATE 10 MG: 10 TABLET ORAL at 16:05

## 2023-07-07 RX ADMIN — NICARDIPINE HYDROCHLORIDE 2.5 MG/HR: 0.2 INJECTION INTRAVENOUS at 07:56

## 2023-07-07 RX ADMIN — ONDANSETRON 4 MG: 2 INJECTION INTRAMUSCULAR; INTRAVENOUS at 05:25

## 2023-07-07 RX ADMIN — NICARDIPINE HYDROCHLORIDE 10 MG/HR: 0.2 INJECTION INTRAVENOUS at 08:13

## 2023-07-07 RX ADMIN — NICARDIPINE HYDROCHLORIDE 15 MG/HR: 0.2 INJECTION INTRAVENOUS at 08:21

## 2023-07-07 RX ADMIN — LIDOCAINE HYDROCHLORIDE ANHYDROUS 1 ML: 10 INJECTION, SOLUTION INFILTRATION at 23:05

## 2023-07-07 RX ADMIN — NICARDIPINE HYDROCHLORIDE 12 MG/HR: 0.2 INJECTION INTRAVENOUS at 08:42

## 2023-07-07 RX ADMIN — HYDROMORPHONE HYDROCHLORIDE 0.3 MG: 1 INJECTION, SOLUTION INTRAMUSCULAR; INTRAVENOUS; SUBCUTANEOUS at 05:25

## 2023-07-07 RX ADMIN — DEXAMETHASONE SODIUM PHOSPHATE 4 MG: 4 INJECTION, SOLUTION INTRA-ARTICULAR; INTRALESIONAL; INTRAMUSCULAR; INTRAVENOUS; SOFT TISSUE at 14:23

## 2023-07-07 RX ADMIN — NICARDIPINE HYDROCHLORIDE 10 MG/HR: 0.2 INJECTION INTRAVENOUS at 09:17

## 2023-07-07 RX ADMIN — NICARDIPINE HYDROCHLORIDE 5 MG/HR: 0.2 INJECTION INTRAVENOUS at 08:06

## 2023-07-07 RX ADMIN — ACETAMINOPHEN 650 MG: 325 TABLET, FILM COATED ORAL at 23:30

## 2023-07-07 RX ADMIN — SODIUM CHLORIDE, SODIUM LACTATE, POTASSIUM CHLORIDE, CALCIUM CHLORIDE AND DEXTROSE MONOHYDRATE 100 ML: 5; 600; 310; 30; 20 INJECTION, SOLUTION INTRAVENOUS at 08:12

## 2023-07-07 RX ADMIN — HYDROMORPHONE HYDROCHLORIDE 0.3 MG: 1 INJECTION, SOLUTION INTRAMUSCULAR; INTRAVENOUS; SUBCUTANEOUS at 05:53

## 2023-07-07 RX ADMIN — PROCHLORPERAZINE EDISYLATE 5 MG: 5 INJECTION INTRAMUSCULAR; INTRAVENOUS at 18:26

## 2023-07-07 RX ADMIN — LEVETIRACETAM 1000 MG: 10 INJECTION INTRAVENOUS at 21:01

## 2023-07-07 RX ADMIN — HYDROMORPHONE HYDROCHLORIDE 0.5 MG: 1 INJECTION, SOLUTION INTRAMUSCULAR; INTRAVENOUS; SUBCUTANEOUS at 17:17

## 2023-07-07 RX ADMIN — GADOBUTROL 15 ML: 604.72 INJECTION INTRAVENOUS at 15:27

## 2023-07-07 RX ADMIN — ONDANSETRON 4 MG: 2 INJECTION INTRAMUSCULAR; INTRAVENOUS at 14:29

## 2023-07-07 RX ADMIN — SODIUM CHLORIDE: 9 INJECTION, SOLUTION INTRAVENOUS at 17:36

## 2023-07-07 RX ADMIN — SODIUM CHLORIDE: 3 INJECTION, SOLUTION INTRAVENOUS at 18:50

## 2023-07-07 RX ADMIN — DEXAMETHASONE SODIUM PHOSPHATE 6 MG: 4 INJECTION, SOLUTION INTRAMUSCULAR; INTRAVENOUS at 19:57

## 2023-07-07 RX ADMIN — NICARDIPINE HYDROCHLORIDE 6 MG/HR: 0.2 INJECTION INTRAVENOUS at 10:12

## 2023-07-07 RX ADMIN — ACETAMINOPHEN 650 MG: 325 TABLET, FILM COATED ORAL at 17:18

## 2023-07-07 ASSESSMENT — ENCOUNTER SYMPTOMS
CHEST TIGHTNESS: 0
SEIZURES: 0
ABDOMINAL PAIN: 0
SPEECH DIFFICULTY: 1
COUGH: 0
NECK STIFFNESS: 0
VOMITING: 0
HEADACHES: 1
CONFUSION: 1
LIGHT-HEADEDNESS: 0
WEAKNESS: 1
NECK PAIN: 0
APPETITE CHANGE: 1
SHORTNESS OF BREATH: 0
NAUSEA: 1
WOUND: 0
FEVER: 0
FATIGUE: 0

## 2023-07-07 ASSESSMENT — ACTIVITIES OF DAILY LIVING (ADL)
ADLS_ACUITY_SCORE: 41
ADLS_ACUITY_SCORE: 41
ADLS_ACUITY_SCORE: 35
ADLS_ACUITY_SCORE: 41
ADLS_ACUITY_SCORE: 35
ADLS_ACUITY_SCORE: 41
ADLS_ACUITY_SCORE: 35

## 2023-07-07 NOTE — PROGRESS NOTES
"Palliative consult received for goals of care. Due to palliative consult volume received today, palliative census acuity, and limited palliative staffing, pt will not be evaluated by our team until Monday 7/10, when we are back in the hospital. Avila Schultz NP notified. Thanks.    Shavon LAY, RAVI  Palliative Medicine   [facility]  Securely message with [employer]   *If you are having trouble locating my name, please ensure \"global\" is checked under contacts tab, within the sites button      "

## 2023-07-07 NOTE — MEDICATION SCRIBE - ADMISSION MEDICATION HISTORY
Medication Scribe Admission Medication History    Admission medication history is complete. The information provided in this note is only as accurate as the sources available at the time of the update.    Medication reconciliation/reorder completed by provider prior to medication history? No    Information Source(s): Family member via phone    Pertinent Information: Spoke with wife Smiley regarding medications. Patient was weaned off of Dexamethasone and began Keytrude (06/07/23) and would be due for next injection. She stated doctors gave the Decadron in the ER due to increased swelling and they were putting Keytruda on hold.    Changes made to PTA medication list:    Added: Amlodipine 10 mg, Aspirin 325 mg prn    Deleted: Baclofen 5 mg    Changed: None    Medication Affordability:  Not including over the counter (OTC) medications, was there a time in the past 3 months when you did not take your medications as prescribed because of cost?: Unable to Assess    Allergies reviewed with patient and updates made in EHR: yes    Medication History Completed By: Radha Munguia 7/7/2023 10:23 AM    Prior to Admission medications    Medication Sig Last Dose Taking? Auth Provider Long Term End Date   amLODIPine (NORVASC) 10 MG tablet Take 10 mg by mouth every morning 7/6/2023 at 0700 Yes Reported, Patient No    aspirin (ASA) 325 MG EC tablet Take 325 mg by mouth daily as needed for moderate pain 7/6/2023 at pm Yes Reported, Patient     baclofen (LIORESAL) 10 MG tablet Take 10 mg by mouth daily as needed for muscle spasms Unknown at prn Yes Reported, Patient     dexamethasone (DECADRON) 2 MG tablet Take 2 mg by mouth daily Unknown Yes Reported, Patient Yes    dexamethasone (DECADRON) 4 MG tablet Take 1 tablet (4 mg) by mouth 2 times daily Unknown Yes Mason Benoit, DO Yes    levETIRAcetam (KEPPRA) 1000 MG tablet 1 tablet Orally every 12 hrs 7/6/2023 at 1900 Yes Reported, Patient Yes    pembrolizumab (KEYTRUDA) 25 MG/ mg  every 21 days 6/7/2023 at am Yes Reported, Patient

## 2023-07-07 NOTE — PROGRESS NOTES
Patient neuro status stable as afternoon has progressed however sodium steadily declining over course of last 12 hours.  Now under goal range and given steady decline I contacted Neurosurgery.  They recommended more aggressive work on raising sodium and use of hypertonic saline which I have now ordered.  If more rapidly declining on further rechecks consider bolus dosing.  Patient continuing with close neurologic monitoring.  Given need for hypertonic saline a PICC line has been requested for urgent placement.  I also have updated the Tele ICU hub/intensivist and they will be monitoring the sodium levels for this evening.  Dr. Garrido (neurosurgery) prefers Na goal 135-145.  Neurology consult also pending.

## 2023-07-07 NOTE — ED NOTES
Assumed care of pt at 0730, report received.  Pt sleeping, wife at bedside.  Pt wakes to verbal and able to follow commands.  Minimal speaking, but is oriented and in pain.  Pt hypertensive and Nicardipine qtt started and titrated per orders.  Pt with nausea and again medicated for comfort.  Wife did give permission for steroids and dose given per orders.  Pt has been voiding in urnial and can make his needs known.  Wife advocating for pain medications, MD aware.   Bed placement at [facility] and awaiting to give report.     Wife very anxious and tearful.  Brother is now in room with pt, and is calming and helpful.  Wife would greatly benefit from a  once at receiving hospital.  I have offered pastoral care.  Wife has called other family members and will hopefully be of support to wife and pt as well.  PLAN:  Awaiting report and transfer.

## 2023-07-07 NOTE — ED PROVIDER NOTES
Emergency Department Patient Sign-out       Brief HPI:  This is a 67 year old male signed out to me by Dr. English.  See initial ED Provider note for details of the presentation.            5:48 AM: Discussed with Dr Baron, radiology: Some expansion of the lesion previously seen concerning for acute hemorrhage as there is increased density in this area.  There is some slight midline shift now.     5:50 AM: Paging neurosurgery to discuss     6:21 AM Patient re-assessed: Patient resting but moaning in discomfort intermittently.  Additional hydromorphone ordered to help control his pain.     6:40 AM: Discussed with Dr Powers, on call neurosurgery. Recommends transfer to ER at the  for further evaluation.  Upon my questioning, he does state that 6mg dexamthasone would be reasonable.  Declined offer advice on blood pressure management.  Declined to give any advice on whether there is any surgical possibility for treatment of this.     6:50 AM: Wife updated. She would prefer not transferring to the [facility] as she has not had a good experience there previously.  She is also hesitant to restart higher dose steroids since their plan has been to wean off of them.  We will try to contact the patient's neurosurgeon directly, Dr. Garrido.     7:17 AM: Awaiting callback from Dr. Garrido.  Since he is not on-call, it is uncertain if he will return page.  Discussed with patient's wife again at bedside.     7:28 AM: After discussion with patient's wife, she is amenable to transfer to [facility].  Still not heard back from Dr. Garrido.  Paging [facility] to see if there is bed availability     7:48 AM: BP continues to climb. Will start nicardipine for BP control given the presence of hemorrhage in an effort to limit further bleeding.      8:00 AM: Still no word on bed availability for transfer.  Patient signed out to Dr. Aguayo to help secure a transfer.        Assessments & Plan (with Medical Decision Making)    67-year-old with history of metastatic melanoma status postsurgical resection of lung tumor but still with another in his brain presenting for evaluation of increasing headache, confusion, vision changes.  Patient arrived here with severe headache in the left frontal temporal area.  Hypertensive with relative bradycardia concerning for increased intracranial pressure.  CT imaging obtained showed what appears to be an interval hemorrhage of his left occipital mass lesion.  Discussed with on-call neurosurgery who had few recommendations as he is not the patient's primary neurosurgeon.  He did recommend dexamethasone however patient's wife is very hesitant to restart high-dose dexamethasone as they have been trying to wean off of dexamethasone in order to start Keytruda.  I did advise that dexamethasone is likely the best short-term treatment despite there efforts to wean off of this but she would like to hold off on this treatment at this time.  Wife is also very hesitant to go back to the [facility] where they had a very poor overall visit on the last admission due to being held in the hallway for prolonged time and having inconsistent care per wife's report.  I recommended we try transferring to [facility] as an alternative to have access to the resources he would need.  She is open to this.      Due to patient being on Decadron drip it was decided patient needed ICU.  Family wanted patient not to go to the [facility] and therefore we contacted [facility].  I discussed case with Dr. Jackson with hospitalist service he was willing to accept the patient but wanted me to talk to the neurosurgical team.  I discussed case with Mila SALDAÑA with neurosurgery and she is in agreement with the excepting the patient in transfer also.  I reviewed discussed the case with Dr. Jasmine with recommendations of giving the patient Decadron and transferring while keeping the nicardipine drip  "going and keeping the blood pressure below 150.  Family and patient were informed of the plan to transfer and they are agreement with this.      Exam:   Patient Vitals for the past 24 hrs:   BP Temp Temp src Pulse Resp SpO2 Height Weight   07/07/23 0838 130/83 -- -- 64 (!) 8 95 % -- --   07/07/23 0833 134/73 -- -- 66 10 96 % -- --   07/07/23 0828 (!) 148/90 -- -- 71 17 95 % -- --   07/07/23 0823 (!) 155/80 -- -- 74 15 94 % -- --   07/07/23 0818 (!) 161/96 -- -- 81 15 92 % -- --   07/07/23 0813 (!) 172/83 -- -- 63 15 98 % -- --   07/07/23 0808 (!) 179/86 -- -- 70 14 92 % -- --   07/07/23 0800 (!) 177/100 -- -- (!) 46 10 95 % -- --   07/07/23 0755 (!) 186/108 -- -- (!) 48 (!) 9 98 % -- --   07/07/23 0730 (!) 188/99 -- -- 56 12 98 % -- --   07/07/23 0701 (!) 182/118 -- -- (!) 49 16 95 % -- --   07/07/23 0630 (!) 190/102 -- -- (!) 44 16 97 % -- --   07/07/23 0600 (!) 170/110 -- -- 63 18 94 % -- --   07/07/23 0530 (!) 185/84 -- -- 56 18 99 % -- --   07/07/23 0504 (!) 192/110 97.7  F (36.5  C) Oral 58 16 100 % 1.854 m (6' 1\") 81.6 kg (180 lb)           ED RESULTS:   Results for orders placed or performed during the hospital encounter of 07/07/23 (from the past 24 hour(s))   [place] Draw     Status: None    Collection Time: 07/07/23  5:10 AM    Narrative    The following orders were created for panel order [place] Draw.  Procedure                               Abnormality         Status                     ---------                               -----------         ------                     Extra Blue Top Tube[[id number]]                              Final result               Extra Green Top (Lithium...[[id number]]                      Final result               Extra Purple Top Tube[[id number]]                            Final result                 Please view results for these tests on the individual orders.   Extra Blue Top Tube     Status: None    Collection Time: 07/07/23  5:10 AM   Result Value Ref Range    Hold " Specimen JIC    Extra Green Top (Lithium Heparin) Tube     Status: None    Collection Time: 07/07/23  5:10 AM   Result Value Ref Range    Hold Specimen JIC    Extra Purple Top Tube     Status: None    Collection Time: 07/07/23  5:10 AM   Result Value Ref Range    Hold Specimen JIC    CBC with platelets differential     Status: Abnormal    Collection Time: 07/07/23  5:10 AM    Narrative    The following orders were created for panel order CBC with platelets differential.  Procedure                               Abnormality         Status                     ---------                               -----------         ------                     CBC with platelets and d...[[id number]]  Abnormal            Final result                 Please view results for these tests on the individual orders.   Comprehensive metabolic panel     Status: Abnormal    Collection Time: 07/07/23  5:10 AM   Result Value Ref Range    Sodium 141 136 - 145 mmol/L    Potassium 3.9 3.4 - 5.3 mmol/L    Chloride 108 (H) 98 - 107 mmol/L    Carbon Dioxide (CO2) 25 22 - 29 mmol/L    Anion Gap 8 7 - 15 mmol/L    Urea Nitrogen 25.9 (H) 8.0 - 23.0 mg/dL    Creatinine 1.05 0.67 - 1.17 mg/dL    Calcium 8.5 (L) 8.8 - 10.2 mg/dL    Glucose 94 70 - 99 mg/dL    Alkaline Phosphatase 69 40 - 129 U/L    AST 18 0 - 45 U/L    ALT 26 0 - 70 U/L    Protein Total 5.7 (L) 6.4 - 8.3 g/dL    Albumin 3.6 3.5 - 5.2 g/dL    Bilirubin Total 0.5 <=1.2 mg/dL    GFR Estimate 77 >60 mL/min/1.73m2   CBC with platelets and differential     Status: Abnormal    Collection Time: 07/07/23  5:10 AM   Result Value Ref Range    WBC Count 10.2 4.0 - 11.0 10e3/uL    RBC Count 4.23 (L) 4.40 - 5.90 10e6/uL    Hemoglobin 13.2 (L) 13.3 - 17.7 g/dL    Hematocrit 40.7 40.0 - 53.0 %    MCV 96 78 - 100 fL    MCH 31.2 26.5 - 33.0 pg    MCHC 32.4 31.5 - 36.5 g/dL    RDW 14.9 10.0 - 15.0 %    Platelet Count 164 150 - 450 10e3/uL    % Neutrophils 58 %    % Lymphocytes 30 %    % Monocytes 6 %    %  Eosinophils 2 %    % Basophils 1 %    % Immature Granulocytes 3 %    NRBCs per 100 WBC 0 <1 /100    Absolute Neutrophils 6.0 1.6 - 8.3 10e3/uL    Absolute Lymphocytes 3.1 0.8 - 5.3 10e3/uL    Absolute Monocytes 0.6 0.0 - 1.3 10e3/uL    Absolute Eosinophils 0.2 0.0 - 0.7 10e3/uL    Absolute Basophils 0.1 0.0 - 0.2 10e3/uL    Absolute Immature Granulocytes 0.3 <=0.4 10e3/uL    Absolute NRBCs 0.0 10e3/uL   CT Head w/o Contrast     Status: None    Collection Time: 07/07/23  5:43 AM    Narrative    EXAM: CT HEAD W/O CONTRAST  LOCATION: [facility]  DATE: 7/7/2023    INDICATION: History of melanoma with metastatic disease.  COMPARISON: 06/14/2023.  TECHNIQUE: Routine CT Head without IV contrast. Multiplanar reformats. Dose reduction techniques were used.    FINDINGS:  INTRACRANIAL CONTENTS: Increased size and density of the lesion in the left occipital lobe, measuring approximately 3.6 x 3.4 x 3.4 cm in AP x transverse x craniocaudal dimension, previously measuring approximately 3.1 x 2.6 x 2.4 cm by CT technique. The   increased internal density is worrisome for hemorrhage. Worsening mass effect with diffuse sulcal effacement, surrounding vasogenic edema, and approximately 5 mm left to right midline shift. There is partial effacement of the left lateral ventricle. The   basal cisterns are preserved. No hydrocephalus. The gray-white differentiation is preserved.     VISUALIZED ORBITS/SINUSES/MASTOIDS: No intraorbital abnormality. Trace ethmoid mucosal thickening. Mucous retention cyst in the left sphenoid sinus. Small right mastoid effusion.    BONES/SOFT TISSUES: High left parietal craniotomy.      Impression    IMPRESSION:  1.  Increased size and density of the lesion in the left occipital lobe, worrisome for interval hemorrhage.  2.  Increased surrounding edema and associated mass effect with approximately 5 mm left to right midline shift.    Findings were discussed with Dr. English at 5:48 AM  on 07/07/2023.                      ED MEDICATIONS:   Medications   ondansetron (ZOFRAN) injection 4 mg (4 mg Intravenous $Given 7/7/23 0525)   niCARdipine 40 mg in 200 mL NS (CARDENE) infusion (12 mg/hr Intravenous $New Bag 7/7/23 0843)   dextrose 5% in lactated ringers infusion (100 mLs Intravenous $New Bag 7/7/23 0812)   HYDROmorphone (PF) (DILAUDID) injection 0.5 mg (0.5 mg Intravenous $Given 7/7/23 0646)         Impression:    ICD-10-CM    1. Metastatic melanoma (H)  C43.9       2. Brain tumor (H)  D49.6       3. Increased intracranial pressure  G93.2           Plan:    [facility].      MD Dede Forde, Aguilar Blackwood MD  07/08/23 5020

## 2023-07-07 NOTE — TELEPHONE ENCOUNTER
67 year old history of malignant melanoma who is s/p stealth assisted awake crani with resection of motor/sensory strip lesion with Dr. aGrrido, 5/24/23 GK to resection cavity and parieto occipital lesion presented to [facility] ED with fall x 6 days, confusion and ongoing and worsening headaches with imaging evidence as demonstrated below    Narrative & Impression   EXAM: CT HEAD W/O CONTRAST  LOCATION: [facility]  DATE: 7/7/2023     INDICATION: History of melanoma with metastatic disease.  COMPARISON: 06/14/2023.  TECHNIQUE: Routine CT Head without IV contrast. Multiplanar reformats. Dose reduction techniques were used.     FINDINGS:  INTRACRANIAL CONTENTS: Increased size and density of the lesion in the left occipital lobe, measuring approximately 3.6 x 3.4 x 3.4 cm in AP x transverse x craniocaudal dimension, previously measuring approximately 3.1 x 2.6 x 2.4 cm by CT technique. The   increased internal density is worrisome for hemorrhage. Worsening mass effect with diffuse sulcal effacement, surrounding vasogenic edema, and approximately 5 mm left to right midline shift. There is partial effacement of the left lateral ventricle. The   basal cisterns are preserved. No hydrocephalus. The gray-white differentiation is preserved.      VISUALIZED ORBITS/SINUSES/MASTOIDS: No intraorbital abnormality. Trace ethmoid mucosal thickening. Mucous retention cyst in the left sphenoid sinus. Small right mastoid effusion.     BONES/SOFT TISSUES: High left parietal craniotomy.                                                                      IMPRESSION:  1.  Increased size and density of the lesion in the left occipital lobe, worrisome for interval hemorrhage.  2.  Increased surrounding edema and associated mass effect with approximately 5 mm left to right midline shift.     Findings were discussed with Dr. English at 5:48 AM on 07/07/2023.          PLAN:   -transfer [facility] ICU with admission under  hospitalist service with q2H neurologic checks  -HOB30  -SBP<150  -NPO  -brain MRI w/wo contrast and stereotactic MRI upon arrival to [facility]   -load with keppra and continue BID maintenance   -decadron 10mg and 4q6 after that     Plans reviewed with Dr. Garrido who was in agreement     Mila PHELPS [facility]  [address]  [address] [place] [id number]    Tel [phone]  Pager [phone]

## 2023-07-07 NOTE — H&P
[facility]    History and Physical - Hospitalist Service       Date of Admission:  7/7/2023    Assessment & Plan      Saeid Santa is a 67 year old male with a past medical history significant for hypertension, JUSTEN, prostate cancer, malignant melanoma with metastases to lung, brain, and lymph nodes s/p surgical resection of lung tumor,  and seizures secondary to complication of brain mets who initially presented to [facility] 7/7/2023 with increasing headache, confusion, and vision changes.    Patient was transferred to [facility] as there was no other bed availability and neurosurgical evaluation.     Malignant melanoma with metastases to brain and lymph nodes s/p tumor resection 4/21/23   Hemorrhagic left occipital brain mass with cerebral edema and midline shift  Hypertension and bradycardia suspect 2/2 Increased Intracranial Pressure    Patient follows with Dr. Kingston Greene with oncology, Gerald Rose with radiation oncology at [facility], and Dr. Kye Garrido with neurosurgery outpatient. Patient has received 1 round of Keytruda which was started 6/7/23. Patient was recently hospitalized 6/14/2023- 6/16/2023 for progressive left lower extremity weakness, headache and visual changes at the [facility]. CT remarkable for cerebral edema and cystic and solid masses with associated mass effect and 4 mm right word midline shift.  Patient was treated with IV Decadron and had rapidly improving symptoms. He was discharged on oral steroids with plan for follow up MRI in 1 month. At last oncology appointment 6/28/23, plan was to restart Keytruda in 2 weeks, if he tolerated decadron taper. Patient presented to ED 7/7/23 with worsening headache, visual changes, and confusion.  Patient reports headache started 3 days ago 2 days later developed visual changes, and confusion.  Headache progressively worsened prompting him to  be evaluated in the ED. In the ED diagnostic work-up remarkable for 3.9, BUN 25.9, creatinine 1.05, calcium 8.5, glucose 94, alk phos 69, AST 18, ALT 26, bilirubin 0.5, WBC 10.2, Hgb 13.2, platelets 164, ANC 6, INR 0.83, CT head without contrast shows increase size and density of left occipital lobe lesion, worrisome for interval hemorrhage and increased surrounding edema and associated mass effect with approximately 5 mm left-to-right midline shift.  Patient was hypertensive and bradycardic concerning for increased intracranial pressure.  Patient was initiated on nicardipine infusion.  Other medications patient received in the ED include Zofran, and Dilaudid. Upon arrival to [facility] patient is alert, disoriented to time, otherwise has logical speech.  Patient is slow to respond, dysarthric.  Has right visual field cut, right lower extremity weakness, diminished sensation right upper extremity and right lower extremity.  - Admit inpatient to ICU  - Appreciate assistance from Neurosurgery; Per neurosurgery patient will need to be transferred to [facility] for surgery. They're planning for surgery Monday.  - NPO  - IV Decadron 6mg q6h per Neursurgery  - q2h neuro checks   - keep HOB 30 degrees  - Nicardipine infusion was stopped when patient arrived to [facility].  - PRN needed hydralazine and labetalol for systolic blood greater than 150  - PRN acetaminophen, oxycodone, and IV dilaudid for pain  - PRN zofran and compazine for nausea  - Discussed patient with Jazmyn Burgess, AMANDA with neurocritical care; From their standpoint they do not have any additional recommendations at this time. Will reconsult them if patient develops seizures or other neurocritical care concerns.   - Consider heme/onc consult; will hold off for now given acuity of patient's condition with brain mass  - Social work consult     History of seizures secondary to brain metastases  Patient denies any recent seizures. Takes PTA keppra 1000 mg  "BID.  - Will order IV keppra  - Seizure precautions    Balance difficulties and Left lower extremity weakness 2/2 left occipital mass  Recent fall PTA  Patient reportedly had a fall approximately 6 days prior to admission.  Denies hitting head.  - PT/OT  - Fall precautions    Hypertension  Takes PTA amlodipine 10 mg daily.  Patient arrived to [facility] on nicardipine infusion, however it was turned off as patient's SBP was in the 120s to 130s.   - Discontinue Nicardipine drip  - Resume PTA amlodipine  - PRN hydralazine and labetalol for SBP > 150    JUSTEN  - Does not wear home CPAP    Goals of Care  Spoke with patient's wife Smiley regarding patient's current condition.  Smiley was very upset, and initially was reluctant to talk with me.  During our conversation Smiley was yelling, anxious, using profanity.  Smiley expressed that she was upset due to the lack of communication between hospitals, and she does not even know if Tim should have the surgery.  She reports she was told by a doctor at the previous hospital that \"patient's brain will turn into mush\" if the patient had surgery.  She was also told that patient has 3- 5 days to live.  I reiterated the severity of Tim's brain tumor and metastatic cancer.  We discussed CODE STATUS and she confirmed that Tim would want to be fully resuscitated.  Tim is confused and not decisional, therefore Smiley is his legal healthcare agent.  I asked Smiley if Tim would still want to be fully resuscitated given the new information that he has an enlarged brain tumor that is bleeding.  Smiley told me she cannot make that decision to change him to a DNR/DNI right now.  She would like patient to remain full code.  Palliative care has been consulted to have ongoing goals of care discussion.  Smiley did verbalize that she will not let her  die in hospital.  She is prepared to take patient home on hospice.  Social work has been consulted to assist with disposition " planning.    Diet:   NPO  DVT Prophylaxis: Pneumatic Compression Devices  Aragon Catheter: Not present  Lines: None     Cardiac Monitoring: None  Code Status:   FULL    Clinically Significant Risk Factors Present on Admission                  # Hypertension: Noted on problem list               Disposition Plan    > 2 days     The patient's care was discussed with the Attending Physician, Dr. Del Toro.    Avila Schultz, NP  Hospitalist Service  [facility]  Securely message with [employer] (more info)  Text page via [employer] Paging/Directory     ______________________________________________________________________    Chief Complaint   Increasing headache, confusion, and visual changes    History is obtained from the patient  Patient, patient's wife, and chart review.    History of Present Illness   Saeid Santa is a 67 year old male with a past medical history significant for hypertension, JUSTEN, prostate cancer, malignant melanoma with metastases to lung, brain, and lymph nodes s/p surgical resection of lung tumor, and seizures secondary to complication of brain mets.  Patient reports he has had a progressive headache over the last 3 days.  Has not been relieved with Tylenol.  He is also experienced blurred vision, which he noticed started 2 days ago while reading the newspaper.  Others associated symptoms includes nausea and confusion.  Patient's headache was unbearable today prompting him to be evaluated in the ED.    Saeid initially presented to [facility] 7/7/2023 with increasing headache, confusion, and vision changes.  Patient was transferred to [facility] as there was no other bed availability and neurosurgical evaluation. In the ED diagnostic work-up remarkable for 3.9, BUN 25.9, creatinine 1.05, calcium 8.5, glucose 94, alk phos 69, AST 18, ALT 26, bilirubin 0.5, WBC 10.2, Hgb 13.2, platelets 164, ANC 6, INR 0.83, CT head without contrast shows increase size and density of  left occipital lobe lesion, worrisome for interval hemorrhage and increased surrounding edema and associated mass effect with approximately 5 mm left-to-right midline shift.  Patient was hypertensive and bradycardic concerning for increased intracranial pressure.  Patient was initiated on nicardipine infusion.  Other medications patient received in the ED include Zofran, and Dilaudid.    Upon arrival patient is laying in bed supine, HOB 30 degrees with a towel covering his face. He is hemodynamically stable. Dysarthric, and slow to respond, but oriented x3. Logical speech. On review of systems patient endorses appetite change, visual disturbance, light sensitivity, nausea, vomiting, speech difficulties, weakness, headache, nausea, and confusion. Nausea and dizziness are exacerbated by motion.  He denies fever, chills, cough, chest tightness, shortness of breath, neck pain or stiffness, seizures.    Past Medical History    Past Medical History:   Diagnosis Date     HTN (hypertension)      Metastatic melanoma (H)      Prostate cancer (H)      Seizures (H)        Past Surgical History   Past Surgical History:   Procedure Laterality Date     OPTICAL TRACKING SYSTEM CRANIOTOMY, EXCISE TUMOR, COMBINED Left 2023    Procedure: stealth assisted awake craniotomy resection of motor strip tumor;  Surgeon: Kye Garrido MD;  Location: [facility] OR     PROSTATECTOMY             Prior to Admission Medications   Cannot display prior to admission medications because the patient has not been admitted in this contact.        Social History   I have reviewed this patient's social history and updated it with pertinent information if needed.  Social History     Tobacco Use     Smoking status: Former     Packs/day: 0.50     Years: 20.00     Pack years: 10.00     Types: Cigarettes     Quit date: 2017     Years since quittin.5     Smokeless tobacco: Never   Vaping Use     Vaping Use: Never used   Substance Use Topics     Alcohol  use: Yes     Comment: socially     Drug use: Yes     Types: Marijuana     Comment: typically smoked daily but no use for past two weeks as of 4/20/23       Family History   I have reviewed this patient's family history and updated it with pertinent information if needed.  Family History   Problem Relation Age of Onset     Pancreatic Cancer Father      Diabetes Paternal Grandmother        Allergies   No Known Allergies     Physical Exam     Pulse 80   Temp 97.3  F (36.3  C) (Oral)   Resp 29   Wt 84.2 kg (185 lb 10 oz)   BMI 24.49 kg/m     General:  Non-toxic appearing, not in acute distress. Has towel over his head, endorses headache.  Skin:  Warm, dry. No rashes or lesions on exposed skin.  HEENT:  Normocephalic, atraumatic. New right visual field cut appreciated.  Neck:  Supple.  Chest:  Breath sounds CTA, diminished in bases. No increased work of breathing on room air.  Cardiovascular:  RRR, no rub or murmur. No peripheral edema.  Abdomen:  Soft, non-tender, non-distended.  Musculoskeletal:  RLE weakness 4/5 strength. 5/5 strength in remaining extremities.   Neurological:  Right visual field cut. RLE weakness, RUE and RLE diminished sensation.   Psychiatric:  Affect and mood congruent.      Medical Decision Making       80 MINUTES SPENT BY ME on the date of service doing chart review, history, exam, documentation & further activities per the note.      Data     I have personally reviewed the following data over the past 24 hrs:    10.2  \   13.2 (L)   / 164     141 108 (H) 25.9 (H) /  94   3.9 25 1.05 \       ALT: 26 AST: 18 AP: 69 TBILI: 0.5   ALB: 3.6 TOT PROTEIN: 5.7 (L) LIPASE: N/A       INR:  0.83 (L) PTT:  24   D-dimer:  N/A Fibrinogen:  N/A       Imaging results reviewed over the past 24 hrs:   Recent Results (from the past 24 hour(s))   CT Head w/o Contrast    Narrative    EXAM: CT HEAD W/O CONTRAST  LOCATION: [facility]  DATE: 7/7/2023    INDICATION: History of melanoma  with metastatic disease.  COMPARISON: 06/14/2023.  TECHNIQUE: Routine CT Head without IV contrast. Multiplanar reformats. Dose reduction techniques were used.    FINDINGS:  INTRACRANIAL CONTENTS: Increased size and density of the lesion in the left occipital lobe, measuring approximately 3.6 x 3.4 x 3.4 cm in AP x transverse x craniocaudal dimension, previously measuring approximately 3.1 x 2.6 x 2.4 cm by CT technique. The   increased internal density is worrisome for hemorrhage. Worsening mass effect with diffuse sulcal effacement, surrounding vasogenic edema, and approximately 5 mm left to right midline shift. There is partial effacement of the left lateral ventricle. The   basal cisterns are preserved. No hydrocephalus. The gray-white differentiation is preserved.     VISUALIZED ORBITS/SINUSES/MASTOIDS: No intraorbital abnormality. Trace ethmoid mucosal thickening. Mucous retention cyst in the left sphenoid sinus. Small right mastoid effusion.    BONES/SOFT TISSUES: High left parietal craniotomy.      Impression    IMPRESSION:  1.  Increased size and density of the lesion in the left occipital lobe, worrisome for interval hemorrhage.  2.  Increased surrounding edema and associated mass effect with approximately 5 mm left to right midline shift.    Findings were discussed with Dr. English at 5:48 AM on 07/07/2023.

## 2023-07-07 NOTE — PROGRESS NOTES
MRI reviewed with Dr. Garrido  Continue with current plans decadron 6q6, keppra, monitor neurologic status q2H, HOB30, SBP<150    To add to plan  Dr. Garrido prefers Na goal 135-145- medical management per hospitalist and intensivist   Updated RN who will update hospitalist on Na goals     Dr. Garrido in agreement     Narrative & Impression   MRI BRAIN WITHOUT AND WITH CONTRAST  7/7/2023 3:35 PM      HISTORY: Post op resection with new hemorrhage, shift, edema.      TECHNIQUE: Multiplanar, multisequence MRI of the brain without and  with 15 mL Gadavist      COMPARISON: Head CT from earlier the same day. Brain MR 6/14/2023.      FINDINGS: Abnormal heterogeneously enhancing partially internally  cystic mass in the left occipital lobe has increased in size since  prior MR 6/14/2023. There are also new hematocrit layer within this  collection and susceptibility hypointensity suggesting intralesional  hemorrhage within the lesion. On the postcontrast sequence, this  collection measures approximately 3.4 x 3.4 x 4.4 cm, previously 3.1 x  3.2 x 4.0 cm when measured at a similar slice. Marked surrounding T2  hyperdense edema around this lesion is not significantly changed since  prior. Mass effect on the posterior left cerebral hemisphere with  rightward midline shift now measures 0.6 cm, previously 0.3 cm on  6/14/2023. There is worsening mass effect on the occipital horn of the  left lateral ventricle. There is also increased effacement of the  basal cisterns particularly the left quadrigeminal plate cistern. No  evidence of hydrocephalus or ventricular entrapment at this time. Mild  patchy periventricular white matter T2 hyperintensities which are  likely due to chronic microvascular ischemic disease.     Sequela of prior treatment changes near the left postcentral gyrus  with susceptibility hypodensity in that area as well as T2  hyperintensity in the cortex. Linear sulcal enhancement in this area  is favored to be  treatment related. Sequela of previous left vertex  craniotomy.     No new suspicious enhancing intracranial lesions.     Moderate left and small right mastoid effusions. Mild mucosal  thickening in the paranasal sinuses. The major arterial T2 flow voids  at the base of the brain appear patent.                                                                       IMPRESSION:    1. Increased size of complex cystic and solid heterogeneous enhancing  mass in the left occipital lobe since prior MR 6/14/2023. New internal  signal with hematocrit layer in this collection suggests intralesional  hemorrhage of the lesion since that time. Surrounding T2 hyperintense  signal/edema is not significantly changed.  2. Increased mass effect on the posterior left cerebral hemisphere.  Increased rightward midline shift of 0.6 cm with worsening compression  of the occipital horn of the left lateral ventricle. There is also  increased effacement of the basal cisterns particularly the left  quadrigeminal plate cistern. No evidence of hydrocephalus or  ventricular entrapment at this time.  3. Sequela of prior treatment changes in the left postcentral gyrus,  similar to prior.  4. No new suspicious enhancing intracranial masses.        MD Mila PADILLA PA-C  [facility]  [address] [address]    Tel [phone]  Pager [phone]

## 2023-07-07 NOTE — ED NOTES
Pt intermittently moaning in pain. Dilaudid given x3 per MD orders. Pt resting quietly with intermittent moaning and discomfort. Pt continues to move all extremities equally but with generalized weakness noted. Pt with intermittent confusion when awakens, but re-orientates quickly. Awaiting further orders. Will continue close pt monitoring.

## 2023-07-07 NOTE — PROGRESS NOTES
-received call to place a central line for 3%. Recommend PICC placement.  -In the meantime ok to do 3% hypertonic saline through PIV.     - Will follow goal of 135-145.   -  Discussed it with Neurology as well who agrees. They will see the patient in the morning.  ICU can help with sodium management overnight.     Fer Cedeno MD

## 2023-07-07 NOTE — CONSULTS
[facility]    Neurosurgery Consultation     Date of Admission:  7/7/2023  Date of Consult (When I saw the patient): 07/07/23    Assessment & Plan   Saeid Santa is a 67 year old male who was admitted on 7/7/2023. Saeid Santa is a 67 year old male with metastatic melanoma is who is s/p stealth assisted awake craniotomy with resection of motor/sensory strip lesion with Dr. Garrido on 4/21/23, underwent GK to resection cavity and parieto-occipital lesion on 5/24/23 presented to OSH with worsening confusion and headache and imaging evidence of increased size and density of the lesion in the left occipital lobe, worrisome for interval hemorrhage, Increased surrounding edema and associated mass effect with approximately 5 mm left to right midline shift.    Patient refused transfer to [place] so was ultimately transferred to [facility] ICU. Patient is confused and only following simple commands at this time. Per report, patient has been having balance issues and recently fell 6 days ago hitting his head. He did not have LOC per report. Sicne then, has been having worsening head pain and confusion and decreased alertness. Has had difficulty reading over the past 2 days as well. He was recently being weaned off of steroids.     After imaging, OSH gave 10 of decadron and loaded with Keppra.     EXAM: CT HEAD W/O CONTRAST  LOCATION: [facility]  DATE: 7/7/2023                                                           IMPRESSION:  1.  Increased size and density of the lesion in the left occipital lobe, worrisome for interval hemorrhage.  2.  Increased surrounding edema and associated mass effect with approximately 5 mm left to right midline shift.     Findings were discussed with Dr. English at 5:48 AM on 07/07/2023.         Clinical history, imaging and plans reviewed myself as well as with Dr. Garrido. Plan for MRI w/wo and stereotactic MRI, neurologic checks q2H, HOB30,  SBP<150. OK to remove NPO status and confirmed this with Dr. Garrido. Continue decadron 6q6 and keppra.     I have discussed the following assessment and plan with Dr. Garrido who is in agreement with the initial plan and will follow up with further consultation recommendations.    Mila Gallegos PA-C  [facility]  [address]  [address]  [place] [address]    Tel [phone]  Pager [phone]      Code Status    Full Code    Reason for Consult   Reason for consult: I was asked by Dr. Schultz to evaluate this patient for imaging findings.    Primary Care Physician   Asim Cervantes    Chief Complaint   Headache, confusion    History is obtained from the patient, electronic health record, emergency department physician and patient's spouse    History of Present Illness   Saeid Santa is a 67 year old male with metastatic melanoma is who is s/p stealth assisted awake craniotomy with resection of motor/sensory strip lesion with Dr. Garrido on 4/21/23, underwent GK to resection cavity and parieto-occipital lesion on 5/24/23 presented to OSH with worsening confusion and headache and imaging evidence of increased size and density of the lesion in the left occipital lobe, worrisome for interval hemorrhage, Increased surrounding edema and associated mass effect with approximately 5 mm left to right midline shift.    Patient refused transfer to [place] so was ultimately transferred to [facility] ICU. Patient is confused and only following simple commands at this time. Per report, patient has been having balance issues and recently fell 6 days ago hitting his head. He did not have LOC per report. Sicne then, has been having worsening head pain and confusion and decreased alertness. Has had difficulty reading over the past 2 days as well. He was recently being weaned off of steroids.     After imaging, OSH gave 10 of decadron and loaded with Keppra.     EXAM: CT HEAD  W/O CONTRAST  LOCATION: [facility]  DATE: 2023                                                           IMPRESSION:  1.  Increased size and density of the lesion in the left occipital lobe, worrisome for interval hemorrhage.  2.  Increased surrounding edema and associated mass effect with approximately 5 mm left to right midline shift.     Findings were discussed with Dr. English at 5:48 AM on 2023.         Past Medical History   I have reviewed this patient's medical history and updated it with pertinent information if needed.   Past Medical History:   Diagnosis Date     HTN (hypertension)      Metastatic melanoma (H)      Prostate cancer (H)      Seizures (H)        Past Surgical History   I have reviewed this patient's surgical history and updated it with pertinent information if needed.  Past Surgical History:   Procedure Laterality Date     OPTICAL TRACKING SYSTEM CRANIOTOMY, EXCISE TUMOR, COMBINED Left 2023    Procedure: stealth assisted awake craniotomy resection of motor strip tumor;  Surgeon: Kye Garrido MD;  Location: [facility] OR     PROSTATECTOMY             Prior to Admission Medications   Prior to Admission Medications   Prescriptions Last Dose Informant Patient Reported? Taking?   amLODIPine (NORVASC) 10 MG tablet 2023 at 0700  Yes Yes   Sig: Take 10 mg by mouth every morning   aspirin (ASA) 325 MG EC tablet 2023 at PM  Yes Yes   Sig: Take 325 mg by mouth daily as needed for moderate pain   baclofen (LIORESAL) 10 MG tablet prn  Yes No   Sig: Take 10 mg by mouth daily as needed for muscle spasms   dexamethasone (DECADRON) 2 MG tablet Unknown  Yes Yes   Sig: Take 2 mg by mouth daily   dexamethasone (DECADRON) 4 MG tablet Unknown  No Yes   Sig: Take 1 tablet (4 mg) by mouth 2 times daily   levETIRAcetam (KEPPRA) 1000 MG tablet 2023 at 1900  Yes Yes   Si tablet Orally every 12 hrs   pembrolizumab (KEYTRUDA) 25 MG/ML Past Month  Yes Yes   Si  mg every 21 days      Facility-Administered Medications: None     Allergies   No Known Allergies    Social History   I have reviewed this patient's social history and updated it with pertinent information if needed. Saeid Santa  reports that he quit smoking about 6 years ago. His smoking use included cigarettes. He has a 10.00 pack-year smoking history. He has never used smokeless tobacco. He reports current alcohol use. He reports current drug use. Drug: Marijuana.    Family History   I have reviewed this patient's family history and updated it with pertinent information if needed.   Family History   Problem Relation Age of Onset     Pancreatic Cancer Father      Diabetes Paternal Grandmother        Review of Systems    ROS: 10 point ROS neg other than the symptoms noted above in the HPI.    Physical Exam   Temp: 97.3  F (36.3  C) Temp src: Oral   Pulse: 80   Resp: 29        Vital Signs with Ranges  Temp:  [97.3  F (36.3  C)-97.7  F (36.5  C)] 97.3  F (36.3  C)  Pulse:  [] 80  Resp:  [0-29] 29  BP: (112-192)/() 122/72  SpO2:  [91 %-100 %] 97 %  185 lbs 10.04 oz     , Pulse 80, temperature 97.3  F (36.3  C), temperature source Oral, resp. rate 29, weight 185 lb 10 oz (84.2 kg).  185 lbs 10.04 oz\    NEUROLOGICAL EXAMINATION:   Mental status:  Lethargic, pillow case over eyes, follows simple commands. Oriented to person and place, disoriented to time  Cranial nerves:  Opens eyes, pupils react to light. Did not participate in remainder of exam. Wife noted patient just did multiple neurologic exams and is exhausted. Right visual field cut per report. On review of notes, patient developed this after first fraction of gamma knife which resolved with steroids  Motor:    LUE LLE 5/5  RUE RLE 4/5   Sensation:  Decreased right sided  Reflexes:  Negative Clonus.    Coordination:  Would not participate in finger to nose testing.   Negative pronator drift.      Incision is well healed    Data   All new lab and  imaging data was personally reviewed by me.    EXAM: CT HEAD W/O CONTRAST  LOCATION: [facility]  DATE: 7/7/2023                                                           IMPRESSION:  1.  Increased size and density of the lesion in the left occipital lobe, worrisome for interval hemorrhage.  2.  Increased surrounding edema and associated mass effect with approximately 5 mm left to right midline shift.     Findings were discussed with Dr. English at 5:48 AM on 07/07/2023.       CBC RESULTS:   Recent Labs   Lab Test 07/07/23  0510   WBC 10.2   RBC 4.23*   HGB 13.2*   HCT 40.7   MCV 96   MCH 31.2   MCHC 32.4   RDW 14.9        Basic Metabolic Panel:  Lab Results   Component Value Date     07/07/2023      Lab Results   Component Value Date    POTASSIUM 3.9 07/07/2023    POTASSIUM 4.0 10/29/2021     Lab Results   Component Value Date    CHLORIDE 108 07/07/2023    CHLORIDE 108 10/29/2021     Lab Results   Component Value Date    JOHN PAUL 8.5 07/07/2023     Lab Results   Component Value Date    CO2 25 07/07/2023    CO2 21 10/29/2021     Lab Results   Component Value Date    BUN 25.9 07/07/2023    BUN 16 10/29/2021     Lab Results   Component Value Date    CR 1.05 07/07/2023     Lab Results   Component Value Date     07/07/2023    GLC 97 10/29/2021     INR:  Lab Results   Component Value Date    INR 0.83 07/07/2023    INR 1.06 06/16/2023    INR 0.97 06/14/2023    INR 0.97 04/15/2023

## 2023-07-07 NOTE — ED TRIAGE NOTES
Pt presents with HA, dizziness, and increased confusion that started 2 days ago. Per spouse pt was diagnosed with brain cancer in April of this year.      Triage Assessment     Row Name 07/07/23 050       Triage Assessment (Adult)    Airway WDL WDL       Respiratory WDL    Respiratory WDL WDL       Skin Circulation/Temperature WDL    Skin Circulation/Temperature WDL WDL       Cardiac WDL    Cardiac WDL WDL       Peripheral/Neurovascular WDL    Peripheral Neurovascular WDL WDL       Cognitive/Neuro/Behavioral WDL    Cognitive/Neuro/Behavioral WDL X    Level of Consciousness intermittent confusion    Arousal Level opens eyes spontaneously    Orientation oriented x 4    Speech slow    Mood/Behavior restless       Pupils (CN II)    Pupil PERRLA yes    Pupil Size Left 3 mm    Pupil Size Right 3 mm

## 2023-07-07 NOTE — ED PROVIDER NOTES
History     Chief Complaint   Patient presents with     Headache     Dizziness     Altered Mental Status     HPI  Saeid Santa is a 67 year old male with history of malignant melanoma with spread to the brain and lymph nodes presenting for evaluation of increasing headache.  Patient been having difficulty with balance recently and took a fall 6 days ago striking his head.  Denies loss of conscious.  Since then has been having a frontal and left temporal headache which has been progressively worsening.  Wife reports increasing confusion as well where he has been slower to respond and has been less alert.  He has been having difficulty reading which is new over the past 2 days.  No reported vomiting but has felt sick to his stomach and has had a decreased oral intake.  Has been weaning off of dexamethasone in hopes of starting on Keytruda for treatment of this tumor.  Tonight with headache worsening, he finally was amenable to coming into the hospital with his wife.    Allergies:  No Known Allergies    Problem List:    Patient Active Problem List    Diagnosis Date Noted     Cerebral edema (H) 06/14/2023     Priority: Medium     Metastatic malignant melanoma (H) 06/14/2023     Priority: Medium     Right hemiparesis (H) 05/26/2023     Priority: Medium     Malignant melanoma of scalp (H) 05/19/2023     Priority: Medium     Primary hypertension 04/27/2023     Priority: Medium     JUSTEN (obstructive sleep apnea) 04/27/2023     Priority: Medium     Malignant neoplasm metastatic to brain (H) 04/27/2023     Priority: Medium     History of prostate cancer 04/27/2023     Priority: Medium     Brain tumor (H) 04/21/2023     Priority: Medium     Seizure disorder (H) 03/15/2023     Priority: Medium     Scalp cyst 08/16/2022     Priority: Medium        Past Medical History:    Past Medical History:   Diagnosis Date     HTN (hypertension)      Metastatic melanoma (H)      Prostate cancer (H)      Seizures (H)        Past Surgical  "History:    Past Surgical History:   Procedure Laterality Date     OPTICAL TRACKING SYSTEM CRANIOTOMY, EXCISE TUMOR, COMBINED Left 2023    Procedure: stealth assisted awake craniotomy resection of motor strip tumor;  Surgeon: Kye Garrido MD;  Location: [facility] OR     PROSTATECTOMY             Family History:    Family History   Problem Relation Age of Onset     Pancreatic Cancer Father      Diabetes Paternal Grandmother        Social History:  Marital Status:   [2]  Social History     Tobacco Use     Smoking status: Former     Packs/day: 0.50     Years: 20.00     Pack years: 10.00     Types: Cigarettes     Quit date: 2017     Years since quittin.5     Smokeless tobacco: Never   Vaping Use     Vaping Use: Never used   Substance Use Topics     Alcohol use: Yes     Comment: socially     Drug use: Yes     Types: Marijuana     Comment: typically smoked daily but no use for past two weeks as of 23        Medications:    acetaminophen (TYLENOL) 325 MG tablet  baclofen (LIORESAL) 10 MG tablet  Baclofen (LIORESAL) 5 MG tablet  dexamethasone (DECADRON) 2 MG tablet  dexamethasone (DECADRON) 4 MG tablet  levETIRAcetam (KEPPRA) 1000 MG tablet          Review of Systems   Constitutional: Positive for appetite change. Negative for fatigue and fever.   Eyes: Positive for visual disturbance.   Respiratory: Negative for cough, chest tightness and shortness of breath.    Cardiovascular: Negative for chest pain.   Gastrointestinal: Positive for nausea. Negative for abdominal pain and vomiting.   Musculoskeletal: Negative for neck pain and neck stiffness.   Skin: Negative for wound.   Neurological: Positive for speech difficulty, weakness and headaches. Negative for seizures and light-headedness.   Psychiatric/Behavioral: Positive for confusion.   All other systems reviewed and are negative.      Physical Exam   BP: (!) 192/110  Pulse: 58  Temp: 97.7  F (36.5  C)  Resp: 16  Height: 185.4 cm (6' 1\")  Weight: " 81.6 kg (180 lb)  SpO2: 100 %      Physical Exam  Vitals and nursing note reviewed.   Constitutional:       Appearance: He is well-developed.      Comments: Frail, elderly appearing, appears older than stated age, very slow to respond to questions   HENT:      Head: Atraumatic.      Mouth/Throat:      Mouth: Mucous membranes are moist.   Eyes:      Conjunctiva/sclera: Conjunctivae normal.   Neck:      Comments: No cervical tenderness  Cardiovascular:      Rate and Rhythm: Normal rate.   Pulmonary:      Effort: Pulmonary effort is normal.   Abdominal:      Palpations: Abdomen is soft.   Musculoskeletal:         General: No tenderness.      Cervical back: Normal range of motion.   Skin:     General: Skin is warm and dry.      Capillary Refill: Capillary refill takes less than 2 seconds.   Neurological:      Mental Status: He is alert and oriented to person, place, and time.      Sensory: No sensory deficit.      Motor: No weakness.   Psychiatric:         Mood and Affect: Mood normal.         ED Course                 Procedures                Results for orders placed or performed during the hospital encounter of 07/07/23 (from the past 24 hour(s))   [place] Draw    Narrative    The following orders were created for panel order [place] Draw.  Procedure                               Abnormality         Status                     ---------                               -----------         ------                     Extra Blue Top Tube[[id number]]                              Final result               Extra Green Top (Lithium...[[id number]]                      Final result               Extra Purple Top Tube[[id number]]                            Final result                 Please view results for these tests on the individual orders.   Extra Blue Top Tube   Result Value Ref Range    Hold Specimen [facility]    Extra Green Top (Lithium Heparin) Tube   Result Value Ref Range    Hold Specimen [facility]    Extra Purple Top Tube   Result  Value Ref Range    Hold Specimen [facility]    CBC with platelets differential    Narrative    The following orders were created for panel order CBC with platelets differential.  Procedure                               Abnormality         Status                     ---------                               -----------         ------                     CBC with platelets and d...[[id number]]  Abnormal            Final result                 Please view results for these tests on the individual orders.   Comprehensive metabolic panel   Result Value Ref Range    Sodium 141 136 - 145 mmol/L    Potassium 3.9 3.4 - 5.3 mmol/L    Chloride 108 (H) 98 - 107 mmol/L    Carbon Dioxide (CO2) 25 22 - 29 mmol/L    Anion Gap 8 7 - 15 mmol/L    Urea Nitrogen 25.9 (H) 8.0 - 23.0 mg/dL    Creatinine 1.05 0.67 - 1.17 mg/dL    Calcium 8.5 (L) 8.8 - 10.2 mg/dL    Glucose 94 70 - 99 mg/dL    Alkaline Phosphatase 69 40 - 129 U/L    AST 18 0 - 45 U/L    ALT 26 0 - 70 U/L    Protein Total 5.7 (L) 6.4 - 8.3 g/dL    Albumin 3.6 3.5 - 5.2 g/dL    Bilirubin Total 0.5 <=1.2 mg/dL    GFR Estimate 77 >60 mL/min/1.73m2   CBC with platelets and differential   Result Value Ref Range    WBC Count 10.2 4.0 - 11.0 10e3/uL    RBC Count 4.23 (L) 4.40 - 5.90 10e6/uL    Hemoglobin 13.2 (L) 13.3 - 17.7 g/dL    Hematocrit 40.7 40.0 - 53.0 %    MCV 96 78 - 100 fL    MCH 31.2 26.5 - 33.0 pg    MCHC 32.4 31.5 - 36.5 g/dL    RDW 14.9 10.0 - 15.0 %    Platelet Count 164 150 - 450 10e3/uL    % Neutrophils 58 %    % Lymphocytes 30 %    % Monocytes 6 %    % Eosinophils 2 %    % Basophils 1 %    % Immature Granulocytes 3 %    NRBCs per 100 WBC 0 <1 /100    Absolute Neutrophils 6.0 1.6 - 8.3 10e3/uL    Absolute Lymphocytes 3.1 0.8 - 5.3 10e3/uL    Absolute Monocytes 0.6 0.0 - 1.3 10e3/uL    Absolute Eosinophils 0.2 0.0 - 0.7 10e3/uL    Absolute Basophils 0.1 0.0 - 0.2 10e3/uL    Absolute Immature Granulocytes 0.3 <=0.4 10e3/uL    Absolute NRBCs 0.0 10e3/uL   CT Head w/o Contrast     Narrative    EXAM: CT HEAD W/O CONTRAST  LOCATION: [facility]  DATE: 7/7/2023    INDICATION: History of melanoma with metastatic disease.  COMPARISON: 06/14/2023.  TECHNIQUE: Routine CT Head without IV contrast. Multiplanar reformats. Dose reduction techniques were used.    FINDINGS:  INTRACRANIAL CONTENTS: Increased size and density of the lesion in the left occipital lobe, measuring approximately 3.6 x 3.4 x 3.4 cm in AP x transverse x craniocaudal dimension, previously measuring approximately 3.1 x 2.6 x 2.4 cm by CT technique. The   increased internal density is worrisome for hemorrhage. Worsening mass effect with diffuse sulcal effacement, surrounding vasogenic edema, and approximately 5 mm left to right midline shift. There is partial effacement of the left lateral ventricle. The   basal cisterns are preserved. No hydrocephalus. The gray-white differentiation is preserved.     VISUALIZED ORBITS/SINUSES/MASTOIDS: No intraorbital abnormality. Trace ethmoid mucosal thickening. Mucous retention cyst in the left sphenoid sinus. Small right mastoid effusion.    BONES/SOFT TISSUES: High left parietal craniotomy.      Impression    IMPRESSION:  1.  Increased size and density of the lesion in the left occipital lobe, worrisome for interval hemorrhage.  2.  Increased surrounding edema and associated mass effect with approximately 5 mm left to right midline shift.    Findings were discussed with Dr. English at 5:48 AM on 07/07/2023.                      Medications   ondansetron (ZOFRAN) injection 4 mg (4 mg Intravenous $Given 7/7/23 9343)   niCARdipine 40 mg in 200 mL NS (CARDENE) infusion (2.5 mg/hr Intravenous $New Bag 7/7/23 4396)   dextrose 5% in lactated ringers infusion (has no administration in time range)   HYDROmorphone (PF) (DILAUDID) injection 0.5 mg (0.5 mg Intravenous $Given 7/7/23 7611)     5:48 AM: Discussed with Dr Baron, radiology: Some expansion of the lesion previously seen  concerning for acute hemorrhage as there is increased density in this area.  There is some slight midline shift now.    5:50 AM: Paging neurosurgery to discuss    6:21 AM Patient re-assessed: Patient resting but moaning in discomfort intermittently.  Additional hydromorphone ordered to help control his pain.    6:40 AM: Discussed with Dr Powers, on call neurosurgery. Recommends transfer to ER at the  for further evaluation.  Upon my questioning, he does state that 6mg dexamthasone would be reasonable.  Declined offer advice on blood pressure management.  Declined to give any advice on whether there is any surgical possibility for treatment of this.    6:50 AM: Wife updated. She would prefer not transferring to the [facility] as she has not had a good experience there previously.  She is also hesitant to restart higher dose steroids since their plan has been to wean off of them.  We will try to contact the patient's neurosurgeon directly, Dr. Garrido.    7:17 AM: Awaiting callback from Dr. Garrido.  Since he is not on-call, it is uncertain if he will return page.  Discussed with patient's wife again at bedside.    7:28 AM: After discussion with patient's wife, she is amenable to transfer to [facility].  Still not heard back from Dr. Garrido.  Paging [facility] to see if there is bed availability    7:48 AM: BP continues to climb. Will start nicardipine for BP control given the presence of hemorrhage in an effort to limit further bleeding.     8:00 AM: Still no word on bed availability for transfer.  Patient signed out to Dr. Aguayo to help secure a transfer.       Assessments & Plan (with Medical Decision Making)   67-year-old with history of metastatic melanoma status postsurgical resection of lung tumor but still with another in his brain presenting for evaluation of increasing headache, confusion, vision changes.  Patient arrived here with severe headache in the left frontal temporal area.   Hypertensive with relative bradycardia concerning for increased intracranial pressure.  CT imaging obtained showed what appears to be an interval hemorrhage of his left occipital mass lesion.  Discussed with on-call neurosurgery who had few recommendations as he is not the patient's primary neurosurgeon.  He did recommend dexamethasone however patient's wife is very hesitant to restart high-dose dexamethasone as they have been trying to wean off of dexamethasone in order to start Keytruda.  I did advise that dexamethasone is likely the best short-term treatment despite there efforts to wean off of this but she would like to hold off on this treatment at this time.  Wife is also very hesitant to go back to the [facility] where they had a very poor overall visit on the last admission due to being held in the hallway for prolonged time and having inconsistent care per wife's report.  I recommended we try transferring to [facility] as an alternative to have access to the resources he would need.  She is open to this.     I have reviewed the nursing notes.    I have reviewed the findings, diagnosis, plan and need for follow up with the patient.     Critical Care Addendum    My initial assessment, based on my review of nursing observations, review of vital signs, focused history and physical exam, established that Saeid Santa has ischemic or hemorrhagic stroke, which requires immediate intervention, and therefore He is critically ill.     After the initial assessment, the care team initiated medication therapy with nicardipine and consulted with neurosurgery to provide stabilization care. Due to the critical nature of this patient, I reassessed nursing observations, vital signs, physical exam, mental status, neurologic status and respiratory status multiple times prior to He disposition.     Time also spent performing documentation, discussion with family to obtain medical information for decision  making, reviewing test results, discussion with consultants and coordination of care.     Critical care time (excluding teaching time and procedures): 75 minutes.         New Prescriptions    No medications on file       Final diagnoses:   Metastatic melanoma (H)   Brain tumor (H)   Increased intracranial pressure       7/7/2023   [facility] EMERGENCY DEPT     English, Negrito Breaux MD  07/07/23 0891

## 2023-07-08 ENCOUNTER — APPOINTMENT (OUTPATIENT)
Dept: OCCUPATIONAL THERAPY | Facility: CLINIC | Age: 68
DRG: 054 | End: 2023-07-08
Payer: COMMERCIAL

## 2023-07-08 ENCOUNTER — APPOINTMENT (OUTPATIENT)
Dept: PHYSICAL THERAPY | Facility: CLINIC | Age: 68
DRG: 054 | End: 2023-07-08
Payer: COMMERCIAL

## 2023-07-08 LAB
ANION GAP SERPL CALCULATED.3IONS-SCNC: 12 MMOL/L (ref 7–15)
BUN SERPL-MCNC: 15.3 MG/DL (ref 8–23)
CALCIUM SERPL-MCNC: 8.8 MG/DL (ref 8.8–10.2)
CHLORIDE SERPL-SCNC: 109 MMOL/L (ref 98–107)
CREAT SERPL-MCNC: 0.73 MG/DL (ref 0.67–1.17)
DEPRECATED HCO3 PLAS-SCNC: 19 MMOL/L (ref 22–29)
ERYTHROCYTE [DISTWIDTH] IN BLOOD BY AUTOMATED COUNT: 14.7 % (ref 10–15)
GFR SERPL CREATININE-BSD FRML MDRD: >90 ML/MIN/1.73M2
GLUCOSE BLDC GLUCOMTR-MCNC: 107 MG/DL (ref 70–99)
GLUCOSE BLDC GLUCOMTR-MCNC: 126 MG/DL (ref 70–99)
GLUCOSE BLDC GLUCOMTR-MCNC: 128 MG/DL (ref 70–99)
GLUCOSE BLDC GLUCOMTR-MCNC: 129 MG/DL (ref 70–99)
GLUCOSE BLDC GLUCOMTR-MCNC: 170 MG/DL (ref 70–99)
GLUCOSE SERPL-MCNC: 126 MG/DL (ref 70–99)
HCT VFR BLD AUTO: 40.9 % (ref 40–53)
HGB BLD-MCNC: 13.5 G/DL (ref 13.3–17.7)
MCH RBC QN AUTO: 30.8 PG (ref 26.5–33)
MCHC RBC AUTO-ENTMCNC: 33 G/DL (ref 31.5–36.5)
MCV RBC AUTO: 93 FL (ref 78–100)
PLATELET # BLD AUTO: 170 10E3/UL (ref 150–450)
POTASSIUM SERPL-SCNC: 4.3 MMOL/L (ref 3.4–5.3)
RBC # BLD AUTO: 4.38 10E6/UL (ref 4.4–5.9)
SODIUM SERPL-SCNC: 133 MMOL/L (ref 136–145)
SODIUM SERPL-SCNC: 138 MMOL/L (ref 136–145)
SODIUM SERPL-SCNC: 139 MMOL/L (ref 136–145)
SODIUM SERPL-SCNC: 140 MMOL/L (ref 136–145)
SODIUM SERPL-SCNC: 140 MMOL/L (ref 136–145)
SODIUM SERPL-SCNC: 141 MMOL/L (ref 136–145)
SODIUM SERPL-SCNC: 145 MMOL/L (ref 136–145)
WBC # BLD AUTO: 11.5 10E3/UL (ref 4–11)

## 2023-07-08 PROCEDURE — 97530 THERAPEUTIC ACTIVITIES: CPT | Mod: GO

## 2023-07-08 PROCEDURE — 97166 OT EVAL MOD COMPLEX 45 MIN: CPT | Mod: GO

## 2023-07-08 PROCEDURE — 250N000011 HC RX IP 250 OP 636: Mod: JZ | Performed by: INTERNAL MEDICINE

## 2023-07-08 PROCEDURE — 85027 COMPLETE CBC AUTOMATED: CPT

## 2023-07-08 PROCEDURE — 84295 ASSAY OF SERUM SODIUM: CPT | Performed by: INTERNAL MEDICINE

## 2023-07-08 PROCEDURE — 84295 ASSAY OF SERUM SODIUM: CPT

## 2023-07-08 PROCEDURE — 250N000011 HC RX IP 250 OP 636: Mod: JZ

## 2023-07-08 PROCEDURE — 200N000001 HC R&B ICU

## 2023-07-08 PROCEDURE — 97162 PT EVAL MOD COMPLEX 30 MIN: CPT | Mod: GP

## 2023-07-08 PROCEDURE — 250N000013 HC RX MED GY IP 250 OP 250 PS 637

## 2023-07-08 PROCEDURE — 99233 SBSQ HOSP IP/OBS HIGH 50: CPT | Performed by: INTERNAL MEDICINE

## 2023-07-08 PROCEDURE — 97116 GAIT TRAINING THERAPY: CPT | Mod: GP

## 2023-07-08 RX ADMIN — HYDRALAZINE HYDROCHLORIDE 20 MG: 20 INJECTION INTRAMUSCULAR; INTRAVENOUS at 16:09

## 2023-07-08 RX ADMIN — DEXAMETHASONE SODIUM PHOSPHATE 6 MG: 4 INJECTION, SOLUTION INTRAMUSCULAR; INTRAVENOUS at 20:08

## 2023-07-08 RX ADMIN — DEXAMETHASONE SODIUM PHOSPHATE 6 MG: 4 INJECTION, SOLUTION INTRAMUSCULAR; INTRAVENOUS at 14:26

## 2023-07-08 RX ADMIN — SODIUM CHLORIDE: 3 INJECTION, SOLUTION INTRAVENOUS at 12:41

## 2023-07-08 RX ADMIN — DEXAMETHASONE SODIUM PHOSPHATE 6 MG: 4 INJECTION, SOLUTION INTRAMUSCULAR; INTRAVENOUS at 08:09

## 2023-07-08 RX ADMIN — AMLODIPINE BESYLATE 10 MG: 10 TABLET ORAL at 08:42

## 2023-07-08 RX ADMIN — LEVETIRACETAM 1000 MG: 10 INJECTION INTRAVENOUS at 20:59

## 2023-07-08 RX ADMIN — SODIUM CHLORIDE: 3 INJECTION, SOLUTION INTRAVENOUS at 23:02

## 2023-07-08 RX ADMIN — SODIUM CHLORIDE: 3 INJECTION, SOLUTION INTRAVENOUS at 04:12

## 2023-07-08 RX ADMIN — LABETALOL HYDROCHLORIDE 20 MG: 5 INJECTION, SOLUTION INTRAVENOUS at 11:34

## 2023-07-08 RX ADMIN — LEVETIRACETAM 1000 MG: 10 INJECTION INTRAVENOUS at 08:42

## 2023-07-08 RX ADMIN — DEXAMETHASONE SODIUM PHOSPHATE 6 MG: 4 INJECTION, SOLUTION INTRAMUSCULAR; INTRAVENOUS at 01:58

## 2023-07-08 ASSESSMENT — ACTIVITIES OF DAILY LIVING (ADL)
ADLS_ACUITY_SCORE: 41
ADLS_ACUITY_SCORE: 38
ADLS_ACUITY_SCORE: 41
ADLS_ACUITY_SCORE: 40
ADLS_ACUITY_SCORE: 40
ADLS_ACUITY_SCORE: 38
ADLS_ACUITY_SCORE: 38
ADLS_ACUITY_SCORE: 40
ADLS_ACUITY_SCORE: 43
ADLS_ACUITY_SCORE: 41
ADLS_ACUITY_SCORE: 43
ADLS_ACUITY_SCORE: 38

## 2023-07-08 NOTE — PLAN OF CARE
1300 - 2300 RN Shift Summary    Patient arrived from [facility] via EMS; At arrival reports significant pain and nausea. Treated with antiemetics and PRN Hydromorphone, reports no change in pain. Lethargic, slow to speak, however oriented to everything except for time. Right visual field cut noted. Baseline numbness in right lower extremity per patient. Photosensitivity. No drift or ataxia, no dysarthria or aphasia. Denies any new sensory loss. Moves all extremities bilaterally, symmetrically.   Nicardipine drip not continued at transfer, patient's BP mostly within goal, given PRN Hydralazine for hypertension. Given Dexamethasone per order. Passed bedside swallow and given fluids; Patient projectile vomited this PM with no evidence of aspiration. Only given ice chips for the rest of the shift. Reports significant decrease in nausea and headache by end of shift.  Started on 3% Saline due to hyponatremia, rate not titrated per hospitalist for a repeat Sodium value of 132. PICC placed at bedside.  Family at bedside for most of the day, updated; All questions answered and concerns addressed.

## 2023-07-08 NOTE — PROGRESS NOTES
Patient alert and oriented but forgetful overnight, able to move all extremities, baseline numbness in RLE, unchanged, has some right visual cut, complains of slight headache overnight, relieved with tylenol. Speech clear and a bit slow, but improving overnight. Able to make needs known, using urinal overnight. No nausea or emesis overnight, tolerating PO. Remains on 3%, NA slowly improving. Vitals signs WNL. Started on 2L NC for some desatting while sleeping.

## 2023-07-08 NOTE — PROCEDURES
[facility]    Triple Lumen PICC Placement    Date/Time: 7/7/2023 11:24 PM    Performed by: Thania Moser RN  Authorized by: Ramses Del Toro DO  Indications: vascular access      UNIVERSAL PROTOCOL   Site Marked: Yes  Prior Images Obtained and Reviewed:  Yes  Required items: Required blood products, implants, devices and special equipment available    Patient identity confirmed:  Verbally with patient, arm band, provided demographic data and hospital-assigned identification number  NA - No sedation, light sedation, or local anesthesia  Confirmation Checklist:  Patient's identity using two indicators, relevant allergies, procedure was appropriate and matched the consent or emergent situation and correct equipment/implants were available  Time out: Immediately prior to the procedure a time out was called    Universal Protocol: the Joint Commission Universal Protocol was followed    Preparation: Patient was prepped and draped in usual sterile fashion       ANESTHESIA    Anesthesia: Local infiltration  Local Anesthetic:  Lidocaine 1% without epinephrine  Anesthetic Total (mL):  1      SEDATION    Patient Sedated: No        Preparation: skin prepped with 2% chlorhexidine  Skin prep agent: skin prep agent completely dried prior to procedure  Sterile barriers: maximum sterile barriers were used: cap, mask, sterile gown, sterile gloves, and large sterile sheet  Hand hygiene: hand hygiene performed prior to central venous catheter insertion  Type of line used: PICC  Catheter type: triple lumen  Lumen type: valved and power PICC  Lumen Identification: White, Red and Gray  Catheter size: 5 Fr  Brand: Bard  Lot number: [id number]  Placement method: MST, ultrasound and tip navigation system  Number of attempts: 1  Difficulty threading catheter: no  Successful placement: yes  Orientation: right    Location: basilic vein  Tip Location: SVC/RA Junction  Arm circumference: adults 10 cm  Extremity  circumference: 32  Visible catheter length: 6  Total catheter length: 47  Dressing and securement: alcohol impregnated caps, chlorhexidine disc applied, gloves changed prior to final dressing, sterile dressing applied and subcutaneous anchor securement system  Post procedure assessment: blood return through all ports, placement verified by 3CG technology and free fluid flow  PROCEDURE   Patient Tolerance:  Patient tolerated the procedure well with no immediate complications  Disposal: sharps and needle count correct at the end of procedure, needles and guidewire disposed in sharps container

## 2023-07-08 NOTE — PROGRESS NOTES
07/08/23 1419   Appointment Info   Signing Clinician's Name / Credentials (PT) Elizabeth Hutchins, PT, DPT   Living Environment   People in Home spouse   Current Living Arrangements house   Home Accessibility stairs to enter home;stairs within home   Number of Stairs, Within Home, Primary greater than 10 stairs   Stair Railings, Within Home, Primary railings safe and in good condition   Transportation Anticipated family or friend will provide   Living Environment Comments Lives w/ wife   Self-Care   Usual Activity Tolerance good   Current Activity Tolerance moderate   Equipment Currently Used at Home walker, rolling   Fall history within last six months yes   Number of times patient has fallen within last six months 2   Activity/Exercise/Self-Care Comment Pt uses cane in community on occsasion, otherwise no device. Reports 2 falls recently, and 'three more with seizures'   General Information   Onset of Illness/Injury or Date of Surgery 07/07/23   Referring Physician Avila Schultz NP   Patient/Family Therapy Goals Statement (PT) to get better   Pertinent History of Current Problem (include personal factors and/or comorbidities that impact the POC) Per chart: worsening headaches, confusion and visual changes. 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; Malignant melanoma with metastasis to brain, lymph nodes, lung, s/p resection of lung metastasis, 4/21/2023   Existing Precautions/Restrictions fall   Weight-Bearing Status - LLE full weight-bearing   Weight-Bearing Status - RLE full weight-bearing   Cognition   Orientation Status (Cognition) oriented to;person;place;situation   Cognitive Status Comments appears confused at times   Pain Assessment   Patient Currently in Pain No   Integumentary/Edema   Integumentary/Edema no deficits were identifed   Posture    Posture Forward head position   Range  of Motion (ROM)   Range of Motion ROM is WFL   Strength (Manual Muscle Testing)   Strength Comments mild deficits in R LE compared to L; more noticible with functional mobility   Bed Mobility   Comment, (Bed Mobility) session to /from chair   Transfers   Comment, (Transfers) min assist from chair without device   Gait/Stairs (Locomotion)   Distance in Feet (Gait) 100ft   Comment, (Gait/Stairs) mod assist without device, unsteady throughout   Balance   Balance Comments min-mod assist for dynamic balance   Clinical Impression   Criteria for Skilled Therapeutic Intervention Yes, treatment indicated   PT Diagnosis (PT) impaired functional mobility   Influenced by the following impairments decreased strength, coordination, balance, activity tolerance   Functional limitations due to impairments bed mobility, transfers, ambulation, stairs   Clinical Presentation (PT Evaluation Complexity) Evolving/Changing   Clinical Presentation Rationale clinical judgement   Clinical Decision Making (Complexity) moderate complexity   Planned Therapy Interventions (PT) balance training;bed mobility training;gait training;home exercise program;neuromuscular re-education;stair training;strengthening;transfer training   Anticipated Equipment Needs at Discharge (PT) walker, rolling   Risk & Benefits of therapy have been explained evaluation/treatment results reviewed;care plan/treatment goals reviewed;risks/benefits reviewed;current/potential barriers reviewed;participants voiced agreement with care plan;participants included;patient   PT Total Evaluation Time   PT Eval, Moderate Complexity Minutes (72924) 15   Physical Therapy Goals   PT Frequency Daily   PT Predicted Duration/Target Date for Goal Attainment 07/15/23   PT Goals Bed Mobility;Transfers;Gait;Stairs;PT Goal 1   PT: Bed Mobility Independent;Supine to/from sit;Rolling   PT: Transfers Modified independent;Sit to/from stand;Bed to/from chair;Assistive device   PT: Gait Modified  independent;Rolling walker;Greater than 200 feet   PT: Stairs Supervision/stand-by assist;Greater than 10 stairs   Gait Training   Gait Training Minutes (33934) 25   Symptoms Noted During/After Treatment (Gait Training) fatigue   Treatment Detail/Skilled Intervention Set up equipment-- space lab, IV, with aide to assist w/ pushing poles. Transfers to standing without device, pt unsteady and needs cuing for safety awareness. Ambulation where pt unsteady, reaching out for wall first but able to try without reaching. Decreased bilateral toe clearance, worse on R about 50% of the time.   [place] Level (Gait Training) moderate assist (50% patient effort)   Physical Assistance Level (Gait Training) 1 person + 1 person to manage equipment   Weight Bearing (Gait Training) full weight-bearing   PT Discharge Planning   PT Plan progress ambulation-- try FWW. stairs when appropriate, formal balance   PT Discharge Recommendation (DC Rec) [facility]hab [facility]-Motivated patient will benefit from intensive, interdisciplinary therapy.  Anticipate will be able to tolerate 3 hours of therapy per day   PT Rationale for DC Rec Patient w/ decreased functional mobility compared to baseline-- with deficits in balance, strength, activity tolerance. Will benefit from intensive rehab to address this.   PT Brief overview of current status mod assist ambulation x 100ft   Total Session Time   Timed Code Treatment Minutes 25   Total Session Time (sum of timed and untimed services) 40

## 2023-07-08 NOTE — PROGRESS NOTES
[facility]    Neurosurgery  Daily Note    Assessment & Plan   Saeid Santa is a 67 year old male who was admitted on 7/7/2023. Saeid Santa is a 67 year old male with metastatic melanoma is who is s/p stealth assisted awake craniotomy with resection of motor/sensory strip lesion with Dr. Garrido on 4/21/23, underwent GK to resection cavity and parieto-occipital lesion on 5/24/23 presented to OSH with worsening confusion and headache and imaging evidence of increased size and density of the lesion in the left occipital lobe, worrisome for interval hemorrhage, Increased surrounding edema and associated mass effect with approximately 5 mm left to right midline shift.     MRI BRAIN WITHOUT AND WITH CONTRAST  7/7/2023 3:35 PM                                                             IMPRESSION:    1. Increased size of complex cystic and solid heterogeneous enhancing  mass in the left occipital lobe since prior MR 6/14/2023. New internal  signal with hematocrit layer in this collection suggests intralesional  hemorrhage of the lesion since that time. Surrounding T2 hyperintense  signal/edema is not significantly changed.  2. Increased mass effect on the posterior left cerebral hemisphere.  Increased rightward midline shift of 0.6 cm with worsening compression  of the occipital horn of the left lateral ventricle. There is also  increased effacement of the basal cisterns particularly the left  quadrigeminal plate cistern. No evidence of hydrocephalus or  ventricular entrapment at this time.  3. Sequela of prior treatment changes in the left postcentral gyrus,  similar to prior.  4. No new suspicious enhancing intracranial masses.  BRENDON BASILIO MD       AM ROUNDS:   -patient feels significantly improved. Still right field cut however has improved compared to yesterday. R sided weakness is also improving. He is understanding of plans and in agreement    Plan:  -Continue Na goal 135-145 another  24 hours  -OK for neuro checks to be q4H today  -decadron 6q6  -keppra BID  -HOB30  -SBP<150  -if patient remains stable over the next 24 hours, will plan on discharge and follow up for surgery at [place] Friday 7/14 at 1020AM (due to tumor location)    Dr. Garrido in agreement with above stated plans.     Mila PHELPS [address] [address]    Tel [phone]  Pager [phone]    Principal Problem:    Neoplasm of brain causing mass effect and brain compression on adjacent structures (H)      Mila Gallegos PA-C    Interval History   Improving     Physical Exam   Temp: 98.6  F (37  C) Temp src: Temporal BP: (!) 147/99 Pulse: 98   Resp: 19 SpO2: 96 % O2 Device: None (Room air) Oxygen Delivery: 2 LPM  Vitals:    07/07/23 1215 07/08/23 0600   Weight: 185 lb 10 oz (84.2 kg) 179 lb 10.8 oz (81.5 kg)     Vital Signs with Ranges  Temp:  [97.3  F (36.3  C)-99.3  F (37.4  C)] 98.6  F (37  C)  Pulse:  [] 98  Resp:  [9-35] 19  BP: (119-161)/() 147/99  SpO2:  [90 %-99 %] 96 %  I/O last 3 completed shifts:  In: 1270 [P.O.:360; I.V.:910]  Out: 2355 [Urine:2355]    Awake, alert, oriented x 3  Right field cut, improved compared to yesterday per patient; remainder II-XII intact  Right sided weakness 4+/5, improving  LUE LLE 5/5   Negative pronator drift  Right dysmetria with FTN testing. Smooth FTN on left   Negative clonus       Medications     sodium chloride 3% 60 mL/hr at 07/08/23 0412        amLODIPine  10 mg Oral QAM     dexamethasone  6 mg Intravenous Q6H     levETIRAcetam  1,000 mg Intravenous BID       Plans discussed with Dr. Garrido who was in agreement with plans    Mila PHELPS [place] [id number] [facility]  [address]  [place] [address]    Tel [phone]  Pager [phone]

## 2023-07-08 NOTE — PROGRESS NOTES
[facility]    Medicine Progress Note - Hospitalist Service    Date of Admission:  7/7/2023    Assessment & Plan     Saeid Santa is a 67-year-old male with history of hypertension, JUSTEN, prostate cancer, malignant melanoma with metastasis to brain, lung, lymph nodes, status postresection of lung mets seizures due to brain mets, who presented to [facility] on 7/7/2023 with worsening headaches, confusion and visual changes.  He was transferred to [facility] for neurosurgery evaluation.    Malignant melanoma with metastasis to brain, lymph nodes, lung, s/p resection of lung metastasis, 4/21/2023  Left occipital brain metastatic lesion with intralesional hemorrhage, cerebral compression, cerebral edema and midline shift  Hypotension and bradycardia-likely due to increased intracranial pressure  -Is followed by Dr. Kingston Greene (oncology), Gerald [place] (radiation oncology) at [facility], and Dr. Kye Garrido (neurosurgery)  -Received 1 round of Keytruda on 6/7/2023  -Was hospitalized at [facility] from 6/14 -6/16/2023 due to progressive left lower extremity weakness, headache, new visual changes.  CT scan showed cerebral edema, cystic and solid masses with mass effect and 4 mm right midline shift.  He was treated with IV dexamethasone, symptoms improved rapidly and was discharged on oral dexamethasone with plan for follow-up MRI in 1 month.  -Last oncology appointment was on 6/28.  Plan was to restart Keytruda in 2 weeks if he tolerated Decadron taper.  -Now presented on 7/7 with worsening headache, visual changes, confusion for 2 to 3 days.  -Head CT showed increase in size and density of left occipital lobe lesion worrisome for interval hemorrhage and increased surrounding edema with associated mass effect 5 mm left-to-right shift.  -Patient became hypotensive and bradycardic raising concern for increased intracranial pressure  -He was started  on IV nicardipine infusion.  Also received Zofran and Dilaudid.  Regarding pain infusion.  On arrival to [facility]  -On arrival to [facility], was confused, disoriented, slow to respond, dysarthric  -Exam showed right visual field cut, right lower extremity weakness, diminished sensation in right upper extremity and right lower extremity  -MRI brain showed increased size of complex cystic and solid heterogeneous enhancing mass in the left occipital lobe with new intralesional hemorrhage.  Surrounding edema.  Increased mass effect on posterior left cerebral hemisphere.  Rightward midline shift of 0.6 cm with worsening compression of occipital horn of left lateral ventricle.  -Sodium was initially 141 but subsequently decreased to 132.  Patient started on 3% saline overnight as per neurosurgery's recommendations.  Sodium now proved to 140  -Admitted to ICU.  Neurosurgery consulted.  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  Denies any recent seizures    Unstable gait  Left lower extremity weakness due to occipital mass  Recent fall  -Fell 6 days before admission.  No head injury  -Obtain PT/OT evaluation  -Fall precautions    Essential hypertension  -PTA amlodipine 10 mg daily  -was on nicardipine infusion on arrival Which was subsequently discontinued and amlodipine was resumed  -As needed hydralazine and labetalol for SBP over 150    JUSTEN  -Does not use CPAP    Goals of care discussion-see H &  P.  Palliative care consult ordered, likely be done on 7/10       Diet: Regular Diet Adult    DVT Prophylaxis: Pneumatic Compression Devices  Aragon Catheter: Not present  Lines: PRESENT      PICC 07/07/23 Triple Lumen Right Basilic Ok for immediate use-Site Assessment: WDL      Cardiac Monitoring: ACTIVE order. Indication: Stroke, acute (48 hours)  Code Status: Full Code      Clinically Significant Risk Factors                  # Hypertension: Noted on problem list                 Disposition Plan     Expected Discharge Date: 07/09/2023                  Nika Sen MD  Hospitalist Service  [facility]  Securely message with [employer] (more info)  Text page via [employer] Paging/Directory   ______________________________________________________________________    Interval History   Patient sitting in chair, reports he feels better than before.  Headaches have improved.  Denies headache at this time.  Reports his vision has improved.  Visual cuts have improved as well.  No other complaints.    Physical Exam   Vital Signs: Temp: 98.6  F (37  C) Temp src: Temporal BP: (!) 153/100 Pulse: 108   Resp: 16 SpO2: 95 % O2 Device: None (Room air) Oxygen Delivery: 2 LPM  Weight: 179 lbs 10.8 oz    Constitutional - awake and alert, resting in bed, in no acute distress  Cardiovascular - regular rate and rhythm, no murmurs, no edema  Pulmonary - lungs are clear to auscultation bilaterally, no wheezing or rhonchi  GI - abdomen is soft, nontender, nondistended, no hepatosplenomegaly or masses  Integumentary - skin is warm and dry, no rashes or ulcers  Neurological - awake, alert and oriented x3.  Moving all 4 extremities, normal speech, no focal deficits.  Visual field cuts improved    Medical Decision Making       60 MINUTES SPENT BY ME on the date of service doing chart review, history, exam, documentation & further activities per the note.      Data     I have personally reviewed the following data over the  past 24 hrs:    11.5 (H)  \   13.5   / 170     140; 140 109 (H) 15.3 /  126 (H)   4.3 19 (L) 0.73 \       Imaging results reviewed over the past 24 hrs:   Recent Results (from the past 24 hour(s))   MR Brain w Contrast Stereotactic    Narrative    MRI BRAIN WITHOUT AND WITH CONTRAST  7/7/2023 3:35 PM     HISTORY: Post op resection with new hemorrhage, shift, edema.     TECHNIQUE: Multiplanar, multisequence MRI of the brain without and  with 15 mL Gadavist     COMPARISON: Head CT from earlier the same day. Brain MR 6/14/2023.     FINDINGS: Abnormal heterogeneously enhancing partially internally  cystic mass in the left occipital lobe has increased in size since  prior MR 6/14/2023. There are also new hematocrit layer within this  collection and susceptibility hypointensity suggesting intralesional  hemorrhage within the lesion. On the postcontrast sequence, this  collection measures approximately 3.4 x 3.4 x 4.4 cm, previously 3.1 x  3.2 x 4.0 cm when measured at a similar slice. Marked surrounding T2  hyperdense edema around this lesion is not significantly changed since  prior. Mass effect on the posterior left cerebral hemisphere with  rightward midline shift now measures 0.6 cm, previously 0.3 cm on  6/14/2023. There is worsening mass effect on the occipital horn of the  left lateral ventricle. There is also increased effacement of the  basal cisterns particularly the left quadrigeminal plate cistern. No  evidence of hydrocephalus or ventricular entrapment at this time. Mild  patchy periventricular white matter T2 hyperintensities which are  likely due to chronic microvascular ischemic disease.    Sequela of prior treatment changes near the left postcentral gyrus  with susceptibility hypodensity in that area as well as T2  hyperintensity in the cortex. Linear sulcal enhancement in this area  is favored to be treatment related. Sequela of previous left vertex  craniotomy.    No new suspicious enhancing  intracranial lesions.    Moderate left and small right mastoid effusions. Mild mucosal  thickening in the paranasal sinuses. The major arterial T2 flow voids  at the base of the brain appear patent.       Impression    IMPRESSION:    1. Increased size of complex cystic and solid heterogeneous enhancing  mass in the left occipital lobe since prior MR 6/14/2023. New internal  signal with hematocrit layer in this collection suggests intralesional  hemorrhage of the lesion since that time. Surrounding T2 hyperintense  signal/edema is not significantly changed.  2. Increased mass effect on the posterior left cerebral hemisphere.  Increased rightward midline shift of 0.6 cm with worsening compression  of the occipital horn of the left lateral ventricle. There is also  increased effacement of the basal cisterns particularly the left  quadrigeminal plate cistern. No evidence of hydrocephalus or  ventricular entrapment at this time.  3. Sequela of prior treatment changes in the left postcentral gyrus,  similar to prior.  4. No new suspicious enhancing intracranial masses.      BRENDON BASILIO MD         SYSTEM ID:  [id number]   MR Brain w/o & w Contrast    Narrative    MRI BRAIN WITHOUT AND WITH CONTRAST  7/7/2023 3:35 PM     HISTORY: Post op resection with new hemorrhage, shift, edema.     TECHNIQUE: Multiplanar, multisequence MRI of the brain without and  with 15 mL Gadavist     COMPARISON: Head CT from earlier the same day. Brain MR 6/14/2023.     FINDINGS: Abnormal heterogeneously enhancing partially internally  cystic mass in the left occipital lobe has increased in size since  prior MR 6/14/2023. There are also new hematocrit layer within this  collection and susceptibility hypointensity suggesting intralesional  hemorrhage within the lesion. On the postcontrast sequence, this  collection measures approximately 3.4 x 3.4 x 4.4 cm, previously 3.1 x  3.2 x 4.0 cm when measured at a similar slice. Marked surrounding  T2  hyperdense edema around this lesion is not significantly changed since  prior. Mass effect on the posterior left cerebral hemisphere with  rightward midline shift now measures 0.6 cm, previously 0.3 cm on  6/14/2023. There is worsening mass effect on the occipital horn of the  left lateral ventricle. There is also increased effacement of the  basal cisterns particularly the left quadrigeminal plate cistern. No  evidence of hydrocephalus or ventricular entrapment at this time. Mild  patchy periventricular white matter T2 hyperintensities which are  likely due to chronic microvascular ischemic disease.    Sequela of prior treatment changes near the left postcentral gyrus  with susceptibility hypodensity in that area as well as T2  hyperintensity in the cortex. Linear sulcal enhancement in this area  is favored to be treatment related. Sequela of previous left vertex  craniotomy.    No new suspicious enhancing intracranial lesions.    Moderate left and small right mastoid effusions. Mild mucosal  thickening in the paranasal sinuses. The major arterial T2 flow voids  at the base of the brain appear patent.       Impression    IMPRESSION:    1. Increased size of complex cystic and solid heterogeneous enhancing  mass in the left occipital lobe since prior MR 6/14/2023. New internal  signal with hematocrit layer in this collection suggests intralesional  hemorrhage of the lesion since that time. Surrounding T2 hyperintense  signal/edema is not significantly changed.  2. Increased mass effect on the posterior left cerebral hemisphere.  Increased rightward midline shift of 0.6 cm with worsening compression  of the occipital horn of the left lateral ventricle. There is also  increased effacement of the basal cisterns particularly the left  quadrigeminal plate cistern. No evidence of hydrocephalus or  ventricular entrapment at this time.  3. Sequela of prior treatment changes in the left postcentral gyrus,  similar to  prior.  4. No new suspicious enhancing intracranial masses.      BRENDON BASILIO MD         SYSTEM ID:  [id number]

## 2023-07-08 NOTE — PROGRESS NOTES
Neuro: A&Ox3-4, intermittently confused on time.  PERRLA at 3mm, R field cut. occasional word finding difficultly, speech is very smooth otherwise. RUE internal rotation of hand counted as drift, 5/5 strength slightly weaker than LUE. RLE 4/5 strength with drift, does not touch bed, sensation is dull in L ankle and distally.     Resp: WDL    CV: 's, -160's Hydralazine 20mg given IV x1. Trending Na q4hrs. 1800hrs Na 139, 3%NS currently at 40ml/hr      GI/ WDL

## 2023-07-08 NOTE — PROGRESS NOTES
07/08/23 0900   Appointment Info   Signing Clinician's Name / Credentials (OT) Erin Oliveira, OTR/L   Rehab Comments (OT) Initial OT eVal   Living Environment   People in Home spouse   Current Living Arrangements house   Home Accessibility stairs to enter home;stairs within home   Number of Stairs, Within Home, Primary greater than 10 stairs   Stair Railings, Within Home, Primary railings safe and in good condition   Transportation Anticipated family or friend will provide   Living Environment Comments home with wife. can stay on 1 level as needed. tub shower combo   Self-Care   Usual Activity Tolerance good   Current Activity Tolerance moderate   Regular Exercise Yes   Equipment Currently Used at Home walker, rolling   Fall history within last six months yes   Number of times patient has fallen within last six months 1   Activity/Exercise/Self-Care Comment Per pt he was IND with ADL's prior to admission.  Pt had weaned himself off of use of 4WW (just using it outside now) and cane (intermittently using if fatigued).  Had been going to OP PT 1x/week. reports no concerns with stairs   Instrumental Activities of Daily Living (IADL)   IADL Comments pt wife completes. he does not drive   General Information   Onset of Illness/Injury or Date of Surgery 07/07/23   Referring Physician Avila Schultz NP   Patient/Family Therapy Goal Statement (OT) pt reports he wants to regain function in R foot   Additional Occupational Profile Info/Pertinent History of Current Problem Saeid Santa is a 67 year old male with a past medical history significant for hypertension, JUSTEN, prostate cancer, malignant melanoma with metastases to lung, brain, and lymph nodes s/p surgical resection of lung tumor,  and seizures secondary to complication of brain mets who initially presented to [facility] 7/7/2023 with increasing headache, confusion, and vision changes.    Patient was transferred to [facility] as there was no other  bed availability and neurosurgical evaluation.   Existing Precautions/Restrictions fall;seizures   General Observations and Info agreeable to OT liseth. pt familiar with therapy services   Cognitive Status Examination   Orientation Status person;place;other (see comments)  (situation)   Affect/Mental Status (Cognitive) WFL   Follows Commands WFL   Cognitive Status Comments somewhat disoriented to time. good safety awareness and need for assistance   Visual Perception   Visual Impairment/Limitations corrective lenses for reading   Impact of Vision Impairment on Function (Vision) R field cut but oculomotor intact   Sensory   Sensory Comments pt reports baseline numbness in R foot   Pain Assessment   Patient Currently in Pain Yes, see Vital Sign flowsheet   Posture   Posture forward head position;protracted shoulders   Range of Motion Comprehensive   Comment, General Range of Motion WFL for ADL   Strength Comprehensive (MMT)   Comment, General Manual Muscle Testing (MMT) Assessment UE R mildly weaker than L. largely 4/5 R and 5/5 on L   Muscle Tone Assessment   Muscle Tone Quick Adds No deficits were identified   Coordination   Coordination Comments pt reports at times has R discoordination. finger to nose intact, functionally intact during grasp and reach to object transport   Bed Mobility   Bed Mobility supine-sit   Supine-Sit [place] (Bed Mobility) supervision   Transfers   Transfers sit-stand transfer   Sit-Stand Transfer   Sit-Stand [place] (Transfers) contact guard   Assistive Device (Sit-Stand Transfers) walker, front-wheeled   Balance   Balance Comments defer to PT   Activities of Daily Living   BADL Assessment/Intervention bathing;lower body dressing;toileting   Bathing Assessment/Intervention   [place] Level (Bathing) minimum assist (75% patient effort)   Comment, (Bathing) seated for safety   Lower Body Dressing Assessment/Training   [place] Level (Lower Body Dressing) contact guard assist    Toileting   [place] Level (Toileting) contact guard assist   Clinical Impression   Criteria for Skilled Therapeutic Interventions Met (OT) Yes, treatment indicated   OT Diagnosis decreased function in ADL   OT Problem List-Impairments impacting ADL problems related to;activity tolerance impaired   Assessment of Occupational Performance 3-5 Performance Deficits   Identified Performance Deficits bathing, dressing, toileting IADL   Planned Therapy Interventions (OT) ADL retraining;IADL retraining;progressive activity/exercise;transfer training   Clinical Decision Making Complexity (OT) moderate complexity   Anticipated Equipment Needs Upon Discharge (OT) shower chair   Risk & Benefits of therapy have been explained evaluation/treatment results reviewed;care plan/treatment goals reviewed;risks/benefits reviewed;current/potential barriers reviewed;participants voiced agreement with care plan;participants included;patient   Clinical Impression Comments decreased function in ADL Warrants skilled therapy   OT Total Evaluation Time   OT Eval, Moderate Complexity Minutes (72967) 20   OT Goals   Therapy Frequency (OT) Daily   OT Predicted Duration/Target Date for Goal Attainment 07/18/23   OT Goals Hygiene/Grooming;Upper Body Dressing;Lower Body Dressing;Transfers;Toilet Transfer/Toileting;Cognition   OT: Hygiene/Grooming supervision/stand-by assist   OT: Upper Body Dressing Supervision/stand-by assist   OT: Lower Body Dressing Supervision/stand-by assist   OT: Transfer Supervision/stand-by assist   OT: Toilet Transfer/Toileting Supervision/stand-by assist   OT: Cognitive Patient/caregiver will verbalize understanding of cognitive assessment results/recommendations as needed for safe discharge planning   OT Discharge Planning   OT Plan toileting, LB dressing, transfers   OT Discharge Recommendation (DC Rec) home with assist;home with home care occupational therapy;[facility]ute [facility]hab [facility]-Motivated patient will benefit from  intensive, interdisciplinary therapy.  Anticipate will be able to tolerate 3 hours of therapy per day   OT Rationale for DC Rec pt below baseline function in ADL/IADL. lives at home with wife, stairs may be a barrier to home. pending medical status and POC may recommend ARU vs home with HH or OP. will continue to monitor for POC and update recommendations   OT Brief overview of current status Ax1 GBW   Total Session Time   Total Session Time (sum of timed and untimed services) 20

## 2023-07-08 NOTE — PROGRESS NOTES
Pt alert and oriented. Forgetful.   Motor exam: equal strength upper and lowe ext. Sensation: right upper and lower ext have decreased sensation with blunt tip needle test. Vision: right field cut to midline / 90 degrees. No light sensitivity at this time. Pt denies head ache, denies nausea as well. PERRLA  Advanced diet to regular  CV: SR , ST with activity. BP within ordered limits. Afebrile  Resp: Pt on NC at 2LPM while sleeping. RA while awake.  : voiding independently.   csccrn

## 2023-07-09 ENCOUNTER — TELEPHONE (OUTPATIENT)
Dept: NEUROSURGERY | Facility: CLINIC | Age: 68
End: 2023-07-09
Payer: COMMERCIAL

## 2023-07-09 ENCOUNTER — APPOINTMENT (OUTPATIENT)
Dept: PHYSICAL THERAPY | Facility: CLINIC | Age: 68
DRG: 054 | End: 2023-07-09
Attending: INTERNAL MEDICINE
Payer: COMMERCIAL

## 2023-07-09 VITALS
TEMPERATURE: 97.8 F | HEART RATE: 75 BPM | WEIGHT: 181.5 LBS | OXYGEN SATURATION: 94 % | RESPIRATION RATE: 19 BRPM | HEIGHT: 73 IN | SYSTOLIC BLOOD PRESSURE: 138 MMHG | BODY MASS INDEX: 24.06 KG/M2 | DIASTOLIC BLOOD PRESSURE: 83 MMHG

## 2023-07-09 LAB
ANION GAP SERPL CALCULATED.3IONS-SCNC: 14 MMOL/L (ref 7–15)
BUN SERPL-MCNC: 20.3 MG/DL (ref 8–23)
CALCIUM SERPL-MCNC: 8.7 MG/DL (ref 8.8–10.2)
CHLORIDE SERPL-SCNC: 106 MMOL/L (ref 98–107)
CREAT SERPL-MCNC: 0.72 MG/DL (ref 0.67–1.17)
DEPRECATED HCO3 PLAS-SCNC: 18 MMOL/L (ref 22–29)
ERYTHROCYTE [DISTWIDTH] IN BLOOD BY AUTOMATED COUNT: 15.5 % (ref 10–15)
GFR SERPL CREATININE-BSD FRML MDRD: >90 ML/MIN/1.73M2
GLUCOSE BLDC GLUCOMTR-MCNC: 119 MG/DL (ref 70–99)
GLUCOSE SERPL-MCNC: 140 MG/DL (ref 70–99)
HCT VFR BLD AUTO: 38.8 % (ref 40–53)
HGB BLD-MCNC: 12.8 G/DL (ref 13.3–17.7)
MCH RBC QN AUTO: 31 PG (ref 26.5–33)
MCHC RBC AUTO-ENTMCNC: 33 G/DL (ref 31.5–36.5)
MCV RBC AUTO: 94 FL (ref 78–100)
PLATELET # BLD AUTO: 181 10E3/UL (ref 150–450)
POTASSIUM SERPL-SCNC: 3.8 MMOL/L (ref 3.4–5.3)
RBC # BLD AUTO: 4.13 10E6/UL (ref 4.4–5.9)
SODIUM SERPL-SCNC: 138 MMOL/L (ref 136–145)
SODIUM SERPL-SCNC: 139 MMOL/L (ref 136–145)
SODIUM SERPL-SCNC: 159 MMOL/L (ref 136–145)
WBC # BLD AUTO: 17.6 10E3/UL (ref 4–11)

## 2023-07-09 PROCEDURE — 999N000040 HC STATISTIC CONSULT NO CHARGE VASC ACCESS

## 2023-07-09 PROCEDURE — 84295 ASSAY OF SERUM SODIUM: CPT | Performed by: INTERNAL MEDICINE

## 2023-07-09 PROCEDURE — 85027 COMPLETE CBC AUTOMATED: CPT

## 2023-07-09 PROCEDURE — 84295 ASSAY OF SERUM SODIUM: CPT

## 2023-07-09 PROCEDURE — 250N000011 HC RX IP 250 OP 636: Mod: JZ

## 2023-07-09 PROCEDURE — 97530 THERAPEUTIC ACTIVITIES: CPT | Mod: GP

## 2023-07-09 PROCEDURE — 97116 GAIT TRAINING THERAPY: CPT | Mod: GP

## 2023-07-09 PROCEDURE — 99239 HOSP IP/OBS DSCHRG MGMT >30: CPT | Performed by: INTERNAL MEDICINE

## 2023-07-09 PROCEDURE — 250N000013 HC RX MED GY IP 250 OP 250 PS 637

## 2023-07-09 RX ORDER — DEXAMETHASONE 4 MG/1
4 TABLET ORAL EVERY 6 HOURS
Qty: 60 TABLET | Refills: 0 | Status: SHIPPED | OUTPATIENT
Start: 2023-07-09 | End: 2023-07-09

## 2023-07-09 RX ORDER — DEXAMETHASONE 4 MG/1
4 TABLET ORAL EVERY 6 HOURS
Qty: 90 TABLET | Refills: 0 | Status: SHIPPED | OUTPATIENT
Start: 2023-07-09 | End: 2023-07-09

## 2023-07-09 RX ORDER — 3% SODIUM CHLORIDE 3 G/100ML
INJECTION, SOLUTION INTRAVENOUS CONTINUOUS
Status: DISCONTINUED | OUTPATIENT
Start: 2023-07-09 | End: 2023-07-09 | Stop reason: HOSPADM

## 2023-07-09 RX ORDER — DEXAMETHASONE 4 MG/1
4 TABLET ORAL EVERY 6 HOURS
Qty: 24 TABLET | Refills: 0 | Status: ON HOLD | OUTPATIENT
Start: 2023-07-09 | End: 2023-07-16

## 2023-07-09 RX ADMIN — LEVETIRACETAM 1000 MG: 10 INJECTION INTRAVENOUS at 08:10

## 2023-07-09 RX ADMIN — AMLODIPINE BESYLATE 10 MG: 10 TABLET ORAL at 08:10

## 2023-07-09 RX ADMIN — DEXAMETHASONE SODIUM PHOSPHATE 6 MG: 4 INJECTION, SOLUTION INTRAMUSCULAR; INTRAVENOUS at 01:26

## 2023-07-09 RX ADMIN — ACETAMINOPHEN 650 MG: 325 TABLET, FILM COATED ORAL at 08:20

## 2023-07-09 RX ADMIN — LABETALOL HYDROCHLORIDE 20 MG: 5 INJECTION, SOLUTION INTRAVENOUS at 05:29

## 2023-07-09 RX ADMIN — DEXAMETHASONE SODIUM PHOSPHATE 6 MG: 4 INJECTION, SOLUTION INTRAMUSCULAR; INTRAVENOUS at 08:10

## 2023-07-09 ASSESSMENT — ACTIVITIES OF DAILY LIVING (ADL)
ADLS_ACUITY_SCORE: 39
ADLS_ACUITY_SCORE: 38
ADLS_ACUITY_SCORE: 38
ADLS_ACUITY_SCORE: 39
ADLS_ACUITY_SCORE: 39
ADLS_ACUITY_SCORE: 38
ADLS_ACUITY_SCORE: 39

## 2023-07-09 NOTE — PLAN OF CARE
Physical Therapy Discharge Summary    Reason for therapy discharge:    Discharged to home.    Progress towards therapy goal(s). See goals on Care Plan in [facility] electronic health record for goal details.  Goals partially met.  Barriers to achieving goals:   discharge from facility.    Therapy recommendation(s):    Pt will need PT following planned procedure next week.

## 2023-07-09 NOTE — PROGRESS NOTES
[facility]    Neurosurgery  Daily Note    Assessment & Plan      Saeid Santa is a 67 year old male who was admitted on 7/7/2023. Saeid Santa is a 67 year old male with metastatic melanoma is who is s/p stealth assisted awake craniotomy with resection of motor/sensory strip lesion with Dr. Garrido on 4/21/23, underwent GK to resection cavity and parieto-occipital lesion on 5/24/23 presented to OSH with worsening confusion and headache and imaging evidence of increased size and density of the lesion in the left occipital lobe, worrisome for interval hemorrhage, Increased surrounding edema and associated mass effect with approximately 5 mm left to right midline shift.     MRI BRAIN WITHOUT AND WITH CONTRAST  7/7/2023 3:35 PM                                                             IMPRESSION:    1. Increased size of complex cystic and solid heterogeneous enhancing  mass in the left occipital lobe since prior MR 6/14/2023. New internal  signal with hematocrit layer in this collection suggests intralesional  hemorrhage of the lesion since that time. Surrounding T2 hyperintense  signal/edema is not significantly changed.  2. Increased mass effect on the posterior left cerebral hemisphere.  Increased rightward midline shift of 0.6 cm with worsening compression  of the occipital horn of the left lateral ventricle. There is also  increased effacement of the basal cisterns particularly the left  quadrigeminal plate cistern. No evidence of hydrocephalus or  ventricular entrapment at this time.  3. Sequela of prior treatment changes in the left postcentral gyrus,  similar to prior.  4. No new suspicious enhancing intracranial masses.  BRENDON BASILIO MD       AM ROUNDS:   Symptoms and exam improving.     Plan:  Cleared for discharge to home from NSGY standpoint. Na 139 on rechek. OK for normal sodium. Sending home with decadron 4mg every 6 hours until procedure on Friday.     Red flag s/s  reviewed, advised to present sooner should occur    Should continue BID Fan Garrido in agreement      Principal Problem:    Neoplasm of brain causing mass effect and brain compression on adjacent structures (H)      Mila Gallegos PA-C    Interval History   Stable     Physical Exam   Temp: 97.8  F (36.6  C) Temp src: Oral BP: 138/83 Pulse: 75   Resp: 19 SpO2: 94 % O2 Device: None (Room air)    Vitals:    07/07/23 1215 07/08/23 0600 07/09/23 0600   Weight: 185 lb 10 oz (84.2 kg) 179 lb 10.8 oz (81.5 kg) 181 lb 8 oz (82.3 kg)     Vital Signs with Ranges  Temp:  [97.6  F (36.4  C)-98.6  F (37  C)] 97.8  F (36.6  C)  Pulse:  [] 75  Resp:  [11-27] 19  BP: (121-163)/() 138/83  SpO2:  [94 %-100 %] 94 %  I/O last 3 completed shifts:  In: 1553 [P.O.:480; I.V.:1073]  Out: 2125 [Urine:2125]    Awake, alert, appropriate  Right field cut improving   Otherwise II-XII intact  Right weakness is improving   Right dysmetria improving   Negative clonus       Medications     sodium chloride 3% Stopped (07/09/23 0925)        amLODIPine  10 mg Oral QAM     dexamethasone  6 mg Intravenous Q6H     levETIRAcetam  1,000 mg Intravenous BID       Plans discussed with Dr. Posada who was in agreement with plans    Mila Gallegos PA-C  [facility] Neurosurgery  [address]  [address] [place] [id number]    Tel [phone]  Pager [phone]

## 2023-07-09 NOTE — PLAN OF CARE
Neuro: A/Ox3-4, forgetful on date/time; PERRLA.  Moves all extremities.  Follows commands.  Weaker right side, R ankle, left plantar w/ numbness-baseline per pt  Cardiovascular: SR, RRR.  Hemodynamically stable. SBPs <150 achieved, PRN meds given per MAR  Pulmonary:  Stable SPO2s on RA  GI: regular, tolerating, improving appetite, BMx1   : voiding per urinal, Adequate UOP.  Endocrine: Glucose well controlled.  Skin:  Intact except some scabs   Plan: possible discharge today

## 2023-07-09 NOTE — PROGRESS NOTES
X-cover    On 3% Na at 30 ml/hour for goal Na 135-145. Na at 159  - stop 3%  - monitor  - if stays elevate will give free water    Asim Rogers MD    6 am Na at 138. Will resume 3% at 15 ml/ hour.     Asim Rogers MD

## 2023-07-09 NOTE — PROVIDER NOTIFICATION
MD Notification    Notified Person: MD    Notified Person Name: Nubia Portillo     Notification Date/Time: 7/8/23 2210     Notification Interaction: TeamPages web     Purpose of Notification: JEAN PAUL DORSEY Na at 145, current NaCl 3% infusion at 60cc/hr. do you want to decrease the rate? Thanks     Orders Received: rate decreased to 30cc/hr    Comments:

## 2023-07-09 NOTE — PROGRESS NOTES
Discharge instructions and medications reviewed with patient and questions answered. Discharged from ICU at 1300 via wheelchair.   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 He was diagnosed with -5q MDS in 2020. Has 16% peripheral blood blasts on transfer. Bone Marrow Bx 9/9/21 -- MDS with EB2, 17% blasts. FISH / Cytogenetics / NGS -- PENDING  Reviewed diagnosis and explained it. Recommended therapy with 5'azacytidine daily x 7 subq. Reviewed toxicities and all questions answered. He gave informed consent. Today is day 8  Tolerated 5'aza well. Discomfort in left posterior shoulder when lies down resolved, now recurring. Feels better.  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 He was diagnosed with -5q MDS in 2020. Has 16% peripheral blood blasts on transfer. Bone Marrow Bx 9/9/21 -- MDS with EB2, 17% blasts. FISH / Cytogenetics / NGS -- PENDING  Reviewed diagnosis and explained it. Recommended therapy with 5'azacytidine daily x 7 subq. Reviewed toxicities and all questions answered. He gave informed consent. Today is day 9  Tolerated 5'aza well. Discomfort in left posterior shoulder when lies down resolved. Feels better.  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  D/C home. F/U at [facility]. To ER prn.

## 2023-07-09 NOTE — PROVIDER NOTIFICATION
MD Notification    Notified Person: MD    Notified Person Name: Asim Rogers     Notification Date/Time: 7/923 0208    Notification Interaction: AMCOM     Purpose of Notification: YVONNE DORSEY Na is 159, current nacl 3% at 30cc/hr. stop infusion ?     Orders Received: held, recheck at 0400.     Comments: repged at 0620: YVONNE DORSEY na now at 138, pls advise if you want to restart nacl at low rate. thanks     Orders: Nacl 3% at 15cc/hr

## 2023-07-09 NOTE — PROGRESS NOTES
Occupational Therapy Discharge Summary    Reason for therapy discharge:    Patient/family request discontinuation of services.  Family bringing patient home today. Discharge orders in.   Progress towards therapy goal(s). See goals on Care Plan in [facility] electronic health record for goal details.  Goals partially met.  Barriers to achieving goals:   discharge from facility.    Therapy recommendation(s):    Continue home exercise program.  As per 7/8/23 pt needed assist with standing ADL and all IADL. Recommend Ax1 for  All ADL/IADL and defer continued therapy recs to neuro and primary team as pt has surgery upcoming.

## 2023-07-09 NOTE — DISCHARGE SUMMARY
[facility]  Hospitalist Discharge Summary      Date of Admission:  7/7/2023  Date of Discharge:  7/9/2023  1:31 PM  Discharging Provider: Nika Sen MD  Discharge Service: Hospitalist Service    Discharge Diagnoses    Hospital Course     Saeid Santa is a 67-year-old male with history of hypertension, JUSTEN, prostate cancer, malignant melanoma with metastasis to brain, lung, lymph nodes, status postresection of lung mets seizures due to brain mets, who presented to [facility] on 7/7/2023 with worsening headaches, confusion and visual changes.  He was transferred to [facility] for neurosurgery evaluation.     Malignant melanoma with metastasis to brain (left frontal and left occipital), lymph nodes, lung, s/p resection of left frontal mass 4/21/2023 and GK asked both lesions  Left occipital brain metastatic lesion with intralesional hemorrhage, cerebral compression, cerebral edema and midline shift  Hypotension and bradycardia-likely due to increased intracranial pressure  - Is followed by Dr. Kingston Greene (oncology), Gerald Rose (radiation oncology) at [facility], and Dr. Kye Garrido (neurosurgery)  - First noted scalp lesion in August 2022.  Subsequently developed right leg numbness and abnormal movement.  MRI 4/2023 showed 2 enhancing lesions in brain-left frontal lobe and left occipital lobe.  Underwent craniotomy with resection of left frontal tumor and excision of left parietal scalp lesion on 4/21/2023.  Pathology was consistent with melanoma.  Subsequently received treatment with, knife to both brain lesions.  Completed on 5/31/2023.  - Started on pembrolizumab on 6/7/2023  - Was hospitalized at [facility] from 6/14 -6/16/2023 due to progressive left lower extremity weakness, headache, new visual changes.  CT scan showed cerebral edema, cystic and solid masses with mass effect and 4 mm right midline shift.  He was treated with  IV dexamethasone, symptoms improved rapidly and was discharged on oral dexamethasone with plan for follow-up MRI in 1 month.  - Last oncology appointment was on 6/28.  Plan was to restart pembrolizumab in 2 weeks if he tolerated Decadron taper.  - Now presented on 7/7 with worsening headache, visual changes, confusion for 2 to 3 days.  - Head CT showed increase in size and density of left occipital lobe lesion worrisome for interval hemorrhage and increased surrounding edema with associated mass effect 5 mm left-to-right shift.  - Patient became hypertensive and bradycardic raising concern for increased intracranial pressure  - He was started on IV nicardipine infusion that was discontinued On arrival to [facility]  - On arrival to [facility], was confused, disoriented, slow to respond, dysarthric  - Exam showed right visual field cut, right lower extremity weakness, diminished sensation in right upper extremity and right lower extremity  - MRI brain showed increased size of complex cystic and solid heterogeneous enhancing mass in the left occipital lobe with new intralesional hemorrhage.  Surrounding edema.  Increased mass effect on posterior left cerebral hemisphere.  Rightward midline shift of 0.6 cm with worsening compression of occipital horn of left lateral ventricle.  - Sodium was initially 141 but subsequently decreased to 132.  He was started on 3% saline with goal to maintain sodium 135-145.   - Neurosurgery consulted.  Planning surgery at [facility] on Friday, 7/14  -Received IV dexamethasone 6 mg every 6 hours while hospitalized.  Discharged on dexamethasone 4 mg every 6 hours until surgery  -Continue Keppra  -Significantly improved by discharge.  Headaches resolved, visual field deficits significantly improved.     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  No head injury  -PT/OT were consulted.  Patient elected to go home     Essential hypertension  -PTA amlodipine 10 mg daily  -was on nicardipine infusion on arrival Which was subsequently discontinued and amlodipine was resumed     JUSTEN  -Does not use CPAP    Prostate cancer, s/p prostatectomy 2009    Discharged home      Clinically Significant Risk Factors          Follow-ups Needed After Discharge   Follow-up Appointments     Follow-up and recommended labs and tests       Follow up with primary care provider, Asim Cervantes, within 14 days   for hospital follow- up.  No follow up labs or test are needed.        {Additional follow-up instructions/to-do's for PCP    :    Unresulted Labs Ordered in the Past 30 Days of this Admission     No orders found from 6/7/2023 to 7/8/2023.          Discharge Disposition   Discharged to home  Condition at discharge: Stable    Hospital Course   See above    Consultations This Hospital Stay   OCCUPATIONAL THERAPY ADULT IP CONSULT  PHYSICAL THERAPY ADULT IP CONSULT  NEUROSURGERY IP CONSULT  NEUROLOGY CRITICAL CARE ADULT IP CONSULT  PALLIATIVE CARE ADULT IP CONSULT  VASCULAR ACCESS ADULT IP CONSULT    Code Status   Full Code    Time Spent on this Encounter   I, Nika Sen MD, personally saw the patient today and spent greater than 30 minutes discharging this patient.       Nika Sen MD  [facility] INTENSIVE [facility]  [address]  [place] [address]  Phone: [phone]  ______________________________________________________________________    Physical Exam   Vital Signs: Temp: 97.8  F (36.6  C) Temp src: Oral BP: 138/83 Pulse: 75   Resp: 19 SpO2: 94 % O2 Device: None (Room air)    Weight: 181 lbs 8 oz    Constitutional - awake and alert, resting in bed, in no acute distress  Cardiovascular - regular rate and rhythm, no murmurs, no edema  Pulmonary - lungs are clear to auscultation bilaterally, no wheezing or  rhonchi  GI - abdomen is soft, nontender, nondistended, no hepatosplenomegaly or masses  Integumentary - skin is warm and dry, no rashes or ulcers  Neurological - awake, alert and oriented x3.  Moving all 4 extremities, normal speech, no focal deficits.  Visual field cuts improved          Primary Care Physician   Asim Cervantes    Discharge Orders      Reason for your hospital stay    Brain mass causing headache and visual changes     Follow-up and recommended labs and tests     Follow up with primary care provider, Asim Cervantes, within 14 days for hospital follow- up.  No follow up labs or test are needed.     Activity    Your activity upon discharge: activity as tolerated     Diet    Follow this diet upon discharge: Regular       Significant Results and Procedures   Results for orders placed or performed during the hospital encounter of 07/07/23   MR Brain w/o & w Contrast    Narrative    MRI BRAIN WITHOUT AND WITH CONTRAST  7/7/2023 3:35 PM     HISTORY: Post op resection with new hemorrhage, shift, edema.     TECHNIQUE: Multiplanar, multisequence MRI of the brain without and  with 15 mL Gadavist     COMPARISON: Head CT from earlier the same day. Brain MR 6/14/2023.     FINDINGS: Abnormal heterogeneously enhancing partially internally  cystic mass in the left occipital lobe has increased in size since  prior MR 6/14/2023. There are also new hematocrit layer within this  collection and susceptibility hypointensity suggesting intralesional  hemorrhage within the lesion. On the postcontrast sequence, this  collection measures approximately 3.4 x 3.4 x 4.4 cm, previously 3.1 x  3.2 x 4.0 cm when measured at a similar slice. Marked surrounding T2  hyperdense edema around this lesion is not significantly changed since  prior. Mass effect on the posterior left cerebral hemisphere with  rightward midline shift now measures 0.6 cm, previously 0.3 cm on  6/14/2023. There is worsening mass effect on the occipital  horn of the  left lateral ventricle. There is also increased effacement of the  basal cisterns particularly the left quadrigeminal plate cistern. No  evidence of hydrocephalus or ventricular entrapment at this time. Mild  patchy periventricular white matter T2 hyperintensities which are  likely due to chronic microvascular ischemic disease.    Sequela of prior treatment changes near the left postcentral gyrus  with susceptibility hypodensity in that area as well as T2  hyperintensity in the cortex. Linear sulcal enhancement in this area  is favored to be treatment related. Sequela of previous left vertex  craniotomy.    No new suspicious enhancing intracranial lesions.    Moderate left and small right mastoid effusions. Mild mucosal  thickening in the paranasal sinuses. The major arterial T2 flow voids  at the base of the brain appear patent.       Impression    IMPRESSION:    1. Increased size of complex cystic and solid heterogeneous enhancing  mass in the left occipital lobe since prior MR 6/14/2023. New internal  signal with hematocrit layer in this collection suggests intralesional  hemorrhage of the lesion since that time. Surrounding T2 hyperintense  signal/edema is not significantly changed.  2. Increased mass effect on the posterior left cerebral hemisphere.  Increased rightward midline shift of 0.6 cm with worsening compression  of the occipital horn of the left lateral ventricle. There is also  increased effacement of the basal cisterns particularly the left  quadrigeminal plate cistern. No evidence of hydrocephalus or  ventricular entrapment at this time.  3. Sequela of prior treatment changes in the left postcentral gyrus,  similar to prior.  4. No new suspicious enhancing intracranial masses.      BRENDON BASILIO MD         SYSTEM ID:  [id number]   MR Brain w Contrast Stereotactic    Narrative    MRI BRAIN WITHOUT AND WITH CONTRAST  7/7/2023 3:35 PM     HISTORY: Post op resection with new hemorrhage,  shift, edema.     TECHNIQUE: Multiplanar, multisequence MRI of the brain without and  with 15 mL Gadavist     COMPARISON: Head CT from earlier the same day. Brain MR 6/14/2023.     FINDINGS: Abnormal heterogeneously enhancing partially internally  cystic mass in the left occipital lobe has increased in size since  prior MR 6/14/2023. There are also new hematocrit layer within this  collection and susceptibility hypointensity suggesting intralesional  hemorrhage within the lesion. On the postcontrast sequence, this  collection measures approximately 3.4 x 3.4 x 4.4 cm, previously 3.1 x  3.2 x 4.0 cm when measured at a similar slice. Marked surrounding T2  hyperdense edema around this lesion is not significantly changed since  prior. Mass effect on the posterior left cerebral hemisphere with  rightward midline shift now measures 0.6 cm, previously 0.3 cm on  6/14/2023. There is worsening mass effect on the occipital horn of the  left lateral ventricle. There is also increased effacement of the  basal cisterns particularly the left quadrigeminal plate cistern. No  evidence of hydrocephalus or ventricular entrapment at this time. Mild  patchy periventricular white matter T2 hyperintensities which are  likely due to chronic microvascular ischemic disease.    Sequela of prior treatment changes near the left postcentral gyrus  with susceptibility hypodensity in that area as well as T2  hyperintensity in the cortex. Linear sulcal enhancement in this area  is favored to be treatment related. Sequela of previous left vertex  craniotomy.    No new suspicious enhancing intracranial lesions.    Moderate left and small right mastoid effusions. Mild mucosal  thickening in the paranasal sinuses. The major arterial T2 flow voids  at the base of the brain appear patent.       Impression    IMPRESSION:    1. Increased size of complex cystic and solid heterogeneous enhancing  mass in the left occipital lobe since prior MR 6/14/2023.  New internal  signal with hematocrit layer in this collection suggests intralesional  hemorrhage of the lesion since that time. Surrounding T2 hyperintense  signal/edema is not significantly changed.  2. Increased mass effect on the posterior left cerebral hemisphere.  Increased rightward midline shift of 0.6 cm with worsening compression  of the occipital horn of the left lateral ventricle. There is also  increased effacement of the basal cisterns particularly the left  quadrigeminal plate cistern. No evidence of hydrocephalus or  ventricular entrapment at this time.  3. Sequela of prior treatment changes in the left postcentral gyrus,  similar to prior.  4. No new suspicious enhancing intracranial masses.      BRENDON BASILIO MD         SYSTEM ID:  [id number]       Discharge Medications   Discharge Medication List as of 7/9/2023 12:21 PM      CONTINUE these medications which have CHANGED    Details   dexamethasone (DECADRON) 4 MG tablet Take 1 tablet (4 mg) by mouth every 6 hours for 6 days, Disp-24 tablet, R-0, E-Prescribe         CONTINUE these medications which have NOT CHANGED    Details   amLODIPine (NORVASC) 10 MG tablet Take 10 mg by mouth every morning, Historical      levETIRAcetam (KEPPRA) 1000 MG tablet 1 tablet Orally every 12 hrs, Historical      pembrolizumab (KEYTRUDA) 25 MG/ mg every 21 days, Historical      baclofen (LIORESAL) 10 MG tablet Take 10 mg by mouth daily as needed for muscle spasms, Historical         STOP taking these medications       aspirin (ASA) 325 MG EC tablet Comments:   Reason for Stopping:             Allergies   No Known Allergies

## 2023-07-11 ENCOUNTER — ANESTHESIA EVENT (OUTPATIENT)
Dept: SURGERY | Facility: CLINIC | Age: 68
DRG: 025 | End: 2023-07-11
Payer: COMMERCIAL

## 2023-07-12 DIAGNOSIS — Z00.00 ROUTINE HEALTH MAINTENANCE: Primary | ICD-10-CM

## 2023-07-14 ENCOUNTER — APPOINTMENT (OUTPATIENT)
Dept: CT IMAGING | Facility: CLINIC | Age: 68
DRG: 025 | End: 2023-07-14
Attending: NEUROLOGICAL SURGERY
Payer: COMMERCIAL

## 2023-07-14 ENCOUNTER — ANESTHESIA (OUTPATIENT)
Dept: SURGERY | Facility: CLINIC | Age: 68
DRG: 025 | End: 2023-07-14
Payer: COMMERCIAL

## 2023-07-14 ENCOUNTER — HOSPITAL ENCOUNTER (INPATIENT)
Facility: CLINIC | Age: 68
LOS: 2 days | Discharge: HOME | DRG: 025 | End: 2023-07-16
Attending: NEUROLOGICAL SURGERY | Admitting: NEUROLOGICAL SURGERY
Payer: COMMERCIAL

## 2023-07-14 DIAGNOSIS — D49.6 BRAIN TUMOR (H): Primary | ICD-10-CM

## 2023-07-14 LAB
ABO/RH(D): NORMAL
ANTIBODY SCREEN: NEGATIVE
BLD PROD TYP BPU: NORMAL
BLD PROD TYP BPU: NORMAL
BLOOD COMPONENT TYPE: NORMAL
BLOOD COMPONENT TYPE: NORMAL
CODING SYSTEM: NORMAL
CODING SYSTEM: NORMAL
CROSSMATCH: NORMAL
CROSSMATCH: NORMAL
GLUCOSE BLDC GLUCOMTR-MCNC: 142 MG/DL (ref 70–99)
HOLD SPECIMEN: NORMAL
SPECIMEN EXPIRATION DATE: NORMAL
UNIT ABO/RH: NORMAL
UNIT ABO/RH: NORMAL
UNIT NUMBER: NORMAL
UNIT NUMBER: NORMAL
UNIT STATUS: NORMAL
UNIT STATUS: NORMAL
UNIT TYPE ISBT: 5100
UNIT TYPE ISBT: 5100

## 2023-07-14 PROCEDURE — 00B70ZX EXCISION OF CEREBRAL HEMISPHERE, OPEN APPROACH, DIAGNOSTIC: ICD-10-PCS | Performed by: NEUROLOGICAL SURGERY

## 2023-07-14 PROCEDURE — 250N000011 HC RX IP 250 OP 636: Mod: JZ | Performed by: NURSE ANESTHETIST, CERTIFIED REGISTERED

## 2023-07-14 PROCEDURE — 710N000011 HC RECOVERY PHASE 1, LEVEL 3, PER MIN: Performed by: NEUROLOGICAL SURGERY

## 2023-07-14 PROCEDURE — 250N000013 HC RX MED GY IP 250 OP 250 PS 637: Performed by: ANESTHESIOLOGY

## 2023-07-14 PROCEDURE — 250N000011 HC RX IP 250 OP 636: Performed by: STUDENT IN AN ORGANIZED HEALTH CARE EDUCATION/TRAINING PROGRAM

## 2023-07-14 PROCEDURE — 61510 CRNEC TREPH EXC BRN TUM STTL: CPT | Mod: 58 | Performed by: NEUROLOGICAL SURGERY

## 2023-07-14 PROCEDURE — 88331 PATH CONSLTJ SURG 1 BLK 1SPC: CPT | Mod: 26 | Performed by: SPECIALIST

## 2023-07-14 PROCEDURE — 250N000011 HC RX IP 250 OP 636: Mod: JZ | Performed by: ANESTHESIOLOGY

## 2023-07-14 PROCEDURE — 250N000009 HC RX 250: Performed by: NURSE ANESTHETIST, CERTIFIED REGISTERED

## 2023-07-14 PROCEDURE — 36415 COLL VENOUS BLD VENIPUNCTURE: CPT | Performed by: ANESTHESIOLOGY

## 2023-07-14 PROCEDURE — 200N000002 HC R&B ICU UMMC

## 2023-07-14 PROCEDURE — 86850 RBC ANTIBODY SCREEN: CPT | Performed by: ANESTHESIOLOGY

## 2023-07-14 PROCEDURE — 250N000013 HC RX MED GY IP 250 OP 250 PS 637: Performed by: NURSE ANESTHETIST, CERTIFIED REGISTERED

## 2023-07-14 PROCEDURE — 250N000011 HC RX IP 250 OP 636: Performed by: NEUROLOGICAL SURGERY

## 2023-07-14 PROCEDURE — 258N000003 HC RX IP 258 OP 636: Performed by: NURSE ANESTHETIST, CERTIFIED REGISTERED

## 2023-07-14 PROCEDURE — C1763 CONN TISS, NON-HUMAN: HCPCS | Performed by: NEUROLOGICAL SURGERY

## 2023-07-14 PROCEDURE — 88331 PATH CONSLTJ SURG 1 BLK 1SPC: CPT | Mod: TC | Performed by: NEUROLOGICAL SURGERY

## 2023-07-14 PROCEDURE — 8E09XBZ COMPUTER ASSISTED PROCEDURE OF HEAD AND NECK REGION: ICD-10-PCS | Performed by: NEUROLOGICAL SURGERY

## 2023-07-14 PROCEDURE — 250N000011 HC RX IP 250 OP 636: Performed by: PHYSICIAN ASSISTANT

## 2023-07-14 PROCEDURE — 272N000480 CT HEAD W/O CONTRAST

## 2023-07-14 PROCEDURE — 86901 BLOOD TYPING SEROLOGIC RH(D): CPT | Performed by: ANESTHESIOLOGY

## 2023-07-14 PROCEDURE — 61781 SCAN PROC CRANIAL INTRA: CPT | Performed by: NEUROLOGICAL SURGERY

## 2023-07-14 PROCEDURE — 250N000013 HC RX MED GY IP 250 OP 250 PS 637: Performed by: STUDENT IN AN ORGANIZED HEALTH CARE EDUCATION/TRAINING PROGRAM

## 2023-07-14 PROCEDURE — 360N000079 HC SURGERY LEVEL 6, PER MIN: Performed by: NEUROLOGICAL SURGERY

## 2023-07-14 PROCEDURE — 88341 IMHCHEM/IMCYTCHM EA ADD ANTB: CPT | Mod: 26 | Performed by: SPECIALIST

## 2023-07-14 PROCEDURE — 00B70ZZ EXCISION OF CEREBRAL HEMISPHERE, OPEN APPROACH: ICD-10-PCS | Performed by: NEUROLOGICAL SURGERY

## 2023-07-14 PROCEDURE — 62272 THER SPI PNXR DRG CSF: CPT | Mod: 58 | Performed by: NEUROLOGICAL SURGERY

## 2023-07-14 PROCEDURE — 250N000024 HC ISOFLURANE, PER MIN: Performed by: NEUROLOGICAL SURGERY

## 2023-07-14 PROCEDURE — 278N000051 HC OR IMPLANT GENERAL: Performed by: NEUROLOGICAL SURGERY

## 2023-07-14 PROCEDURE — 272N000001 HC OR GENERAL SUPPLY STERILE: Performed by: NEUROLOGICAL SURGERY

## 2023-07-14 PROCEDURE — 88307 TISSUE EXAM BY PATHOLOGIST: CPT | Mod: 26 | Performed by: SPECIALIST

## 2023-07-14 PROCEDURE — 00U20KZ SUPPLEMENT DURA MATER WITH NONAUTOLOGOUS TISSUE SUBSTITUTE, OPEN APPROACH: ICD-10-PCS | Performed by: NEUROLOGICAL SURGERY

## 2023-07-14 PROCEDURE — 88307 TISSUE EXAM BY PATHOLOGIST: CPT | Mod: TC | Performed by: NEUROLOGICAL SURGERY

## 2023-07-14 PROCEDURE — C1713 ANCHOR/SCREW BN/BN,TIS/BN: HCPCS | Performed by: NEUROLOGICAL SURGERY

## 2023-07-14 PROCEDURE — 86923 COMPATIBILITY TEST ELECTRIC: CPT | Performed by: STUDENT IN AN ORGANIZED HEALTH CARE EDUCATION/TRAINING PROGRAM

## 2023-07-14 PROCEDURE — 258N000003 HC RX IP 258 OP 636: Performed by: NEUROLOGICAL SURGERY

## 2023-07-14 PROCEDURE — 258N000003 HC RX IP 258 OP 636: Performed by: STUDENT IN AN ORGANIZED HEALTH CARE EDUCATION/TRAINING PROGRAM

## 2023-07-14 PROCEDURE — 70450 CT HEAD/BRAIN W/O DYE: CPT | Mod: 26 | Performed by: RADIOLOGY

## 2023-07-14 PROCEDURE — 250N000009 HC RX 250: Performed by: NEUROLOGICAL SURGERY

## 2023-07-14 PROCEDURE — 88342 IMHCHEM/IMCYTCHM 1ST ANTB: CPT | Mod: 26 | Performed by: SPECIALIST

## 2023-07-14 PROCEDURE — 999N000141 HC STATISTIC PRE-PROCEDURE NURSING ASSESSMENT: Performed by: NEUROLOGICAL SURGERY

## 2023-07-14 PROCEDURE — 370N000017 HC ANESTHESIA TECHNICAL FEE, PER MIN: Performed by: NEUROLOGICAL SURGERY

## 2023-07-14 DEVICE — GRAFT DURAGEN 3X3" ID330: Type: IMPLANTABLE DEVICE | Site: CRANIAL | Status: FUNCTIONAL

## 2023-07-14 RX ORDER — DEXAMETHASONE SODIUM PHOSPHATE 4 MG/ML
INJECTION, SOLUTION INTRA-ARTICULAR; INTRALESIONAL; INTRAMUSCULAR; INTRAVENOUS; SOFT TISSUE PRN
Status: DISCONTINUED | OUTPATIENT
Start: 2023-07-14 | End: 2023-07-14

## 2023-07-14 RX ORDER — MANNITOL 20 G/100ML
INJECTION, SOLUTION INTRAVENOUS PRN
Status: DISCONTINUED | OUTPATIENT
Start: 2023-07-14 | End: 2023-07-14

## 2023-07-14 RX ORDER — ONDANSETRON 2 MG/ML
INJECTION INTRAMUSCULAR; INTRAVENOUS PRN
Status: DISCONTINUED | OUTPATIENT
Start: 2023-07-14 | End: 2023-07-14

## 2023-07-14 RX ORDER — HYDROMORPHONE HCL IN WATER/PF 6 MG/30 ML
0.2 PATIENT CONTROLLED ANALGESIA SYRINGE INTRAVENOUS EVERY 5 MIN PRN
Status: DISCONTINUED | OUTPATIENT
Start: 2023-07-14 | End: 2023-07-14 | Stop reason: HOSPADM

## 2023-07-14 RX ORDER — SODIUM CHLORIDE, SODIUM LACTATE, POTASSIUM CHLORIDE, CALCIUM CHLORIDE 600; 310; 30; 20 MG/100ML; MG/100ML; MG/100ML; MG/100ML
INJECTION, SOLUTION INTRAVENOUS CONTINUOUS
Status: DISCONTINUED | OUTPATIENT
Start: 2023-07-14 | End: 2023-07-14 | Stop reason: HOSPADM

## 2023-07-14 RX ORDER — SODIUM CHLORIDE, SODIUM LACTATE, POTASSIUM CHLORIDE, CALCIUM CHLORIDE 600; 310; 30; 20 MG/100ML; MG/100ML; MG/100ML; MG/100ML
INJECTION, SOLUTION INTRAVENOUS CONTINUOUS PRN
Status: DISCONTINUED | OUTPATIENT
Start: 2023-07-14 | End: 2023-07-14

## 2023-07-14 RX ORDER — PROCHLORPERAZINE MALEATE 5 MG
5 TABLET ORAL EVERY 6 HOURS PRN
Status: DISCONTINUED | OUTPATIENT
Start: 2023-07-14 | End: 2023-07-16 | Stop reason: HOSPADM

## 2023-07-14 RX ORDER — AMOXICILLIN 250 MG
1 CAPSULE ORAL 2 TIMES DAILY
Status: DISCONTINUED | OUTPATIENT
Start: 2023-07-14 | End: 2023-07-16 | Stop reason: HOSPADM

## 2023-07-14 RX ORDER — PROCHLORPERAZINE MALEATE 5 MG
5 TABLET ORAL EVERY 6 HOURS PRN
Status: DISCONTINUED | OUTPATIENT
Start: 2023-07-14 | End: 2023-07-14

## 2023-07-14 RX ORDER — BISACODYL 10 MG
10 SUPPOSITORY, RECTAL RECTAL DAILY PRN
Status: DISCONTINUED | OUTPATIENT
Start: 2023-07-14 | End: 2023-07-16 | Stop reason: HOSPADM

## 2023-07-14 RX ORDER — ONDANSETRON 4 MG/1
4 TABLET, ORALLY DISINTEGRATING ORAL EVERY 30 MIN PRN
Status: DISCONTINUED | OUTPATIENT
Start: 2023-07-14 | End: 2023-07-14 | Stop reason: HOSPADM

## 2023-07-14 RX ORDER — FENTANYL CITRATE 50 UG/ML
INJECTION, SOLUTION INTRAMUSCULAR; INTRAVENOUS PRN
Status: DISCONTINUED | OUTPATIENT
Start: 2023-07-14 | End: 2023-07-14

## 2023-07-14 RX ORDER — ONDANSETRON 2 MG/ML
4 INJECTION INTRAMUSCULAR; INTRAVENOUS EVERY 30 MIN PRN
Status: DISCONTINUED | OUTPATIENT
Start: 2023-07-14 | End: 2023-07-14 | Stop reason: HOSPADM

## 2023-07-14 RX ORDER — DEXAMETHASONE SODIUM PHOSPHATE 4 MG/ML
4 INJECTION, SOLUTION INTRA-ARTICULAR; INTRALESIONAL; INTRAMUSCULAR; INTRAVENOUS; SOFT TISSUE
Status: DISCONTINUED | OUTPATIENT
Start: 2023-07-14 | End: 2023-07-14 | Stop reason: HOSPADM

## 2023-07-14 RX ORDER — LIDOCAINE 40 MG/G
CREAM TOPICAL
Status: DISCONTINUED | OUTPATIENT
Start: 2023-07-14 | End: 2023-07-16 | Stop reason: HOSPADM

## 2023-07-14 RX ORDER — MEPERIDINE HYDROCHLORIDE 25 MG/ML
12.5 INJECTION INTRAMUSCULAR; INTRAVENOUS; SUBCUTANEOUS EVERY 5 MIN PRN
Status: DISCONTINUED | OUTPATIENT
Start: 2023-07-14 | End: 2023-07-14 | Stop reason: HOSPADM

## 2023-07-14 RX ORDER — FENTANYL CITRATE 50 UG/ML
25 INJECTION, SOLUTION INTRAMUSCULAR; INTRAVENOUS EVERY 5 MIN PRN
Status: DISCONTINUED | OUTPATIENT
Start: 2023-07-14 | End: 2023-07-14 | Stop reason: HOSPADM

## 2023-07-14 RX ORDER — ONDANSETRON 4 MG/1
4 TABLET, ORALLY DISINTEGRATING ORAL EVERY 6 HOURS PRN
Status: DISCONTINUED | OUTPATIENT
Start: 2023-07-14 | End: 2023-07-14

## 2023-07-14 RX ORDER — LIDOCAINE 40 MG/G
CREAM TOPICAL
Status: DISCONTINUED | OUTPATIENT
Start: 2023-07-14 | End: 2023-07-14 | Stop reason: HOSPADM

## 2023-07-14 RX ORDER — HYDRALAZINE HYDROCHLORIDE 20 MG/ML
10-20 INJECTION INTRAMUSCULAR; INTRAVENOUS EVERY 30 MIN PRN
Status: DISCONTINUED | OUTPATIENT
Start: 2023-07-14 | End: 2023-07-16 | Stop reason: HOSPADM

## 2023-07-14 RX ORDER — PANTOPRAZOLE SODIUM 40 MG/1
40 TABLET, DELAYED RELEASE ORAL
Status: DISCONTINUED | OUTPATIENT
Start: 2023-07-15 | End: 2023-07-16 | Stop reason: HOSPADM

## 2023-07-14 RX ORDER — ACETAMINOPHEN 325 MG/1
650 TABLET ORAL EVERY 4 HOURS PRN
Status: DISCONTINUED | OUTPATIENT
Start: 2023-07-14 | End: 2023-07-16 | Stop reason: HOSPADM

## 2023-07-14 RX ORDER — LABETALOL HYDROCHLORIDE 5 MG/ML
10-40 INJECTION, SOLUTION INTRAVENOUS EVERY 10 MIN PRN
Status: DISCONTINUED | OUTPATIENT
Start: 2023-07-14 | End: 2023-07-16 | Stop reason: HOSPADM

## 2023-07-14 RX ORDER — NALOXONE HYDROCHLORIDE 0.4 MG/ML
0.4 INJECTION, SOLUTION INTRAMUSCULAR; INTRAVENOUS; SUBCUTANEOUS
Status: DISCONTINUED | OUTPATIENT
Start: 2023-07-14 | End: 2023-07-16 | Stop reason: HOSPADM

## 2023-07-14 RX ORDER — AMLODIPINE BESYLATE 10 MG/1
10 TABLET ORAL EVERY MORNING
Status: DISCONTINUED | OUTPATIENT
Start: 2023-07-15 | End: 2023-07-16 | Stop reason: HOSPADM

## 2023-07-14 RX ORDER — OXYCODONE HYDROCHLORIDE 5 MG/1
5 TABLET ORAL EVERY 4 HOURS PRN
Status: DISCONTINUED | OUTPATIENT
Start: 2023-07-14 | End: 2023-07-16 | Stop reason: HOSPADM

## 2023-07-14 RX ORDER — CEFAZOLIN SODIUM/WATER 2 G/20 ML
2 SYRINGE (ML) INTRAVENOUS SEE ADMIN INSTRUCTIONS
Status: DISCONTINUED | OUTPATIENT
Start: 2023-07-14 | End: 2023-07-14 | Stop reason: HOSPADM

## 2023-07-14 RX ORDER — BACLOFEN 10 MG/1
10 TABLET ORAL DAILY PRN
Status: DISCONTINUED | OUTPATIENT
Start: 2023-07-14 | End: 2023-07-16 | Stop reason: HOSPADM

## 2023-07-14 RX ORDER — LABETALOL HYDROCHLORIDE 5 MG/ML
5-10 INJECTION, SOLUTION INTRAVENOUS
Status: DISCONTINUED | OUTPATIENT
Start: 2023-07-14 | End: 2023-07-14 | Stop reason: HOSPADM

## 2023-07-14 RX ORDER — FENTANYL CITRATE 50 UG/ML
50 INJECTION, SOLUTION INTRAMUSCULAR; INTRAVENOUS EVERY 5 MIN PRN
Status: DISCONTINUED | OUTPATIENT
Start: 2023-07-14 | End: 2023-07-14 | Stop reason: HOSPADM

## 2023-07-14 RX ORDER — PROCHLORPERAZINE 25 MG
12.5 SUPPOSITORY, RECTAL RECTAL EVERY 12 HOURS PRN
Status: DISCONTINUED | OUTPATIENT
Start: 2023-07-14 | End: 2023-07-16 | Stop reason: HOSPADM

## 2023-07-14 RX ORDER — NALOXONE HYDROCHLORIDE 0.4 MG/ML
0.2 INJECTION, SOLUTION INTRAMUSCULAR; INTRAVENOUS; SUBCUTANEOUS
Status: DISCONTINUED | OUTPATIENT
Start: 2023-07-14 | End: 2023-07-16 | Stop reason: HOSPADM

## 2023-07-14 RX ORDER — ONDANSETRON 4 MG/1
4-8 TABLET, ORALLY DISINTEGRATING ORAL EVERY 6 HOURS PRN
Status: DISCONTINUED | OUTPATIENT
Start: 2023-07-14 | End: 2023-07-16 | Stop reason: HOSPADM

## 2023-07-14 RX ORDER — LEVETIRACETAM 500 MG/1
1000 TABLET ORAL EVERY 12 HOURS
Status: DISCONTINUED | OUTPATIENT
Start: 2023-07-14 | End: 2023-07-16 | Stop reason: HOSPADM

## 2023-07-14 RX ORDER — HYDRALAZINE HYDROCHLORIDE 20 MG/ML
2.5-5 INJECTION INTRAMUSCULAR; INTRAVENOUS EVERY 10 MIN PRN
Status: DISCONTINUED | OUTPATIENT
Start: 2023-07-14 | End: 2023-07-14 | Stop reason: HOSPADM

## 2023-07-14 RX ORDER — HYDROMORPHONE HCL IN WATER/PF 6 MG/30 ML
0.4 PATIENT CONTROLLED ANALGESIA SYRINGE INTRAVENOUS
Status: DISCONTINUED | OUTPATIENT
Start: 2023-07-14 | End: 2023-07-16 | Stop reason: HOSPADM

## 2023-07-14 RX ORDER — LIDOCAINE HYDROCHLORIDE 20 MG/ML
INJECTION, SOLUTION INFILTRATION; PERINEURAL PRN
Status: DISCONTINUED | OUTPATIENT
Start: 2023-07-14 | End: 2023-07-14

## 2023-07-14 RX ORDER — POLYETHYLENE GLYCOL 3350 17 G/17G
17 POWDER, FOR SOLUTION ORAL DAILY
Status: DISCONTINUED | OUTPATIENT
Start: 2023-07-15 | End: 2023-07-16 | Stop reason: HOSPADM

## 2023-07-14 RX ORDER — HYDRALAZINE HYDROCHLORIDE 20 MG/ML
INJECTION INTRAMUSCULAR; INTRAVENOUS PRN
Status: DISCONTINUED | OUTPATIENT
Start: 2023-07-14 | End: 2023-07-14

## 2023-07-14 RX ORDER — CEFAZOLIN SODIUM 1 G/3ML
1 INJECTION, POWDER, FOR SOLUTION INTRAMUSCULAR; INTRAVENOUS EVERY 8 HOURS
Status: COMPLETED | OUTPATIENT
Start: 2023-07-14 | End: 2023-07-15

## 2023-07-14 RX ORDER — HYDROMORPHONE HCL IN WATER/PF 6 MG/30 ML
0.2 PATIENT CONTROLLED ANALGESIA SYRINGE INTRAVENOUS
Status: DISCONTINUED | OUTPATIENT
Start: 2023-07-14 | End: 2023-07-16 | Stop reason: HOSPADM

## 2023-07-14 RX ORDER — ACETAMINOPHEN 325 MG/1
975 TABLET ORAL ONCE
Status: COMPLETED | OUTPATIENT
Start: 2023-07-14 | End: 2023-07-14

## 2023-07-14 RX ORDER — DEXAMETHASONE SODIUM PHOSPHATE 4 MG/ML
4 INJECTION, SOLUTION INTRA-ARTICULAR; INTRALESIONAL; INTRAMUSCULAR; INTRAVENOUS; SOFT TISSUE EVERY 6 HOURS SCHEDULED
Status: DISCONTINUED | OUTPATIENT
Start: 2023-07-14 | End: 2023-07-16 | Stop reason: ALTCHOICE

## 2023-07-14 RX ORDER — HYDROMORPHONE HCL IN WATER/PF 6 MG/30 ML
0.4 PATIENT CONTROLLED ANALGESIA SYRINGE INTRAVENOUS EVERY 5 MIN PRN
Status: DISCONTINUED | OUTPATIENT
Start: 2023-07-14 | End: 2023-07-14 | Stop reason: HOSPADM

## 2023-07-14 RX ORDER — OXYCODONE HYDROCHLORIDE 10 MG/1
10 TABLET ORAL
Status: DISCONTINUED | OUTPATIENT
Start: 2023-07-14 | End: 2023-07-14 | Stop reason: HOSPADM

## 2023-07-14 RX ORDER — PROPOFOL 10 MG/ML
INJECTION, EMULSION INTRAVENOUS PRN
Status: DISCONTINUED | OUTPATIENT
Start: 2023-07-14 | End: 2023-07-14

## 2023-07-14 RX ORDER — SODIUM CHLORIDE, SODIUM LACTATE, POTASSIUM CHLORIDE, AND CALCIUM CHLORIDE .6; .31; .03; .02 G/100ML; G/100ML; G/100ML; G/100ML
IRRIGANT IRRIGATION PRN
Status: DISCONTINUED | OUTPATIENT
Start: 2023-07-14 | End: 2023-07-14 | Stop reason: HOSPADM

## 2023-07-14 RX ORDER — SODIUM CHLORIDE 9 MG/ML
INJECTION, SOLUTION INTRAVENOUS CONTINUOUS
Status: DISCONTINUED | OUTPATIENT
Start: 2023-07-14 | End: 2023-07-15

## 2023-07-14 RX ORDER — BUPIVACAINE HYDROCHLORIDE AND EPINEPHRINE 2.5; 5 MG/ML; UG/ML
INJECTION, SOLUTION INFILTRATION; PERINEURAL PRN
Status: DISCONTINUED | OUTPATIENT
Start: 2023-07-14 | End: 2023-07-14 | Stop reason: HOSPADM

## 2023-07-14 RX ORDER — LEVETIRACETAM 100 MG/ML
SOLUTION ORAL PRN
Status: DISCONTINUED | OUTPATIENT
Start: 2023-07-14 | End: 2023-07-14

## 2023-07-14 RX ORDER — DEXAMETHASONE 4 MG/1
4 TABLET ORAL EVERY 6 HOURS SCHEDULED
Status: DISCONTINUED | OUTPATIENT
Start: 2023-07-14 | End: 2023-07-16 | Stop reason: ALTCHOICE

## 2023-07-14 RX ORDER — ONDANSETRON 2 MG/ML
4 INJECTION INTRAMUSCULAR; INTRAVENOUS EVERY 6 HOURS PRN
Status: DISCONTINUED | OUTPATIENT
Start: 2023-07-14 | End: 2023-07-14

## 2023-07-14 RX ORDER — CEFAZOLIN SODIUM/WATER 2 G/20 ML
2 SYRINGE (ML) INTRAVENOUS
Status: COMPLETED | OUTPATIENT
Start: 2023-07-14 | End: 2023-07-14

## 2023-07-14 RX ORDER — LABETALOL HYDROCHLORIDE 5 MG/ML
INJECTION, SOLUTION INTRAVENOUS PRN
Status: DISCONTINUED | OUTPATIENT
Start: 2023-07-14 | End: 2023-07-14

## 2023-07-14 RX ORDER — OXYCODONE HYDROCHLORIDE 10 MG/1
10 TABLET ORAL EVERY 4 HOURS PRN
Status: DISCONTINUED | OUTPATIENT
Start: 2023-07-14 | End: 2023-07-16 | Stop reason: HOSPADM

## 2023-07-14 RX ORDER — OXYCODONE HYDROCHLORIDE 5 MG/1
5 TABLET ORAL
Status: DISCONTINUED | OUTPATIENT
Start: 2023-07-14 | End: 2023-07-14 | Stop reason: HOSPADM

## 2023-07-14 RX ORDER — ONDANSETRON 2 MG/ML
4-8 INJECTION INTRAMUSCULAR; INTRAVENOUS EVERY 6 HOURS PRN
Status: DISCONTINUED | OUTPATIENT
Start: 2023-07-14 | End: 2023-07-16 | Stop reason: HOSPADM

## 2023-07-14 RX ADMIN — OXYCODONE HYDROCHLORIDE 5 MG: 5 TABLET ORAL at 20:02

## 2023-07-14 RX ADMIN — CEFAZOLIN 1 G: 1 INJECTION, POWDER, FOR SOLUTION INTRAMUSCULAR; INTRAVENOUS at 19:40

## 2023-07-14 RX ADMIN — Medication 30 MG: at 09:02

## 2023-07-14 RX ADMIN — ACETAMINOPHEN 650 MG: 325 TABLET, FILM COATED ORAL at 18:53

## 2023-07-14 RX ADMIN — Medication 20 MG: at 11:00

## 2023-07-14 RX ADMIN — HYDRALAZINE HYDROCHLORIDE 20 MG: 20 INJECTION INTRAMUSCULAR; INTRAVENOUS at 18:53

## 2023-07-14 RX ADMIN — Medication 2 G: at 08:44

## 2023-07-14 RX ADMIN — HYDRALAZINE HYDROCHLORIDE 5 MG: 20 INJECTION INTRAMUSCULAR; INTRAVENOUS at 16:50

## 2023-07-14 RX ADMIN — DEXAMETHASONE SODIUM PHOSPHATE 10 MG: 4 INJECTION, SOLUTION INTRA-ARTICULAR; INTRALESIONAL; INTRAMUSCULAR; INTRAVENOUS; SOFT TISSUE at 09:56

## 2023-07-14 RX ADMIN — PHENYLEPHRINE HYDROCHLORIDE 100 MCG: 10 INJECTION INTRAVENOUS at 13:18

## 2023-07-14 RX ADMIN — FENTANYL CITRATE 150 MCG: 50 INJECTION, SOLUTION INTRAMUSCULAR; INTRAVENOUS at 09:15

## 2023-07-14 RX ADMIN — ACETAMINOPHEN 975 MG: 325 TABLET, FILM COATED ORAL at 06:19

## 2023-07-14 RX ADMIN — PROPOFOL 50 MG: 10 INJECTION, EMULSION INTRAVENOUS at 09:07

## 2023-07-14 RX ADMIN — PROPOFOL 200 MG: 10 INJECTION, EMULSION INTRAVENOUS at 08:05

## 2023-07-14 RX ADMIN — HYDRALAZINE HYDROCHLORIDE 20 MG: 20 INJECTION INTRAMUSCULAR; INTRAVENOUS at 19:03

## 2023-07-14 RX ADMIN — SODIUM CHLORIDE, POTASSIUM CHLORIDE, SODIUM LACTATE AND CALCIUM CHLORIDE: 600; 310; 30; 20 INJECTION, SOLUTION INTRAVENOUS at 07:54

## 2023-07-14 RX ADMIN — Medication 20 MG: at 12:00

## 2023-07-14 RX ADMIN — Medication 20 MG: at 12:27

## 2023-07-14 RX ADMIN — SUGAMMADEX 200 MG: 100 INJECTION, SOLUTION INTRAVENOUS at 14:18

## 2023-07-14 RX ADMIN — FENTANYL CITRATE 100 MCG: 50 INJECTION, SOLUTION INTRAMUSCULAR; INTRAVENOUS at 08:40

## 2023-07-14 RX ADMIN — ONDANSETRON 4 MG: 2 INJECTION INTRAMUSCULAR; INTRAVENOUS at 13:50

## 2023-07-14 RX ADMIN — FENTANYL CITRATE 25 MCG: 50 INJECTION, SOLUTION INTRAMUSCULAR; INTRAVENOUS at 16:58

## 2023-07-14 RX ADMIN — Medication 20 MG: at 11:29

## 2023-07-14 RX ADMIN — HYDROMORPHONE HYDROCHLORIDE 0.5 MG: 1 INJECTION, SOLUTION INTRAMUSCULAR; INTRAVENOUS; SUBCUTANEOUS at 13:53

## 2023-07-14 RX ADMIN — FENTANYL CITRATE 50 MCG: 50 INJECTION, SOLUTION INTRAMUSCULAR; INTRAVENOUS at 11:58

## 2023-07-14 RX ADMIN — FENTANYL CITRATE 150 MCG: 50 INJECTION, SOLUTION INTRAMUSCULAR; INTRAVENOUS at 08:05

## 2023-07-14 RX ADMIN — Medication 20 MG: at 10:25

## 2023-07-14 RX ADMIN — SODIUM CHLORIDE: 9 INJECTION, SOLUTION INTRAVENOUS at 16:54

## 2023-07-14 RX ADMIN — MANNITOL 50 G: 20 INJECTION, SOLUTION INTRAVENOUS at 08:49

## 2023-07-14 RX ADMIN — Medication 20 MG: at 13:03

## 2023-07-14 RX ADMIN — LABETALOL HYDROCHLORIDE 5 MG: 5 INJECTION INTRAVENOUS at 11:06

## 2023-07-14 RX ADMIN — SENNOSIDES AND DOCUSATE SODIUM 1 TABLET: 50; 8.6 TABLET ORAL at 19:40

## 2023-07-14 RX ADMIN — HYDRALAZINE HYDROCHLORIDE 5 MG: 20 INJECTION INTRAMUSCULAR; INTRAVENOUS at 14:07

## 2023-07-14 RX ADMIN — LABETALOL HYDROCHLORIDE 5 MG: 5 INJECTION INTRAVENOUS at 11:59

## 2023-07-14 RX ADMIN — LABETALOL HYDROCHLORIDE 5 MG: 5 INJECTION INTRAVENOUS at 14:18

## 2023-07-14 RX ADMIN — LEVETIRACETAM 1000 MG: 100 SOLUTION ORAL at 08:48

## 2023-07-14 RX ADMIN — HYDRALAZINE HYDROCHLORIDE 10 MG: 20 INJECTION INTRAMUSCULAR; INTRAVENOUS at 23:45

## 2023-07-14 RX ADMIN — LABETALOL HYDROCHLORIDE 20 MG: 5 INJECTION, SOLUTION INTRAVENOUS at 18:54

## 2023-07-14 RX ADMIN — Medication 20 MG: at 09:54

## 2023-07-14 RX ADMIN — Medication 50 MG: at 08:05

## 2023-07-14 RX ADMIN — LIDOCAINE HYDROCHLORIDE 100 MG: 20 INJECTION, SOLUTION INFILTRATION; PERINEURAL at 08:05

## 2023-07-14 RX ADMIN — Medication 2 G: at 12:49

## 2023-07-14 RX ADMIN — MANNITOL 30 G: 20 INJECTION, SOLUTION INTRAVENOUS at 09:19

## 2023-07-14 RX ADMIN — HYDRALAZINE HYDROCHLORIDE 5 MG: 20 INJECTION INTRAMUSCULAR; INTRAVENOUS at 14:14

## 2023-07-14 RX ADMIN — LEVETIRACETAM 1000 MG: 500 TABLET, FILM COATED ORAL at 18:53

## 2023-07-14 RX ADMIN — HYDRALAZINE HYDROCHLORIDE 5 MG: 20 INJECTION INTRAMUSCULAR; INTRAVENOUS at 17:12

## 2023-07-14 RX ADMIN — DEXAMETHASONE SODIUM PHOSPHATE 4 MG: 4 INJECTION, SOLUTION INTRA-ARTICULAR; INTRALESIONAL; INTRAMUSCULAR; INTRAVENOUS; SOFT TISSUE at 18:53

## 2023-07-14 RX ADMIN — LABETALOL HYDROCHLORIDE 5 MG: 5 INJECTION INTRAVENOUS at 11:44

## 2023-07-14 RX ADMIN — DEXAMETHASONE SODIUM PHOSPHATE 4 MG: 4 INJECTION, SOLUTION INTRA-ARTICULAR; INTRALESIONAL; INTRAMUSCULAR; INTRAVENOUS; SOFT TISSUE at 23:45

## 2023-07-14 RX ADMIN — ONDANSETRON 4 MG: 2 INJECTION INTRAMUSCULAR; INTRAVENOUS at 16:29

## 2023-07-14 RX ADMIN — FENTANYL CITRATE 50 MCG: 50 INJECTION, SOLUTION INTRAMUSCULAR; INTRAVENOUS at 09:47

## 2023-07-14 ASSESSMENT — ACTIVITIES OF DAILY LIVING (ADL)
ADLS_ACUITY_SCORE: 23
ADLS_ACUITY_SCORE: 31
ADLS_ACUITY_SCORE: 23
ADLS_ACUITY_SCORE: 29
ADLS_ACUITY_SCORE: 33
ADLS_ACUITY_SCORE: 23

## 2023-07-14 ASSESSMENT — VISUAL ACUITY
OU: NORMAL ACUITY;BASELINE

## 2023-07-14 NOTE — ANESTHESIA CARE TRANSFER NOTE
Patient: Saeid Santa    Procedure: Procedure(s):  STEALTH ASSISTED CRANIOTOMY, WITH NEOPLASM EXCISION LEFT PARIETO-OCCIPITAL CRANIOTOMY WITH RESECTION OF MASS,  LUMBAR DRAIN       Diagnosis: Malignant melanoma metastatic to brain (H) [C79.31]  Diagnosis Additional Information: No value filed.    Anesthesia Type:   General     Note:    Oropharynx: oral airway in place  Level of Consciousness: drowsy  Oxygen Supplementation: face mask    Independent Airway: airway patency satisfactory and stable  Dentition: dentition unchanged  Vital Signs Stable: post-procedure vital signs reviewed and stable  Report to RN Given: handoff report given  Patient transferred to: PACU    Handoff Report: Identifed the Patient, Identified the Reponsible Provider, Reviewed the pertinent medical history, Discussed the surgical course, Reviewed Intra-OP anesthesia mangement and issues during anesthesia, Set expectations for post-procedure period and Allowed opportunity for questions and acknowledgement of understanding      Vitals:  Vitals Value Taken Time   /74 07/14/23 1429   Temp     Pulse 66 07/14/23 1438   Resp     SpO2 98 % 07/14/23 1438   Vitals shown include unvalidated device data.    Electronically Signed By: ALIREZA Caballero CRNA  July 14, 2023  2:39 PM

## 2023-07-14 NOTE — ANESTHESIA PROCEDURE NOTES
Arterial Line Procedure Note    Pre-Procedure   Staff -        Anesthesiologist:  Mauricio Morrison MD       Performed By: anesthesiologist       Location: OR       Pre-Anesthestic Checklist: patient identified, IV checked, risks and benefits discussed, informed consent, monitors and equipment checked and pre-op evaluation  Line Placement:   This line was placed Post Induction starting at 7/14/2023 8:14 AM  Procedure   Procedure: arterial line       Diagnosis: cranial tumor, hx of metastatic melanoma       Laterality: left       Insertion Site: radial.  Sterile Prep        Standard elements of sterile barrier followed       Skin prep: Chloraprep  Insertion/Injection        Technique: Seldinger Technique        Catheter Type/Size: 20 G, 1.75 in/4.5 cm quick cath (integral wire)  Narrative         Secured by: other       Tegaderm (tape, benzoin) dressing used.       Complications: None apparent,        Arterial waveform: Yes        IBP within 10% of NIBP: Yes

## 2023-07-14 NOTE — BRIEF OP NOTE
"[facility]    Brief Operative Note    Pre-operative diagnosis: Malignant melanoma metastatic to brain (H) [C79.31]  Post-operative diagnosis Same as pre-operative diagnosis    Procedure: Procedure(s):  STEALTH ASSISTED CRANIOTOMY, WITH NEOPLASM EXCISION LEFT PARIETO-OCCIPITAL CRANIOTOMY WITH RESECTION OF MASS,  LUMBAR DRAIN  Surgeon: Surgeon(s) and Role:     * Kye Garrido MD - Primary     * Jaycob Denise MD - Resident - Assisting  Anesthesia: General   Estimated Blood Loss: 50 ml    Drains:  Subgaleal MELY drain  Specimens:   ID Type Source Tests Collected by Time Destination   1 : left parietal tumor Tissue Brain SURGICAL PATHOLOGY EXAM Kye Garrido MD 7/14/2023 11:19 AM    2 : Left Parietal Tumor Tissue Brain SURGICAL PATHOLOGY EXAM Kye Garrido MD 7/14/2023  1:11 PM      Findings:   Occipital interhemispheric approach for left parietal-occipital tumor resection. Frozen metastatic tumor. Subgaleal MELY drain left in place. Skin closure with simple running 3-0 Monocryl..  Complications: None.  Implants:   Implant Name Type Inv. Item Serial No.  Lot No. LRB No. Used Action   2 hole dogbone  erma     NA N/A 1 Implanted    Sandee Hole Cover Plate 7mm erma     NA N/A 1 Implanted   sandee hole cover plate 20mm strkyer     NA N/A 2 Implanted   GRAFT DURAGEN 3X3\"  - [id number] Other GRAFT DURAGEN 3X3\"   [employer] [id number] N/A 1 Implanted   4mm screws erma     NA N/A 14 Implanted   4mm screw erma     NA N/A 1 Wasted         Jaycob Denise M.D.  Neurosurgery Resident, PGY-5    Please contact neurosurgery resident on call with questions.    Dial * * [phone], enter [phone] when prompted.    "

## 2023-07-14 NOTE — BRIEF OP NOTE
"[facility]    Brief Operative Note    Pre-operative diagnosis: Malignant melanoma metastatic to brain (H) [C79.31]  Post-operative diagnosis Same as pre-operative diagnosis    Procedure: Procedure(s):  STEALTH ASSISTED CRANIOTOMY, WITH NEOPLASM EXCISION LEFT PARIETO-OCCIPITAL CRANIOTOMY WITH RESECTION OF MASS,  LUMBAR DRAIN  Surgeon: Surgeon(s) and Role:     * Kye Garrido MD - Primary     * Jaycob Denise MD - Resident - Assisting  Anesthesia: General   Estimated Blood Loss: 50 mL from 7/14/2023  7:59 AM to 7/14/2023  2:28 PM      Drains: Eyad-Little  Specimens:   ID Type Source Tests Collected by Time Destination   1 : left parietal tumor Tissue Brain SURGICAL PATHOLOGY EXAM Kye Garrido MD 7/14/2023 11:19 AM    2 : Left Parietal Tumor Tissue Brain SURGICAL PATHOLOGY EXAM Kye Garrido MD 7/14/2023  1:11 PM      Findings:   Tumor was vascular and suckable.  Complications: None.  Implants:   Implant Name Type Inv. Item Serial No.  Lot No. LRB No. Used Action   2 hole dogbone  erma     NA N/A 1 Implanted    [place] Hole Cover Plate 7mm erma     NA N/A 1 Implanted   sandee hole cover plate 20mm strkyer     NA N/A 2 Implanted   GRAFT DURAGEN 3X3\"  - [id number] Other GRAFT DURAGEN 3X3\"   [employer] [id number] N/A 1 Implanted   4mm screws erma     NA N/A 14 Implanted   4mm screw erma     NA N/A 1 Wasted           "

## 2023-07-14 NOTE — PLAN OF CARE
A&Ox4, moves all extremities purposefully. Follows commands. Possible R visual field cut noted, team aware. C/o 4/10 pain on scalp incision. MELY drain to thumbprint. 40 hydralazine given, 20 labetalol. L radial art-line readaing 20-30 mmHg higher than cuff, team aware. Systolic goal <140. Otherwise VSS.     .Admitted/transferred from: PACU  Reason for admission/transfer: Serial neuro exams  2 RN skin assessment: completed by: Samantha Little and Ronnie REAVES  Result of skin assessment and interventions/actions: Preventative mepilex on coccyx  Height, weight, drug calc weight: Done  Patient belongings: with patient   MDRO education added to care plan: No    Samantha Little, RN on 7/14/2023 at 7:33 PM  ?

## 2023-07-14 NOTE — ANESTHESIA POSTPROCEDURE EVALUATION
Patient: Saeid Santa    Procedure: Procedure(s):  STEALTH ASSISTED CRANIOTOMY, WITH NEOPLASM EXCISION LEFT PARIETO-OCCIPITAL CRANIOTOMY WITH RESECTION OF MASS,  LUMBAR DRAIN       Anesthesia Type:  General    Note:  Disposition: Inpatient   Postop Pain Control: Uneventful            Sign Out: Well controlled pain   PONV: No   Neuro/Psych:             Events: New weakness            Sign Out: Acceptable/Baseline neuro status (right lower extremity initially unable to wiggles toe /ankle RLLE.  Now patiuent abl e to flex/dflex and wiggle right foot and toes.  RN at bedside aware and she has called neurosurgeon to inform and come evaluate.)   Airway/Respiratory: Uneventful            Sign Out: Acceptable/Baseline resp. status   CV/Hemodynamics: Uneventful            Sign Out: Acceptable CV status; No obvious hypovolemia; No obvious fluid overload   Other NRE:    DID A NON-ROUTINE EVENT OCCUR? YES           Last vitals:  Vitals Value Taken Time   /79 07/14/23 1500   Temp     Pulse 52 07/14/23 1501   Resp     SpO2 96 % 07/14/23 1501   Vitals shown include unvalidated device data.    Electronically Signed By: Mauricio Morrison MD  July 14, 2023  3:02 PM

## 2023-07-14 NOTE — ANESTHESIA PREPROCEDURE EVALUATION
Anesthesia Pre-Procedure Evaluation    Patient: Saeid Santa   MRN: [id number] : 1955        Procedure : Procedure(s):  STEALTH ASSISTED CRANIOTOMY, WITH NEOPLASM EXCISION LEFT PARIETO-OCCIPITAL CRANIOTOMY WITH RESECTION OF MASS,  POSSIBLE LUMBAR DRAIN          Past Medical History:   Diagnosis Date     HTN (hypertension)      Metastatic melanoma (H)      Prostate cancer (H)      Seizures (H)       Past Surgical History:   Procedure Laterality Date     OPTICAL TRACKING SYSTEM CRANIOTOMY, EXCISE TUMOR, COMBINED Left 2023    Procedure: stealth assisted awake craniotomy resection of motor strip tumor;  Surgeon: Kye Garrido MD;  Location: [facility] OR     PROSTATECTOMY            No Known Allergies   Social History     Tobacco Use     Smoking status: Former     Packs/day: 0.50     Years: 20.00     Pack years: 10.00     Types: Cigarettes     Quit date: 2017     Years since quittin.5     Smokeless tobacco: Never   Substance Use Topics     Alcohol use: Not Currently     Comment: socially      Wt Readings from Last 1 Encounters:   23 81.7 kg (180 lb 1.9 oz)        Anesthesia Evaluation            ROS/MED HX  ENT/Pulmonary:     (+) sleep apnea, mild, doesn't use CPAP,     Neurologic:       Cardiovascular:     (+) hypertension-----    METS/Exercise Tolerance:     Hematologic:       Musculoskeletal:       GI/Hepatic:       Renal/Genitourinary:       Endo:       Psychiatric/Substance Use:       Infectious Disease:       Malignancy:       Other:            Physical Exam    Airway        Mallampati: II   TM distance: > 3 FB    Mouth opening: > 3 cm    Respiratory Devices and Support         Dental       (+) Minor Abnormalities - some fillings, tiny chips      Cardiovascular   cardiovascular exam normal          Pulmonary   pulmonary exam normal                OUTSIDE LABS:  CBC:   Lab Results   Component Value Date    WBC 17.6 (H) 2023    WBC 11.5 (H) 2023    HGB 12.8 (L) 2023     HGB 13.5 07/08/2023    HCT 38.8 (L) 07/09/2023    HCT 40.9 07/08/2023     07/09/2023     07/08/2023     BMP:   Lab Results   Component Value Date     07/09/2023     07/09/2023    POTASSIUM 3.8 07/09/2023    POTASSIUM 4.3 07/08/2023    CHLORIDE 106 07/09/2023    CHLORIDE 109 (H) 07/08/2023    CO2 18 (L) 07/09/2023    CO2 19 (L) 07/08/2023    BUN 20.3 07/09/2023    BUN 15.3 07/08/2023    CR 0.72 07/09/2023    CR 0.73 07/08/2023     (H) 07/09/2023     (H) 07/09/2023     COAGS:   Lab Results   Component Value Date    PTT 24 07/07/2023    INR 0.83 (L) 07/07/2023     POC: No results found for: BGM, HCG, HCGS  HEPATIC:   Lab Results   Component Value Date    ALBUMIN 3.6 07/07/2023    PROTTOTAL 5.7 (L) 07/07/2023    ALT 26 07/07/2023    AST 18 07/07/2023    ALKPHOS 69 07/07/2023    BILITOTAL 0.5 07/07/2023     OTHER:   Lab Results   Component Value Date    LACT 1.2 04/15/2023    JOHN PAUL 8.7 (L) 07/09/2023    PHOS 3.1 04/22/2023    MAG 2.2 04/23/2023    LIPASE 121 10/06/2021    TSH 1.90 06/07/2023       Anesthesia Plan    ASA Status:  3      Anesthesia Type: General.   Induction: Intravenous.   Maintenance: Balanced.   Techniques and Equipment:     - Lines/Monitors: 2nd IV, Arterial Line     Consents    Anesthesia Plan(s) and associated risks, benefits, and realistic alternatives discussed. Questions answered and patient/representative(s) expressed understanding.    - Discussed:     - Discussed with:  Patient         Postoperative Care    Pain management: IV analgesics.   PONV prophylaxis: Ondansetron (or other 5HT-3), Dexamethasone or Solumedrol     Comments:           H&P reviewed: Unable to attach H&P to encounter due to EHR limitations. H&P Update: appropriate H&P reviewed, patient examined. No interval changes since H&P (within 30 days).         Mauricio Morrison MD

## 2023-07-14 NOTE — ANESTHESIA PROCEDURE NOTES
Airway       Patient location during procedure: OR       Procedure Start/Stop Times: 7/14/2023 8:07 AM  Staff -        CRNA: Parisa Sevilla APRN CRNA       Performed By: CRNA  Consent for Airway        Urgency: elective  Indications and Patient Condition       Indications for airway management: bob-procedural       Induction type:intravenous       Mask difficulty assessment: 2 - vent by mask + OA or adjuvant +/- NMBA    Final Airway Details       Final airway type: endotracheal airway       Successful airway: ETT - single  Endotracheal Airway Details        ETT size (mm): 8.0       Cuffed: yes       Successful intubation technique: direct laryngoscopy       DL Blade Type: MAC 3       Grade View of Cords: 1       Adjucts: stylet       Position: Right       Bite block used: None    Post intubation assessment        Placement verified by: capnometry, equal breath sounds and chest rise        Number of attempts at approach: 1       Secured with: cloth tape       Ease of procedure: easy       Dentition: Intact and Unchanged    Medication(s) Administered   Medication Administration Time: 7/14/2023 8:07 AM

## 2023-07-15 ENCOUNTER — APPOINTMENT (OUTPATIENT)
Dept: OCCUPATIONAL THERAPY | Facility: CLINIC | Age: 68
DRG: 025 | End: 2023-07-15
Attending: NEUROLOGICAL SURGERY
Payer: COMMERCIAL

## 2023-07-15 ENCOUNTER — APPOINTMENT (OUTPATIENT)
Dept: PHYSICAL THERAPY | Facility: CLINIC | Age: 68
DRG: 025 | End: 2023-07-15
Attending: NEUROLOGICAL SURGERY
Payer: COMMERCIAL

## 2023-07-15 LAB
ANION GAP SERPL CALCULATED.3IONS-SCNC: 12 MMOL/L (ref 7–15)
BUN SERPL-MCNC: 16.6 MG/DL (ref 8–23)
CALCIUM SERPL-MCNC: 8 MG/DL (ref 8.8–10.2)
CHLORIDE SERPL-SCNC: 108 MMOL/L (ref 98–107)
CREAT SERPL-MCNC: 0.69 MG/DL (ref 0.67–1.17)
DEPRECATED HCO3 PLAS-SCNC: 20 MMOL/L (ref 22–29)
ERYTHROCYTE [DISTWIDTH] IN BLOOD BY AUTOMATED COUNT: 15.3 % (ref 10–15)
GFR SERPL CREATININE-BSD FRML MDRD: >90 ML/MIN/1.73M2
GLUCOSE SERPL-MCNC: 106 MG/DL (ref 70–99)
HCT VFR BLD AUTO: 39.2 % (ref 40–53)
HGB BLD-MCNC: 13 G/DL (ref 13.3–17.7)
MAGNESIUM SERPL-MCNC: 2.1 MG/DL (ref 1.7–2.3)
MCH RBC QN AUTO: 30.8 PG (ref 26.5–33)
MCHC RBC AUTO-ENTMCNC: 33.2 G/DL (ref 31.5–36.5)
MCV RBC AUTO: 93 FL (ref 78–100)
PHOSPHATE SERPL-MCNC: 3.4 MG/DL (ref 2.5–4.5)
PLATELET # BLD AUTO: 239 10E3/UL (ref 150–450)
POTASSIUM SERPL-SCNC: 3.7 MMOL/L (ref 3.4–5.3)
RBC # BLD AUTO: 4.22 10E6/UL (ref 4.4–5.9)
SARS-COV-2 RNA RESP QL NAA+PROBE: NEGATIVE
SODIUM SERPL-SCNC: 140 MMOL/L (ref 136–145)
WBC # BLD AUTO: 19.1 10E3/UL (ref 4–11)

## 2023-07-15 PROCEDURE — 97165 OT EVAL LOW COMPLEX 30 MIN: CPT | Mod: GO | Performed by: OCCUPATIONAL THERAPIST

## 2023-07-15 PROCEDURE — 97116 GAIT TRAINING THERAPY: CPT | Mod: GP

## 2023-07-15 PROCEDURE — 120N000002 HC R&B MED SURG/OB UMMC

## 2023-07-15 PROCEDURE — 83735 ASSAY OF MAGNESIUM: CPT | Performed by: STUDENT IN AN ORGANIZED HEALTH CARE EDUCATION/TRAINING PROGRAM

## 2023-07-15 PROCEDURE — 250N000011 HC RX IP 250 OP 636: Performed by: STUDENT IN AN ORGANIZED HEALTH CARE EDUCATION/TRAINING PROGRAM

## 2023-07-15 PROCEDURE — 97110 THERAPEUTIC EXERCISES: CPT | Mod: GO | Performed by: OCCUPATIONAL THERAPIST

## 2023-07-15 PROCEDURE — 87635 SARS-COV-2 COVID-19 AMP PRB: CPT | Performed by: NEUROLOGICAL SURGERY

## 2023-07-15 PROCEDURE — 97530 THERAPEUTIC ACTIVITIES: CPT | Mod: GP

## 2023-07-15 PROCEDURE — 97535 SELF CARE MNGMENT TRAINING: CPT | Mod: GO | Performed by: OCCUPATIONAL THERAPIST

## 2023-07-15 PROCEDURE — 250N000012 HC RX MED GY IP 250 OP 636 PS 637: Performed by: STUDENT IN AN ORGANIZED HEALTH CARE EDUCATION/TRAINING PROGRAM

## 2023-07-15 PROCEDURE — 97161 PT EVAL LOW COMPLEX 20 MIN: CPT | Mod: GP

## 2023-07-15 PROCEDURE — 84100 ASSAY OF PHOSPHORUS: CPT | Performed by: STUDENT IN AN ORGANIZED HEALTH CARE EDUCATION/TRAINING PROGRAM

## 2023-07-15 PROCEDURE — 250N000013 HC RX MED GY IP 250 OP 250 PS 637: Performed by: NEUROLOGICAL SURGERY

## 2023-07-15 PROCEDURE — 82310 ASSAY OF CALCIUM: CPT | Performed by: NEUROLOGICAL SURGERY

## 2023-07-15 PROCEDURE — 250N000013 HC RX MED GY IP 250 OP 250 PS 637: Performed by: STUDENT IN AN ORGANIZED HEALTH CARE EDUCATION/TRAINING PROGRAM

## 2023-07-15 PROCEDURE — 85027 COMPLETE CBC AUTOMATED: CPT | Performed by: STUDENT IN AN ORGANIZED HEALTH CARE EDUCATION/TRAINING PROGRAM

## 2023-07-15 RX ORDER — POTASSIUM CHLORIDE 1.5 G/1.58G
20 POWDER, FOR SOLUTION ORAL ONCE
Status: COMPLETED | OUTPATIENT
Start: 2023-07-15 | End: 2023-07-15

## 2023-07-15 RX ADMIN — SENNOSIDES AND DOCUSATE SODIUM 1 TABLET: 50; 8.6 TABLET ORAL at 19:54

## 2023-07-15 RX ADMIN — LABETALOL HYDROCHLORIDE 20 MG: 5 INJECTION, SOLUTION INTRAVENOUS at 07:50

## 2023-07-15 RX ADMIN — OXYCODONE HYDROCHLORIDE 10 MG: 10 TABLET ORAL at 15:43

## 2023-07-15 RX ADMIN — LEVETIRACETAM 1000 MG: 500 TABLET, FILM COATED ORAL at 05:30

## 2023-07-15 RX ADMIN — CEFAZOLIN 1 G: 1 INJECTION, POWDER, FOR SOLUTION INTRAMUSCULAR; INTRAVENOUS at 03:59

## 2023-07-15 RX ADMIN — DEXAMETHASONE 4 MG: 4 TABLET ORAL at 23:56

## 2023-07-15 RX ADMIN — AMLODIPINE BESYLATE 10 MG: 10 TABLET ORAL at 07:44

## 2023-07-15 RX ADMIN — DEXAMETHASONE SODIUM PHOSPHATE 4 MG: 4 INJECTION, SOLUTION INTRA-ARTICULAR; INTRALESIONAL; INTRAMUSCULAR; INTRAVENOUS; SOFT TISSUE at 05:30

## 2023-07-15 RX ADMIN — SENNOSIDES AND DOCUSATE SODIUM 1 TABLET: 50; 8.6 TABLET ORAL at 07:43

## 2023-07-15 RX ADMIN — LEVETIRACETAM 1000 MG: 500 TABLET, FILM COATED ORAL at 18:35

## 2023-07-15 RX ADMIN — OXYCODONE HYDROCHLORIDE 5 MG: 5 TABLET ORAL at 03:59

## 2023-07-15 RX ADMIN — OXYCODONE HYDROCHLORIDE 10 MG: 10 TABLET ORAL at 07:38

## 2023-07-15 RX ADMIN — OXYCODONE HYDROCHLORIDE 10 MG: 10 TABLET ORAL at 11:49

## 2023-07-15 RX ADMIN — DEXAMETHASONE 4 MG: 4 TABLET ORAL at 11:49

## 2023-07-15 RX ADMIN — POTASSIUM CHLORIDE 20 MEQ: 1.5 POWDER, FOR SOLUTION ORAL at 05:30

## 2023-07-15 RX ADMIN — HYDRALAZINE HYDROCHLORIDE 20 MG: 20 INJECTION INTRAMUSCULAR; INTRAVENOUS at 10:46

## 2023-07-15 RX ADMIN — ONDANSETRON 4 MG: 4 TABLET, ORALLY DISINTEGRATING ORAL at 10:33

## 2023-07-15 RX ADMIN — HYDRALAZINE HYDROCHLORIDE 20 MG: 20 INJECTION INTRAMUSCULAR; INTRAVENOUS at 09:04

## 2023-07-15 RX ADMIN — DEXAMETHASONE 4 MG: 4 TABLET ORAL at 18:35

## 2023-07-15 RX ADMIN — PANTOPRAZOLE SODIUM 40 MG: 40 TABLET, DELAYED RELEASE ORAL at 07:43

## 2023-07-15 RX ADMIN — HYDRALAZINE HYDROCHLORIDE 10 MG: 20 INJECTION INTRAMUSCULAR; INTRAVENOUS at 06:36

## 2023-07-15 RX ADMIN — CEFAZOLIN 1 G: 1 INJECTION, POWDER, FOR SOLUTION INTRAMUSCULAR; INTRAVENOUS at 11:49

## 2023-07-15 ASSESSMENT — ACTIVITIES OF DAILY LIVING (ADL)
ADLS_ACUITY_SCORE: 31

## 2023-07-15 ASSESSMENT — VISUAL ACUITY
OU: NORMAL ACUITY;BASELINE

## 2023-07-15 NOTE — PROGRESS NOTES
07/15/23 1439   Appointment Info   Signing Clinician's Name / Credentials (OT) calos hammer ot/l   Living Environment   People in Home spouse   Current Living Arrangements house   Home Accessibility stairs within home   Number of Stairs, Within Home, Primary greater than 10 stairs   Transportation Anticipated family or friend will provide   Living Environment Comments pt can stay on either level of house wiht full ammenities.   Self-Care   Usual Activity Tolerance good   Current Activity Tolerance fair   Activity/Exercise Type walking   Equipment Currently Used at Home cane, straight   Fall history within last six months yes   Number of times patient has fallen within last six months 2   Instrumental Activities of Daily Living (IADL)   IADL Comments pt manages his own medicaitons, SO able to assist as needed   General Information   Referring Physician Jaycob Denise   Patient/Family Therapy Goal Statement (OT) return to PLOF   Additional Occupational Profile Info/Pertinent History of Current Problem Saeid Santa is a 67 year old male with left occipitotemporal gyrus malignant melanoma lesion   Existing Precautions/Restrictions fall;other (see comments)  (craniotomy)   General Observations and Info pt fatigued and in pain, motivated to particiapate in OT.   Cognitive Status Examination   Orientation Status orientation to person, place and time   Memory Deficit moderate deficit;short-term memory;long-term memory   Cognitive Status Comments memory lagging   Cognitive Screens/Assessments   Cognitive Assessments Completed [facility] Mental Status Exam (UMS):  Total Score out of /30 17   [facility] Norms 1-20 equals dementia   [facility] Domains assessed: orientation, memory, attention, executive functions   Visual Perception   Impact of Vision Impairment on Function (Vision) pt wiht blurry vision hower able to see in full visual field   Range of Motion Comprehensive   General Range of Motion no range of motion  deficits identified   Strength Comprehensive (MMT)   General Manual Muscle Testing (MMT) Assessment other (see comments)   Comment, General Manual Muscle Testing (MMT) Assessment overall decondiitoning   Coordination   Coordination Comments no concerns.   Activities of Daily Living   BADL Assessment/Intervention lower body dressing   Bathing Assessment/Intervention   [place] Level (Bathing) maximum assist (25% patient effort)   Lower Body Dressing Assessment/Training   [place] Level (Lower Body Dressing) maximum assist (25% patient effort)   Clinical Impression   Criteria for Skilled Therapeutic Interventions Met (OT) Yes, treatment indicated   OT Diagnosis decreased ADL I   Assessment of Occupational Performance 3-5 Performance Deficits   Identified Performance Deficits dressing, bathing, toileting, medication managment.   Planned Therapy Interventions (OT) ADL retraining;IADL retraining;cognition;strengthening;transfer training;home program guidelines;progressive activity/exercise;risk factor education   Clinical Decision Making Complexity (OT) low complexity   Risk & Benefits of therapy have been explained evaluation/treatment results reviewed;care plan/treatment goals reviewed;risks/benefits reviewed;current/potential barriers reviewed;participants voiced agreement with care plan;participants included;patient   OT Total Evaluation Time   OT Eval, Low Complexity Minutes (24071) 5   OT Goals   Therapy Frequency (OT) 5 times/wk   OT Predicted Duration/Target Date for Goal Attainment 07/28/23   OT Goals OT Goal 1   OT: Hygiene/Grooming independent;while standing   OT: Upper Body Dressing Independent;including set-up/clothing retrieval   OT: Lower Body Dressing Modified independent;within precautions;including set-up/clothing retrieval   OT: Toilet Transfer/Toileting Modified independent;toilet transfer;cleaning and garment management   OT: Goal 1 pt will complete with 100% accuracy a new medication setup  task.   OT Discharge Planning   OT Plan cont wiht LE dressing and medication setup task.   OT Discharge Recommendation (DC Rec) Transitional Care Facility;home with assist  (pending progress.)   OT Rationale for DC Rec pt below baseline in ADL I   OT Brief overview of current status Pt scoring 17 on SLUMS indicating dementia like cognitive skills. pt at baseline manages his own mediucations.   Total Session Time   Total Session Time (sum of timed and untimed services) 5

## 2023-07-15 NOTE — PROGRESS NOTES
Neurocritical Care Multi-Disciplinary Note    Reason for critical care admission: Brain tumor   Primary Team: JENNIFER  Date of Service:  07/15/2023  Date of Admission:  7/14/2023  Hospital Day: 2    Patient condition reviewed and discussed while on multidisciplinary rounds today. Please note these minor interventions that were initiated:  1. None.    The Neurocritical Care service will continue to follow peripherally while the patient is within the ICU. We are readily available should issues arise. Please feel free to contact us for critical care issues with which we may be of assistance. For all other concerns, please contact primary service first.     Please contact NCC team phone at [phone]    Pita LAY CNP  Neurocritical care nurse practitioner  Pager: [phone]  Ascom: *[phone] available [facility] [place] [phone] to [phone]

## 2023-07-15 NOTE — PLAN OF CARE
Major Shift Events:  Patient remained stable this shift. Slept between cares and neuro checks. Moderate amount of pain around the incision site. NSG paged due to having 1275mL output of urine in two hours. Order to stop his NS fluids. A. Febrile.      Neuro: Q1 neuro checks done this shift. Neuro intact, alert and orientated x4. 5/5 strength in extremities. PERRLA. Did not notice any right visual cut this shift.      Cardiac: Patient normal sinus rhythm this shift with occasional sinus verónica into the upper 50's. Heart rate 50-70's. SBP goal <140, maintained with one dose of prn Hydralazine given.  +2 edema in right foot.      Respiratory: Patient on room air. Severe sleep apnea throughout the night. SpO2 was between 96-99% this shift. Clear lung sounds.      /GI: Patient correa removed at 0600. Large amount of urine out this shift. Anywhere from 250-350 every 2 hours. Except for the large 1275mL amount that was reported to neuro surg. No BM this shift. Bowel meds given. Last BM was prior to arrival.      Diet: advanced diet as tolerated. Tolerated sips of water throughout the night, along with no difficulty swallowing medications.       Activity: Patient assist of 1. Was turned and repositioned Q2 hours this shift. No OOB activity this shift. Bath done, linen changed, and daily weight charted.     Pain: Patient complained of incisional pain throughout the shift, relieved with oxycodone.       Skin: No new skin issues noted. Drainage on incision primapore outlined and unchanged this shift.      LDAs: PIV x2, Left radial ART line    Gtts: None.     Plan: Continue with cares and neuro checks, possibly transfer to floor today. Monitor patient status closely and updated NSG with any changes.      For vital signs and complete assessments, please see documentation flowsheets.       Problem: Risk for Delirium  Goal: Improved Attention and Thought Clarity  Outcome: Progressing     Problem: Risk for Delirium  Goal: Improved  Sleep  Outcome: Progressing

## 2023-07-15 NOTE — PHARMACY-ADMISSION MEDICATION HISTORY
"Pharmacy Intern Admission Medication History    Admission medication history is complete. The information provided in this note is only as accurate as the sources available at the time of the update.    Medication reconciliation/reorder completed by provider prior to medication history? Yes    Information Source(s): Patient, Hospital records and CareEverywhere/SureScripts via in-person    Pertinent Information:    Per the previous note by the nurse on PTA med list, \"Pt states he was instructed at last hospitalization to stop taking Norvasc, not sure why\"  o Per my conversation with the patient, he has not been taking his amlodipine since discharging from [facility] as he states it was not included with his discharge meds.   o Looking at the After Visit Summary from that hospitalization, amlodipine is listed as a medication the pt should have continued taking.    Changes made to PTA medication list:    Added: None    Deleted: None    Changed: None    Allergies reviewed with patient and updates made in EHR: yes    Medication History Completed By: Cyndi Dubois 7/15/2023 2:52 PM    PTA Med List   Medication Sig Note Last Dose     amLODIPine (NORVASC) 10 MG tablet Take 10 mg by mouth every morning 7/15/2023: Pt has not been taking since 7/7 discharge at [facility] 7/6/2023 at am     baclofen (LIORESAL) 10 MG tablet Take 10 mg by mouth daily as needed for muscle spasms  Past Month     dexamethasone (DECADRON) 4 MG tablet Take 1 tablet (4 mg) by mouth every 6 hours for 6 days  7/14/2023 at 0400     levETIRAcetam (KEPPRA) 1000 MG tablet 1 tablet Orally every 12 hrs  7/14/2023 at 0400     pembrolizumab (KEYTRUDA) 25 MG/ mg every 21 days  More than a month     "

## 2023-07-15 NOTE — PROGRESS NOTES
Major Shift Events:  Vitals stable, afebrile. Neuros unchanged. Has baseline right leg weakness. SBP maintained < 140 mmhg. PRN Hydralazine 20 mg given x2, Labetalol 10 mg given x1. Arterial line removed. Pain managed with 10 mg Oxycodone PRN. Patient up in chair most of shift. Appetite is fair; ate breakfast and was not hungry for lunch. Aragon removed at 0530; voiding without difficulty. Transfer orders for ; report given and will travel via wheelchair.   Plan: Transfer to [address] [place] [address]  For vital signs and complete assessments, please see documentation flowsheets.

## 2023-07-15 NOTE — PROGRESS NOTES
[facility], [place]   07/15/2023  Neurosurgery Progress Note:    Assessment:  Saeid Santa is a 67 year old male with left occipitotemporal gyrus malignant melanoma lesion.    Patient is now POD-1 s/p left parieto-occipital craniotomy for tumor resection.    Plan:  - Monitor subgaleal drain output  - MRI Brain +/- today  - SBP< 140  - Ancef x 3 doses  - Continue Keppra 1000mg BID  - Dexamethasone 4q6h for now- will decide final taper plan post MRI  - Serial neuro exams  - Pain control  - HOB > 30 degrees  - Advance diet as tolerates  - Bowel regimen  - PRN antiemetics  - IVF until taking adequate PO  - PT/OT  - SCDs for DVT proph  - Okay to transfer to the floor later today  -----------------------------------  Oscar Dodson  Neurosurgery Resident PGY3    Interval History: No acute events overnight. Stable neuro exam.    Objective:   Temp:  [97.3  F (36.3  C)-98.1  F (36.7  C)] 98.1  F (36.7  C)  Pulse:  [] 56  Resp:  [10-22] 10  BP: (109-152)/(59-95) 120/82  MAP:  [74 mmHg-109 mmHg] 76 mmHg  Arterial Line BP: (127-159)/(49-74) 137/56  SpO2:  [90 %-100 %] 97 %  I/O last 3 completed shifts:  In: 2500 [P.O.:100; I.V.:2400]  Out: 5440 [Urine:5275; Drains:95; Other:20; Blood:50]    Gen: Appears comfortable, NAD  Neurologic:  - Alert & Oriented to person, place, time, and situation  - Follows commands briskly  - Speech fluent, spontaneous. No aphasia or dysarthria.  - No gaze preference. No apparent hemineglect. Baseline- right field cut  - PERRL, EOMI  - Face symmetric with sensation intact to light touch  - Palate elevates symmetrically, uvula midline, tongue protrudes midline  - Trapezii muscles 5/5 bilaterally  - No pronator drift     Del Tr Bi WE WF Gr   R 5 5 5 5 5 5   L 5 5 5 5 5 5    HF KE KF DF PF EHL   R 5 5 5 5 5 5   L 5 5 5 5 5 5     Reflexes 2+ throughout    Sensation intact and symmetric to light touch throughout    LABS:  Recent Labs   Lab 07/15/23  0405 07/14/23  6440  07/09/23  1014 07/09/23  0544     --  139 138   POTASSIUM 3.7  --   --  3.8   CHLORIDE 108*  --   --  106   CO2 20*  --   --  18*   ANIONGAP 12  --   --  14   * 142*  --  140*   BUN 16.6  --   --  20.3   CR 0.69  --   --  0.72   JOHN PAUL 8.0*  --   --  8.7*       Recent Labs   Lab 07/15/23  0405   WBC 19.1*   RBC 4.22*   HGB 13.0*   HCT 39.2*   MCV 93   MCH 30.8   MCHC 33.2   RDW 15.3*          IMAGING:  Recent Results (from the past 24 hour(s))   CT Head w/o Contrast    Narrative    Stealth CT imaging for purposes of stereotactic evaluation    Provided History: Stealth protocol with fiducials. For operative  planning (tumor resection). Please put a few extra fiducials on the  back of the patient's head, as tumor is occipital    Comparison: Brain MR 7/7/2093, CT 7/7/2023    Technique: CT imaging performed with axial, sagittal, and coronal  reconstructed images obtained without intravenous contrast.    Findings: Limited imaging for stereotactic localization.    Interval decreased internal density and surrounding hypoattenuation of  the left occipital lobe lesion since 7/7/2023, with decreased  rightward midline shift. Decreased local mass effect on the left  lateral ventricle. There is no hydrocephalus. Chronic lacunar infarct  in the right caudate nucleus. Clear basal cisterns. Left parietal  craniotomy changes.    Small left sphenoid mucous retention cyst.      Impression    Impression: Decreased edema and mass effect associated with left  occipital lobe lesion since 7/7/2023. Limited imaging performed  primarily for the purposes of stereotactic localization.     I have personally reviewed the examination and initial interpretation  and I agree with the findings.    ADRIANNE HOLLOWAY MD         SYSTEM ID:  [id number]     I have reviewed the history above and agree with the resident's assessment and plan.  JOCELYN Monge MD

## 2023-07-15 NOTE — PROGRESS NOTES
"4A PT Eval     07/15/23 1400   Appointment Info   Signing Clinician's Name / Credentials (PT) Richardson Cid, PT, DPT   Living Environment   People in Home spouse   Current Living Arrangements house   Home Accessibility stairs to enter home;stairs within home   Number of Stairs, Within Home, Primary greater than 10 stairs  (12)   Stair Railings, Within Home, Primary railings safe and in good condition   Transportation Anticipated family or friend will provide   Living Environment Comments Patient lives in house with wife with 1 flight of stairs down to front door from parking pad.  Home is 2 stories and each is \"indepedent\" and patient can stay on either level.   Self-Care   Usual Activity Tolerance good   Current Activity Tolerance fair   Equipment Currently Used at Home cane, straight;walker, rolling   Activity/Exercise/Self-Care Comment Patient has FWW and SPC but states that he has mainly been using SPC. Patient very familiar with rehab services from ARU stay in the past after surgery.   General Information   Onset of Illness/Injury or Date of Surgery 07/14/23   Referring Physician Jaycob Denise MD   Patient/Family Therapy Goals Statement (PT) To return home   Pertinent History of Current Problem (include personal factors and/or comorbidities that impact the POC) Per EMR \"Saeid Santa is a 67 year old male with left occipitotemporal gyrus malignant melanoma lesion.\"   General Observations activity: ambulate with assist   Cognition   Cognitive Status Comments appropriate throughout session   Range of Motion (ROM)   Range of Motion ROM is WFL   Strength (Manual Muscle Testing)   Strength (Manual Muscle Testing) strength is WFL   Bed Mobility   Bed Mobility sit-supine;supine-sit   Supine-Sit [place] (Bed Mobility) independent   Sit-Supine [place] (Bed Mobility) independent   Transfers   Transfers sit-stand transfer   Sit-Stand Transfer   Sit-Stand [place] (Transfers) modified independence "   Assistive Device (Sit-Stand Transfers) walker, front-wheeled   Gait/Stairs (Locomotion)   [place] (Gait) modified independence   Assistive Device (Gait) walker, front-wheeled   Clinical Impression   Criteria for Skilled Therapeutic Intervention Yes, treatment indicated   PT Diagnosis (PT) impaired functional mobility   Influenced by the following impairments decreased activity tolerance, impaired balance   Functional limitations due to impairments gait, stairs, transfers   Clinical Presentation (PT Evaluation Complexity) Stable/Uncomplicated   Clinical Presentation Rationale clinical judgement   Clinical Decision Making (Complexity) low complexity   Planned Therapy Interventions (PT) gait training;balance training;bed mobility training;ROM (range of motion);stair training;strengthening;stretching;transfer training   Risk & Benefits of therapy have been explained evaluation/treatment results reviewed;care plan/treatment goals reviewed;risks/benefits reviewed;current/potential barriers reviewed;participants voiced agreement with care plan;participants included;patient   PT Total Evaluation Time   PT Eval, Low Complexity Minutes (71644) 8   Physical Therapy Goals   PT Frequency 5x/week   PT Predicted Duration/Target Date for Goal Attainment 07/29/23   PT Goals Bed Mobility;Transfers;Gait;Stairs   PT: Bed Mobility Modified independent;Supine to/from sit   PT: Transfers Modified independent;Sit to/from stand;Assistive device   PT: Gait Modified independent;Assistive device;Greater than 200 feet   PT: Stairs Modified independent;Greater than 10 stairs   PT Discharge Planning   PT Plan gait in hallway, stairs   PT Discharge Recommendation (DC Rec) home with assist;home with outpatient physical therapy   PT Rationale for DC Rec While patient demonstrates impaired mobility at this time, he is chiefly limited by pain. Anticipate patient will progress well and return home safely when medically stable. Recommend OP  PT for further endurance and balance training.   PT Brief overview of current status SBA for hallway ambualtion with FWW   Total Session Time   Total Session Time (sum of timed and untimed services) 8

## 2023-07-16 ENCOUNTER — APPOINTMENT (OUTPATIENT)
Dept: MRI IMAGING | Facility: CLINIC | Age: 68
DRG: 025 | End: 2023-07-16
Payer: COMMERCIAL

## 2023-07-16 VITALS
OXYGEN SATURATION: 99 % | HEART RATE: 111 BPM | TEMPERATURE: 97.4 F | RESPIRATION RATE: 16 BRPM | WEIGHT: 179.68 LBS | SYSTOLIC BLOOD PRESSURE: 137 MMHG | HEIGHT: 73 IN | DIASTOLIC BLOOD PRESSURE: 84 MMHG | BODY MASS INDEX: 23.81 KG/M2

## 2023-07-16 LAB
ANION GAP SERPL CALCULATED.3IONS-SCNC: 14 MMOL/L (ref 7–15)
BUN SERPL-MCNC: 27.9 MG/DL (ref 8–23)
CALCIUM SERPL-MCNC: 8.6 MG/DL (ref 8.8–10.2)
CHLORIDE SERPL-SCNC: 104 MMOL/L (ref 98–107)
CREAT SERPL-MCNC: 0.75 MG/DL (ref 0.67–1.17)
DEPRECATED HCO3 PLAS-SCNC: 17 MMOL/L (ref 22–29)
ERYTHROCYTE [DISTWIDTH] IN BLOOD BY AUTOMATED COUNT: 15.4 % (ref 10–15)
GFR SERPL CREATININE-BSD FRML MDRD: >90 ML/MIN/1.73M2
GLUCOSE BLDC GLUCOMTR-MCNC: 130 MG/DL (ref 70–99)
GLUCOSE SERPL-MCNC: 105 MG/DL (ref 70–99)
HCT VFR BLD AUTO: 43.7 % (ref 40–53)
HGB BLD-MCNC: 14.2 G/DL (ref 13.3–17.7)
LACTATE SERPL-SCNC: 1.5 MMOL/L (ref 0.7–2)
MAGNESIUM SERPL-MCNC: 2.2 MG/DL (ref 1.7–2.3)
MCH RBC QN AUTO: 30.7 PG (ref 26.5–33)
MCHC RBC AUTO-ENTMCNC: 32.5 G/DL (ref 31.5–36.5)
MCV RBC AUTO: 95 FL (ref 78–100)
PHOSPHATE SERPL-MCNC: 3.4 MG/DL (ref 2.5–4.5)
PLATELET # BLD AUTO: 293 10E3/UL (ref 150–450)
POTASSIUM SERPL-SCNC: 4.4 MMOL/L (ref 3.4–5.3)
RBC # BLD AUTO: 4.62 10E6/UL (ref 4.4–5.9)
SODIUM SERPL-SCNC: 135 MMOL/L (ref 136–145)
WBC # BLD AUTO: 18.5 10E3/UL (ref 4–11)

## 2023-07-16 PROCEDURE — 70553 MRI BRAIN STEM W/O & W/DYE: CPT

## 2023-07-16 PROCEDURE — 83605 ASSAY OF LACTIC ACID: CPT | Performed by: NEUROLOGICAL SURGERY

## 2023-07-16 PROCEDURE — 999N000128 HC STATISTIC PERIPHERAL IV START W/O US GUIDANCE

## 2023-07-16 PROCEDURE — 70553 MRI BRAIN STEM W/O & W/DYE: CPT | Mod: 26 | Performed by: RADIOLOGY

## 2023-07-16 PROCEDURE — 84100 ASSAY OF PHOSPHORUS: CPT | Performed by: STUDENT IN AN ORGANIZED HEALTH CARE EDUCATION/TRAINING PROGRAM

## 2023-07-16 PROCEDURE — A9585 GADOBUTROL INJECTION: HCPCS | Mod: JZ | Performed by: NEUROLOGICAL SURGERY

## 2023-07-16 PROCEDURE — 85014 HEMATOCRIT: CPT | Performed by: STUDENT IN AN ORGANIZED HEALTH CARE EDUCATION/TRAINING PROGRAM

## 2023-07-16 PROCEDURE — 255N000002 HC RX 255 OP 636: Mod: JZ | Performed by: NEUROLOGICAL SURGERY

## 2023-07-16 PROCEDURE — 250N000013 HC RX MED GY IP 250 OP 250 PS 637: Performed by: STUDENT IN AN ORGANIZED HEALTH CARE EDUCATION/TRAINING PROGRAM

## 2023-07-16 PROCEDURE — 82310 ASSAY OF CALCIUM: CPT | Performed by: NEUROLOGICAL SURGERY

## 2023-07-16 PROCEDURE — 250N000012 HC RX MED GY IP 250 OP 636 PS 637

## 2023-07-16 PROCEDURE — 36415 COLL VENOUS BLD VENIPUNCTURE: CPT | Performed by: STUDENT IN AN ORGANIZED HEALTH CARE EDUCATION/TRAINING PROGRAM

## 2023-07-16 PROCEDURE — 250N000012 HC RX MED GY IP 250 OP 636 PS 637: Performed by: STUDENT IN AN ORGANIZED HEALTH CARE EDUCATION/TRAINING PROGRAM

## 2023-07-16 PROCEDURE — 36415 COLL VENOUS BLD VENIPUNCTURE: CPT | Performed by: NEUROLOGICAL SURGERY

## 2023-07-16 PROCEDURE — 83735 ASSAY OF MAGNESIUM: CPT | Performed by: STUDENT IN AN ORGANIZED HEALTH CARE EDUCATION/TRAINING PROGRAM

## 2023-07-16 RX ORDER — OXYCODONE HYDROCHLORIDE 5 MG/1
5 TABLET ORAL EVERY 4 HOURS PRN
Qty: 10 TABLET | Refills: 0 | Status: SHIPPED | OUTPATIENT
Start: 2023-07-16 | End: 2023-01-01

## 2023-07-16 RX ORDER — DEXAMETHASONE 2 MG/1
TABLET ORAL
Qty: 88 TABLET | Refills: 0 | Status: SHIPPED | OUTPATIENT
Start: 2023-07-16 | End: 2023-01-01

## 2023-07-16 RX ORDER — DEXAMETHASONE 4 MG/1
4 TABLET ORAL EVERY 12 HOURS SCHEDULED
Status: DISCONTINUED | OUTPATIENT
Start: 2023-07-16 | End: 2023-07-16 | Stop reason: HOSPADM

## 2023-07-16 RX ORDER — GADOBUTROL 604.72 MG/ML
0.1 INJECTION INTRAVENOUS ONCE
Status: COMPLETED | OUTPATIENT
Start: 2023-07-16 | End: 2023-07-16

## 2023-07-16 RX ORDER — DEXAMETHASONE 4 MG/1
2 TABLET ORAL EVERY 12 HOURS
Qty: 20 TABLET | Refills: 0 | Status: SHIPPED | OUTPATIENT
Start: 2023-07-16 | End: 2023-07-16

## 2023-07-16 RX ORDER — ACETAMINOPHEN 325 MG/1
650 TABLET ORAL EVERY 4 HOURS PRN
Qty: 15 TABLET | Refills: 0 | Status: SHIPPED | OUTPATIENT
Start: 2023-07-16 | End: 2023-01-01

## 2023-07-16 RX ADMIN — OXYCODONE HYDROCHLORIDE 10 MG: 10 TABLET ORAL at 08:12

## 2023-07-16 RX ADMIN — OXYCODONE HYDROCHLORIDE 5 MG: 5 TABLET ORAL at 04:19

## 2023-07-16 RX ADMIN — DEXAMETHASONE 4 MG: 4 TABLET ORAL at 06:25

## 2023-07-16 RX ADMIN — LEVETIRACETAM 1000 MG: 500 TABLET, FILM COATED ORAL at 06:25

## 2023-07-16 RX ADMIN — ACETAMINOPHEN 650 MG: 325 TABLET, FILM COATED ORAL at 06:32

## 2023-07-16 RX ADMIN — GADOBUTROL 8.1 ML: 604.72 INJECTION INTRAVENOUS at 08:26

## 2023-07-16 RX ADMIN — DEXAMETHASONE 4 MG: 4 TABLET ORAL at 12:48

## 2023-07-16 RX ADMIN — ACETAMINOPHEN 650 MG: 325 TABLET, FILM COATED ORAL at 12:48

## 2023-07-16 RX ADMIN — PANTOPRAZOLE SODIUM 40 MG: 40 TABLET, DELAYED RELEASE ORAL at 07:40

## 2023-07-16 RX ADMIN — AMLODIPINE BESYLATE 10 MG: 10 TABLET ORAL at 07:40

## 2023-07-16 RX ADMIN — SENNOSIDES AND DOCUSATE SODIUM 1 TABLET: 50; 8.6 TABLET ORAL at 07:40

## 2023-07-16 ASSESSMENT — ACTIVITIES OF DAILY LIVING (ADL)
ADLS_ACUITY_SCORE: 31

## 2023-07-16 NOTE — OP NOTE
Procedure date: 07/14/23      PREOPERATIVE DIAGNOSIS:   1.Left parieto-occipital falcine based tumor.  2. Cerebral edema     POSTOPERATIVE DIAGNOSIS:    1. Left parietooccipital tumor, likely metastasis.  2.  Cerebral edema     PROCEDURES:    1.  Stealth-assisted left parietooccipital craniotomy, with interhemispheric approach and resection of falcine mass. (Ipsilateral interhemispheric Approach)  2.  Insertion of lumbar drain.  3.  Use of intraoperative microscope.  4.  Use of intraoperative ultrasound     SURGEON:  Kye Garrido MD     :  Jaycob Denise MD - Resident - Assisting     ANESTHESIA:  General.     ESTIMATED BLOOD LOSS:  50 mL     DRAIN:  No 10 MELY drain     FINDINGS:  Solid-cystic Lesion with ill-defined capsule invading the cortex at multiple points and radiation changes.  Good hemostasis at end of case.     COMPLICATIONS:  None.     INDICATIONS FOR PROCEDURE:  Saeid Santa is a 67 year old male who was admitted on 7/7/2023. Saeid Santa is a 67 year old male with metastatic melanoma is who is s/p stealth assisted awake craniotomy with resection of motor/sensory strip lesion  on 4/21/23, underwent GK to resection cavity and parieto-occipital lesion on 5/24/23 presented to OSH with worsening confusion and headache and imaging evidence of increased size and density of the lesion in the left occipital lobe, worrisome for interval hemorrhage, Increased surrounding edema and associated mass effect with approximately 5 mm left to right midline shift. He was recently admitted to the [facility] with worsening symptoms and decision was made to surgically resect the lesion.   We discussed the potential benefits of surgery and the risks of surgery for a mass this large including seizures, hemorrhage, stroke with paralysis, hematoma, infection, incomplete resection, need for further surgeries, dissemination of the tumor, MI, DVT/PE, or even death. He understands the risks and  understands that the potential benefits far exceed the risks at this time.  After discussion of various options and risks and benefits of surgery, the patient elected to move forward with above procedures.     DESCRIPTION OF PROCEDURES:  The patient was brought to the operating room, supine on the hospital bed.  He was transferred over to the operating table.  Our anesthesia colleagues induced general anesthesia via endotracheal intubation.  Patient was then turned in the lateral position.  A lumbar drain was placed, with good CSF flow upon entry and clamped for drainage during surgery. He was then positioned with left side down to assist retraction of the occipital lobe with the gravity. All appropriate lines were placed and arms and pressure points were padded. The patient was given 10 mg of Decadron, 1 gram of Keppra and 1 gram/kg total of mannitol.  Head was fixed in a head frame, with 3 fixation points, and then placed in the [place] headholder.  Patient's head was then rotated approximately 45 degrees from the horizontal with slight flexion to bring the falx with the site of attachment of tumor along the visual axis.      Stealth navigation with optical tracking was used to appropriately register the patient and identify the site of entry.  A horse-shoe shaped incision was marked on the scalp at the intended entry site in the left parieto-occipital region.  The patient was then prepped and draped in the standard sterile fashion.  Local anesthetic 1:100,000 lidocaine with epinephrine, total 10 mL, was injected at the premarked site.  A timeout was performed confirming the patient's name, procedure, site and side of procedure and administration of intraoperative antibiotics.     After appropriate timeout, we proceeded with the incision.  A #10 blade scalpel was used to carry the incision down to the bone.  This was done in a curvilinear fashion.  A skin flap was retracted using fish hooks.  Further dissection  was carried out with monopolar cautery and scalpel.  Good hemostasis was achieved with bipolar cautery.A craniotomy was planned with 2 sandee holes on either side of midline and 1 extra bur hole laterally, for a total of 5 sandee holes.  These were made with a 5 mm cutting drill.  Homer dental was used to strip the dura, and a B1 drill with a B1 footplate was used to complete the craniotomy.  Meticulous dissection of the bone flap was done to avoid injury to the superior sagittal sinus.Bone flap was retracted, with no durotomy or CSF leak.  Good hemostasis was achieved with bipolar cautery. Surgicel and Gelfoam were then used to cover the sinus and line the bone edges.  We next used a #15 blade and tenotomies to open the dura in a curvilinear fashion and retracted medially toward the sinus.  We used 4-0 Nurolon to hold the dura up in place.  Again, we ensured good hemostasis.  We drained approximately 30 cc of CSF before opening the dura.     We then brought in the intraoperative microscope.  There was a  draining vein entering into the lacunae and the superior sagittal sinus.  The vein was meticulously dissected and  to get into the interhemispheric fissure.  After entering into the interhemispheric fissure and dissecting the arachnoid the medial occipital cortex overlying the tumor was opened using stealth navigation.  Tumor was identified as grayish in color, vascular and pial supply within. We used bipolar to coagulate the capsule and enter the tumor, tumor was suck able, friable and vascular. We confirmed the entry into the tumor cavity, with Stealth navigation.  Abnormal tissue was easily identified and multiple tissue samples were sent for frozen section.  The superior and inferior extent of the tumor was dissected and cottonoid patties were placed to define the extent of the tumor.  We then proceeded with the dissection of the tumor using a combination of suction and bipolar, in a stepwise fashion,  confirming the edges of the capsule intermittently with stealth navigation. We then entered the cystic part of the tumor filled with subacute blood and walls were resected. The deeper aspect of the tumor towards the ventricle showed  radiation related changes with thickened capsule and fibrotic changes. The deeper aspect of the tumor was dissected as much as possible safely and rest densely adhered to the ventricle wall and the entrapped occipital horn was left behind. The frozen sample of the tumor tissue came back as likely to be malignant tumor.  The medial aspect of the capsule towards the falx was well-defined except at few locations at the deeper aspect of the tumor where it is firmly attached to the brain with radiation related changes.    Once we were satisfied with the resection, we confirmed that there was no clear nodule with intraoperative ultrasound.  Brain was completely laxed at the end of the procedure. We then lined the resection cavity with Surgicel.  After satisfactory hemostasis, we laid duragen in an underlay manner, and the dura was closed with 4-0 Nurolon.  We then placed the bone flap back with plates and screws.  Hemostasis was achieved.  A 10-[place] MELY drain was placed in the subgaleal space and tunneled posteriorly through the scalp.The area was irrigated copiously with antibiotic I was scrubbed solution.  [place] was closed with 2-0 Vicryl in an inverted interrupted fashion.  Skin was closed with 4-0 Monocryl in a running fashion.  After skin closure, drain site was closed with a nylon in a pursestring fashion and a 3-0 Monocryl stitch was left in place for future closure of the drain site.  At the end of the case, all needle, sponge and instruments were correct x2.  The incision was cleaned with wet and dry sponges, then with ChloraPrep and then a dressing was placed on top, including Xeroform, Telfa, and Medipore tape.     The lumbar drain was removed.  The site was closed with a Monocryl  suture after cleansing it appropriately with ChloraPrep.  The patient's head was taken out of the head frame, with no bleeding.  He was turned back supine onto her hospital bed and extubated by anesthesia colleagues.     I was scrubbed throughout the procedure and updated the family after the procedure.  There were no intraoperative complications.     Kye Garrido MD

## 2023-07-16 NOTE — PLAN OF CARE
Status: Pt POD #1 s/p Left parieto-occipital craniotomy for tumor resection.   Vitals: VSS on RA    Neuros: A&O x4. Numbness and tingling to R foot and ankle since previous surgery. R leg weakness at baseline. Flat affect  IV: L PIV SL  Labs/Electrolytes: WDL   Resp/trach: LS clear  Diet: Regular  Bowel status: Last BM 07/14, BS+  : VDSP  Skin: Head incision covered with primapore dressing, CDI. Drain to head was removed by NSG upon pt's arrival to , drain site SELENA, WDL. Right foot with 2+ edema, elevated with pillows, ordered SCD  Pain: Frontal headache and head incisional pain managed with PRN oxycodone. Declined PRN tylenol  Activity: A1, GB, and walker  Social: Wife at bedside, involved/supportive  Plan: MRI pending, MRI staff returned call to say they plan MRI around 8:30AM tomorrow. Cont with current POC        Arrived from:   Belongings/meds: clothing, shoe, cellphone  2 RN Skin Assessment Completed by: TATIANNA Springer and TATIANNA Ordoñez    Non-intact findings documented (yes/no/NA): Head incision, R arm with large bruising around previous PICC site, Blanchable redness to buttocks, Lumbar puncture site with bandaid.

## 2023-07-16 NOTE — PLAN OF CARE
Status: Pt POD #2 s/p Left parieto-occipital craniotomy for tumor resection.   Vitals: VSS on RA    Neuros: A&Ox4. 5/5 throughout. N/T to R foot and ankle since previous surgery. R leg weakness at baseline. Flat affect  IV: L PIV SL  Labs/Electrolytes: WDL   Resp/trach: LS clear  Diet: Regular  Bowel status: Last BM 07/14, BS+  : VDSP  Skin: Head incision covered with primapore dressing, CDI. Drain to head was removed by NSG unit, drain site SELENA, WDL. Right foot with 2+ edema, elevated with pillows. R arm with large bruising around previous PICC site. Blanchable redness to buttocks. Lumbar puncture site with bandaid.   Pain: Frontal headache and head incisional pain rated as 4-6/10, managed with PRN oxycodone   Activity: A1, GB, and walker. HOB >30. PCDs off, refused   Plan:MRI around 8:30AM today, checklist sent. Dexamethasone 4q6h for now- will decide final taper plan post MRI. PT recommending home with assist;home with outpatient physical therapy. OT recommending Transitional Care Facility;home with assist (pending progress.) Cont with current POC

## 2023-07-16 NOTE — PROGRESS NOTES
Brief Care Coordination Note    Attempted to meet with patient briefly before anticipated discharge, he was busy w/the bedside nurse. Per chart review, the primary team is planning on discharge patient to home w/OP PT services today. No additional discharge needs noted per AVS and chart review.     Hermila Fitch, RNCC, BSN    [facility] [address]  [address] [place] [id number]    [email or web address]    Office: [phone] Pager: [phone]

## 2023-07-16 NOTE — PROGRESS NOTES
Neurosurgery Brief Progress Note    MELY drain removal    Drain not deemed to be necessary . Drain site was prepped in usual sterile fashion with chloraprep. The drain was taken off suction. The sutures securing the drain were cut and the site was re-prepped. The drain was removed with tip intact. A single figure of 8 stitch with 3-0 monocryl was placed with adequate hemostasis. No immediate complication was observed and the patient tolerated the procedure well.     Olman Rangel MD  Neurosurgery Resident  Pager [phone]    Please contact neurosurgery resident on call with questions.    Dial * * [phone], enter [phone] when prompted.

## 2023-07-16 NOTE — DISCHARGE SUMMARY
[facility] Discharge Summary and Instructions    Saeid Santa MRN# [id number]   Age: 67 year old YOB: 1955     Date of Admission:  7/14/2023  Date of Discharge::  7/16/2023  Admitting Physician:  Kye Garrido MD  Discharge Physician:  Kye Garrido MD          Admission Diagnoses:   Malignant melanoma metastatic to brain (H) [C79.31]  Brain tumor (H) [D49.6]          Discharge Diagnosis:     #Malignant melanoma metastatic to brain (H) [C79.31]  #Brain tumor (H) [D49.6]  #Brain Compression         Procedures:   Procedure(s):  STEALTH ASSISTED CRANIOTOMY, WITH NEOPLASM EXCISION LEFT PARIETO-OCCIPITAL CRANIOTOMY WITH RESECTION OF MASS,  LUMBAR DRAIN           Brief History of Illness:   Saeid Santa is a 67 year old male who was admitted on 7/7/2023. Saeid Santa is a 67 year old male with metastatic melanoma is who is s/p stealth assisted awake craniotomy with resection of motor/sensory strip lesion  on 4/21/23, underwent GK to resection cavity and parieto-occipital lesion on 5/24/23 presented to OSH with worsening confusion and headache and imaging evidence of increased size and density of the lesion in the left occipital lobe, worrisome for interval hemorrhage, Increased surrounding edema and associated mass effect with approximately 5 mm left to right midline shift. He was recently admitted to the [facility] with worsening symptoms and decision was made to surgically resect the lesion. Dr. Garrido discussed the potential benefits of surgery and the risks of surgery for a mass this large including seizures, hemorrhage, stroke with paralysis, hematoma, infection, incomplete resection, need for further surgeries, dissemination of the tumor, MI, DVT/PE, or even death. He understands the risks and understands that the potential benefits far exceed the risks at this time.  Patient has elected to undergo above-mentioned procedure.           Hospital Course:     Patient underwent  above-mentioned procedure on 7/14.  Following the procedure the patient was transferred to the ICU.  The patient's course was uncomplicated.  Patient underwent MRI imaging revealing stable post-operative changes with improving midline shift.  Patient was started on a Decadron taper plan, going from 4 mg Q6h to 4 mg Q8h after 4 days, to 4 mg BID after 4 days, to 2 mg BID after 5 days medications that will be continued until his appointment with Dr. Garrido in 2 weeks.  On post operative day 1, he/she was doing well and transferred to the floor. On post operative day 2, he/she was ambulating, voiding without a correa, eating a regular diet, pain was well controlled and therefore he/she was discharged 2.     His post-operative exam demonstrated a baseline R-sided field cut, but otherwise no other focal deficits.           Discharge Medications:     Current Discharge Medication List      START taking these medications    Details   acetaminophen (TYLENOL) 325 MG tablet Take 2 tablets (650 mg) by mouth every 4 hours as needed for other (For optimal non-opioid multimodal pain management to improve pain control.)  Qty: 15 tablet, Refills: 0    Associated Diagnoses: Brain tumor (H)      oxyCODONE (ROXICODONE) 5 MG tablet Take 1 tablet (5 mg) by mouth every 4 hours as needed for moderate pain  Qty: 10 tablet, Refills: 0    Associated Diagnoses: Brain tumor (H)         CONTINUE these medications which have CHANGED    Details   dexamethasone (DECADRON) 2 MG tablet Take 2 tablets (4 mg) by mouth every 6 hours for 4 days, THEN 2 tablets (4 mg) every 8 hours for 4 days, THEN 2 tablets (4 mg) 2 times daily for 4 days, THEN 1 tablet (2 mg) 2 times daily for 8 days.  Qty: 88 tablet, Refills: 0    Associated Diagnoses: Brain tumor (H)         CONTINUE these medications which have NOT CHANGED    Details   amLODIPine (NORVASC) 10 MG tablet Take 10 mg by mouth every morning      baclofen (LIORESAL) 10 MG tablet Take 10 mg by mouth daily as  needed for muscle spasms      levETIRAcetam (KEPPRA) 1000 MG tablet 1 tablet Orally every 12 hrs      pembrolizumab (KEYTRUDA) 25 MG/ mg every 21 days                           Discharge Instructions and Follow-Up:     Discharge diet: Regular   Discharge activity: You may advance activity as tolerated. No strenuous exercise or heay lifting greater than 10 lbs for 4 weeks or until seen and cleared in clinic.    Discharge follow-up:     Follow-up Dr. Kye Garrido MD in 2 weeks, all additional follow-up visits to be determined by Dr. Kye Garrido MD. An MRI at 3 months was scheduled, with referrals to Radiation oncology and medical oncology.        Wound care: Ok to shower,however no scrubbing of the wound and no soaking of the wound, meaning no bathtubs or swimming pools. Pat dry only. Leave wound open to air.  Patient to have wound checked 2 weeks following surgery.    Wound location: Left parieto-occipital region  Closure technique: 4-0 Monocryl (absorbable)  Dressing needs: Open to air, clean and dry  Post-op care at follow-up: Keep dry and clean         Please call if you have:  1. increased pain, redness, drainage, swelling at your incision  2. fevers > 101.5 F degrees  3. with any questions or concerns.  You may reach the Neurosurgery clinic at [phone] during regular work hours. ER at [phone].    and ask for the Neurosurgery Resident on call at [phone], during off hours or weekends.         Discharge Disposition:     Discharged to home        Jermaine Rader MD  PGY-1, Department of Neurosurgery

## 2023-07-17 LAB
PATH REPORT.COMMENTS IMP SPEC: ABNORMAL
PATH REPORT.COMMENTS IMP SPEC: YES
PATH REPORT.FINAL DX SPEC: ABNORMAL
PATH REPORT.GROSS SPEC: ABNORMAL
PATH REPORT.INTRAOP OBS SPEC DOC: ABNORMAL
PATH REPORT.MICROSCOPIC SPEC OTHER STN: ABNORMAL
PATH REPORT.RELEVANT HX SPEC: ABNORMAL
PHOTO IMAGE: ABNORMAL

## 2023-07-17 NOTE — PLAN OF CARE
Physical Therapy Discharge Summary    Reason for therapy discharge:    Discharged to home with outpatient therapy.    Progress towards therapy goal(s). See goals on Care Plan in [facility] electronic health record for goal details.  Goals partially met.  Barriers to achieving goals:   discharge from facility.    Therapy recommendation(s):    Continued therapy is recommended.  Rationale/Recommendations:  OP PT for maximal safety and progress.

## 2023-07-17 NOTE — PLAN OF CARE
Occupational Therapy Discharge Summary    Reason for therapy discharge:    Discharged to home with outpatient therapy.    Progress towards therapy goal(s). See goals on Care Plan in [facility] electronic health record for goal details.  Goals partially met.  Barriers to achieving goals:   discharge from facility.    Therapy recommendation(s):    No further therapy is recommended.

## 2023-07-31 NOTE — PROGRESS NOTES
"Saeid Santa is status post stealth-assisted left parietooccipital craniotomy, with interhemispheric approach and resection of falcine mass on 07/14/2023 with Dr. Garrido.  History of metastatic melanoma, s/p stealth assisted awake craniotomy with resection of motor/sensory strip lesion on 4/21/23, underwent GK to resection cavity and parieto-occipital lesion on 5/24/23 presented  with worsening confusion and headache.  Today he returns in follow up for wound check. He is accompanied by his SO. He is doing reasonably well - reports not positional mild HA in the morning well controlled with Tylenol prn. States his vision is back to \"normal\". Gait and balance are stable, uses a cane for safety. Speech is fluent. Cognition is intact.     Dr. Garrido saw Saeid in clinic during his appt in clinic this morning.    Surgical wound WNL - CDI, no signs of infection or skin breakdown.  Incision well-healed: good skin approximation, no redness or visible/palpable edema, no tenderness to palpation.  PT. AF, denies fever, chills or sweats.  Pt. reports that the symptoms are improved from pre-op.    Steffany Blake, RN, CNRN    "

## 2023-07-31 NOTE — PATIENT INSTRUCTIONS
A dressing is not required.    Keep the wound clean.    Wash your hands before touching the wound.  Ensure that anyone assisting you in the care of your wound washes her/his hands before touching the wound. Good handwashing can decrease the risk of serious infection.    If you are unable to see your wound, have someone check the wound daily for redness, swelling,or drainage. A small amount of drainage is normal.    You may shower.  Pat the wound dry. Do not rub.    No tub baths until the wound is well healed.  Usually 5-6 weeks.     If you develop redness, swelling, drainage, or temp 101 or greater, call our clinic.        * No lifting, pushing or pulling greater than 5-10 pound (this is about a gallon of milk).  *No repetitive bending, twisting, or jarring activities  *No overhead work  *No aerobic or strenuous activity  *No activities with increased risk of falls  *You may move about your home as tolerated  *You may walk up and down stairs as tolerated  *You may increase your activity slowly over the next 4-6 weeks    * WALKING PROGRAM: As you can tolerate, walk daily-start with 5-10 minutes of continuous walking. This is in addition to the walking that you do as part of your daily activities. Increase the time that you walk by 5 minutes every couple of days. Do not exceed 30-45 minutes of continuous walking until seen in follow-up. Walking is the best exercise after surgery.  **Listen to your body, if you find that you are more painful or fatigued, you may need to proceed more slowly.    **Do not smoke or expose yourself to second hand smoke. Cigarette smoke can delay healing and cause complications.     DRIVING: Do not drive. Plan to discuss your return to driving at your 4-6 weeks follow-up appointment.    WORK: If you plan to return to work before you 4-6 weeks appointment, call and discuss with one of the nurses in the neurosurgery office.

## 2023-08-07 NOTE — NURSING NOTE
Is the patient currently in the state of [place]? YES    Visit mode:VIDEO    If the visit is dropped, the patient can be reconnected by: VIDEO VISIT: Text to cell phone: [phone]    Will anyone else be joining the visit? NO    How would you like to obtain your AVS? MyChart    Are changes needed to the allergy or medication list? NO    Reason for visit: RECHECK (Pt has been suffering from a headache the last few days and is wondering if he should be taking Norvasc again. )    Parisa Bauer, LESIA/BLADEN

## 2023-08-07 NOTE — PROGRESS NOTES
Virtual Visit Details    Type of service:  Video Visit   Video Start Time: 2:20 PM  Video End Time:2:33 PM    Originating Location (pt. Location): Home  Distant Location (provider location):  Off-site  Platform used for Video Visit: [facility] ONCOLOGY PROGRESS NOTE  Melanoma Clinic  Aug 7, 2023    Chief Complaint: metastatic melanoma to brain    Melanoma History:  --8/16/22, he brings a left parietal scalp lesion to attention of his GP. It is initially observed.  --November 2022, the left parietal scalp lesion was felt to be a cyst, and the left parietal lesion was lanced and drained via scalp incision.  --January 2023, the left parietal scalp cyst would occasionally break open, drain, and bleed, largely at night.   --February 2023, he notes some mild difficulty clearing obstacles with right leg and foot.  --March/April 2023, he has 3 progressively worsening episodes over a span 3 weeks including the right leg. At first he notes the onset of right leg numbness and heaviness, like he was carrying 50 lbs of water on the leg. Then, about a week or so later, the leg began to jerk and converse while he was in the basement. Neither episode associated with loss of consciousness  --4/15/23, he recalls the right leg numbness, heaviness, jerking while driving between work. He had been found by coworkers in the parking lot, sitting in the grass, appearing confused. He was brought to the ER by EMS. CT-CAP, showed small (<6mm), bilateral indeterminate pulmonary nodules. CT-head and MRI brain showed 2 discrete ring-enhancing lesions in the left paramedian posterior frontal lobe near the precentral gyrus measuring approximately 2.2 x 1.7 x 1.9 cm and left occipital lobe near the lingual gyrus measuring approximately 2.2 x 2.1 x 2.0 cm. The occipital lesion encroaches upon the subependymal surface of the left lateral ventricle occipital horn and contacts the superior surface of the medial left tentorium. The lesions are  accompanied by moderate surrounding vasogenic edema. Surgery was recommended.  --4/21/23, underwent left awake (sleep-awake-sleep) craniotomy for tumor resection, he has resection of the left frontal, motor strip tumor as well asan elliptical incision of the left parietal scalp lesion. On pathology, both the scalp excision and brain mass are positive for malignant melanoma. Tumor cells are positive for S-100 and Melan-A, and negative for cytokeratin AE1/AE3, NKX3.1, GFAP, ERG, CD3, and CD20.  CD3 highlights brisk infiltration of the tumor by T lymphocytes, while CD20 highlights scattered positive B lymphocytes. NGS identifies a BRAF G466A mutation, a class 3 mutation insensitive to BRAF kinase inhibitor monotherapy. TMB is high at 48.754 muts/Mb.   --4/22/23, post-operative MRI shows post-operative changes of the parietal craniotomy of the left frontal lobe metastasis. The other 1.7 x 1.1 cm cystic lesion in the left occipital lobe is also seen. No new lesions appreciated.  -- 4/25/23 to 5/9/23, discharged from the hospital to acute rehab stay at [facility] in [place]. He participated in a comprehensive rehabilitation program consisting of PT, OT, Speech, Psychology and Recreation Therapy. He did well and was discharged home with use of a walker.  --PET-CT on 5/18/23 with potentially involved neck lymph nodes. He has mild FDG uptake in soft tissue nodules in the scalp, in the known left occipital brain metastasis, as well as in the left lower neck region.  -5/24-/5/31/23, He was treated with gamma knife: 2500 cGy to the left resection cavity; 3000 cGy to the L parietal-occipital lesion.  -6/7/2023, Cycle 1 of pembrolizumab 200mg every 3 weeks  -6/14/23-6/16/23, admitted for vision change, reduced comprehension, speech change, fall and headache. MRI showing likely progression of left occipital mass with associated edema. Improved on steroids and discharged on oral decadron. Plan for repeat MRI and neurosurgery  follow up in 1 month.  -7/7/23 ED visit for headaches, confusion, vision changes, concern for hemorrhage of the left occipital mass, admitted to [facility] until 7/9/23, increased dexamethasone dose  -7/14/23 craniotomy of the left parieto-occipital mass, discharged on a dexamethasone taper    Interval History:  -Has headaches between eyes and temples. Uses Tylenol or oxycodone as needed.  -Has knee swelling, which has improved some since stopping the steroids. Has some associated pain with the swelling, improved with Icy Hot. Discomfort wakes him up at night.   -Had diarrhea last night, about 6 times last night. No stools so far today.   -Blood pressure today is elevated 180/110 and increased pulse.  -Off steroids x 2 days.   -Feels very fatigued over the last week.   -Balance and weakness remain a problem. Denies any falls. Using a cane or walker to get around.   -Vision is doing okay.   -Eating and drinking well.    Objective:  General: patient appears well in no acute distress, alert and oriented, speech clear and fluid  Skin: no visualized rash or lesions on visualized skin  Resp: Appears to be breathing comfortably without accessory muscle usage, speaking in full sentences, no audible wheezes or cough.  Psych: Coherent speech, normal rate and volume, able to articulate logical thoughts, able to abstract reason, no tangential thoughts, no hallucinations or delusions  Patient's affect is appropriate.    Laboratory Data:  Most Recent 3 CBC's:  Recent Labs   Lab Test 07/16/23  0955 07/15/23  0405 07/09/23  0544 07/08/23  0609 07/07/23  0510 06/15/23  1107 06/14/23  1220 04/22/23  0408   WBC 18.5* 19.1* 17.6*   < > 10.2   < > 9.0 10.6   HGB 14.2 13.0* 12.8*   < > 13.2*   < > 13.0* 14.4   MCV 95 93 94   < > 96   < > 95 93    239 181   < > 164   < > 218 246   ANEUTAUTO  --   --   --   --  6.0  --  5.5 9.6*    < > = values in this interval not displayed.     Most Recent 3 BMP's:  Recent Labs   Lab Test  07/16/23  0811 07/16/23  0634 07/15/23  0405 07/14/23  1802 07/09/23  1014 07/09/23  0544 07/07/23  1223 07/07/23  0510 06/21/23  1313 06/14/23  1220 06/07/23  0915   NA  --  135* 140  --  139 138   < > 141 140   < > 140   POTASSIUM  --  4.4 3.7  --   --  3.8   < > 3.9 4.3   < > 3.5   CHLORIDE  --  104 108*  --   --  106   < > 108* 105   < > 108*   CO2  --  17* 20*  --   --  18*   < > 25 25   < > 22   BUN  --  27.9* 16.6  --   --  20.3   < > 25.9* 23.0   < > 18.3   CR  --  0.75 0.69  --   --  0.72   < > 1.05 0.99   < > 0.90   ANIONGAP  --  14 12  --   --  14   < > 8 10   < > 10   JOHN PAUL  --  8.6* 8.0*  --   --  8.7*   < > 8.5* 9.0   < > 8.7*   * 105* 106*   < >  --  140*   < > 94 120*   < > 81   PROTTOTAL  --   --   --   --   --   --   --  5.7* 6.2*  --  6.0*   ALBUMIN  --   --   --   --   --   --   --  3.6 3.6  --  3.4*    < > = values in this interval not displayed.    Most Recent 3 LFT's:  Recent Labs   Lab Test 07/07/23  0510 06/21/23  1313 06/07/23  0915   AST 18 16 15   ALT 26 34 12   ALKPHOS 69 67 71   BILITOTAL 0.5 0.6 0.8    Most Recent 2 TSH and T4:  Recent Labs   Lab Test 06/07/23 0915   TSH 1.90     I reviewed the above labs today.     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 He started pembrolizumab on 6/7/23. He was subsequently hospitalized twice in June and July 2023 due to neurologic symptoms, as noted above. He is now off of the prednisone and seems to be doing reasonably well. He will continue with delayed cycle 2 pembrolizumab later this week. PET/CT is scheduled for the end of August.    Brain metastases. S/p 2 craniotomies and gamma knife: 2500 cGy to the left resection cavity; 3000 cGy to the L parietal-occipital lesion. He is now off of dexamethasone. Next brain MRI is scheduled for 10/12/23.     Seizure, secondary to brain metastases. Continue Keppra 1000 mg twice daily. No auras or seizure activity recently. No dose adjustments at  this time.     History of prostate cancer (Xiomara 3 + 3) s/p prostatectomy (2009)  He had no adverse features and did not receive adjuvant therapy. PSA has remained negative. Continue with yearly PSA.     Elevated BP. Recommend resuming amlodipine.     Headaches, fatigue, and weakness. May be related to recent complications and taper off of the dexamethasone. Will check a cortisol and ACTH when he comes into clinic later this week.     Meghan Rinaldi PA-C  [facility]  [address] [place] [address]  [phone]    30 minutes spent on the date of the encounter doing chart review, review of test results, interpretation of tests, patient visit and documentation

## 2023-08-07 NOTE — LETTER
8/7/2023         RE: Saeid Santa  [address]  [facility] [address]        Dear Colleague,    Thank you for referring your patient, Saeid Santa, to the [facility]. Please see a copy of my visit note below.    Virtual Visit Details    Type of service:  Video Visit   Video Start Time: 2:20 PM  Video End Time:2:33 PM    Originating Location (pt. Location): Home  Distant Location (provider location):  Off-site  Platform used for Video Visit: [facility] ONCOLOGY PROGRESS NOTE  Melanoma Clinic  Aug 7, 2023    Chief Complaint: metastatic melanoma to brain    Melanoma History:  --8/16/22, he brings a left parietal scalp lesion to attention of his GP. It is initially observed.  --November 2022, the left parietal scalp lesion was felt to be a cyst, and the left parietal lesion was lanced and drained via scalp incision.  --January 2023, the left parietal scalp cyst would occasionally break open, drain, and bleed, largely at night.   --February 2023, he notes some mild difficulty clearing obstacles with right leg and foot.  --March/April 2023, he has 3 progressively worsening episodes over a span 3 weeks including the right leg. At first he notes the onset of right leg numbness and heaviness, like he was carrying 50 lbs of water on the leg. Then, about a week or so later, the leg began to jerk and converse while he was in the basement. Neither episode associated with loss of consciousness  --4/15/23, he recalls the right leg numbness, heaviness, jerking while driving between work. He had been found by coworkers in the parking lot, sitting in the grass, appearing confused. He was brought to the ER by EMS. CT-CAP, showed small (<6mm), bilateral indeterminate pulmonary nodules. CT-head and MRI brain showed 2 discrete ring-enhancing lesions in the left paramedian posterior frontal lobe near the precentral gyrus measuring approximately 2.2 x 1.7 x 1.9 cm and left occipital lobe near the  lingual gyrus measuring approximately 2.2 x 2.1 x 2.0 cm. The occipital lesion encroaches upon the subependymal surface of the left lateral ventricle occipital horn and contacts the superior surface of the medial left tentorium. The lesions are accompanied by moderate surrounding vasogenic edema. Surgery was recommended.  --4/21/23, underwent left awake (sleep-awake-sleep) craniotomy for tumor resection, he has resection of the left frontal, motor strip tumor as well asan elliptical incision of the left parietal scalp lesion. On pathology, both the scalp excision and brain mass are positive for malignant melanoma. Tumor cells are positive for S-100 and Melan-A, and negative for cytokeratin AE1/AE3, NKX3.1, GFAP, ERG, CD3, and CD20.  CD3 highlights brisk infiltration of the tumor by T lymphocytes, while CD20 highlights scattered positive B lymphocytes. NGS identifies a BRAF G466A mutation, a class 3 mutation insensitive to BRAF kinase inhibitor monotherapy. TMB is high at 48.754 muts/Mb.   --4/22/23, post-operative MRI shows post-operative changes of the parietal craniotomy of the left frontal lobe metastasis. The other 1.7 x 1.1 cm cystic lesion in the left occipital lobe is also seen. No new lesions appreciated.  -- 4/25/23 to 5/9/23, discharged from the hospital to acute rehab stay at [facility] in [place]. He participated in a comprehensive rehabilitation program consisting of PT, OT, Speech, Psychology and Recreation Therapy. He did well and was discharged home with use of a walker.  --PET-CT on 5/18/23 with potentially involved neck lymph nodes. He has mild FDG uptake in soft tissue nodules in the scalp, in the known left occipital brain metastasis, as well as in the left lower neck region.  -5/24-/5/31/23, He was treated with gamma knife: 2500 cGy to the left resection cavity; 3000 cGy to the L parietal-occipital lesion.  -6/7/2023, Cycle 1 of pembrolizumab 200mg every 3 weeks  -6/14/23-6/16/23, admitted for  vision change, reduced comprehension, speech change, fall and headache. MRI showing likely progression of left occipital mass with associated edema. Improved on steroids and discharged on oral decadron. Plan for repeat MRI and neurosurgery follow up in 1 month.  -7/7/23 ED visit for headaches, confusion, vision changes, concern for hemorrhage of the left occipital mass, admitted to [facility] until 7/9/23, increased dexamethasone dose  -7/14/23 craniotomy of the left parieto-occipital mass, discharged on a dexamethasone taper    Interval History:  -Has headaches between eyes and temples. Uses Tylenol or oxycodone as needed.  -Has knee swelling, which has improved some since stopping the steroids. Has some associated pain with the swelling, improved with Icy Hot. Discomfort wakes him up at night.   -Had diarrhea last night, about 6 times last night. No stools so far today.   -Blood pressure today is elevated 180/110 and increased pulse.  -Off steroids x 2 days.   -Feels very fatigued over the last week.   -Balance and weakness remain a problem. Denies any falls. Using a cane or walker to get around.   -Vision is doing okay.   -Eating and drinking well.    Objective:  General: patient appears well in no acute distress, alert and oriented, speech clear and fluid  Skin: no visualized rash or lesions on visualized skin  Resp: Appears to be breathing comfortably without accessory muscle usage, speaking in full sentences, no audible wheezes or cough.  Psych: Coherent speech, normal rate and volume, able to articulate logical thoughts, able to abstract reason, no tangential thoughts, no hallucinations or delusions  Patient's affect is appropriate.    Laboratory Data:  Most Recent 3 CBC's:  Recent Labs   Lab Test 07/16/23  0955 07/15/23  0405 07/09/23  0544 07/08/23  0609 07/07/23  0510 06/15/23  1107 06/14/23  1220 04/22/23  0408   WBC 18.5* 19.1* 17.6*   < > 10.2   < > 9.0 10.6   HGB 14.2 13.0* 12.8*   < > 13.2*   < > 13.0*  14.4   MCV 95 93 94   < > 96   < > 95 93    239 181   < > 164   < > 218 246   ANEUTAUTO  --   --   --   --  6.0  --  5.5 9.6*    < > = values in this interval not displayed.     Most Recent 3 BMP's:  Recent Labs   Lab Test 07/16/23  0811 07/16/23  0634 07/15/23  0405 07/14/23  1802 07/09/23  1014 07/09/23  0544 07/07/23  1223 07/07/23  0510 06/21/23  1313 06/14/23  1220 06/07/23  0915   NA  --  135* 140  --  139 138   < > 141 140   < > 140   POTASSIUM  --  4.4 3.7  --   --  3.8   < > 3.9 4.3   < > 3.5   CHLORIDE  --  104 108*  --   --  106   < > 108* 105   < > 108*   CO2  --  17* 20*  --   --  18*   < > 25 25   < > 22   BUN  --  27.9* 16.6  --   --  20.3   < > 25.9* 23.0   < > 18.3   CR  --  0.75 0.69  --   --  0.72   < > 1.05 0.99   < > 0.90   ANIONGAP  --  14 12  --   --  14   < > 8 10   < > 10   JOHN PAUL  --  8.6* 8.0*  --   --  8.7*   < > 8.5* 9.0   < > 8.7*   * 105* 106*   < >  --  140*   < > 94 120*   < > 81   PROTTOTAL  --   --   --   --   --   --   --  5.7* 6.2*  --  6.0*   ALBUMIN  --   --   --   --   --   --   --  3.6 3.6  --  3.4*    < > = values in this interval not displayed.    Most Recent 3 LFT's:  Recent Labs   Lab Test 07/07/23  0510 06/21/23  1313 06/07/23  0915   AST 18 16 15   ALT 26 34 12   ALKPHOS 69 67 71   BILITOTAL 0.5 0.6 0.8    Most Recent 2 TSH and T4:  Recent Labs   Lab Test 06/07/23  0915   TSH 1.90     I reviewed the above labs today.     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 He started pembrolizumab on 6/7/23. He was subsequently hospitalized twice in June and July 2023 due to neurologic symptoms, as noted above. He is now off of the prednisone and seems to be doing reasonably well. He will continue with delayed cycle 2 pembrolizumab later this week. PET/CT is scheduled for the end of August.    Brain metastases. S/p 2 craniotomies and gamma knife: 2500 cGy to the left resection cavity; 3000 cGy to the L  parietal-occipital lesion. He is now off of dexamethasone. Next brain MRI is scheduled for 10/12/23.     Seizure, secondary to brain metastases. Continue Keppra 1000 mg twice daily. No auras or seizure activity recently. No dose adjustments at this time.     History of prostate cancer (Xiomara 3 + 3) s/p prostatectomy (2009)  He had no adverse features and did not receive adjuvant therapy. PSA has remained negative. Continue with yearly PSA.     Elevated BP. Recommend resuming amlodipine.     Headaches, fatigue, and weakness. May be related to recent complications and taper off of the dexamethasone. Will check a cortisol and ACTH when he comes into clinic later this week.     Meghan Rinaldi PA-C  [facility]  [address] [place] [address] [phone]    30 minutes spent on the date of the encounter doing chart review, review of test results, interpretation of tests, patient visit and documentation

## 2023-08-08 NOTE — TELEPHONE ENCOUNTER
Received notification that Dr. Rose left a message with Tim explaining the reason to reschedule the MRI and Followup on 8/24/23. Called Tim and left a message to call us back to reschedule those appts to mid October 2023.

## 2023-08-09 NOTE — PROGRESS NOTES
Assessment & Plan     (Z76.89) Encounter to establish care  (primary encounter diagnosis)  Comment: 67 yr old male here to establish care. He has melanoma with metastasis to the brain and had a recent craniotomy  Plan: still quite fatigued and running a low grade fever. Will touch base with his oncologist.     (R50.9) Fever, unspecified fever cause  Comment: Patient will be notified of results   Plan: ESR: Erythrocyte sedimentation rate, CRP,         inflammation, CBC with platelets and         differential            (D49.6) Brain tumor (H)  Comment: Resected recently   Plan: Patient doing fairly well though still fatigued.     (C43.9) Metastatic malignant melanoma (H)  Comment: Patient is being seen by the oncologist   Plan: as above    (I10) Benign essential hypertension  Comment: Blood pressure doing much better  Plan: Continue with amlodipine     Review of external notes as documented elsewhere in note  30 minutes spent by me on the date of the encounter doing chart review, review of test results, interpretation of tests, patient visit, documentation, and discussion with family      MED REC REQUIRED  Post Medication Reconciliation Status: discharge medications reconciled, continue medications without change  FUTURE APPOINTMENTS:       - Follow-up visit in one month     Rock Grajeda MD  [facility] is a 67 year [facility], presenting for the following health issues:    Patient is a 67 yr old male here to establish care. He has a recent diagnosis of stage IV melanoma of the scalp with brain metastasis  with recent craniotomy ( stealth assisted craniotomy with neoplasm excision left parietoccipital ) he reports that he has been having some headaches. He described it as twinges of pain in the temporal area. He says they last a few seconds but happen several times a day . He is running a low grade fever presently . Denies any cough or URI symptoms. He has had no  urinary symptoms.   He also wanted some stitches removed from his scalp from the surgery.  Establish Care (Here to establish care.  Wanting to discuss about his brain tumor.  His wife states he is cold all of the time for the past week.  She will have a heating pad for him with blankets and the heat on in the house and he still has chills.  No cough.  Right leg is swelling but has been getting better.) and Hospital F/U (Post hospital follow up.)        8/9/2023     8:00 AM   Additional Questions   Roomed by Erin Ram CMA   Accompanied by Smiley-wife.     Chief Complaint   Patient presents with    Establish Care     Here to establish care.  Wanting to discuss about his brain tumor.  His wife states he is cold all of the time for the past week.  She will have a heating pad for him with blankets and the heat on in the house and he still has chills.  No cough.  Right leg is swelling but has been getting better.    Hospital F/U     Post hospital follow up.         History of Present Illness       Reason for visit:  I am seeking a new primary care doctor    He eats 2-3 servings of fruits and vegetables daily.He consumes 1 sweetened beverage(s) daily.He exercises with enough effort to increase his heart rate 20 to 29 minutes per day.  He exercises with enough effort to increase his heart rate 7 days per week.   He is taking medications regularly.         Hospital Follow-up Visit:    Hospital/Nursing Home/IP Rehab Facility: [facility]  Date of Admission: 7-  Date of Discharge: 7-    He was at [facility] from 7-7-through 7-9-2023.  Reason(s) for Admission: Brain tumor.    Was your hospitalization related to COVID-19? No   Problems taking medications regularly:  None  Medication changes since discharge: Oxycodone only as needed.  Still has some left but this makes him feel out of it for the day when taking.  Problems adhering to non-medication  "therapy:  See above.    Summary of hospitalization:  [facility] discharge summary reviewed  Diagnostic Tests/Treatments reviewed.  Follow up needed: none  Other Healthcare Providers Involved in Patient s Care:         None  Update since discharge: fluctuating course.     Plan of care communicated with patient and family               Review of Systems   Constitutional:  Positive for fatigue and fever.   HENT: Negative.     Eyes: Negative.    Respiratory: Negative.     Cardiovascular: Negative.    Gastrointestinal: Negative.    Endocrine: Negative.    Genitourinary: Negative.    Musculoskeletal: Negative.    Allergic/Immunologic: Negative.    Neurological:  Positive for weakness and headaches.   Psychiatric/Behavioral: Negative.           Objective    /78 (BP Location: Left arm, Patient Position: Chair, Cuff Size: Adult Regular)   Pulse 107   Temp 100  F (37.8  C) (Tympanic)   Resp 24   Ht 1.854 m (6' 1\")   Wt 82.1 kg (181 lb)   SpO2 98%   BMI 23.88 kg/m    Body mass index is 23.88 kg/m .  Physical Exam  Constitutional:       Appearance: Normal appearance.   HENT:      Head: Normocephalic and atraumatic.   Eyes:      Pupils: Pupils are equal, round, and reactive to light.   Cardiovascular:      Rate and Rhythm: Normal rate and regular rhythm.   Pulmonary:      Effort: Pulmonary effort is normal.      Breath sounds: Normal breath sounds.   Musculoskeletal:         General: Normal range of motion.   Skin:     General: Skin is warm and dry.   Neurological:      Mental Status: He is alert and oriented to person, place, and time.   Psychiatric:         Mood and Affect: Mood normal.         Behavior: Behavior normal.            Results for orders placed or performed in visit on 08/09/23 (from the past 24 hour(s))   ESR: Erythrocyte sedimentation rate   Result Value Ref Range    Erythrocyte Sedimentation Rate 64 (H) 0 - 20 mm/hr   CBC with platelets and differential    Narrative    The following " orders were created for panel order CBC with platelets and differential.  Procedure                               Abnormality         Status                     ---------                               -----------         ------                     CBC with platelets and d...[[id number]]                      In process                   Please view results for these tests on the individual orders.

## 2023-08-09 NOTE — TELEPHONE ENCOUNTER
Spoke to pt and rescheduled both the MRI and follow up w/Dr. Rose to Thursday October 12th at 11:00 AM and 1:00 PM respectively. Pt confirmed new appt info  Thank you

## 2023-08-09 NOTE — TELEPHONE ENCOUNTER
"Nurse Triage SBAR    Situation: Headaches and fever    Background:    DX: metastatic melanoma to brain   Provider: /Meghan Rinaldi  Most recent appointment: 08/07/23 Panda Rinaldi  Upcoming appointments: 09/07/23 Panda Rinaldi  Most recent treatment: 06/07/23 Cycle 1, Day 1- Keytruda Infusion.   Surgery: 07/14/23 STEALTH ASSISTED CRANIOTOMY, WITH NEOPLASM EXCISION LEFT PARIETO-OCCIPITAL CRANIOTOMY WITH RESECTION OF MASS, LUMBAR DRAIN      Assessment:   Headaches multiple times throughout the day. Pt had not been checking his temperature. Pt saw PCP today for headaches and was told they are going to touch base with neurosurgery regarding headaches but recommended he follow up with oncology for low grade fever.    At the appt pt temperature was taken and it was 100F. At 1130 pt took temperature again and it was 101.4F oral temp. Pt reports he is having chills, \"I get chills, then I get hot, get chills, and then get hot.\" Requested pt take temperature while on the phone and temp was 102.6F    Taking acetaminophen 650 mg every 4-6hrs.     Pt reports also being very fatigued.     Pt notes no other sx.    Labs completed today:   ESR- 64  CRP-196.47    Denies chest pain, SOB, sore throat, cough, abnormal swelling of extremities, rhinorrhea, confusion, dizziness/lightheadedness     Recommendation:   1238 Paged Meghan Rinaldi  1238 Meghan responding she is off for the day and recommending doctor be paged.  1250 Paged   [phone] Second page to   1339  responding that it does not seem like sx are related to any tx or surgery pt has had recently. He recommends pt report to ED for evaluation to determine cause of sx.  [id number] Contacted pt to inform him of provider recommendation. Pt verbalized understanding.       "

## 2023-08-11 NOTE — PROGRESS NOTES
"Infusion Nursing Note:  Saeid Santa presents today for Keytruda C2D1.    Patient seen by provider today: No   present during visit today: Not Applicable.    Note: Tim stated his temp was around 99F yesterday with tylenol on board. Fevers have been going on for 4-5 days. His headaches happen a dozen times a day. He describes them as \" sharp, spiking headaches that go up to 10/10 and then back down again, only lasting a few seconds.\" He took a covid test yesterday which was negative. Temp at clinic today was 97.9F. He took tylenol around 0600 today.   Updated Meghan Rinaldi and Dr. Greene regarding the above.     Intravenous Access:  Peripheral IV placed.    Treatment Conditions:  Lab Results   Component Value Date     08/11/2023    POTASSIUM 3.8 08/11/2023    MAG 2.2 07/16/2023    CR 1.14 08/11/2023    JOHN PAUL 8.8 08/11/2023    BILITOTAL 0.7 08/11/2023    ALBUMIN 3.0 (L) 08/11/2023    ALT 83 (H) 08/11/2023    AST 48 (H) 08/11/2023   Results reviewed, labs MET treatment parameters, ok to proceed with treatment.    Post Infusion Assessment:  Patient tolerated infusion without incident.  Blood return noted pre and post infusion.  Site patent and intact, free from redness, edema or discomfort.  No evidence of extravasations.  Access discontinued per protocol.     Discharge Plan:   Discharge instructions reviewed with: Patient.  Patient and/or family verbalized understanding of discharge instructions and all questions answered.  AVS to patient via [employer].  Patient will return 9/1/2023 for next appointment.   Patient discharged in stable condition accompanied by: self and wife  Departure Mode: Ambulatory.    Radha Hamm RN  "

## 2023-08-25 NOTE — RESULT ENCOUNTER NOTE
This message has been addressed several times by the patient's oncologist. Please close encounter , no more comments about this results.   Thanks    Rock Grajeda M.D.

## 2023-08-28 NOTE — PROGRESS NOTES
NEUROSURGERY FOLLOWUP  NOTE    Saeid Santa comes today in f/u 6 weeks s/p 7/14/2023 Stealth assisted left parieto-occipital craniotomy with interhemispheric approach and resection of falcine mass.    He has a known diagnosis of metastatic melanoma s/p stealth assisted awake craniotomy with resection of motor/sensory strip lesion  on 4/21/23, underwent GK to resection cavity and parieto-occipital lesion on 5/24/23 presented to OSH with worsening confusion and headache and imaging evidence of increased size and density of the lesion in the left occipital lobe, worrisome for interval hemorrhage, Increased surrounding edema and associated mass effect with approximately 5 mm left to right midline shift. He was recently admitted to the [facility] with worsening symptoms and decision was made to surgically resect the lesion.      He has PET whole-body scheduled on 8/31/2023 and MRI brain with and without contrast scheduled on 10/12/2023.    Patient reports that he still has numbness involving his right leg and weakness involving his right lower extremity.  Patient also mentioned that he has lost muscle mass in his right leg.  He still has balance issues with no history of falls.  He denies any symptoms involving his left lower extremity or bilateral upper extremities.    Patient's spouse also reported that he has a lot of generalized fatigue, tiredness and sleep approximately 20 hours a day.    He has started on Keytruda 3 weeks ago and is scheduled to have another dose next week.    PHYSICAL EXAM:   Constitutional: /74   Pulse 110   SpO2 98%      Mental Status: A & O in no acute distress.  Affect is appropriate.  Speech is fluent.  Recent and remote memory are intact.  Attention span and concentration are normal.     Motor: Generalized loss of muscle mass is present.  Right leg proximal muscles 4 x 5, distal 4+ by 5     Sensory: Sensation decreased to touch in right lower extremity     Coordination:    Heel/toe/ gait intact.  Impaired tandem gait      Reflexes; supinator, biceps, triceps, knee/ ankle jerk intact.  No hoffmans/   no babinski/ clonus.    Incision healed well.    IMAGING:   I personally reviewed all radiographic images and agree with neuroradiology report of stable postoperative changes.  There is an evidence of abnormal contrast-enhancement measuring 6 mm along the anteromedial right frontal lobe worrisome for a new intracranial metastasis.     8/23/2023: MRI brain with and without:  IMPRESSION:   1. Stable postoperative change to the posterior left parietal skull  and high posteromedial left frontal lobe with no evidence for tumor  recurrence in the left frontal lobe.  2. Interval decrease in abnormal contrast enhancement at the margins  of the left occipital lobe surgical resection cavity possibly  representing evolving postoperative change. Continued surveillance  recommended.  3. New focus of abnormal contrast enhancement measuring 6 mm in  diameter in the anteromedial right frontal lobe worrisome for a new  intracranial metastasis. No other new lesions identified.  4. Diffuse cerebral volume loss and cerebral white matter changes  consistent with chronic small vessel ischemic disease. No evidence for  acute intracranial pathology.     CONSULTATION ASSESSMENT AND PLAN:    Saeid Santa comes today in f/u 6 weeks s/p 7/14/2023 Stealth assisted left parieto-occipital craniotomy with interhemispheric approach and resection of falcine mass.    He has a known diagnosis of metastatic melanoma s/p stealth assisted awake craniotomy with resection of motor/sensory strip lesion  on 4/21/23, underwent GK to resection cavity and parieto-occipital lesion on 5/24/23 presented to OSH with worsening confusion and headache and imaging evidence of increased size and density of the lesion in the left occipital lobe, worrisome for interval hemorrhage, Increased surrounding edema and associated mass effect with  approximately 5 mm left to right midline shift. He was [facility] to the [facility] with worsening symptoms and decision was made to surgically resect the lesion.      Patient has worsening numbness and weakness involving right lower extremity.  He still has balance issues.  Patient also reports generalized weakness and fatigue with loss of muscle mass involving  primarily right lower extremity.     He is scheduled to have another dose of Keytruda next week and is following with his medical oncologist for the same.    I explained the MRI brain with and without contrast findings to the patient and his spouse which showed a new focus of abnormal contrast-enhancement approximately 6 mm in the anteromedial right frontal lobe.  I explained to them that the postoperative resection site seems stable on the MRI and we will continue to follow.  Regarding the new lesion I will discuss with Dr. Maier regarding possible radiation treatment to the lesion involving the right anteromedial frontal lobe.    Patient and his spouse agreed with the plan.  All the questions were answered and patient sounded understanding.  They can contact us if there are any further question concerns or worsening neurological deficits.    I spent more than 20 minutes in this apt, examining the pt, reviewing the scans, reviewing notes from chart, discussing treatment options with risks and benefits and coordinating care. This note was created in part by the use of Dragon voice recognition system. Inadvertent grammatical errors and typographical errors may still exist.        Kye Garrido MD      CC:     Rock Grajeda  [address] [facility] [id number]

## 2023-08-28 NOTE — LETTER
8/28/2023         RE: Saeid Santa  [address]  [facility] [address]        Dear Colleague,    Thank you for referring your patient, Saeid Santa, to the [facility]. Please see a copy of my visit note below.    NEUROSURGERY FOLLOWUP  NOTE    Saeid Santa comes today in f/u 6 weeks s/p 7/14/2023 Stealth assisted left parieto-occipital craniotomy with interhemispheric approach and resection of falcine mass.    He has a known diagnosis of metastatic melanoma s/p stealth assisted awake craniotomy with resection of motor/sensory strip lesion  on 4/21/23, underwent GK to resection cavity and parieto-occipital lesion on 5/24/23 presented to OSH with worsening confusion and headache and imaging evidence of increased size and density of the lesion in the left occipital lobe, worrisome for interval hemorrhage, Increased surrounding edema and associated mass effect with approximately 5 mm left to right midline shift. He was recently admitted to the [facility] with worsening symptoms and decision was made to surgically resect the lesion.      He has PET whole-body scheduled on 8/31/2023 and MRI brain with and without contrast scheduled on 10/12/2023.    Patient reports that he still has numbness involving his right leg and weakness involving his right lower extremity.  Patient also mentioned that he has lost muscle mass in his right leg.  He still has balance issues with no history of falls.  He denies any symptoms involving his left lower extremity or bilateral upper extremities.    Patient's spouse also reported that he has a lot of generalized fatigue, tiredness and sleep approximately 20 hours a day.    He has started on Keytruda 3 weeks ago and is scheduled to have another dose next week.    PHYSICAL EXAM:   Constitutional: /74   Pulse 110   SpO2 98%      Mental Status: A & O in no acute distress.  Affect is appropriate.  Speech is fluent.  Recent and remote memory  are intact.  Attention span and concentration are normal.     Motor: Generalized loss of muscle mass is present.  Right leg proximal muscles 4 x 5, distal 4+ by 5     Sensory: Sensation decreased to touch in right lower extremity     Coordination:   Heel/toe/ gait intact.  Impaired tandem gait      Reflexes; supinator, biceps, triceps, knee/ ankle jerk intact.  No hoffmans/   no babinski/ clonus.    Incision healed well.    IMAGING:   I personally reviewed all radiographic images and agree with neuroradiology report of stable postoperative changes.  There is an evidence of abnormal contrast-enhancement measuring 6 mm along the anteromedial right frontal lobe worrisome for a new intracranial metastasis.     8/23/2023: MRI brain with and without:  IMPRESSION:   1. Stable postoperative change to the posterior left parietal skull  and high posteromedial left frontal lobe with no evidence for tumor  recurrence in the left frontal lobe.  2. Interval decrease in abnormal contrast enhancement at the margins  of the left occipital lobe surgical resection cavity possibly  representing evolving postoperative change. Continued surveillance  recommended.  3. New focus of abnormal contrast enhancement measuring 6 mm in  diameter in the anteromedial right frontal lobe worrisome for a new  intracranial metastasis. No other new lesions identified.  4. Diffuse cerebral volume loss and cerebral white matter changes  consistent with chronic small vessel ischemic disease. No evidence for  acute intracranial pathology.     CONSULTATION ASSESSMENT AND PLAN:    Saeid Santa comes today in f/u 6 weeks s/p 7/14/2023 Stealth assisted left parieto-occipital craniotomy with interhemispheric approach and resection of falcine mass.    He has a known diagnosis of metastatic melanoma s/p stealth assisted awake craniotomy with resection of motor/sensory strip lesion  on 4/21/23, underwent GK to resection cavity and parieto-occipital lesion on  5/24/23 presented to OSH with worsening confusion and headache and imaging evidence of increased size and density of the lesion in the left occipital lobe, worrisome for interval hemorrhage, Increased surrounding edema and associated mass effect with approximately 5 mm left to right midline shift. He was recently admitted to the [facility] with worsening symptoms and decision was made to surgically resect the lesion.      Patient has worsening numbness and weakness involving right lower extremity.  He still has balance issues.  Patient also reports generalized weakness and fatigue with loss of muscle mass involving  primarily right lower extremity.     He is scheduled to have another dose of Keytruda next week and is following with his medical oncologist for the same.    I explained the MRI brain with and without contrast findings to the patient and his spouse which showed a new focus of abnormal contrast-enhancement approximately 6 mm in the anteromedial right frontal lobe.  I explained to them that the postoperative resection site seems stable on the MRI and we will continue to follow.  Regarding the new lesion I will discuss with Dr. Maier regarding possible radiation treatment to the lesion involving the right anteromedial frontal lobe.    Patient and his spouse agreed with the plan.  All the questions were answered and patient sounded understanding.  They can contact us if there are any further question concerns or worsening neurological deficits.    I spent more than 20 minutes in this apt, examining the pt, reviewing the scans, reviewing notes from chart, discussing treatment options with risks and benefits and coordinating care. This note was created in part by the use of Dragon voice recognition system. Inadvertent grammatical errors and typographical errors may still exist.        Kye Garrido MD      CC:     Rock Grajeda  [address] [place] [id number]      Again, thank you for  allowing me to participate in the care of your patient.        Sincerely,        Kye Garrido MD

## 2023-08-28 NOTE — NURSING NOTE
"Saeid Santa is a 68 year old male who presents for:  Chief Complaint   Patient presents with    Surgical Followup     Constant near sighted vision disturbance -right side - since surgery  Fatigued - sleeping 20 hours/day - spends a lot of time sleeping/ falls asleep at table, for example  Scalp tenderness  Numbness right foot up to thigh        Initial Vitals:  /74   Pulse 110   SpO2 98%  Estimated body mass index is 23.77 kg/m  as calculated from the following:    Height as of 8/9/23: 6' 1\" (1.854 m).    Weight as of 8/11/23: 180 lb 3.2 oz (81.7 kg).. There is no height or weight on file to calculate BSA. BP completed using cuff size: regular  Moderate Pain (4)    Negrito Hoffman  "

## 2023-08-30 NOTE — PROGRESS NOTES
Physical Therapy Progress Note  08/30/23 0500   Appointment Info   Signing clinician's name / credentials Che Srivastava Chuck PT DPT   Total/Authorized Visits 365 are Medicare soc 5/26/23   Visits Used 3   Medical Diagnosis R hemiparesis; s/p tumor resection   PT Tx Diagnosis R hemiparesis   Progress Note/Certification   Onset of illness/injury or Date of Surgery 04/21/23   Therapy Frequency 1x/wk   Predicted Duration 12 weeks   GOALS   PT Goals 2;3;4   PT Goal 1   Goal Description ambulate community distances with no AFO or assistive device   Rationale to maximize safety and independence within the community   Target Date 11/08/23   PT Goal 2   Goal Description UP/Down full flight of stairs reciprocal pattern no rail to carry items in/out of home and upstairs   Rationale to maximize safety and independence within the home   Goal Progress 50% reciprocal pattern; moderate reliance on rail   Target Date 11/08/23   PT Goal 3   Goal Description increase R LE strength 5/5 (HF; KE ;HE; HABD; DF; PF) to return to previous activity level and lifestyle   Rationale to maximize safety and independence within the community   Target Date 11/08/23   PT Goal 4   Goal Description Independent HEP to continue stretching and strengthening on own   Rationale to maximize safety and independence with performance of ADLs and functional tasks   Target Date 11/08/23   Subjective Report   Subjective Report in past 3 months has been hospitalized 3-4x; had surgery resection of brain lesion 7/14/23; R LE is VERY weak; overall endurance is VERY low, not using AFO today; catches R foot on things and trips but has not fallen since surgery   Objective Measures   Objective Measures Objective Measure 3   Objective Measure 3   Objective Measure Strength   Details DF 3+/5; PF 3-/5; HF 3+ R, 4- L; Habd 3- R, 4-L   Therapeutic Procedure/Exercise   Therapeutic Procedures: strength, endurance, ROM, flexibillity minutes (86884) 25   Ther Proc 1 -  Details instructed in SLR x 5 bilaterally 2 sets; review HS stretch, PF BTB and gastroc stretch   PTRx Ther Proc 3 review clam poor hip control so modified to hooklying T TB hip abd/ER x 10 reps   Skilled Intervention HEP instruction for strengthening and balance   Patient Response/Progress fatigues (muscle fatigue and endurance)  VERY quickly   Education   Learner/Method Patient   Education Comments vhi/ptrx   Plan   Home program added above, continue previous   Updates to plan of care 1x/wk   Plan for next session R LE and core strengthening   Total Session Time   Timed Code Treatment Minutes 25   Total Treatment Time (sum of timed and untimed services) 25       [facility]                                                                                   OUTPATIENT PHYSICAL THERAPY    PLAN OF TREATMENT FOR OUTPATIENT REHABILITATION   Patient's Last Name, First Name, MATTIESaeid Constantino YOB: 1955   Provider's Name   [facility]   Medical Record No.  [id number]     Onset Date: 04/21/23  Start of Care Date:  5/26/23     Medical Diagnosis:  R hemiparesis; s/p tumor resection      PT Treatment Diagnosis:  R hemiparesis Plan of Treatment  Frequency/Duration: 1x/wk x 10 weeks    Certification date from 8/18/2023  to 11/22/23          See note for plan of treatment details and functional goals     Che Merlos, PT  DPT                         I CERTIFY THE NEED FOR THESE SERVICES FURNISHED UNDER        THIS PLAN OF TREATMENT AND WHILE UNDER MY CARE     (Physician attestation of this document indicates review and certification of the therapy plan).                Referring Provider:  Jermaine Rader      Initial Assessment  See Epic Evaluation- 5/26/23

## 2023-08-30 NOTE — TELEPHONE ENCOUNTER
"Routing refill request to provider for review/approval because:  Drug not active on patient's medication list  Labs out of range:  CBC, ALT    Pending Prescriptions:                       Disp   Refills    indomethacin (INDOCIN) 25 MG capsule [Pha*30 cap*5            Sig: TAKE 1 CAPSULE BY MOUTH THREE TIMES DAILY AS           DIRECTED      Requested Prescriptions   Pending Prescriptions Disp Refills    indomethacin (INDOCIN) 25 MG capsule [Pharmacy Med Name: indomethacin 25 mg capsule] 30 capsule 5     Sig: TAKE 1 CAPSULE BY MOUTH THREE TIMES DAILY AS DIRECTED       Gout Agents Protocol Failed - 8/30/2023 12:48 PM        Failed - Has Uric Acid on file in past 12 months and value is less than 6     Recent Labs   Lab Test 10/29/21  1632   URIC 7.5     If level is 6mg/dL or greater, ok to refill one time and refer to provider.           Failed - Medication is active on med list        Passed - CBC on file in past 12 months     Recent Labs   Lab Test 08/09/23  0836   WBC 7.7   RBC 4.19*   HGB 13.2*   HCT 40.1                    Passed - ALT on file in past 12 months     Recent Labs   Lab Test 08/11/23  0812   ALT 83*             Passed - Recent (12 mo) or future (30 days) visit within the authorizing provider's specialty     Patient has had an office visit with the authorizing provider or a provider within the authorizing providers department within the previous 12 mos or has a future within next 30 days. See \"Patient Info\" tab in inbasket, or \"Choose Columns\" in Meds & Orders section of the refill encounter.              Passed - Patient is age 18 or older        Passed - Normal serum creatinine on file in the past 12 months     Recent Labs   Lab Test 08/11/23  0812   CR 1.14       Ok to refill medication if creatinine is low         NSAID Medications Failed - 8/30/2023 12:48 PM        Failed - Normal ALT on file in past 12 months     Recent Labs   Lab Test 08/11/23  0812   ALT 83*             Failed - Normal " "AST on file in past 12 months     Recent Labs   Lab Test 08/11/23  0812   AST 48*             Failed - Patient is age 6-64 years        Failed - Normal CBC on file in past 12 months     Recent Labs   Lab Test 08/09/23  0836   WBC 7.7   RBC 4.19*   HGB 13.2*   HCT 40.1                    Failed - Medication is active on med list        Passed - Blood pressure under 140/90 in past 12 months     BP Readings from Last 3 Encounters:   08/28/23 107/74   08/11/23 106/69   08/09/23 120/78                 Passed - Recent (12 mo) or future (30 days) visit within the authorizing provider's specialty     Patient has had an office visit with the authorizing provider or a provider within the authorizing providers department within the previous 12 mos or has a future within next 30 days. See \"Patient Info\" tab in inbasket, or \"Choose Columns\" in Meds & Orders section of the refill encounter.              Passed - Normal serum creatinine on file in past 12 months     Recent Labs   Lab Test 08/11/23 0812   CR 1.14       Ok to refill medication if creatinine is low             Jennifer Bean RN on 8/30/2023 at 1:06 PM    "

## 2023-08-31 NOTE — TELEPHONE ENCOUNTER
Pending Prescriptions:                       Disp   Refills    indomethacin (INDOCIN) 25 MG capsule [Phar*30 cap*5        Sig: TAKE 1 CAPSULE BY MOUTH THREE TIMES DAILY AS DIRECTED        Routing refill request to provider for review/approval because:  Drug not active on patient's medication list  Labs out of range.  Labs not current.  Last office visit: 8/9/2023 with prescribing provider.  Future Office Visit:   Next 5 appointments (look out 90 days)      Oct 16, 2023 12:20 PM  (Arrive by 12:00 PM)  Return Visit with Kye Garrido MD  [facility] ([facility] ) [address] [place] [id number]  [phone]             Stacey Brown RN

## 2023-08-31 NOTE — PROGRESS NOTES
Virtual Visit Details    Type of service:  Video Visit   Video Start Time:  7:05AM  Video End Time: 7:32AM    Originating Location (pt. Location): Home    Distant Location (provider location):  On-site  Platform used for Video Visit: [facility] ONCOLOGY PROGRESS NOTE  Melanoma Clinic  Aug 31, 2023    Chief Complaint: metastatic melanoma to brain    Melanoma History:  --8/16/22, he brings a left parietal scalp lesion to attention of his GP. It is initially observed.  --November 2022, the left parietal scalp lesion was felt to be a cyst, and the left parietal lesion was lanced and drained via scalp incision.  --January 2023, the left parietal scalp cyst would occasionally break open, drain, and bleed, largely at night.   --February 2023, he notes some mild difficulty clearing obstacles with right leg and foot.  --March/April 2023, he has 3 progressively worsening episodes over a span 3 weeks including the right leg. At first he notes the onset of right leg numbness and heaviness, like he was carrying 50 lbs of water on the leg. Then, about a week or so later, the leg began to jerk and converse while he was in the basement. Neither episode associated with loss of consciousness  --4/15/23, he recalls the right leg numbness, heaviness, jerking while driving between work. He had been found by coworkers in the parking lot, sitting in the grass, appearing confused. He was brought to the ER by EMS. CT-CAP, showed small (<6mm), bilateral indeterminate pulmonary nodules. CT-head and MRI brain showed 2 discrete ring-enhancing lesions in the left paramedian posterior frontal lobe near the precentral gyrus measuring approximately 2.2 x 1.7 x 1.9 cm and left occipital lobe near the lingual gyrus measuring approximately 2.2 x 2.1 x 2.0 cm. The occipital lesion encroaches upon the subependymal surface of the left lateral ventricle occipital horn and contacts the superior surface of the medial left tentorium. The lesions are  accompanied by moderate surrounding vasogenic edema. Surgery was recommended.  --4/21/23, underwent left awake (sleep-awake-sleep) craniotomy for tumor resection, he has resection of the left frontal, motor strip tumor as well asan elliptical incision of the left parietal scalp lesion. On pathology, both the scalp excision and brain mass are positive for malignant melanoma. Tumor cells are positive for S-100 and Melan-A, and negative for cytokeratin AE1/AE3, NKX3.1, GFAP, ERG, CD3, and CD20.  CD3 highlights brisk infiltration of the tumor by T lymphocytes, while CD20 highlights scattered positive B lymphocytes. NGS identifies a BRAF G466A mutation, a class 3 mutation insensitive to BRAF kinase inhibitor monotherapy. TMB is high at 48.754 muts/Mb.   --4/22/23, post-operative MRI shows post-operative changes of the parietal craniotomy of the left frontal lobe metastasis. The other 1.7 x 1.1 cm cystic lesion in the left occipital lobe is also seen. No new lesions appreciated.  -- 4/25/23 to 5/9/23, discharged from the hospital to acute rehab stay at [facility] in [place]. He participated in a comprehensive rehabilitation program consisting of PT, OT, Speech, Psychology and Recreation Therapy. He did well and was discharged home with use of a walker.  --PET-CT on 5/18/23 with potentially involved neck lymph nodes. He has mild FDG uptake in soft tissue nodules in the scalp, in the known left occipital brain metastasis, as well as in the left lower neck region.  -5/24-/5/31/23, He was treated with gamma knife: 2500 cGy to the left resection cavity; 3000 cGy to the L parietal-occipital lesion.  -6/7/2023, Cycle 1 of pembrolizumab 200mg every 3 weeks  -6/14/23-6/16/23, admitted for vision change, reduced comprehension, speech change, fall and headache. MRI showing likely progression of left occipital mass with associated edema. Improved on steroids and discharged on oral decadron. Plan for repeat MRI and neurosurgery  follow up in 1 month.  -7/7/23 ED visit for headaches, confusion, vision changes, concern for hemorrhage of the left occipital mass, admitted to [facility] until 7/9/23, increased dexamethasone dose  -7/14/23 craniotomy of the left parieto-occipital mass, discharged on a dexamethasone taper  -8/11/2023, Cycle 2 of pembrolizumab  -8/23/2023 brain MRI with no recurrence in left frontal lobe, decreased enhancement at edges of left occipital resection cavity, new enhancement int he right front lobe concerning for new met--seen by neurosurgery and considering RT    Interval History:  -Saeid presents today via video visit for follow up/evaluation prior to Keytruda. He is accompanied by his wife Smiley. He reports overall not having any notable side effects from Keytruda. He has had ongoing significant fatigue. At times, he can sleep up to 20 hours per day. He is able to do chores around the house and go for short walks. He notes the fatigue was been present since his 2nd surgery in July (Keytruda was given in June and not again until mid August). He did not notice any clear exacerbation with immunotherapy. He did not notice any improvement in fatigue with steroids following surgery. He is off decadron.   -He is working on strength overall and RLE specifically. He worked with PT yesterday. He reports not falls. Reports he is working on getting strength and conditioning back. His RLE remains numb.  -On Keppra and no concerns for seizure activity.  -Headaches and vision changes are resolved at present. Had previously had some right side vision changes with movement of images in field of vision but not noting this now. He also reports intermittent fevers are no longer present.  -He's had a couple days of loose stools but self-limiting, not more than 4 per day and bowels are normal now.  -Appetite has decreased following being off steroids. Closer to his normal but a bit low. No nausea. Smiley makes sure he takes in nutrition.  -No  rashes.  -CRUZ with climbing stairs.     Review of Systems:  As noted in HPI    Objective:  There were no vitals taken for this visit.   Video physical exam  General: Patient appears well in no acute distress.   Skin: No visualized rash or lesions on visualized skin  Eyes: EOMI, no erythema, sclera icterus or discharge noted  Resp: Appears to be breathing comfortably without accessory muscle usage, speaking in full sentences, no cough  Neurologic: No apparent tremors, facial movements symmetric. Hearing intact. No dysarthria.   Psych: affect appropriate, alert and oriented. Pleasant.     Laboratory Data:  Most Recent 3 CBC's:  Recent Labs   Lab Test 08/09/23  0836 07/16/23  0955 07/15/23  0405 07/08/23  0609 07/07/23  0510 06/15/23  1107 06/14/23  1220 04/22/23  0408   WBC 7.7 18.5* 19.1*   < > 10.2   < > 9.0 10.6   HGB 13.2* 14.2 13.0*   < > 13.2*   < > 13.0* 14.4   MCV 96 95 93   < > 96   < > 95 93    293 239   < > 164   < > 218 246   ANEUTAUTO  --   --   --   --  6.0  --  5.5 9.6*    < > = values in this interval not displayed.     Most Recent 3 BMP's:  Recent Labs   Lab Test 08/11/23  0812 07/16/23  0811 07/16/23  0634 07/15/23  0405 07/07/23  1223 07/07/23  0510 06/21/23  1313     --  135* 140   < > 141 140   POTASSIUM 3.8  --  4.4 3.7   < > 3.9 4.3   CHLORIDE 102  --  104 108*   < > 108* 105   CO2 24  --  17* 20*   < > 25 25   BUN 16.9  --  27.9* 16.6   < > 25.9* 23.0   CR 1.14  --  0.75 0.69   < > 1.05 0.99   ANIONGAP 11  --  14 12   < > 8 10   JOHN PAUL 8.8  --  8.6* 8.0*   < > 8.5* 9.0   * 130* 105* 106*   < > 94 120*   PROTTOTAL 6.5  --   --   --   --  5.7* 6.2*   ALBUMIN 3.0*  --   --   --   --  3.6 3.6    < > = values in this interval not displayed.    Most Recent 3 LFT's:  Recent Labs   Lab Test 08/11/23  0812 07/07/23  0510 06/21/23  1313   AST 48* 18 16   ALT 83* 26 34   ALKPHOS 62 69 67   BILITOTAL 0.7 0.5 0.6    Most Recent 2 TSH and T4:  Recent Labs   Lab Test 08/11/23  0812  06/07/23  0915   TSH 1.81 1.90     I reviewed the above labs today.    IMAGING  Imaging:  MRI Brain w & w/o contrast  Narrative: MRI OF THE BRAIN WITHOUT AND WITH CONTRAST 8/23/2023 4:17 PM     COMPARISON: Brain MRI 7/16/2023.    HISTORY: Malignant neoplasm metastatic to brain (H).     TECHNIQUE: Axial diffusion-weighted with ADC map, axial T2-weighted  with fat saturation, axial T1-weighted, axial turboFLAIR and coronal  T1-weighted images of the brain were acquired without intravenous  contrast.  Following intravenous administration of gadolinium  (Gadavist 8 mL), axial T1-weighted images of the brain were acquired.     FINDINGS: Postoperative change consisting of a small posterior left  parietal craniotomy and a midline occipital craniotomy again noted.  Surgical resection cavities in the high posterior medial left frontal  lobe and left occipital lobe again noted. There is no definite  abnormal contrast enhancement within or at the margins of the high  left frontal surgical resection cavity. There is persistent but  decreased abnormal contrast enhancement at the margins of the left  occipital surgical resection cavity that may simply represent  postoperative change. Continued surveillance recommended.    There is a new focus of abnormal contrast enhancement in the  anteromedial aspect of the right frontal lobe (series 11, image 16)  measuring 6 mm in diameter worrisome for a new intracranial  metastasis. No other new abnormal contrast enhancement in the brain or  its coverings.    There is mild diffuse cerebral volume loss. There are patchy  periventricular areas of abnormal T2 signal hyperintensity in the  cerebral white matter bilaterally that are consistent with sequela of  chronic small vessel ischemic disease. The ventricles and basal  cisterns are within normal limits in configuration given the degree of  cerebral volume loss.  There is no midline shift.  There are no  extra-axial fluid collections. There  is no evidence for stroke or  acute intracranial hemorrhage.    There is no sinusitis or mastoiditis.  Impression: IMPRESSION:   1. Stable postoperative change to the posterior left parietal skull  and high posteromedial left frontal lobe with no evidence for tumor  recurrence in the left frontal lobe.  2. Interval decrease in abnormal contrast enhancement at the margins  of the left occipital lobe surgical resection cavity possibly  representing evolving postoperative change. Continued surveillance  recommended.  3. New focus of abnormal contrast enhancement measuring 6 mm in  diameter in the anteromedial right frontal lobe worrisome for a new  intracranial metastasis. No other new lesions identified.  4. Diffuse cerebral volume loss and cerebral white matter changes  consistent with chronic small vessel ischemic disease. No evidence for  acute intracranial pathology.    ADRIANNE SERVIN MD         SYSTEM ID:  [id number]    I reviewed the above imaging today.      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   He started pembrolizumab on 6/7/23. He was subsequently hospitalized twice in June and July 2023 due to neurologic symptoms, as noted above with most recent craniotomy on 7/14/23. After a delay, he continued with cycle 2 pembrolizumab on 8/11/23. His brain MRI on 8/23/23 as noted above  with no recurrence in left frontal lobe, decreased enhancement at edges of left occipital resection cavity, new enhancement int he right front lobe concerning for new met. He was seen by neurosurgery who was planning to see if radiation would be an option. I will send a message to follow up on this.    Tim reports ongoing fatigue which does not seem to be clearly linked to Keytruda based on history. He seems to be tolerating Keytruda well. He is working on gaining strength following his 2nd surgery.     He has a PET/CT scheduled later today. Given that he has only received 2 cycles  of pembrolizumab and they were over 2 months apart, it's difficult to draw conclusions from recent brain MRI. As such, we will plan to proceed with cycle 3 of pembrolizumab as planned tomorrow barring any drastic findings any on PET. He will follow up with Dr. Greene next week as planned.    ADDENDUM: PET-CT with incidental finding of pneumonitis. Could be s/t keytruda but not certain and asymptomatic other than fatigue which is not clearly correlated as fatigue followed second craniotomy. I reviewed imaging with Dr. Greene and discussed with radiology. Likely ~40% lung involvement (primarily peripheral). Could be any where from grade 1 to grade 3 based on NCCN guidelines but in absence of symptoms, favor a lower grade. Our plan is to cancel Keytruda for tomorrow. We will obtain Covid and resp panel along with CBC with diff and CMP. (Also testosterone for fatigue). Pulse ox on 8/28 was 98%. Asked for vitals check with labs tomorrow. Will hold on starting steroids for now and follow clinically. Asked RNCC to update patient on the above and to educate that he should ideally get a pulse ox monitor (can buy at local pharmacy) and check daily (especially if feeling SOB) to make sure sats are 92% or above. He needs to let us/triage know right away if any SOB, cough or fevers. Dr Greene will discuss next steps in treatment when he sees him next week and will change this to an in person visit    Brain metastases. S/p 2 craniotomies and gamma knife: 2500 cGy to the left resection cavity; 3000 cGy to the L parietal-occipital lesion. He is now off of dexamethasone. Brain MRI with a new area of enhancement as above; reaching out to see if RT a consideration. Next brain MRI tentatively scheduled in October pending clinical course.     Seizure, secondary to brain metastases. Continue Keppra 1000 mg twice daily. No auras or seizure activity recently.     Fatigue.    Cortisol and ACTH on 8/11/23 were without concerns. Will check  testosterone and TSH tomorrow before infusion. Timeline does not seem linked to Keytruda, as such we will plan to proceed tomorrow. Given no clear benefit from steroids following surgery, will hold on these for now; he prefers this as well. If no abnormalities on TSH or testosterone, can consider trial of Ritalin, though Tim is hesitant about this. He will consider it. We discussed limiting naps to 30 minutes at a time and then doing something active. He knows to reach out if fatigue worsens    History of prostate cancer (Anthony 3 + 3) s/p prostatectomy (2009), not discussed today  He had no adverse features and did not receive adjuvant therapy. PSA has remained negative. Continue with yearly PSA.     50 minutes spent on the date of the encounter doing chart review, review of test results, interpretation of tests, patient visit, and documentation      Amber Scheierl, CNP  [facility]  [address] [place] [id number]  [phone]

## 2023-08-31 NOTE — NURSING NOTE
Is the patient currently in the state of [place]? YES    Visit mode:VIDEO    If the visit is dropped, the patient can be reconnected by: VIDEO VISIT: Text to cell phone:   Telephone Information:   Mobile [phone]       Will anyone else be joining the visit? NO    How would you like to obtain your AVS? MyChart    Are changes needed to the allergy or medication list? Pt stated no changes to allergies and Pt stated no med changes    Reason for visit: ANNE Bauer LPN

## 2023-08-31 NOTE — NURSING NOTE
Date: 2023   Age: 68 year old  Ethnicity:    Sex: male  : 1955   Lives In: [place]      Diagnosis: metastatic melanoma    Prior radiation therapy:   Site Treated: left resection cavity  Facility: [facility]  Dates: 23- 23  Dose: 2500 cGy in 5 fx    Site Treated: left parietal-occipital lesion  Facility: [facility]  Dates: 23- 23  Dose: 3000 cGy in 5 fx    Prior chemotherapy:   See below    Pain at time of consult?  Does patient have a living will?  Does patient have an implanted cardiac device?    RN time with patient:  Educated on gamma knife?    Fall Screen:  Have you fallen in the past week?   Have you felt unsteady when walking or standing in the past week?     Physicians: Dr. Kingston Greene; Dr. Kye Garrido; Dr. Asim Cervantes (PCP)    Review Since Diagnosis:  Hx : Prostate Cancer, Prostatectomy, no adjuvant therapy  22: he brings a left parietal scalp lesion to attention of GP. It was initially observed.  2022: PCP appt; lesion felt to be a cyst. Lesion lanced and drained via scalp incision  2023: the left parietal scalp lesion area would occasionally break open and bleed   2023: right leg and foot not working totally right   March/2023: three episodes of right leg feeling heavy, last episode jerking of leg, lasted a minute or two, no loss of consciousness   4/15/23: pt driving from work as had a migraine. Had right leg numbness/heaviness and jerking.  A co worker was following on the same route and noticed pt having problems so assisted him when found in parking lot; sitting in grass and confused.  To ER via ambulance to [place]  4/15/23: Brain MRI:   2.2 x 1.7 x 1.9 cm lesion left paramedian posterior frontal lobe near the precentral gyrus  2.2 x 2.1 x 2.0 cm lesion left occipital lobe near the lingual gyrus  4/15/23: CT CAP, bilateral pulmonary nodules measuring up to 0.6 cm-indeterminate   Neck CT/Angio: enlarged lymph nodes and  additional scalp lesions consistent with head and neck local regional disease  Pt referred to Dr. Garrido   4/21/23: crani by Dr. Garrido, biopsy of left frontal brain lesion showed malignant melanoma, this was resected   Biopsy of left parietal scalp lesion showed malignant melanoma, excision of this lesion  4/25/23: discharged to acute rehab [facility] in [place]   5/9/23: discharged home with use of walker, remains on keppra  5/10/23: last dose of steroids   5/12/23: pt saw Dr. Greene, unsure scalp lesion is primary or a metastatic lesion. Wanting PET/CT for extent of disease.  Plan Rad/Onc for brain lesion and surgical cavity and then plan to immediately follow with systemic immunotherapy.  Will know better which agents will be used once PET/CT completed.    5/15/23: pt saw Dr. Garrido, still some numbness right leg and foot.  Currently ambulating with a cane   5/18/23: PET/CT: shows potentially involved neck lymph nodes. Mild uptake in soft tissue nodules in the scalp, in the known left occipital brain metastasis as well as in the left lower neck region.   5/24/23- 5/31/23: gamma knife as noted above  6/7/23: C1 of pembrolizumab every 3 weeks  6/14/23- 6/16/23: admitted for vision change, reduced comprehension, speech change, fall and headache.   6/14/23: brain mri showing likely progression of left occipital mass with associated edema. Improved on steroids and discharged on oral decadron. Plan for repeat mri and neurosurgery follow up in one month  7/7/23: ED visit for headaches, confusion, vision changes concern for hemorrhage of the left occipital mass. Admitted to [facility] until 7/9/23. Increased Decadron dose.   7/14/23: craniotomy of the left parieto-occipital mass (Dr. Garrido). Discharged on Decadron taper  8/11/23: C2 of pembrolizumab  8/23/23: brain mri  No recurrence in left frontal lobe  Decreased enhancement at edges of left occipital resection cavity  New enhancement in the right frontal lobe concerning  for new met. Measures 6 mm.  8/28/23: post op visit with Dr. Garrido. Pt still has numbness involving right leg and weakness involving his right lower extremity. Loss of muscle mass in right leg. Still has balance issues with no recent falls. Generalized fatigue; sleeping up to 20 hours a day. Review of brain mri. Referred to Dr. Rose for SRS.  8/31/23: video visit with CNP hem/onc: plan for scheduled C3 dose of pembrolizumab on 9/1/23. Has PET/CT later today.   8/31/23: PET/CT: incidental finding of pneumonitis. Plan to cancel Keytruda for 9/1/23. Will obtain COVID and resp panel on 9/1/23. Pt to monitor his pulse ox. Dr. Greene will discuss next steps in treatment at appointment scheduled for 9/7.     Chief Complaint: at

## 2023-08-31 NOTE — TELEPHONE ENCOUNTER
Pt has called and will be out of this med today, can he get it refilled?    Madelyn Moore  [facility]

## 2023-08-31 NOTE — LETTER
8/31/2023         RE: Saeid Santa  [address]  [facility] [address]        Dear Colleague,    Thank you for referring your patient, Saeid Santa, to the [facility]. Please see a copy of my visit note below.    Virtual Visit Details    Type of service:  Video Visit   Video Start Time:  7:05AM  Video End Time: 7:32AM    Originating Location (pt. Location): Home    Distant Location (provider location):  On-site  Platform used for Video Visit: [facility] ONCOLOGY PROGRESS NOTE  Melanoma Clinic  Aug 31, 2023    Chief Complaint: metastatic melanoma to brain    Melanoma History:  --8/16/22, he brings a left parietal scalp lesion to attention of his GP. It is initially observed.  --November 2022, the left parietal scalp lesion was felt to be a cyst, and the left parietal lesion was lanced and drained via scalp incision.  --January 2023, the left parietal scalp cyst would occasionally break open, drain, and bleed, largely at night.   --February 2023, he notes some mild difficulty clearing obstacles with right leg and foot.  --March/April 2023, he has 3 progressively worsening episodes over a span 3 weeks including the right leg. At first he notes the onset of right leg numbness and heaviness, like he was carrying 50 lbs of water on the leg. Then, about a week or so later, the leg began to jerk and converse while he was in the basement. Neither episode associated with loss of consciousness  --4/15/23, he recalls the right leg numbness, heaviness, jerking while driving between work. He had been found by coworkers in the parking lot, sitting in the grass, appearing confused. He was brought to the ER by EMS. CT-CAP, showed small (<6mm), bilateral indeterminate pulmonary nodules. CT-head and MRI brain showed 2 discrete ring-enhancing lesions in the left paramedian posterior frontal lobe near the precentral gyrus measuring approximately 2.2 x 1.7 x 1.9 cm and left occipital lobe  near the lingual gyrus measuring approximately 2.2 x 2.1 x 2.0 cm. The occipital lesion encroaches upon the subependymal surface of the left lateral ventricle occipital horn and contacts the superior surface of the medial left tentorium. The lesions are accompanied by moderate surrounding vasogenic edema. Surgery was recommended.  --4/21/23, underwent left awake (sleep-awake-sleep) craniotomy for tumor resection, he has resection of the left frontal, motor strip tumor as well asan elliptical incision of the left parietal scalp lesion. On pathology, both the scalp excision and brain mass are positive for malignant melanoma. Tumor cells are positive for S-100 and Melan-A, and negative for cytokeratin AE1/AE3, NKX3.1, GFAP, ERG, CD3, and CD20.  CD3 highlights brisk infiltration of the tumor by T lymphocytes, while CD20 highlights scattered positive B lymphocytes. NGS identifies a BRAF G466A mutation, a class 3 mutation insensitive to BRAF kinase inhibitor monotherapy. TMB is high at 48.754 muts/Mb.   --4/22/23, post-operative MRI shows post-operative changes of the parietal craniotomy of the left frontal lobe metastasis. The other 1.7 x 1.1 cm cystic lesion in the left occipital lobe is also seen. No new lesions appreciated.  -- 4/25/23 to 5/9/23, discharged from the hospital to acute rehab stay at [facility] in [place]. He participated in a comprehensive rehabilitation program consisting of PT, OT, Speech, Psychology and Recreation Therapy. He did well and was discharged home with use of a walker.  --PET-CT on 5/18/23 with potentially involved neck lymph nodes. He has mild FDG uptake in soft tissue nodules in the scalp, in the known left occipital brain metastasis, as well as in the left lower neck region.  -5/24-/5/31/23, He was treated with gamma knife: 2500 cGy to the left resection cavity; 3000 cGy to the L parietal-occipital lesion.  -6/7/2023, Cycle 1 of pembrolizumab 200mg every 3 weeks  -6/14/23-6/16/23,  admitted for vision change, reduced comprehension, speech change, fall and headache. MRI showing likely progression of left occipital mass with associated edema. Improved on steroids and discharged on oral decadron. Plan for repeat MRI and neurosurgery follow up in 1 month.  -7/7/23 ED visit for headaches, confusion, vision changes, concern for hemorrhage of the left occipital mass, admitted to [facility] until 7/9/23, increased dexamethasone dose  -7/14/23 craniotomy of the left parieto-occipital mass, discharged on a dexamethasone taper  -8/11/2023, Cycle 2 of pembrolizumab  -8/23/2023 brain MRI with no recurrence in left frontal lobe, decreased enhancement at edges of left occipital resection cavity, new enhancement int he right front lobe concerning for new met--seen by neurosurgery and considering RT    Interval History:  -Saeid presents today via video visit for follow up/evaluation prior to Keytruda. He is accompanied by his wife Smiley. He reports overall not having any notable side effects from Keytruda. He has had ongoing significant fatigue. At times, he can sleep up to 20 hours per day. He is able to do chores around the house and go for short walks. He notes the fatigue was been present since his 2nd surgery in July (Keytruda was given in June and not again until mid August). He did not notice any clear exacerbation with immunotherapy. He did not notice any improvement in fatigue with steroids following surgery. He is off decadron.   -He is working on strength overall and RLE specifically. He worked with PT yesterday. He reports not falls. Reports he is working on getting strength and conditioning back. His RLE remains numb.  -On Keppra and no concerns for seizure activity.  -Headaches and vision changes are resolved at present. Had previously had some right side vision changes with movement of images in field of vision but not noting this now. He also reports intermittent fevers are no longer  present.  -He's had a couple days of loose stools but self-limiting, not more than 4 per day and bowels are normal now.  -Appetite has decreased following being off steroids. Closer to his normal but a bit low. No nausea. Smiley makes sure he takes in nutrition.  -No rashes.  -CRUZ with climbing stairs.     Review of Systems:  As noted in HPI    Objective:  There were no vitals taken for this visit.   Video physical exam  General: Patient appears well in no acute distress.   Skin: No visualized rash or lesions on visualized skin  Eyes: EOMI, no erythema, sclera icterus or discharge noted  Resp: Appears to be breathing comfortably without accessory muscle usage, speaking in full sentences, no cough  Neurologic: No apparent tremors, facial movements symmetric. Hearing intact. No dysarthria.   Psych: affect appropriate, alert and oriented. Pleasant.     Laboratory Data:  Most Recent 3 CBC's:  Recent Labs   Lab Test 08/09/23  0836 07/16/23  0955 07/15/23  0405 07/08/23  0609 07/07/23  0510 06/15/23  1107 06/14/23  1220 04/22/23  0408   WBC 7.7 18.5* 19.1*   < > 10.2   < > 9.0 10.6   HGB 13.2* 14.2 13.0*   < > 13.2*   < > 13.0* 14.4   MCV 96 95 93   < > 96   < > 95 93    293 239   < > 164   < > 218 246   ANEUTAUTO  --   --   --   --  6.0  --  5.5 9.6*    < > = values in this interval not displayed.     Most Recent 3 BMP's:  Recent Labs   Lab Test 08/11/23  0812 07/16/23  0811 07/16/23  0634 07/15/23  0405 07/07/23  1223 07/07/23  0510 06/21/23  1313     --  135* 140   < > 141 140   POTASSIUM 3.8  --  4.4 3.7   < > 3.9 4.3   CHLORIDE 102  --  104 108*   < > 108* 105   CO2 24  --  17* 20*   < > 25 25   BUN 16.9  --  27.9* 16.6   < > 25.9* 23.0   CR 1.14  --  0.75 0.69   < > 1.05 0.99   ANIONGAP 11  --  14 12   < > 8 10   JOHN PAUL 8.8  --  8.6* 8.0*   < > 8.5* 9.0   * 130* 105* 106*   < > 94 120*   PROTTOTAL 6.5  --   --   --   --  5.7* 6.2*   ALBUMIN 3.0*  --   --   --   --  3.6 3.6    < > = values in this  interval not displayed.    Most Recent 3 LFT's:  Recent Labs   Lab Test 08/11/23  0812 07/07/23  0510 06/21/23  1313   AST 48* 18 16   ALT 83* 26 34   ALKPHOS 62 69 67   BILITOTAL 0.7 0.5 0.6    Most Recent 2 TSH and T4:  Recent Labs   Lab Test 08/11/23  0812 06/07/23  0915   TSH 1.81 1.90     I reviewed the above labs today.    IMAGING  Imaging:  MRI Brain w & w/o contrast  Narrative: MRI OF THE BRAIN WITHOUT AND WITH CONTRAST 8/23/2023 4:17 PM     COMPARISON: Brain MRI 7/16/2023.    HISTORY: Malignant neoplasm metastatic to brain (H).     TECHNIQUE: Axial diffusion-weighted with ADC map, axial T2-weighted  with fat saturation, axial T1-weighted, axial turboFLAIR and coronal  T1-weighted images of the brain were acquired without intravenous  contrast.  Following intravenous administration of gadolinium  (Gadavist 8 mL), axial T1-weighted images of the brain were acquired.     FINDINGS: Postoperative change consisting of a small posterior left  parietal craniotomy and a midline occipital craniotomy again noted.  Surgical resection cavities in the high posterior medial left frontal  lobe and left occipital lobe again noted. There is no definite  abnormal contrast enhancement within or at the margins of the high  left frontal surgical resection cavity. There is persistent but  decreased abnormal contrast enhancement at the margins of the left  occipital surgical resection cavity that may simply represent  postoperative change. Continued surveillance recommended.    There is a new focus of abnormal contrast enhancement in the  anteromedial aspect of the right frontal lobe (series 11, image 16)  measuring 6 mm in diameter worrisome for a new intracranial  metastasis. No other new abnormal contrast enhancement in the brain or  its coverings.    There is mild diffuse cerebral volume loss. There are patchy  periventricular areas of abnormal T2 signal hyperintensity in the  cerebral white matter bilaterally that are  consistent with sequela of  chronic small vessel ischemic disease. The ventricles and basal  cisterns are within normal limits in configuration given the degree of  cerebral volume loss.  There is no midline shift.  There are no  extra-axial fluid collections. There is no evidence for stroke or  acute intracranial hemorrhage.    There is no sinusitis or mastoiditis.  Impression: IMPRESSION:   1. Stable postoperative change to the posterior left parietal skull  and high posteromedial left frontal lobe with no evidence for tumor  recurrence in the left frontal lobe.  2. Interval decrease in abnormal contrast enhancement at the margins  of the left occipital lobe surgical resection cavity possibly  representing evolving postoperative change. Continued surveillance  recommended.  3. New focus of abnormal contrast enhancement measuring 6 mm in  diameter in the anteromedial right frontal lobe worrisome for a new  intracranial metastasis. No other new lesions identified.  4. Diffuse cerebral volume loss and cerebral white matter changes  consistent with chronic small vessel ischemic disease. No evidence for  acute intracranial pathology.    ADRIANNE SERVIN MD         SYSTEM ID:  [id number]    I reviewed the above imaging today.      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   He started pembrolizumab on 6/7/23. He was subsequently hospitalized twice in June and July 2023 due to neurologic symptoms, as noted above with most recent craniotomy on 7/14/23. After a delay, he continued with cycle 2 pembrolizumab on 8/11/23. His brain MRI on 8/23/23 as noted above  with no recurrence in left frontal lobe, decreased enhancement at edges of left occipital resection cavity, new enhancement int he right front lobe concerning for new met. He was seen by neurosurgery who was planning to see if radiation would be an option. I will send a message to follow up on this.    Tim reports  ongoing fatigue which does not seem to be clearly linked to Keytruda based on history. He seems to be tolerating Keytruda well. He is working on gaining strength following his 2nd surgery.     He has a PET/CT scheduled later today. Given that he has only received 2 cycles of pembrolizumab and they were over 2 months apart, it's difficult to draw conclusions from recent brain MRI. As such, we will plan to proceed with cycle 3 of pembrolizumab as planned tomorrow barring any drastic findings any on PET. He will follow up with Dr. Greene next week as planned.    Brain metastases. S/p 2 craniotomies and gamma knife: 2500 cGy to the left resection cavity; 3000 cGy to the L parietal-occipital lesion. He is now off of dexamethasone. Brain MRI with a new area of enhancement as above; reaching out to see if RT a consideration. Next brain MRI tentatively scheduled in October pending clinical course.     Seizure, secondary to brain metastases. Continue Keppra 1000 mg twice daily. No auras or seizure activity recently.     Fatigue.    Cortisol and ACTH on 8/11/23 were without concerns. Will check testosterone and TSH tomorrow before infusion. Timeline does not seem linked to Keytruda, as such we will plan to proceed tomorrow. Given no clear benefit from steroids following surgery, will hold on these for now; he prefers this as well. If no abnormalities on TSH or testosterone, can consider trial of Ritalin, though Tim is hesitant about this. He will consider it. We discussed limiting naps to 30 minutes at a time and then doing something active. He knows to reach out if fatigue worsens    History of prostate cancer (Xiomara 3 + 3) s/p prostatectomy (2009), not discussed today  He had no adverse features and did not receive adjuvant therapy. PSA has remained negative. Continue with yearly PSA.     50 minutes spent on the date of the encounter doing chart review, review of test results, interpretation of tests, patient visit, and  documentation      Amber Scheierl, CNP  [facility] [id number] [place], [place] [address] [phone]

## 2023-09-04 NOTE — PROGRESS NOTES
Department of Radiation Oncology  [facility]  [address] [place] [address]  [phone]       Follow-up Note  Sep 5, 2023    Saeid Santa  MRN: [id number]  : 1955    DISEASE TREATED: metastatic melanoma, brain mets     RADIATION THERAPY DELIVERED:   1a L Parietal Cavity      2,500 cGy       Cobalt 60          5 fractions  1b L Parietal-Occipi     3,000 cGy       Cobalt 60        5 fractions    INTERVAL SINCE COMPLETION OF RADIATION THERAPY: 15 weeks     SUBJECTIVE:    Mr. Santa is a 68 year old male with newly found brain metastases secondary to melanoma. He   presented with confusion and was brought to ED. Work up revealed 2 discrete ring-enhancing lesions, one of   which was in the left paramedian posterior frontal lobe near the precentral gyrus, measuring 2.2 x 1.7 x 1.9   cm and the other in the left occipital lobe near the lingual gyrus, measuring 2.2 x 2.1 x 2.0 cm. There were   moderate vasogenic edema. He was started on Keppra and Decadron.   Mr. Santa underwent craniotomy on 2023 for resection of the lesion. Additionally a scalp lesion was also   resected as it had been draining, bleeding and growing in size. Pathology revealed melanoma in both specimens.   Pathologist commented that neither lesion represented a definitive primary melanoma. The scalp lesion was   without epidermal melanoma in situ, suggesting the possibility that this could represent a cutaneous metastasis.      Post-op, Mr. Santa reported sensory issues and weakness of the right dorsiflexion.       He was then referred to us and proceeded with GK SRS to the resection cavity and the unresected lesion in the   left occipital lobe. Both areas were treated with 5 fractions.  End of treatment was 2023.  He tolerated the treatment well with no acute toxicities.                                     He started cycle 1 of pembrolizumab on 2023.  He was admitted on 2023 for  vision change, altered mental status, fall and a headache.  MR brain conducted on 6/14/2023 showed progression of left occipital mass with associated edema, the patient was discharged with oral Decadron.    He returned to the emergency department on 7/7/2023 with headaches confusion, and vision changes.  MR brain on 7/7/2023 showed increased size of the complex cystic and solid mass in the left occipital lobe, concerning for intralesional hemorrhage.  There is also mass effect from the posterior left cerebral hemisphere and increased right midline shift of 6 mm.  He underwent craniotomy with left parietal occipital resection with Dr. Garrido on 7/14/2023.    MRI of the brain conducted on 8/23/2023 showed new enhancement in the right frontal lobe concerning for new metastasis measuring 6 mm.      On follow-up with Dr. Garrido on 8/28/2023 he had considerable weakness in his right leg with numbness, and profound somnolence/fatigue.  He was referred back to radiation oncology for consideration of GK SRS to the new lesion.    PET scan obtained on 8/31/2023 showed pneumonitis, near complete resolution of the medial aspect of the left occipital lesion.    Today the patient presents by telephone with his wife Smiley.  He states that he has continued to produce clear mucus but is coughing less.  Today in fact he says he is not coughing at all.  He does have dyspnea on exertion proved by sitting still.  Also reports poor appetite without nausea or vomiting.  He has blurry vision that comes and goes in his right eye.  He continues to report right leg weakness since his initial seizure in April.  He has not had a seizure since being on Keppra.  He continues with PT every 2 weeks and also has a home exercise regimen.  He denies dizziness and headache.  He is not using any steroids.  He also has profound fatigue, sleeping 20 hours a day.  He notes he sleeps 4 to 5 hours per night unable to sleep throughout the day.  This has been  present since his second resection surgery in July.    OBJECTIVE:   There were no vitals taken for this visit.  Exam limited by telephone  GENERAL: No acute distress   NEURO: Answers questions appropriately, mentation and speech prosody normal.    IMPRESSION: Mr. Santa is a 69 yo male with newly found brain metastases secondary to melanoma, status post craniotomy resection on 4/21/2023 followed by gamma knife SRS with 30 Gray in 5 fractions to unresected lesion in left occipital lobe and 25 Holt in 5 fractions to resected left frontal lobe, complicated by intralesional hemorrhage followed by craniotomy of left parietal occipital hemorrhage on 7/14/2023.  He presents today with evidence of a new foci of 6 mm within the right frontal/paramedian lobe.    We recommend a second course of gamma knife radiosurgery to this lobe.  The logistics were explained, which are nearly identical to his previous course.  The acute and late toxicities associated with gamma knife was explained, including small areas of alopecia, skin reaction, increased risk of cognitive compromise including increased confusion and poor short-term memory. Fatigue would also be a associated side effect with all course of radiation, but especially radiation intracranially.  On the phone, the patient is accompanied by his wife, and all questions were answered.  Informed consent was obtained by telephone.  We will have the patient return for simulation and treatment same day on this coming Friday 9/8/2023.    We discussed the left parieto-occipital lesion that was resected does not show evidence of disease progression, we can continue watching this at this time with follow-up MR brain.    RECOMMENDATIONS:  Return to clinic Friday for gamma knife therapy.    Asaf Wooten MD PGY-3  Radiation Oncology  [facility]

## 2023-09-04 NOTE — PROGRESS NOTES
Department of Therapeutic Radiology--Radiation Oncology                   [facility] [address]  [address] [place]  [address]  Office:  [phone]  Fax:  [phone]   Radiation Oncology Clinic  [address] [place] [address]  Phone:  [phone]  Fax:  [phone]     RE: Saeid Santa : 1955   MRN: [id number] CANDY: 2023     OUTPATIENT VISIT NOTE     Virtual Visit Details    The video visit was converted to a telephone visit as the video did not work.       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 Santa is a 67 yo male with metastatic melanoma. He presented with seizure in April this year. Workup revealed 2 discrete ring-enhancing lesions, one of which was in the left paramedian posterior frontal lobe near the precentral gyrus, measuring 2.2 x 1.7 x 1.9 cm and the other in the left occipital lobe near the lingual gyrus, measuring 2.2 x 2.1 x 2.0 cm, associated with  moderate vasogenic edema.  He underwent craniotomy on 2023 for resection of the lesion left frontal lesion as this was felt to be the cause of his seizure. Additionally a scalp lesion was also resected as it had been draining, bleeding and growing in size. Pathology showed malignant melanoma. It was unclear if the scalp lesion was the primary or represent a cutaneous metastasis (it was without epidermal melanoma in situ).  PET/CT on 2023 showed a brain metastasis in the left occipital lobe and a lymph node in the left low neck.      He then received GK-SRS to the left occipital lesion, 30 Gy in 5 fractions, as well as the left posterior frontal resection cavity, 25 Gy in 5 fractions.  He completed these treatments on  5/31/2023. He then began Keytruda under the care of Dr. Greene on 6/7/2023.       Mr. Santa developed gait ataxia and worsening visual deficits shortly after. Imaging showed increased size of the left occipital lesion with edema. As it was quite close to his GK treatment, repeat radiation was not recommended. Instead, he was treated with Decadron. He again presented to ED on 7/7/2023 with worsening headaches, confusion and visual changes. Imaging showed intralesional hemorrhage and worsening mass effect. As such, he was taken to OR by Dr. Garrido on 7/14/2023 and underwent partial resection of the left parieto-occipital tumor. The deeper aspect of the tumor densely adhered to the ventricle wall and the entrapped occipital horn, and thus could not be safely resected. Surgical pathology showed treatment effect with residual viable melanoma.     Mr. Santa resumed much delayed cycle 2 of Pembrolizumab on 8/11/2023. Repeat brain MRI on 8/23/2023 showed stable post-op changes in the resected parietal skull and left frontal lobe cavity and decreased enhancement of the left occipital resection cavity. However, there was a new focus of abnormal contrast enhancement about 6 mm in diameter in the anteromedial right frontal lobe, worrisome for a new metastasis.     Restaging PET/CT on 8/31/2023 showed slightly increased uptake in the left lower neck node and evidence of immunotherapy-induced pneumonitis. Due to the latter finding, his cycle 3 Keytruda was canceled. He will follow up with Dr. Greene for discuss next step.     Mr. Santa is contacted today via Virtual visit to discuss GK treatment for the new brain metastasis. On interview today, he states that he is profoundly fatigued since his second cycle of immunotherapy. He sleeps about 5-6 hours at night, but naps throughout the day. He gets short of breath with light activities. He denies cough, but notes that his mucous is becoming thick. He denies headaches and has  no seizure activities on his current dose of Keppra. His right leg weakness is persistent since first surgery in April and he ambulates with a cane. He has occasional blurry vision of the right eye.       OBJECTIVE:   Vital sign not obtained. This is a telephone encounter.   His speech was fluent. Interaction was appropriate.  Comprehension intact.     IMAGING:  L posterior high frontal cavity      L parieto-occipital cavity      New anteriomedial right frontal lesion      ASSESSMENT AND PLAN: In summary, Mr. Santa is a 67 yo gentleman with metastatic melanoma. He initially had 2 large brain  metastases, one of which (frontal) was resected followed by GK SRS to the cavity. The occipital lesion was initially treated with GK SRS, but unfortunately had intratumoral hemorrhage, causing mass effect and ultimately required surgical resection. He now has a new lesion, about 7 mm in size.     We discussed GK SRS for management of the new lesion. Given its small size, it is amenable to a single fraction SRS as opposed to a fractionated course that he had in the past. We explained the logistics of the treatment including frame placement, planning MRI and treatment delivery. The side effects, primarily, radiation necrosis, was also discussed.     The occipital lesion was subtotally resected due to the densely adherent nature on the deep side. The current MRI does not show clear evidence of gross residual tumor. We will observe this lesion for now.     Mr. Santa is willing to proceed with GK SRS for the anteromedial right frontal lesion. He understands that if additional lesions were found, they will likely to be treated as well. A consent was obtained over the telephone. He is scheduled to proceed this Friday. The treatment will be jointly performed with Dr. Garrido.          Benedicto Rose M.D./Ph.D.  Radiation Oncologist   Department of Therapeutic Radiology   [facility]  Phone: [phone]                  I reviewed patient's chart, internal/external medical records, imaging studies (including actual images), labs and pathology reports.  I conducted the telephone interview and counseling.  I additionally ordered tests and discussed the case with patient's referring physicians and care team.               Benedicto Rose MD

## 2023-09-05 NOTE — PROGRESS NOTES
HPI  Date: 2023   Age: 68 year old  Ethnicity:    Sex: male  : 1955   Lives In: [place]      Diagnosis: metastatic melanoma    Prior radiation therapy:   Site Treated: left resection cavity  Facility: [facility]  Dates: 23- 23  Dose: 2500 cGy in 5 fx    Site Treated: left parietal-occipital lesion  Facility: [facility]  Dates: 23- 23  Dose: 3000 cGy in 5 fx    Prior chemotherapy:   See below    Pain at time of consult? No  Does patient have a living will? Yes  Does patient have an implanted cardiac device? No    RN time with patient: 45 minutes  Educated on gamma knife? Yes    Fall Screen:  Have you fallen in the past week? No  Have you felt unsteady when walking or standing in the past week? Constant numbness in right leg hip down to foot.    Physicians: Dr. Kingston Greene; Dr. Kye Garrido; Dr. Asim Cervantes (PCP)    Review Since Diagnosis:  Hx : Prostate Cancer, Prostatectomy, no adjuvant therapy  22: he brings a left parietal scalp lesion to attention of GP. It was initially observed.  2022: PCP appt; lesion felt to be a cyst. Lesion lanced and drained via scalp incision  2023: the left parietal scalp lesion area would occasionally break open and bleed   2023: right leg and foot not working totally right   March/2023: three episodes of right leg feeling heavy, last episode jerking of leg, lasted a minute or two, no loss of consciousness   4/15/23: pt driving from work as had a migraine. Had right leg numbness/heaviness and jerking.  A co worker was following on the same route and noticed pt having problems so assisted him when found in parking lot; sitting in grass and confused.  To ER via ambulance to [place]  4/15/23: Brain MRI:   2.2 x 1.7 x 1.9 cm lesion left paramedian posterior frontal lobe near the precentral gyrus  2.2 x 2.1 x 2.0 cm lesion left occipital lobe near the lingual gyrus  4/15/23: CT CAP, bilateral pulmonary  nodules measuring up to 0.6 cm-indeterminate   Neck CT/Angio: enlarged lymph nodes and additional scalp lesions consistent with head and neck local regional disease  Pt referred to Dr. Garrido   4/21/23: crani by Dr. Garrido, biopsy of left frontal brain lesion showed malignant melanoma, this was resected   Biopsy of left parietal scalp lesion showed malignant melanoma, excision of this lesion  4/25/23: discharged to acute rehab [facility] in [place]   5/9/23: discharged home with use of walker, remains on keppra  5/10/23: last dose of steroids   5/12/23: pt saw Dr. Greene, unsure scalp lesion is primary or a metastatic lesion. Wanting PET/CT for extent of disease.  Plan Rad/Onc for brain lesion and surgical cavity and then plan to immediately follow with systemic immunotherapy.  Will know better which agents will be used once PET/CT completed.    5/15/23: pt saw Dr. Garrido, still some numbness right leg and foot.  Currently ambulating with a cane   5/18/23: PET/CT: shows potentially involved neck lymph nodes. Mild uptake in soft tissue nodules in the scalp, in the known left occipital brain metastasis as well as in the left lower neck region.   5/24/23- 5/31/23: gamma knife as noted above  6/7/23: C1 of pembrolizumab every 3 weeks  6/14/23- 6/16/23: admitted for vision change, reduced comprehension, speech change, fall and headache.   6/14/23: brain mri showing likely progression of left occipital mass with associated edema. Improved on steroids and discharged on oral decadron. Plan for repeat mri and neurosurgery follow up in one month  7/7/23: ED visit for headaches, confusion, vision changes concern for hemorrhage of the left occipital mass. Admitted to [facility] until 7/9/23. Increased Decadron dose.   7/14/23: craniotomy of the left parieto-occipital mass (Dr. Garrido). Discharged on Decadron taper  8/11/23: C2 of pembrolizumab  8/23/23: brain mri  No recurrence in left frontal lobe  Decreased enhancement at edges of  left occipital resection cavity  New enhancement in the right frontal lobe concerning for new met. Measures 6 mm.  8/28/23: post op visit with Dr. Garrido. Pt still has numbness involving right leg and weakness involving his right lower extremity. Loss of muscle mass in right leg. Still has balance issues with no recent falls. Generalized fatigue; sleeping up to 20 hours a day. Review of brain mri. Referred to Dr. Rose for SRS.  8/31/23: video visit with CNP hem/onc: plan for scheduled C3 dose of pembrolizumab on 9/1/23. Has PET/CT later today.   8/31/23: PET/CT: incidental finding of pneumonitis. Plan to cancel Keytruda for 9/1/23. Will obtain COVID and resp panel on 9/1/23. Pt to monitor his pulse ox. Dr. Greene will discuss next steps in treatment at appointment scheduled for 9/7.     Chief Complaint: aright leg numbness    Review of Systems   Constitutional:  Positive for malaise/fatigue.        Poor energy   HENT: Negative.     Eyes:  Positive for blurred vision.        Occasional blurred vision in right eye; comes and goes. Typically daily,   Respiratory:  Positive for cough and shortness of breath.         Increased mucus with pneumonitis. Clear in color.   SOB with increased activity; sits after short activity.  Patient ordered pulse ox; waiting to be delivered.   Cardiovascular: Negative.    Gastrointestinal:  Negative for nausea and vomiting.        Low appetite.    Genitourinary: Negative.    Musculoskeletal: Negative.    Skin: Negative.    Neurological:  Positive for seizures. Negative for dizziness and headaches.        Right leg with ongoing weakness involving entire leg from hip to ankle. Uses cane for ambulation. Continues with routine PT visits q 2 weeks and then does therapies at home on own.     Endo/Heme/Allergies: Negative.    Psychiatric/Behavioral:          5-6 hrs of sleep at night. Naps routinely every day

## 2023-09-05 NOTE — LETTER
2023         RE: Saeid Santa  [address]  [facility] [address]        Dear Colleague,    Thank you for referring your patient, Saeid Santa, to the [facility] RADIATION ONCOLOGY GAMMA KNIFE. Please see a copy of my visit note below.     Department of Radiation Oncology  [facility]  [address] [place] [address]  [phone]       Follow-up Note  Sep 5, 2023    Saeid Santa  MRN: [id number]  : 1955    DISEASE TREATED: metastatic melanoma, brain mets     RADIATION THERAPY DELIVERED:   1a L Parietal Cavity      2,500 cGy       C[place]alt 60          5 fractions  1b L Parietal-Occipi     3,000 cGy       Cobalt 60        5 fractions    INTERVAL SINCE COMPLETION OF RADIATION THERAPY: 15 weeks     SUBJECTIVE:    Mr. Santa is a 68 year old male with newly found brain metastases secondary to melanoma. He   presented with confusion and was brought to ED. Work up revealed 2 discrete ring-enhancing lesions, one of   which was in the left paramedian posterior frontal lobe near the precentral gyrus, measuring 2.2 x 1.7 x 1.9   cm and the other in the left occipital lobe near the lingual gyrus, measuring 2.2 x 2.1 x 2.0 cm. There were   moderate vasogenic edema. He was started on Keppra and Decadron.   Mr. Santa underwent craniotomy on 2023 for resection of the lesion. Additionally a scalp lesion was also   resected as it had been draining, bleeding and growing in size. Pathology revealed melanoma in both specimens.   Pathologist commented that neither lesion represented a definitive primary melanoma. The scalp lesion was   without epidermal melanoma in situ, suggesting the possibility that this could represent a cutaneous metastasis.      Post-op, Mr. Santa reported sensory issues and weakness of the right dorsiflexion.       He was then referred to us and proceeded with GK SRS to the resection cavity and the unresected lesion in the   left occipital  lobe. Both areas were treated with 5 fractions.  End of treatment was 5/31/2023.  He tolerated the treatment well with no acute toxicities.                                     He started cycle 1 of pembrolizumab on 6/7/2023.  He was admitted on 6/14/2023 for vision change, altered mental status, fall and a headache.  MR brain conducted on 6/14/2023 showed progression of left occipital mass with associated edema, the patient was discharged with oral Decadron.    He returned to the emergency department on 7/7/2023 with headaches confusion, and vision changes.  MR brain on 7/7/2023 showed increased size of the complex cystic and solid mass in the left occipital lobe, concerning for intralesional hemorrhage.  There is also mass effect from the posterior left cerebral hemisphere and increased right midline shift of 6 mm.  He underwent craniotomy with left parietal occipital resection with Dr. Garrido on 7/14/2023.    MRI of the brain conducted on 8/23/2023 showed new enhancement in the right frontal lobe concerning for new metastasis measuring 6 mm.      On follow-up with Dr. Garrido on 8/28/2023 he had considerable weakness in his right leg with numbness, and profound somnolence/fatigue.  He was referred back to radiation oncology for consideration of GK SRS to the new lesion.    PET scan obtained on 8/31/2023 showed pneumonitis, near complete resolution of the medial aspect of the left occipital lesion.    Today the patient presents by telephone with his wife Smiley.  He states that he has continued to produce clear mucus but is coughing less.  Today in fact he says he is not coughing at all.  He does have dyspnea on exertion proved by sitting still.  Also reports poor appetite without nausea or vomiting.  He has blurry vision that comes and goes in his right eye.  He continues to report right leg weakness since his initial seizure in April.  He has not had a seizure since being on Keppra.  He continues with PT every 2  weeks and also has a home exercise regimen.  He denies dizziness and headache.  He is not using any steroids.  He also has profound fatigue, sleeping 20 hours a day.  He notes he sleeps 4 to 5 hours per night unable to sleep throughout the day.  This has been present since his second resection surgery in July.    OBJECTIVE:   There were no vitals taken for this visit.  Exam limited by telephone  GENERAL: No acute distress   NEURO: Answers questions appropriately, mentation and speech prosody normal.    IMPRESSION: Mr. Santa is a 67 yo male with newly found brain metastases secondary to melanoma, status post craniotomy resection on 4/21/2023 followed by gamma knife SRS with 30 Gray in 5 fractions to unresected lesion in left occipital lobe and 25 Holt in 5 fractions to resected left frontal lobe, complicated by intralesional hemorrhage followed by craniotomy of left parietal occipital hemorrhage on 7/14/2023.  He presents today with evidence of a new foci of 6 mm within the right frontal/paramedian lobe.    We recommend a second course of gamma knife radiosurgery to this lobe.  The logistics were explained, which are nearly identical to his previous course.  The acute and late toxicities associated with gamma knife was explained, including small areas of alopecia, skin reaction, increased risk of cognitive compromise including increased confusion and poor short-term memory. Fatigue would also be a associated side effect with all course of radiation, but especially radiation intracranially.  On the phone, the patient is accompanied by his wife, and all questions were answered.  Informed consent was obtained by telephone.  We will have the patient return for simulation and treatment same day on this coming Friday 9/8/2023.    We discussed the left parieto-occipital lesion that was resected does not show evidence of disease progression, we can continue watching this at this time with follow-up   brain.    RECOMMENDATIONS:  Return to clinic Friday for gamma knife therapy.    Asaf Wooten MD PGY-3  Radiation Oncology  [facility]         Department of Therapeutic Radiology--Radiation Oncology                   [place] [address]  [address] [place]  [address]  Office:  [phone]  Fax:  [phone]   Radiation Oncology Clinic  [address] [place] [address]  Phone:  [phone]  Fax:  [phone]     RE: Saeid Santa : 1955   MRN: [id number] CANDY: 2023     OUTPATIENT VISIT NOTE     Virtual Visit Details    The video visit was converted to a telephone visit as the video did not work.       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 Santa is a 67 yo male with metastatic melanoma. He presented with seizure in April this year. Workup revealed 2 discrete ring-enhancing lesions, one of which was in the left paramedian posterior frontal lobe near the precentral gyrus, measuring 2.2 x 1.7 x 1.9 cm and the other in the left occipital lobe near the lingual gyrus, measuring 2.2 x 2.1 x 2.0 cm, associated with  moderate vasogenic edema.  He underwent craniotomy on 2023 for resection of the lesion left frontal lesion as this was felt to be the cause of his seizure. Additionally a scalp lesion was also resected as it had been draining, bleeding and growing in size. Pathology showed malignant melanoma. It was unclear if the scalp lesion was the primary or represent a cutaneous metastasis (it was without epidermal melanoma in situ).  PET/CT on 2023 showed a brain metastasis in the left occipital lobe and a lymph node in the left low neck.      He then received GK-SRS to the left  occipital lesion, 30 Gy in 5 fractions, as well as the left posterior frontal resection cavity, 25 Gy in 5 fractions.  He completed these treatments on 5/31/2023. He then began Keytruda under the care of Dr. Greene on 6/7/2023.       Mr. Santa developed gait ataxia and worsening visual deficits shortly after. Imaging showed increased size of the left occipital lesion with edema. As it was quite close to his GK treatment, repeat radiation was not recommended. Instead, he was treated with Decadron. He again presented to ED on 7/7/2023 with worsening headaches, confusion and visual changes. Imaging showed intralesional hemorrhage and worsening mass effect. As such, he was taken to OR by Dr. Garrido on 7/14/2023 and underwent partial resection of the left parieto-occipital tumor. The deeper aspect of the tumor densely adhered to the ventricle wall and the entrapped occipital horn, and thus could not be safely resected. Surgical pathology showed treatment effect with residual viable melanoma.     Mr. Santa resumed much delayed cycle 2 of Pembrolizumab on 8/11/2023. Repeat brain MRI on 8/23/2023 showed stable post-op changes in the resected parietal skull and left frontal lobe cavity and decreased enhancement of the left occipital resection cavity. However, there was a new focus of abnormal contrast enhancement about 6 mm in diameter in the anteromedial right frontal lobe, worrisome for a new metastasis.     Restaging PET/CT on 8/31/2023 showed slightly increased uptake in the left lower neck node and evidence of immunotherapy-induced pneumonitis. Due to the latter finding, his cycle 3 Keytruda was canceled. He will follow up with Dr. Greene for discuss next step.     Mr. Santa is contacted today via Virtual visit to discuss GK treatment for the new brain metastasis. On interview today, he states that he is profoundly fatigued since his second cycle of immunotherapy. He sleeps about 5-6 hours at night, but naps  throughout the day. He gets short of breath with light activities. He denies cough, but notes that his mucous is becoming thick. He denies headaches and has no seizure activities on his current dose of Keppra. His right leg weakness is persistent since first surgery in April and he ambulates with a cane. He has occasional blurry vision of the right eye.       OBJECTIVE:   Vital sign not obtained. This is a telephone encounter.   His speech was fluent. Interaction was appropriate.  Comprehension intact.     IMAGING:  L posterior high frontal cavity      L parieto-occipital cavity      New anteriomedial right frontal lesion      ASSESSMENT AND PLAN: In summary, Mr. Santa is a 69 yo gentleman with metastatic melanoma. He initially had 2 large brain  metastases, one of which (frontal) was resected followed by GK SRS to the cavity. The occipital lesion was initially treated with GK SRS, but unfortunately had intratumoral hemorrhage, causing mass effect and ultimately required surgical resection. He now has a new lesion, about 7 mm in size.     We discussed GK SRS for management of the new lesion. Given its small size, it is amenable to a single fraction SRS as opposed to a fractionated course that he had in the past. We explained the logistics of the treatment including frame placement, planning MRI and treatment delivery. The side effects, primarily, radiation necrosis, was also discussed.     The occipital lesion was subtotally resected due to the densely adherent nature on the deep side. The current MRI does not show clear evidence of gross residual tumor. We will observe this lesion for now.     Mr. Santa is willing to proceed with GK SRS for the anteromedial right frontal lesion. He understands that if additional lesions were found, they will likely to be treated as well. A consent was obtained over the telephone. He is scheduled to proceed this Friday. The treatment will be jointly performed with Dr. Garrido.           Benedicto Rose M.D./Ph.D.  Radiation Oncologist   Department of Therapeutic Radiology   [facility]  Phone: [phone]                 I reviewed patient's chart, internal/external medical records, imaging studies (including actual images), labs and pathology reports.  I conducted the telephone interview and counseling.  I additionally ordered tests and discussed the case with patient's referring physicians and care team.               Benedicto Rose MD       HPI  Date: 2023   Age: 68 year old  Ethnicity:    Sex: male  : 1955   Lives In: [place], [place]      Diagnosis: metastatic melanoma    Prior radiation therapy:   Site Treated: left resection cavity  Facility: [facility]  Dates: 23- 23  Dose: 2500 cGy in 5 fx    Site Treated: left parietal-occipital lesion  Facility: [facility]  Dates: 23- 23  Dose: 3000 cGy in 5 fx    Prior chemotherapy:   See below    Pain at time of consult? No  Does patient have a living will? Yes  Does patient have an implanted cardiac device? No    RN time with patient: 45 minutes  Educated on gamma knife? Yes    Fall Screen:  Have you fallen in the past week? No  Have you felt unsteady when walking or standing in the past week? Constant numbness in right leg hip down to foot.    Physicians: Dr. Kingston Greene; Dr. Kye Garrido; Dr. Asim Cervantes (PCP)    Review Since Diagnosis:  2009: Prostate Cancer, Prostatectomy, no adjuvant therapy  22: he brings a left parietal scalp lesion to attention of GP. It was initially observed.  2022: PCP appt; lesion felt to be a cyst. Lesion lanced and drained via scalp incision  2023: the left parietal scalp lesion area would occasionally break open and bleed   2023: right leg and foot not working totally right   2023: three episodes of right leg feeling heavy, last episode jerking of leg, lasted a minute or two, no loss of consciousness   4/15/23:  pt driving from work as had a migraine. Had right leg numbness/heaviness and jerking.  A co worker was following on the same route and noticed pt having problems so assisted him when found in parking lot; sitting in grass and confused.  To ER via ambulance to [place]  4/15/23: Brain MRI:   2.2 x 1.7 x 1.9 cm lesion left paramedian posterior frontal lobe near the precentral gyrus  2.2 x 2.1 x 2.0 cm lesion left occipital lobe near the lingual gyrus  4/15/23: CT CAP, bilateral pulmonary nodules measuring up to 0.6 cm-indeterminate   Neck CT/Angio: enlarged lymph nodes and additional scalp lesions consistent with head and neck local regional disease  Pt referred to Dr. Garrido   4/21/23: crani by Dr. Garrido, biopsy of left frontal brain lesion showed malignant melanoma, this was resected   Biopsy of left parietal scalp lesion showed malignant melanoma, excision of this lesion  4/25/23: discharged to acute rehab [facility]   5/9/23: discharged home with use of walker, remains on keppra  5/10/23: last dose of steroids   5/12/23: pt saw Dr. Greene, unsure scalp lesion is primary or a metastatic lesion. Wanting PET/CT for extent of disease.  Plan Rad/Onc for brain lesion and surgical cavity and then plan to immediately follow with systemic immunotherapy.  Will know better which agents will be used once PET/CT completed.    5/15/23: pt saw Dr. Garrido, still some numbness right leg and foot.  Currently ambulating with a cane   5/18/23: PET/CT: shows potentially involved neck lymph nodes. Mild uptake in soft tissue nodules in the scalp, in the known left occipital brain metastasis as well as in the left lower neck region.   5/24/23- 5/31/23: gamma knife as noted above  6/7/23: C1 of pembrolizumab every 3 weeks  6/14/23- 6/16/23: admitted for vision change, reduced comprehension, speech change, fall and headache.   6/14/23: brain mri showing likely progression of left occipital mass with associated edema. Improved on  steroids and discharged on oral decadron. Plan for repeat mri and neurosurgery follow up in one month  7/7/23: ED visit for headaches, confusion, vision changes concern for hemorrhage of the left occipital mass. Admitted to [facility] until 7/9/23. Increased Decadron dose.   7/14/23: craniotomy of the left parieto-occipital mass (Dr. Garrido). Discharged on Decadron taper  8/11/23: C2 of pembrolizumab  8/23/23: brain mri  No recurrence in left frontal lobe  Decreased enhancement at edges of left occipital resection cavity  New enhancement in the right frontal lobe concerning for new met. Measures 6 mm.  8/28/23: post op visit with Dr. Garrido. Pt still has numbness involving right leg and weakness involving his right lower extremity. Loss of muscle mass in right leg. Still has balance issues with no recent falls. Generalized fatigue; sleeping up to 20 hours a day. Review of brain mri. Referred to Dr. Rose for SRS.  8/31/23: video visit with CNP hem/onc: plan for scheduled C3 dose of pembrolizumab on 9/1/23. Has PET/CT later today.   8/31/23: PET/CT: incidental finding of pneumonitis. Plan to cancel Keytruda for 9/1/23. Will obtain COVID and resp panel on 9/1/23. Pt to monitor his pulse ox. Dr. Greene will discuss next steps in treatment at appointment scheduled for 9/7.     Chief Complaint: aright leg numbness    Review of Systems   Constitutional:  Positive for malaise/fatigue.        Poor energy   HENT: Negative.     Eyes:  Positive for blurred vision.        Occasional blurred vision in right eye; comes and goes. Typically daily,   Respiratory:  Positive for cough and shortness of breath.         Increased mucus with pneumonitis. Clear in color.   SOB with increased activity; sits after short activity.  Patient ordered pulse ox; waiting to be delivered.   Cardiovascular: Negative.    Gastrointestinal:  Negative for nausea and vomiting.        Low appetite.    Genitourinary: Negative.    Musculoskeletal: Negative.     Skin: Negative.    Neurological:  Positive for seizures. Negative for dizziness and headaches.        Right leg with ongoing weakness involving entire leg from hip to ankle. Uses cane for ambulation. Continues with routine PT visits q 2 weeks and then does therapies at home on own.     Endo/Heme/Allergies: Negative.    Psychiatric/Behavioral:          5-6 hrs of sleep at night. Naps routinely every day                 Again, thank you for allowing me to participate in the care of your patient.        Sincerely,        Benedicto Rose MD

## 2023-09-06 NOTE — PROGRESS NOTES
Physical Therapy Progress Note  08/30/23 0500   Appointment Info   Signing clinician's name / credentials Che Srivastava Chuck PT DPT   Total/Authorized Visits 365 are Medicare soc 5/26/23   Visits Used 3   Medical Diagnosis R hemiparesis; s/p tumor resection   PT Tx Diagnosis R hemiparesis   Progress Note/Certification   Onset of illness/injury or Date of Surgery 04/21/23   Therapy Frequency 1x/wk   Predicted Duration 12 weeks   GOALS   PT Goals 2;3;4   PT Goal 1   Goal Description ambulate community distances with no AFO or assistive device   Rationale to maximize safety and independence within the community   Target Date 11/08/23   PT Goal 2   Goal Description UP/Down full flight of stairs reciprocal pattern no rail to carry items in/out of home and upstairs   Rationale to maximize safety and independence within the home   Goal Progress 50% reciprocal pattern; moderate reliance on rail   Target Date 11/08/23   PT Goal 3   Goal Description increase R LE strength 5/5 (HF; KE ;HE; HABD; DF; PF) to return to previous activity level and lifestyle   Rationale to maximize safety and independence within the community   Target Date 11/08/23   PT Goal 4   Goal Description Independent HEP to continue stretching and strengthening on own   Rationale to maximize safety and independence with performance of ADLs and functional tasks   Target Date 11/08/23   Subjective Report   Subjective Report in past 3 months has been hospitalized 3-4x; had surgery resection of brain lesion 7/14/23; R LE is VERY weak; overall endurance is VERY low, not using AFO today; catches R foot on things and trips but has not fallen since surgery   Objective Measures   Objective Measures Objective Measure 3   Objective Measure 3   Objective Measure Strength   Details DF 3+/5; PF 3-/5; HF 3+ R, 4- L; Habd 3- R, 4-L   Therapeutic Procedure/Exercise   Therapeutic Procedures: strength, endurance, ROM, flexibillity minutes (91826) 25   Ther Proc 1 -  Details instructed in SLR x 5 bilaterally 2 sets; review HS stretch, PF BTB and gastroc stretch   PTRx Ther Proc 3 review clam poor hip control so modified to hooklying T TB hip abd/ER x 10 reps   Skilled Intervention HEP instruction for strengthening and balance   Patient Response/Progress fatigues (muscle fatigue and endurance)  VERY quickly   Education   Learner/Method Patient   Education Comments vhi/ptrx   Plan   Home program added above, continue previous   Updates to plan of care 1x/wk   Plan for next session R LE and core strengthening   Total Session Time   Timed Code Treatment Minutes 25   Total Treatment Time (sum of timed and untimed services) 25         [facility]                                                                                   OUTPATIENT PHYSICAL THERAPY         PLAN OF TREATMENT FOR OUTPATIENT REHABILITATION   Patient's Last Name, First Name, MATTIESaeid Constantino YOB: 1955   Provider's Name   [facility]    Medical Record No.  [id number]      Onset Date: 04/21/23  Start of Care Date:  5/26/23      Medical Diagnosis:  R hemiparesis; s/p tumor resection        PT Treatment Diagnosis:  R hemiparesis Plan of Treatment  Frequency/Duration: 1x/wk x 10 weeks     Certification date from 8/18/2023  to 11/22/23          See note for plan of treatment details and functional goals      Che Merlos,  DPT, PT                                                                                                                                        I CERTIFY THE NEED FOR THESE SERVICES FURNISHED UNDER                   THIS PLAN OF TREATMENT AND WHILE UNDER MY CARE     (Physician attestation of this document indicates review and certification of the therapy plan).                           Referring Provider:  Magnus Sutton DO        Initial Assessment  See Epic Evaluation- 5/26/23

## 2023-09-07 NOTE — NURSING NOTE
"Oncology Rooming Note    September 7, 2023 1:47 PM   Saeid Santa is a 68 year old male who presents for:    Chief Complaint   Patient presents with    Blood Draw     Labs drawn via  by RN. Vitals taken.    Oncology Clinic Visit     Melanoma     Initial Vitals: /82 (BP Location: Right arm, Patient Position: Sitting, Cuff Size: Adult Regular)   Pulse 105   Temp 98.5  F (36.9  C) (Oral)   Resp 18   Wt 83.2 kg (183 lb 6.4 oz)   SpO2 96%   BMI 24.20 kg/m   Estimated body mass index is 24.2 kg/m  as calculated from the following:    Height as of 8/31/23: 1.854 m (6' 1\").    Weight as of this encounter: 83.2 kg (183 lb 6.4 oz). Body surface area is 2.07 meters squared.  No Pain (0) Comment: Data Unavailable   No LMP for male patient.  Allergies reviewed: Yes    Medications reviewed: Yes    Medications: Medication refills not needed today.  Pharmacy name entered into [employer]:    [facility] - [place], [place] - [address] [facility] - [place], [place] - [address]    Clinical concerns:        Nathalie Coelho              "

## 2023-09-07 NOTE — LETTER
9/7/2023         RE: Saeid Santa  [address]  [facility] [address]        Dear Colleague,    Thank you for referring your patient, Saeid Santa, to the [facility]. Please see a copy of my visit note below.    Virtual Visit Details    Type of service:  Video Visit   Video Start Time:  7:05AM  Video End Time: 7:32AM    Originating Location (pt. Location): Home    Distant Location (provider location):  On-site  Platform used for Video Visit: [facility] ONCOLOGY PROGRESS NOTE  Melanoma Clinic  Sep 7, 2023    Chief Complaint: metastatic melanoma to brain    Melanoma History:  --8/16/22, he brings a left parietal scalp lesion to attention of his GP. It is initially observed.  --November 2022, the left parietal scalp lesion was felt to be a cyst, and the left parietal lesion was lanced and drained via scalp incision.  --January 2023, the left parietal scalp cyst would occasionally break open, drain, and bleed, largely at night.   --February 2023, he notes some mild difficulty clearing obstacles with right leg and foot.  --March/April 2023, he has 3 progressively worsening episodes over a span 3 weeks including the right leg. At first he notes the onset of right leg numbness and heaviness, like he was carrying 50 lbs of water on the leg. Then, about a week or so later, the leg began to jerk and converse while he was in the basement. Neither episode associated with loss of consciousness  --4/15/23, he recalls the right leg numbness, heaviness, jerking while driving between work. He had been found by coworkers in the parking lot, sitting in the grass, appearing confused. He was brought to the ER by EMS. CT-CAP, showed small (<6mm), bilateral indeterminate pulmonary nodules. CT-head and MRI brain showed 2 discrete ring-enhancing lesions in the left paramedian posterior frontal lobe near the precentral gyrus measuring approximately 2.2 x 1.7 x 1.9 cm and left occipital lobe near  the lingual gyrus measuring approximately 2.2 x 2.1 x 2.0 cm. The occipital lesion encroaches upon the subependymal surface of the left lateral ventricle occipital horn and contacts the superior surface of the medial left tentorium. The lesions are accompanied by moderate surrounding vasogenic edema. Surgery was recommended.  --4/21/23, underwent left awake (sleep-awake-sleep) craniotomy for tumor resection, he has resection of the left frontal, motor strip tumor as well asan elliptical incision of the left parietal scalp lesion. On pathology, both the scalp excision and brain mass are positive for malignant melanoma. Tumor cells are positive for S-100 and Melan-A, and negative for cytokeratin AE1/AE3, NKX3.1, GFAP, ERG, CD3, and CD20.  CD3 highlights brisk infiltration of the tumor by T lymphocytes, while CD20 highlights scattered positive B lymphocytes. NGS identifies a BRAF G466A mutation, a class 3 mutation insensitive to BRAF kinase inhibitor monotherapy. TMB is high at 48.754 muts/Mb.   --4/22/23, post-operative MRI shows post-operative changes of the parietal craniotomy of the left frontal lobe metastasis. The other 1.7 x 1.1 cm cystic lesion in the left occipital lobe is also seen. No new lesions appreciated.  -- 4/25/23 to 5/9/23, discharged from the hospital to acute rehab stay at [facility] in [place]. He participated in a comprehensive rehabilitation program consisting of PT, OT, Speech, Psychology and Recreation Therapy. He did well and was discharged home with use of a walker.  --PET-CT on 5/18/23 with potentially involved neck lymph nodes. He has mild FDG uptake in soft tissue nodules in the scalp, in the known left occipital brain metastasis, as well as in the left lower neck region.  -5/24-/5/31/23, He was treated with gamma knife: 2500 cGy to the left resection cavity; 3000 cGy to the L parietal-occipital lesion.  -6/7/2023, Cycle 1 of pembrolizumab 200mg every 3 weeks  -6/14/23-6/16/23, admitted  for vision change, reduced comprehension, speech change, fall and headache. MRI showing likely progression of left occipital mass with associated edema. Improved on steroids and discharged on oral decadron. Plan for repeat MRI and neurosurgery follow up in 1 month.  -7/7/23 ED visit for headaches, confusion, vision changes, concern for hemorrhage of the left occipital mass, admitted to [facility] until 7/9/23, increased dexamethasone dose  -7/14/23 craniotomy of the left parieto-occipital mass, discharged on a dexamethasone taper  -8/11/2023, Cycle 2 of pembrolizumab  -8/23/2023 brain MRI with no recurrence in left frontal lobe, decreased enhancement at edges of left occipital resection cavity, new enhancement int he right front lobe concerning for new met--seen by neurosurgery and considering RT  -8/31/2023 CT/PET with increased uptake in left lower neck lymph node c/f early progression. Ground-glass, reticular changes in lungs also noted likely secondary to drug-induced pneumonitis.     Interval History:  Here today with his wife. He is doing okay. Reports significant fatigue and some shortness of breath with exertion since his surgery in July. He is able to do household chores but finds himself needing to sit down/catch his breath afterwards. Denies cough, dyspnea at rest, fever/chills. Also reports some worsening numbness/weakness in his right leg. Was originally present in his foot/ankle but now has extended to his knee/thigh. Denies headache, new vision changes. Met with radiation oncology on 9/5 and is planning to undergo gamma knife for new brain met on Friday 9/8.     Discussed that the inflammatory changes in his lungs are likely a side effect of keytruda. This is also likely contributing to his fatigue and dyspnea on exertion. We discussed that treatment for this would be a course of steroids and we would expect his symptoms to improve within 3-5 days after starting steroids. Because he experienced  toxicity on keytruda, we discussed that he will need treatment with a different medication after completing his steroid course. We discussed that opdivo is an option but will plan to discuss it further at the next visit. We also discussed that lymph node in his neck could be evidence of disease progression but also could be inflammatory/reactive. Will continue to monitor it.     His wife also mentioned concern that Tim has been intermittently depressed given everything that has happened over the past year. Also wanted to know when it would be appropriate to meet with palliative care. We discussed the role of palliative care and that they can provide both symptom relief but also psycho-social/emotional support. Will refer him to palliative care and they can set up an appointment whenever they'd like.     Review of Systems:  As noted in HPI    Objective:  /82 (BP Location: Right arm, Patient Position: Sitting, Cuff Size: Adult Regular)   Pulse 105   Temp 98.5  F (36.9  C) (Oral)   Resp 18   Wt 83.2 kg (183 lb 6.4 oz)   SpO2 96%   BMI 24.20 kg/m     Video physical exam  General: Patient appears well in no acute distress.   Skin: No visualized rash or lesions on visualized skin  Eyes: EOMI, no erythema, sclera icterus or discharge noted  Resp: Appears to be breathing comfortably without accessory muscle usage, speaking in full sentences, no cough  Neurologic: No apparent tremors, facial movements symmetric. Hearing intact. No dysarthria.   Psych: affect appropriate, alert and oriented. Pleasant.     Laboratory Data:  Most Recent 3 CBC's:  Recent Labs   Lab Test 09/07/23  1339 09/01/23  0818 08/09/23  0836 07/08/23  0609 07/07/23  0510 06/15/23  1107 06/14/23  1220   WBC 12.3* 11.3* 7.7   < > 10.2   < > 9.0   HGB 12.0* 12.5* 13.2*   < > 13.2*   < > 13.0*   MCV 92 93 96   < > 96   < > 95    317 241   < > 164   < > 218   ANEUTAUTO 7.8  --   --   --  6.0  --  5.5    < > = values in this interval not  displayed.     Most Recent 3 BMP's:  Recent Labs   Lab Test 09/07/23  1339 09/01/23  0818 08/11/23  0812    139 137   POTASSIUM 4.5 4.1 3.8   CHLORIDE 106 105 102   CO2 24 25 24   BUN 13.6 12.3 16.9   CR 0.96 0.91 1.14   ANIONGAP 9 9 11   JOHN PAUL 9.4 9.3 8.8   GLC 92 92 107*   PROTTOTAL 6.5 6.4 6.5   ALBUMIN 3.7 3.5 3.0*    Most Recent 3 LFT's:  Recent Labs   Lab Test 09/07/23  1339 09/01/23  0818 08/11/23  0812   AST 27 39 48*   ALT 16 33 83*   ALKPHOS 71 85 62   BILITOTAL 0.5 0.5 0.7    Most Recent 2 TSH and T4:  Recent Labs   Lab Test 08/11/23  0812 06/07/23  0915   TSH 1.81 1.90     I reviewed the above labs today.    IMAGING  Imaging:  CT Soft Tissue Neck w Contrast  Narrative: EXAM: PET ONCOLOGY WHOLE BODY, CT SOFT TISSUE NECK W CONTRAST, CT CHEST/ABDOMEN/PELVIS W CONTRAST  LOCATION: [facility]  DATE: 8/31/2023    INDICATION: Subsequent treatment strategy and staging for malignant melanoma of scalp and neck. Malignant melanoma metastatic to brain. Status post craniotomy and tumor resection April 2023. Also history of prostate cancer. Interval immunotherapy.   Monitor treatment response.  COMPARISON: FDG PET/CT 05/18/2023.  CONTRAST: 100 mL Isovue-370, IV.  TECHNIQUE: Serum glucose level 100 mg/dL. One hour post intravenous administration of 13.15 mCi F-18 FDG, PET imaging was performed from the skull vertex to feet, utilizing attenuation correction with concurrent axial CT and PET/CT image fusion. Separate   diagnostic CT of the neck, chest, abdomen, and pelvis was performed. Dose reduction techniques were used.    PET/CT FINDINGS: Slightly increased FDG avidity of the left lower neck lymph node (SUV max 10.4, previously 6.4).     Interval development of FDG avid groundglass and reticular change in the lungs, likely inflammatory/sequelae of drug-induced pneumonitis.    Near-complete resolution of uptake in the medial aspect of the left occipital lobe lesion with persistent  encephalomalacia. Postoperative changes from prior left posterior parietal/frontal lobe craniotomy. Additional intracranial findings are better   characterized on better characterized on recent brain MRI.    Subtle uptake in the interatrial septum corresponding to adipose tissue/lipomatous hypertrophy, likely representing physiologic activation. Synovitis in the left great toe.    CT FINDINGS: Postoperative changes from prior left frontal/parietal craniotomy. No aerodigestive tract lesion. Minimal coronary artery calcification. Scattered atherosclerotic calcifications. Sigmoid diverticulosis. Pelvic phleboliths. Multilevel   degenerative changes of the spine.  Impression: IMPRESSION:    1. Slightly increased uptake in the left lower neck lymph node, which may represent early progression of disease, however given recent initiation of immunotherapy, pseudoprogression is also possible. No new lesions identified.    2. Interval development of FDG avid groundglass and reticular change in the lungs, likely inflammatory/sequelae of drug-induced pneumonitis.    3. Near-complete resolution of uptake in the medial aspect of the left occipital lesion, however overall intracranial findings are better characterized on prior brain MRI.  PET Oncology Whole Body  Narrative: EXAM: PET ONCOLOGY WHOLE BODY, CT SOFT TISSUE NECK W CONTRAST, CT CHEST/ABDOMEN/PELVIS W CONTRAST  LOCATION: [facility]  DATE: 8/31/2023    INDICATION: Subsequent treatment strategy and staging for malignant melanoma of scalp and neck. Malignant melanoma metastatic to brain. Status post craniotomy and tumor resection April 2023. Also history of prostate cancer. Interval immunotherapy.   Monitor treatment response.  COMPARISON: FDG PET/CT 05/18/2023.  CONTRAST: 100 mL Isovue-370, IV.  TECHNIQUE: Serum glucose level 100 mg/dL. One hour post intravenous administration of 13.15 mCi F-18 FDG, PET imaging was performed from the skull vertex  to feet, utilizing attenuation correction with concurrent axial CT and PET/CT image fusion. Separate   diagnostic CT of the neck, chest, abdomen, and pelvis was performed. Dose reduction techniques were used.    PET/CT FINDINGS: Slightly increased FDG avidity of the left lower neck lymph node (SUV max 10.4, previously 6.4).     Interval development of FDG avid groundglass and reticular change in the lungs, likely inflammatory/sequelae of drug-induced pneumonitis.    Near-complete resolution of uptake in the medial aspect of the left occipital lobe lesion with persistent encephalomalacia. Postoperative changes from prior left posterior parietal/frontal lobe craniotomy. Additional intracranial findings are better   characterized on better characterized on recent brain MRI.    Subtle uptake in the interatrial septum corresponding to adipose tissue/lipomatous hypertrophy, likely representing physiologic activation. Synovitis in the left great toe.    CT FINDINGS: Postoperative changes from prior left frontal/parietal craniotomy. No aerodigestive tract lesion. Minimal coronary artery calcification. Scattered atherosclerotic calcifications. Sigmoid diverticulosis. Pelvic phleboliths. Multilevel   degenerative changes of the spine.  Impression: IMPRESSION:    1. Slightly increased uptake in the left lower neck lymph node, which may represent early progression of disease, however given recent initiation of immunotherapy, pseudoprogression is also possible. No new lesions identified.    2. Interval development of FDG avid groundglass and reticular change in the lungs, likely inflammatory/sequelae of drug-induced pneumonitis.    3. Near-complete resolution of uptake in the medial aspect of the left occipital lesion, however overall intracranial findings are better characterized on prior brain MRI.  CT Chest/Abdomen/Pelvis w Contrast  Narrative: EXAM: PET ONCOLOGY WHOLE BODY, CT SOFT TISSUE NECK W CONTRAST, CT  CHEST/ABDOMEN/PELVIS W CONTRAST  LOCATION: [facility]  DATE: 8/31/2023    INDICATION: Subsequent treatment strategy and staging for malignant melanoma of scalp and neck. Malignant melanoma metastatic to brain. Status post craniotomy and tumor resection April 2023. Also history of prostate cancer. Interval immunotherapy.   Monitor treatment response.  COMPARISON: FDG PET/CT 05/18/2023.  CONTRAST: 100 mL Isovue-370, IV.  TECHNIQUE: Serum glucose level 100 mg/dL. One hour post intravenous administration of 13.15 mCi F-18 FDG, PET imaging was performed from the skull vertex to feet, utilizing attenuation correction with concurrent axial CT and PET/CT image fusion. Separate   diagnostic CT of the neck, chest, abdomen, and pelvis was performed. Dose reduction techniques were used.    PET/CT FINDINGS: Slightly increased FDG avidity of the left lower neck lymph node (SUV max 10.4, previously 6.4).     Interval development of FDG avid groundglass and reticular change in the lungs, likely inflammatory/sequelae of drug-induced pneumonitis.    Near-complete resolution of uptake in the medial aspect of the left occipital lobe lesion with persistent encephalomalacia. Postoperative changes from prior left posterior parietal/frontal lobe craniotomy. Additional intracranial findings are better   characterized on better characterized on recent brain MRI.    Subtle uptake in the interatrial septum corresponding to adipose tissue/lipomatous hypertrophy, likely representing physiologic activation. Synovitis in the left great toe.    CT FINDINGS: Postoperative changes from prior left frontal/parietal craniotomy. No aerodigestive tract lesion. Minimal coronary artery calcification. Scattered atherosclerotic calcifications. Sigmoid diverticulosis. Pelvic phleboliths. Multilevel   degenerative changes of the spine.  Impression: IMPRESSION:    1. Slightly increased uptake in the left lower neck lymph node, which  may represent early progression of disease, however given recent initiation of immunotherapy, pseudoprogression is also possible. No new lesions identified.    2. Interval development of FDG avid groundglass and reticular change in the lungs, likely inflammatory/sequelae of drug-induced pneumonitis.    3. Near-complete resolution of uptake in the medial aspect of the left occipital lesion, however overall intracranial findings are better characterized on prior brain MRI.    I reviewed the above imaging today.      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   He started pembrolizumab on 6/7/23. He was subsequently hospitalized twice in June and July 2023 due to neurologic symptoms, as noted above with most recent craniotomy on 7/14/23. After a delay, he continued with cycle 2 pembrolizumab on 8/11/23. His brain MRI on 8/23/23 as noted above  with no recurrence in left frontal lobe, decreased enhancement at edges of left occipital resection cavity, new enhancement int he right front lobe concerning for new met. He was seen by radiation oncology on 9/5 and they will plan do to GK SRS to the new right frontal lobe lesion on 9/7. PET CT from 8/31 slight increase uptake in left lower neck lymph node c/f disease progression vs reactive/inflammatory. Also showed evidence of immunotherapy-induced pneumonitis. He has been reporting increased fatigue and dyspnea with exertion, likely related to his pneumonitis. Will treat with 8 week course of prednisone. After he completes steroid course, will need to start a new type of treatment.   -Prednisone 80 mg daily for 1 week. Taper by 10 mg increments every 7 days to complete an 8 week total course  -Bactrim 3x weekly for PJP prophylaxis while on steroids  -RTC in 2 months with labs and PET CT prior  -Discontinue Keytruda - will start different type of treatment after completing steroid course  -Palliative care referral     Brain  metastases. S/p 2 craniotomies and gamma knife: 2500 cGy to the left resection cavity; 3000 cGy to the L parietal-occipital lesion. He is now off of dexamethasone. Brain MRI with a new area of enhancement as above; met with Rad Onc and will be treated with GK SRS. Next brain MRI tentatively scheduled in October pending clinical course.  -GK SRS with Rad Onc on 9/8    Seizure, secondary to brain metastases. Continue Keppra 1000 mg twice daily. No auras or seizure activity recently.     Fatigue.  Cortisol and ACTH on 8/11/23 were without concerns. Will check testosterone and TSH tomorrow before infusion. Timeline does not seem linked to Keytruda, as such we will plan to proceed tomorrow. Given no clear benefit from steroids following surgery, will hold on these for now; he prefers this as well. If no abnormalities on TSH or testosterone, can consider trial of Ritalin, though Tim is hesitant about this. He will consider it. We discussed limiting naps to 30 minutes at a time and then doing something active. He knows to reach out if fatigue worsens  -Possibly related to immunotherapy-induced pneumonitis, as above. Hopefully will improve with course of steroids.     History of prostate cancer ([place]om[place] 3 + 3) s/p prostatectomy (2009), not discussed today  He had no adverse features and did not receive adjuvant therapy. PSA has remained negative. Continue with yearly PSA.           Abdullahi Greene MD

## 2023-09-07 NOTE — NURSING NOTE
Chief Complaint   Patient presents with    Blood Draw     Labs drawn via  by RN. Vitals taken.     Labs drawn with  by RN. Vitals taken. Patient checked into next appointment.    Humaira Esparza RN

## 2023-09-07 NOTE — PROGRESS NOTES
Lab RN contacted [facility] to enter lab orders for patient prior to appointment with Dr. Greene.  CBC and CMP entered and sent to Dr. Greene for co-sign.    Jenny Lemon, RN  Cancer Care Coordinator  [facility]

## 2023-09-07 NOTE — PROGRESS NOTES
MEDICAL ONCOLOGY PROGRESS NOTE  [facility]  Sep 7, 2023    Chief Complaint: metastatic melanoma to brain    Melanoma History:  --8/16/22, he brings a left parietal scalp lesion to attention of his GP. It is initially observed.  --November 2022, the left parietal scalp lesion was felt to be a cyst, and the left parietal lesion was lanced and drained via scalp incision.  --January 2023, the left parietal scalp cyst would occasionally break open, drain, and bleed, largely at night.   --February 2023, he notes some mild difficulty clearing obstacles with right leg and foot.  --March/April 2023, he has 3 progressively worsening episodes over a span 3 weeks including the right leg. At first he notes the onset of right leg numbness and heaviness, like he was carrying 50 lbs of water on the leg. Then, about a week or so later, the leg began to jerk and converse while he was in the basement. Neither episode associated with loss of consciousness  --4/15/23, he recalls the right leg numbness, heaviness, jerking while driving between work. He had been found by coworkers in the parking lot, sitting in the grass, appearing confused. He was brought to the ER by EMS. CT-CAP, showed small (<6mm), bilateral indeterminate pulmonary nodules. CT-head and MRI brain showed 2 discrete ring-enhancing lesions in the left paramedian posterior frontal lobe near the precentral gyrus measuring approximately 2.2 x 1.7 x 1.9 cm and left occipital lobe near the lingual gyrus measuring approximately 2.2 x 2.1 x 2.0 cm. The occipital lesion encroaches upon the subependymal surface of the left lateral ventricle occipital horn and contacts the superior surface of the medial left tentorium. The lesions are accompanied by moderate surrounding vasogenic edema. Surgery was recommended.  --4/21/23, underwent left awake (sleep-awake-sleep) craniotomy for tumor resection, he has resection of the left frontal, motor strip tumor as well asan elliptical  incision of the left parietal scalp lesion. On pathology, both the scalp excision and brain mass are positive for malignant melanoma. Tumor cells are positive for S-100 and Melan-A, and negative for cytokeratin AE1/AE3, NKX3.1, GFAP, ERG, CD3, and CD20.  CD3 highlights brisk infiltration of the tumor by T lymphocytes, while CD20 highlights scattered positive B lymphocytes. NGS identifies a BRAF G466A mutation, a class 3 mutation insensitive to BRAF kinase inhibitor monotherapy. TMB is high at 48.754 muts/Mb.   --4/22/23, post-operative MRI shows post-operative changes of the parietal craniotomy of the left frontal lobe metastasis. The other 1.7 x 1.1 cm cystic lesion in the left occipital lobe is also seen. No new lesions appreciated.  -- 4/25/23 to 5/9/23, discharged from the hospital to acute rehab stay at [facility] in [place]. He participated in a comprehensive rehabilitation program consisting of PT, OT, Speech, Psychology and Recreation Therapy. He did well and was discharged home with use of a walker.  --PET-CT on 5/18/23 with potentially involved neck lymph nodes. He has mild FDG uptake in soft tissue nodules in the scalp, in the known left occipital brain metastasis, as well as in the left lower neck region.  -5/24-/5/31/23, He was treated with gamma knife: 2500 cGy to the left resection cavity; 3000 cGy to the L parietal-occipital lesion.  -6/7/2023, Cycle 1 of pembrolizumab 200mg every 3 weeks  -6/14/23-6/16/23, admitted for vision change, reduced comprehension, speech change, fall and headache. MRI showing likely progression of left occipital mass with associated edema. Improved on steroids and discharged on oral decadron. Plan for repeat MRI and neurosurgery follow up in 1 month.  -7/7/23 ED visit for headaches, confusion, vision changes, concern for hemorrhage of the left occipital mass, admitted to [facility] until 7/9/23, increased dexamethasone dose  -7/14/23 craniotomy of the left parieto-occipital  mass, discharged on a dexamethasone taper  -8/11/2023, Cycle 2 of pembrolizumab  -8/23/2023 brain MRI with no recurrence in left frontal lobe, decreased enhancement at edges of left occipital resection cavity, new enhancement int he right front lobe concerning for new met--seen by neurosurgery and considering RT  -8/31/2023 CT/PET with increased uptake in left lower neck lymph node c/f early progression. Ground-glass, reticular changes in lungs also noted likely secondary to drug-induced pneumonitis.     Interval History:  Here today with his wife. He is doing okay. Reports significant fatigue and some shortness of breath with exertion since his surgery in July. He is able to do household chores but finds himself needing to sit down/catch his breath afterwards. Denies cough, dyspnea at rest, fever/chills. Also reports some worsening numbness/weakness in his right leg. Was originally present in his foot/ankle but now has extended to his knee/thigh. Denies headache, new vision changes. Met with radiation oncology on 9/5 and is planning to undergo gamma knife for new brain met on Friday 9/8.     Discussed that the inflammatory changes in his lungs are likely a side effect of keytruda. This is also likely contributing to his fatigue and dyspnea on exertion. We discussed that treatment for this would be a course of steroids and we would expect his symptoms to improve within 3-5 days after starting steroids. Because he experienced toxicity on keytruda, we discussed that he will need treatment with a different medication after completing his steroid course. We discussed that opdivo is an option but will plan to discuss it further at the next visit. We also discussed that lymph node in his neck could be evidence of disease progression but also could be inflammatory/reactive. Will continue to monitor it.     His wife also mentioned concern that Tim has been intermittently depressed given everything that has happened over the  past year. Also wanted to know when it would be appropriate to meet with palliative care. We discussed the role of palliative care and that they can provide both symptom relief but also psycho-social/emotional support. Will refer him to palliative care and they can set up an appointment whenever they'd like.     Objective:  /82 (BP Location: Right arm, Patient Position: Sitting, Cuff Size: Adult Regular)   Pulse 105   Temp 98.5  F (36.9  C) (Oral)   Resp 18   Wt 83.2 kg (183 lb 6.4 oz)   SpO2 96%   BMI 24.20 kg/m     Video physical exam  General: Patient appears well in no acute distress.   Skin: No visualized rash or lesions on visualized skin  Eyes: EOMI, no erythema, sclera icterus or discharge noted  Resp: Appears to be breathing comfortably without accessory muscle usage, speaking in full sentences, no cough  Neurologic: No apparent tremors, facial movements symmetric. Hearing intact. No dysarthria.   Psych: affect appropriate, alert and oriented. Pleasant.     Laboratory Data:  Most Recent 3 CBC's:  Recent Labs   Lab Test 09/07/23  1339 09/01/23  0818 08/09/23  0836 07/08/23  0609 07/07/23  0510 06/15/23  1107 06/14/23  1220   WBC 12.3* 11.3* 7.7   < > 10.2   < > 9.0   HGB 12.0* 12.5* 13.2*   < > 13.2*   < > 13.0*   MCV 92 93 96   < > 96   < > 95    317 241   < > 164   < > 218   ANEUTAUTO 7.8  --   --   --  6.0  --  5.5    < > = values in this interval not displayed.     Most Recent 3 BMP's:  Recent Labs   Lab Test 09/07/23  1339 09/01/23  0818 08/11/23  0812    139 137   POTASSIUM 4.5 4.1 3.8   CHLORIDE 106 105 102   CO2 24 25 24   BUN 13.6 12.3 16.9   CR 0.96 0.91 1.14   ANIONGAP 9 9 11   JOHN PAUL 9.4 9.3 8.8   GLC 92 92 107*   PROTTOTAL 6.5 6.4 6.5   ALBUMIN 3.7 3.5 3.0*    Most Recent 3 LFT's:  Recent Labs   Lab Test 09/07/23  1339 09/01/23  0818 08/11/23  0812   AST 27 39 48*   ALT 16 33 83*   ALKPHOS 71 85 62   BILITOTAL 0.5 0.5 0.7    Most Recent 2 TSH and T4:  Recent Labs   Lab Test  08/11/23  0812 06/07/23  0915   TSH 1.81 1.90     I reviewed the above labs today.    IMAGING  Imaging:  CT Soft Tissue Neck w Contrast  Narrative: EXAM: PET ONCOLOGY WHOLE BODY, CT SOFT TISSUE NECK W CONTRAST, CT CHEST/ABDOMEN/PELVIS W CONTRAST  LOCATION: [facility]  DATE: 8/31/2023    INDICATION: Subsequent treatment strategy and staging for malignant melanoma of scalp and neck. Malignant melanoma metastatic to brain. Status post craniotomy and tumor resection April 2023. Also history of prostate cancer. Interval immunotherapy.   Monitor treatment response.  COMPARISON: FDG PET/CT 05/18/2023.  CONTRAST: 100 mL Isovue-370, IV.  TECHNIQUE: Serum glucose level 100 mg/dL. One hour post intravenous administration of 13.15 mCi F-18 FDG, PET imaging was performed from the skull vertex to feet, utilizing attenuation correction with concurrent axial CT and PET/CT image fusion. Separate   diagnostic CT of the neck, chest, abdomen, and pelvis was performed. Dose reduction techniques were used.    PET/CT FINDINGS: Slightly increased FDG avidity of the left lower neck lymph node (SUV max 10.4, previously 6.4).     Interval development of FDG avid groundglass and reticular change in the lungs, likely inflammatory/sequelae of drug-induced pneumonitis.    Near-complete resolution of uptake in the medial aspect of the left occipital lobe lesion with persistent encephalomalacia. Postoperative changes from prior left posterior parietal/frontal lobe craniotomy. Additional intracranial findings are better   characterized on better characterized on recent brain MRI.    Subtle uptake in the interatrial septum corresponding to adipose tissue/lipomatous hypertrophy, likely representing physiologic activation. Synovitis in the left great toe.    CT FINDINGS: Postoperative changes from prior left frontal/parietal craniotomy. No aerodigestive tract lesion. Minimal coronary artery calcification. Scattered  atherosclerotic calcifications. Sigmoid diverticulosis. Pelvic phleboliths. Multilevel   degenerative changes of the spine.  Impression: IMPRESSION:    1. Slightly increased uptake in the left lower neck lymph node, which may represent early progression of disease, however given recent initiation of immunotherapy, pseudoprogression is also possible. No new lesions identified.    2. Interval development of FDG avid groundglass and reticular change in the lungs, likely inflammatory/sequelae of drug-induced pneumonitis.    3. Near-complete resolution of uptake in the medial aspect of the left occipital lesion, however overall intracranial findings are better characterized on prior brain MRI.  PET Oncology Whole Body  Narrative: EXAM: PET ONCOLOGY WHOLE BODY, CT SOFT TISSUE NECK W CONTRAST, CT CHEST/ABDOMEN/PELVIS W CONTRAST  LOCATION: [facility]  DATE: 8/31/2023    INDICATION: Subsequent treatment strategy and staging for malignant melanoma of scalp and neck. Malignant melanoma metastatic to brain. Status post craniotomy and tumor resection April 2023. Also history of prostate cancer. Interval immunotherapy.   Monitor treatment response.  COMPARISON: FDG PET/CT 05/18/2023.  CONTRAST: 100 mL Isovue-370, IV.  TECHNIQUE: Serum glucose level 100 mg/dL. One hour post intravenous administration of 13.15 mCi F-18 FDG, PET imaging was performed from the skull vertex to feet, utilizing attenuation correction with concurrent axial CT and PET/CT image fusion. Separate   diagnostic CT of the neck, chest, abdomen, and pelvis was performed. Dose reduction techniques were used.    PET/CT FINDINGS: Slightly increased FDG avidity of the left lower neck lymph node (SUV max 10.4, previously 6.4).     Interval development of FDG avid groundglass and reticular change in the lungs, likely inflammatory/sequelae of drug-induced pneumonitis.    Near-complete resolution of uptake in the medial aspect of the left  occipital lobe lesion with persistent encephalomalacia. Postoperative changes from prior left posterior parietal/frontal lobe craniotomy. Additional intracranial findings are better   characterized on better characterized on recent brain MRI.    Subtle uptake in the interatrial septum corresponding to adipose tissue/lipomatous hypertrophy, likely representing physiologic activation. Synovitis in the left great toe.    CT FINDINGS: Postoperative changes from prior left frontal/parietal craniotomy. No aerodigestive tract lesion. Minimal coronary artery calcification. Scattered atherosclerotic calcifications. Sigmoid diverticulosis. Pelvic phleboliths. Multilevel   degenerative changes of the spine.  Impression: IMPRESSION:    1. Slightly increased uptake in the left lower neck lymph node, which may represent early progression of disease, however given recent initiation of immunotherapy, pseudoprogression is also possible. No new lesions identified.    2. Interval development of FDG avid groundglass and reticular change in the lungs, likely inflammatory/sequelae of drug-induced pneumonitis.    3. Near-complete resolution of uptake in the medial aspect of the left occipital lesion, however overall intracranial findings are better characterized on prior brain MRI.  CT Chest/Abdomen/Pelvis w Contrast  Narrative: EXAM: PET ONCOLOGY WHOLE BODY, CT SOFT TISSUE NECK W CONTRAST, CT CHEST/ABDOMEN/PELVIS W CONTRAST  LOCATION: [facility]  DATE: 8/31/2023    INDICATION: Subsequent treatment strategy and staging for malignant melanoma of scalp and neck. Malignant melanoma metastatic to brain. Status post craniotomy and tumor resection April 2023. Also history of prostate cancer. Interval immunotherapy.   Monitor treatment response.  COMPARISON: FDG PET/CT 05/18/2023.  CONTRAST: 100 mL Isovue-370, IV.  TECHNIQUE: Serum glucose level 100 mg/dL. One hour post intravenous administration of 13.15 mCi F-18  FDG, PET imaging was performed from the skull vertex to feet, utilizing attenuation correction with concurrent axial CT and PET/CT image fusion. Separate   diagnostic CT of the neck, chest, abdomen, and pelvis was performed. Dose reduction techniques were used.    PET/CT FINDINGS: Slightly increased FDG avidity of the left lower neck lymph node (SUV max 10.4, previously 6.4).     Interval development of FDG avid groundglass and reticular change in the lungs, likely inflammatory/sequelae of drug-induced pneumonitis.    Near-complete resolution of uptake in the medial aspect of the left occipital lobe lesion with persistent encephalomalacia. Postoperative changes from prior left posterior parietal/frontal lobe craniotomy. Additional intracranial findings are better   characterized on better characterized on recent brain MRI.    Subtle uptake in the interatrial septum corresponding to adipose tissue/lipomatous hypertrophy, likely representing physiologic activation. Synovitis in the left great toe.    CT FINDINGS: Postoperative changes from prior left frontal/parietal craniotomy. No aerodigestive tract lesion. Minimal coronary artery calcification. Scattered atherosclerotic calcifications. Sigmoid diverticulosis. Pelvic phleboliths. Multilevel   degenerative changes of the spine.  Impression: IMPRESSION:    1. Slightly increased uptake in the left lower neck lymph node, which may represent early progression of disease, however given recent initiation of immunotherapy, pseudoprogression is also possible. No new lesions identified.    2. Interval development of FDG avid groundglass and reticular change in the lungs, likely inflammatory/sequelae of drug-induced pneumonitis.    3. Near-complete resolution of uptake in the medial aspect of the left occipital lesion, however overall intracranial findings are better characterized on prior brain MRI.    I reviewed the above imaging today.      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   He started pembrolizumab on 6/7/23. He was subsequently hospitalized twice in June and July 2023 due to neurologic symptoms, as noted above with most recent craniotomy on 7/14/23. After a delay, he continued with cycle 2 pembrolizumab on 8/11/23. His brain MRI on 8/23/23 noted no recurrence in left frontal lobe, decreased enhancement at edges of left occipital resection cavity, new enhancement in the right frontal lobe concerning for new met. He was seen by radiation oncology on 9/5 and GK SRS to the new right frontal lobe lesion planned for tomorrow.     PET CT from 8/31 slight increase uptake in left lower neck lymph node c/f disease progression vs reactive/inflammatory. Also showed evidence of immunotherapy-induced pneumonitis. He has been reporting increased fatigue and dyspnea with exertion, likely related to his pneumonitis. Will treat with 8 week course of prednisone. After he completes steroid course, will need to start a new type of treatment, likely nivolumab 240 q2w.     -Prednisone 80 mg daily for 1 week. Taper by 10 mg increments every 7 days to complete an 8 week total course  -Bactrim 3x weekly for PJP prophylaxis while on steroids  -RTC in 2 months with labs and PET CT prior  -Discontinue Keytruda - will start different type of treatment after completing steroid course  -Palliative care referral     Brain metastases. S/p 2 craniotomies and gamma knife: 2500 cGy to the left resection cavity; 3000 cGy to the L parietal-occipital lesion. He is now off of dexamethasone. Brain MRI with a new area of enhancement as above; met with Rad Onc and will be treated with GK SRS. Next brain MRI tentatively scheduled in October pending clinical course.  -GK SRS with Rad Onc on 9/8    Seizure, secondary to brain metastases. Continue Keppra 1000 mg twice daily. No auras or seizure activity recently.     Fatigue.  Cortisol and ACTH on 8/11/23 were  without concerns. Can consider trial of Ritalin, though Tim is hesitant about this. He will consider it.  -Possibly related to immunotherapy-induced pneumonitis, as above. Hopefully will improve with course of steroids.     History of prostate cancer ([place] 3 + 3) s/p prostatectomy (2009), not discussed today  He had no adverse features and did not receive adjuvant therapy. PSA has remained negative. Continue with yearly PSA.       I was present with Bijal Mitchell MS who participated in the service and in the documentation of the note. I have verified the history and personally performed the physical exam and medical decision making. I edited the clinical note and I agree with the assessment and plan of care as documented.    Abdullahi Lynch M.D.   of Medicine  Hematology, Oncology and Transplantation  [facility]

## 2023-09-08 NOTE — LETTER
2023         RE: Saeid Santa  [address]  [place] [address]        Dear Colleague,    Thank you for referring your patient, Saeid Santa, to the [facility] RADIATION ONCOLOGY GAMMA KNIFE. Please see a copy of my visit note below.    Gamma Knife Operative Note     MRN: [id number]    Name: Saeid Santa    : 1955     Date of procedure: 23     Surgeon:  Kye Garrido MD     Pre-operative Diagnosis:  Multiple metastatic melanoma  Post-operative Diagnosis: Multiple metastatic melanoma     Procedure: Gamma Knife Radiosurgery using Leksell frame     Indication: Saeid Santa right handed male a known diagnosis of metastatic melanoma s/p stealth assisted awake craniotomy with resection of motor/sensory strip lesion  on 23, underwent GK to resection cavity and parieto-occipital lesion on 23 presented to OSH with worsening confusion and headache and imaging evidence of increased size and density of the lesion in the left occipital lobe, worrisome for interval hemorrhage, Increased surrounding edema and associated mass effect with approximately 5 mm left to right midline shift. He underwent Stealth assisted left parieto-occipital craniotomy with interhemispheric approach and resection of falcine mass on 2023 .     After case review in the brain tumor conference, the joint recommendation was to treat the patient with radiosurgery. Standard measurements were obtained for coordinate calculation to facilitate SRS delivery.     On the day of the procedure, informed consent was obtained. The previous MRI was reviewed. The Leksell stereotactic frame was attached to the patient's cranium using standard technique. With the frame placed, the patient underwent an MRI of the head with contrast. No new additional lesions were identified on this MRI.     The treatment is planned on the Gamma plan system based on the MRI taken on the day of the procedure.  Treatment parameters were  verified by myself, the treating radiation oncologist, and the physicist.      One single lesion is treated.  The maximal diameter of the lesion is 8 mm. The lesion was treated with 22 Gy delivered in a single fraction.  The dose was delivered in a highly conformal fashion. The treatment was performed at the [facility] gamma knife center.      I was present and performed the key portions of this procedure.     Kye Garrido MD          Again, thank you for allowing me to participate in the care of your patient.        Sincerely,        Kye Garrido MD

## 2023-09-08 NOTE — LETTER
2023         RE: Saeid Santa  [address] [address]        Dear Colleague,    Thank you for referring your patient, Saeid Santa, to the [facility] RADIATION ONCOLOGY GAMMA KNIFE. Please see a copy of my visit note below.    Name: Saeid Santa  : 1955 Medical Record #: [id number]  Diagnosis: C79.31 Secondary malignant neoplasm of brain  Date of Treatment: 2023  Referring Physicians: Benedicto Rose, Tumor Registry      GAMMA KNIFE PROCEDURE NOTE and TREATMENT SUMMARY    Treatment Summary:     Treatment Site Dose Modality Collimators Shots   Right Medial Frontal 22 Gy to 50% isodose Cobalt 60 4,8mm 12             DESCRIPTION OF PROCEDURE:  On 2023, the patient was brought to the Gamma Knife suite at the [facility].      HEAD IMMOBILIZATION: Head frame put on by the neurosurgeon  MEDICATION: Sedation and local anesthetic    The patient was then taken to the Department of Radiology where a stereotactic brain MRI was performed.  The patient was admitted to the  care area while treatment planning was completed.      These Images were then sent to the [employer] Gamma Knife computer system where a three dimensional stereotactic plan was generated.   The patient was then treated on the Leksell Gamma Knife.  Treatment involved 1 target.    The Leksell Gamma Knife IconTM Plan software was used to create a highly conformal dose distribution using the number and size collimators detailed above.     TREATMENT:    The patient was brought to the Gamma Knife suite.  The patient was identified by 2 methods. A timeout was performed to confirm the correct patient and correct procedure. The site was identified by an MRI image and treatment planning software, which defined the treatment area.     A cone beam CT was done and compared to the MRI to look for frame motion  We evaluated the frame manually to ensure it was still secure.      The treatment was delivered using the [employer] Gamma Knife IconTM without complication.  The head immobilization was removed and the patient was discharged home in stable condition.  The patient tolerated the treatment well and had no complications.        PAIN MANAGEMENT: None required      FOLLOW-UP PLANS:  Follow up: 3 Months      Imaging Before Follow Up: Brain MRI    Approved by:  Benedicto Rose MD/PhD    PHYSICIST: Jovana Batista, PhD      Again, thank you for allowing me to participate in the care of your patient.        Sincerely,        Benedicto Rose MD

## 2023-09-08 NOTE — PROGRESS NOTES
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      HEAD IMMOBILIZATION: Head frame put on by the neurosurgeon  MEDICATION: Sedation and local anesthetic    The patient was then taken to the Department of Radiology where a stereotactic brain MRI was performed.  The patient was admitted to the  care area while treatment planning was completed.      These Images were then sent to the [employer] Gamma Knife computer system where a three dimensional stereotactic plan was generated.   The patient was then treated on the Independent IPksell Gamma Knife.  Treatment involved 1 target.    The Independent IPksell Gamma Knife IconTM Plan software was used to create a highly conformal dose distribution using the number and size collimators detailed above.     TREATMENT:    The patient was brought to the Gamma Knife suite.  The patient was identified by 2 methods. A timeout was performed to confirm the correct patient and correct procedure. The site was identified by an MRI image and treatment planning software, which defined the treatment area.     A cone beam CT was done and compared to the MRI to look for frame motion  We evaluated the frame manually to ensure it was still secure.     The treatment was delivered using the Lesksell Gamma Knife IconTM without complication.  The head immobilization was removed and the patient was discharged home in stable condition.  The patient tolerated the treatment well and had no  complications.        PAIN MANAGEMENT: None required      FOLLOW-UP PLANS:  Follow up: 3 Months      Imaging Before Follow Up: Brain MRI    Approved by:  Benedicto Rose MD/PhD    PHYSICIST: Jovana Batista, PhD

## 2023-09-08 NOTE — PROGRESS NOTES
Gamma Knife Operative Note     MRN: [id number]    Name: Saeid Santa    : 1955     Date of procedure: 23     Surgeon:  Kye Garrido MD     Pre-operative Diagnosis:  Multiple metastatic melanoma  Post-operative Diagnosis: Multiple metastatic melanoma     Procedure: Gamma Knife Radiosurgery using Leksell frame     Indication: Saeid Santa right handed male a known diagnosis of metastatic melanoma s/p stealth assisted awake craniotomy with resection of motor/sensory strip lesion  on 23, underwent GK to resection cavity and parieto-occipital lesion on 23 presented to OSH with worsening confusion and headache and imaging evidence of increased size and density of the lesion in the left occipital lobe, worrisome for interval hemorrhage, Increased surrounding edema and associated mass effect with approximately 5 mm left to right midline shift. He underwent Stealth assisted left parieto-occipital craniotomy with interhemispheric approach and resection of falcine mass on 2023 .     After case review in the brain tumor conference, the joint recommendation was to treat the patient with radiosurgery. Standard measurements were obtained for coordinate calculation to facilitate SRS delivery.     On the day of the procedure, informed consent was obtained. The previous MRI was reviewed. The Leksell stereotactic frame was attached to the patient's cranium using standard technique. With the frame placed, the patient underwent an MRI of the head with contrast. No new additional lesions were identified on this MRI.     The treatment is planned on the Gamma plan system based on the MRI taken on the day of the procedure.  Treatment parameters were verified by myself, the treating radiation oncologist, and the physicist.      One single lesion is treated.  The maximal diameter of the lesion is 8 mm. The lesion was treated with 22 Gy delivered in a single fraction.  The dose was delivered in a highly  conformal fashion. The treatment was performed at the [facility] gamma knife center.      I was present and performed the key portions of this procedure.     Kye Garrido MD

## 2023-09-11 NOTE — Clinical Note
PEBBLESI - He wants to review the results, I asked if anyone reviewed them with him last week and he said no.  He said he just wants to sit down and review the language so he understands what's going on in his brain

## 2023-09-11 NOTE — PROGRESS NOTES
"[facility]: Cancer Care Plan of Care Education Note                                    Discussion with Patient:                                                      Patient sent MyChart requesting a follow-up appointment to review recent MRI brain scan.  RNCC called patient for further information.  Patient reports MRI brain results were not reviewed with patient at last week's appointments with Rad Onc or Neurosurgery and that he would like go over it \"paragraph by paragraph\" with a provider.     Goals          General     Other (pt-stated)      Notes - Note edited  9/11/2023  3:02 PM by Jenny Lemon RN     Goal Statement: I will use my clinic and care team resources as directed.  Date Goal set: 9/11/2023  Barriers:  No barriers identified  Strengths: support and coping  Date to Achieve By: ongoing  Patient expressed understanding of goal: Yes  Action steps to achieve this goal:  Patient to contact clinic with questions/concerns as needed ongoing                Assessment:                                                      Assessment completed with:: Patient    Plan of Care Education   Diagnosis:: Malignant neoplasm metastatic to brain  Does patient understand diagnosis?: Yes  Tx plan/regimen:: Previous MRI  Does patient understand treatment plan/regimen?: Yes  Plan of Care:: AMANDA follow-up appointment    Evaluation of Learning  Patient Education Provided: Yes  Readiness:: Acceptance  Method:: Explanation  Response:: Verbalizes understanding    Intervention/Education provided during outreach:                                                       RNCC contacted scheduling team to contact patient to schedule follow-up visit (video visit, not phone visit) with AMANDA or Dr. Greene.  Patient scheduled for Friday 9/15.    Patient to follow up as scheduled at next appt  Patient to call/[employer] message with updates    Jenny Lemon RN  Cancer Care Coordinator  [facility]  "

## 2023-09-11 NOTE — TELEPHONE ENCOUNTER
A call was placed to Tim in follow up to his Gamma Knife treatment on 9/8/23.  Tim said the pin sites were tender the first night but now are fine and seem to be healing with no problem.  Tim denied a headache or any nausea, he said he thought things were going well.

## 2023-09-15 NOTE — TELEPHONE ENCOUNTER
Patient needs to be rescheduled for their virtual visit due to Reason for Reschedule: No-Show    Appointment mode: Video  Provider: Meghan Matt   
Yes

## 2023-09-27 NOTE — PROGRESS NOTES
Tim is a 68 year old who is being evaluated via a billable telephone visit.      What phone number would you like to be contacted at? [phone]  How would you like to obtain your AVS? Juancarlos    Distant Location (provider location):  On-site    Assessment & Plan     Benign essential hypertension  Medication faxed  - amLODIPine (NORVASC) 10 MG tablet; Take 1 tablet (10 mg) by mouth every morning    Seizure disorder (H)  Medication faxed  - levETIRAcetam (KEPPRA) 1000 MG tablet; 1 tablet Orally every 12 hrs    Right leg swelling  US ordered ,patient will be notified of results.   - US Lower Extremity Venous Duplex Right; Future      Right hemiparesis (H)  Unchanged         FUTURE APPOINTMENTS:       - Follow-up visit in one month or sooner as needed.    Rock Grajeda MD  [facility]    Subjective   Tim is a 68 year old, presenting for the following health issues:    Patient is a 68-year-old here for medication recheck.  He reports that he has been on amlodipine for blood pressure control and needs refills on his medications.  He is also taking levitiracetam( Keppra) for seizures.      He has a history of malignant neoplasm metastasis to the brain from melanoma.  Takes the Keppra as a prophylaxis to prevent seizures.  He reports that he has been having pain in his right leg and he obvious swelling.  This was a telephone encounter so I was unable to examine the leg.  I recommended the patient come in for DVT ultrasound of the right leg.  Medications were faxed to his pharmacy.  Hypertension      9/27/2023    12:29 PM   Additional Questions   Roomed by Arely JOSHI   Accompanied by self         9/27/2023    12:29 PM   Patient Reported Additional Medications   Patient reports taking the following new medications none       History of Present Illness       Hypertension: He presents for follow up of hypertension.  He does not check blood pressure  regularly outside of the clinic. Outside blood  pressures have been over 140/90. He follows a low salt diet. He consumes 1 sweetened beverage(s) daily.He exercises with enough effort to increase his heart rate 20 to 29 minutes per day.  He exercises with enough effort to increase his heart rate 4 days per week.   He is taking medications regularly.     Has some questions about his medications.  Amlodipine and prednisone.  Having some swelling in his right leg.            Review of Systems   Constitutional: Negative.    HENT: Negative.     Eyes: Negative.    Respiratory: Negative.     Cardiovascular: Negative.    Gastrointestinal: Negative.    Endocrine: Negative.    Genitourinary: Negative.    Musculoskeletal:  Positive for arthralgias and joint swelling.   Allergic/Immunologic: Negative.    Neurological: Negative.    Hematological: Negative.    Psychiatric/Behavioral: Negative.              Objective           Vitals:  No vitals were obtained today due to virtual visit.    Physical Exam   healthy, alert, and no distress  PSYCH: Alert and oriented times 3; coherent speech, normal   rate and volume, able to articulate logical thoughts, able   to abstract reason, no tangential thoughts, no hallucinations   or delusions  His affect is normal  RESP: No cough, no audible wheezing, able to talk in full sentences  Remainder of exam unable to be completed due to telephone visits            Phone call duration: 11 minutes

## 2023-10-16 NOTE — LETTER
10/16/2023         RE: Saeid Santa  [address]  [place] [address]        Dear Colleague,    Thank you for referring your patient, Saeid Santa, to the [facility]. Please see a copy of my visit note below.    NEUROSURGERY FOLLOWUP  NOTE    Saeid Santa comes today in f/u 3 months s/p 7/14/2023 Stealth assisted left parieto-occipital craniotomy with interhemispheric approach and resection of falcine mass.     He has a known diagnosis of metastatic melanoma s/p stealth assisted awake craniotomy with resection of motor/sensory strip lesion  on 4/21/23, underwent GK to resection cavity and parieto-occipital lesion on 5/24/23 presented to OSH with worsening confusion and headache and imaging evidence of increased size and density of the lesion in the left occipital lobe, worrisome for interval hemorrhage, Increased surrounding edema and associated mass effect with approximately 5 mm left to right midline shift.     9/8/2023: Gamma knife to the new anteromedial right frontal lobe lesion likely mets.  22 Gy single fraction.     Patient is doing reasonably well since his last gamma knife.  He is in good spirits and denies any new onset neurological symptoms.  He is scheduled to follow with his medical oncologist in December 2023.    He is not on any systemic treatment at this point.      He is scheduled for MRI brain with and without contrast on 12/7/2023.    PHYSICAL EXAM:   Constitutional: /75   Pulse 60   SpO2 98%      Mental Status: Mental Status: A & O in no acute distress.  Affect is appropriate.  Speech is fluent.  Recent and remote memory are intact.  Attention span and concentration are normal.     Motor: Generalized loss of muscle mass is present.  Right leg proximal muscles 5 /5, distal 4+ by 5     Sensory: Sensation decreased to touch in right lower extremity     Coordination:   Heel/toe/ gait intact.  Intact tandem gait      Reflexes; supinator, biceps,  triceps, knee/ ankle jerk intact.  No hoffmans/   no babinski/ clonus.     Incision healed well.    IMAGING:   I personally reviewed all radiographic images and agree with the neuroradiology report.     9/8/2023 MRI brain with and without contrast:  IMPRESSION:  1.  Stable postoperative changes related to left parietal and left  frontal tumoral resections.  2.  Persistent enhancement of the margins surrounding the left  occipital lobe resection cavity, which is increased in conspicuity  along the posterior inferior margin.  3.  Interval growth of an enhancing right parafalcine nodule,  consistent with worsening metastatic disease.     CONSULTATION ASSESSMENT AND PLAN:    Saeid Santa comes today in f/u 3 months s/p 7/14/2023 Stealth assisted left parieto-occipital craniotomy with interhemispheric approach and resection of falcine mass.     He has a known diagnosis of metastatic melanoma s/p stealth assisted awake craniotomy with resection of motor/sensory strip lesion  on 4/21/23, underwent GK to resection cavity and parieto-occipital lesion on 5/24/23 presented to OSH with worsening confusion and headache and imaging evidence of increased size and density of the lesion in the left occipital lobe, worrisome for interval hemorrhage, Increased surrounding edema and associated mass effect with approximately 5 mm left to right midline shift.     9/8/2023: Gamma knife to the new anteromedial right frontal lobe lesion likely mets.  22 Gy single fraction.     He is doing reasonably well since his last gamma knife and denies any new onset neurological symptoms.  On clinical examination he has no focal neurological deficit except for mild dorsiflexion weakness on the right side which has improved significantly since the surgery.  Patient denied any new onset seizures.    He is scheduled to have follow-up MRI brain with and without contrast on December 7, 2023.  He can follow-up with Dr. Rose from radiation oncology for  further follow-ups.  He can follow-up with me on as-needed basis.  Patient agreed with the plan.  All the questions were answered and patient sounded understanding.  He can contact us if there are any further questions or concerns or worsening neurological deficits.    I spent more than 20 minutes in this apt, examining the pt, reviewing the scans, reviewing notes from chart, discussing treatment options with risks and benefits and coordinating care. This note was created in part by the use of Dragon voice recognition system. Inadvertent grammatical errors and typographical errors may still exist.        Kye Garrido MD      CC:     Rock Grajeda  [address] [address]      Again, thank you for allowing me to participate in the care of your patient.        Sincerely,        Kye Garrido MD

## 2023-10-16 NOTE — NURSING NOTE
"Saeid Santa is a 68 year old male who presents for:  Chief Complaint   Patient presents with    RECHECK        Initial Vitals:  /75   Pulse 60   SpO2 98%  Estimated body mass index is 24.2 kg/m  as calculated from the following:    Height as of 8/31/23: 6' 1\" (1.854 m).    Weight as of 9/7/23: 183 lb 6.4 oz (83.2 kg).. There is no height or weight on file to calculate BSA. BP completed using cuff size: regular  No Pain (0)    Hermes Lyons    "

## 2023-10-16 NOTE — PROGRESS NOTES
NEUROSURGERY FOLLOWUP  NOTE    Saeid Santa comes today in f/u 3 months s/p 7/14/2023 Stealth assisted left parieto-occipital craniotomy with interhemispheric approach and resection of falcine mass.     He has a known diagnosis of metastatic melanoma s/p stealth assisted awake craniotomy with resection of motor/sensory strip lesion  on 4/21/23, underwent GK to resection cavity and parieto-occipital lesion on 5/24/23 presented to OSH with worsening confusion and headache and imaging evidence of increased size and density of the lesion in the left occipital lobe, worrisome for interval hemorrhage, Increased surrounding edema and associated mass effect with approximately 5 mm left to right midline shift.     9/8/2023: Gamma knife to the new anteromedial right frontal lobe lesion likely mets.  22 Gy single fraction.     Patient is doing reasonably well since his last gamma knife.  He is in good spirits and denies any new onset neurological symptoms.  He is scheduled to follow with his medical oncologist in December 2023.    He is not on any systemic treatment at this point.      He is scheduled for MRI brain with and without contrast on 12/7/2023.    PHYSICAL EXAM:   Constitutional: /75   Pulse 60   SpO2 98%      Mental Status: Mental Status: A & O in no acute distress.  Affect is appropriate.  Speech is fluent.  Recent and remote memory are intact.  Attention span and concentration are normal.     Motor: Generalized loss of muscle mass is present.  Right leg proximal muscles 5 /5, distal 4+ by 5     Sensory: Sensation decreased to touch in right lower extremity     Coordination:   Heel/toe/ gait intact.  Intact tandem gait      Reflexes; supinator, biceps, triceps, knee/ ankle jerk intact.  No hoffmans/   no babinski/ clonus.     Incision healed well.    IMAGING:   I personally reviewed all radiographic images and agree with the neuroradiology report.     9/8/2023 MRI brain with and without  contrast:  IMPRESSION:  1.  Stable postoperative changes related to left parietal and left  frontal tumoral resections.  2.  Persistent enhancement of the margins surrounding the left  occipital lobe resection cavity, which is increased in conspicuity  along the posterior inferior margin.  3.  Interval growth of an enhancing right parafalcine nodule,  consistent with worsening metastatic disease.     CONSULTATION ASSESSMENT AND PLAN:    Saeid Santa comes today in f/u 3 months s/p 7/14/2023 Stealth assisted left parieto-occipital craniotomy with interhemispheric approach and resection of falcine mass.     He has a known diagnosis of metastatic melanoma s/p stealth assisted awake craniotomy with resection of motor/sensory strip lesion  on 4/21/23, underwent GK to resection cavity and parieto-occipital lesion on 5/24/23 presented to OSH with worsening confusion and headache and imaging evidence of increased size and density of the lesion in the left occipital lobe, worrisome for interval hemorrhage, Increased surrounding edema and associated mass effect with approximately 5 mm left to right midline shift.     9/8/2023: Gamma knife to the new anteromedial right frontal lobe lesion likely mets.  22 Gy single fraction.     He is doing reasonably well since his last gamma knife and denies any new onset neurological symptoms.  On clinical examination he has no focal neurological deficit except for mild dorsiflexion weakness on the right side which has improved significantly since the surgery.  Patient denied any new onset seizures.    He is scheduled to have follow-up MRI brain with and without contrast on December 7, 2023.  He can follow-up with Dr. Rose from radiation oncology for further follow-ups.  He can follow-up with me on as-needed basis.  Patient agreed with the plan.  All the questions were answered and patient sounded understanding.  He can contact us if there are any further questions or concerns or  worsening neurological deficits.    I spent more than 20 minutes in this apt, examining the pt, reviewing the scans, reviewing notes from chart, discussing treatment options with risks and benefits and coordinating care. This note was created in part by the use of Dragon voice recognition system. Inadvertent grammatical errors and typographical errors may still exist.        Kye Garrido MD      CC:     Rock Grajeda  [address] [facility] [address]

## 2023-11-07 NOTE — PROGRESS NOTES
"SUBJECTIVE:   Tim is a 68 year old who presents for Preventive Visit.  Patient is a 68-year-old here for his annual preventive visit.  His past medical history significant for malignant metastatic melanoma, history of prostrate cancer, hypertension.  He had left temporal occipital craniotomy in spring of this year and has been following with oncology for his cancer.      Presently he treatment is on hold but he had about 12 weeks of prednisone which he has since completed.  He has a follow-up on 7 December for an MRI of the brain.      He reports that he still experiences weakness from time to time but he feels like his strength is slowly coming back.  He also struggles with neuropathy in both extremities and weakness in his legs.      His appetite has been good according to him.      Overall he feels he appears to be doing okay.  Medications do not need refills today.  Patient is requesting lab work.  Vaccinations were updated.  Preventive health was discussed. Colonoscopy was done in the last 3 years.  Vaccines were reviewed and updated.  No other complaints today.          11/7/2023     8:11 AM   Additional Questions   Roomed by Erin Ram CMA       Are you in the first 12 months of your Medicare coverage?  No    Healthy Habits:     In general, how would you rate your overall health?  Fair    Frequency of exercise:  4-5 days/week    Duration of exercise:  15-30 minutes    Do you usually eat at least 4 servings of fruit and vegetables a day, include whole grains    & fiber and avoid regularly eating high fat or \"junk\" foods?  No    Taking medications regularly:  Yes    Medication side effects:  Not applicable    Ability to successfully perform activities of daily living:  Transportation requires assistance    Home Safety:  No safety concerns identified    Hearing Impairment:  No hearing concerns    In the past 6 months, have you been bothered by leaking of urine?  No    In general, how would you rate your " overall mental or emotional health?  Good    Additional concerns today:  Yes      Today's PHQ-2 Score:       2023     2:52 PM   PHQ-2 (  [employer])   Q1: Little interest or pleasure in doing things 0   Q2: Feeling down, depressed or hopeless 0   PHQ-2 Score 0   Q1: Little interest or pleasure in doing things Not at all   Q2: Feeling down, depressed or hopeless Not at all   PHQ-2 Score 0           Have you ever done Advance Care Planning? (For example, a Health Directive, POLST, or a discussion with a medical provider or your loved ones about your wishes): Yes, patient states has an Advance Care Planning document and will bring a copy to the clinic.      Fall risk  Fallen 2 or more times in the past year?: Yes  Any fall with injury in the past year?: No    Cognitive Screening   1) Repeat 3 items (Leader, Season, Table)    2) Clock draw: NORMAL  3) 3 item recall: Recalls 3 objects  Results: 3 items recalled: COGNITIVE IMPAIRMENT LESS LIKELY    Mini-CogTM Copyright YVONNE Greenberg. Licensed by the author for use in [facility]; reprinted with permission (stanislaw@[facility]). All rights reserved.      Do you have sleep apnea, excessive snoring or daytime drowsiness? : Years ago for sleep apnea.  This is not as bad any longer.  Not using a CPAP machine.  Daytime drowsiness related to his cancer.     Reviewed and updated as needed this visit by clinical staff   Tobacco  Allergies  Meds              Reviewed and updated as needed this visit by Provider     Meds             Social History     Tobacco Use    Smoking status: Former     Packs/day: 0.50     Years: 20.00     Additional pack years: 0.00     Total pack years: 10.00     Types: Cigarettes     Quit date: 2017     Years since quittin.8    Smokeless tobacco: Never   Substance Use Topics    Alcohol use: Not Currently     Comment: socially             2023     2:58 PM   Alcohol Use   Prescreen: >3 drinks/day or >7 drinks/week? No     Do you have a  current opioid prescription? No  Do you use any other controlled substances or medications that are not prescribed by a provider? None      Chief Complaint   Patient presents with    Physical     Wellness physical.    Imm/Inj     Discuss if he is due for a Prevnar 20.               Current providers sharing in care for this patient include:   Patient Care Team:  Rock Grajeda MD as PCP - General (Family Medicine)  Kye Garrido MD as Assigned Neuroscience Provider  Lisa Beach MD as MD (Radiation Oncology)  Abdullahi Mcmahon MD as MD (Hematology & Oncology)  Rock Grajeda MD as Assigned PCP  Jenny Lemon RN as Specialty Care Coordinator (Hematology & Oncology)  Benedicto Rose MD as Assigned Cancer Care Provider    The following health maintenance items are reviewed in Epic and correct as of today:  Health Maintenance   Topic Date Due    HEPATITIS C SCREENING  Never done    MEDICARE ANNUAL WELLNESS VISIT  11/29/2023    COVID-19 Vaccine (6 - 2023-24 season) 12/08/2023    ANNUAL REVIEW OF HM ORDERS  08/09/2024    LUNG CANCER SCREENING  08/31/2024    FALL RISK ASSESSMENT  11/07/2024    LIPID  11/29/2027    ADVANCE CARE PLANNING  11/07/2028    COLORECTAL CANCER SCREENING  10/13/2030    DTAP/TDAP/TD IMMUNIZATION (3 - Td or Tdap) 10/24/2030    PHQ-2 (once per calendar year)  Completed    INFLUENZA VACCINE  Completed    Pneumococcal Vaccine: 65+ Years  Completed    ZOSTER IMMUNIZATION  Completed    RSV VACCINE (Pregnancy & 60+)  Completed    AORTIC ANEURYSM SCREENING (SYSTEM ASSIGNED)  Completed    IPV IMMUNIZATION  Aged Out    HPV IMMUNIZATION  Aged Out    MENINGITIS IMMUNIZATION  Aged Out    RSV MONOCLONAL ANTIBODY  Aged Out     Lab work is in process  Labs reviewed in EPIC  BP Readings from Last 3 Encounters:   11/07/23 126/86   10/16/23 120/75   09/08/23 112/68    Wt Readings from Last 3 Encounters:   11/07/23 91.2 kg (201 lb)   09/07/23 83.2 kg (183 lb 6.4 oz)   08/31/23 81.6 kg  (180 lb)                  Patient Active Problem List   Diagnosis    Brain tumor (H)    Seizure disorder (H)    Scalp cyst    Primary hypertension    JUSTEN (obstructive sleep apnea)    Malignant neoplasm metastatic to brain (H)    History of prostate cancer    Malignant melanoma of scalp (H)    Right hemiparesis (H)    Cerebral edema (H)    Metastatic malignant melanoma (H)    Neoplasm of brain causing mass effect and brain compression on adjacent structures (H)     Past Surgical History:   Procedure Laterality Date    INSERT DRAIN LUMBAR N/A 2023    Procedure: LUMBAR DRAIN;  Surgeon: Kye Garrido MD;  Location: [facility] OR    OPTICAL TRACKING SYSTEM CRANIOTOMY, EXCISE TUMOR, COMBINED Left 2023    Procedure: stealth assisted awake craniotomy resection of motor strip tumor;  Surgeon: Kye Garrido MD;  Location: [facility] OR    OPTICAL TRACKING SYSTEM CRANIOTOMY, EXCISE TUMOR, COMBINED Left 2023    Procedure: STEALTH ASSISTED CRANIOTOMY, WITH NEOPLASM EXCISION LEFT PARIETO-OCCIPITAL CRANIOTOMY WITH RESECTION OF MASS,;  Surgeon: Kye Garrido MD;  Location: [facility] OR    PROSTATECTOMY      2009       Social History     Tobacco Use    Smoking status: Former     Packs/day: 0.50     Years: 20.00     Additional pack years: 0.00     Total pack years: 10.00     Types: Cigarettes     Quit date: 2017     Years since quittin.8    Smokeless tobacco: Never   Substance Use Topics    Alcohol use: Not Currently     Comment: socially     Family History   Problem Relation Age of Onset    Pancreatic Cancer Father     Diabetes Paternal Grandmother          Current Outpatient Medications   Medication Sig Dispense Refill    acetaminophen (TYLENOL) 325 MG tablet Take 2 tablets (650 mg) by mouth every 4 hours as needed for other (For optimal non-opioid multimodal pain management to improve pain control.) 15 tablet 0    amLODIPine (NORVASC) 10 MG tablet Take 1 tablet (10 mg) by mouth every morning 90 tablet 3    levETIRAcetam  "(KEPPRA) 1000 MG tablet 1 tablet Orally every 12 hrs 180 tablet 3     No Known Allergies          Review of Systems   Constitutional:  Negative for chills and fever.   HENT:  Negative for congestion, ear pain, hearing loss and sore throat.    Eyes:  Positive for visual disturbance. Negative for pain.   Respiratory:  Negative for cough and shortness of breath.    Cardiovascular:  Positive for peripheral edema. Negative for chest pain and palpitations.   Gastrointestinal:  Negative for abdominal pain, constipation, diarrhea, heartburn, hematochezia and nausea.   Genitourinary:  Negative for dysuria, frequency, genital sores, hematuria, impotence, penile discharge and urgency.   Musculoskeletal:  Negative for arthralgias, joint swelling and myalgias.   Skin:  Negative for rash.   Neurological:  Positive for weakness and headaches. Negative for dizziness and paresthesias.   Psychiatric/Behavioral:  Negative for mood changes. The patient is not nervous/anxious.          OBJECTIVE:   /86 (BP Location: Right arm, Patient Position: Chair, Cuff Size: Adult Regular)   Pulse 96   Temp 97.2  F (36.2  C) (Tympanic)   Resp 24   Ht 1.848 m (6' 0.75\")   Wt 91.2 kg (201 lb)   SpO2 99%   BMI 26.70 kg/m   Estimated body mass index is 26.7 kg/m  as calculated from the following:    Height as of this encounter: 1.848 m (6' 0.75\").    Weight as of this encounter: 91.2 kg (201 lb).  Physical Exam  GENERAL: healthy, alert and no distress  EYES: Eyes grossly normal to inspection, PERRL and conjunctivae and sclerae normal  HENT: ear canals and TM's normal, nose and mouth without ulcers or lesions  NECK: no adenopathy, no asymmetry, masses, or scars and thyroid normal to palpation  RESP: lungs clear to auscultation - no rales, rhonchi or wheezes  CV: regular rate and rhythm, normal S1 S2, no S3 or S4, no murmur, click or rub, no peripheral edema and peripheral pulses strong  ABDOMEN: soft, nontender, no hepatosplenomegaly, no " "masses and bowel sounds normal  MS: no gross musculoskeletal defects noted, no edema  SKIN: no suspicious lesions or rashes  PSYCH: mentation appears normal, affect normal/bright    Diagnostic Test Results:  Pending     ASSESSMENT / PLAN:   Tim was seen today for physical and imm/inj.    Diagnoses and all orders for this visit:    Encounter for Medicare annual wellness exam    Hyperlipidemia LDL goal <100  -     Lipid panel reflex to direct LDL Fasting  -     OFFICE/OUTPT VISIT,EST,LEVL III  -     Cholesterol labs ordered. Patient will be notified of results.     History of prostate cancer  -     PSA, screen  -     OFFICE/OUTPT VISIT,EST,LEVL III  -     PSA ordered. Patient will be notified of results.     Encounter for immunization  -     PNEUMOCOCCAL 20 VALENT CONJUGATE (PREVNAR 20)    Metastatic malignant melanoma (H)  -     OFFICE/OUTPT VISIT,EST,LEVL III  -      Follows with oncology.    Other orders  -     PRIMARY CARE FOLLOW-UP SCHEDULING; Future        Patient has been advised of split billing requirements and indicates understanding: Yes      COUNSELING:  Reviewed preventive health counseling, as reflected in patient instructions       Regular exercise       Healthy diet/nutrition       Vision screening      BMI:   Estimated body mass index is 26.7 kg/m  as calculated from the following:    Height as of this encounter: 1.848 m (6' 0.75\").    Weight as of this encounter: 91.2 kg (201 lb).         He reports that he quit smoking about 6 years ago. His smoking use included cigarettes. He has a 10.00 pack-year smoking history. He has never used smokeless tobacco.      Appropriate preventive services were discussed with this patient, including applicable screening as appropriate for fall prevention, nutrition, physical activity, Tobacco-use cessation, weight loss and cognition.  Checklist reviewing preventive services available has been given to the patient.    Reviewed patients plan of care and provided an AVS. " The Basic Care Plan (routine screening as documented in Health Maintenance) for Saeid meets the Care Plan requirement. This Care Plan has been established and reviewed with the Patient.          Rock Grajeda MD  [facility]    Identified Health Risks:  I have reviewed Opioid Use Disorder and Substance Use Disorder risk factors and made any needed referrals.

## 2023-11-07 NOTE — NURSING NOTE
Prior to immunization administration, verified patients identity using patient s name and date of birth. Please see Immunization Activity for additional information.     Screening Questionnaire for Adult Immunization    Are you sick today?   No   Do you have allergies to medications, food, a vaccine component or latex?   No   Have you ever had a serious reaction after receiving a vaccination?   No   Do you have a long-term health problem with heart, lung, kidney, or metabolic disease (e.g., diabetes), asthma, a blood disorder, no spleen, complement component deficiency, a cochlear implant, or a spinal fluid leak?  Are you on long-term aspirin therapy?   No   Do you have cancer, leukemia, HIV/AIDS, or any other immune system problem?   Yes   Do you have a parent, brother, or sister with an immune system problem?   No   In the past 3 months, have you taken medications that affect  your immune system, such as prednisone, other steroids, or anticancer drugs; drugs for the treatment of rheumatoid arthritis, Crohn s disease, or psoriasis; or have you had radiation treatments?   Yes   Have you had a seizure, or a brain or other nervous system problem?   Yes   During the past year, have you received a transfusion of blood or blood    products, or been given immune (gamma) globulin or antiviral drug?   No   For women: Are you pregnant or is there a chance you could become       pregnant during the next month?   No   Have you received any vaccinations in the past 4 weeks?   Yes     Immunization questionnaire answers were Yes and No.  He is not getting a live virus vaccine today so this is fine to give per the directions on the back of the injection sheet.      Patient instructed to remain in clinic for 15 minutes afterwards, and to report any adverse reactions.     Screening performed by Erin Ram CMA on 11/7/2023 at 8:51 AM.

## 2023-11-07 NOTE — PATIENT INSTRUCTIONS
Patient Education   Personalized Prevention Plan  You are due for the preventive services outlined below.  Your care team is available to assist you in scheduling these services.  If you have already completed any of these items, please share that information with your care team to update in your medical record.  Health Maintenance Due   Topic Date Due     Discuss Advance Care Planning  Never done     Hepatitis C Screening  Never done     RSV VACCINE (Pregnancy & 60+) (1 - 1-dose 60+ series) Never done     Pneumococcal Vaccine (2 - PPSV23 or PCV20) 08/12/2019     Annual Wellness Visit  11/29/2023

## 2023-11-08 NOTE — RESULT ENCOUNTER NOTE
Please inform patient that test result showed extremely high cholesterol  Recommend patient take cholesterol medication as the levels are dangerously high. Faxed medication to patient's pharmacy. Labs ordered to be rechecked in three months.   Thank you.     Rock Grajeda M.D.

## 2023-11-17 NOTE — LETTER
11/17/2023         RE: Saeid Santa  [address] [facility] [address]        Dear Colleague,    Thank you for referring your patient, Saeid Santa, to the [facility]. Please see a copy of my visit note below.    [facility]   Return Patient Visit    Name: Saeid Santa  MRN: [id number]  Date of visit: Nov 17, 2023    Diagnosis: Metastatic melanoma  Stage:   Cancer Staging   No matching staging information was found for the patient.      Molecular: BRAF G466A, TMB 48.7554 mut/Mb    Performance status: ECOG 0    Oncology History:  --8/16/22, he brings a left parietal scalp lesion to attention of his GP. It is initially observed.  --November 2022, the left parietal scalp lesion was felt to be a cyst, and the left parietal lesion was lanced and drained via scalp incision.  --January 2023, the left parietal scalp cyst would occasionally break open, drain, and bleed, largely at night.   --February 2023, he notes some mild difficulty clearing obstacles with right leg and foot.  --March/April 2023, he has 3 progressively worsening episodes over a span 3 weeks including the right leg. At first he notes the onset of right leg numbness and heaviness, like he was carrying 50 lbs of water on the leg. Then, about a week or so later, the leg began to jerk and converse while he was in the basement. Neither episode associated with loss of consciousness  --4/15/23, he recalls the right leg numbness, heaviness, jerking while driving between work. He had been found by coworkers in the parking lot, sitting in the grass, appearing confused. He was brought to the ER by EMS. CT-CAP, showed small (<6mm), bilateral indeterminate pulmonary nodules. CT-head and MRI brain showed 2 discrete ring-enhancing lesions in the left paramedian posterior frontal lobe near the precentral gyrus measuring approximately 2.2 x 1.7 x 1.9 cm and left occipital lobe near the lingual gyrus  measuring approximately 2.2 x 2.1 x 2.0 cm. The occipital lesion encroaches upon the subependymal surface of the left lateral ventricle occipital horn and contacts the superior surface of the medial left tentorium. The lesions are accompanied by moderate surrounding vasogenic edema. Surgery was recommended.  --4/21/23, underwent left awake (sleep-awake-sleep) craniotomy for tumor resection, he has resection of the left frontal, motor strip tumor as well asan elliptical incision of the left parietal scalp lesion. On pathology, both the scalp excision and brain mass are positive for malignant melanoma. Tumor cells are positive for S-100 and Melan-A, and negative for cytokeratin AE1/AE3, NKX3.1, GFAP, ERG, CD3, and CD20.  CD3 highlights brisk infiltration of the tumor by T lymphocytes, while CD20 highlights scattered positive B lymphocytes. NGS identifies a BRAF G466A mutation, a class 3 mutation insensitive to BRAF kinase inhibitor monotherapy. TMB is high at 48.754 muts/Mb.   --4/22/23, post-operative MRI shows post-operative changes of the parietal craniotomy of the left frontal lobe metastasis. The other 1.7 x 1.1 cm cystic lesion in the left occipital lobe is also seen. No new lesions appreciated.  -- 4/25/23 to 5/9/23, discharged from the hospital to acute rehab stay at [facility] in [place]. He participated in a comprehensive rehabilitation program consisting of PT, OT, Speech, Psychology and Recreation Therapy. He did well and was discharged home with use of a walker.  --PET-CT on 5/18/23 with potentially involved neck lymph nodes. He has mild FDG uptake in soft tissue nodules in the scalp, in the known left occipital brain metastasis, as well as in the left lower neck region.  -5/24-/5/31/23, He was treated with gamma knife: 2500 cGy to the left resection cavity; 3000 cGy to the L parietal-occipital lesion.  -6/7/2023, Cycle 1 of pembrolizumab 200mg every 3 weeks  -6/14/23-6/16/23, admitted for vision change,  reduced comprehension, speech change, fall and headache. MRI showing likely progression of left occipital mass with associated edema. Improved on steroids and discharged on oral decadron. Plan for repeat MRI and neurosurgery follow up in 1 month.  -7/7/23 ED visit for headaches, confusion, vision changes, concern for hemorrhage of the left occipital mass, admitted to [facility] until 7/9/23, increased dexamethasone dose  -7/14/23 craniotomy of the left parieto-occipital mass, discharged on a dexamethasone taper  -8/11/2023, Cycle 2 of pembrolizumab  -8/23/2023 brain MRI with no recurrence in left frontal lobe, decreased enhancement at edges of left occipital resection cavity, new enhancement int he right front lobe concerning for new met--seen by neurosurgery and considering RT  -8/31/2023 CT/PET with increased uptake in left lower neck lymph node c/f early progression. Ground-glass, reticular changes in lungs also noted likely secondary to drug-induced pneumonitis.       Interval History:    8/31/23 PET/CT:  1. Slightly increased uptake in the left lower neck lymph node, which may represent early progression of disease, however given recent initiation of immunotherapy, pseudoprogression is also possible. No new lesions identified.  2. Interval development of FDG avid groundglass and reticular change in the lungs, likely inflammatory/sequelae of drug-induced pneumonitis.  3. Near-complete resolution of uptake in the medial aspect of the left occipital lesion, however overall intracranial findings are better characterized on prior brain MRI.    Started steroids 1 mg/kg prednisone (80 mg daily) with 8 week taper    9/8/23 GK to R frontal lobe lesion    Next brain MR scheduled for 12/7    Never had a cough or shortness of breath  No exertional dyspnea  Now off steroids  Had some increased LE swelling and glucose was poorly controlled, poor sleep    Three weeks ago or so after steroids were stopped, felt more generalized  "fatigue  Eating well  Not losing weight  No new skin lesions of concern  No new masses in the neck    Past couple of months have been \"fine\"    No TSH since August      Exam:   There were no vitals taken for this visit.  Limited exam given virtual visit    Gen: Alert, interactive, NAD  HEENT: Pupils equal  Resp: Comfortable on room air      Labs:   Reviewed in chart. Key findings from labs performed 9/7 include normal renal function, mildly elevated WBC at 12.3, hgb 12, plts 314    Imaging:   All pertinent imaging studies personally reviewed, esteves findings included in oncology history above      Pathology:   4/21/23 L frontal lobe tumor:  Final Diagnosis   A-B. Brain, left frontal brain tumor, biopsy and resection:     - Malignant melanoma. See comment.     C. Skin, left parietal scalp, excision:     - Malignant melanoma. See comment.     - Tumor measures 10 mm in greatest dimension on histologic slides.     - Deep and lateral margins are involved by tumor.     Both the scalp excision and brain mass are positive for malignant melanoma, though neither lesion represents a definitive primary melanoma. Clinical evaluation to exclude a different potential primary site is recommended.     The scalp lesion appears histologically as a well-circumscribed nodule based in the dermis with epidermotropism and in the absence of an epidermal melanoma in situ, suggesting the possibility that this represents a cutaneous metastasis rather than a true primary invasive melanoma of the scalp. The brain mass is favored to represent a metastatic rather than primary melanoma, as multiple discrete intracranial lesions are present on brain MRI and primary intracranial melanoma is rare. NGS study is pending and the results will be reported in an addendum.    Microscopic Description    Parts A and B: Histologic sections show a high-grade neoplasm with tumor cells showing significant variation in nuclear size and shape.  Prominent nucleoli are " present in numerous tumor cells.  There is no cytoplasmic pigment within tumor cells.  The tumor is associated with significant hemorrhage, and it shows a pushing border with the adjacent nonneoplastic brain parenchyma.  By immunohistochemistry, tumor cells are positive for S-100 and Melan-A, while they are negative for cytokeratin AE1/AE3, NKX3.1, GFAP, ERG, CD3, and CD20.  CD3 highlights brisk infiltration of the tumor by T lymphocytes, while CD20 highlights scattered positive B lymphocytes.  All immunohistochemistry is performed on block B2 with appropriate controls.  These findings support the intraoperative impression.     Part C: Histologic sections show a dermal nodule of malignant tumor cells with severe cytologic atypia, prominent nucleoli, and at least a portion of tumor which show spindle cell morphology.  There are more dispersed tumor cells outside of the main body of this nodule superficially.  There is epidermal hyperplasia of the overlying epidermis with mild melanocytic hyperplasia without significant atypia.  There is disruption between a portion of hyperplastic epidermis and the tumor nodule in block C3, but no connection of the tumor nodule to the epidermis or epidermal melanoma in situ is identified after evaluation of multiple deeper levels.  By immunohistochemistry, tumor cells are positive for S100 and Melan-A, while they are negative for CD34, ERG, cytokeratin AE1/AE3, NKX3.1, and CD20.         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 Started pembrolizumab 6/7/23. Underwent second craniotomy for L parieto-occipital lesion resection in 7/14/23 2/2 hemorrhagic conversion and GK to R frontal lesion 9/8/23, on pembrolizumab, course  c/b ICI pneumonitis necessitating steroids.       # metastatic melanoma with metastatic disease to brain  # checkpoint inhibitor pneumonitis  -presented with de-francisca CNS involvement s/p craniotomy x2 and GK to additional R frontal lesion  -TMB high, BRAF mutated but not V600E  -s/p pembrolizumab x2 (first 6/7/23)  -most recent PET/CT showed bilateral FDG avid ground glass and reticular infiltrates favored to represent pneumonitis, started on steroids, now completed taper  -of note, denies any history of pulmonary symptoms at the time of pneumonitis diagnosis c/w grade 1-2 disease    Plan  -repeat CT neck/chest/AP in December  -if significantly improved pneumonitis, could consider restarting pembrolizumab with caution      # fatigue  -check TSH  -if persistent and normal TSH, will consider broader workup for ICI toxicity        Carlos Manuel Bunn MD PhD   of Medicine  Division of Hematology, Oncology and Transplantation    ---  50 minutes were spent on the date of the encounter performing chart review, history and exam, documentation, and further activities as noted above.         Virtual Visit Details    Type of service:  Video Visit   Video Start Time: 10:32 AM  Video End Time: 11:15 AM    Originating Location (pt. Location): Home    Distant Location (provider location):  On-site  Platform used for Video Visit: Mariya

## 2023-11-17 NOTE — PROGRESS NOTES
Virtual Visit Details    Type of service:  Video Visit   Video Start Time: 10:32 AM  Video End Time: 11:15 AM    Originating Location (pt. Location): Home    Distant Location (provider location):  On-site  Platform used for Video Visit: Mariya

## 2023-11-17 NOTE — PROGRESS NOTES
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 He is s/p resection of a left motor strip lesion and left parietal scalp lesion via left sided awake crani with Dr. Garrido on 4/21/23.  Repeat MRI performed with concern     Clinically Significant Risk Factors Present on Admission              Plan:  Appreciate medical oncology and radiation oncology consults.    After discussion regarding operative timing with medical oncology in the context of his other anti-cancer therapies, no neurosurgical intervention at this time  Steroids per Oncology   Please continue patient's PTA keppra (ordered for you).  Ok for diet per Neurosurgery perspective.     We will schedule patient for follow up appointment with Dr. Garrido in 3-4 weeks with a repeat MRI at this time.        Kellen Alvares PA-C  Neurosurgery Department  Pager: [phone]      I have spent a total of 25 minutes total time counseling, coordination, and chart review, over 50% of the time was spent in direct patient care.       Interval History:  No acute events overnight. Steroids initiated yesterday per Oncology. Patient reports he is feeling well with improvement of his symptoms of weakness and blurry vision. After discussion of timing for surgery with Oncology, we will plan to monitor the lesion and will schedule follow up with Dr. Garrido in 3-4 weeks with repeat MRI.          Objective:   Temp:  [97.5  F (36.4  C)-98.5  F (36.9  C)] 97.7  F (36.5  C)  Pulse:  [] 59  Resp:  [16-18]  Progress Note     Patient: Gunner Mccarty               Sex: male           MRN: [id number]       YOB: 1951      Age:  71 year old               Subjective:  Overnight pt had spiking fevers, has become hypotensive, was desaturating, and was having increased work of breathing. Pt placed on venturi mask for comfort, abx coverage broadened. Pt has soft restraints on both wrists due to agitation. Dressings changed at bedside.     Objective:  Blood pressure 127/74, pulse 80, temperature (!) 101.7 °F (38.7 °C), temperature source Oral, resp. rate (!) 22, height 5' 7\" (1.702 m), weight 60.5 kg (133 lb 6.1 oz), SpO2 94 %.    Intake/Output     Intake/Output Summary (Last 24 hours) at 11/17/2023 0908  Last data filed at 11/17/2023 0800  Gross per 24 hour   Intake 1500 ml   Output 1550 ml   Net -50 ml     Intake/Output last 3 shifts:  I/O last 3 completed shifts:  In: 1800 [NG/GT:1800]  Out: 1550 [Urine:1550]    Physical Exam:  General: Alert, not oriented  HEENT: R side of head incision secure with staples, incision c/d/i. Dobhoff in and secured with bridle  CV: Palpable Femoral B/L, LLE AKA, no stump hematoma, no strikethrough  Abdomen soft, nondistended  Ext: Lt LE AKA, soft restraint on right wrist    Lab/Data Reviewed:  Recent Results (from the past 24 hour(s))   Blood Culture    Collection Time: 11/16/23  3:57 PM    Specimen: Blood, Peripheral   Result Value Ref Range    Culture, Blood or Bone Marrow No Growth <24 hours    Basic Metabolic Panel    Collection Time: 11/16/23  3:58 PM   Result Value Ref Range    Fasting Status      Sodium 146 (H) 135 - 145 mmol/L    Potassium 3.9 3.4 - 5.1 mmol/L    Chloride 113 (H) 97 - 110 mmol/L    Carbon Dioxide 29 21 - 32 mmol/L    Anion Gap 8 7 - 19 mmol/L    Glucose 154 (H) 70 - 99 mg/dL    BUN 30 (H) 6 - 20 mg/dL    Creatinine 0.73 0.67 - 1.17 mg/dL    Glomerular Filtration Rate >90 >=60    BUN/Cr 41 (H) 7 - 25    Calcium 8.6 8.4 - 10.2 mg/dL   Blood Culture     16  BP: ()/(67-88) 90/67  SpO2:  [96 %-98 %] 97 %  No intake/output data recorded.    NEUROLOGIC:  -- Awake; Alert; oriented x 3  -- Follows commands briskly  -- +repetition, calculation, and naming  -- Speech fluent and spontaneous. No aphasia or dysarthria.  -- no gaze preference. No apparent hemineglect.  Cranial Nerves:  -- narrowed peripheral vision on R- improved, PERRL 3-2mm bilat and brisk, extraocular movements intact  -- face symmetrical, tongue midline  -- sensory V1-V3 intact bilaterally  -- palate elevates symmetrically, uvula midline  -- hearing grossly intact bilat  -- Trapezii 5/5 strength bilat symmetric     Motor:  Normal bulk / tone; no tremor, rigidity, or bradykinesia.  No muscle wasting or fasciculations  No Pronator Drift       Delt Bi Tri Hand Flexion/  Extension Iliopsoas Quadriceps Hamstrings Tibialis Anterior Gastroc     C5 C6 C7 C8/T1 L2 L3 L4-S1 L4 S1   R 5 5 5 5 4 5 5 4 5   L 5 5 5 5 5 5 5 5 5   Sensory:  intact to LT x 4 extremities, decreased in RLE relative to LLE          LABS  Recent Labs   Lab Test 06/16/23  0437 06/15/23  1107 06/14/23  1220   WBC 14.0* 8.9 9.0   HGB 12.4* 13.8 13.0*   MCV 90 92 95    245 218       Recent Labs   Lab Test 06/16/23  0437 06/15/23  1107 06/14/23  1220    136 139   POTASSIUM 4.2 4.0 3.9   CHLORIDE 109* 102 105   CO2 19* 21* 22   BUN 21.6 17.1 13.9   CR 0.86 0.84 0.99   ANIONGAP 12 13 12   JOHN PAUL 9.2 9.5 8.9   * 175* 77       IMAGING    Recent Results (from the past 24 hour(s))   CT Head w/o Contrast    Narrative    CT HEAD W/O CONTRAST 6/14/2023 1:16 PM    History: fall confusion     Comparison: MRI 5/19/2023, 4/22/2023    Technique: Using multidetector thin collimation helical acquisition  technique, axial, coronal and sagittal CT images from the skull base  to the vertex were obtained without intravenous contrast.   (topogram) image(s) also obtained and reviewed.    Findings:   No acute traumatic abnormality.    Surgical  Collection Time: 11/16/23  3:58 PM    Specimen: Blood, Peripheral   Result Value Ref Range    Culture, Blood or Bone Marrow No Growth <24 hours    GLUCOSE, BEDSIDE - POINT OF CARE    Collection Time: 11/16/23  5:12 PM   Result Value Ref Range    GLUCOSE, BEDSIDE - POINT OF CARE 154 (H) 70 - 99 mg/dL   GLUCOSE, BEDSIDE - POINT OF CARE    Collection Time: 11/17/23 12:43 AM   Result Value Ref Range    GLUCOSE, BEDSIDE - POINT OF CARE 181 (H) 70 - 99 mg/dL   Staphylococcus aureus Methicillin Resistant (MRSA) PCR    Collection Time: 11/17/23  2:24 AM    Specimen: Nares; Swab   Result Value Ref Range    MRSA PCR Not Detected Not Detected   Basic Metabolic Panel    Collection Time: 11/17/23  4:50 AM   Result Value Ref Range    Fasting Status      Sodium 150 (H) 135 - 145 mmol/L    Potassium 4.4 3.4 - 5.1 mmol/L    Chloride 115 (H) 97 - 110 mmol/L    Carbon Dioxide 27 21 - 32 mmol/L    Anion Gap 12 7 - 19 mmol/L    Glucose 121 (H) 70 - 99 mg/dL    BUN 35 (H) 6 - 20 mg/dL    Creatinine 0.92 0.67 - 1.17 mg/dL    Glomerular Filtration Rate 88 >=60    BUN/Cr 38 (H) 7 - 25    Calcium 8.4 8.4 - 10.2 mg/dL   CBC No Differential    Collection Time: 11/17/23  4:50 AM   Result Value Ref Range    WBC 17.6 (H) 4.2 - 11.0 K/mcL    RBC 2.55 (L) 4.50 - 5.90 mil/mcL    HGB 8.2 (L) 13.0 - 17.0 g/dL    HCT 25.7 (L) 39.0 - 51.0 %    .8 (H) 78.0 - 100.0 fl    MCH 32.2 26.0 - 34.0 pg    MCHC 31.9 (L) 32.0 - 36.5 g/dL     140 - 450 K/mcL    RDW-CV 13.7 11.0 - 15.0 %    RDW-SD 51.6 (H) 39.0 - 50.0 fL    NRBC 0 <=0 /100 WBC   GLUCOSE, BEDSIDE - POINT OF CARE    Collection Time: 11/17/23  6:24 AM   Result Value Ref Range    GLUCOSE, BEDSIDE - POINT OF CARE 120 (H) 70 - 99 mg/dL         Other Studies     XR CHEST AP OR PA   Final Result   FINDINGS AND IMPRESSION:      Enteric tube in the stomach. Heart size stable. Elongated aorta. Patchy   perihilar and basilar reticular nodular opacities greater on the left than   the right likely  atelectasis and infiltrate at the left lung base.   Follow-up advised. No pleural effusion or pneumothorax.      Electronically Signed by: ZULAY ZAFAR M.D.    Signed on: 11/17/2023 5:26 AM    Workstation ID: [id number]      XR CHEST AP OR PA   Final Result   FINDINGS/IMPRESSION:      Tip of Dobbhoff not included in field-of-view.      Coarse interstitial markings and scattered ill-defined opacities left mid   and lower lung field and at right lung base. Improvement on the left but   worsening on the right when compared to prior study.       Electronically Signed by: ELIO TYLER M.D.    Signed on: 11/16/2023 11:37 AM    Workstation ID: [id number]      CT HEAD WO CONTRAST   Final Result   No significant interval change.         Dictated by: CATIA ROSE DO   Dictated on: 11/16/2023 6:02 AM          I, DARRYL POSADAS MD, have reviewed the images and report and concur with   these findings interpreted by CATIA ROSE DO.      Electronically Signed by: DARRYL POSADAS MD    Signed on: 11/16/2023 8:33 AM    Workstation ID: [id number]      XR ABDOMEN 1 VIEW   Final Result   FINDINGS AND IMPRESSION:      Feeding tube traverses the rest of into the abdomen and projects in the   gastric antrum region. Reticular opacities lung bases greater on the left   than the right.               Electronically Signed by: ZULAY ZAFAR M.D.    Signed on: 11/15/2023 7:44 AM    Workstation ID: [id number]      CT HEAD WO CONTRAST   Final Result      Stable postsurgical changes consistent with evacuation right temporal lobe   hematoma. Right lateral ventricle remains partially effaced without   significant midline shift to the left.         Dictated by: CORBY COREA   Dictated on: 11/13/2023 1:20 PM          IEARLINE M.D., have reviewed the images and report and concur with   these findings interpreted by CORBY COREA.      Electronically Signed by: EARLINE KABA M.D.    Signed on: 11/13/2023 1:58 PM    Workstation  changes of left parietal craniotomy for resection of left  parietal mass. Significantly increased size of 2 adjacent cystic  masses centered in the left occipital lobe. Superior cystic lesion  measures 1.6 x 1.3 x 1.4 cm, previously 1.0 x 1.2 x 0.6 cm. Inferior  cystic and solid lesion measures 3.3 x 3.1 x 2.9 cm, previously 2.7 x  2.8 x 2.6 cm. Significantly increased extent of confluent edema  surrounding these lesions and extending into the adjacent left  parietal, temporal, and posterior frontal white matter. Associated  mass effect with effacement of the atrium of the left lateral  ventricle and 4 mm of rightward midline shift.    Orbits are unremarkable. Paranasal sinuses and mastoid air cells are  clear.      Impression    Impression:  1. No acute traumatic abnormality.  2. Significantly increased size of left occipital cystic and solid  masses and associated adjacent edema.    I have personally reviewed the examination and initial interpretation  and I agree with the findings.    MARC KOO MD         SYSTEM ID:  [id number]   MR Brain w/o & w Contrast    Impression    RESIDENT PRELIMINARY INTERPRETATION  IMPRESSION:  Increased size of the left occipital cystic and solid masses with  associated cerebral edema. Perfusion imaging is pending.         ID: [id number]      XR CHEST AP OR PA   Final Result   FINDINGS AND IMPRESSION:       Dobbhoff feeding tube tip over the left upper quadrant.      Cardiomediastinal silhouette is normal in size and contour. Aortic   calcification.  Minimal stable patchy opacities in the right lower lung   likely pneumonia. Significant interval increased opacity in the left mid   and lower lung, probably combination of pleural fluid and pneumonia. No   detectable pneumothorax with skinfold over the right lung apex.       Electronically Signed by: RAVI PRICE M.D.    Signed on: 11/12/2023 9:24 AM    Workstation ID: [id number]      CT HEAD WO CONTRAST   Final Result   *   Stable exam.      Dictated by: WILLY TATE D.O.   Dictated on: 11/11/2023 6:46 AM          I, DARRYL POSADAS MD, have reviewed the images and report and concur with   these findings interpreted by WILLY TATE D.O..      Electronically Signed by: DARRYL POSADAS MD    Signed on: 11/11/2023 8:36 AM    Workstation ID: [id number]      XR CHEST AP OR PA   Final Result   FINDINGS/IMPRESSION:      Tip of Dobbhoff in superior lateral fundus of stomach.       Electronically Signed by: ELIO TYLER M.D.    Signed on: 11/11/2023 10:12 AM    Workstation ID: [id number]      CTA CHEST PULMONARY EMBOLISM   Final Result   Impression:      *   No acute pulmonary embolism.   *   Findings suspicious for left lower lobe lung pneumonia and/or   aspiration pneumonitis with left lower lobe endobronchial opacification   which may represent mucous plugging.   *   Additional right lower lobe hazy and nodular opacities concerning for   multifocal infectious or inflammatory pneumonia with right lower lobe   aspiration pneumonitis also possible.   *   Few 1.2 cm and smaller lung nodules. Recommend follow-up CT chest   without contrast in 3 to 6 months versus correlation with FDG PET/CT.         Dictated by: HETAL SUTTON MD   Dictated on: 11/10/2023 6:20 PM           BEV CALDERON MD, have reviewed the images and report and concur   with these findings interpreted by HETAL SUTTON MD.      Electronically Signed by: BEV IBRAHIM MD    Signed on: 11/10/2023 7:04 PM    Workstation ID: [id number]      XR CHEST AP OR PA   Final Result   FINDINGS/IMPRESSION:      Tip of feeding tube not included in field-of-view.      Borderline heart size.      Mild prominence of bronchovascular and interstitial markings. Ill-defined   opacities of both lung bases greater on the right not significantly changed   compared to prior study.       Electronically Signed by: ELIO TYLER M.D.    Signed on: 11/10/2023 4:18 PM    Workstation ID: [id number]      CT HEAD WO CONTRAST   Final Result   1.   Postoperative changes of right temporal craniotomy for evacuation of   intracranial hemorrhage with interval removal of right-sided extra-axial   drainage catheter.   2.   Interval increased hyperdense products within mixed attenuating right   temporal lobe evacuation cavity with slightly increased surrounding   vasogenic edema.   3.   Increased mass effect on the adjacent right temporal lobe sulci and   increased effacement of the right lateral ventricle.   4.   Increase in leftward midline shift now 6 mm, previously 4 mm.   5.   Stable right lateral convexity extradural collection subjacent to the   craniotomy site measuring up to 1.2 cm in maximal thickness.   6.   Slight interval decrease in pneumocephalus.   7.   Stable scattered subarachnoid hemorrhage.   8.   Persistent effacement of the basal cisterns.      Dr. Tang notified Dr. Coffey via telephone on 11/10/2023 5:33 AM.            Dictated by: WILLY TANG D.O.   Dictated on: 11/10/2023 5:17 AM          ELAINA CALDERON M.D., have reviewed the images and report and concur with   these findings interpreted by WILLY TANG D.O..      Electronically Signed by: ELAINA POSADAS M.D.    Signed on: 11/10/2023 9:09 AM     Workstation ID: [id number]      XR CHEST AP OR PA   Final Result   Impression:       1. Feeding tube tip in the stomach.   2. New right basilar pulmonary opacities could reflect aspiration or   developing infiltrate.      Electronically Signed by: BLAISE RAYMOND M.D.    Signed on: 11/9/2023 3:56 PM    Workstation ID: [id number]      XR CHEST AP OR PA   Final Result   FINDINGS AND IMPRESSION:      Endotracheal and enteric tubes unchanged. Heart size normal. Posterior,   pleural effusion and thorax.               Electronically Signed by: ZULAY ZAFAR M.D.    Signed on: 11/9/2023 6:06 AM    Workstation ID: [id number]      CT HEAD WO CONTRAST   Final Result   1.   Evolving postoperative changes related to right parietotemporal   craniotomy for evacuation of right temporal intracranial hemorrhage.   2.   Interval decrease in pneumocephalus.   3.   Interval increase in extradural collection subjacent to the craniotomy   site, now measuring 12 mm in maximal thickness (previously 7 mm). Stable   leftward midline shift of 4 mm. Continued close follow-up advised.   4.   Stable scattered subarachnoid hemorrhage.   5.   Persistent mild effacement of the basal cisterns.   6.   Additional findings as above.      Electronically Signed by: ELAINA POSADAS M.D.    Signed on: 11/8/2023 8:47 AM    Workstation ID: [id number]      XR CHEST AP OR PA   Final Result   FINDINGS AND IMPRESSION:      Endotracheal tube in the distal trachea above the shailesh. Enteric tube in   place. Heart size stable. No infiltrate, pleural effusion or pneumothorax.         Electronically Signed by: ZULAY ZAFAR M.D.    Signed on: 11/8/2023 5:24 AM    Workstation ID: [id number]      XR CHEST AP OR PA   Final Result      CT HEAD WO CONTRAST   Final Result      Interval postoperative changes, detailed above, related to evacuation of   right temporal hematoma. Pockets of pneumocephalus throughout the cerebrum   and epidural fluid/hemorrhage beneath  the craniotomy defect leading to mass   effect on the underlying parenchyma with leftward midline shift of 3 mm.   Acute subarachnoid blood products and intraventricular hemorrhage   throughout the cerebrum, likely recirculated. Fullness of the basal   cisterns, likely related to elevated intracerebral pressures.   Close imaging surveillance is advised.      Electronically Signed by: SCOTT GUTIÉRREZ M.D.    Signed on: 11/7/2023 7:50 PM    Workstation ID: [id number]      CT HEAD WO CONTRAST   Final Result   No significant interval change in appearance of large   parenchymal hematoma right temporal lobe, intraventricular and subarachnoid   hemorrhage. Small subdural hematoma in the right middle cranial fossa is   more noticeable on the current study but appears unchanged.      Electronically Signed by: EARLINE KABA M.D.    Signed on: 11/7/2023 2:03 PM    Workstation ID: [id number]      CTA HEAD AND NECK W CONTRAST LEVEL 1   Final Result   Addendum (preliminary) 1 of 1   Addendum:      Dr. Johnie Ryan notified Dr. Celis of findings via [employer] message,   read at 8:47 a.m. on 11/7/2023.      Dictated by: JOHNIE RYAN M.D.   Dictated on: 11/7/2023 8:48 AM          I, EARLINE KABA M.D., have reviewed the images and report and concur    with   these findings interpreted by JOHNIE RYAN M.D..      Electronically Signed by: EARLINE KABA M.D.    Signed on: 11/7/2023 9:13 AM    Workstation ID: [id number]      Final   1.  The brain CTA is unremarkable. No evidence of intracranial vessel   stenosis, vascular malformation or aneurysm.   2. Unremarkable CT angiogram of the neck.   3. Large parenchymal hematoma right temporal lobe with extension into the   right lateral ventricle. Minimal right to left shift of the septum   pellucidum.      Electronically Signed by: EARLINE KABA M.D.    Signed on: 11/7/2023 8:09 AM    Workstation ID: [id number]      CT HEAD LEVEL 1   Final Result      Acute 5.7 cm  right temporal parenchymal hematoma with intraventricular   extension. Acute right lateral convexity subdural hematoma measuring up to   6 mm in thickness. 5 mm leftward midline shift of the septum pellucidum.      Dr. Tanmay Ryan notified Dr. Celis of critical findings via phone call at   8:00 a.m on 11/7/2023.      Dictated by: TANMAY RYAN M.D.   Dictated on: 11/7/2023 7:55 AM          IEARLINE M.D., have reviewed the images and report and concur with   these findings interpreted by TANMAY RYAN M.D..      Electronically Signed by: EARLINE KABA M.D.    Signed on: 11/7/2023 8:45 AM    Workstation ID: [id number]      FL INTRAOPERATIVE C ARM NO REPORT   Final Result      CTA ABDOMINAL AORTA ILIOFEMORAL BILATERAL RUNOFF   Final Result      *   Occlusion of bilateral superficial femoral arteries and popliteal   arteries. Likely chronic.   *   Occlusion of the left SFA to below the knee popliteal artery bypass   graft. Likely chronic.   *   Bilateral common femoral artery and profunda patent. Pro[employer] supplies   extensive collaterals. Two-vessel runoff primarily via the right peroneal   and anterior tibial arteries. No distinct opacification of the left   below-knee arteries with few small collaterals - correlation advised.   *   Infrarenal abdominal aortic aneurysm measuring 4.7 cm.   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 Both common and   external iliac arteries are patent with severe calcific disease without   distinct stenosis.  High-grade narrowing at the origins of bilateral   internal iliac arteries.   *   The 2 dominant left renal arteries have >75% stenosis at the origin.   *   Diverticulosis. Renal cysts.   *   Please see above for details.               Dictated by: AMRITA JOHNSON   Dictated on: 11/3/2023 8:53 PM          ICHARLES M.D., have reviewed the images and report and concur   with these findings interpreted by AMRITA JOHNSON.      Electronically Signed  by: CHARLES ROBERTS M.D.    Signed on: 11/4/2023 2:20 PM    Workstation ID: [id number]       VAS LOWER EXTREMITY ARTERIES DUPLEX BILATERAL   Final Result   1.  On the right side, the patient has occluded right SFA and popliteal   arteries with reconstitution and three-vessel runoff in the distal lower   extremity.   2.  On the left side, the patient has occluded left SFA to popliteal artery   bypass graft with some flow demonstrated in the distal peroneal and   posterior tibial arteries.      Dr. Watts discussed with Dr. Yadav via phone on 11/3/2023 7:30 PM.      Dictated by: AMRITA WATTS   Dictated on: 11/3/2023 7:12 PM          I, SHIRLEY VELASQUEZ D.O., have reviewed the images and report and concur with   these findings interpreted by AMRITA WATTS.      Electronically Signed by: SHIRLEY VELASQUEZ D.O.    Signed on: 11/3/2023 10:59 PM    Workstation ID: [id number]       VAS LOWER EXTREMITY VENOUS DUPLEX LEFT   Final Result   No evidence for left lower extremity DVT.       INCIDENTALLY NOTED: NO FLOW IS IDENTIFIED WITHIN THE LIMITED IMAGING OF THE   COMMON FEMORAL ARTERIES BILATERALLY. CHRONICITY IS UNCLEAR. CLINICAL   CORRELATION IS ADVISED. CONSIDER FURTHER WORK-UP IF INDICATED               Electronically Signed by: SHIRLEY VELASQUEZ D.O.    Signed on: 11/3/2023 4:56 PM    Workstation ID: [id number]      Cath/PV Case    (Results Pending)       Assessment/Plan  Gunner Mccarty is a 71 year old male with PMHx of HTN, status post stent placement, HLD, PAD status post left femoropopliteal bypass (2020) with subsequent balloon angioplasty (2021) presents with left foot pain, coolness to left foot and numbness since 11/5 at 12:30 pm. Now POD1 LLE angiogram on 11/6 with initiation of LLE catheter directed thrombolytic therapy into occluded L fem-pop gortex bypass. 11/7 0730 stroke alert initiated for confusion with subsequent ICH frontotemporal hemorrhage appreciated on stat CTH and CTA Head. Heparin and tPA  discontinued immediatly upon initiation of stroke alert. Patient remains in SICU for continued intensive care. Patient underwent craniotomy with EVD placement on 11/7 for ICH.      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 (in 2020) s/p stenosis at distal anastomosis s/p balloon angioplasty of distal anastomosis (in 4/2021)  S/p LLE AKA on 11/13/2023  -PT as tolerated     Pulmonary:  - transferred back to SICU for fever and increased respiratory effort, abx broadened     GI:    #Nausea  #Vomiting  - Zofran PRN  - Diet: Tube feeds through Dobbhoff secure with bridle, patient with soft restraint of only Right arm        Renal:    #ALIS  - Monitor UOP     Hematologic:    #Coagulopathy 2/2 tPA and Heparin (discontinued s/p stroke alert)  - CTM H/H  - Transfuse if Hb < 7  -LVX     Endocrine:    #ALIS  - PRN: Accuchecks, ISS     ID:   - Recurrent fever  -Leukocytosis to 19  -Broadened to Zosyn/Vanc  -Infectious workup per SICU        F: None  E: replete per protocol  N: NPO  A: bedrest per above  D: SICU     Prophylaxis:   DVT: held in setting of acute ICH  GI stress: Protonix     Code status: Full     To be discussed with attending    Jesús Seals MD   Vascular Surgery  PGY1, *[id number]

## 2023-11-17 NOTE — PROGRESS NOTES
[facility]   Return Patient Visit    Name: Saeid Santa  MRN: [id number]  Date of visit: Nov 17, 2023    Diagnosis: Metastatic melanoma  Stage:   Cancer Staging   No matching staging information was found for the patient.      Molecular: BRAF G466A, TMB 48.7554 mut/Mb    Performance status: ECOG 0    Oncology History:  --8/16/22, he brings a left parietal scalp lesion to attention of his GP. It is initially observed.  --November 2022, the left parietal scalp lesion was felt to be a cyst, and the left parietal lesion was lanced and drained via scalp incision.  --January 2023, the left parietal scalp cyst would occasionally break open, drain, and bleed, largely at night.   --February 2023, he notes some mild difficulty clearing obstacles with right leg and foot.  --March/April 2023, he has 3 progressively worsening episodes over a span 3 weeks including the right leg. At first he notes the onset of right leg numbness and heaviness, like he was carrying 50 lbs of water on the leg. Then, about a week or so later, the leg began to jerk and converse while he was in the basement. Neither episode associated with loss of consciousness  --4/15/23, he recalls the right leg numbness, heaviness, jerking while driving between work. He had been found by coworkers in the parking lot, sitting in the grass, appearing confused. He was brought to the ER by EMS. CT-CAP, showed small (<6mm), bilateral indeterminate pulmonary nodules. CT-head and MRI brain showed 2 discrete ring-enhancing lesions in the left paramedian posterior frontal lobe near the precentral gyrus measuring approximately 2.2 x 1.7 x 1.9 cm and left occipital lobe near the lingual gyrus measuring approximately 2.2 x 2.1 x 2.0 cm. The occipital lesion encroaches upon the subependymal surface of the left lateral ventricle occipital horn and contacts the superior surface of the medial left tentorium. The lesions are accompanied by  moderate surrounding vasogenic edema. Surgery was recommended.  --4/21/23, underwent left awake (sleep-awake-sleep) craniotomy for tumor resection, he has resection of the left frontal, motor strip tumor as well asan elliptical incision of the left parietal scalp lesion. On pathology, both the scalp excision and brain mass are positive for malignant melanoma. Tumor cells are positive for S-100 and Melan-A, and negative for cytokeratin AE1/AE3, NKX3.1, GFAP, ERG, CD3, and CD20.  CD3 highlights brisk infiltration of the tumor by T lymphocytes, while CD20 highlights scattered positive B lymphocytes. NGS identifies a BRAF G466A mutation, a class 3 mutation insensitive to BRAF kinase inhibitor monotherapy. TMB is high at 48.754 muts/Mb.   --4/22/23, post-operative MRI shows post-operative changes of the parietal craniotomy of the left frontal lobe metastasis. The other 1.7 x 1.1 cm cystic lesion in the left occipital lobe is also seen. No new lesions appreciated.  -- 4/25/23 to 5/9/23, discharged from the hospital to acute rehab stay at [facility] in [place]. He participated in a comprehensive rehabilitation program consisting of PT, OT, Speech, Psychology and Recreation Therapy. He did well and was discharged home with use of a walker.  --PET-CT on 5/18/23 with potentially involved neck lymph nodes. He has mild FDG uptake in soft tissue nodules in the scalp, in the known left occipital brain metastasis, as well as in the left lower neck region.  -5/24-/5/31/23, He was treated with gamma knife: 2500 cGy to the left resection cavity; 3000 cGy to the L parietal-occipital lesion.  -6/7/2023, Cycle 1 of pembrolizumab 200mg every 3 weeks  -6/14/23-6/16/23, admitted for vision change, reduced comprehension, speech change, fall and headache. MRI showing likely progression of left occipital mass with associated edema. Improved on steroids and discharged on oral decadron. Plan for repeat MRI and neurosurgery follow up in 1  "month.  -7/7/23 ED visit for headaches, confusion, vision changes, concern for hemorrhage of the left occipital mass, admitted to [facility] until 7/9/23, increased dexamethasone dose  -7/14/23 craniotomy of the left parieto-occipital mass, discharged on a dexamethasone taper  -8/11/2023, Cycle 2 of pembrolizumab  -8/23/2023 brain MRI with no recurrence in left frontal lobe, decreased enhancement at edges of left occipital resection cavity, new enhancement int he right front lobe concerning for new met--seen by neurosurgery and considering RT  -8/31/2023 CT/PET with increased uptake in left lower neck lymph node c/f early progression. Ground-glass, reticular changes in lungs also noted likely secondary to drug-induced pneumonitis.       Interval History:    8/31/23 PET/CT:  1. Slightly increased uptake in the left lower neck lymph node, which may represent early progression of disease, however given recent initiation of immunotherapy, pseudoprogression is also possible. No new lesions identified.  2. Interval development of FDG avid groundglass and reticular change in the lungs, likely inflammatory/sequelae of drug-induced pneumonitis.  3. Near-complete resolution of uptake in the medial aspect of the left occipital lesion, however overall intracranial findings are better characterized on prior brain MRI.    Started steroids 1 mg/kg prednisone (80 mg daily) with 8 week taper    9/8/23 GK to R frontal lobe lesion    Next brain MR scheduled for 12/7    Never had a cough or shortness of breath  No exertional dyspnea  Now off steroids  Had some increased LE swelling and glucose was poorly controlled, poor sleep    Three weeks ago or so after steroids were stopped, felt more generalized fatigue  Eating well  Not losing weight  No new skin lesions of concern  No new masses in the neck    Past couple of months have been \"fine\"    No TSH since August      Exam:   There were no vitals taken for this visit.  Limited exam given " virtual visit    Gen: Alert, interactive, NAD  HEENT: Pupils equal  Resp: Comfortable on room air      Labs:   Reviewed in chart. Key findings from labs performed 9/7 include normal renal function, mildly elevated WBC at 12.3, hgb 12, plts 314    Imaging:   All pertinent imaging studies personally reviewed, esteves findings included in oncology history above      Pathology:   4/21/23 L frontal lobe tumor:  Final Diagnosis   A-B. Brain, left frontal brain tumor, biopsy and resection:     - Malignant melanoma. See comment.     C. Skin, left parietal scalp, excision:     - Malignant melanoma. See comment.     - Tumor measures 10 mm in greatest dimension on histologic slides.     - Deep and lateral margins are involved by tumor.     Both the scalp excision and brain mass are positive for malignant melanoma, though neither lesion represents a definitive primary melanoma. Clinical evaluation to exclude a different potential primary site is recommended.     The scalp lesion appears histologically as a well-circumscribed nodule based in the dermis with epidermotropism and in the absence of an epidermal melanoma in situ, suggesting the possibility that this represents a cutaneous metastasis rather than a true primary invasive melanoma of the scalp. The brain mass is favored to represent a metastatic rather than primary melanoma, as multiple discrete intracranial lesions are present on brain MRI and primary intracranial melanoma is rare. NGS study is pending and the results will be reported in an addendum.    Microscopic Description    Parts A and B: Histologic sections show a high-grade neoplasm with tumor cells showing significant variation in nuclear size and shape.  Prominent nucleoli are present in numerous tumor cells.  There is no cytoplasmic pigment within tumor cells.  The tumor is associated with significant hemorrhage, and it shows a pushing border with the adjacent nonneoplastic brain parenchyma.  By  immunohistochemistry, tumor cells are positive for S-100 and Melan-A, while they are negative for cytokeratin AE1/AE3, NKX3.1, GFAP, ERG, CD3, and CD20.  CD3 highlights brisk infiltration of the tumor by T lymphocytes, while CD20 highlights scattered positive B lymphocytes.  All immunohistochemistry is performed on block B2 with appropriate controls.  These findings support the intraoperative impression.     Part C: Histologic sections show a dermal nodule of malignant tumor cells with severe cytologic atypia, prominent nucleoli, and at least a portion of tumor which show spindle cell morphology.  There are more dispersed tumor cells outside of the main body of this nodule superficially.  There is epidermal hyperplasia of the overlying epidermis with mild melanocytic hyperplasia without significant atypia.  There is disruption between a portion of hyperplastic epidermis and the tumor nodule in block C3, but no connection of the tumor nodule to the epidermis or epidermal melanoma in situ is identified after evaluation of multiple deeper levels.  By immunohistochemistry, tumor cells are positive for S100 and Melan-A, while they are negative for CD34, ERG, cytokeratin AE1/AE3, NKX3.1, and CD20.         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 Started pembrolizumab 6/7/23. Underwent second craniotomy for L parieto-occipital lesion resection in 7/14/23 2/2 hemorrhagic conversion and GK to R frontal lesion 9/8/23, on pembrolizumab, course c/b ICI pneumonitis necessitating steroids.       # metastatic melanoma with metastatic disease to brain  # checkpoint inhibitor pneumonitis  -presented with de-francisca CNS involvement s/p craniotomy x2 and GK to additional R  frontal lesion  -TMB high, BRAF mutated but not V600E  -s/p pembrolizumab x2 (first 6/7/23)  -most recent PET/CT showed bilateral FDG avid ground glass and reticular infiltrates favored to represent pneumonitis, started on steroids, now completed taper  -of note, denies any history of pulmonary symptoms at the time of pneumonitis diagnosis c/w grade 1-2 disease    Plan  -repeat CT neck/chest/AP in December  -if significantly improved pneumonitis, could consider restarting pembrolizumab with caution      # fatigue  -check TSH  -if persistent and normal TSH, will consider broader workup for ICI toxicity        Carlos Manuel Bunn MD PhD   of Medicine  Division of Hematology, Oncology and Transplantation    ---  50 minutes were spent on the date of the encounter performing chart review, history and exam, documentation, and further activities as noted above.

## 2023-11-29 NOTE — TELEPHONE ENCOUNTER
"Forwarding MyChart request for indomethacin for gout flare-up to PCP.  It's not on his current list, but per history last prescribed by PCP 8/31/23 for gout for a 6 month supply, but then discontinued on 9/7/23 oncology visit as \"patient not taking\".    Rx pended for consideration.  Patient thinks he's had a flare-up since starting rosuvastatin.  He was advised twice to schedule a f/u appt, but doesn't seem amenable to this.    He was advised to contact billing regarding his coding/billing inquiry.    Stacey Brown RN  [facility]    "

## 2023-12-05 NOTE — PROGRESS NOTES
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 22 Gy in one fraction prescribed to the 50% isodose line (2023)      INTERVAL SINCE COMPLETION OF RADIATION THERAPY:  3 months since first course on 2023, 3 months since 2nd course on 2023.          SUBJECTIVE: Mr. Santa is a 69 yo male with metastatic melanoma. He presented with seizure in April this year. Workup revealed 2 discrete ring-enhancing lesions, one of which was in the left paramedian posterior frontal lobe near the precentral gyrus, measuring 2.2 x 1.7 x 1.9 cm and the other in the left occipital lobe near the lingual gyrus, measuring 2.2 x 2.1 x 2.0 cm, associated with  moderate vasogenic edema.  He underwent craniotomy on 2023 for resection of the lesion left frontal lesion as this was felt to be the cause of his seizure. Additionally a scalp lesion was also resected as it had been draining, bleeding and growing in size. Pathology showed malignant melanoma. It was unclear if the scalp lesion was the primary or represent a cutaneous metastasis (it was without epidermal melanoma in situ).  PET/CT on 2023 showed a brain metastasis in the left occipital lobe and a lymph node in the left low neck.      He then received GK-SRS to the left occipital lesion, 30 Gy in 5 fractions, as well as the left posterior frontal resection cavity, 25 Gy in 5  fractions.  He completed these treatments on 5/31/2023. He then began Keytruda under the care of Dr. Greene on 6/7/2023.       Mr. Santa developed intralesional hemorrhage of the left occipital metastasis with mass effect and ultimately required partial resection on 7/14/2023. The deeper aspect of the tumor densely adhered to the ventricle wall and the entrapped occipital horn, and thus could not be safely resected. Surgical pathology showed treatment effect with residual viable melanoma.     Mr. Santa resumed much delayed cycle 2 of Pembrolizumab on 8/11/2023. Repeat brain MRI on 8/23/2023 showed stable post-op changes in the resected parietal skull and left frontal lobe cavity and decreased enhancement of the left occipital resection cavity. However, a new focus of abnormal contrast enhancement about 6 mm in diameter in the anteromedial right frontal lobe was found.     He then proceeded another course of GK SRS to this new metastasis on 9/8/2023, receiving 22 Gy in a single fraction prescribed to the 50% isodose line.     Mr. Santa unfortunately had to discontinue from Keytruda after 2 cycles due to immune pneumonitis. He was started on a Prednisone taper with the plan to switch to Nivolumab.    Tim most recently saw Dr. Carlos Manuel Bunn in medical oncology on 11/17/2023, who ordered a repeat CT scan.    Mr. Santa completed follow up MRI of the brain earlier today and is here to review the result.     On interview today, he states that he is feeling quite well, in fact the best he's been for the last few months. He has no new neurologic symptoms in the past 2 months. The right weakness is about the same, whereas the numbness is slowly resolving. The blurry vision in the right eye is resolved. He denies headaches. He is continuing with Keppra 1000 mg BID and has not had any seizure activities.       OBJECTIVE:   /82   Pulse 104   Wt 94.3 kg (208 lb)   SpO2 97%   BMI 27.63 kg/m     His speech was  fluent. Interaction was appropriate.  Comprehension intact.     IMAGING:  New lesions in the right frontal lobe with vasogenic edema      Previously treated right frontal falcine lesion measured up to 14 mm      New right occipital lesion ~ 1 cm      New peripheral enhancing lesion anterior to the previously treated left occipital cavity      Increased enhancing of the anterior superior aspect of previously treated left occipital cavity      ASSESSMENT AND PLAN: In summary, Mr. Santa is a 69 yo gentleman with metastatic melanoma. He is s/p 2 courses of GK SRS to 3 lesions, two of which required resection due to symptoms. He is currently not on systemic therapy due to immune-mediated pneumonitis, necessitating prolonged prednisone.     Tim's brain MRI from today unfortunately showed progression, manifesting as new lesions as well as increase in size of the previously treated lesions. The right frontal lesion is particularly worrisome due to its large size, vasogenic edema and midline shift.     I discussed his imaging with Dr. Garrido, who will see the patient next week to discuss possible resection of the largest lesion.     Tim's CNS disease has been difficult to control. We discussed that GK SRS has not been successful. We see new lesions with each follow up imaging, necessitating either surgery and/or radiation therapy. This is likely in part due to inability to administer immunotherapy due to pneumonitis. Additionally, melanoma is known to be radioresistant. We briefly discussed the use of whole brain radiation therapy as an alternative to GK SRS, which may allow control of microscopic brain metastases, but limited in its efficacy on gross CNS disease owing to its modest dose.     Despite edema, he is not experiencing any neurologic symptoms. Thus I don't feel strongly about starting him on Decadron, but will defer this to Dr. Bunn. He completed Prednisone on 11/7 according to the chart. Steroids is not  desirable in the setting of immunotherapy.     I will follow along to see what decision Tim has made with Dr. Garrido regarding the management of new brain metastases and plan either additional GK or whole brain radiation therapy.            Benedicto Rose M.D./Ph.D.  Radiation Oncologist   Department of Therapeutic Radiology   [facility]  Phone: [phone]          40 minutes were spent on the date of the encounter doing chart review, history and exam, documentation and further activities as noted above.           Benedicto Rose MD      ADDENDUM:    I discussed his case again with Dr. Garrido and Dr. Bunn. Dr. Bunn currently has no concerns about starting steroids and plans to him soon in the clinic to review CT scan and discuss systemic therapy.     I prescribed 4 mg Decadron BID and sent the prescription to [facility] in [place]. The dose is to be tapered by Dr. Garrido and Dr. Bunn whoever sees him next.       Benedicto Rose MD

## 2023-12-07 NOTE — LETTER
2023         RE: Saeid Santa  [address]  [facility] [address]        Dear Colleague,    Thank you for referring your patient, Saeid Santa, to the [facility] RADIATION ONCOLOGY. Please see a copy of my visit note below.    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 22 Gy in one fraction prescribed to the 50% isodose line (2023)      INTERVAL SINCE COMPLETION OF RADIATION THERAPY:  3 months since first course on 2023, 3 months since 2nd course on 2023.          SUBJECTIVE: Mr. Santa is a 67 yo male with metastatic melanoma. He presented with seizure in April this year. Workup revealed 2 discrete ring-enhancing lesions, one of which was in the left paramedian posterior frontal lobe near the precentral gyrus, measuring 2.2 x 1.7 x 1.9 cm and the other in the left occipital lobe near the lingual gyrus, measuring 2.2 x 2.1 x 2.0 cm, associated with  moderate vasogenic edema.  He underwent craniotomy on 2023 for resection of the lesion left frontal lesion as this was felt to be the cause of his seizure. Additionally a scalp lesion was also resected as it had been draining, bleeding and growing in size. Pathology showed malignant melanoma. It was unclear if the scalp lesion was the primary or represent a cutaneous metastasis (it was without epidermal melanoma in situ).  PET/CT on  5/18/2023 showed a brain metastasis in the left occipital lobe and a lymph node in the left low neck.      He then received GK-SRS to the left occipital lesion, 30 Gy in 5 fractions, as well as the left posterior frontal resection cavity, 25 Gy in 5 fractions.  He completed these treatments on 5/31/2023. He then began Keytruda under the care of Dr. Greene on 6/7/2023.       Mr. Santa developed intralesional hemorrhage of the left occipital metastasis with mass effect and ultimately required partial resection on 7/14/2023. The deeper aspect of the tumor densely adhered to the ventricle wall and the entrapped occipital horn, and thus could not be safely resected. Surgical pathology showed treatment effect with residual viable melanoma.     Mr. Santa resumed much delayed cycle 2 of Pembrolizumab on 8/11/2023. Repeat brain MRI on 8/23/2023 showed stable post-op changes in the resected parietal skull and left frontal lobe cavity and decreased enhancement of the left occipital resection cavity. However, a new focus of abnormal contrast enhancement about 6 mm in diameter in the anteromedial right frontal lobe was found.     He then proceeded another course of GK SRS to this new metastasis on 9/8/2023, receiving 22 Gy in a single fraction prescribed to the 50% isodose line.     Mr. Santa unfortunately had to discontinue from Keytruda after 2 cycles due to immune pneumonitis. He was started on a Prednisone taper with the plan to switch to Nivolumab.    Tim most recently saw Dr. Carlos Manuel Bunn in medical oncology on 11/17/2023, who ordered a repeat CT scan.    Mr. Santa completed follow up MRI of the brain earlier today and is here to review the result.     On interview today, he states that he is feeling quite well, in fact the best he's been for the last few months. He has no new neurologic symptoms in the past 2 months. The right weakness is about the same, whereas the numbness is slowly resolving. The blurry vision  in the right eye is resolved. He denies headaches. He is continuing with Keppra 1000 mg BID and has not had any seizure activities.       OBJECTIVE:   /82   Pulse 104   Wt 94.3 kg (208 lb)   SpO2 97%   BMI 27.63 kg/m     His speech was fluent. Interaction was appropriate.  Comprehension intact.     IMAGING:  New lesions in the right frontal lobe with vasogenic edema      Previously treated right frontal falcine lesion measured up to 14 mm      New right occipital lesion ~ 1 cm      New peripheral enhancing lesion anterior to the previously treated left occipital cavity      Increased enhancing of the anterior superior aspect of previously treated left occipital cavity      ASSESSMENT AND PLAN: In summary, Mr. Santa is a 69 yo gentleman with metastatic melanoma. He is s/p 2 courses of GK SRS to 3 lesions, two of which required resection due to symptoms. He is currently not on systemic therapy due to immune-mediated pneumonitis, necessitating prolonged prednisone.     Tim's brain MRI from today unfortunately showed progression, manifesting as new lesions as well as increase in size of the previously treated lesions. The right frontal lesion is particularly worrisome due to its large size, vasogenic edema and midline shift.     I discussed his imaging with Dr. Garrido, who will see the patient next week to discuss possible resection of the largest lesion.     Tim's CNS disease has been difficult to control. We discussed that GK SRS has not been successful. We see new lesions with each follow up imaging, necessitating either surgery and/or radiation therapy. This is likely in part due to inability to administer immunotherapy due to pneumonitis. Additionally, melanoma is known to be radioresistant. We briefly discussed the use of whole brain radiation therapy as an alternative to GK SRS, which may allow control of microscopic brain metastases, but limited in its efficacy on gross CNS disease owing to its modest  dose.     Despite edema, he is not experiencing any neurologic symptoms. Thus I don't feel strongly about starting him on Decadron, but will defer this to Dr. Bunn. He completed Prednisone on  according to the chart. Steroids is not desirable in the setting of immunotherapy.     I will follow along to see what decision Tim has made with Dr. Garrido regarding the management of new brain metastases and plan either additional GK or whole brain radiation therapy.            Benedicto Rose M.D./Ph.D.  Radiation Oncologist   Department of Therapeutic Radiology   [facility]  Phone: [phone]          40 minutes were spent on the date of the encounter doing chart review, history and exam, documentation and further activities as noted above.           Benedicto Rose MD      ADDENDUM:    I discussed his case again with Dr. Garrido and Dr. Bunn. Dr. Bunn currently has no concerns about starting steroids and plans to him soon in the clinic to review CT scan and discuss systemic therapy.     I prescribed 4 mg Decadron BID and sent the prescription to [facility] in [place]. The dose is to be tapered by Dr. Garrido and Dr. Bunn whoever sees him next.       Benedicto Rose MD         FOLLOW-UP VISIT    Patient Name: Saeid Santa      : 1955     Age: 68 year old        ______________________________________________________________________________     Chief Complaint   Patient presents with     Cancer     Radiation follow up with scans     /82   Pulse 104   Wt 94.3 kg (208 lb)   SpO2 97%   BMI 27.63 kg/m       Date Radiation Completed: Gamma Knife: mask x5 completed 23, frame 23    Pain  Denies    Labs  Other Labs: Today    Imaging  CT: Chest/abd, and soft tissue. Today  MRI: brain, today    Other Appointments: Yes    MD Name: Appointment Date: Labs and scans today   MD Name: Appointment Date:   MD Name: Appointment Date:   Other Appointment Notes:     Residual  Radiation side effect: Pt states he has been feeling well for the last couple months. Rt leg slowly decreasing nueropathy.     Additional Instructions: Here to review scans    Nurse face-to-face time: Level 3:  10 min face to face time            Again, thank you for allowing me to participate in the care of your patient.        Sincerely,        Beneditco Rose MD

## 2023-12-07 NOTE — PROGRESS NOTES
FOLLOW-UP VISIT    Patient Name: Saeid Santa      : 1955     Age: 68 year old        ______________________________________________________________________________     Chief Complaint   Patient presents with    Cancer     Radiation follow up with scans     /82   Pulse 104   Wt 94.3 kg (208 lb)   SpO2 97%   BMI 27.63 kg/m       Date Radiation Completed: Gamma Knife: mask x5 completed 23, frame 23    Pain  Denies    Labs  Other Labs: Today    Imaging  CT: Chest/abd, and soft tissue. Today  MRI: brain, today    Other Appointments: Yes    MD Name: Appointment Date: Labs and scans today   MD Name: Appointment Date:   MD Name: Appointment Date:   Other Appointment Notes:     Residual Radiation side effect: Pt states he has been feeling well for the last couple months. Rt leg slowly decreasing nueropathy.     Additional Instructions: Here to review scans    Nurse face-to-face time: Level 3:  10 min face to face time

## 2023-12-07 NOTE — PROGRESS NOTES
Dr. Garrido [facility] pt set up a follow up appt with him next week -    Routed to scheduling.

## 2023-12-08 NOTE — ADDENDUM NOTE
Addended by: NATE DIAMOND on: 12/8/2023 11:27 AM     Modules accepted: Orders    
Initial (On Arrival)

## 2023-12-11 NOTE — PATIENT INSTRUCTIONS
Please review COMPLETE information about your proposed surgery, pre-operative requirements, post-operative course and expectations - available in a folder provided to you in clinic!    Your surgery scheduler will call you within 3 business days to begin the process of scheduling your surgery and appointments.     Pre-Operative    Pre-operative physical / Lab work with primary care physician within 30 days of surgical date. We prefer the pre-op exam to be done 2 weeks prior to surgery. We also prefer pre-op lab work be done at [facility] outpatient lab, 2 weeks prior to surgery.     If all pre-op appointments/test are not completed prior to your surgery date, you will be asked to reschedule your surgery.           As part of preparation for your upcoming procedure your primary care doctor may order a test to rule out a COVID-19 infection. This is no longer a requirement prior to surgery.     Readiness Visits    Prior to surgery, you may have a telephone or in person readiness visit with our RN team to discuss your upcomming surgery, results of your pre-op physical, and lab work.   If you will require a collar/neck brace after surgery, you will be fitted for one at your readiness visit prior to surgery (scheduled by the surgery scheduler).     Shower procedure    Hibiclens shower: Use one packet the night before surgery and one packet the morning of surgery for a whole body shower. Avoid face, hair, and genitals.      Eating/Drinking    Stop all solid foods 8 hours before surgery.  Stop all clear liquids 2 hours prior to arrival time     Clear liquids include water, clear juice, black coffee, or clear tea without milk, Gatorade, clear soda.     Medications - please refer to the pre-operative medication instructions sheet in your folder    Hold Aspirin, NSAIDs (Advil/Ibuprofen, Indocin, Naproxen,Nuprin,Relafen/Nabumetone, Diclofenac,Meloxicam, Aleve, Celebrex) and all vitamins and supplements x  7-10 days prior to surgical date  You can take Tylenol (Acetaminophen) for pain up until the date of your surgery   Do not exceed 3,000 mg per day   Any other medications prescribed, please discuss with your primary care provider at your pre-operative physical. Please discuss when/if it is safe for you to stop taking blood thinners with your primary care provider.   We will NOT provide pain medications prior to surgery. We will prescribe post-op pain medications for up to 6 weeks after surgery.       FMLA/Short-term disability    If you are currently employed, you will likely need to be off work for 4-6 weeks for post-op recovery and healing.  Please fax any FMLA/short term disability paperwork to [phone], mail it into the clinic, drop it off in person, or send via a [employer] message.   You may also call our clinic with the date in which you'd like to return to work, and we can provide a work letter to your employer  We will support Short-Term Disability up to 12 weeks, beginning the date of your surgery. We do NOT support Long-Term Disability/Social Security Benefits.     Pain Management after surgery    Dealing with pain    As your body heals, you might feel a stabbing, burning, or aching pain. You may also have some numbness.  Everyone feels pain differently, we may ask you to rate your pain using a pain scale. This will let us know how much pain you feel.   Keep in mind that medicine won't take away all of your pain. It helps to try other ways to relax and ease pain.     Things to help with pain    After surgery, we will give you medicine for your pain. These medications work well, but they can make you drowsy, itchy, or sick to your stomach, and constipated. Try to take narcotics with food if they cause nausea.   For mild to moderate pain, you can take medication such as Tylenol or Ibuprofen. These can be used with narcotics and muscle relaxants. *If you have had a fusion: do NOT use NSAIDs for 6 months  after surgery.   Do NOT drive while taking narcotic pain medication  Do NOT drink alcohol while using narcotic pain medication  You can utilize ice as needed (no longer than 20 minutes at one time) you may apply this over your covered incision  Utilize heat for muscle spasms, do not apply heat over your incision  If a muscle relaxer is prescribed, please do NOT take it at the same time as your narcotic pain medication. Take them at least 90 minutes apart.   You may also use pain cream/patches on sore muscles. Do NOT apply these on your incision. Patches may be cut in 1/2 if needed.     *After your surgery, if you will be staying in-patient, a nursing team will be monitoring you closely throughout your stay and communicate your health status to your surgeon and other providers.  You will be seen by Advanced Practice Providers (e.g., nurse practitioners, clinic nurse specialists, and physician assistants) who will check on you regularly to assess the status of your surgical recovery.     Incision Care    Look at your incision site every day. You  may need a mirror, or family member to help you.   Do not submerge your incision in water such as pools, hot tubs, baths for at least 6 weeks or until incision is healed  You may get your incision wet in the shower. Allow water and soap to run over incision, and gently pat dry.   Remove the dressing the day after you are discharged from the hospital. Keep the incision clean and dry at all times. This may require several bandage changes.   Contact us right away if you have:   Fever over 101 degrees farenheit  Green or yellow drainage (pus) from your incision or increased bloody drainage   Redness, swelling, or warmth at your surgery site   Notify the clinic [phone].    Activity Restrictions    For the first 6 weeks, no lifting,pushing, or pulling > 5-10 pounds, no bending, twisting.  Use the stairs in moderation   Walking: Walking is the best way to start exercise after  surgery. Take short frequent walks. You may gradually increase the distance as tolerated. If you feel pain, decrease your activity, but DO NOT stop walking. Walking will help you regain strength.  Avoid prolonged positioning for longer than 30 minutes (change positions frequently while awake)  No contact sports until after follow up visit  No high impact activities such as; running/jogging, snowmobile or 4 nesbitt riding or any other recreational vehicles until deemed safe by your surgeon/care team.   Please call the clinic if you develop any of the following symptoms:  Swelling and/or warmth in one or both legs  Pain or tenderness in your leg, ankle, foot, or arm   Red or discolored/pale skin     Post-Op Follow Up Appointments    We will call you to schedule these appointments after your discharge from the hospital.   Incision assessment within 2 weeks with a Registered Nurse   6 week post-op with a Nurse Practitioner/Physician Assistant. Your surgeon will be available on this day.

## 2023-12-11 NOTE — PROGRESS NOTES
"NEUROSURGERY FOLLOWUP  NOTE    Saeid Santa comes today in f/u with MRI brain done on 12/7/2023.    9/8/2023: Gamma knife to the new anteromedial right frontal lobe lesion likely mets.  22 Gy single fraction.  7/14/2023 Stealth assisted left parieto-occipital craniotomy with interhemispheric approach and resection of falcine mass.  5/24/2023 : Gamma knife to the resection cavity and parieto-occipital lesion 4/21/2023:stealth assisted awake craniotomy with resection of motor/sensory strip lesion     Patient is doing well since his last gamma knife with intermittent recent memory issues and generalized weakness.  Patient also reported that his right leg weakness is gradually improving.  Patient denies any new onset focal neurological deficits.  He reports no new onset seizures.  He is not on systemic immunotherapy at this point.  He has been prescribed dexamethasone 4 mg twice a day which he has not started yet.    PHYSICAL EXAM:   Constitutional: /77   Pulse 75   Ht 1.854 m (6' 1\")   Wt 94.3 kg (208 lb)   SpO2 98%   BMI 27.44 kg/m       Mental Status: Mental Status: A & O in no acute distress.  Affect is appropriate.  Speech is fluent.  Recent and remote memory are intact.  Attention span and concentration are normal.     Motor: Generalized loss of muscle mass is present.  Right leg proximal muscles 5 /5, distal 4+ by 5     Sensory: Sensation decreased to touch in right lower extremity     Coordination:   Heel/toe/ gait intact.  Intact tandem gait      Reflexes; supinator, biceps, triceps, knee/ ankle jerk intact.  No hoffmans/   no babinski/ clonus.     Incision healed well.  IMAGING:   I personally reviewed all radiographic images and agree with the neuroradiology report of multiple new  and/or enlarging metastasis.  The largest one is in the right frontal lobe with internal hemorrhage.     12/7/2023: MRI brain with and without contrast:  Impression:  Progression in intracranial metastatic disease " compared to 9/8/2023 with multiple new and/or enlarging metastases, including internally hemorrhagic right frontal lobe lesion with 3 mm of leftward midline shift and new lesion adjacent to the left occipital resection bed. Vasogenic edema associated with multiple lesions.  Some of the new irregular areas of enhancement in the left occipital lobe has an appearance that can be seen with radiation-induced changes.      CONSULTATION ASSESSMENT AND PLAN:    Saeid Santa comes today in follow-up with MRI brain with and without contrast done on 12/7/2023.    9/8/2023: Gamma knife to the new anteromedial right frontal lobe lesion likely mets.  22 Gy single fraction.  7/14/2023 Stealth assisted left parieto-occipital craniotomy with interhemispheric approach and resection of falcine mass.  5/24/2023 : Gamma knife to the resection cavity and parieto-occipital lesion 4/21/2023:stealth assisted awake craniotomy with resection of motor/sensory strip lesion     Patient is in the clinic today with her spouse.  Patient reported no new onset focal neurological symptoms.  He reports generalized weakness and intermittent recent memory issues.    I discussed the MRI brain with and without contrast findings with the patient and his spouse and showed them the images.  I explained to them that there is an evidence of progressive disease with new lesions, largest 1 is involving the right frontal lobe with significant perilesional edema and internal hemorrhage.  I explained to the patient and her spouse that I discussed his case with Dr. Rose from radiation oncology.  I explained to him that surgical resection of the right frontal met offers an advantage of tapering his steroids faster so that he can get back to immunotherapy.  I also explained to him and his spouse that we may consider whole brain radiation treatment with hippocampal sparing following the surgery.      I discussed the risk and benefits of the surgery including bleeding,  infection, stroke, incomplete resolution of symptoms, DVT/PE, MI and others.  I explained to the patient that he already had 2 craniotomies with radiation treatment which placed him at high risk of wound infection and issues with healing.    Patient would like to discuss the surgical option with his spouse and will give us a call.  All the questions were answered and patient sounded understanding.  He can contact us if there are any further questions or concerns.    I spent more than 20 minutes in this apt, examining the pt, reviewing the scans, reviewing notes from chart, discussing treatment options with risks and benefits and coordinating care. This note was created in part by the use of Dragon voice recognition system. Inadvertent grammatical errors and typographical errors may still exist.        Kye Garrido MD      CC:     Rock Grajeda  [address] [facility] [address]

## 2023-12-11 NOTE — NURSING NOTE
"Saeid Santa is a 68 year old male who presents for:  Chief Complaint   Patient presents with    RECHECK        Initial Vitals:  /77   Pulse 75   Ht 6' 1\" (1.854 m)   Wt 208 lb (94.3 kg)   SpO2 98%   BMI 27.44 kg/m   Estimated body mass index is 27.44 kg/m  as calculated from the following:    Height as of this encounter: 6' 1\" (1.854 m).    Weight as of this encounter: 208 lb (94.3 kg).. Body surface area is 2.2 meters squared. BP completed using cuff size: regular  No Pain (0)    Hermes Lyons    "

## 2023-12-11 NOTE — LETTER
"    12/11/2023         RE: Saeid Santa  [address]  [facility] [address]        Dear Colleague,    Thank you for referring your patient, Saeid Santa, to the [facility]. Please see a copy of my visit note below.    NEUROSURGERY FOLLOWUP  NOTE    Saeid Santa comes today in f/u with MRI brain done on 12/7/2023.    9/8/2023: Gamma knife to the new anteromedial right frontal lobe lesion likely mets.  22 Gy single fraction.  7/14/2023 Stealth assisted left parieto-occipital craniotomy with interhemispheric approach and resection of falcine mass.  5/24/2023 : Gamma knife to the resection cavity and parieto-occipital lesion 4/21/2023:stealth assisted awake craniotomy with resection of motor/sensory strip lesion     Patient is doing well since his last gamma knife with intermittent recent memory issues and generalized weakness.  Patient also reported that his right leg weakness is gradually improving.  Patient denies any new onset focal neurological deficits.  He reports no new onset seizures.  He is not on systemic immunotherapy at this point.  He has been prescribed dexamethasone 4 mg twice a day which he has not started yet.    PHYSICAL EXAM:   Constitutional: /77   Pulse 75   Ht 1.854 m (6' 1\")   Wt 94.3 kg (208 lb)   SpO2 98%   BMI 27.44 kg/m       Mental Status: Mental Status: A & O in no acute distress.  Affect is appropriate.  Speech is fluent.  Recent and remote memory are intact.  Attention span and concentration are normal.     Motor: Generalized loss of muscle mass is present.  Right leg proximal muscles 5 /5, distal 4+ by 5     Sensory: Sensation decreased to touch in right lower extremity     Coordination:   Heel/toe/ gait intact.  Intact tandem gait      Reflexes; supinator, biceps, triceps, knee/ ankle jerk intact.  No hoffmans/   no babinski/ clonus.     Incision healed well.  IMAGING:   I personally reviewed all radiographic images and agree with the " neuroradiology report of multiple new  and/or enlarging metastasis.  The largest one is in the right frontal lobe with internal hemorrhage.     12/7/2023: MRI brain with and without contrast:  Impression:  Progression in intracranial metastatic disease compared to 9/8/2023 with multiple new and/or enlarging metastases, including internally hemorrhagic right frontal lobe lesion with 3 mm of leftward midline shift and new lesion adjacent to the left occipital resection bed. Vasogenic edema associated with multiple lesions.  Some of the new irregular areas of enhancement in the left occipital lobe has an appearance that can be seen with radiation-induced changes.      CONSULTATION ASSESSMENT AND PLAN:    Saeid Santa comes today in follow-up with MRI brain with and without contrast done on 12/7/2023.    9/8/2023: Gamma knife to the new anteromedial right frontal lobe lesion likely mets.  22 Gy single fraction.  7/14/2023 Stealth assisted left parieto-occipital craniotomy with interhemispheric approach and resection of falcine mass.  5/24/2023 : Gamma knife to the resection cavity and parieto-occipital lesion 4/21/2023:stealth assisted awake craniotomy with resection of motor/sensory strip lesion     Patient is in the clinic today with her spouse.  Patient reported no new onset focal neurological symptoms.  He reports generalized weakness and intermittent recent memory issues.    I discussed the MRI brain with and without contrast findings with the patient and his spouse and showed them the images.  I explained to them that there is an evidence of progressive disease with new lesions, largest 1 is involving the right frontal lobe with significant perilesional edema and internal hemorrhage.  I explained to the patient and her spouse that I discussed his case with Dr. Rose from radiation oncology.  I explained to him that surgical resection of the right frontal met offers an advantage of tapering his steroids faster so  that he can get back to immunotherapy.  I also explained to him and his spouse that we may consider whole brain radiation treatment with hippocampal sparing following the surgery.      I discussed the risk and benefits of the surgery including bleeding, infection, stroke, incomplete resolution of symptoms, DVT/PE, MI and others.  I explained to the patient that he already had 2 craniotomies with radiation treatment which placed him at high risk of wound infection and issues with healing.    Patient would like to discuss the surgical option with his spouse and will give us a call.  All the questions were answered and patient sounded understanding.  He can contact us if there are any further questions or concerns.    I spent more than 20 minutes in this apt, examining the pt, reviewing the scans, reviewing notes from chart, discussing treatment options with risks and benefits and coordinating care. This note was created in part by the use of Dragon voice recognition system. Inadvertent grammatical errors and typographical errors may still exist.        Kye Garrido MD      CC:     Rock Grajeda  [address] [place] [id number]      Again, thank you for allowing me to participate in the care of your patient.        Sincerely,        Kye Garrido MD

## 2023-12-12 NOTE — LETTER
12/12/2023         RE: Saeid Santa  [address] [facility] [address]        Dear Colleague,    Thank you for referring your patient, Saeid Santa, to the [facility]. Please see a copy of my visit note below.    [facility]   Return Patient Visit    Name: Saeid Santa  MRN: [id number]  Date of visit: Dec 12, 2023    Diagnosis: Metastatic melanoma  Stage:    Cancer Staging   No matching staging information was found for the patient.    Molecular: BRAF G466A, TMB 48.7554 mut/Mb    Performance status: ECOG 0    Oncology History:  --8/16/22, he brings a left parietal scalp lesion to attention of his GP. It is initially observed.  --November 2022, the left parietal scalp lesion was felt to be a cyst, and the left parietal lesion was lanced and drained via scalp incision.  --January 2023, the left parietal scalp cyst would occasionally break open, drain, and bleed, largely at night.   --February 2023, he notes some mild difficulty clearing obstacles with right leg and foot.  --March/April 2023, he has 3 progressively worsening episodes over a span 3 weeks including the right leg. At first he notes the onset of right leg numbness and heaviness, like he was carrying 50 lbs of water on the leg. Then, about a week or so later, the leg began to jerk and converse while he was in the basement. Neither episode associated with loss of consciousness  --4/15/23, he recalls the right leg numbness, heaviness, jerking while driving between work. He had been found by coworkers in the parking lot, sitting in the grass, appearing confused. He was brought to the ER by EMS. CT-CAP, showed small (<6mm), bilateral indeterminate pulmonary nodules. CT-head and MRI brain showed 2 discrete ring-enhancing lesions in the left paramedian posterior frontal lobe near the precentral gyrus measuring approximately 2.2 x 1.7 x 1.9 cm and left occipital lobe near the lingual gyrus  measuring approximately 2.2 x 2.1 x 2.0 cm. The occipital lesion encroaches upon the subependymal surface of the left lateral ventricle occipital horn and contacts the superior surface of the medial left tentorium. The lesions are accompanied by moderate surrounding vasogenic edema. Surgery was recommended.  --4/21/23, underwent left awake (sleep-awake-sleep) craniotomy for tumor resection, he has resection of the left frontal, motor strip tumor as well asan elliptical incision of the left parietal scalp lesion. On pathology, both the scalp excision and brain mass are positive for malignant melanoma. Tumor cells are positive for S-100 and Melan-A, and negative for cytokeratin AE1/AE3, NKX3.1, GFAP, ERG, CD3, and CD20.  CD3 highlights brisk infiltration of the tumor by T lymphocytes, while CD20 highlights scattered positive B lymphocytes. NGS identifies a BRAF G466A mutation, a class 3 mutation insensitive to BRAF kinase inhibitor monotherapy. TMB is high at 48.754 muts/Mb.   --4/22/23, post-operative MRI shows post-operative changes of the parietal craniotomy of the left frontal lobe metastasis. The other 1.7 x 1.1 cm cystic lesion in the left occipital lobe is also seen. No new lesions appreciated.  -- 4/25/23 to 5/9/23, discharged from the hospital to acute rehab stay at [facility] in [place]. He participated in a comprehensive rehabilitation program consisting of PT, OT, Speech, Psychology and Recreation Therapy. He did well and was discharged home with use of a walker.  --PET-CT on 5/18/23 with potentially involved neck lymph nodes. He has mild FDG uptake in soft tissue nodules in the scalp, in the known left occipital brain metastasis, as well as in the left lower neck region.  -5/24-/5/31/23, He was treated with gamma knife: 2500 cGy to the left resection cavity; 3000 cGy to the L parietal-occipital lesion.  -6/7/2023, Cycle 1 of pembrolizumab 200mg every 3 weeks  -6/14/23-6/16/23, admitted for vision change,  reduced comprehension, speech change, fall and headache. MRI showing likely progression of left occipital mass with associated edema. Improved on steroids and discharged on oral decadron. Plan for repeat MRI and neurosurgery follow up in 1 month.  -7/7/23 ED visit for headaches, confusion, vision changes, concern for hemorrhage of the left occipital mass, admitted to [facility] until 7/9/23, increased dexamethasone dose  -7/14/23 craniotomy of the left parieto-occipital mass, discharged on a dexamethasone taper  -8/11/2023, Cycle 2 of pembrolizumab  -8/23/2023 brain MRI with no recurrence in left frontal lobe, decreased enhancement at edges of left occipital resection cavity, new enhancement int he right front lobe concerning for new met--seen by neurosurgery and considering RT  -8/31/2023 CT/PET with increased uptake in left lower neck lymph node c/f early progression. Ground-glass, reticular changes in lungs also noted likely secondary to drug-induced pneumonitis. Started 1 mg/kg prednisone (80 mg daily) with 8 week taper  -9/8/23 GK to R frontal lobe lesion    Interval History:    12/7/23 CT neck: Resolved previously hypermetabolic left level 5 lymph node. No new  cervical lymphadenopathy.    12/7/23 CT chest/AP: Two arterially enhancing lesions in the liver measuring 0.9 - 1.0 cm were not previously visualized, likely due to differences in timing of the contrast bolus. These are indeterminate but favored to be benign perfusional abnormalities. Melanoma metastases cannot be entirely excluded. Otherwise, no evidence for metastatic disease in the chest, abdomen and pelvis. Significant improvement in linear opacities in the lungs, suggesting improving drug-induced pneumonitis. Few stable small pulmonary nodules.    12/7/23 MR head: Progression in intracranial metastatic disease compared to 9/8/2023   with multiple new and/or enlarging metastases, including internally hemorrhagic right frontal lobe lesion with 3 mm of  leftward midline shift and new lesion adjacent to the left occipital resection bed.     Seen by Rad Onc  12/8/23 started on dex 4 mg BID    Generally feels well  No focal neurologic complaints, if anything his gait continues to improve. Improved control over RLE. Mild headache the past couple of days. Has not yet started his steroid. Mild fatigue.   No cough or shortness of breath. No new masses in the neck.     Reviewed his imaging findings today in person. Continued response systemically despite CNS progression.       Exam:   /75 (BP Location: Right arm, Patient Position: Sitting, Cuff Size: Adult Regular)   Pulse 98   Temp 98.5  F (36.9  C) (Oral)   Resp 16   Wt 94.4 kg (208 lb 3.2 oz)   SpO2 99%   BMI 27.47 kg/m      Gen: interactive, NAD  HEENT: Pupils equal, non-icteric  CV: Regular, well perfused  Resp: Comfortable on room air, no increased work  Abd: Non-distended  Ext: No swelling  Neuro: Gait assisted with cane      Labs:   Reviewed in chart. Key findings include TSH of 1.45.     Imaging:   All pertinent imaging studies personally reviewed, esteves findings included in oncology history above    12/7/23 CT neck:  Impression:  1. Resolved previously hypermetabolic left level 5 lymph node. No new  cervical lymphadenopathy.  2. Multiple intracranial metastatic lesions, better evaluated on  same-day brain MRI.    12/7/23 CT chest/AP:  IMPRESSION:  1.  Two arterially enhancing lesions in the liver measuring 0.9 - 1.0 cm were not previously visualized, likely due to differences in timing of the contrast bolus. These are indeterminate but favored to be benign perfusional abnormalities. Melanoma   metastases cannot be entirely excluded. Short interval follow-up CT or liver MRI can be considered for further evaluation.  2.  Otherwise, no evidence for metastatic disease in the chest, abdomen and pelvis.  3.  Significant improvement in linear opacities in the lungs, suggesting improving drug-induced  pneumonitis.  4.  Few stable small pulmonary nodules.    12/7/23 MR head:  Impression:   Progression in intracranial metastatic disease compared to 9/8/2023   with multiple new and/or enlarging metastases, including internally   hemorrhagic right frontal lobe lesion with 3 mm of leftward midline   shift and new lesion adjacent to the left occipital resection bed.   Vasogenic edema associated with multiple lesions.  Some of the new   irregular areas of enhancement in the left occipital lobe has an   appearance that can be seen with radiation-induced changes.       Pathology:   4/21/23 L frontal lobe tumor:  Final Diagnosis   A-B. Brain, left frontal brain tumor, biopsy and resection:     - Malignant melanoma. See comment.     C. Skin, left parietal scalp, excision:     - Malignant melanoma. See comment.     - Tumor measures 10 mm in greatest dimension on histologic slides.     - Deep and lateral margins are involved by tumor.     Both the scalp excision and brain mass are positive for malignant melanoma, though neither lesion represents a definitive primary melanoma. Clinical evaluation to exclude a different potential primary site is recommended.     The scalp lesion appears histologically as a well-circumscribed nodule based in the dermis with epidermotropism and in the absence of an epidermal melanoma in situ, suggesting the possibility that this represents a cutaneous metastasis rather than a true primary invasive melanoma of the scalp. The brain mass is favored to represent a metastatic rather than primary melanoma, as multiple discrete intracranial lesions are present on brain MRI and primary intracranial melanoma is rare. NGS study is pending and the results will be reported in an addendum.    Microscopic Description    Parts A and B: Histologic sections show a high-grade neoplasm with tumor cells showing significant variation in nuclear size and shape.  Prominent nucleoli are present in numerous tumor cells.   There is no cytoplasmic pigment within tumor cells.  The tumor is associated with significant hemorrhage, and it shows a pushing border with the adjacent nonneoplastic brain parenchyma.  By immunohistochemistry, tumor cells are positive for S-100 and Melan-A, while they are negative for cytokeratin AE1/AE3, NKX3.1, GFAP, ERG, CD3, and CD20.  CD3 highlights brisk infiltration of the tumor by T lymphocytes, while CD20 highlights scattered positive B lymphocytes.  All immunohistochemistry is performed on block B2 with appropriate controls.  These findings support the intraoperative impression.     Part C: Histologic sections show a dermal nodule of malignant tumor cells with severe cytologic atypia, prominent nucleoli, and at least a portion of tumor which show spindle cell morphology.  There are more dispersed tumor cells outside of the main body of this nodule superficially.  There is epidermal hyperplasia of the overlying epidermis with mild melanocytic hyperplasia without significant atypia.  There is disruption between a portion of hyperplastic epidermis and the tumor nodule in block C3, but no connection of the tumor nodule to the epidermis or epidermal melanoma in situ is identified after evaluation of multiple deeper levels.  By immunohistochemistry, tumor cells are positive for S100 and Melan-A, while they are negative for CD34, ERG, cytokeratin AE1/AE3, NKX3.1, and CD20.         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 Started pembrolizumab 6/7/23. Underwent second craniotomy for L parieto-occipital lesion resection in 7/14/23 2/2 hemorrhagic conversion and GK to R frontal lesion 9/8/23, on pembrolizumab, course c/b ICI pneumonitis necessitating  steroids, now with imaging showing CNS progression.      # metastatic melanoma with metastatic disease to brain  # checkpoint inhibitor pneumonitis  -presented with de-francisca CNS involvement s/p craniotomy x2 and GK to additional R frontal lesion  -TMB high, BRAF mutated but not V600E  -s/p pembrolizumab x2 (first 6/7/23)  -most recent PET/CT showed bilateral FDG avid ground glass and reticular infiltrates favored to represent pneumonitis, started on steroids, now completed taper  -of note, denies any history of pulmonary symptoms at the time of pneumonitis diagnosis c/w grade 1-2 disease  -now with CNS progression, ongoing response systemically, improved pneumonitis radiographically (never symptomatic)    Plan  --agree with surgical resection of the large R frontal lesion  --agree with dexamethasone 4 mg BID for now, would plan to rapidly taper postoperatively given asymptomatic of this mass as this time  --plan to start single agent nivolumab shortly after surgery, monitoring closely for recurrent pneumonitis  --if tolerating immunotherapy, could consider escalation with addition of either relatlimab or ipilimumab down the line    RTC prior to new start nivolumab      # Liver lesions  -12/7 CT AP showed two indeterminate liver lesions not previously appreciated, favored to be benign but cannot exclude metastases    Plan:  --trend with surveillance scans      # Fatigue  -TSH normal  -somewhat improved today    Plan:  --monitor  --if worsening consider adrenal insufficiency workup        Carlos Manuel Bunn MD PhD   of Medicine  Division of Hematology, Oncology and Transplantation    ---  80 minutes were spent on the date of the encounter performing chart review, history and exam, documentation, and further activities as noted above.

## 2023-12-12 NOTE — NURSING NOTE
"Oncology Rooming Note    December 12, 2023 1:15 PM   Saeid Santa is a 68 year old male who presents for:    Chief Complaint   Patient presents with    Oncology Clinic Visit     Metastatic malignant melanoma     Initial Vitals: /75 (BP Location: Right arm, Patient Position: Sitting, Cuff Size: Adult Regular)   Pulse 98   Temp 98.5  F (36.9  C) (Oral)   Resp 16   Wt 94.4 kg (208 lb 3.2 oz)   SpO2 99%   BMI 27.47 kg/m   Estimated body mass index is 27.47 kg/m  as calculated from the following:    Height as of 12/11/23: 1.854 m (6' 1\").    Weight as of this encounter: 94.4 kg (208 lb 3.2 oz). Body surface area is 2.21 meters squared.  No Pain (0) Comment: Data Unavailable   No LMP for male patient.  Allergies reviewed: Yes  Medications reviewed: Yes    Medications: Medication refills not needed today.  Pharmacy name entered into S[facility]:    [facility] - [place], [place] - [address] [facility] - [place], [place] - [address] [facility] [address] [facility]  [facility] - [place], [place] - [address]    Clinical concerns: Patient states there are no new concerns to discuss with provider.       Madeline Bean              "

## 2023-12-12 NOTE — PROGRESS NOTES
[facility]   Return Patient Visit    Name: Saeid Santa  MRN: [id number]  Date of visit: Dec 12, 2023    Diagnosis: Metastatic melanoma  Stage:    Cancer Staging   No matching staging information was found for the patient.    Molecular: BRAF G466A, TMB 48.7554 mut/Mb    Performance status: ECOG 0    Oncology History:  --8/16/22, he brings a left parietal scalp lesion to attention of his GP. It is initially observed.  --November 2022, the left parietal scalp lesion was felt to be a cyst, and the left parietal lesion was lanced and drained via scalp incision.  --January 2023, the left parietal scalp cyst would occasionally break open, drain, and bleed, largely at night.   --February 2023, he notes some mild difficulty clearing obstacles with right leg and foot.  --March/April 2023, he has 3 progressively worsening episodes over a span 3 weeks including the right leg. At first he notes the onset of right leg numbness and heaviness, like he was carrying 50 lbs of water on the leg. Then, about a week or so later, the leg began to jerk and converse while he was in the basement. Neither episode associated with loss of consciousness  --4/15/23, he recalls the right leg numbness, heaviness, jerking while driving between work. He had been found by coworkers in the parking lot, sitting in the grass, appearing confused. He was brought to the ER by EMS. CT-CAP, showed small (<6mm), bilateral indeterminate pulmonary nodules. CT-head and MRI brain showed 2 discrete ring-enhancing lesions in the left paramedian posterior frontal lobe near the precentral gyrus measuring approximately 2.2 x 1.7 x 1.9 cm and left occipital lobe near the lingual gyrus measuring approximately 2.2 x 2.1 x 2.0 cm. The occipital lesion encroaches upon the subependymal surface of the left lateral ventricle occipital horn and contacts the superior surface of the medial left tentorium. The lesions are accompanied by  moderate surrounding vasogenic edema. Surgery was recommended.  --4/21/23, underwent left awake (sleep-awake-sleep) craniotomy for tumor resection, he has resection of the left frontal, motor strip tumor as well asan elliptical incision of the left parietal scalp lesion. On pathology, both the scalp excision and brain mass are positive for malignant melanoma. Tumor cells are positive for S-100 and Melan-A, and negative for cytokeratin AE1/AE3, NKX3.1, GFAP, ERG, CD3, and CD20.  CD3 highlights brisk infiltration of the tumor by T lymphocytes, while CD20 highlights scattered positive B lymphocytes. NGS identifies a BRAF G466A mutation, a class 3 mutation insensitive to BRAF kinase inhibitor monotherapy. TMB is high at 48.754 muts/Mb.   --4/22/23, post-operative MRI shows post-operative changes of the parietal craniotomy of the left frontal lobe metastasis. The other 1.7 x 1.1 cm cystic lesion in the left occipital lobe is also seen. No new lesions appreciated.  -- 4/25/23 to 5/9/23, discharged from the hospital to acute rehab stay at [facility] in [place]. He participated in a comprehensive rehabilitation program consisting of PT, OT, Speech, Psychology and Recreation Therapy. He did well and was discharged home with use of a walker.  --PET-CT on 5/18/23 with potentially involved neck lymph nodes. He has mild FDG uptake in soft tissue nodules in the scalp, in the known left occipital brain metastasis, as well as in the left lower neck region.  -5/24-/5/31/23, He was treated with gamma knife: 2500 cGy to the left resection cavity; 3000 cGy to the L parietal-occipital lesion.  -6/7/2023, Cycle 1 of pembrolizumab 200mg every 3 weeks  -6/14/23-6/16/23, admitted for vision change, reduced comprehension, speech change, fall and headache. MRI showing likely progression of left occipital mass with associated edema. Improved on steroids and discharged on oral decadron. Plan for repeat MRI and neurosurgery follow up in 1  month.  -7/7/23 ED visit for headaches, confusion, vision changes, concern for hemorrhage of the left occipital mass, admitted to [facility] until 7/9/23, increased dexamethasone dose  -7/14/23 craniotomy of the left parieto-occipital mass, discharged on a dexamethasone taper  -8/11/2023, Cycle 2 of pembrolizumab  -8/23/2023 brain MRI with no recurrence in left frontal lobe, decreased enhancement at edges of left occipital resection cavity, new enhancement int he right front lobe concerning for new met--seen by neurosurgery and considering RT  -8/31/2023 CT/PET with increased uptake in left lower neck lymph node c/f early progression. Ground-glass, reticular changes in lungs also noted likely secondary to drug-induced pneumonitis. Started 1 mg/kg prednisone (80 mg daily) with 8 week taper  -9/8/23 GK to R frontal lobe lesion    Interval History:    12/7/23 CT neck: Resolved previously hypermetabolic left level 5 lymph node. No new  cervical lymphadenopathy.    12/7/23 CT chest/AP: Two arterially enhancing lesions in the liver measuring 0.9 - 1.0 cm were not previously visualized, likely due to differences in timing of the contrast bolus. These are indeterminate but favored to be benign perfusional abnormalities. Melanoma metastases cannot be entirely excluded. Otherwise, no evidence for metastatic disease in the chest, abdomen and pelvis. Significant improvement in linear opacities in the lungs, suggesting improving drug-induced pneumonitis. Few stable small pulmonary nodules.    12/7/23 MR head: Progression in intracranial metastatic disease compared to 9/8/2023   with multiple new and/or enlarging metastases, including internally hemorrhagic right frontal lobe lesion with 3 mm of leftward midline shift and new lesion adjacent to the left occipital resection bed.     Seen by Rad Onc  12/8/23 started on dex 4 mg BID    Generally feels well  No focal neurologic complaints, if anything his gait continues to improve.  Improved control over RLE. Mild headache the past couple of days. Has not yet started his steroid. Mild fatigue.   No cough or shortness of breath. No new masses in the neck.     Reviewed his imaging findings today in person. Continued response systemically despite CNS progression.       Exam:   /75 (BP Location: Right arm, Patient Position: Sitting, Cuff Size: Adult Regular)   Pulse 98   Temp 98.5  F (36.9  C) (Oral)   Resp 16   Wt 94.4 kg (208 lb 3.2 oz)   SpO2 99%   BMI 27.47 kg/m      Gen: interactive, NAD  HEENT: Pupils equal, non-icteric  CV: Regular, well perfused  Resp: Comfortable on room air, no increased work  Abd: Non-distended  Ext: No swelling  Neuro: Gait assisted with cane      Labs:   Reviewed in chart. Key findings include TSH of 1.45.     Imaging:   All pertinent imaging studies personally reviewed, esteves findings included in oncology history above    12/7/23 CT neck:  Impression:  1. Resolved previously hypermetabolic left level 5 lymph node. No new  cervical lymphadenopathy.  2. Multiple intracranial metastatic lesions, better evaluated on  same-day brain MRI.    12/7/23 CT chest/AP:  IMPRESSION:  1.  Two arterially enhancing lesions in the liver measuring 0.9 - 1.0 cm were not previously visualized, likely due to differences in timing of the contrast bolus. These are indeterminate but favored to be benign perfusional abnormalities. Melanoma   metastases cannot be entirely excluded. Short interval follow-up CT or liver MRI can be considered for further evaluation.  2.  Otherwise, no evidence for metastatic disease in the chest, abdomen and pelvis.  3.  Significant improvement in linear opacities in the lungs, suggesting improving drug-induced pneumonitis.  4.  Few stable small pulmonary nodules.    12/7/23 MR head:  Impression:   Progression in intracranial metastatic disease compared to 9/8/2023   with multiple new and/or enlarging metastases, including internally   hemorrhagic right  frontal lobe lesion with 3 mm of leftward midline   shift and new lesion adjacent to the left occipital resection bed.   Vasogenic edema associated with multiple lesions.  Some of the new   irregular areas of enhancement in the left occipital lobe has an   appearance that can be seen with radiation-induced changes.       Pathology:   4/21/23 L frontal lobe tumor:  Final Diagnosis   A-B. Brain, left frontal brain tumor, biopsy and resection:     - Malignant melanoma. See comment.     C. Skin, left parietal scalp, excision:     - Malignant melanoma. See comment.     - Tumor measures 10 mm in greatest dimension on histologic slides.     - Deep and lateral margins are involved by tumor.     Both the scalp excision and brain mass are positive for malignant melanoma, though neither lesion represents a definitive primary melanoma. Clinical evaluation to exclude a different potential primary site is recommended.     The scalp lesion appears histologically as a well-circumscribed nodule based in the dermis with epidermotropism and in the absence of an epidermal melanoma in situ, suggesting the possibility that this represents a cutaneous metastasis rather than a true primary invasive melanoma of the scalp. The brain mass is favored to represent a metastatic rather than primary melanoma, as multiple discrete intracranial lesions are present on brain MRI and primary intracranial melanoma is rare. NGS study is pending and the results will be reported in an addendum.    Microscopic Description    Parts A and B: Histologic sections show a high-grade neoplasm with tumor cells showing significant variation in nuclear size and shape.  Prominent nucleoli are present in numerous tumor cells.  There is no cytoplasmic pigment within tumor cells.  The tumor is associated with significant hemorrhage, and it shows a pushing border with the adjacent nonneoplastic brain parenchyma.  By immunohistochemistry, tumor cells are positive for S-100  and Melan-A, while they are negative for cytokeratin AE1/AE3, NKX3.1, GFAP, ERG, CD3, and CD20.  CD3 highlights brisk infiltration of the tumor by T lymphocytes, while CD20 highlights scattered positive B lymphocytes.  All immunohistochemistry is performed on block B2 with appropriate controls.  These findings support the intraoperative impression.     Part C: Histologic sections show a dermal nodule of malignant tumor cells with severe cytologic atypia, prominent nucleoli, and at least a portion of tumor which show spindle cell morphology.  There are more dispersed tumor cells outside of the main body of this nodule superficially.  There is epidermal hyperplasia of the overlying epidermis with mild melanocytic hyperplasia without significant atypia.  There is disruption between a portion of hyperplastic epidermis and the tumor nodule in block C3, but no connection of the tumor nodule to the epidermis or epidermal melanoma in situ is identified after evaluation of multiple deeper levels.  By immunohistochemistry, tumor cells are positive for S100 and Melan-A, while they are negative for CD34, ERG, cytokeratin AE1/AE3, NKX3.1, and CD20.         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 Started pembrolizumab 6/7/23. Underwent second craniotomy for L parieto-occipital lesion resection in 7/14/23 2/2 hemorrhagic conversion and GK to R frontal lesion 9/8/23, on pembrolizumab, course c/b ICI pneumonitis necessitating steroids, now with imaging showing CNS progression.      # metastatic melanoma with metastatic disease to brain  # checkpoint inhibitor pneumonitis  -presented with de-francisca CNS involvement s/p craniotomy x2 and GK to additional R frontal  lesion  -TMB high, BRAF mutated but not V600E  -s/p pembrolizumab x2 (first 6/7/23)  -most recent PET/CT showed bilateral FDG avid ground glass and reticular infiltrates favored to represent pneumonitis, started on steroids, now completed taper  -of note, denies any history of pulmonary symptoms at the time of pneumonitis diagnosis c/w grade 1-2 disease  -now with CNS progression, ongoing response systemically, improved pneumonitis radiographically (never symptomatic)    Plan  --agree with surgical resection of the large R frontal lesion  --agree with dexamethasone 4 mg BID for now, would plan to rapidly taper postoperatively given asymptomatic of this mass as this time  --plan to start single agent nivolumab shortly after surgery, monitoring closely for recurrent pneumonitis  --if tolerating immunotherapy, could consider escalation with addition of either relatlimab or ipilimumab down the line    RTC prior to new start nivolumab      # Liver lesions  -12/7 CT AP showed two indeterminate liver lesions not previously appreciated, favored to be benign but cannot exclude metastases    Plan:  --trend with surveillance scans      # Fatigue  -TSH normal  -somewhat improved today    Plan:  --monitor  --if worsening consider adrenal insufficiency workup        Carlos Manuel Bunn MD PhD   of Medicine  Division of Hematology, Oncology and Transplantation    ---  80 minutes were spent on the date of the encounter performing chart review, history and exam, documentation, and further activities as noted above.

## 2023-12-13 NOTE — TELEPHONE ENCOUNTER
Patient is scheduled for surgery 12/21/23. Gave Spouse surgery details and she verbalized understanding

## 2023-12-13 NOTE — TELEPHONE ENCOUNTER
Patient and spouse called in and stated that they would like to take 12/20 for surgery . Advised them I would try to get scheduled and give them a call back to confirm with surgery details.

## 2023-12-13 NOTE — TELEPHONE ENCOUNTER
Called patient's phone # and it went straight to voice mail so I left message to call back and schedule. Called spouse's # and left voice mail to call back when ready to schedule.

## 2023-12-14 NOTE — PROGRESS NOTES
09/13/23 0500   Appointment Info   Signing clinician's name / credentials Che Woods PT DPT   Total/Authorized Visits [employer] soc 5/26/23   Visits Used 3   Medical Diagnosis R hemiparesis; s/p tumor resection   PT Tx Diagnosis R hemiparesis   Quick Adds Certification   Progress Note/Certification   Start of Care Date 05/26/23   Onset of illness/injury or Date of Surgery 04/21/23   Therapy Frequency 1x/wk   Predicted Duration 12 weeks   Certification date from 05/26/23   Certification date to 08/18/23   Progress Note Completed Date 08/30/23   GOALS   PT Goals 2;3;4   PT Goal 1   Goal Description ambulate community distances with no AFO or assistive device   Rationale to maximize safety and independence within the community   Target Date 11/08/23   PT Goal 2   Goal Description UP/Down full flight of stairs reciprocal pattern no rail to carry items in/out of home and upstairs   Rationale to maximize safety and independence within the home   Goal Progress 50% reciprocal pattern; moderate reliance on rail   Target Date 11/08/23   PT Goal 3   Goal Description increase R LE strength 5/5 (HF; KE ;HE; HABD; DF; PF) to return to previous activity level and lifestyle   Rationale to maximize safety and independence within the community   Target Date 11/08/23   PT Goal 4   Goal Description Independent HEP to continue stretching and strengthening on own   Rationale to maximize safety and independence with performance of ADLs and functional tasks   Target Date 11/08/23   Therapeutic Procedure/Exercise   Therapeutic Procedures: strength, endurance, ROM, flexibillity minutes (12388) 40   Skilled Intervention review SLR  x 10 bilateral; clam, reverse clam all x 10; heel raise L x 5 reps; R up on both down on R x 10 reps; gastroc stretch; NuStep x 10'  wkld 5-6, 60 spm   Patient Response/Progress increased strength since last week; OMNI 5-6 on NuStep   Therapeutic Activity   Therapeutic Activities: dynamic  activities to improve functional performance minutes (24227) 15   Ther Act 1 - Details crossing over front and back with CGA, more challenging going to L; tandem on floor- and heel toe; best effort is 6 steps tandem   Skilled Intervention balance and coordination activities to improve dynamic safety at home and community   Patient Response/Progress R knee wobblier when doing balance work after strengthening   Education   Learner/Method Patient   Education Comments [id number]   Plan   Home program added above, continue previous   Updates to plan of care 1x/wk   Plan for next session stand to sit; HF/Q   Total Session Time   Timed Code Treatment Minutes 55   Total Treatment Time (sum of timed and untimed services) 55         DISCHARGE  Reason for Discharge: Patient has failed to schedule further appointments.    Equipment Issued: none     Discharge Plan: Patient to continue home program.    Referring Provider:  Jermaine Rader

## 2023-12-15 NOTE — TELEPHONE ENCOUNTER
Reason for Call:  Other appointment    Detailed comments: patient called and needs a pre op physical at [facility] [place]  today or Monday.    Reason:  Surgery brain 12-19    Please contact patient today.  Thank you.    Phone Number Patient can be reached at: [phone]     Best Time: any    Can we leave a detailed message on this number? YES    Call taken on 12/15/2023 at 10:13 AM by Betty Ramos

## 2023-12-18 NOTE — PROGRESS NOTES
[facility]  [address] [facility] [address]  Phone: [phone]  Primary Provider: Rock Grajeda  Pre-op Performing Provider: IVETT ALBARADO      PREOPERATIVE EVALUATION:  Today's date: 12/18/2023    Tim is a 68 year old, presenting for the following:  Pre-Op Exam        12/18/2023     8:15 AM   Additional Questions   Roomed by Melita BAXTER   Accompanied by self       Surgical Information:  Surgery/Procedure: CRANIOTOMY, USING OPTICAL TRACKING SYSTEM, WITH NEOPLASM EXCISION, RIGHT FRONTAL CRANOTOMY WITH RESECTION OF TUMOR   Surgery Location: [facility]  Surgeon: Dr. Garrido  Surgery Date: 12/21/23  Time of Surgery: 7:30am  Where patient plans to recover: At home with family  Fax number for surgical facility: Note does not need to be faxed, will be available electronically in Epic.    Assessment & Plan     The proposed surgical procedure is considered INTERMEDIATE risk.    Preop general physical exam  METS >4  - CBC with platelets; Future  - Basic metabolic panel  (Ca, Cl, CO2, Creat, Gluc, K, Na, BUN); Future    Metastatic malignant melanoma (H)  Planned procedure to address.  - Comprehensive metabolic panel  - CBC with Platelets & Differential    JUSTEN (obstructive sleep apnea)  Not currently using CPAP, denies recent daytime sleepiness, snoring, or reported apneic periods.    Hyperlipidemia LDL goal <100  On statin    Seizure disorder (H)  Stable.    Primary hypertension  Well controlled.      - No identified additional risk factors other than previously addressed    Antiplatelet or Anticoagulation Medication Instructions:   - Patient is on no antiplatelet or anticoagulation medications.    Additional Medication Instructions:   - Calcium Channel Blockers: May be continued on the day of surgery.   - Statins: Continue taking on the day of surgery.    - Antiepileptics: Continue without modification.    RECOMMENDATION:  APPROVAL GIVEN to proceed with proposed  procedure, without further diagnostic evaluation.        Subjective       HPI related to upcoming procedure: history of metastatic melanoma with brain lesions, have been treated previously treated with surgery, gamma knife. New lesions, largest being to the right frontal lobe. Planned procedure to address.          12/18/2023     8:19 AM   Preop Questions   1. Have you ever had a heart attack or stroke? No   2. Have you ever had surgery on your heart or blood vessels, such as a stent placement, a coronary artery bypass, or surgery on an artery in your head, neck, heart, or legs? No   3. Do you have chest pain with activity? No   4. Do you have a history of  heart failure? No   5. Do you currently have a cold, bronchitis or symptoms of other infection? No   6. Do you have a cough, shortness of breath, or wheezing? No   7. Do you or anyone in your family have previous history of blood clots? No   8. Do you or does anyone in your family have a serious bleeding problem such as prolonged bleeding following surgeries or cuts? No   9. Have you ever had problems with anemia or been told to take iron pills? No   10. Have you had any abnormal blood loss such as black, tarry or bloody stools? No   11. Have you ever had a blood transfusion? No   12. Are you willing to have a blood transfusion if it is medically needed before, during, or after your surgery? Yes   13. Have you or any of your relatives ever had problems with anesthesia? No   14. Do you have sleep apnea, excessive snoring or daytime drowsiness? YES -    14a. Do you have a CPAP machine? Yes   15. Do you have any artifical heart valves or other implanted medical devices like a pacemaker, defibrillator, or continuous glucose monitor? No   16. Do you have artificial joints? No   17. Are you allergic to latex? No     Health Care Directive:  Patient does not have a Health Care Directive or Living Will:     Preoperative Review of :   reviewed - no record of  controlled substances prescribed.      Status of Chronic Conditions:  HYPERLIPIDEMIA - Patient has a long history of significant Hyperlipidemia requiring medication for treatment with recent poor control. Patient reports no problems or side effects with the medication. Recently started medication.    HYPERTENSION - Patient has longstanding history of HTN , currently denies any symptoms referable to elevated blood pressure. Specifically denies chest pain, palpitations, dyspnea, orthopnea, PND or peripheral edema. Blood pressure readings have been in normal range. Current medication regimen is as listed below. Patient denies any side effects of medication.     Review of Systems  CONSTITUTIONAL: NEGATIVE for fever, chills, change in weight  INTEGUMENTARY/SKIN: NEGATIVE for worrisome rashes, moles or lesions  EYES: NEGATIVE for vision changes or irritation  ENT/MOUTH: NEGATIVE for ear, mouth and throat problems  RESP: NEGATIVE for significant cough or SOB  CV: NEGATIVE for chest pain, palpitations or peripheral edema  GI: NEGATIVE for nausea, abdominal pain, heartburn, or change in bowel habits  : NEGATIVE for frequency, dysuria, or hematuria  MUSCULOSKELETAL: NEGATIVE for significant arthralgias or myalgia  NEURO: NEGATIVE for weakness, dizziness or paresthesias  ENDOCRINE: NEGATIVE for temperature intolerance, skin/hair changes  HEME: NEGATIVE for bleeding problems  PSYCHIATRIC: NEGATIVE for changes in mood or affect    Patient Active Problem List    Diagnosis Date Noted    Neoplasm of brain causing mass effect and brain compression on adjacent structures (H) 07/07/2023     Priority: Medium    Cerebral edema (H) 06/14/2023     Priority: Medium    Metastatic malignant melanoma (H) 06/14/2023     Priority: Medium    Right hemiparesis (H) 05/26/2023     Priority: Medium    Malignant melanoma of scalp (H) 05/19/2023     Priority: Medium    Primary hypertension 04/27/2023     Priority: Medium    JUSTEN (obstructive sleep  apnea) 04/27/2023     Priority: Medium    Malignant neoplasm metastatic to brain (H) 04/27/2023     Priority: Medium    History of prostate cancer 04/27/2023     Priority: Medium    Brain tumor (H) 04/21/2023     Priority: Medium    Seizure disorder (H) 03/15/2023     Priority: Medium    Scalp cyst 08/16/2022     Priority: Medium      Past Medical History:   Diagnosis Date    HTN (hypertension)     Metastasis to brain (H) 2023    Metastatic melanoma (H)     Prostate cancer (H)     Seizures (H)      Past Surgical History:   Procedure Laterality Date    INSERT DRAIN LUMBAR N/A 7/14/2023    Procedure: LUMBAR DRAIN;  Surgeon: Kye Garrido MD;  Location: [facility] OR    OPTICAL TRACKING SYSTEM CRANIOTOMY, EXCISE TUMOR, COMBINED Left 04/21/2023    Procedure: stealth assisted awake craniotomy resection of motor strip tumor;  Surgeon: Kye Garrido MD;  Location: [facility] OR    OPTICAL TRACKING SYSTEM CRANIOTOMY, EXCISE TUMOR, COMBINED Left 7/14/2023    Procedure: STEALTH ASSISTED CRANIOTOMY, WITH NEOPLASM EXCISION LEFT PARIETO-OCCIPITAL CRANIOTOMY WITH RESECTION OF MASS,;  Surgeon: Kye Garrido MD;  Location: [facility] OR    PROSTATECTOMY      2009     Current Outpatient Medications   Medication Sig Dispense Refill    acetaminophen (TYLENOL) 325 MG tablet Take 2 tablets (650 mg) by mouth every 4 hours as needed for other (For optimal non-opioid multimodal pain management to improve pain control.) 15 tablet 0    amLODIPine (NORVASC) 10 MG tablet Take 1 tablet (10 mg) by mouth every morning 90 tablet 3    dexAMETHasone (DECADRON) 4 MG tablet Take 1 tablet (4 mg) by mouth 2 times daily (with meals) 20 tablet 0    indomethacin (INDOCIN) 25 MG capsule TAKE 1 CAPSULE BY MOUTH THREE TIMES DAILY AS DIRECTED 30 capsule 5    levETIRAcetam (KEPPRA) 1000 MG tablet 1 tablet Orally every 12 hrs 180 tablet 3    rosuvastatin (CRESTOR) 40 MG tablet Take 1 tablet (40 mg) by mouth daily 90 tablet 3       No Known Allergies     Social History     Tobacco  "Use    Smoking status: Former     Packs/day: 0.50     Years: 20.00     Additional pack years: 0.00     Total pack years: 10.00     Types: Cigarettes     Quit date: 2017     Years since quittin.9     Passive exposure: Current (Second hand exposure)    Smokeless tobacco: Never   Substance Use Topics    Alcohol use: Not Currently     Comment: socially     Family History   Problem Relation Age of Onset    Pancreatic Cancer Father     Diabetes Paternal Grandmother      History   Drug Use Unknown     Comment: typically smoked daily but no use for past two weeks as of 23         Objective     /74   Pulse 71   Temp 97.3  F (36.3  C) (Tympanic)   Resp 16   Ht 1.854 m (6' 1\")   Wt 94.3 kg (208 lb)   SpO2 100%   BMI 27.44 kg/m      Physical Exam    GENERAL APPEARANCE: healthy, alert and no distress     EYES: EOMI,  PERRL     HENT: ear canals and TM's normal and nose and mouth without ulcers or lesions     NECK: no adenopathy, no asymmetry, masses, or scars and thyroid normal to palpation     RESP: lungs clear to auscultation - no rales, rhonchi or wheezes     CV: regular rates and rhythm, normal S1 S2, no S3 or S4 and no murmur, click or rub     ABDOMEN:  soft, nontender, no HSM or masses and bowel sounds normal     MS: extremities normal- no gross deformities noted, no evidence of inflammation in joints, FROM in all extremities.     SKIN: no suspicious lesions or rashes     NEURO: Normal strength and tone, sensory exam grossly normal, mentation intact and speech normal     PSYCH: mentation appears normal. and affect normal/bright     LYMPHATICS: No cervical adenopathy    Recent Labs   Lab Test 23  1339 23  0818 23  1424 23  0510 23  1313 23  0437   HGB 12.0* 12.5*   < > 13.2*   < > 12.4*    317   < > 164   < > 239   INR  --   --   --  0.83*  --  1.06    139   < > 141   < > 140   POTASSIUM 4.5 4.1   < > 3.9   < > 4.2   CR 0.96 0.91   < > 1.05   < > 0.86 "    < > = values in this interval not displayed.        Diagnostics:  Labs pending at this time.  Results will be reviewed when available.  Recent Results (from the past 24 hour(s))   CBC with platelets and differential    Collection Time: 12/18/23  9:03 AM   Result Value Ref Range    WBC Count 12.9 (H) 4.0 - 11.0 10e3/uL    RBC Count 4.79 4.40 - 5.90 10e6/uL    Hemoglobin 14.0 13.3 - 17.7 g/dL    Hematocrit 43.5 40.0 - 53.0 %    MCV 91 78 - 100 fL    MCH 29.2 26.5 - 33.0 pg    MCHC 32.2 31.5 - 36.5 g/dL    RDW 13.4 10.0 - 15.0 %    Platelet Count 352 150 - 450 10e3/uL    % Neutrophils 84 %    % Lymphocytes 11 %    % Monocytes 3 %    % Eosinophils 0 %    % Basophils 0 %    % Immature Granulocytes 2 %    Absolute Neutrophils 10.9 (H) 1.6 - 8.3 10e3/uL    Absolute Lymphocytes 1.4 0.8 - 5.3 10e3/uL    Absolute Monocytes 0.3 0.0 - 1.3 10e3/uL    Absolute Eosinophils 0.0 0.0 - 0.7 10e3/uL    Absolute Basophils 0.0 0.0 - 0.2 10e3/uL    Absolute Immature Granulocytes 0.3 <=0.4 10e3/uL      No EKG required, no history of coronary heart disease, significant arrhythmia, peripheral arterial disease or other structural heart disease.    Revised Cardiac Risk Index (RCRI):  The patient has the following serious cardiovascular risks for perioperative complications:   - No serious cardiac risks = 0 points     RCRI Interpretation: 0 points: Class I (very low risk - 0.4% complication rate)         Signed Electronically by: ALIREZA Hughes CNP  Copy of this evaluation report is provided to requesting physician.

## 2023-12-20 NOTE — ANESTHESIA PREPROCEDURE EVALUATION
Anesthesia Pre-Procedure Evaluation    Patient: Saeid Santa   MRN: [id number] : 1955        Procedure : Procedure(s):  CRANIOTOMY, USING OPTICAL TRACKING SYSTEM, WITH NEOPLASM EXCISION, RIGHT FRONTAL CRANOTOMY WITH RESECTION OF TUMOR          Past Medical History:   Diagnosis Date    HTN (hypertension)     Metastasis to brain (H)     Metastatic melanoma (H)     Prostate cancer (H)     Seizures (H)       Past Surgical History:   Procedure Laterality Date    INSERT DRAIN LUMBAR N/A 2023    Procedure: LUMBAR DRAIN;  Surgeon: Kye Garrido MD;  Location: [facility] OR    OPTICAL TRACKING SYSTEM CRANIOTOMY, EXCISE TUMOR, COMBINED Left 2023    Procedure: stealth assisted awake craniotomy resection of motor strip tumor;  Surgeon: Kye Garrido MD;  Location: [facility] OR    OPTICAL TRACKING SYSTEM CRANIOTOMY, EXCISE TUMOR, COMBINED Left 2023    Procedure: STEALTH ASSISTED CRANIOTOMY, WITH NEOPLASM EXCISION LEFT PARIETO-OCCIPITAL CRANIOTOMY WITH RESECTION OF MASS,;  Surgeon: Kye Garrido MD;  Location: [facility] OR    PROSTATECTOMY      2009      No Known Allergies   Social History     Tobacco Use    Smoking status: Former     Packs/day: 0.50     Years: 20.00     Additional pack years: 0.00     Total pack years: 10.00     Types: Cigarettes     Quit date: 2017     Years since quittin.9     Passive exposure: Current (Second hand exposure)    Smokeless tobacco: Never   Substance Use Topics    Alcohol use: Not Currently     Comment: socially      Wt Readings from Last 1 Encounters:   23 94.3 kg (208 lb)        Anesthesia Evaluation   Pt has had prior anesthetic.     No history of anesthetic complications       ROS/MED HX  ENT/Pulmonary: Comment: Melanoma mets to brain    (+) sleep apnea, doesn't use CPAP,                                      Neurologic:       Cardiovascular:     (+) Dyslipidemia hypertension-range: controlled/ -   -  - -                                      METS/Exercise Tolerance:    "  Hematologic:       Musculoskeletal:       GI/Hepatic:    (-) GERD   Renal/Genitourinary:       Endo:       Psychiatric/Substance Use:       Infectious Disease:       Malignancy:   (+) Malignancy (metastatic melanoma),     Other:            Physical Exam    Airway        Mallampati: III   TM distance: > 3 FB   Neck ROM: full   Mouth opening: > 3 cm    Respiratory Devices and Support         Dental       (+) Minor Abnormalities - some fillings, tiny chips      Cardiovascular   cardiovascular exam normal          Pulmonary   pulmonary exam normal                OUTSIDE LABS:  CBC:   Lab Results   Component Value Date    WBC 12.9 (H) 12/18/2023    WBC 12.3 (H) 09/07/2023    HGB 14.0 12/18/2023    HGB 12.0 (L) 09/07/2023    HCT 43.5 12/18/2023    HCT 36.6 (L) 09/07/2023     12/18/2023     09/07/2023     BMP:   Lab Results   Component Value Date     12/18/2023     09/07/2023    POTASSIUM 3.9 12/18/2023    POTASSIUM 4.5 09/07/2023    CHLORIDE 106 12/18/2023    CHLORIDE 106 09/07/2023    CO2 20 (L) 12/18/2023    CO2 24 09/07/2023    BUN 23.2 (H) 12/18/2023    BUN 13.6 09/07/2023    CR 0.97 12/18/2023    CR 0.96 09/07/2023     (H) 12/18/2023    GLC 92 09/07/2023     COAGS:   Lab Results   Component Value Date    PTT 24 07/07/2023    INR 0.83 (L) 07/07/2023     POC: No results found for: \"BGM\", \"HCG\", \"HCGS\"  HEPATIC:   Lab Results   Component Value Date    ALBUMIN 4.3 12/18/2023    PROTTOTAL 7.3 12/18/2023    ALT 14 12/18/2023    AST 15 12/18/2023    ALKPHOS 74 12/18/2023    BILITOTAL 0.4 12/18/2023     OTHER:   Lab Results   Component Value Date    LACT 1.5 07/16/2023    JOHN PAUL 9.4 12/18/2023    PHOS 3.4 07/16/2023    MAG 2.2 07/16/2023    LIPASE 121 10/06/2021    TSH 1.46 12/07/2023    SED 64 (H) 08/09/2023       Anesthesia Plan    ASA Status:  4   NPO Status:  NPO Appropriate    Anesthesia Type: General.     - Airway: ETT   Induction: Intravenous.   Maintenance: Balanced.   Techniques and " "Equipment:     - Airway: Video-Laryngoscope     - Lines/Monitors: 2nd IV, Arterial Line     Consents    Anesthesia Plan(s) and associated risks, benefits, and realistic alternatives discussed. Questions answered and patient/representative(s) expressed understanding.    - Discussed:     - Discussed with:  Patient         Postoperative Care    Pain management: IV analgesics, Multi-modal analgesia.   PONV prophylaxis: Ondansetron (or other 5HT-3)     Comments:               Pérez Nguyen MD    I have reviewed the pertinent notes and labs in the chart from the past 30 days and (re)examined the patient.  Any updates or changes from those notes are reflected in this note.              # Overweight: Estimated body mass index is 27.44 kg/m  as calculated from the following:    Height as of 12/18/23: 1.854 m (6' 1\").    Weight as of 12/18/23: 94.3 kg (208 lb).    "

## 2023-12-21 PROBLEM — Z98.890 S/P CRANIOTOMY: Status: ACTIVE | Noted: 2023-01-01

## 2023-12-21 NOTE — PLAN OF CARE
Problem: Adult Inpatient Plan of Care  Goal: Plan of Care Review  Description: The Plan of Care Review/Shift note should be completed every shift.  The Outcome Evaluation is a brief statement about your assessment that the patient is improving, declining, or no change.  This information will be displayed automatically on your shift  note.  Outcome: Progressing  Flowsheets (Taken 12/21/2023 0665)  Outcome Evaluation: Patient to ICU around 1300. Nuero exam at baseline. Pain controlled with regimen. Tolerating PO's. Aragon removed and voiding spontaneously. Wife updated  Plan of Care Reviewed With:   patient   spouse  Overall Patient Progress: improving     Problem: Adult Inpatient Plan of Care  Goal: Optimal Comfort and Wellbeing  Outcome: Progressing     Problem: Adult Inpatient Plan of Care  Goal: Readiness for Transition of Care  Outcome: Progressing     Goal Outcome Evaluation:      Plan of Care Reviewed With: patient, spouse    Overall Patient Progress: improvingOverall Patient Progress: improving    Outcome Evaluation: Patient to ICU around 1300. Nuero exam at baseline. Pain controlled with regimen. Tolerating PO's. Aragon removed and voiding spontaneously. Wife updated

## 2023-12-21 NOTE — ANESTHESIA PROCEDURE NOTES
Arterial Line Procedure Note    Pre-Procedure   Staff -        Anesthesiologist:  Pérez Nguyen MD       Performed By: anesthesiologist       Location: pre-op       Pre-Anesthestic Checklist: patient identified, IV checked, risks and benefits discussed, informed consent and pre-op evaluation  Timeout:       Correct Patient: Yes        Correct Procedure: Yes        Correct Site: Yes        Correct Position: Yes   Line Placement:   This line was placed Pre Induction starting at 12/21/2023 7:00 AM and ending at 12/21/2023 7:15 AM  Procedure   Procedure: arterial line       Laterality: left       Insertion Site: radial.  Sterile Prep        Standard elements of sterile barrier followed       Skin prep: Chloraprep  Insertion/Injection        Technique: ultrasound guided        1. Ultrasound was used to evaluate the access site.       2. Artery evaluated via ultrasound for patency/adequacy.       3. Using real-time ultrasound the needle/catheter was observed entering the artery/vein.       Catheter Type/Size: 20 gauge, 1.75 in/4.5 cm quick cath (integral wire)  Narrative         Secured by: other       Tegaderm dressing used.       Arterial waveform: Yes    Comments:  Ultrasound used for placement (no image)  Secured with adhesive spray, tegaderm, and tape    1st attempt catheter wouldn't thread.  2nd attempt more proximal successful

## 2023-12-21 NOTE — ANESTHESIA CARE TRANSFER NOTE
Patient: Saeid Santa    Procedure: Procedure(s):  CRANIOTOMY, USING OPTICAL TRACKING SYSTEM, WITH NEOPLASM EXCISION, RIGHT FRONTAL CRANOTOMY WITH RESECTION OF TUMOR       Diagnosis: Metastatic melanoma (H) [C43.9]  Diagnosis Additional Information: No value filed.    Anesthesia Type:   General     Note:    Oropharynx: oropharynx clear of all foreign objects and spontaneously breathing  Level of Consciousness: drowsy  Oxygen Supplementation: face mask    Independent Airway: airway patency satisfactory and stable  Dentition: dentition unchanged  Vital Signs Stable: post-procedure vital signs reviewed and stable  Report to RN Given: handoff report given  Patient transferred to: PACU    Handoff Report: Identifed the Patient, Identified the Reponsible Provider, Reviewed the pertinent medical history, Discussed the surgical course, Reviewed Intra-OP anesthesia mangement and issues during anesthesia, Set expectations for post-procedure period and Allowed opportunity for questions and acknowledgement of understanding      Vitals:  Vitals Value Taken Time   /75 12/21/23 1122   Temp     Pulse 89 12/21/23 1128   Resp 25 12/21/23 1128   SpO2 100 % 12/21/23 1128   Vitals shown include unfiled device data.    Electronically Signed By: ALIREZA Smith CRNA  December 21, 2023  11:30 AM

## 2023-12-21 NOTE — CONSULTS
[facility]  Consult Note - Hospitalist Service  Date of Admission:  12/21/2023  Consult Requested by: Neurosurgery  Reason for Consult: Medical Co-management    Assessment & Plan   Saeid Santa is a 68 year old male admitted to ICU on 12/21/2023 s/p elective right frontal craniotomy with neoplasm resection. Hospitalist consulted for medical co-management.     Metastatic malignant melanoma  H/o prostate cancer   S/p elective right frontal craniotomy with neoplasm resection 12/21/23  - Defer perioperative pharmacopropylaxis antibiotics to neurosurgery  - Tylenol 975 mg TID scheduled  - Oxycodone 5 mg q4h prn   - Dexamethasone 4 mg Q6H per neurosurgery  - Levetiracetam 1,000 mg bid per neurosurgery  - Post-operative MR Brain w/ and w/o contrast planned for 12/22/23  - Neurochecks per neurosurgery   - Ordered AM labs for tomorrow, CBC, BMP, Mag      Hypertension  Currently being monitored with Art line. BP ~120-130 systolic.   - PTA amlodipine 10 mg daily to start tomorrow  - labetolol prn for systolic blood pressure >150 mmHg    GERD  Hiccups  Has previously been on baclofen in the past. Will hold this due to lowered seizure threshold with baclofen and history of seizures. Hiccups currently resolved. Will start PPI for GERD, will also serve as ulcer prophylaxis while on steroids. If hiccups return, could consider trial of gabapentin.  - start protonix 40 mg daily   - hold pta baclofen, consider discontinuing on discharge     Sore Throat  Likely 2/2 to intubation  - prn cepacol lozenge ordered    History of JUSTEN  Not currently on CPAP    Hyperlipidemia  - Rosuvastatin 40 mg daily    Constipation  - Senna-docusate 8.6-50 mg bid     Leukocytosis   Pre-operative WBC 12.9.   - Daily CBC w/ platelets     Placed order for nurse bedside swallow evaluation with plan to advance to regular diet, low sodium.        Clinically Significant Risk Factors Present on Admission                  # Hypertension:  "Noted on problem list      # Overweight: Estimated body mass index is 27.44 kg/m  as calculated from the following:    Height as of this encounter: 1.854 m (6' 1\").    Weight as of this encounter: 94.3 kg (208 lb).         # Financial/Environmental Concerns:           Magnus Ruiz DO  Hospitalist Service  Securely message with [employer] (more info)  Text page via [employer] Paging/Directory   ______________________________________________________________________    Chief Complaint   Elective craniotomy     History is obtained from the patient    History of Present Illness   Saeid Santa is a 68 year old male with history of metastatic melanoma admitted s/p elective craniotomy and neoplasm resection.    Met patient after surgery in ICU. He reports to be doing well. Has a sore throat. Has some pain near craniotomy site. Endorses reflux when he eats and chronic hiccups. Hiccups currently resolved. No other symptoms. Denies chest pain, shortness of breath, nausea, vomiting, leg pain, leg swelling, vision changes, or any other symptoms.    Confirmed Full Code Status     PTA medication list below  - Tylenol 650 q4h prn  - Amlodipine 10 mg every morning  - Baclofen 10 mg bid prn for muscle spasm   - Dexamethasone 4 mg bid with meals   - Indomethacin 25 mg tid   - Levetiracetam 1,000 mg Q12H  - Rosuvastatin 40 mg daily     Past Medical History    Past Medical History:   Diagnosis Date    HTN (hypertension)     Metastasis to brain (H) 2023    Metastatic melanoma (H)     Mixed hyperlipidemia     Prostate cancer (H)     Seizures (H)     Sleep apnea      Past Surgical History   Past Surgical History:   Procedure Laterality Date    INSERT DRAIN LUMBAR N/A 7/14/2023    Procedure: LUMBAR DRAIN;  Surgeon: Kye Garrido MD;  Location:  OR    OPTICAL TRACKING SYSTEM CRANIOTOMY, EXCISE TUMOR, COMBINED Left 04/21/2023    Procedure: stealth assisted awake craniotomy resection of motor strip tumor;  Surgeon: Kye Garrido MD;  " Location: [facility] OR    OPTICAL TRACKING SYSTEM CRANIOTOMY, EXCISE TUMOR, COMBINED Left 2023    Procedure: STEALTH ASSISTED CRANIOTOMY, WITH NEOPLASM EXCISION LEFT PARIETO-OCCIPITAL CRANIOTOMY WITH RESECTION OF MASS,;  Surgeon: Kye Garrido MD;  Location: [facility] OR    PROSTATECTOMY      2009     Medications   I have reviewed this patient's current medications       Review of Systems    The 10 point Review of Systems is negative other than noted in the HPI or here.     Social History   I have reviewed this patient's social history and updated it with pertinent information if needed.  Social History     Tobacco Use    Smoking status: Former     Packs/day: 0.50     Years: 20.00     Additional pack years: 0.00     Total pack years: 10.00     Types: Cigarettes     Quit date: 2017     Years since quittin.9     Passive exposure: Current (Second hand exposure)    Smokeless tobacco: Never   Vaping Use    Vaping Use: Never used   Substance Use Topics    Alcohol use: Not Currently     Comment: socially    Drug use: Not Currently     Types: Marijuana     Comment: typically smoked daily but no use for past two weeks as of 23     Family History   I have reviewed this patient's family history and updated it with pertinent information if needed.  Family History   Problem Relation Age of Onset    Pancreatic Cancer Father     Diabetes Paternal Grandmother      Allergies   No Known Allergies     Physical Exam   Vital Signs: Temp: 97.9  F (36.6  C) Temp src: Temporal BP: 110/71 Pulse: 70   Resp: 18 SpO2: 98 % O2 Device: None (Room air)    Weight: 208 lbs 0 oz    General Appearance: No acute distress  Eyes: PERRLA   HEENT: Right frontal craniotomy site covered in bandage, no active bleeding   Respiratory: Lungs clear to auscultation, no wheezes, crackles, or rales   Cardiovascular: Regular rate and rhythm, no murmurs, rubs, or gallops  GI: Soft abdomen, bowel sounds present, no tenderness   Skin: Visualized skin without  rash  Musculoskeletal: No deformity   Neurologic: Alert and oriented x3, Moving all extremities. Bilateral upper and lower strength and sensation intact. CN2-12 intact    Psychiatric: Normal Affect    Medical Decision Making       75 MINUTES SPENT BY ME on the date of service doing chart review, history, exam, documentation & further activities per the note.      Data     I have personally reviewed the following data over the past 24 hrs:    N/A  \   N/A   / N/A     N/A N/A N/A /  115 (H)   4.0 N/A N/A \       Imaging results reviewed over the past 24 hrs:   No results found for this or any previous visit (from the past 24 hour(s)).    Magnus Ruiz, DO

## 2023-12-21 NOTE — PROGRESS NOTES
Neurosurgery progress note    POD0 CRANIOTOMY, USING OPTICAL TRACKING SYSTEM, WITH NEOPLASM EXCISION, RIGHT FRONTAL CRANOTOMY WITH RESECTION OF TUMOR with Dr. Garrido    Plan:   -ICU  -MRI brain w/wo in am  -Continue keppra 1g BID, patient has had seizures previously so will continue   -Decadron 4mg Q6hr, taper determined based on MRI results   -HOB greater than 30  -SBP < 150  -Antibiotics x 3 doses  -No drain  -Routine wound care  -Pain control  -PT/OT/Hospitalist consult     Discussed with Dr. Hema Bean PA-C  [facility]  [address] [address]

## 2023-12-21 NOTE — ANESTHESIA POSTPROCEDURE EVALUATION
Patient: Saeid Santa    Procedure: Procedure(s):  CRANIOTOMY, USING OPTICAL TRACKING SYSTEM, WITH NEOPLASM EXCISION, RIGHT FRONTAL CRANOTOMY WITH RESECTION OF TUMOR       Anesthesia Type:  General    Note:     Postop Pain Control: Uneventful            Sign Out: Well controlled pain   PONV: No   Neuro/Psych: Uneventful            Sign Out: Acceptable/Baseline neuro status   Airway/Respiratory: Uneventful            Sign Out: Acceptable/Baseline resp. status   CV/Hemodynamics: Uneventful            Sign Out: Acceptable CV status; No obvious hypovolemia; No obvious fluid overload   Other NRE: NONE   DID A NON-ROUTINE EVENT OCCUR? No           Last vitals:  Vitals Value Taken Time   BP 91/49 12/21/23 1210   Temp 36.6  C (97.9  F) 12/21/23 1132   Pulse 70 12/21/23 1359   Resp 18 12/21/23 1359   SpO2 98 % 12/21/23 1359   Vitals shown include unfiled device data.    Electronically Signed By: Pérez Nguyen MD  December 21, 2023  2:01 PM

## 2023-12-21 NOTE — PROGRESS NOTES
PTA medications completed by Medication Scribe day of surgery     Medication history sources: Patient, Surescripts, and H&P  In the past week, patient estimated taking medication this percent of the time: Greater than 90%      Significant changes made to the medication list:  None      Additional medication history information:   None    Medication reconciliation completed by provider prior to medication history? No    Time spent in this activity: 25 minutes    The information provided in this note is only as accurate as the sources available at the time of update(s)      Prior to Admission medications    Medication Sig Last Dose Taking? Auth Provider Long Term End Date   acetaminophen (TYLENOL) 325 MG tablet Take 2 tablets (650 mg) by mouth every 4 hours as needed for other (For optimal non-opioid multimodal pain management to improve pain control.) Unknown at prn Yes Abdullahi Mcmahon MD No    amLODIPine (NORVASC) 10 MG tablet Take 1 tablet (10 mg) by mouth every morning 12/21/2023 at am Yes Rock Grajeda MD Yes    baclofen (LIORESAL) 10 MG tablet Take 1 tablet by mouth 2 times daily as needed for muscle spasms 12/21/2023 at am Yes Reported, Patient No    dexAMETHasone (DECADRON) 4 MG tablet Take 1 tablet (4 mg) by mouth 2 times daily (with meals) 12/21/2023 at am Yes Benedicto Rose MD Yes    indomethacin (INDOCIN) 25 MG capsule TAKE 1 CAPSULE BY MOUTH THREE TIMES DAILY AS DIRECTED Unknown at prn Yes Rock Grajeda MD Yes    levETIRAcetam (KEPPRA) 1000 MG tablet 1 tablet Orally every 12 hrs 12/21/2023 at am Yes Rock Grajeda MD Yes    rosuvastatin (CRESTOR) 40 MG tablet Take 1 tablet (40 mg) by mouth daily 12/20/2023 at pm Yes Rock Grajeda MD Yes        Medication history completed by: Tonio Velarde CPhT

## 2023-12-21 NOTE — ANESTHESIA PROCEDURE NOTES
Airway       Patient location during procedure: OR       Procedure Start/Stop Times: 12/21/2023 8:01 AM  Staff -        Anesthesiologist:  Pérez Nguyen MD       CRNA: Nevaeh Joe APRN CRNA       Performed By: CRNA  Consent for Airway        Urgency: elective  Indications and Patient Condition       Indications for airway management: bob-procedural         Mask difficulty assessment: 1 - vent by mask    Final Airway Details       Final airway type: endotracheal airway       Successful airway: ETT - single  Endotracheal Airway Details        ETT size (mm): 8.0       Cuffed: yes       Successful intubation technique: video laryngoscopy       VL Blade Size: Glidescope 4       Grade View of Cords: 1       Adjucts: stylet       Position: Right       Measured from: lips       Secured at (cm): 23       Bite block used: None    Post intubation assessment        Placement verified by: capnometry, equal breath sounds and chest rise        Number of attempts at approach: 1       Secured with: commercial tube quintanilla and tape       Ease of procedure: easy       Dentition: Intact and Unchanged    Medication(s) Administered   Medication Administration Time: 12/21/2023 8:01 AM

## 2023-12-21 NOTE — OP NOTE
Date of surgery: 12/21/23    Surgeon: Kye Garrido MD  Assistant: Mary Bean PA-C   (Note: The assistant was present for and assisted with the entire surgery, and his/her role as an assistant was crucial for aid in positioning, exposure, suctioning, retraction, and closure).  There was no qualified resident available for assistance.      Preoperative diagnosis: Right frontal brain tumor  Postoperative diagnosis: Right frontal  brain tumor likely metastatic melanoma      Procedure:  1. Right Frontal craniotomy for resection of intra-axial brain tumor   2. Use of [employer] Stealth intra-operative neuro-navigation with MRI guidance   3. Use of intraoperative microscope.      EBL: 50 mL      Indications:   Saeid Santa comes today in follow-up with MRI brain with and without contrast done on 12/7/2023.  9/8/2023: Gamma knife to the new anteromedial right frontal lobe lesion likely mets.  22 Gy single fraction.7/14/2023 Stealth assisted left parieto-occipital craniotomy with interhemispheric approach and resection of falcine mass.  5/24/2023 : Gamma knife to the resection cavity and parieto-occipital lesion 4/21/2023:stealth assisted awake craniotomy with resection of motor/sensory strip lesion     I discussed the MRI brain with and without contrast findings with the patient and his spouse and showed them the images.  I explained to them that there is an evidence of progressive disease with new lesions, largest 1 is involving the right frontal lobe with significant perilesional edema and internal hemorrhage.  I explained to the patient and her spouse that I discussed his case with Dr. Rose from radiation oncology.     We discussed the need for surgery to establish pathology and to relieve  the mass effect.  Risks, benefits, indications, and alternatives were discussed with the patient and family in detail.  All their questions were answered, and they wished to proceed with surgery.      Description of  surgery:   The patient was brought to the OR and general anesthesia was induced.  IV lines and Aragon catheter were placed.  The patient was positioned supine with the head slightly turned to the left in Mendoza pins.  [employer] Stealth navigation was registered with MRI guidance.  Sterile prepping and draping procedures were performed.  Antibiotics were administered and timeout was performed. The 10 blade was used to perform a curvilinear incision starting in the right frontal region.  Skin flap was elevated, temporalis muscle and fascia was incised and right frontal region was exposed.     Tumor was mapped out ui neuronavigation and a right frontal craniotomy was performed using single sandee hole which was created using cutting sandee and craniotome was used to complete the craniotomy. The dura was opened with the geralds and metzenbaum scissors and retracted using No 4-0 nurolon.  The lesion's  location confirmed with navigation.  Tumor was approached using trans-sulcal approach using neuronavigation.  Tumor capsule was identified and xanthochromic fluid was drained.  Specimen was collected from the solid part of the tumor and sent for permanent pathology.  After interval decompression tumor capsule was identified and dissected all around.  Tumor was grayish, friable and vascular.  Tumor capsule was delineated and meticulously dissected from the surrounding normal white matter.  The medial aspect of the tumor capsule was in close proximity to the frontal horn which was opened at a  pinpoint and covered with a cottonoid.    Following satisfactory gross total resection of the mass using neuro navigation, hemostasis was achieved using Surgicel, Floseal and thrombin-soaked Gelfoam.  The brain was completely relaxed at the end of the procedure.  The resection cavity was irrigated with warm saline and filled.  We then placed the subdural DuraGen and achieved a watertight dural closure above it  using 4-0 Nurolon.  The  bone flap was fixed back into place with the cranial plating system.  Antibiotic irrigation was performed, the galea was closed with 2-0 Vicryls, and the skin was closed with 4-0 monocryl.     Dressing was  applied using Xeroform, Telfa and medpore tape.     Patient withstood the procedure well and transferred to ICU following extubation in a stable condition.There were no intraprocedural complications.  I have updated patient's spouse after the surgery.  No drain was placed.    Kye Garrido MD

## 2023-12-21 NOTE — BRIEF OP NOTE
"[facility]    Brief Operative Note    Pre-operative diagnosis: Metastatic melanoma (H) [C43.9]  Post-operative diagnosis Right frontal metastatic melanoma    Procedure: CRANIOTOMY, USING OPTICAL TRACKING SYSTEM, WITH NEOPLASM EXCISION, RIGHT FRONTAL CRANOTOMY WITH RESECTION OF TUMOR, Right - Head    Surgeon: Surgeon(s) and Role:     * Kye Garrido MD - Primary     * Mary Bean PA-C  Anesthesia: General   Estimated Blood Loss: Less than 50 ml    Drains: None  Specimens:   ID Type Source Tests Collected by Time Destination   1 : RIGHT FRONTAL LOBE TUMOR Tissue Brain SURGICAL PATHOLOGY EXAM Kye Garrido MD 12/21/2023  9:02 AM      Findings:   Tumor was solid cystic with xanthochromatic fluid.   Complications: None.  Implants:   Implant Name Type Inv. Item Serial No.  Lot No. LRB No. Used Action   GRAFT DURAGEN 2X2\"  - [id number] Other GRAFT DURAGEN 2X2\"   [employer] [id number] Right 1 Implanted   MARILIN HOLE COVER PLATE 7MM WITH TAB   [id number] JOSE 41 03 [id number] Right 1 Implanted   6-HOLE W/BAR   [id number] JOSE 41 03 [id number] Right 2 Implanted   SCREW BN 4MM 1.5MM SLF DRL AX STAB NS UNV NEURO 3 CRNL LF - [id number] Metallic Hardware/U[employer]ar[employer] SCREW BN 4MM 1.5MM SLF DRL AX STAB NS UNV NEURO 3 CRNL   [employer] 41 03 [id number] Right 10 Implanted             "

## 2023-12-21 NOTE — PROVIDER NOTIFICATION
Dr Garrido at bedside. Only co slight headache at site of incision. Resting comfortably between cares. BPs stable. Neuros and CMS WNL and at baseline. Ok to transfer from surgery standpoint, will notify MDA.

## 2023-12-22 NOTE — PLAN OF CARE
"Goal Outcome Evaluation:      Plan of Care Reviewed With: patient  Problem: Adult Inpatient Plan of Care  Goal: Plan of Care Review  Description: The Plan of Care Review/Shift note should be completed every shift.  The Outcome Evaluation is a brief statement about your assessment that the patient is improving, declining, or no change.  This information will be displayed automatically on your shift  note.  Outcome: Progressing  Flowsheets (Taken 12/22/2023 0706)  Outcome Evaluation: Alert, Oriented x4, DAVISON, PERRLA, Neuros intact, SR, normotensive, on RA, mild to moderate Pain, Voiding per urinal, brisk urine output, regular diet, 2gm na, tolerating intake, PRN per MAR, Ax1 c GB and Cane, PIV c MIVF as ordered.  Plan of Care Reviewed With: patient  Goal: Patient-Specific Goal (Individualized)  Description: You can add care plan individualizations to a care plan. Examples of Individualization might be:  \"Parent requests to be called daily at 9am for status\", \"I have a hard time hearing out of my right ear\", or \"Do not touch me to wake me up as it startles  me\".  Outcome: Progressing  Goal: Absence of Hospital-Acquired Illness or Injury  Outcome: Progressing  Intervention: Identify and Manage Fall Risk  Recent Flowsheet Documentation  Taken 12/22/2023 0400 by Serge Mcmanus, RN  Safety Promotion/Fall Prevention:   activity supervised   clutter free environment maintained   nonskid shoes/slippers when out of bed   patient and family education   safety round/check completed   supervised activity   treat reversible contributory factors   treat underlying cause  Taken 12/22/2023 0000 by Serge Mcmanus, RN  Safety Promotion/Fall Prevention:   activity supervised   clutter free environment maintained   nonskid shoes/slippers when out of bed   patient and family education   safety round/check completed   supervised activity   treat reversible contributory factors   treat underlying cause  Intervention: Prevent Skin " Injury  Recent Flowsheet Documentation  Taken 12/22/2023 0400 by Serge Mcmanus, RN  Body Position:   position changed independently   turned   left   side-lying 30 degrees  Skin Protection: adhesive use limited  Device Skin Pressure Protection:   absorbent pad utilized/changed   adhesive use limited  Taken 12/22/2023 0206 by Serge Mcmanus RN  Body Position:   turned   side-lying 30 degrees  Taken 12/22/2023 0000 by Serge Mcmanus, RN  Body Position:   position changed independently   turned   left   side-lying 30 degrees  Skin Protection: adhesive use limited  Device Skin Pressure Protection:   absorbent pad utilized/changed   adhesive use limited  Intervention: Prevent and Manage VTE (Venous Thromboembolism) Risk  Recent Flowsheet Documentation  Taken 12/22/2023 0400 by Serge Mcmanus, TATIANNA  VTE Prevention/Management: SCDs (sequential compression devices) on  Taken 12/22/2023 0000 by Serge Mcmanus, TATIANNA  VTE Prevention/Management: SCDs (sequential compression devices) on  Goal: Optimal Comfort and Wellbeing  Outcome: Progressing  Intervention: Monitor Pain and Promote Comfort  Recent Flowsheet Documentation  Taken 12/22/2023 0400 by Serge Mcmanus, RN  Pain Management Interventions: medication (see MAR)  Taken 12/22/2023 0206 by Serge Mcamnus RN  Pain Management Interventions: medication (see MAR)  Intervention: Provide Person-Centered Care  Recent Flowsheet Documentation  Taken 12/22/2023 0400 by Serge Mcmanus, RN  Trust Relationship/Rapport:   care explained   choices provided   empathic listening provided   emotional support provided   questions answered   questions encouraged   reassurance provided   thoughts/feelings acknowledged  Taken 12/22/2023 0000 by Serge Mcmanus, RN  Trust Relationship/Rapport:   care explained   choices provided   empathic listening provided   emotional support provided   questions answered   questions encouraged   reassurance provided    thoughts/feelings acknowledged  Goal: Readiness for Transition of Care  Outcome: Progressing           Outcome Evaluation: Alert, Oriented x4, DAVISON, PERRLA, Neuros intact, SR, normotensive, on RA, mild to moderate Pain, Voiding per urinal, brisk urine output, regular diet, 2gm na, tolerating intake, PRN per MAR, Ax1 c GB and Cane, PIV c MIVF as ordered.

## 2023-12-22 NOTE — PROGRESS NOTES
[facility]    Medicine Progress Note - Hospitalist Service    Date of Admission:  12/21/2023    Assessment & Plan   Saeid Santa is a 68 year old male admitted to ICU on 12/21/2023 s/p elective right frontal craniotomy with neoplasm resection. Hospitalist consulted for medical co-management.      Metastatic malignant melanoma  H/o prostate cancer   S/p elective right frontal craniotomy with neoplasm resection 12/21/23  - Defer perioperative pharmacopropylaxis antibiotics to neurosurgery  - Tylenol 975 mg TID scheduled  - Oxycodone 5 mg q4h prn   - Dexamethasone 4 mg Q6H per neurosurgery  - Levetiracetam 1,000 mg bid per neurosurgery  - Post-operative MR Brain w/ and w/o contrast planned for 12/22/23  - Neurochecks per neurosurgery, disposition also per primary team  - Ordered AM labs for tomorrow, CBC, BMP, Mag      Hypertension  Currently being monitored with Art line. BP ~120-130 systolic.   - PTA amlodipine 10 mg daily   - labetolol prn for systolic blood pressure >150 mmHg     GERD  Hiccups  Has previously been on baclofen in the past. Will hold this due to lowered seizure threshold with baclofen and history of seizures. Hiccups currently resolved. Will start PPI for GERD, will also serve as ulcer prophylaxis while on steroids. If hiccups return, could consider trial of gabapentin.  - start protonix 40 mg daily   - hold pta baclofen, consider discontinuing on discharge      Sore Throat  Likely 2/2 to intubation  - prn cepacol lozenge ordered     History of JUSTEN  Not currently on CPAP     Hyperlipidemia  - Rosuvastatin 40 mg daily     Constipation  - Senna-docusate 8.6-50 mg bid      Leukocytosis   Pre-operative WBC 12.9.   - Daily CBC w/ platelets           Diet: Advance Diet as Tolerated: Regular Diet Adult; 2 gm NA Diet    DVT Prophylaxis: Defer to primary service  Aragon Catheter: Not present  Lines: None     Cardiac Monitoring: None  Code Status: Full Code      Clinically  "Significant Risk Factors                  # Hypertension: Noted on problem list        # Overweight: Estimated body mass index is 27.76 kg/m  as calculated from the following:    Height as of this encounter: 1.854 m (6' 1\").    Weight as of this encounter: 95.4 kg (210 lb 6.4 oz)., PRESENT ON ADMISSION     # Financial/Environmental Concerns:           Disposition Plan     Expected Discharge Date: 12/23/2023      Destination: home with family              John Almeida MD  Hospitalist Service  [facility]  Securely message with [employer] (more info)  Text page via [employer] Paging/Directory   ______________________________________________________________________    Interval History   Care assumed today - consultation follow up. Pt seen and examined in ICU, he is POD 1 from R frontal craniotomy. Family at bedside, pt up in chair, denies pain, or other new complaints. No sob, fevers, chills, weakness/numbness/tingling.    Physical Exam   Vital Signs: Temp: 98.4  F (36.9  C) Temp src: Oral BP: 126/71 Pulse: 60   Resp: 17 SpO2: 92 % O2 Device: None (Room air)    Weight: 210 lbs 6.4 oz      Gen: NAD, pleasant  HEENT: EOMI, MMM, R sided dressing CDI  Resp: no focal crackles,  no wheezes, no increased work of resp  CV: S1S2 heard, reg rhythm, reg rate  Abdo: soft, nontender, nondistended, bowel sounds present  Ext: calves nontender, well perfused  Neuro: aa, conversant, moving ext grossly equally, CN grossly intact, no facial asymmetry      Medical Decision Making       41 MINUTES SPENT BY ME on the date of service doing chart review, history, exam, documentation & further activities per the note.      Data     I have personally reviewed the following data over the past 24 hrs:    22.0 (H)  \   11.8 (L)   / 302     137 102 26.1 (H) /  134 (H)   4.4 21 (L) 0.93 \       "

## 2023-12-22 NOTE — PLAN OF CARE
Problem: Fall Injury Risk  Goal: Absence of Fall and Fall-Related Injury  Intervention: Promote Injury-Free Environment  Flowsheets  Taken 12/21/2023 2155  Safety Promotion/Fall Prevention:   activity supervised   assistive device/personal items within reach   clutter free environment maintained   lighting adjusted   nonskid shoes/slippers when out of bed   patient and family education   safety round/check completed   supervised activity   treat reversible contributory factors   treat underlying cause  Taken 12/21/2023 2000  Safety Promotion/Fall Prevention:   activity supervised   clutter free environment maintained   nonskid shoes/slippers when out of bed   patient and family education   safety round/check completed   supervised activity   treat reversible contributory factors   treat underlying cause   Goal Outcome Evaluation:  As noted above and already charted in flowsheets, interventions completed to promote an injury-free environment. Pt did not have a fall this shift. Goal is progressing.

## 2023-12-22 NOTE — PROGRESS NOTES
12/22/23 9082   Appointment Info   Signing Clinician's Name / Credentials (PT) Elizabeth Hutchins, PT, DPT   Living Environment   People in Home spouse   Current Living Arrangements house   Home Accessibility stairs within home   Number of Stairs, Within Home, Primary greater than 10 stairs   Stair Railings, Within Home, Primary railings safe and in good condition   Transportation Anticipated family or friend will provide   Self-Care   Usual Activity Tolerance good   Current Activity Tolerance fair   Equipment Currently Used at Home cane, straight   Fall history within last six months no   Activity/Exercise/Self-Care Comment independent at baseline, retired. has good family support   General Information   Onset of Illness/Injury or Date of Surgery 12/21/23   Referring Physician Mary Bean PA-C   Patient/Family Therapy Goals Statement (PT) to go home   Pertinent History of Current Problem (include personal factors and/or comorbidities that impact the POC) POD 1 Right Frontal craniotomy for resection of intra-axial brain tumor   Existing Precautions/Restrictions   (crani)   Weight-Bearing Status - LLE full weight-bearing   Weight-Bearing Status - RLE full weight-bearing   Cognition   Affect/Mental Status (Cognition) WNL   Integumentary/Edema   Integumentary/Edema Comments incision CDI   Posture    Posture Forward head position   Range of Motion (ROM)   Range of Motion ROM is WFL   Strength (Manual Muscle Testing)   Strength (Manual Muscle Testing) strength is WFL   Strength Comments decreased R ankle strength at baseline, pt reports improvement in the last couple months   Bed Mobility   Comment, (Bed Mobility) independent   Transfers   Comment, (Transfers) independent w/ cane   Gait/Stairs (Locomotion)   [place] Level (Gait) modified independence   Assistive Device (Gait) cane, straight   Distance in Feet (Gait) 200ft   Pattern (Gait) step-through   Comment, (Gait/Stairs) uses a hurrycane; steady  throughout x 200ft. able to perform head turns, change gait speed, turn around without issue   Balance   Balance no deficits were identified   Sensory Examination   Sensory Perception Comments numbness in R foot, baseline   Clinical Impression   Criteria for Skilled Therapeutic Intervention Evaluation only   PT Diagnosis (PT) clinical judgement   Clinical Presentation (PT Evaluation Complexity) stable   Clinical Decision Making (Complexity) low complexity   PT Total Evaluation Time   PT Eval, Low Complexity Minutes (12116) 15   PT Discharge Planning   PT Plan eval only   PT Discharge Recommendation (DC Rec) home with assist   PT Rationale for DC Rec Patient is able to demonstrate all functional mobility he will need to d/c home with use of cane for safety as needed. He has good family support and is mobilizing well.   PT Equipment Needed at Discharge cane, straight   Total Session Time   Total Session Time (sum of timed and untimed services) 15

## 2023-12-22 NOTE — PROGRESS NOTES
Occupational Therapy Discharge Summary    Reason for therapy discharge:    All goals and outcomes met, no further needs identified.    Progress towards therapy goal(s). See goals on Care Plan in [facility] electronic health record for goal details.  Goals met    Therapy recommendation(s):    Pt appears to have good support and resources for discharge at this time from an OT standpoint.  If there are further difficulties or issues with family supporting pt recommend OP OT services to work on memory and cognitive compensation skills to help with executive function and emotional regulation for his ongoing medical issues.

## 2023-12-22 NOTE — PROGRESS NOTES
"[facility]    Neurosurgery Progress Note    Date of Service (when I saw the patient): 12/22/2023     Assessment & Plan     Procedure(s):  CRANIOTOMY, USING OPTICAL TRACKING SYSTEM, WITH NEOPLASM EXCISION, RIGHT FRONTAL CRANOTOMY WITH RESECTION OF TUMOR   -1 Day Post-Op  Doing well. No major complaints this AM. Reporting some inflammation/irritation of the wound. No new or worsening symptoms. Postoperative brain MRI completed this AM.     Plan:  -Ok to transfer to floor  -Continue keppra  -Decadron taper over 1 week to 2mg BID  -Activity as tolerated, appreciate therapies  -Routine wound care  -Pain management as needed     I have discussed the following assessment and plan Dr. Garrido who is in agreement with initial plan and will follow up with further consultation recommendations.    Nathalie Mireles CNP  [facility] Neurosurgery  [address] [address]    Tel [phone]  Text page via [employer] Paging/Directory    Interval History   Stable.    Physical Exam   Temp: 98  F (36.7  C) Temp src: Oral BP: (!) 142/89 Pulse: 70   Resp: 20 SpO2: 97 % O2 Device: None (Room air)    Vitals:    12/21/23 0622 12/22/23 0600   Weight: 94.3 kg (208 lb) 95.4 kg (210 lb 6.4 oz)     Vital Signs with Ranges  Temp:  [97.3  F (36.3  C)-98.4  F (36.9  C)] 98  F (36.7  C)  Pulse:  [51-87] 70  Resp:  [7-27] 20  BP: (117-142)/(68-89) 142/89  MAP:  [78 mmHg-102 mmHg] 89 mmHg  Arterial Line BP: ()/(53-79) 121/69  SpO2:  [92 %-99 %] 97 %  I/O last 3 completed shifts:  In: 3297.25 [P.O.:1695; I.V.:1602.25]  Out: 2250 [Urine:2250]     , Blood pressure (!) 142/89, pulse 70, temperature 98  F (36.7  C), temperature source Oral, resp. rate 20, height 1.854 m (6' 1\"), weight 95.4 kg (210 lb 6.4 oz), SpO2 97%.  210 lbs 6.4 oz    NEUROLOGICAL EXAMINATION:   Mental status:  Alert and Oriented x 3, speech is fluent.  Cranial nerves:  II-XII intact. "   Motor:  Strength is 5/5 throughout the upper and lower extremities  Sensation:  intact  Coordination:  Smooth finger to nose testing.   Negative pronator drift.     Dressing CDI.    Medications    sodium chloride Stopped (12/22/23 1000)      acetaminophen  975 mg Oral Q8H    amLODIPine  10 mg Oral QAM    dexAMETHasone  4 mg Intravenous Q6H KEILY    Or    dexAMETHasone  4 mg Oral Q6H KEILY    levETIRAcetam  1,000 mg Oral or NG Tube BID    methocarbamol  500 mg Oral 4x Daily    pantoprazole  40 mg Oral QAM AC    polyethylene glycol  17 g Oral Daily    rosuvastatin  40 mg Oral QPM    senna-docusate  1 tablet Oral BID    sodium chloride (PF)  3 mL Intracatheter Q8H       Data     CBC RESULTS:   Recent Labs   Lab Test 12/22/23  0441   WBC 22.0*   RBC 4.00*   HGB 11.8*   HCT 36.2*   MCV 91   MCH 29.5   MCHC 32.6   RDW 13.8        Basic Metabolic Panel:  Lab Results   Component Value Date     12/22/2023      Lab Results   Component Value Date    POTASSIUM 4.4 12/22/2023    POTASSIUM 4.0 10/29/2021     Lab Results   Component Value Date    CHLORIDE 102 12/22/2023    CHLORIDE 108 10/29/2021     Lab Results   Component Value Date    JOHN PAUL 8.9 12/22/2023     Lab Results   Component Value Date    CO2 21 12/22/2023    CO2 21 10/29/2021     Lab Results   Component Value Date    BUN 26.1 12/22/2023    BUN 16 10/29/2021     Lab Results   Component Value Date    CR 0.93 12/22/2023     Lab Results   Component Value Date     12/22/2023     12/22/2023    GLC 97 10/29/2021     INR:  Lab Results   Component Value Date    INR 0.83 07/07/2023    INR 1.06 06/16/2023    INR 0.97 06/14/2023    INR 0.97 04/15/2023

## 2023-12-22 NOTE — PROGRESS NOTES
12/22/23 1619   Appointment Info   Signing Clinician's Name / Credentials (OT) Peña Shukla EdD, OTR/L   Living Environment   People in Home spouse   Current Living Arrangements house  [place]2 [place]   Home Accessibility stairs within home   Number of Stairs, Within Home, Primary greater than 10 stairs   Stair Railings, Within Home, Primary railings safe and in good condition   Transportation Anticipated family or friend will provide   Living Environment Comments pt has assistance from both spouse and other family members   Self-Care   Usual Activity Tolerance good   Current Activity Tolerance fair   Equipment Currently Used at Home cane, straight   Fall history within last six months no   General Information   Onset of Illness/Injury or Date of Surgery 12/12/23   Referring Physician Mary Bean PA-C   Patient/Family Therapy Goal Statement (OT) return home.  Pt expressed concern over the added stress to his spouse about having to take over duties he used to do   Additional Occupational Profile Info/Pertinent History of Current Problem Pt is a 68 year old male admitted for a right frontal craniotomy for a tumro resection.  PMH includes GERD, OAD, hyperlipidemia.  Pt reports having 2 other surgeries to remove tumors on the back and top of his head   Performance Patterns (Routines, Roles, Habits) pt states he is independent in basic ADLS, has some numbness and mild clumisness with his right leg.  Uses a cane but not all the time.   Existing Precautions/Restrictions fall   Limitations/Impairments safety/cognitive   General Observations and Info pt laying in bed, willing to participate   Cognitive Status Examination   Orientation Status orientation to person, place and time   Affect/Mental Status (Cognitive) other (see comments)  (pt had two short episodes of expressing frustration and mild anger about his situation and the medical bills that are happening.  Stated that it wasn't personal but  angry at the cost and having to repeat his story.)   Cognitive Status Comments pt had 2 occassional word finding episodes   Cognitive Screens/Assessments   Cognitive Assessments Completed [facility] Mental Status Exam (UMS):  Total Score out of /30 23/30   [facility] Norms 21-26 equals mild neurocognitive disorder   [facility] Domains assessed: orientation, memory, attention, executive functions   SLUMS Interpretation Pt had some occasional mild lapses with number and memory tasks.  Long term memory appears intact or close to baseline.  Pt was able to recall in some detial his previous rehab experiences.  Reports using memory aides to help with function, stated this procedure happened quickly and he was not able to prepare as well as he did in the past.   Visual Perception   Visual Impairment/Limitations corrective lenses for reading   Range of Motion Comprehensive   General Range of Motion no range of motion deficits identified   Strength Comprehensive (MMT)   General Manual Muscle Testing (MMT) Assessment no strength deficits identified   Coordination   Upper Extremity Coordination No deficits were identified   Bed Mobility   Bed Mobility supine-sit;sit-supine   Supine-Sit Cactus (Bed Mobility) independent   Sit-Supine Cactus (Bed Mobility) independent   Assistive Device (Bed Mobility) bed rails   Transfers   Transfers sit-stand transfer   Sit-Stand Transfer   Sit-Stand Cactus (Transfers) supervision;contact guard   Assistive Device (Sit-Stand Transfers) cane, straight   Sit/Stand Transfer Comments Pt used cane without difficulty.  Forgetful of chair alarm once up, set it off twice   Clinical Impression   Criteria for Skilled Therapeutic Interventions Met (OT) Yes, treatment indicated   OT Diagnosis decreased cognitive skills for ADLs and IDLs at home   OT Problem List-Impairments impacting ADL problems related to;cognition;other (see comments)  (some mild emotional changes in  conversation, did not last long and resolved.)   Assessment of Occupational Performance 1-3 Performance Deficits   Identified Performance Deficits decreased memory for daily home tasks, executive functioning   Planned Therapy Interventions (OT) ADL retraining   Clinical Decision Making Complexity (OT) problem focused assessment/low complexity   Risk & Benefits of therapy have been explained evaluation/treatment results reviewed;care plan/treatment goals reviewed;patient   OT Total Evaluation Time   OT Eval, Low Complexity Minutes (82755) 15   OT Goals   Therapy Frequency (OT) One time eval and treatment   OT Predicted Duration/Target Date for Goal Attainment 12/22/23   OT Goals Cognition;OT Goal 1   OT: Cognitive Patient/caregiver will verbalize understanding of cognitive assessment results/recommendations as needed for safe discharge planning   OT: Goal 1 Pt will identify community resources he may be able to use to help manage cognitive changes   Interventions   Interventions Quick Adds Self-Care/Home Management   Self-Care/Home Management   Self-Care/Home Mgmt/ADL, Compensatory, Meal Prep Minutes (70627) 25   Symptoms Noted During/After Treatment (Meal Preparation/Planning Training) behavioral stress signs   Treatment Detail/Skilled Intervention OT: Pt calm and responding to questions most of session.  Had two short episodes of frustration and mild anger at his financial costs for this visit and having to repeat his story so many times.  Expressed appreciation about the support his famiily gives him but also some concern about the stress that it is putting on his spouse.  Pt scored at the mild neurocognitive impairment as measured by the SLUMS.  Reviewed results, provided suggesions for managing memory or cognitive organization, and the [place] [facility] [place] as a possible community resource to use.  Pt identified 2 methods he has used in the past, stated he had not had time to implement them with this  admssion.  Expressed appreciation at the community resource.   OT Discharge Planning   OT Plan discharge   OT Discharge Recommendation (DC Rec) home with assist;home with outpatient occupational therapy   OT Rationale for DC Rec Pt has good overall movement and some insight into his current situation.  Some memory and emotional changes that appear consistent with the location of the surgery.  Family appears very supportive and seem to have the skills to support needs once pt is discharged.  Pt has some ideas about modifications he can use at home to assist with the cognitive changes he is having.  Anticipate pt will be able to return home with support of family.  If things are difficult or pt is having more difficulties than expected recommend OP OT services for cognitve skills and reasoning.   OT Brief overview of current status Pt demonstrated good mobility with cane walking around the bed, scored at the mild neurocognitive deficit level as measured by the SLUMS.   Total Session Time   Timed Code Treatment Minutes 25   Total Session Time (sum of timed and untimed services) 40

## 2023-12-23 NOTE — PLAN OF CARE
Pt here with Crani w/ tumor resection. A&O. Neuros intact ex RLE numbness baseline for pt. VSS. REg diet, thin liquids. Takes pills whole with water. Up independently. Denies pain. Pt scoring green on the Aggression Stop Light Tool.. Discharged to home with outpatient OT. Belongings with pt at discharge. Medications with pt at discharge.

## 2023-12-23 NOTE — PROGRESS NOTES
Hospitalist Chart Check    Plan is for discharge home today per neurosurgery. Chart reviewed, discussed with RN. Afebrile, VSS, postop labs stable with ongoing leukocytosis dt steroids.  Medical issues remain stable.     Okay for discharge home from hospitalist standpoint with resumption of home meds (amlodipine, statin) as per prior to admission. Orders placed. Follow up with PCP as needed.     Ewa Hutchins,   Internal Medicine - Hospitalist  12/23/2023  9:08 AM

## 2023-12-23 NOTE — PLAN OF CARE
VSS on RA. A&O x4. Calm and cooperative. Neuros intact except baseline right foot numbness. Patient states right foot is clumsy at times. Strength strong. Right scalp incision clean dry intact. Patient requested dressing in place overnight. Intermittent hiccups. TUMS ordered for reflux per patient request. Up independently in room. Patient hoping to go home today.

## 2023-12-23 NOTE — PROVIDER NOTIFICATION
Dr Renee notified regarding patient request for TUMS for reflux. Patient takes them PRN at home. Thank you!

## 2023-12-23 NOTE — DISCHARGE SUMMARY
PRIMARY DIAGNOSIS:  Right frontal metastatic melanoma     OPERATION PERFORMED:  Right frontal craniotomy performed 12/21/23 with Dr. Garrido.    Mr. Santa is a pleasant 68 year old male who presented with the above diagnosis in which surgical management was deemed appropriate and he was prepared for surgery 12/21/23 at which time he underwent the above described procedure.    Please see the dictated operative report for further details.  He tolerated surgery well without complications.  He is tolerating a regular diet without difficulty and ambulating well.  His operative incision is healing well without signs of infection.   He is ready to be discharge home 12/23/23 in improved condition as compared to admission.    All instructions regarding diet, activity, wound care, bowel management and follow up were given to the patient and he was released in good conditions.    DISCHARGE MEDICATIONS:  Decadron 2 mg tabs with taper over one week to 2 mg BID until further instructions.  Keppra as PTA  Protonix 40 mg BID    FOLLOW UP:  Neurosurgery nurse 1/2/24  NS office will contact you for further follow up appts.      Please contact out office for any worsening or changes in symptoms.    ALYSSIA Dumont  [facility] Neurosurgery  [address] [address]    Tel [phone]  Pager [phone]

## 2023-12-23 NOTE — PLAN OF CARE
"  Problem: Adult Inpatient Plan of Care  Goal: Patient-Specific Goal (Individualized)  Description: You can add care plan individualizations to a care plan. Examples of Individualization might be:  \"Parent requests to be called daily at 9am for status\", \"I have a hard time hearing out of my right ear\", or \"Do not touch me to wake me up as it startles  me\".  Outcome: Progressing  Flowsheets (Taken 12/22/2023 1820)  Individualized Care Needs: Pt with intact neuro status, at baseline with only defict as right foot numb.  Pain managaed with tylenol and robaxin.  Tolerating up with assist of one for safety, reliable when up, continues to use fall risk criteria as an inpatient.  Await next level of care transition to neuro floor.   Goal Outcome Evaluation:      Plan of Care Reviewed With: patient, spouse                 "

## 2023-12-27 NOTE — PROGRESS NOTES
Connected Care Resource Center:   [facility] Resource Center Contact  [facility]     Clinical Data: Post-Discharge Outreach     Outreach attempted x 2.  Left message on patient's voicemail, providing [facility]'s central phone number of [phone] ([phone]) for questions/concerns and/or to schedule an appt with an [facility] provider, if they do not have a PCP.      Plan:  [facility] will do no further outreaches at this time.       Kaylin Fuller RN  [facility] Care Resource [place], [facility]    *Connected Care Resource Team does NOT follow patient ongoing. Referrals are identified based on internal discharge reports and the outreach is to ensure patient has an understanding of their discharge instructions.

## 2024-01-01 ENCOUNTER — INFUSION THERAPY VISIT (OUTPATIENT)
Dept: ONCOLOGY | Facility: CLINIC | Age: 69
End: 2024-01-01
Attending: PEDIATRICS
Payer: COMMERCIAL

## 2024-01-01 ENCOUNTER — TELEPHONE (OUTPATIENT)
Dept: RADIATION ONCOLOGY | Facility: CLINIC | Age: 69
End: 2024-01-01
Payer: COMMERCIAL

## 2024-01-01 ENCOUNTER — OFFICE VISIT (OUTPATIENT)
Dept: RADIATION ONCOLOGY | Facility: CLINIC | Age: 69
End: 2024-01-01
Attending: RADIOLOGY
Payer: COMMERCIAL

## 2024-01-01 ENCOUNTER — PATIENT OUTREACH (OUTPATIENT)
Dept: CARE COORDINATION | Facility: CLINIC | Age: 69
End: 2024-01-01
Payer: COMMERCIAL

## 2024-01-01 ENCOUNTER — HOSPITAL ENCOUNTER (OUTPATIENT)
Dept: MRI IMAGING | Facility: CLINIC | Age: 69
Discharge: HOME | End: 2024-03-02
Attending: STUDENT IN AN ORGANIZED HEALTH CARE EDUCATION/TRAINING PROGRAM | Admitting: STUDENT IN AN ORGANIZED HEALTH CARE EDUCATION/TRAINING PROGRAM
Payer: COMMERCIAL

## 2024-01-01 ENCOUNTER — PATIENT OUTREACH (OUTPATIENT)
Dept: ONCOLOGY | Facility: CLINIC | Age: 69
End: 2024-01-01

## 2024-01-01 ENCOUNTER — INFUSION THERAPY VISIT (OUTPATIENT)
Dept: ONCOLOGY | Facility: CLINIC | Age: 69
End: 2024-01-01
Attending: STUDENT IN AN ORGANIZED HEALTH CARE EDUCATION/TRAINING PROGRAM
Payer: COMMERCIAL

## 2024-01-01 ENCOUNTER — PATIENT OUTREACH (OUTPATIENT)
Dept: ONCOLOGY | Facility: CLINIC | Age: 69
End: 2024-01-01
Payer: COMMERCIAL

## 2024-01-01 ENCOUNTER — APPOINTMENT (OUTPATIENT)
Dept: CT IMAGING | Facility: CLINIC | Age: 69
DRG: 557 | End: 2024-01-01
Attending: EMERGENCY MEDICINE
Payer: COMMERCIAL

## 2024-01-01 ENCOUNTER — VIRTUAL VISIT (OUTPATIENT)
Dept: ONCOLOGY | Facility: CLINIC | Age: 69
DRG: 557 | End: 2024-01-01
Attending: PHYSICIAN ASSISTANT
Payer: COMMERCIAL

## 2024-01-01 ENCOUNTER — HOSPITAL ENCOUNTER (OUTPATIENT)
Dept: MRI IMAGING | Facility: CLINIC | Age: 69
Discharge: HOME | End: 2024-01-16
Attending: RADIOLOGY | Admitting: RADIOLOGY
Payer: COMMERCIAL

## 2024-01-01 ENCOUNTER — HOSPITAL ENCOUNTER (OUTPATIENT)
Dept: PET IMAGING | Facility: CLINIC | Age: 69
Discharge: HOME | End: 2024-04-17
Attending: STUDENT IN AN ORGANIZED HEALTH CARE EDUCATION/TRAINING PROGRAM
Payer: COMMERCIAL

## 2024-01-01 ENCOUNTER — APPOINTMENT (OUTPATIENT)
Dept: LAB | Facility: CLINIC | Age: 69
End: 2024-01-01
Attending: STUDENT IN AN ORGANIZED HEALTH CARE EDUCATION/TRAINING PROGRAM
Payer: COMMERCIAL

## 2024-01-01 ENCOUNTER — VIRTUAL VISIT (OUTPATIENT)
Dept: ONCOLOGY | Facility: CLINIC | Age: 69
DRG: 393 | End: 2024-01-01
Attending: STUDENT IN AN ORGANIZED HEALTH CARE EDUCATION/TRAINING PROGRAM
Payer: COMMERCIAL

## 2024-01-01 ENCOUNTER — APPOINTMENT (OUTPATIENT)
Dept: GENERAL RADIOLOGY | Facility: CLINIC | Age: 69
DRG: 393 | End: 2024-01-01
Attending: PHYSICIAN ASSISTANT
Payer: COMMERCIAL

## 2024-01-01 ENCOUNTER — TELEPHONE (OUTPATIENT)
Dept: FAMILY MEDICINE | Facility: CLINIC | Age: 69
End: 2024-01-01
Payer: COMMERCIAL

## 2024-01-01 ENCOUNTER — HOSPITAL ENCOUNTER (OUTPATIENT)
Dept: MRI IMAGING | Facility: CLINIC | Age: 69
Discharge: HOME | End: 2024-05-15
Attending: RADIOLOGY
Payer: COMMERCIAL

## 2024-01-01 ENCOUNTER — PRE VISIT (OUTPATIENT)
Dept: RADIATION ONCOLOGY | Facility: CLINIC | Age: 69
End: 2024-01-01
Payer: COMMERCIAL

## 2024-01-01 ENCOUNTER — TELEPHONE (OUTPATIENT)
Dept: ONCOLOGY | Facility: CLINIC | Age: 69
End: 2024-01-01

## 2024-01-01 ENCOUNTER — APPOINTMENT (OUTPATIENT)
Dept: OCCUPATIONAL THERAPY | Facility: CLINIC | Age: 69
DRG: 557 | End: 2024-01-01
Payer: COMMERCIAL

## 2024-01-01 ENCOUNTER — OFFICE VISIT (OUTPATIENT)
Dept: NEUROSURGERY | Facility: CLINIC | Age: 69
End: 2024-01-01
Payer: COMMERCIAL

## 2024-01-01 ENCOUNTER — APPOINTMENT (OUTPATIENT)
Dept: CT IMAGING | Facility: CLINIC | Age: 69
End: 2024-01-01
Attending: STUDENT IN AN ORGANIZED HEALTH CARE EDUCATION/TRAINING PROGRAM
Payer: COMMERCIAL

## 2024-01-01 ENCOUNTER — APPOINTMENT (OUTPATIENT)
Dept: PHYSICAL THERAPY | Facility: CLINIC | Age: 69
DRG: 393 | End: 2024-01-01
Attending: STUDENT IN AN ORGANIZED HEALTH CARE EDUCATION/TRAINING PROGRAM
Payer: COMMERCIAL

## 2024-01-01 ENCOUNTER — ONCOLOGY VISIT (OUTPATIENT)
Dept: RADIATION ONCOLOGY | Facility: CLINIC | Age: 69
End: 2024-01-01

## 2024-01-01 ENCOUNTER — HOSPITAL ENCOUNTER (OUTPATIENT)
Dept: MRI IMAGING | Facility: CLINIC | Age: 69
Discharge: HOME | End: 2024-01-31
Attending: RADIOLOGY
Payer: COMMERCIAL

## 2024-01-01 ENCOUNTER — ONCOLOGY VISIT (OUTPATIENT)
Dept: ONCOLOGY | Facility: CLINIC | Age: 69
End: 2024-01-01
Attending: PHYSICIAN ASSISTANT
Payer: COMMERCIAL

## 2024-01-01 ENCOUNTER — HOSPITAL ENCOUNTER (EMERGENCY)
Facility: CLINIC | Age: 69
Discharge: HOME | End: 2024-05-22
Attending: STUDENT IN AN ORGANIZED HEALTH CARE EDUCATION/TRAINING PROGRAM | Admitting: STUDENT IN AN ORGANIZED HEALTH CARE EDUCATION/TRAINING PROGRAM
Payer: COMMERCIAL

## 2024-01-01 ENCOUNTER — HOSPITAL ENCOUNTER (OUTPATIENT)
Dept: MRI IMAGING | Facility: CLINIC | Age: 69
Discharge: HOME | End: 2024-04-17
Attending: STUDENT IN AN ORGANIZED HEALTH CARE EDUCATION/TRAINING PROGRAM
Payer: COMMERCIAL

## 2024-01-01 ENCOUNTER — TELEPHONE (OUTPATIENT)
Dept: PALLIATIVE CARE | Facility: CLINIC | Age: 69
End: 2024-01-01

## 2024-01-01 ENCOUNTER — APPOINTMENT (OUTPATIENT)
Dept: ULTRASOUND IMAGING | Facility: CLINIC | Age: 69
DRG: 557 | End: 2024-01-01
Payer: COMMERCIAL

## 2024-01-01 ENCOUNTER — APPOINTMENT (OUTPATIENT)
Dept: CARDIOLOGY | Facility: CLINIC | Age: 69
DRG: 393 | End: 2024-01-01
Attending: STUDENT IN AN ORGANIZED HEALTH CARE EDUCATION/TRAINING PROGRAM
Payer: COMMERCIAL

## 2024-01-01 ENCOUNTER — INFUSION THERAPY VISIT (OUTPATIENT)
Dept: INFUSION THERAPY | Facility: CLINIC | Age: 69
End: 2024-01-01
Attending: STUDENT IN AN ORGANIZED HEALTH CARE EDUCATION/TRAINING PROGRAM
Payer: COMMERCIAL

## 2024-01-01 ENCOUNTER — APPOINTMENT (OUTPATIENT)
Dept: PHYSICAL THERAPY | Facility: CLINIC | Age: 69
DRG: 557 | End: 2024-01-01
Payer: COMMERCIAL

## 2024-01-01 ENCOUNTER — VIRTUAL VISIT (OUTPATIENT)
Dept: ONCOLOGY | Facility: CLINIC | Age: 69
DRG: 393 | End: 2024-01-01
Attending: PHYSICIAN ASSISTANT
Payer: COMMERCIAL

## 2024-01-01 ENCOUNTER — TELEPHONE (OUTPATIENT)
Dept: ONCOLOGY | Facility: CLINIC | Age: 69
End: 2024-01-01
Payer: COMMERCIAL

## 2024-01-01 ENCOUNTER — NURSE TRIAGE (OUTPATIENT)
Dept: ONCOLOGY | Facility: CLINIC | Age: 69
End: 2024-01-01
Payer: COMMERCIAL

## 2024-01-01 ENCOUNTER — HOSPITAL ENCOUNTER (OUTPATIENT)
Dept: PET IMAGING | Facility: HOSPITAL | Age: 69
Discharge: HOME | End: 2024-03-04
Attending: STUDENT IN AN ORGANIZED HEALTH CARE EDUCATION/TRAINING PROGRAM
Payer: COMMERCIAL

## 2024-01-01 ENCOUNTER — LAB (OUTPATIENT)
Dept: LAB | Facility: CLINIC | Age: 69
End: 2024-01-01
Payer: COMMERCIAL

## 2024-01-01 ENCOUNTER — APPOINTMENT (OUTPATIENT)
Dept: GENERAL RADIOLOGY | Facility: CLINIC | Age: 69
DRG: 393 | End: 2024-01-01
Attending: STUDENT IN AN ORGANIZED HEALTH CARE EDUCATION/TRAINING PROGRAM
Payer: COMMERCIAL

## 2024-01-01 ENCOUNTER — ONCOLOGY VISIT (OUTPATIENT)
Dept: ONCOLOGY | Facility: CLINIC | Age: 69
End: 2024-01-01
Attending: RADIOLOGY
Payer: COMMERCIAL

## 2024-01-01 ENCOUNTER — MYC MEDICAL ADVICE (OUTPATIENT)
Dept: ONCOLOGY | Facility: CLINIC | Age: 69
End: 2024-01-01
Payer: COMMERCIAL

## 2024-01-01 ENCOUNTER — HOSPITAL ENCOUNTER (INPATIENT)
Facility: CLINIC | Age: 69
LOS: 7 days | Discharge: HOME HEALTH | DRG: 557 | End: 2024-07-08
Attending: EMERGENCY MEDICINE | Admitting: STUDENT IN AN ORGANIZED HEALTH CARE EDUCATION/TRAINING PROGRAM
Payer: COMMERCIAL

## 2024-01-01 ENCOUNTER — OFFICE VISIT (OUTPATIENT)
Dept: PODIATRY | Facility: CLINIC | Age: 69
End: 2024-01-01
Payer: COMMERCIAL

## 2024-01-01 ENCOUNTER — LAB (OUTPATIENT)
Dept: LAB | Facility: CLINIC | Age: 69
End: 2024-01-01
Attending: PHYSICIAN ASSISTANT
Payer: COMMERCIAL

## 2024-01-01 ENCOUNTER — APPOINTMENT (OUTPATIENT)
Dept: CT IMAGING | Facility: CLINIC | Age: 69
DRG: 393 | End: 2024-01-01
Attending: EMERGENCY MEDICINE
Payer: COMMERCIAL

## 2024-01-01 ENCOUNTER — HOSPITAL ENCOUNTER (INPATIENT)
Facility: CLINIC | Age: 69
LOS: 6 days | Discharge: HOME | DRG: 393 | End: 2024-06-23
Attending: EMERGENCY MEDICINE | Admitting: STUDENT IN AN ORGANIZED HEALTH CARE EDUCATION/TRAINING PROGRAM
Payer: COMMERCIAL

## 2024-01-01 ENCOUNTER — PRE VISIT (OUTPATIENT)
Dept: RADIATION ONCOLOGY | Facility: CLINIC | Age: 69
End: 2024-01-01

## 2024-01-01 VITALS
BODY MASS INDEX: 27.57 KG/M2 | RESPIRATION RATE: 20 BRPM | WEIGHT: 209 LBS | OXYGEN SATURATION: 98 % | HEART RATE: 91 BPM | SYSTOLIC BLOOD PRESSURE: 117 MMHG | DIASTOLIC BLOOD PRESSURE: 83 MMHG | TEMPERATURE: 98.1 F

## 2024-01-01 VITALS
SYSTOLIC BLOOD PRESSURE: 138 MMHG | TEMPERATURE: 97.5 F | RESPIRATION RATE: 16 BRPM | HEART RATE: 88 BPM | DIASTOLIC BLOOD PRESSURE: 73 MMHG | OXYGEN SATURATION: 99 % | WEIGHT: 212.2 LBS | BODY MASS INDEX: 28 KG/M2

## 2024-01-01 VITALS
RESPIRATION RATE: 15 BRPM | SYSTOLIC BLOOD PRESSURE: 120 MMHG | HEART RATE: 104 BPM | DIASTOLIC BLOOD PRESSURE: 80 MMHG | OXYGEN SATURATION: 97 % | TEMPERATURE: 98.2 F

## 2024-01-01 VITALS
SYSTOLIC BLOOD PRESSURE: 151 MMHG | RESPIRATION RATE: 16 BRPM | HEART RATE: 75 BPM | HEIGHT: 73 IN | WEIGHT: 210 LBS | DIASTOLIC BLOOD PRESSURE: 88 MMHG | OXYGEN SATURATION: 99 % | BODY MASS INDEX: 27.83 KG/M2

## 2024-01-01 VITALS — DIASTOLIC BLOOD PRESSURE: 77 MMHG | SYSTOLIC BLOOD PRESSURE: 126 MMHG | HEART RATE: 89 BPM | OXYGEN SATURATION: 96 %

## 2024-01-01 VITALS
DIASTOLIC BLOOD PRESSURE: 83 MMHG | HEART RATE: 74 BPM | HEIGHT: 72 IN | BODY MASS INDEX: 27.9 KG/M2 | RESPIRATION RATE: 16 BRPM | WEIGHT: 206 LBS | SYSTOLIC BLOOD PRESSURE: 131 MMHG | TEMPERATURE: 98 F | OXYGEN SATURATION: 98 %

## 2024-01-01 VITALS
WEIGHT: 202.3 LBS | HEART RATE: 119 BPM | OXYGEN SATURATION: 99 % | SYSTOLIC BLOOD PRESSURE: 118 MMHG | BODY MASS INDEX: 27.4 KG/M2 | HEIGHT: 72 IN | TEMPERATURE: 97.9 F | DIASTOLIC BLOOD PRESSURE: 102 MMHG | RESPIRATION RATE: 16 BRPM

## 2024-01-01 VITALS
SYSTOLIC BLOOD PRESSURE: 116 MMHG | WEIGHT: 216.7 LBS | RESPIRATION RATE: 16 BRPM | DIASTOLIC BLOOD PRESSURE: 81 MMHG | HEART RATE: 101 BPM | BODY MASS INDEX: 28.59 KG/M2 | OXYGEN SATURATION: 98 % | TEMPERATURE: 98 F

## 2024-01-01 VITALS
SYSTOLIC BLOOD PRESSURE: 117 MMHG | DIASTOLIC BLOOD PRESSURE: 85 MMHG | HEART RATE: 97 BPM | RESPIRATION RATE: 128 BRPM | WEIGHT: 208.1 LBS | TEMPERATURE: 98.5 F | BODY MASS INDEX: 27.46 KG/M2 | OXYGEN SATURATION: 97 %

## 2024-01-01 VITALS
TEMPERATURE: 97.5 F | BODY MASS INDEX: 28.63 KG/M2 | SYSTOLIC BLOOD PRESSURE: 122 MMHG | WEIGHT: 217 LBS | HEART RATE: 73 BPM | DIASTOLIC BLOOD PRESSURE: 76 MMHG

## 2024-01-01 VITALS
HEART RATE: 108 BPM | DIASTOLIC BLOOD PRESSURE: 85 MMHG | TEMPERATURE: 98.2 F | WEIGHT: 210.7 LBS | OXYGEN SATURATION: 97 % | BODY MASS INDEX: 27.8 KG/M2 | SYSTOLIC BLOOD PRESSURE: 132 MMHG | RESPIRATION RATE: 18 BRPM

## 2024-01-01 VITALS — BODY MASS INDEX: 29.16 KG/M2 | WEIGHT: 220 LBS | HEIGHT: 73 IN

## 2024-01-01 VITALS
HEART RATE: 106 BPM | OXYGEN SATURATION: 97 % | WEIGHT: 211.4 LBS | RESPIRATION RATE: 16 BRPM | SYSTOLIC BLOOD PRESSURE: 121 MMHG | BODY MASS INDEX: 27.89 KG/M2 | TEMPERATURE: 98 F | DIASTOLIC BLOOD PRESSURE: 79 MMHG

## 2024-01-01 VITALS
BODY MASS INDEX: 28.63 KG/M2 | WEIGHT: 216 LBS | HEIGHT: 73 IN | DIASTOLIC BLOOD PRESSURE: 89 MMHG | SYSTOLIC BLOOD PRESSURE: 122 MMHG | HEART RATE: 89 BPM

## 2024-01-01 VITALS
DIASTOLIC BLOOD PRESSURE: 85 MMHG | HEIGHT: 73 IN | TEMPERATURE: 97.8 F | BODY MASS INDEX: 27.68 KG/M2 | BODY MASS INDEX: 28.49 KG/M2 | SYSTOLIC BLOOD PRESSURE: 120 MMHG | WEIGHT: 215 LBS | WEIGHT: 209.8 LBS | HEART RATE: 84 BPM

## 2024-01-01 VITALS
DIASTOLIC BLOOD PRESSURE: 88 MMHG | OXYGEN SATURATION: 98 % | BODY MASS INDEX: 28.44 KG/M2 | TEMPERATURE: 98.1 F | WEIGHT: 215.6 LBS | HEART RATE: 64 BPM | RESPIRATION RATE: 16 BRPM | SYSTOLIC BLOOD PRESSURE: 156 MMHG

## 2024-01-01 VITALS — WEIGHT: 210 LBS | BODY MASS INDEX: 27.83 KG/M2 | HEIGHT: 73 IN

## 2024-01-01 VITALS
SYSTOLIC BLOOD PRESSURE: 163 MMHG | TEMPERATURE: 98.9 F | DIASTOLIC BLOOD PRESSURE: 77 MMHG | HEART RATE: 60 BPM | OXYGEN SATURATION: 97 %

## 2024-01-01 VITALS — BODY MASS INDEX: 24.12 KG/M2 | WEIGHT: 182 LBS | HEIGHT: 73 IN

## 2024-01-01 VITALS
WEIGHT: 210 LBS | OXYGEN SATURATION: 97 % | BODY MASS INDEX: 27.83 KG/M2 | HEART RATE: 75 BPM | SYSTOLIC BLOOD PRESSURE: 122 MMHG | HEIGHT: 73 IN | DIASTOLIC BLOOD PRESSURE: 83 MMHG

## 2024-01-01 VITALS — HEIGHT: 73 IN | BODY MASS INDEX: 27.83 KG/M2 | WEIGHT: 210 LBS

## 2024-01-01 VITALS — HEIGHT: 73 IN | BODY MASS INDEX: 26.51 KG/M2 | WEIGHT: 200 LBS

## 2024-01-01 VITALS
RESPIRATION RATE: 16 BRPM | OXYGEN SATURATION: 99 % | HEART RATE: 78 BPM | SYSTOLIC BLOOD PRESSURE: 140 MMHG | DIASTOLIC BLOOD PRESSURE: 82 MMHG

## 2024-01-01 DIAGNOSIS — C79.31 MALIGNANT NEOPLASM METASTATIC TO BRAIN (H): Primary | ICD-10-CM

## 2024-01-01 DIAGNOSIS — C79.31 MALIGNANT NEOPLASM METASTATIC TO BRAIN (H): ICD-10-CM

## 2024-01-01 DIAGNOSIS — C79.31 METASTASIS TO BRAIN (H): Primary | ICD-10-CM

## 2024-01-01 DIAGNOSIS — C43.4 MALIGNANT MELANOMA OF SCALP (H): ICD-10-CM

## 2024-01-01 DIAGNOSIS — G40.909 SEIZURE DISORDER (H): ICD-10-CM

## 2024-01-01 DIAGNOSIS — R11.0 NAUSEA: ICD-10-CM

## 2024-01-01 DIAGNOSIS — R19.7 DIARRHEA: Primary | ICD-10-CM

## 2024-01-01 DIAGNOSIS — K52.9 COLITIS: ICD-10-CM

## 2024-01-01 DIAGNOSIS — C43.9 METASTATIC MALIGNANT MELANOMA (H): ICD-10-CM

## 2024-01-01 DIAGNOSIS — C79.31 MELANOMA METASTATIC TO BRAIN (H): ICD-10-CM

## 2024-01-01 DIAGNOSIS — C79.31 MELANOMA METASTATIC TO BRAIN (H): Primary | ICD-10-CM

## 2024-01-01 DIAGNOSIS — C43.9 METASTATIC MALIGNANT MELANOMA (H): Primary | ICD-10-CM
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
## 2024-01-01 DIAGNOSIS — C79.31 METASTASIS TO BRAIN (H): ICD-10-CM

## 2024-01-01 DIAGNOSIS — Z71.89 GOALS OF CARE, COUNSELING/DISCUSSION: ICD-10-CM

## 2024-01-01 DIAGNOSIS — C43.4 MALIGNANT MELANOMA OF SCALP (H): Primary | ICD-10-CM

## 2024-01-01 DIAGNOSIS — R19.7 DIARRHEA: ICD-10-CM

## 2024-01-01 DIAGNOSIS — E87.6 HYPOKALEMIA: ICD-10-CM

## 2024-01-01 DIAGNOSIS — G93.6 CEREBRAL EDEMA (H): ICD-10-CM

## 2024-01-01 DIAGNOSIS — K21.9 GASTROESOPHAGEAL REFLUX DISEASE, UNSPECIFIED WHETHER ESOPHAGITIS PRESENT: ICD-10-CM

## 2024-01-01 DIAGNOSIS — T40.2X5A THERAPEUTIC OPIOID INDUCED CONSTIPATION: ICD-10-CM

## 2024-01-01 DIAGNOSIS — E86.0 DEHYDRATION: ICD-10-CM

## 2024-01-01 DIAGNOSIS — A04.0 ENTEROPATHOGENIC ESCHERICHIA COLI INFECTION: ICD-10-CM

## 2024-01-01 DIAGNOSIS — R52 PAIN: Primary | ICD-10-CM

## 2024-01-01 DIAGNOSIS — R51.9 INTRACTABLE EPISODIC HEADACHE, UNSPECIFIED HEADACHE TYPE: ICD-10-CM

## 2024-01-01 DIAGNOSIS — Z51.5 ENCOUNTER FOR PALLIATIVE CARE: ICD-10-CM

## 2024-01-01 DIAGNOSIS — I10 BENIGN ESSENTIAL HYPERTENSION: ICD-10-CM

## 2024-01-01 DIAGNOSIS — M62.82 NON-TRAUMATIC RHABDOMYOLYSIS: ICD-10-CM

## 2024-01-01 DIAGNOSIS — Z98.890 S/P CRANIOTOMY: Primary | ICD-10-CM

## 2024-01-01 DIAGNOSIS — D72.829 LEUKOCYTOSIS, UNSPECIFIED TYPE: ICD-10-CM

## 2024-01-01 DIAGNOSIS — R53.0 NEOPLASTIC MALIGNANT RELATED FATIGUE: ICD-10-CM

## 2024-01-01 DIAGNOSIS — R94.31 PROLONGED QT INTERVAL: ICD-10-CM

## 2024-01-01 DIAGNOSIS — L60.0 INGROWN NAIL OF GREAT TOE OF RIGHT FOOT: Primary | ICD-10-CM

## 2024-01-01 DIAGNOSIS — K52.9 COLITIS: Primary | ICD-10-CM

## 2024-01-01 DIAGNOSIS — G81.91 RIGHT HEMIPARESIS (H): ICD-10-CM

## 2024-01-01 DIAGNOSIS — Z71.89 ADVANCED CARE PLANNING/COUNSELING DISCUSSION: ICD-10-CM

## 2024-01-01 DIAGNOSIS — R52 PAIN: ICD-10-CM

## 2024-01-01 DIAGNOSIS — R00.0 TACHYCARDIA: ICD-10-CM

## 2024-01-01 DIAGNOSIS — K59.03 THERAPEUTIC OPIOID INDUCED CONSTIPATION: ICD-10-CM

## 2024-01-01 DIAGNOSIS — R42 LIGHTHEADEDNESS: ICD-10-CM

## 2024-01-01 DIAGNOSIS — N17.9 AKI (ACUTE KIDNEY INJURY) (H): ICD-10-CM

## 2024-01-01 DIAGNOSIS — Z11.59 NEED FOR HEPATITIS C SCREENING TEST: Primary | ICD-10-CM

## 2024-01-01 DIAGNOSIS — E83.41 HYPERMAGNESEMIA: ICD-10-CM

## 2024-01-01 DIAGNOSIS — G89.3 CANCER RELATED PAIN: ICD-10-CM

## 2024-01-01 DIAGNOSIS — R51.9 NONINTRACTABLE HEADACHE, UNSPECIFIED CHRONICITY PATTERN, UNSPECIFIED HEADACHE TYPE: ICD-10-CM

## 2024-01-01 LAB
ABO/RH(D): NORMAL
ACANTHOCYTES BLD QL SMEAR: NORMAL
ACANTHOCYTES BLD QL SMEAR: NORMAL
ADV 40+41 DNA STL QL NAA+NON-PROBE: NEGATIVE
ADV 40+41 DNA STL QL NAA+NON-PROBE: POSITIVE
ALBUMIN SERPL BCG-MCNC: 2 G/DL (ref 3.5–5.2)
ALBUMIN SERPL BCG-MCNC: 2.6 G/DL (ref 3.5–5.2)
ALBUMIN SERPL BCG-MCNC: 2.8 G/DL (ref 3.5–5.2)
ALBUMIN SERPL BCG-MCNC: 2.8 G/DL (ref 3.5–5.2)
ALBUMIN SERPL BCG-MCNC: 2.9 G/DL (ref 3.5–5.2)
ALBUMIN SERPL BCG-MCNC: 3 G/DL (ref 3.5–5.2)
ALBUMIN SERPL BCG-MCNC: 3.1 G/DL (ref 3.5–5.2)
ALBUMIN SERPL BCG-MCNC: 3.1 G/DL (ref 3.5–5.2)
ALBUMIN SERPL BCG-MCNC: 3.3 G/DL (ref 3.5–5.2)
ALBUMIN SERPL BCG-MCNC: 3.9 G/DL (ref 3.5–5.2)
ALBUMIN SERPL BCG-MCNC: 3.9 G/DL (ref 3.5–5.2)
ALBUMIN SERPL BCG-MCNC: 4 G/DL (ref 3.5–5.2)
ALBUMIN SERPL BCG-MCNC: 4 G/DL (ref 3.5–5.2)
ALBUMIN SERPL BCG-MCNC: 4.1 G/DL (ref 3.5–5.2)
ALBUMIN SERPL BCG-MCNC: 4.2 G/DL (ref 3.5–5.2)
ALBUMIN SERPL BCG-MCNC: 4.2 G/DL (ref 3.5–5.2)
ALBUMIN UR-MCNC: 30 MG/DL
ALBUMIN UR-MCNC: 30 MG/DL
ALP SERPL-CCNC: 39 U/L (ref 40–150)
ALP SERPL-CCNC: 58 U/L (ref 40–150)
ALP SERPL-CCNC: 59 U/L (ref 40–150)
ALP SERPL-CCNC: 60 U/L (ref 40–150)
ALP SERPL-CCNC: 63 U/L (ref 40–150)
ALP SERPL-CCNC: 64 U/L (ref 40–150)
ALP SERPL-CCNC: 65 U/L (ref 40–150)
ALP SERPL-CCNC: 68 U/L (ref 40–150)
ALP SERPL-CCNC: 69 U/L (ref 40–150)
ALP SERPL-CCNC: 75 U/L (ref 40–150)
ALP SERPL-CCNC: 82 U/L (ref 40–150)
ALP SERPL-CCNC: 84 U/L (ref 40–150)
ALT SERPL W P-5'-P-CCNC: 17 U/L (ref 0–70)
ALT SERPL W P-5'-P-CCNC: 21 U/L (ref 0–70)
ALT SERPL W P-5'-P-CCNC: 23 U/L (ref 0–70)
ALT SERPL W P-5'-P-CCNC: 24 U/L (ref 0–70)
ALT SERPL W P-5'-P-CCNC: 25 U/L (ref 0–70)
ALT SERPL W P-5'-P-CCNC: 27 U/L (ref 0–70)
ALT SERPL W P-5'-P-CCNC: 27 U/L (ref 0–70)
ALT SERPL W P-5'-P-CCNC: 32 U/L (ref 0–70)
ALT SERPL W P-5'-P-CCNC: 36 U/L (ref 0–70)
ALT SERPL W P-5'-P-CCNC: 40 U/L (ref 0–70)
ALT SERPL W P-5'-P-CCNC: 41 U/L (ref 0–70)
ALT SERPL W P-5'-P-CCNC: 42 U/L (ref 0–70)
ALT SERPL W P-5'-P-CCNC: 46 U/L (ref 0–70)
ALT SERPL W P-5'-P-CCNC: 53 U/L (ref 0–70)
ANION GAP SERPL CALCULATED.3IONS-SCNC: 10 MMOL/L (ref 7–15)
ANION GAP SERPL CALCULATED.3IONS-SCNC: 11 MMOL/L (ref 7–15)
ANION GAP SERPL CALCULATED.3IONS-SCNC: 12 MMOL/L (ref 7–15)
ANION GAP SERPL CALCULATED.3IONS-SCNC: 13 MMOL/L (ref 7–15)
ANION GAP SERPL CALCULATED.3IONS-SCNC: 13 MMOL/L (ref 7–15)
ANION GAP SERPL CALCULATED.3IONS-SCNC: 14 MMOL/L (ref 7–15)
ANION GAP SERPL CALCULATED.3IONS-SCNC: 15 MMOL/L (ref 7–15)
ANION GAP SERPL CALCULATED.3IONS-SCNC: 17 MMOL/L (ref 7–15)
ANION GAP SERPL CALCULATED.3IONS-SCNC: 18 MMOL/L (ref 7–15)
ANION GAP SERPL CALCULATED.3IONS-SCNC: 7 MMOL/L (ref 7–15)
ANION GAP SERPL CALCULATED.3IONS-SCNC: 8 MMOL/L (ref 7–15)
ANION GAP SERPL CALCULATED.3IONS-SCNC: 8 MMOL/L (ref 7–15)
ANION GAP SERPL CALCULATED.3IONS-SCNC: 9 MMOL/L (ref 7–15)
ANTIBODY SCREEN: NEGATIVE
APPEARANCE UR: ABNORMAL
APPEARANCE UR: CLEAR
AST SERPL W P-5'-P-CCNC: 16 U/L (ref 0–45)
AST SERPL W P-5'-P-CCNC: 19 U/L (ref 0–45)
AST SERPL W P-5'-P-CCNC: 20 U/L (ref 0–45)
AST SERPL W P-5'-P-CCNC: 21 U/L (ref 0–45)
AST SERPL W P-5'-P-CCNC: 22 U/L (ref 0–45)
AST SERPL W P-5'-P-CCNC: 23 U/L (ref 0–45)
AST SERPL W P-5'-P-CCNC: 29 U/L (ref 0–45)
AST SERPL W P-5'-P-CCNC: 32 U/L (ref 0–45)
AST SERPL W P-5'-P-CCNC: 36 U/L (ref 0–45)
AST SERPL W P-5'-P-CCNC: 52 U/L (ref 0–45)
AST SERPL W P-5'-P-CCNC: 52 U/L (ref 0–45)
AST SERPL W P-5'-P-CCNC: 66 U/L (ref 0–45)
AST SERPL W P-5'-P-CCNC: 67 U/L (ref 0–45)
AST SERPL W P-5'-P-CCNC: 69 U/L (ref 0–45)
ASTRO TYP 1-8 RNA STL QL NAA+NON-PROBE: NEGATIVE
ASTRO TYP 1-8 RNA STL QL NAA+NON-PROBE: NEGATIVE
ATRIAL RATE - MUSE: 105 BPM
ATRIAL RATE - MUSE: 109 BPM
ATRIAL RATE - MUSE: 118 BPM
ATRIAL RATE - MUSE: 120 BPM
ATRIAL RATE - MUSE: 97 BPM
AUER BODIES BLD QL SMEAR: NORMAL
AUER BODIES BLD QL SMEAR: NORMAL
BACTERIA BLD CULT: NO GROWTH
BASE EXCESS BLDV CALC-SCNC: -5 MMOL/L (ref -3–3)
BASO STIPL BLD QL SMEAR: NORMAL
BASO STIPL BLD QL SMEAR: NORMAL
BASOPHILS # BLD AUTO: 0 10E3/UL (ref 0–0.2)
BASOPHILS # BLD AUTO: 0 10E3/UL (ref 0–0.2)
BASOPHILS # BLD AUTO: 0.1 10E3/UL (ref 0–0.2)
BASOPHILS # BLD AUTO: ABNORMAL 10*3/UL
BASOPHILS # BLD AUTO: ABNORMAL 10*3/UL
BASOPHILS # BLD MANUAL: 0 10E3/UL (ref 0–0.2)
BASOPHILS # BLD MANUAL: 0.1 10E3/UL (ref 0–0.2)
BASOPHILS NFR BLD AUTO: 0 %
BASOPHILS NFR BLD AUTO: 0 %
BASOPHILS NFR BLD AUTO: 1 %
BASOPHILS NFR BLD AUTO: ABNORMAL %
BASOPHILS NFR BLD AUTO: ABNORMAL %
BASOPHILS NFR BLD MANUAL: 0 %
BASOPHILS NFR BLD MANUAL: 1 %
BILIRUB SERPL-MCNC: 0.5 MG/DL
BILIRUB SERPL-MCNC: 0.5 MG/DL
BILIRUB SERPL-MCNC: 0.6 MG/DL
BILIRUB SERPL-MCNC: 0.7 MG/DL
BILIRUB SERPL-MCNC: 0.7 MG/DL
BILIRUB SERPL-MCNC: 0.8 MG/DL
BILIRUB SERPL-MCNC: 0.9 MG/DL
BILIRUB SERPL-MCNC: 0.9 MG/DL
BILIRUB SERPL-MCNC: 1 MG/DL
BILIRUB SERPL-MCNC: 1.1 MG/DL
BILIRUB SERPL-MCNC: 1.2 MG/DL
BILIRUB UR QL STRIP: NEGATIVE
BILIRUB UR QL STRIP: NEGATIVE
BITE CELLS BLD QL SMEAR: NORMAL
BITE CELLS BLD QL SMEAR: NORMAL
BLISTER CELLS BLD QL SMEAR: NORMAL
BLISTER CELLS BLD QL SMEAR: NORMAL
BUN SERPL-MCNC: 11 MG/DL (ref 8–23)
BUN SERPL-MCNC: 11.2 MG/DL (ref 8–23)
BUN SERPL-MCNC: 11.2 MG/DL (ref 8–23)
BUN SERPL-MCNC: 11.5 MG/DL (ref 8–23)
BUN SERPL-MCNC: 11.8 MG/DL (ref 8–23)
BUN SERPL-MCNC: 11.8 MG/DL (ref 8–23)
BUN SERPL-MCNC: 11.9 MG/DL (ref 8–23)
BUN SERPL-MCNC: 12.6 MG/DL (ref 8–23)
BUN SERPL-MCNC: 12.9 MG/DL (ref 8–23)
BUN SERPL-MCNC: 16 MG/DL (ref 8–23)
BUN SERPL-MCNC: 16.1 MG/DL (ref 8–23)
BUN SERPL-MCNC: 16.5 MG/DL (ref 8–23)
BUN SERPL-MCNC: 16.9 MG/DL (ref 8–23)
BUN SERPL-MCNC: 17.6 MG/DL (ref 8–23)
BUN SERPL-MCNC: 18.3 MG/DL (ref 8–23)
BUN SERPL-MCNC: 18.9 MG/DL (ref 8–23)
BUN SERPL-MCNC: 19.1 MG/DL (ref 8–23)
BUN SERPL-MCNC: 19.5 MG/DL (ref 8–23)
BUN SERPL-MCNC: 21.1 MG/DL (ref 8–23)
BUN SERPL-MCNC: 23.5 MG/DL (ref 8–23)
BUN SERPL-MCNC: 25.6 MG/DL (ref 8–23)
BUN SERPL-MCNC: 27.9 MG/DL (ref 8–23)
BUN SERPL-MCNC: 28.1 MG/DL (ref 8–23)
BUN SERPL-MCNC: 29 MG/DL (ref 8–23)
BUN SERPL-MCNC: 29.7 MG/DL (ref 8–23)
BURR CELLS BLD QL SMEAR: NORMAL
BURR CELLS BLD QL SMEAR: NORMAL
C CAYETANENSIS DNA STL QL NAA+NON-PROBE: NEGATIVE
C CAYETANENSIS DNA STL QL NAA+NON-PROBE: NEGATIVE
C DIFF TOX B STL QL: NEGATIVE
C PNEUM DNA SPEC QL NAA+PROBE: NOT DETECTED
CA-I BLD-MCNC: 4.4 MG/DL (ref 4.4–5.2)
CALCIUM SERPL-MCNC: 7.2 MG/DL (ref 8.8–10.2)
CALCIUM SERPL-MCNC: 7.6 MG/DL (ref 8.8–10.2)
CALCIUM SERPL-MCNC: 7.7 MG/DL (ref 8.8–10.2)
CALCIUM SERPL-MCNC: 7.8 MG/DL (ref 8.8–10.2)
CALCIUM SERPL-MCNC: 7.9 MG/DL (ref 8.8–10.2)
CALCIUM SERPL-MCNC: 7.9 MG/DL (ref 8.8–10.2)
CALCIUM SERPL-MCNC: 8.1 MG/DL (ref 8.8–10.2)
CALCIUM SERPL-MCNC: 8.2 MG/DL (ref 8.8–10.2)
CALCIUM SERPL-MCNC: 8.2 MG/DL (ref 8.8–10.2)
CALCIUM SERPL-MCNC: 8.3 MG/DL (ref 8.8–10.2)
CALCIUM SERPL-MCNC: 8.5 MG/DL (ref 8.8–10.2)
CALCIUM SERPL-MCNC: 8.6 MG/DL (ref 8.8–10.2)
CALCIUM SERPL-MCNC: 9 MG/DL (ref 8.8–10.2)
CALCIUM SERPL-MCNC: 9.1 MG/DL (ref 8.8–10.2)
CALCIUM SERPL-MCNC: 9.1 MG/DL (ref 8.8–10.2)
CALCIUM SERPL-MCNC: 9.2 MG/DL (ref 8.8–10.2)
CALCIUM SERPL-MCNC: 9.4 MG/DL (ref 8.8–10.2)
CALCIUM SERPL-MCNC: 9.9 MG/DL (ref 8.8–10.2)
CALPROTECTIN STL-MCNT: 550 MG/KG (ref 0–49.9)
CAMPYLOBACTER DNA SPEC NAA+PROBE: NEGATIVE
CAMPYLOBACTER DNA SPEC NAA+PROBE: NEGATIVE
CHLORIDE SERPL-SCNC: 102 MMOL/L (ref 98–107)
CHLORIDE SERPL-SCNC: 102 MMOL/L (ref 98–107)
CHLORIDE SERPL-SCNC: 103 MMOL/L (ref 98–107)
CHLORIDE SERPL-SCNC: 105 MMOL/L (ref 98–107)
CHLORIDE SERPL-SCNC: 105 MMOL/L (ref 98–107)
CHLORIDE SERPL-SCNC: 106 MMOL/L (ref 98–107)
CHLORIDE SERPL-SCNC: 107 MMOL/L (ref 98–107)
CHLORIDE SERPL-SCNC: 108 MMOL/L (ref 98–107)
CHLORIDE SERPL-SCNC: 109 MMOL/L (ref 98–107)
CHLORIDE SERPL-SCNC: 110 MMOL/L (ref 98–107)
CHLORIDE SERPL-SCNC: 110 MMOL/L (ref 98–107)
CHLORIDE SERPL-SCNC: 111 MMOL/L (ref 98–107)
CHLORIDE SERPL-SCNC: 98 MMOL/L (ref 98–107)
CHLORIDE SERPL-SCNC: 99 MMOL/L (ref 98–107)
CHLORIDE SERPL-SCNC: 99 MMOL/L (ref 98–107)
CK SERPL-CCNC: 1071 U/L (ref 39–308)
CK SERPL-CCNC: 1531 U/L (ref 39–308)
CK SERPL-CCNC: 370 U/L (ref 39–308)
COLOR UR AUTO: ABNORMAL
COLOR UR AUTO: ABNORMAL
CPB POCT: NO
CREAT SERPL-MCNC: 0.86 MG/DL (ref 0.67–1.17)
CREAT SERPL-MCNC: 0.9 MG/DL (ref 0.67–1.17)
CREAT SERPL-MCNC: 0.9 MG/DL (ref 0.67–1.17)
CREAT SERPL-MCNC: 0.91 MG/DL (ref 0.67–1.17)
CREAT SERPL-MCNC: 0.91 MG/DL (ref 0.67–1.17)
CREAT SERPL-MCNC: 0.92 MG/DL (ref 0.67–1.17)
CREAT SERPL-MCNC: 0.94 MG/DL (ref 0.67–1.17)
CREAT SERPL-MCNC: 0.94 MG/DL (ref 0.67–1.17)
CREAT SERPL-MCNC: 0.97 MG/DL (ref 0.67–1.17)
CREAT SERPL-MCNC: 0.99 MG/DL (ref 0.67–1.17)
CREAT SERPL-MCNC: 1.05 MG/DL (ref 0.67–1.17)
CREAT SERPL-MCNC: 1.07 MG/DL (ref 0.67–1.17)
CREAT SERPL-MCNC: 1.08 MG/DL (ref 0.67–1.17)
CREAT SERPL-MCNC: 1.13 MG/DL (ref 0.67–1.17)
CREAT SERPL-MCNC: 1.14 MG/DL (ref 0.67–1.17)
CREAT SERPL-MCNC: 1.14 MG/DL (ref 0.67–1.17)
CREAT SERPL-MCNC: 1.15 MG/DL (ref 0.67–1.17)
CREAT SERPL-MCNC: 1.16 MG/DL (ref 0.67–1.17)
CREAT SERPL-MCNC: 1.2 MG/DL (ref 0.67–1.17)
CREAT SERPL-MCNC: 1.37 MG/DL (ref 0.67–1.17)
CREAT SERPL-MCNC: 1.47 MG/DL (ref 0.67–1.17)
CREAT SERPL-MCNC: 1.52 MG/DL (ref 0.67–1.17)
CREAT SERPL-MCNC: 1.57 MG/DL (ref 0.67–1.17)
CREAT SERPL-MCNC: 1.88 MG/DL (ref 0.67–1.17)
CRP SERPL-MCNC: 115 MG/L
CRP SERPL-MCNC: 120 MG/L
CRP SERPL-MCNC: 22.4 MG/L
CRP SERPL-MCNC: 64.1 MG/L
CRP SERPL-MCNC: 80.3 MG/L
CRYPTOSP DNA STL QL NAA+NON-PROBE: NEGATIVE
CRYPTOSP DNA STL QL NAA+NON-PROBE: NEGATIVE
DACRYOCYTES BLD QL SMEAR: NORMAL
DACRYOCYTES BLD QL SMEAR: NORMAL
DEPRECATED HCO3 PLAS-SCNC: 15 MMOL/L (ref 22–29)
DEPRECATED HCO3 PLAS-SCNC: 16 MMOL/L (ref 22–29)
DEPRECATED HCO3 PLAS-SCNC: 16 MMOL/L (ref 22–29)
DEPRECATED HCO3 PLAS-SCNC: 19 MMOL/L (ref 22–29)
DEPRECATED HCO3 PLAS-SCNC: 20 MMOL/L (ref 22–29)
DEPRECATED HCO3 PLAS-SCNC: 20 MMOL/L (ref 22–29)
DEPRECATED HCO3 PLAS-SCNC: 21 MMOL/L (ref 22–29)
DEPRECATED HCO3 PLAS-SCNC: 22 MMOL/L (ref 22–29)
DEPRECATED HCO3 PLAS-SCNC: 22 MMOL/L (ref 22–29)
DEPRECATED HCO3 PLAS-SCNC: 23 MMOL/L (ref 22–29)
DEPRECATED HCO3 PLAS-SCNC: 24 MMOL/L (ref 22–29)
DEPRECATED HCO3 PLAS-SCNC: 26 MMOL/L (ref 22–29)
DIASTOLIC BLOOD PRESSURE - MUSE: NORMAL MMHG
E COLI O157 DNA STL QL NAA+NON-PROBE: ABNORMAL
E COLI O157 DNA STL QL NAA+NON-PROBE: ABNORMAL
E HISTOLYT DNA STL QL NAA+NON-PROBE: NEGATIVE
E HISTOLYT DNA STL QL NAA+NON-PROBE: NEGATIVE
EAEC ASTA GENE ISLT QL NAA+PROBE: NEGATIVE
EAEC ASTA GENE ISLT QL NAA+PROBE: NEGATIVE
EC STX1+STX2 GENES STL QL NAA+NON-PROBE: NEGATIVE
EC STX1+STX2 GENES STL QL NAA+NON-PROBE: NEGATIVE
EGFRCR SERPLBLD CKD-EPI 2021: 38 ML/MIN/1.73M2
EGFRCR SERPLBLD CKD-EPI 2021: 47 ML/MIN/1.73M2
EGFRCR SERPLBLD CKD-EPI 2021: 49 ML/MIN/1.73M2
EGFRCR SERPLBLD CKD-EPI 2021: 51 ML/MIN/1.73M2
EGFRCR SERPLBLD CKD-EPI 2021: 55 ML/MIN/1.73M2
EGFRCR SERPLBLD CKD-EPI 2021: 65 ML/MIN/1.73M2
EGFRCR SERPLBLD CKD-EPI 2021: 68 ML/MIN/1.73M2
EGFRCR SERPLBLD CKD-EPI 2021: 68 ML/MIN/1.73M2
EGFRCR SERPLBLD CKD-EPI 2021: 69 ML/MIN/1.73M2
EGFRCR SERPLBLD CKD-EPI 2021: 69 ML/MIN/1.73M2
EGFRCR SERPLBLD CKD-EPI 2021: 70 ML/MIN/1.73M2
EGFRCR SERPLBLD CKD-EPI 2021: 74 ML/MIN/1.73M2
EGFRCR SERPLBLD CKD-EPI 2021: 75 ML/MIN/1.73M2
EGFRCR SERPLBLD CKD-EPI 2021: 76 ML/MIN/1.73M2
EGFRCR SERPLBLD CKD-EPI 2021: 82 ML/MIN/1.73M2
EGFRCR SERPLBLD CKD-EPI 2021: 84 ML/MIN/1.73M2
EGFRCR SERPLBLD CKD-EPI 2021: 87 ML/MIN/1.73M2
EGFRCR SERPLBLD CKD-EPI 2021: 87 ML/MIN/1.73M2
EGFRCR SERPLBLD CKD-EPI 2021: 89 ML/MIN/1.73M2
EGFRCR SERPLBLD CKD-EPI 2021: >90 ML/MIN/1.73M2
ELLIPTOCYTES BLD QL SMEAR: NORMAL
ELLIPTOCYTES BLD QL SMEAR: NORMAL
EOSINOPHIL # BLD AUTO: 0 10E3/UL (ref 0–0.7)
EOSINOPHIL # BLD AUTO: 0.1 10E3/UL (ref 0–0.7)
EOSINOPHIL # BLD AUTO: 0.2 10E3/UL (ref 0–0.7)
EOSINOPHIL # BLD AUTO: ABNORMAL 10*3/UL
EOSINOPHIL # BLD AUTO: ABNORMAL 10*3/UL
EOSINOPHIL # BLD MANUAL: 0 10E3/UL (ref 0–0.7)
EOSINOPHIL # BLD MANUAL: 0.5 10E3/UL (ref 0–0.7)
EOSINOPHIL NFR BLD AUTO: 0 %
EOSINOPHIL NFR BLD AUTO: 1 %
EOSINOPHIL NFR BLD AUTO: 2 %
EOSINOPHIL NFR BLD AUTO: ABNORMAL %
EOSINOPHIL NFR BLD AUTO: ABNORMAL %
EOSINOPHIL NFR BLD MANUAL: 0 %
EOSINOPHIL NFR BLD MANUAL: 3 %
EPEC EAE GENE STL QL NAA+NON-PROBE: NEGATIVE
EPEC EAE GENE STL QL NAA+NON-PROBE: POSITIVE
ERYTHROCYTE [DISTWIDTH] IN BLOOD BY AUTOMATED COUNT: 14.3 % (ref 10–15)
ERYTHROCYTE [DISTWIDTH] IN BLOOD BY AUTOMATED COUNT: 14.4 % (ref 10–15)
ERYTHROCYTE [DISTWIDTH] IN BLOOD BY AUTOMATED COUNT: 14.6 % (ref 10–15)
ERYTHROCYTE [DISTWIDTH] IN BLOOD BY AUTOMATED COUNT: 14.7 % (ref 10–15)
ERYTHROCYTE [DISTWIDTH] IN BLOOD BY AUTOMATED COUNT: 14.8 % (ref 10–15)
ERYTHROCYTE [DISTWIDTH] IN BLOOD BY AUTOMATED COUNT: 14.9 % (ref 10–15)
ERYTHROCYTE [DISTWIDTH] IN BLOOD BY AUTOMATED COUNT: 14.9 % (ref 10–15)
ERYTHROCYTE [DISTWIDTH] IN BLOOD BY AUTOMATED COUNT: 15 % (ref 10–15)
ERYTHROCYTE [DISTWIDTH] IN BLOOD BY AUTOMATED COUNT: 15.2 % (ref 10–15)
ERYTHROCYTE [DISTWIDTH] IN BLOOD BY AUTOMATED COUNT: 15.3 % (ref 10–15)
ERYTHROCYTE [DISTWIDTH] IN BLOOD BY AUTOMATED COUNT: 15.8 % (ref 10–15)
ERYTHROCYTE [DISTWIDTH] IN BLOOD BY AUTOMATED COUNT: 15.8 % (ref 10–15)
ETEC LTA+ST1A+ST1B TOX ST NAA+NON-PROBE: NEGATIVE
ETEC LTA+ST1A+ST1B TOX ST NAA+NON-PROBE: NEGATIVE
FLEXIBLE SIGMOIDOSCOPY: NORMAL
FLUAV H1 2009 PAND RNA SPEC QL NAA+PROBE: NOT DETECTED
FLUAV H1 RNA SPEC QL NAA+PROBE: NOT DETECTED
FLUAV H3 RNA SPEC QL NAA+PROBE: NOT DETECTED
FLUAV RNA SPEC QL NAA+PROBE: NOT DETECTED
FLUBV RNA SPEC QL NAA+PROBE: NOT DETECTED
FRAGMENTS BLD QL SMEAR: NORMAL
FRAGMENTS BLD QL SMEAR: NORMAL
G LAMBLIA DNA STL QL NAA+NON-PROBE: NEGATIVE
G LAMBLIA DNA STL QL NAA+NON-PROBE: NEGATIVE
GAMMA INTERFERON BACKGROUND BLD IA-ACNC: 1.75 IU/ML
GLUCOSE BLD-MCNC: 103 MG/DL (ref 70–99)
GLUCOSE BLDC GLUCOMTR-MCNC: 124 MG/DL (ref 70–99)
GLUCOSE BLDC GLUCOMTR-MCNC: 82 MG/DL (ref 70–99)
GLUCOSE SERPL-MCNC: 101 MG/DL (ref 70–99)
GLUCOSE SERPL-MCNC: 103 MG/DL (ref 70–99)
GLUCOSE SERPL-MCNC: 107 MG/DL (ref 70–99)
GLUCOSE SERPL-MCNC: 109 MG/DL (ref 70–99)
GLUCOSE SERPL-MCNC: 113 MG/DL (ref 70–99)
GLUCOSE SERPL-MCNC: 115 MG/DL (ref 70–99)
GLUCOSE SERPL-MCNC: 116 MG/DL (ref 70–99)
GLUCOSE SERPL-MCNC: 116 MG/DL (ref 70–99)
GLUCOSE SERPL-MCNC: 118 MG/DL (ref 70–99)
GLUCOSE SERPL-MCNC: 119 MG/DL (ref 70–99)
GLUCOSE SERPL-MCNC: 123 MG/DL (ref 70–99)
GLUCOSE SERPL-MCNC: 129 MG/DL (ref 70–99)
GLUCOSE SERPL-MCNC: 76 MG/DL (ref 70–99)
GLUCOSE SERPL-MCNC: 79 MG/DL (ref 70–99)
GLUCOSE SERPL-MCNC: 84 MG/DL (ref 70–99)
GLUCOSE SERPL-MCNC: 85 MG/DL (ref 70–99)
GLUCOSE SERPL-MCNC: 85 MG/DL (ref 70–99)
GLUCOSE SERPL-MCNC: 89 MG/DL (ref 70–99)
GLUCOSE SERPL-MCNC: 89 MG/DL (ref 70–99)
GLUCOSE SERPL-MCNC: 90 MG/DL (ref 70–99)
GLUCOSE SERPL-MCNC: 90 MG/DL (ref 70–99)
GLUCOSE SERPL-MCNC: 92 MG/DL (ref 70–99)
GLUCOSE SERPL-MCNC: 94 MG/DL (ref 70–99)
GLUCOSE SERPL-MCNC: 95 MG/DL (ref 70–99)
GLUCOSE SERPL-MCNC: 97 MG/DL (ref 70–99)
GLUCOSE UR STRIP-MCNC: NEGATIVE MG/DL
GLUCOSE UR STRIP-MCNC: NEGATIVE MG/DL
HADV DNA SPEC QL NAA+PROBE: NOT DETECTED
HBV CORE AB SERPL QL IA: NONREACTIVE
HBV SURFACE AB SERPL IA-ACNC: <3.5 M[IU]/ML
HBV SURFACE AB SERPL IA-ACNC: NONREACTIVE M[IU]/ML
HBV SURFACE AG SERPL QL IA: NONREACTIVE
HCO3 BLDV-SCNC: 16 MMOL/L (ref 21–28)
HCOV PNL SPEC NAA+PROBE: NOT DETECTED
HCT VFR BLD AUTO: 28.7 % (ref 40–53)
HCT VFR BLD AUTO: 32.7 % (ref 40–53)
HCT VFR BLD AUTO: 33.7 % (ref 40–53)
HCT VFR BLD AUTO: 34.1 % (ref 40–53)
HCT VFR BLD AUTO: 34.1 % (ref 40–53)
HCT VFR BLD AUTO: 34.4 % (ref 40–53)
HCT VFR BLD AUTO: 35.3 % (ref 40–53)
HCT VFR BLD AUTO: 35.4 % (ref 40–53)
HCT VFR BLD AUTO: 35.6 % (ref 40–53)
HCT VFR BLD AUTO: 36.4 % (ref 40–53)
HCT VFR BLD AUTO: 36.6 % (ref 40–53)
HCT VFR BLD AUTO: 37.2 % (ref 40–53)
HCT VFR BLD AUTO: 41.4 % (ref 40–53)
HCT VFR BLD AUTO: 42.6 % (ref 40–53)
HCT VFR BLD AUTO: 42.9 % (ref 40–53)
HCT VFR BLD AUTO: 43.9 % (ref 40–53)
HCT VFR BLD AUTO: 44 % (ref 40–53)
HCT VFR BLD AUTO: 45 % (ref 40–53)
HCT VFR BLD AUTO: 47 % (ref 40–53)
HCT VFR BLD AUTO: 50.6 % (ref 40–53)
HCT VFR BLD CALC: 42 % (ref 40–53)
HCV AB SERPL QL IA: NONREACTIVE
HCV AB SERPL QL IA: NONREACTIVE
HGB BLD-MCNC: 10.6 G/DL (ref 13.3–17.7)
HGB BLD-MCNC: 11.3 G/DL (ref 13.3–17.7)
HGB BLD-MCNC: 11.4 G/DL (ref 13.3–17.7)
HGB BLD-MCNC: 11.5 G/DL (ref 13.3–17.7)
HGB BLD-MCNC: 11.6 G/DL (ref 13.3–17.7)
HGB BLD-MCNC: 11.9 G/DL (ref 13.3–17.7)
HGB BLD-MCNC: 11.9 G/DL (ref 13.3–17.7)
HGB BLD-MCNC: 12.1 G/DL (ref 13.3–17.7)
HGB BLD-MCNC: 12.7 G/DL (ref 13.3–17.7)
HGB BLD-MCNC: 13.5 G/DL (ref 13.3–17.7)
HGB BLD-MCNC: 14.1 G/DL (ref 13.3–17.7)
HGB BLD-MCNC: 14.3 G/DL (ref 13.3–17.7)
HGB BLD-MCNC: 14.7 G/DL (ref 13.3–17.7)
HGB BLD-MCNC: 14.9 G/DL (ref 13.3–17.7)
HGB BLD-MCNC: 15 G/DL (ref 13.3–17.7)
HGB BLD-MCNC: 16 G/DL (ref 13.3–17.7)
HGB BLD-MCNC: 16.1 G/DL (ref 13.3–17.7)
HGB BLD-MCNC: 16.8 G/DL (ref 13.3–17.7)
HGB BLD-MCNC: 9.8 G/DL (ref 13.3–17.7)
HGB C CRYSTALS: NORMAL
HGB C CRYSTALS: NORMAL
HGB UR QL STRIP: ABNORMAL
HGB UR QL STRIP: ABNORMAL
HIV 1+2 AB+HIV1 P24 AG SERPL QL IA: NONREACTIVE
HMPV RNA SPEC QL NAA+PROBE: NOT DETECTED
HOLD SPECIMEN: NORMAL
HOWELL-JOLLY BOD BLD QL SMEAR: NORMAL
HOWELL-JOLLY BOD BLD QL SMEAR: NORMAL
HPIV1 RNA SPEC QL NAA+PROBE: NOT DETECTED
HPIV2 RNA SPEC QL NAA+PROBE: NOT DETECTED
HPIV3 RNA SPEC QL NAA+PROBE: NOT DETECTED
HPIV4 RNA SPEC QL NAA+PROBE: NOT DETECTED
IMM GRANULOCYTES # BLD: 0.1 10E3/UL
IMM GRANULOCYTES # BLD: 0.2 10E3/UL
IMM GRANULOCYTES # BLD: 0.2 10E3/UL
IMM GRANULOCYTES # BLD: 0.3 10E3/UL
IMM GRANULOCYTES # BLD: 0.4 10E3/UL
IMM GRANULOCYTES # BLD: 0.5 10E3/UL
IMM GRANULOCYTES # BLD: ABNORMAL 10*3/UL
IMM GRANULOCYTES # BLD: ABNORMAL 10*3/UL
IMM GRANULOCYTES NFR BLD: 1 %
IMM GRANULOCYTES NFR BLD: 2 %
IMM GRANULOCYTES NFR BLD: 3 %
IMM GRANULOCYTES NFR BLD: 3 %
IMM GRANULOCYTES NFR BLD: 4 %
IMM GRANULOCYTES NFR BLD: ABNORMAL %
IMM GRANULOCYTES NFR BLD: ABNORMAL %
INR PPP: 0.88 (ref 0.85–1.15)
INTERPRETATION ECG - MUSE: NORMAL
KETONES UR STRIP-MCNC: NEGATIVE MG/DL
KETONES UR STRIP-MCNC: NEGATIVE MG/DL
LACTATE SERPL-SCNC: 1.4 MMOL/L (ref 0.7–2)
LACTATE SERPL-SCNC: 1.6 MMOL/L (ref 0.7–2)
LACTATE SERPL-SCNC: 1.6 MMOL/L (ref 0.7–2)
LACTATE SERPL-SCNC: 7 MMOL/L (ref 0.7–2)
LACTOFERRIN STL QL IA: POSITIVE
LEUKOCYTE ESTERASE UR QL STRIP: NEGATIVE
LEUKOCYTE ESTERASE UR QL STRIP: NEGATIVE
LEVETIRACETAM SERPL-MCNC: 17.9 ΜG/ML (ref 10–40)
LIPASE SERPL-CCNC: 280 U/L (ref 13–60)
LVEF ECHO: NORMAL
LYMPHOCYTES # BLD AUTO: 1.5 10E3/UL (ref 0.8–5.3)
LYMPHOCYTES # BLD AUTO: 1.6 10E3/UL (ref 0.8–5.3)
LYMPHOCYTES # BLD AUTO: 1.8 10E3/UL (ref 0.8–5.3)
LYMPHOCYTES # BLD AUTO: 2.2 10E3/UL (ref 0.8–5.3)
LYMPHOCYTES # BLD AUTO: 2.3 10E3/UL (ref 0.8–5.3)
LYMPHOCYTES # BLD AUTO: 2.9 10E3/UL (ref 0.8–5.3)
LYMPHOCYTES # BLD AUTO: ABNORMAL 10*3/UL
LYMPHOCYTES # BLD AUTO: ABNORMAL 10*3/UL
LYMPHOCYTES # BLD MANUAL: 1 10E3/UL (ref 0.8–5.3)
LYMPHOCYTES # BLD MANUAL: 2.4 10E3/UL (ref 0.8–5.3)
LYMPHOCYTES NFR BLD AUTO: 10 %
LYMPHOCYTES NFR BLD AUTO: 12 %
LYMPHOCYTES NFR BLD AUTO: 13 %
LYMPHOCYTES NFR BLD AUTO: 13 %
LYMPHOCYTES NFR BLD AUTO: 18 %
LYMPHOCYTES NFR BLD AUTO: 20 %
LYMPHOCYTES NFR BLD AUTO: 20 %
LYMPHOCYTES NFR BLD AUTO: 21 %
LYMPHOCYTES NFR BLD AUTO: ABNORMAL %
LYMPHOCYTES NFR BLD AUTO: ABNORMAL %
LYMPHOCYTES NFR BLD MANUAL: 16 %
LYMPHOCYTES NFR BLD MANUAL: 7 %
M PNEUMO DNA SPEC QL NAA+PROBE: NOT DETECTED
M TB IFN-G BLD-IMP: NEGATIVE
M TB IFN-G CD4+ BCKGRND COR BLD-ACNC: 8.25 IU/ML
MAGNESIUM SERPL-MCNC: 1.5 MG/DL (ref 1.7–2.3)
MAGNESIUM SERPL-MCNC: 1.6 MG/DL (ref 1.7–2.3)
MAGNESIUM SERPL-MCNC: 1.6 MG/DL (ref 1.7–2.3)
MAGNESIUM SERPL-MCNC: 1.7 MG/DL (ref 1.7–2.3)
MAGNESIUM SERPL-MCNC: 1.7 MG/DL (ref 1.7–2.3)
MAGNESIUM SERPL-MCNC: 1.8 MG/DL (ref 1.7–2.3)
MAGNESIUM SERPL-MCNC: 1.8 MG/DL (ref 1.7–2.3)
MAGNESIUM SERPL-MCNC: 1.9 MG/DL (ref 1.7–2.3)
MAGNESIUM SERPL-MCNC: 2 MG/DL (ref 1.7–2.3)
MAGNESIUM SERPL-MCNC: 2.1 MG/DL (ref 1.7–2.3)
MAGNESIUM SERPL-MCNC: 2.2 MG/DL (ref 1.7–2.3)
MAGNESIUM SERPL-MCNC: 2.2 MG/DL (ref 1.7–2.3)
MAGNESIUM SERPL-MCNC: 2.3 MG/DL (ref 1.7–2.3)
MAGNESIUM SERPL-MCNC: 2.4 MG/DL (ref 1.7–2.3)
MAGNESIUM SERPL-MCNC: 2.5 MG/DL (ref 1.7–2.3)
MCH RBC QN AUTO: 30.1 PG (ref 26.5–33)
MCH RBC QN AUTO: 30.5 PG (ref 26.5–33)
MCH RBC QN AUTO: 30.6 PG (ref 26.5–33)
MCH RBC QN AUTO: 30.9 PG (ref 26.5–33)
MCH RBC QN AUTO: 30.9 PG (ref 26.5–33)
MCH RBC QN AUTO: 31 PG (ref 26.5–33)
MCH RBC QN AUTO: 31.1 PG (ref 26.5–33)
MCH RBC QN AUTO: 31.2 PG (ref 26.5–33)
MCH RBC QN AUTO: 31.2 PG (ref 26.5–33)
MCH RBC QN AUTO: 31.4 PG (ref 26.5–33)
MCH RBC QN AUTO: 31.4 PG (ref 26.5–33)
MCH RBC QN AUTO: 31.5 PG (ref 26.5–33)
MCH RBC QN AUTO: 31.9 PG (ref 26.5–33)
MCHC RBC AUTO-ENTMCNC: 31.9 G/DL (ref 31.5–36.5)
MCHC RBC AUTO-ENTMCNC: 32 G/DL (ref 31.5–36.5)
MCHC RBC AUTO-ENTMCNC: 32.4 G/DL (ref 31.5–36.5)
MCHC RBC AUTO-ENTMCNC: 32.5 G/DL (ref 31.5–36.5)
MCHC RBC AUTO-ENTMCNC: 32.6 G/DL (ref 31.5–36.5)
MCHC RBC AUTO-ENTMCNC: 33.1 G/DL (ref 31.5–36.5)
MCHC RBC AUTO-ENTMCNC: 33.2 G/DL (ref 31.5–36.5)
MCHC RBC AUTO-ENTMCNC: 33.2 G/DL (ref 31.5–36.5)
MCHC RBC AUTO-ENTMCNC: 33.4 G/DL (ref 31.5–36.5)
MCHC RBC AUTO-ENTMCNC: 33.5 G/DL (ref 31.5–36.5)
MCHC RBC AUTO-ENTMCNC: 34 G/DL (ref 31.5–36.5)
MCHC RBC AUTO-ENTMCNC: 34 G/DL (ref 31.5–36.5)
MCHC RBC AUTO-ENTMCNC: 34.1 G/DL (ref 31.5–36.5)
MCHC RBC AUTO-ENTMCNC: 35 G/DL (ref 31.5–36.5)
MCHC RBC AUTO-ENTMCNC: 36.6 G/DL (ref 31.5–36.5)
MCV RBC AUTO: 87 FL (ref 78–100)
MCV RBC AUTO: 90 FL (ref 78–100)
MCV RBC AUTO: 91 FL (ref 78–100)
MCV RBC AUTO: 92 FL (ref 78–100)
MCV RBC AUTO: 93 FL (ref 78–100)
MCV RBC AUTO: 93 FL (ref 78–100)
MCV RBC AUTO: 94 FL (ref 78–100)
MCV RBC AUTO: 95 FL (ref 78–100)
MITOGEN IGNF BCKGRD COR BLD-ACNC: -0.18 IU/ML
MITOGEN IGNF BCKGRD COR BLD-ACNC: -0.26 IU/ML
MONOCYTES # BLD AUTO: 0.5 10E3/UL (ref 0–1.3)
MONOCYTES # BLD AUTO: 0.6 10E3/UL (ref 0–1.3)
MONOCYTES # BLD AUTO: 0.6 10E3/UL (ref 0–1.3)
MONOCYTES # BLD AUTO: 0.8 10E3/UL (ref 0–1.3)
MONOCYTES # BLD AUTO: 0.8 10E3/UL (ref 0–1.3)
MONOCYTES # BLD AUTO: 0.9 10E3/UL (ref 0–1.3)
MONOCYTES # BLD AUTO: 0.9 10E3/UL (ref 0–1.3)
MONOCYTES # BLD AUTO: 1 10E3/UL (ref 0–1.3)
MONOCYTES # BLD AUTO: ABNORMAL 10*3/UL
MONOCYTES # BLD AUTO: ABNORMAL 10*3/UL
MONOCYTES # BLD MANUAL: 1 10E3/UL (ref 0–1.3)
MONOCYTES # BLD MANUAL: 1.1 10E3/UL (ref 0–1.3)
MONOCYTES NFR BLD AUTO: 11 %
MONOCYTES NFR BLD AUTO: 4 %
MONOCYTES NFR BLD AUTO: 4 %
MONOCYTES NFR BLD AUTO: 5 %
MONOCYTES NFR BLD AUTO: 5 %
MONOCYTES NFR BLD AUTO: 6 %
MONOCYTES NFR BLD AUTO: 7 %
MONOCYTES NFR BLD AUTO: 8 %
MONOCYTES NFR BLD AUTO: ABNORMAL %
MONOCYTES NFR BLD AUTO: ABNORMAL %
MONOCYTES NFR BLD MANUAL: 7 %
MONOCYTES NFR BLD MANUAL: 7 %
MRSA DNA SPEC QL NAA+PROBE: NEGATIVE
MUCOUS THREADS #/AREA URNS LPF: PRESENT /LPF
MYELOCYTES # BLD MANUAL: 0.2 10E3/UL
MYELOCYTES NFR BLD MANUAL: 1 %
NEUTROPHILS # BLD AUTO: 10 10E3/UL (ref 1.6–8.3)
NEUTROPHILS # BLD AUTO: 11 10E3/UL (ref 1.6–8.3)
NEUTROPHILS # BLD AUTO: 14.2 10E3/UL (ref 1.6–8.3)
NEUTROPHILS # BLD AUTO: 5.5 10E3/UL (ref 1.6–8.3)
NEUTROPHILS # BLD AUTO: 8.3 10E3/UL (ref 1.6–8.3)
NEUTROPHILS # BLD AUTO: 9.2 10E3/UL (ref 1.6–8.3)
NEUTROPHILS # BLD AUTO: 9.3 10E3/UL (ref 1.6–8.3)
NEUTROPHILS # BLD AUTO: 9.9 10E3/UL (ref 1.6–8.3)
NEUTROPHILS # BLD AUTO: ABNORMAL 10*3/UL
NEUTROPHILS # BLD AUTO: ABNORMAL 10*3/UL
NEUTROPHILS # BLD MANUAL: 11.2 10E3/UL (ref 1.6–8.3)
NEUTROPHILS # BLD MANUAL: 11.6 10E3/UL (ref 1.6–8.3)
NEUTROPHILS NFR BLD AUTO: 62 %
NEUTROPHILS NFR BLD AUTO: 70 %
NEUTROPHILS NFR BLD AUTO: 71 %
NEUTROPHILS NFR BLD AUTO: 73 %
NEUTROPHILS NFR BLD AUTO: 75 %
NEUTROPHILS NFR BLD AUTO: 78 %
NEUTROPHILS NFR BLD AUTO: 80 %
NEUTROPHILS NFR BLD AUTO: 81 %
NEUTROPHILS NFR BLD AUTO: ABNORMAL %
NEUTROPHILS NFR BLD AUTO: ABNORMAL %
NEUTROPHILS NFR BLD MANUAL: 73 %
NEUTROPHILS NFR BLD MANUAL: 85 %
NEUTS HYPERSEG BLD QL SMEAR: NORMAL
NEUTS HYPERSEG BLD QL SMEAR: NORMAL
NITRATE UR QL: NEGATIVE
NITRATE UR QL: NEGATIVE
NOROVIRUS GI+II RNA STL QL NAA+NON-PROBE: NEGATIVE
NOROVIRUS GI+II RNA STL QL NAA+NON-PROBE: NEGATIVE
NRBC # BLD AUTO: 0 10E3/UL
NRBC # BLD AUTO: 0.5 10E3/UL
NRBC BLD AUTO-RTO: 0 /100
NRBC BLD MANUAL-RTO: 3 %
P AXIS - MUSE: 43 DEGREES
P AXIS - MUSE: 52 DEGREES
P AXIS - MUSE: 57 DEGREES
P AXIS - MUSE: 63 DEGREES
P AXIS - MUSE: NORMAL DEGREES
P SHIGELLOIDES DNA STL QL NAA+NON-PROBE: NEGATIVE
P SHIGELLOIDES DNA STL QL NAA+NON-PROBE: NEGATIVE
PATH REPORT.COMMENTS IMP SPEC: NORMAL
PATH REPORT.FINAL DX SPEC: NORMAL
PATH REPORT.GROSS SPEC: NORMAL
PATH REPORT.MICROSCOPIC SPEC OTHER STN: NORMAL
PATH REPORT.RELEVANT HX SPEC: NORMAL
PCO2 BLDV: 22 MM HG (ref 40–50)
PH BLDV: 7.46 [PH] (ref 7.32–7.43)
PH UR STRIP: 5.5 [PH] (ref 5–7)
PH UR STRIP: 6 [PH] (ref 5–7)
PHOSPHATE SERPL-MCNC: 1.6 MG/DL (ref 2.5–4.5)
PHOSPHATE SERPL-MCNC: 1.9 MG/DL (ref 2.5–4.5)
PHOSPHATE SERPL-MCNC: 2 MG/DL (ref 2.5–4.5)
PHOSPHATE SERPL-MCNC: 2.2 MG/DL (ref 2.5–4.5)
PHOSPHATE SERPL-MCNC: 2.3 MG/DL (ref 2.5–4.5)
PHOSPHATE SERPL-MCNC: 2.4 MG/DL (ref 2.5–4.5)
PHOSPHATE SERPL-MCNC: 2.6 MG/DL (ref 2.5–4.5)
PHOSPHATE SERPL-MCNC: 2.7 MG/DL (ref 2.5–4.5)
PHOSPHATE SERPL-MCNC: 2.7 MG/DL (ref 2.5–4.5)
PHOSPHATE SERPL-MCNC: 2.8 MG/DL (ref 2.5–4.5)
PHOSPHATE SERPL-MCNC: 3 MG/DL (ref 2.5–4.5)
PHOSPHATE SERPL-MCNC: 3.3 MG/DL (ref 2.5–4.5)
PHOTO IMAGE: NORMAL
PLAT MORPH BLD: ABNORMAL
PLAT MORPH BLD: ABNORMAL
PLAT MORPH BLD: NORMAL
PLAT MORPH BLD: NORMAL
PLATELET # BLD AUTO: 110 10E3/UL (ref 150–450)
PLATELET # BLD AUTO: 126 10E3/UL (ref 150–450)
PLATELET # BLD AUTO: 142 10E3/UL (ref 150–450)
PLATELET # BLD AUTO: 143 10E3/UL (ref 150–450)
PLATELET # BLD AUTO: 147 10E3/UL (ref 150–450)
PLATELET # BLD AUTO: 151 10E3/UL (ref 150–450)
PLATELET # BLD AUTO: 154 10E3/UL (ref 150–450)
PLATELET # BLD AUTO: 155 10E3/UL (ref 150–450)
PLATELET # BLD AUTO: 161 10E3/UL (ref 150–450)
PLATELET # BLD AUTO: 163 10E3/UL (ref 150–450)
PLATELET # BLD AUTO: 164 10E3/UL (ref 150–450)
PLATELET # BLD AUTO: 167 10E3/UL (ref 150–450)
PLATELET # BLD AUTO: 173 10E3/UL (ref 150–450)
PLATELET # BLD AUTO: 181 10E3/UL (ref 150–450)
PLATELET # BLD AUTO: 193 10E3/UL (ref 150–450)
PLATELET # BLD AUTO: 224 10E3/UL (ref 150–450)
PLATELET # BLD AUTO: 237 10E3/UL (ref 150–450)
PLATELET # BLD AUTO: 244 10E3/UL (ref 150–450)
PLATELET # BLD AUTO: 261 10E3/UL (ref 150–450)
PLATELET # BLD AUTO: 271 10E3/UL (ref 150–450)
PO2 BLDV: 33 MM HG (ref 25–47)
POLYCHROMASIA BLD QL SMEAR: NORMAL
POLYCHROMASIA BLD QL SMEAR: NORMAL
POTASSIUM BLD-SCNC: 2.8 MMOL/L (ref 3.4–5.3)
POTASSIUM SERPL-SCNC: 3.1 MMOL/L (ref 3.4–5.3)
POTASSIUM SERPL-SCNC: 3.2 MMOL/L (ref 3.4–5.3)
POTASSIUM SERPL-SCNC: 3.3 MMOL/L (ref 3.4–5.3)
POTASSIUM SERPL-SCNC: 3.3 MMOL/L (ref 3.4–5.3)
POTASSIUM SERPL-SCNC: 3.4 MMOL/L (ref 3.4–5.3)
POTASSIUM SERPL-SCNC: 3.4 MMOL/L (ref 3.4–5.3)
POTASSIUM SERPL-SCNC: 3.5 MMOL/L (ref 3.4–5.3)
POTASSIUM SERPL-SCNC: 3.6 MMOL/L (ref 3.4–5.3)
POTASSIUM SERPL-SCNC: 3.7 MMOL/L (ref 3.4–5.3)
POTASSIUM SERPL-SCNC: 3.8 MMOL/L (ref 3.4–5.3)
POTASSIUM SERPL-SCNC: 3.9 MMOL/L (ref 3.4–5.3)
POTASSIUM SERPL-SCNC: 3.9 MMOL/L (ref 3.4–5.3)
POTASSIUM SERPL-SCNC: 4 MMOL/L (ref 3.4–5.3)
POTASSIUM SERPL-SCNC: 4.1 MMOL/L (ref 3.4–5.3)
POTASSIUM SERPL-SCNC: 4.3 MMOL/L (ref 3.4–5.3)
POTASSIUM SERPL-SCNC: 4.5 MMOL/L (ref 3.4–5.3)
POTASSIUM STL-SCNC: 45 MMOL/L
PR INTERVAL - MUSE: 112 MS
PR INTERVAL - MUSE: 144 MS
PR INTERVAL - MUSE: 146 MS
PR INTERVAL - MUSE: 148 MS
PR INTERVAL - MUSE: 150 MS
PROCALCITONIN SERPL IA-MCNC: 0.85 NG/ML
PROT SERPL-MCNC: 3.7 G/DL (ref 6.4–8.3)
PROT SERPL-MCNC: 4.9 G/DL (ref 6.4–8.3)
PROT SERPL-MCNC: 5.1 G/DL (ref 6.4–8.3)
PROT SERPL-MCNC: 5.5 G/DL (ref 6.4–8.3)
PROT SERPL-MCNC: 5.6 G/DL (ref 6.4–8.3)
PROT SERPL-MCNC: 5.7 G/DL (ref 6.4–8.3)
PROT SERPL-MCNC: 5.8 G/DL (ref 6.4–8.3)
PROT SERPL-MCNC: 5.9 G/DL (ref 6.4–8.3)
PROT SERPL-MCNC: 6.1 G/DL (ref 6.4–8.3)
PROT SERPL-MCNC: 6.5 G/DL (ref 6.4–8.3)
PROT SERPL-MCNC: 6.7 G/DL (ref 6.4–8.3)
PROT SERPL-MCNC: 6.9 G/DL (ref 6.4–8.3)
PROT SERPL-MCNC: 7 G/DL (ref 6.4–8.3)
PROT SERPL-MCNC: 7 G/DL (ref 6.4–8.3)
QRS DURATION - MUSE: 86 MS
QRS DURATION - MUSE: 86 MS
QRS DURATION - MUSE: 90 MS
QRS DURATION - MUSE: 92 MS
QRS DURATION - MUSE: 96 MS
QT - MUSE: 332 MS
QT - MUSE: 344 MS
QT - MUSE: 354 MS
QT - MUSE: 354 MS
QT - MUSE: 436 MS
QTC - MUSE: 436 MS
QTC - MUSE: 465 MS
QTC - MUSE: 467 MS
QTC - MUSE: 476 MS
QTC - MUSE: 616 MS
QUANTIFERON MITOGEN: 10 IU/ML
QUANTIFERON NIL TUBE: 1.75 IU/ML
QUANTIFERON TB1 TUBE: 1.57 IU/ML
QUANTIFERON TB2 TUBE: 1.49
R AXIS - MUSE: -3 DEGREES
R AXIS - MUSE: -4 DEGREES
R AXIS - MUSE: 1 DEGREES
R AXIS - MUSE: 14 DEGREES
R AXIS - MUSE: 19 DEGREES
RBC # BLD AUTO: 3.07 10E6/UL (ref 4.4–5.9)
RBC # BLD AUTO: 3.47 10E6/UL (ref 4.4–5.9)
RBC # BLD AUTO: 3.62 10E6/UL (ref 4.4–5.9)
RBC # BLD AUTO: 3.66 10E6/UL (ref 4.4–5.9)
RBC # BLD AUTO: 3.69 10E6/UL (ref 4.4–5.9)
RBC # BLD AUTO: 3.7 10E6/UL (ref 4.4–5.9)
RBC # BLD AUTO: 3.71 10E6/UL (ref 4.4–5.9)
RBC # BLD AUTO: 3.74 10E6/UL (ref 4.4–5.9)
RBC # BLD AUTO: 3.83 10E6/UL (ref 4.4–5.9)
RBC # BLD AUTO: 3.88 10E6/UL (ref 4.4–5.9)
RBC # BLD AUTO: 3.89 10E6/UL (ref 4.4–5.9)
RBC # BLD AUTO: 4.1 10E6/UL (ref 4.4–5.9)
RBC # BLD AUTO: 4.36 10E6/UL (ref 4.4–5.9)
RBC # BLD AUTO: 4.69 10E6/UL (ref 4.4–5.9)
RBC # BLD AUTO: 4.75 10E6/UL (ref 4.4–5.9)
RBC # BLD AUTO: 4.76 10E6/UL (ref 4.4–5.9)
RBC # BLD AUTO: 4.89 10E6/UL (ref 4.4–5.9)
RBC # BLD AUTO: 5.02 10E6/UL (ref 4.4–5.9)
RBC # BLD AUTO: 5.05 10E6/UL (ref 4.4–5.9)
RBC # BLD AUTO: 5.35 10E6/UL (ref 4.4–5.9)
RBC AGGLUT BLD QL: NORMAL
RBC AGGLUT BLD QL: NORMAL
RBC MORPH BLD: ABNORMAL
RBC MORPH BLD: ABNORMAL
RBC MORPH BLD: NORMAL
RBC MORPH BLD: NORMAL
RBC URINE: 1 /HPF
RBC URINE: <1 /HPF
ROULEAUX BLD QL SMEAR: NORMAL
ROULEAUX BLD QL SMEAR: NORMAL
RSV RNA SPEC QL NAA+PROBE: NOT DETECTED
RSV RNA SPEC QL NAA+PROBE: NOT DETECTED
RV+EV RNA SPEC QL NAA+PROBE: DETECTED
RVA RNA STL QL NAA+NON-PROBE: NEGATIVE
RVA RNA STL QL NAA+NON-PROBE: NEGATIVE
SA TARGET DNA: POSITIVE
SALMONELLA SP RPOD STL QL NAA+PROBE: NEGATIVE
SALMONELLA SP RPOD STL QL NAA+PROBE: NEGATIVE
SAO2 % BLDV: 70 % (ref 70–75)
SAPO I+II+IV+V RNA STL QL NAA+NON-PROBE: NEGATIVE
SAPO I+II+IV+V RNA STL QL NAA+NON-PROBE: NEGATIVE
SARS-COV-2 RNA RESP QL NAA+PROBE: NEGATIVE
SHIGELLA SP+EIEC IPAH ST NAA+NON-PROBE: NEGATIVE
SHIGELLA SP+EIEC IPAH ST NAA+NON-PROBE: NEGATIVE
SICKLE CELLS BLD QL SMEAR: NORMAL
SICKLE CELLS BLD QL SMEAR: NORMAL
SMUDGE CELLS BLD QL SMEAR: NORMAL
SMUDGE CELLS BLD QL SMEAR: NORMAL
SODIUM BLD-SCNC: 133 MMOL/L (ref 135–145)
SODIUM SERPL-SCNC: 131 MMOL/L (ref 135–145)
SODIUM SERPL-SCNC: 131 MMOL/L (ref 135–145)
SODIUM SERPL-SCNC: 132 MMOL/L (ref 135–145)
SODIUM SERPL-SCNC: 135 MMOL/L (ref 135–145)
SODIUM SERPL-SCNC: 135 MMOL/L (ref 135–145)
SODIUM SERPL-SCNC: 136 MMOL/L (ref 135–145)
SODIUM SERPL-SCNC: 137 MMOL/L (ref 135–145)
SODIUM SERPL-SCNC: 137 MMOL/L (ref 135–145)
SODIUM SERPL-SCNC: 138 MMOL/L (ref 135–145)
SODIUM SERPL-SCNC: 138 MMOL/L (ref 135–145)
SODIUM SERPL-SCNC: 139 MMOL/L (ref 135–145)
SODIUM SERPL-SCNC: 140 MMOL/L (ref 135–145)
SODIUM SERPL-SCNC: 141 MMOL/L (ref 135–145)
SODIUM SERPL-SCNC: 141 MMOL/L (ref 135–145)
SODIUM SERPL-SCNC: 142 MMOL/L (ref 135–145)
SODIUM SERPL-SCNC: 143 MMOL/L (ref 135–145)
SODIUM SERPL-SCNC: 143 MMOL/L (ref 135–145)
SODIUM SERPL-SCNC: 144 MMOL/L (ref 135–145)
SODIUM STL-SCNC: 36 MMOL/L
SP GR UR STRIP: 1.01 (ref 1–1.03)
SP GR UR STRIP: 1.02 (ref 1–1.03)
SPECIMEN EXPIRATION DATE: NORMAL
SPHEROCYTES BLD QL SMEAR: NORMAL
SPHEROCYTES BLD QL SMEAR: NORMAL
STOMATOCYTES BLD QL SMEAR: NORMAL
STOMATOCYTES BLD QL SMEAR: NORMAL
SYSTOLIC BLOOD PRESSURE - MUSE: NORMAL MMHG
T AXIS - MUSE: 45 DEGREES
T AXIS - MUSE: 49 DEGREES
T AXIS - MUSE: 51 DEGREES
T AXIS - MUSE: 52 DEGREES
T AXIS - MUSE: 55 DEGREES
TARGETS BLD QL SMEAR: NORMAL
TARGETS BLD QL SMEAR: NORMAL
TOXIC GRANULES BLD QL SMEAR: NORMAL
TOXIC GRANULES BLD QL SMEAR: NORMAL
TOXIC GRANULES BLD QL SMEAR: PRESENT
TSH SERPL DL<=0.005 MIU/L-ACNC: 0.45 UIU/ML (ref 0.3–4.2)
TSH SERPL DL<=0.005 MIU/L-ACNC: 0.52 UIU/ML (ref 0.3–4.2)
TSH SERPL DL<=0.005 MIU/L-ACNC: 0.78 UIU/ML (ref 0.3–4.2)
TSH SERPL DL<=0.005 MIU/L-ACNC: 0.84 UIU/ML (ref 0.3–4.2)
TSH SERPL DL<=0.005 MIU/L-ACNC: 0.92 UIU/ML (ref 0.3–4.2)
TSH SERPL DL<=0.005 MIU/L-ACNC: 1.57 UIU/ML (ref 0.3–4.2)
TSH SERPL DL<=0.005 MIU/L-ACNC: 2.3 UIU/ML (ref 0.3–4.2)
UROBILINOGEN UR STRIP-MCNC: NORMAL MG/DL
UROBILINOGEN UR STRIP-MCNC: NORMAL MG/DL
V CHOLERAE DNA SPEC QL NAA+PROBE: NEGATIVE
V CHOLERAE DNA SPEC QL NAA+PROBE: NEGATIVE
VARIANT LYMPHS BLD QL SMEAR: NORMAL
VARIANT LYMPHS BLD QL SMEAR: NORMAL
VARIANT LYMPHS BLD QL SMEAR: PRESENT
VENTRICULAR RATE- MUSE: 105 BPM
VENTRICULAR RATE- MUSE: 109 BPM
VENTRICULAR RATE- MUSE: 118 BPM
VENTRICULAR RATE- MUSE: 120 BPM
VENTRICULAR RATE- MUSE: 97 BPM
VIBRIO DNA SPEC NAA+PROBE: NEGATIVE
VIBRIO DNA SPEC NAA+PROBE: NEGATIVE
WBC # BLD AUTO: 10.5 10E3/UL (ref 4–11)
WBC # BLD AUTO: 10.9 10E3/UL (ref 4–11)
WBC # BLD AUTO: 11.6 10E3/UL (ref 4–11)
WBC # BLD AUTO: 11.9 10E3/UL (ref 4–11)
WBC # BLD AUTO: 12 10E3/UL (ref 4–11)
WBC # BLD AUTO: 12.4 10E3/UL (ref 4–11)
WBC # BLD AUTO: 12.8 10E3/UL (ref 4–11)
WBC # BLD AUTO: 13.2 10E3/UL (ref 4–11)
WBC # BLD AUTO: 13.4 10E3/UL (ref 4–11)
WBC # BLD AUTO: 13.6 10E3/UL (ref 4–11)
WBC # BLD AUTO: 13.9 10E3/UL (ref 4–11)
WBC # BLD AUTO: 14.1 10E3/UL (ref 4–11)
WBC # BLD AUTO: 14.9 10E3/UL (ref 4–11)
WBC # BLD AUTO: 15.3 10E3/UL (ref 4–11)
WBC # BLD AUTO: 17.4 10E3/UL (ref 4–11)
WBC # BLD AUTO: 6.7 10E3/UL (ref 4–11)
WBC # BLD AUTO: 7.4 10E3/UL (ref 4–11)
WBC # BLD AUTO: 8.5 10E3/UL (ref 4–11)
WBC # BLD AUTO: 8.7 10E3/UL (ref 4–11)
WBC # BLD AUTO: 9.4 10E3/UL (ref 4–11)
WBC URINE: 2 /HPF
WBC URINE: 3 /HPF
Y ENTEROCOL DNA STL QL NAA+PROBE: NEGATIVE
Y ENTEROCOL DNA STL QL NAA+PROBE: NEGATIVE

## 2024-01-01 PROCEDURE — 83735 ASSAY OF MAGNESIUM: CPT

## 2024-01-01 PROCEDURE — 80053 COMPREHEN METABOLIC PANEL: CPT | Performed by: STUDENT IN AN ORGANIZED HEALTH CARE EDUCATION/TRAINING PROGRAM

## 2024-01-01 PROCEDURE — 78816 PET IMAGE W/CT FULL BODY: CPT | Mod: PS

## 2024-01-01 PROCEDURE — 258N000003 HC RX IP 258 OP 636: Performed by: EMERGENCY MEDICINE

## 2024-01-01 PROCEDURE — 120N000002 HC R&B MED SURG/OB UMMC

## 2024-01-01 PROCEDURE — 71260 CT THORAX DX C+: CPT

## 2024-01-01 PROCEDURE — 74018 RADEX ABDOMEN 1 VIEW: CPT

## 2024-01-01 PROCEDURE — 77334 RADIATION TREATMENT AID(S): CPT | Performed by: RADIOLOGY

## 2024-01-01 PROCEDURE — 250N000013 HC RX MED GY IP 250 OP 250 PS 637

## 2024-01-01 PROCEDURE — 61796 SRS CRANIAL LESION SIMPLE: CPT | Mod: 58 | Performed by: NEUROLOGICAL SURGERY

## 2024-01-01 PROCEDURE — 93005 ELECTROCARDIOGRAM TRACING: CPT | Mod: RTG

## 2024-01-01 PROCEDURE — 82550 ASSAY OF CK (CPK): CPT

## 2024-01-01 PROCEDURE — 99285 EMERGENCY DEPT VISIT HI MDM: CPT | Mod: 25 | Performed by: EMERGENCY MEDICINE

## 2024-01-01 PROCEDURE — 96417 CHEMO IV INFUS EACH ADDL SEQ: CPT

## 2024-01-01 PROCEDURE — 77373 STRTCTC BDY RAD THER TX DLVR: CPT | Performed by: RADIOLOGY

## 2024-01-01 PROCEDURE — 258N000003 HC RX IP 258 OP 636: Mod: JZ

## 2024-01-01 PROCEDURE — 99222 1ST HOSP IP/OBS MODERATE 55: CPT | Performed by: CLINICAL NURSE SPECIALIST

## 2024-01-01 PROCEDURE — 82962 GLUCOSE BLOOD TEST: CPT

## 2024-01-01 PROCEDURE — 999N000128 HC STATISTIC PERIPHERAL IV START W/O US GUIDANCE

## 2024-01-01 PROCEDURE — 86140 C-REACTIVE PROTEIN: CPT

## 2024-01-01 PROCEDURE — G0463 HOSPITAL OUTPT CLINIC VISIT: HCPCS | Mod: 25 | Performed by: RADIOLOGY

## 2024-01-01 PROCEDURE — 85025 COMPLETE CBC W/AUTO DIFF WBC: CPT | Performed by: STUDENT IN AN ORGANIZED HEALTH CARE EDUCATION/TRAINING PROGRAM

## 2024-01-01 PROCEDURE — 99223 1ST HOSP IP/OBS HIGH 75: CPT | Mod: GC | Performed by: STUDENT IN AN ORGANIZED HEALTH CARE EDUCATION/TRAINING PROGRAM

## 2024-01-01 PROCEDURE — 77300 RADIATION THERAPY DOSE PLAN: CPT | Mod: 26 | Performed by: RADIOLOGY

## 2024-01-01 PROCEDURE — 85007 BL SMEAR W/DIFF WBC COUNT: CPT | Performed by: EMERGENCY MEDICINE

## 2024-01-01 PROCEDURE — G0463 HOSPITAL OUTPT CLINIC VISIT: HCPCS | Mod: 25 | Performed by: PHYSICIAN ASSISTANT

## 2024-01-01 PROCEDURE — 250N000011 HC RX IP 250 OP 636: Mod: JZ | Performed by: PHYSICIAN ASSISTANT

## 2024-01-01 PROCEDURE — 250N000011 HC RX IP 250 OP 636: Performed by: EMERGENCY MEDICINE

## 2024-01-01 PROCEDURE — 97110 THERAPEUTIC EXERCISES: CPT | Mod: GP

## 2024-01-01 PROCEDURE — 77370 RADIATION PHYSICS CONSULT: CPT | Performed by: RADIOLOGY

## 2024-01-01 PROCEDURE — 258N000003 HC RX IP 258 OP 636

## 2024-01-01 PROCEDURE — 36415 COLL VENOUS BLD VENIPUNCTURE: CPT | Performed by: STUDENT IN AN ORGANIZED HEALTH CARE EDUCATION/TRAINING PROGRAM

## 2024-01-01 PROCEDURE — 77435 SBRT MANAGEMENT: CPT | Performed by: RADIOLOGY

## 2024-01-01 PROCEDURE — 258N000003 HC RX IP 258 OP 636: Performed by: STUDENT IN AN ORGANIZED HEALTH CARE EDUCATION/TRAINING PROGRAM

## 2024-01-01 PROCEDURE — 84450 TRANSFERASE (AST) (SGOT): CPT | Performed by: STUDENT IN AN ORGANIZED HEALTH CARE EDUCATION/TRAINING PROGRAM

## 2024-01-01 PROCEDURE — 70450 CT HEAD/BRAIN W/O DYE: CPT

## 2024-01-01 PROCEDURE — 70491 CT SOFT TISSUE NECK W/DYE: CPT

## 2024-01-01 PROCEDURE — 250N000012 HC RX MED GY IP 250 OP 636 PS 637

## 2024-01-01 PROCEDURE — 250N000011 HC RX IP 250 OP 636: Mod: JZ | Performed by: STUDENT IN AN ORGANIZED HEALTH CARE EDUCATION/TRAINING PROGRAM

## 2024-01-01 PROCEDURE — 99222 1ST HOSP IP/OBS MODERATE 55: CPT | Mod: 24 | Performed by: INTERNAL MEDICINE

## 2024-01-01 PROCEDURE — 81001 URINALYSIS AUTO W/SCOPE: CPT | Performed by: EMERGENCY MEDICINE

## 2024-01-01 PROCEDURE — 36415 COLL VENOUS BLD VENIPUNCTURE: CPT | Performed by: RADIOLOGY

## 2024-01-01 PROCEDURE — 99232 SBSQ HOSP IP/OBS MODERATE 35: CPT | Performed by: STUDENT IN AN ORGANIZED HEALTH CARE EDUCATION/TRAINING PROGRAM

## 2024-01-01 PROCEDURE — 80048 BASIC METABOLIC PNL TOTAL CA: CPT | Performed by: STUDENT IN AN ORGANIZED HEALTH CARE EDUCATION/TRAINING PROGRAM

## 2024-01-01 PROCEDURE — G2211 COMPLEX E/M VISIT ADD ON: HCPCS | Mod: 95 | Performed by: PHYSICIAN ASSISTANT

## 2024-01-01 PROCEDURE — 99203 OFFICE O/P NEW LOW 30 MIN: CPT | Mod: 25 | Performed by: PODIATRIST

## 2024-01-01 PROCEDURE — 82565 ASSAY OF CREATININE: CPT | Performed by: STUDENT IN AN ORGANIZED HEALTH CARE EDUCATION/TRAINING PROGRAM

## 2024-01-01 PROCEDURE — 93010 ELECTROCARDIOGRAM REPORT: CPT | Performed by: INTERNAL MEDICINE

## 2024-01-01 PROCEDURE — 84100 ASSAY OF PHOSPHORUS: CPT

## 2024-01-01 PROCEDURE — 255N000002 HC RX 255 OP 636: Performed by: STUDENT IN AN ORGANIZED HEALTH CARE EDUCATION/TRAINING PROGRAM

## 2024-01-01 PROCEDURE — 82374 ASSAY BLOOD CARBON DIOXIDE: CPT | Performed by: PHYSICIAN ASSISTANT

## 2024-01-01 PROCEDURE — 93971 EXTREMITY STUDY: CPT | Mod: 26 | Performed by: RADIOLOGY

## 2024-01-01 PROCEDURE — 36415 COLL VENOUS BLD VENIPUNCTURE: CPT

## 2024-01-01 PROCEDURE — 77290 THER RAD SIMULAJ FIELD CPLX: CPT | Performed by: RADIOLOGY

## 2024-01-01 PROCEDURE — 80177 DRUG SCRN QUAN LEVETIRACETAM: CPT

## 2024-01-01 PROCEDURE — 999N000147 HC STATISTIC PT IP EVAL DEFER

## 2024-01-01 PROCEDURE — 99214 OFFICE O/P EST MOD 30 MIN: CPT | Performed by: RADIOLOGY

## 2024-01-01 PROCEDURE — 80048 BASIC METABOLIC PNL TOTAL CA: CPT

## 2024-01-01 PROCEDURE — 99232 SBSQ HOSP IP/OBS MODERATE 35: CPT | Mod: GC | Performed by: STUDENT IN AN ORGANIZED HEALTH CARE EDUCATION/TRAINING PROGRAM

## 2024-01-01 PROCEDURE — 77290 THER RAD SIMULAJ FIELD CPLX: CPT | Mod: 26 | Performed by: RADIOLOGY

## 2024-01-01 PROCEDURE — 85025 COMPLETE CBC W/AUTO DIFF WBC: CPT

## 2024-01-01 PROCEDURE — 250N000011 HC RX IP 250 OP 636: Mod: JZ

## 2024-01-01 PROCEDURE — 84133 ASSAY OF URINE POTASSIUM: CPT

## 2024-01-01 PROCEDURE — 85027 COMPLETE CBC AUTOMATED: CPT

## 2024-01-01 PROCEDURE — 93005 ELECTROCARDIOGRAM TRACING: CPT | Performed by: EMERGENCY MEDICINE

## 2024-01-01 PROCEDURE — 85027 COMPLETE CBC AUTOMATED: CPT | Performed by: RADIOLOGY

## 2024-01-01 PROCEDURE — 250N000013 HC RX MED GY IP 250 OP 250 PS 637: Performed by: STUDENT IN AN ORGANIZED HEALTH CARE EDUCATION/TRAINING PROGRAM

## 2024-01-01 PROCEDURE — 250N000009 HC RX 250: Performed by: STUDENT IN AN ORGANIZED HEALTH CARE EDUCATION/TRAINING PROGRAM

## 2024-01-01 PROCEDURE — 250N000011 HC RX IP 250 OP 636: Mod: JZ | Performed by: INTERNAL MEDICINE

## 2024-01-01 PROCEDURE — 255N000002 HC RX 255 OP 636: Performed by: RADIOLOGY

## 2024-01-01 PROCEDURE — 82040 ASSAY OF SERUM ALBUMIN: CPT | Performed by: EMERGENCY MEDICINE

## 2024-01-01 PROCEDURE — 83605 ASSAY OF LACTIC ACID: CPT | Performed by: PHYSICIAN ASSISTANT

## 2024-01-01 PROCEDURE — 250N000011 HC RX IP 250 OP 636

## 2024-01-01 PROCEDURE — 96413 CHEMO IV INFUSION 1 HR: CPT

## 2024-01-01 PROCEDURE — 97116 GAIT TRAINING THERAPY: CPT | Mod: GP | Performed by: PHYSICAL THERAPIST

## 2024-01-01 PROCEDURE — 82040 ASSAY OF SERUM ALBUMIN: CPT | Performed by: PHYSICIAN ASSISTANT

## 2024-01-01 PROCEDURE — 84443 ASSAY THYROID STIM HORMONE: CPT | Performed by: STUDENT IN AN ORGANIZED HEALTH CARE EDUCATION/TRAINING PROGRAM

## 2024-01-01 PROCEDURE — 82947 ASSAY GLUCOSE BLOOD QUANT: CPT

## 2024-01-01 PROCEDURE — 70553 MRI BRAIN STEM W/O & W/DYE: CPT

## 2024-01-01 PROCEDURE — 83630 LACTOFERRIN FECAL (QUAL): CPT

## 2024-01-01 PROCEDURE — 99284 EMERGENCY DEPT VISIT MOD MDM: CPT | Mod: 25 | Performed by: STUDENT IN AN ORGANIZED HEALTH CARE EDUCATION/TRAINING PROGRAM

## 2024-01-01 PROCEDURE — G2211 COMPLEX E/M VISIT ADD ON: HCPCS | Performed by: STUDENT IN AN ORGANIZED HEALTH CARE EDUCATION/TRAINING PROGRAM

## 2024-01-01 PROCEDURE — C9113 INJ PANTOPRAZOLE SODIUM, VIA: HCPCS | Mod: JZ

## 2024-01-01 PROCEDURE — 70450 CT HEAD/BRAIN W/O DYE: CPT | Mod: 26 | Performed by: RADIOLOGY

## 2024-01-01 PROCEDURE — 99214 OFFICE O/P EST MOD 30 MIN: CPT | Mod: 95 | Performed by: PHYSICIAN ASSISTANT

## 2024-01-01 PROCEDURE — 84302 ASSAY OF SWEAT SODIUM: CPT

## 2024-01-01 PROCEDURE — 77300 RADIATION THERAPY DOSE PLAN: CPT | Performed by: RADIOLOGY

## 2024-01-01 PROCEDURE — G0463 HOSPITAL OUTPT CLINIC VISIT: HCPCS | Mod: 25 | Performed by: STUDENT IN AN ORGANIZED HEALTH CARE EDUCATION/TRAINING PROGRAM

## 2024-01-01 PROCEDURE — 81001 URINALYSIS AUTO W/SCOPE: CPT | Performed by: PHYSICIAN ASSISTANT

## 2024-01-01 PROCEDURE — 99284 EMERGENCY DEPT VISIT MOD MDM: CPT | Performed by: STUDENT IN AN ORGANIZED HEALTH CARE EDUCATION/TRAINING PROGRAM

## 2024-01-01 PROCEDURE — 80053 COMPREHEN METABOLIC PANEL: CPT | Performed by: PATHOLOGY

## 2024-01-01 PROCEDURE — G2211 COMPLEX E/M VISIT ADD ON: HCPCS | Performed by: PHYSICIAN ASSISTANT

## 2024-01-01 PROCEDURE — 250N000013 HC RX MED GY IP 250 OP 250 PS 637: Performed by: EMERGENCY MEDICINE

## 2024-01-01 PROCEDURE — 97530 THERAPEUTIC ACTIVITIES: CPT | Mod: GO

## 2024-01-01 PROCEDURE — 71045 X-RAY EXAM CHEST 1 VIEW: CPT

## 2024-01-01 PROCEDURE — 93971 EXTREMITY STUDY: CPT | Mod: RT

## 2024-01-01 PROCEDURE — 78816 PET IMAGE W/CT FULL BODY: CPT | Mod: 26 | Performed by: STUDENT IN AN ORGANIZED HEALTH CARE EDUCATION/TRAINING PROGRAM

## 2024-01-01 PROCEDURE — A9552 F18 FDG: HCPCS | Performed by: STUDENT IN AN ORGANIZED HEALTH CARE EDUCATION/TRAINING PROGRAM

## 2024-01-01 PROCEDURE — 77295 3-D RADIOTHERAPY PLAN: CPT | Mod: 26 | Performed by: RADIOLOGY

## 2024-01-01 PROCEDURE — 97165 OT EVAL LOW COMPLEX 30 MIN: CPT | Mod: GO

## 2024-01-01 PROCEDURE — 74018 RADEX ABDOMEN 1 VIEW: CPT | Mod: 26 | Performed by: RADIOLOGY

## 2024-01-01 PROCEDURE — 99215 OFFICE O/P EST HI 40 MIN: CPT | Performed by: STUDENT IN AN ORGANIZED HEALTH CARE EDUCATION/TRAINING PROGRAM

## 2024-01-01 PROCEDURE — 70552 MRI BRAIN STEM W/DYE: CPT | Mod: 26 | Performed by: RADIOLOGY

## 2024-01-01 PROCEDURE — 85025 COMPLETE CBC W/AUTO DIFF WBC: CPT | Performed by: PATHOLOGY

## 2024-01-01 PROCEDURE — 70491 CT SOFT TISSUE NECK W/DYE: CPT | Mod: 26 | Performed by: STUDENT IN AN ORGANIZED HEALTH CARE EDUCATION/TRAINING PROGRAM

## 2024-01-01 PROCEDURE — 87040 BLOOD CULTURE FOR BACTERIA: CPT

## 2024-01-01 PROCEDURE — 82040 ASSAY OF SERUM ALBUMIN: CPT | Performed by: STUDENT IN AN ORGANIZED HEALTH CARE EDUCATION/TRAINING PROGRAM

## 2024-01-01 PROCEDURE — 84443 ASSAY THYROID STIM HORMONE: CPT | Performed by: PHYSICIAN ASSISTANT

## 2024-01-01 PROCEDURE — 80053 COMPREHEN METABOLIC PANEL: CPT

## 2024-01-01 PROCEDURE — 74176 CT ABD & PELVIS W/O CONTRAST: CPT | Mod: 26 | Performed by: RADIOLOGY

## 2024-01-01 PROCEDURE — 83690 ASSAY OF LIPASE: CPT

## 2024-01-01 PROCEDURE — 258N000003 HC RX IP 258 OP 636: Mod: JZ | Performed by: PHYSICIAN ASSISTANT

## 2024-01-01 PROCEDURE — 97530 THERAPEUTIC ACTIVITIES: CPT | Mod: GP

## 2024-01-01 PROCEDURE — 96374 THER/PROPH/DIAG INJ IV PUSH: CPT | Performed by: EMERGENCY MEDICINE

## 2024-01-01 PROCEDURE — 255N000002 HC RX 255 OP 636: Performed by: INTERNAL MEDICINE

## 2024-01-01 PROCEDURE — 250N000011 HC RX IP 250 OP 636: Mod: JZ | Performed by: EMERGENCY MEDICINE

## 2024-01-01 PROCEDURE — 85610 PROTHROMBIN TIME: CPT | Performed by: STUDENT IN AN ORGANIZED HEALTH CARE EDUCATION/TRAINING PROGRAM

## 2024-01-01 PROCEDURE — 87389 HIV-1 AG W/HIV-1&-2 AB AG IA: CPT | Performed by: INTERNAL MEDICINE

## 2024-01-01 PROCEDURE — 77470 SPECIAL RADIATION TREATMENT: CPT | Performed by: RADIOLOGY

## 2024-01-01 PROCEDURE — 87581 M.PNEUMON DNA AMP PROBE: CPT | Performed by: STUDENT IN AN ORGANIZED HEALTH CARE EDUCATION/TRAINING PROGRAM

## 2024-01-01 PROCEDURE — 99215 OFFICE O/P EST HI 40 MIN: CPT | Mod: 24 | Performed by: STUDENT IN AN ORGANIZED HEALTH CARE EDUCATION/TRAINING PROGRAM

## 2024-01-01 PROCEDURE — 999N000104 CT LUMBAR SPINE RECONSTRUCTED

## 2024-01-01 PROCEDURE — 86803 HEPATITIS C AB TEST: CPT | Performed by: INTERNAL MEDICINE

## 2024-01-01 PROCEDURE — 258N000003 HC RX IP 258 OP 636: Mod: JZ | Performed by: EMERGENCY MEDICINE

## 2024-01-01 PROCEDURE — 87493 C DIFF AMPLIFIED PROBE: CPT | Performed by: EMERGENCY MEDICINE

## 2024-01-01 PROCEDURE — 87641 MR-STAPH DNA AMP PROBE: CPT

## 2024-01-01 PROCEDURE — 86803 HEPATITIS C AB TEST: CPT | Performed by: FAMILY MEDICINE

## 2024-01-01 PROCEDURE — 86704 HEP B CORE ANTIBODY TOTAL: CPT | Performed by: INTERNAL MEDICINE

## 2024-01-01 PROCEDURE — G0463 HOSPITAL OUTPT CLINIC VISIT: HCPCS | Performed by: STUDENT IN AN ORGANIZED HEALTH CARE EDUCATION/TRAINING PROGRAM

## 2024-01-01 PROCEDURE — 45330 DIAGNOSTIC SIGMOIDOSCOPY: CPT | Performed by: INTERNAL MEDICINE

## 2024-01-01 PROCEDURE — 84443 ASSAY THYROID STIM HORMONE: CPT

## 2024-01-01 PROCEDURE — 86481 TB AG RESPONSE T-CELL SUSP: CPT | Performed by: INTERNAL MEDICINE

## 2024-01-01 PROCEDURE — 99222 1ST HOSP IP/OBS MODERATE 55: CPT | Mod: AI | Performed by: STUDENT IN AN ORGANIZED HEALTH CARE EDUCATION/TRAINING PROGRAM

## 2024-01-01 PROCEDURE — 87507 IADNA-DNA/RNA PROBE TQ 12-25: CPT | Performed by: EMERGENCY MEDICINE

## 2024-01-01 PROCEDURE — 70552 MRI BRAIN STEM W/DYE: CPT

## 2024-01-01 PROCEDURE — 87493 C DIFF AMPLIFIED PROBE: CPT

## 2024-01-01 PROCEDURE — 258N000003 HC RX IP 258 OP 636: Performed by: PHYSICIAN ASSISTANT

## 2024-01-01 PROCEDURE — 99233 SBSQ HOSP IP/OBS HIGH 50: CPT | Performed by: INTERNAL MEDICINE

## 2024-01-01 PROCEDURE — 96361 HYDRATE IV INFUSION ADD-ON: CPT | Performed by: EMERGENCY MEDICINE

## 2024-01-01 PROCEDURE — 71260 CT THORAX DX C+: CPT | Mod: 26 | Performed by: RADIOLOGY

## 2024-01-01 PROCEDURE — 99222 1ST HOSP IP/OBS MODERATE 55: CPT | Mod: GC | Performed by: STUDENT IN AN ORGANIZED HEALTH CARE EDUCATION/TRAINING PROGRAM

## 2024-01-01 PROCEDURE — 84155 ASSAY OF PROTEIN SERUM: CPT

## 2024-01-01 PROCEDURE — 72125 CT NECK SPINE W/O DYE: CPT

## 2024-01-01 PROCEDURE — 86706 HEP B SURFACE ANTIBODY: CPT | Performed by: INTERNAL MEDICINE

## 2024-01-01 PROCEDURE — 87635 SARS-COV-2 COVID-19 AMP PRB: CPT | Performed by: STUDENT IN AN ORGANIZED HEALTH CARE EDUCATION/TRAINING PROGRAM

## 2024-01-01 PROCEDURE — 250N000012 HC RX MED GY IP 250 OP 636 PS 637: Performed by: STUDENT IN AN ORGANIZED HEALTH CARE EDUCATION/TRAINING PROGRAM

## 2024-01-01 PROCEDURE — 82947 ASSAY GLUCOSE BLOOD QUANT: CPT | Performed by: EMERGENCY MEDICINE

## 2024-01-01 PROCEDURE — 99233 SBSQ HOSP IP/OBS HIGH 50: CPT | Mod: FS | Performed by: PHYSICIAN ASSISTANT

## 2024-01-01 PROCEDURE — 97116 GAIT TRAINING THERAPY: CPT | Mod: GP

## 2024-01-01 PROCEDURE — 74176 CT ABD & PELVIS W/O CONTRAST: CPT

## 2024-01-01 PROCEDURE — A9585 GADOBUTROL INJECTION: HCPCS | Performed by: RADIOLOGY

## 2024-01-01 PROCEDURE — 74177 CT ABD & PELVIS W/CONTRAST: CPT | Mod: 26 | Performed by: RADIOLOGY

## 2024-01-01 PROCEDURE — 36415 COLL VENOUS BLD VENIPUNCTURE: CPT | Performed by: PHYSICIAN ASSISTANT

## 2024-01-01 PROCEDURE — 96360 HYDRATION IV INFUSION INIT: CPT | Performed by: PHYSICIAN ASSISTANT

## 2024-01-01 PROCEDURE — 99233 SBSQ HOSP IP/OBS HIGH 50: CPT | Performed by: PHYSICIAN ASSISTANT

## 2024-01-01 PROCEDURE — 36415 COLL VENOUS BLD VENIPUNCTURE: CPT | Performed by: PATHOLOGY

## 2024-01-01 PROCEDURE — 99215 OFFICE O/P EST HI 40 MIN: CPT | Mod: 95 | Performed by: PHYSICIAN ASSISTANT

## 2024-01-01 PROCEDURE — 99233 SBSQ HOSP IP/OBS HIGH 50: CPT | Mod: GC | Performed by: INTERNAL MEDICINE

## 2024-01-01 PROCEDURE — 258N000003 HC RX IP 258 OP 636: Mod: JZ | Performed by: STUDENT IN AN ORGANIZED HEALTH CARE EDUCATION/TRAINING PROGRAM

## 2024-01-01 PROCEDURE — 97161 PT EVAL LOW COMPLEX 20 MIN: CPT | Mod: GP

## 2024-01-01 PROCEDURE — 82040 ASSAY OF SERUM ALBUMIN: CPT

## 2024-01-01 PROCEDURE — 70553 MRI BRAIN STEM W/O & W/DYE: CPT | Mod: 26 | Performed by: STUDENT IN AN ORGANIZED HEALTH CARE EDUCATION/TRAINING PROGRAM

## 2024-01-01 PROCEDURE — 72128 CT CHEST SPINE W/O DYE: CPT | Mod: 26 | Performed by: PREVENTIVE MEDICINE

## 2024-01-01 PROCEDURE — 87040 BLOOD CULTURE FOR BACTERIA: CPT | Performed by: STUDENT IN AN ORGANIZED HEALTH CARE EDUCATION/TRAINING PROGRAM

## 2024-01-01 PROCEDURE — 77334 RADIATION TREATMENT AID(S): CPT | Mod: 26 | Performed by: RADIOLOGY

## 2024-01-01 PROCEDURE — 84100 ASSAY OF PHOSPHORUS: CPT | Performed by: STUDENT IN AN ORGANIZED HEALTH CARE EDUCATION/TRAINING PROGRAM

## 2024-01-01 PROCEDURE — 250N000011 HC RX IP 250 OP 636: Performed by: STUDENT IN AN ORGANIZED HEALTH CARE EDUCATION/TRAINING PROGRAM

## 2024-01-01 PROCEDURE — 71045 X-RAY EXAM CHEST 1 VIEW: CPT | Mod: 26 | Performed by: RADIOLOGY

## 2024-01-01 PROCEDURE — 83735 ASSAY OF MAGNESIUM: CPT | Performed by: STUDENT IN AN ORGANIZED HEALTH CARE EDUCATION/TRAINING PROGRAM

## 2024-01-01 PROCEDURE — 85041 AUTOMATED RBC COUNT: CPT

## 2024-01-01 PROCEDURE — 85007 BL SMEAR W/DIFF WBC COUNT: CPT | Performed by: RADIOLOGY

## 2024-01-01 PROCEDURE — 83735 ASSAY OF MAGNESIUM: CPT | Performed by: EMERGENCY MEDICINE

## 2024-01-01 PROCEDURE — 999N000208 ECHOCARDIOGRAM COMPLETE

## 2024-01-01 PROCEDURE — 86140 C-REACTIVE PROTEIN: CPT | Performed by: STUDENT IN AN ORGANIZED HEALTH CARE EDUCATION/TRAINING PROGRAM

## 2024-01-01 PROCEDURE — 72131 CT LUMBAR SPINE W/O DYE: CPT | Mod: 26 | Performed by: PREVENTIVE MEDICINE

## 2024-01-01 PROCEDURE — 11750 EXCISION NAIL&NAIL MATRIX: CPT | Mod: T5 | Performed by: PODIATRIST

## 2024-01-01 PROCEDURE — 85049 AUTOMATED PLATELET COUNT: CPT

## 2024-01-01 PROCEDURE — 999N000127 HC STATISTIC PERIPHERAL IV START W US GUIDANCE

## 2024-01-01 PROCEDURE — 0DBE8ZX EXCISION OF LARGE INTESTINE, VIA NATURAL OR ARTIFICIAL OPENING ENDOSCOPIC, DIAGNOSTIC: ICD-10-PCS | Performed by: INTERNAL MEDICINE

## 2024-01-01 PROCEDURE — 84145 PROCALCITONIN (PCT): CPT | Performed by: EMERGENCY MEDICINE

## 2024-01-01 PROCEDURE — 84132 ASSAY OF SERUM POTASSIUM: CPT

## 2024-01-01 PROCEDURE — 88341 IMHCHEM/IMCYTCHM EA ADD ANTB: CPT | Mod: TC | Performed by: INTERNAL MEDICINE

## 2024-01-01 PROCEDURE — 87340 HEPATITIS B SURFACE AG IA: CPT | Performed by: INTERNAL MEDICINE

## 2024-01-01 PROCEDURE — 72125 CT NECK SPINE W/O DYE: CPT | Mod: 26 | Performed by: RADIOLOGY

## 2024-01-01 PROCEDURE — 99418 PROLNG IP/OBS E/M EA 15 MIN: CPT

## 2024-01-01 PROCEDURE — 343N000001 HC RX 343: Performed by: STUDENT IN AN ORGANIZED HEALTH CARE EDUCATION/TRAINING PROGRAM

## 2024-01-01 PROCEDURE — 36415 COLL VENOUS BLD VENIPUNCTURE: CPT | Performed by: EMERGENCY MEDICINE

## 2024-01-01 PROCEDURE — A9585 GADOBUTROL INJECTION: HCPCS | Performed by: STUDENT IN AN ORGANIZED HEALTH CARE EDUCATION/TRAINING PROGRAM

## 2024-01-01 PROCEDURE — 83605 ASSAY OF LACTIC ACID: CPT | Performed by: EMERGENCY MEDICINE

## 2024-01-01 PROCEDURE — 85004 AUTOMATED DIFF WBC COUNT: CPT

## 2024-01-01 PROCEDURE — 99239 HOSP IP/OBS DSCHRG MGMT >30: CPT | Mod: GC | Performed by: STUDENT IN AN ORGANIZED HEALTH CARE EDUCATION/TRAINING PROGRAM

## 2024-01-01 PROCEDURE — G0463 HOSPITAL OUTPT CLINIC VISIT: HCPCS | Performed by: RADIOLOGY

## 2024-01-01 PROCEDURE — 83605 ASSAY OF LACTIC ACID: CPT | Performed by: STUDENT IN AN ORGANIZED HEALTH CARE EDUCATION/TRAINING PROGRAM

## 2024-01-01 PROCEDURE — 99207 PR APP CREDIT; MD BILLING SHARED VISIT: CPT | Performed by: PHYSICIAN ASSISTANT

## 2024-01-01 PROCEDURE — 999N000104 CT THORACIC SPINE RECONSTRUCTED

## 2024-01-01 PROCEDURE — 82247 BILIRUBIN TOTAL: CPT

## 2024-01-01 PROCEDURE — 36415 COLL VENOUS BLD VENIPUNCTURE: CPT | Performed by: INTERNAL MEDICINE

## 2024-01-01 PROCEDURE — 85041 AUTOMATED RBC COUNT: CPT | Performed by: EMERGENCY MEDICINE

## 2024-01-01 PROCEDURE — 99000 SPECIMEN HANDLING OFFICE-LAB: CPT | Performed by: PATHOLOGY

## 2024-01-01 PROCEDURE — 93306 TTE W/DOPPLER COMPLETE: CPT | Mod: 26 | Performed by: INTERNAL MEDICINE

## 2024-01-01 PROCEDURE — 99215 OFFICE O/P EST HI 40 MIN: CPT | Mod: 25 | Performed by: RADIOLOGY

## 2024-01-01 PROCEDURE — 86140 C-REACTIVE PROTEIN: CPT | Performed by: EMERGENCY MEDICINE

## 2024-01-01 PROCEDURE — 70553 MRI BRAIN STEM W/O & W/DYE: CPT | Mod: 26 | Performed by: RADIOLOGY

## 2024-01-01 PROCEDURE — 87040 BLOOD CULTURE FOR BACTERIA: CPT | Performed by: EMERGENCY MEDICINE

## 2024-01-01 PROCEDURE — 99223 1ST HOSP IP/OBS HIGH 75: CPT | Mod: 24

## 2024-01-01 PROCEDURE — 99215 OFFICE O/P EST HI 40 MIN: CPT | Mod: 24 | Performed by: PHYSICIAN ASSISTANT

## 2024-01-01 PROCEDURE — 83735 ASSAY OF MAGNESIUM: CPT | Performed by: PATHOLOGY

## 2024-01-01 PROCEDURE — 83993 ASSAY FOR CALPROTECTIN FECAL: CPT

## 2024-01-01 PROCEDURE — 97535 SELF CARE MNGMENT TRAINING: CPT | Mod: GO

## 2024-01-01 PROCEDURE — 97530 THERAPEUTIC ACTIVITIES: CPT | Mod: GP | Performed by: PHYSICAL THERAPIST

## 2024-01-01 PROCEDURE — 77470 SPECIAL RADIATION TREATMENT: CPT | Mod: 26 | Performed by: RADIOLOGY

## 2024-01-01 PROCEDURE — 99214 OFFICE O/P EST MOD 30 MIN: CPT | Performed by: PHYSICIAN ASSISTANT

## 2024-01-01 PROCEDURE — 74177 CT ABD & PELVIS W/CONTRAST: CPT | Mod: 26 | Performed by: STUDENT IN AN ORGANIZED HEALTH CARE EDUCATION/TRAINING PROGRAM

## 2024-01-01 PROCEDURE — 99214 OFFICE O/P EST MOD 30 MIN: CPT | Mod: 95 | Performed by: STUDENT IN AN ORGANIZED HEALTH CARE EDUCATION/TRAINING PROGRAM

## 2024-01-01 PROCEDURE — 99418 PROLNG IP/OBS E/M EA 15 MIN: CPT | Mod: GC | Performed by: STUDENT IN AN ORGANIZED HEALTH CARE EDUCATION/TRAINING PROGRAM

## 2024-01-01 PROCEDURE — 85004 AUTOMATED DIFF WBC COUNT: CPT | Performed by: EMERGENCY MEDICINE

## 2024-01-01 PROCEDURE — 999N000099 HC STATISTIC MODERATE SEDATION < 10 MIN: Performed by: INTERNAL MEDICINE

## 2024-01-01 PROCEDURE — 61797 SRS CRAN LES SIMPLE ADDL: CPT | Mod: 58 | Performed by: NEUROLOGICAL SURGERY

## 2024-01-01 PROCEDURE — 77371 SRS MULTISOURCE: CPT | Performed by: RADIOLOGY

## 2024-01-01 PROCEDURE — 999N000157 HC STATISTIC RCP TIME EA 10 MIN

## 2024-01-01 PROCEDURE — 99207 PR NO CHARGE NURSE ONLY: CPT

## 2024-01-01 PROCEDURE — 87507 IADNA-DNA/RNA PROBE TQ 12-25: CPT | Performed by: PHYSICIAN ASSISTANT

## 2024-01-01 PROCEDURE — 99443 PR PHYSICIAN TELEPHONE EVALUATION 21-30 MIN: CPT | Mod: 95 | Performed by: PHYSICIAN ASSISTANT

## 2024-01-01 PROCEDURE — 93010 ELECTROCARDIOGRAM REPORT: CPT | Performed by: EMERGENCY MEDICINE

## 2024-01-01 PROCEDURE — 77432 STEREOTACTIC RADIATION TRMT: CPT | Performed by: RADIOLOGY

## 2024-01-01 PROCEDURE — 77263 THER RADIOLOGY TX PLNG CPLX: CPT | Performed by: RADIOLOGY

## 2024-01-01 PROCEDURE — 77295 3-D RADIOTHERAPY PLAN: CPT | Performed by: RADIOLOGY

## 2024-01-01 PROCEDURE — 999N000111 HC STATISTIC OT IP EVAL DEFER

## 2024-01-01 PROCEDURE — 88342 IMHCHEM/IMCYTCHM 1ST ANTB: CPT | Mod: 26 | Performed by: PATHOLOGY

## 2024-01-01 PROCEDURE — 99285 EMERGENCY DEPT VISIT HI MDM: CPT | Performed by: EMERGENCY MEDICINE

## 2024-01-01 PROCEDURE — 88341 IMHCHEM/IMCYTCHM EA ADD ANTB: CPT | Mod: 26 | Performed by: PATHOLOGY

## 2024-01-01 PROCEDURE — 82550 ASSAY OF CK (CPK): CPT | Performed by: EMERGENCY MEDICINE

## 2024-01-01 PROCEDURE — 88305 TISSUE EXAM BY PATHOLOGIST: CPT | Mod: 26 | Performed by: PATHOLOGY

## 2024-01-01 PROCEDURE — 71260 CT THORAX DX C+: CPT | Mod: 26 | Performed by: STUDENT IN AN ORGANIZED HEALTH CARE EDUCATION/TRAINING PROGRAM

## 2024-01-01 PROCEDURE — 82803 BLOOD GASES ANY COMBINATION: CPT

## 2024-01-01 PROCEDURE — 96360 HYDRATION IV INFUSION INIT: CPT | Performed by: STUDENT IN AN ORGANIZED HEALTH CARE EDUCATION/TRAINING PROGRAM

## 2024-01-01 PROCEDURE — 99443 PR PHYSICIAN TELEPHONE EVALUATION 21-30 MIN: CPT | Mod: 24 | Performed by: STUDENT IN AN ORGANIZED HEALTH CARE EDUCATION/TRAINING PROGRAM

## 2024-01-01 PROCEDURE — 86900 BLOOD TYPING SEROLOGIC ABO: CPT | Performed by: EMERGENCY MEDICINE

## 2024-01-01 PROCEDURE — 99417 PROLNG OP E/M EACH 15 MIN: CPT | Performed by: STUDENT IN AN ORGANIZED HEALTH CARE EDUCATION/TRAINING PROGRAM

## 2024-01-01 PROCEDURE — 87633 RESP VIRUS 12-25 TARGETS: CPT | Performed by: STUDENT IN AN ORGANIZED HEALTH CARE EDUCATION/TRAINING PROGRAM

## 2024-01-01 PROCEDURE — 87493 C DIFF AMPLIFIED PROBE: CPT | Mod: XU | Performed by: PHYSICIAN ASSISTANT

## 2024-01-01 PROCEDURE — 77336 RADIATION PHYSICS CONSULT: CPT | Performed by: RADIOLOGY

## 2024-01-01 PROCEDURE — 96361 HYDRATE IV INFUSION ADD-ON: CPT | Performed by: STUDENT IN AN ORGANIZED HEALTH CARE EDUCATION/TRAINING PROGRAM

## 2024-01-01 PROCEDURE — G0463 HOSPITAL OUTPT CLINIC VISIT: HCPCS | Performed by: PHYSICIAN ASSISTANT

## 2024-01-01 RX ORDER — IOPAMIDOL 755 MG/ML
10-135 INJECTION, SOLUTION INTRAVASCULAR ONCE
Status: COMPLETED | OUTPATIENT
Start: 2024-01-01 | End: 2024-01-01

## 2024-01-01 RX ORDER — MEPERIDINE HYDROCHLORIDE 25 MG/ML
25 INJECTION INTRAMUSCULAR; INTRAVENOUS; SUBCUTANEOUS EVERY 30 MIN PRN
Status: CANCELLED | OUTPATIENT
Start: 2024-01-01

## 2024-01-01 RX ORDER — AMLODIPINE BESYLATE 10 MG/1
10 TABLET ORAL EVERY MORNING
Qty: 90 TABLET | Refills: 3 | Status: ON HOLD | OUTPATIENT
Start: 2024-01-01 | End: 2024-01-01

## 2024-01-01 RX ORDER — AMOXICILLIN 250 MG
1 CAPSULE ORAL 2 TIMES DAILY PRN
Status: DISCONTINUED | OUTPATIENT
Start: 2024-01-01 | End: 2024-01-01 | Stop reason: HOSPADM

## 2024-01-01 RX ORDER — NALOXONE HYDROCHLORIDE 0.4 MG/ML
0.4 INJECTION, SOLUTION INTRAMUSCULAR; INTRAVENOUS; SUBCUTANEOUS
Status: DISCONTINUED | OUTPATIENT
Start: 2024-01-01 | End: 2024-01-01 | Stop reason: HOSPADM

## 2024-01-01 RX ORDER — LOPERAMIDE HCL 2 MG
4 CAPSULE ORAL DAILY PRN
Status: CANCELLED | OUTPATIENT
Start: 2024-01-01

## 2024-01-01 RX ORDER — POTASSIUM CHLORIDE 1.5 G/1.58G
20 POWDER, FOR SOLUTION ORAL ONCE
Status: COMPLETED | OUTPATIENT
Start: 2024-01-01 | End: 2024-01-01

## 2024-01-01 RX ORDER — CALCIUM CARBONATE 500 MG/1
1000 TABLET, CHEWABLE ORAL 4 TIMES DAILY PRN
Status: DISCONTINUED | OUTPATIENT
Start: 2024-01-01 | End: 2024-01-01 | Stop reason: HOSPADM

## 2024-01-01 RX ORDER — ACETAMINOPHEN 325 MG/1
650 TABLET ORAL EVERY 4 HOURS PRN
Status: DISCONTINUED | OUTPATIENT
Start: 2024-01-01 | End: 2024-01-01 | Stop reason: HOSPADM

## 2024-01-01 RX ORDER — AMLODIPINE BESYLATE 10 MG/1
10 TABLET ORAL EVERY MORNING
Status: DISCONTINUED | OUTPATIENT
Start: 2024-01-01 | End: 2024-01-01

## 2024-01-01 RX ORDER — DEXAMETHASONE 1 MG
4 TABLET ORAL
Qty: 40 TABLET | Refills: 0 | Status: SHIPPED | OUTPATIENT
Start: 2024-01-01 | End: 2024-01-01

## 2024-01-01 RX ORDER — LEVETIRACETAM 1000 MG/1
TABLET ORAL
Qty: 75 TABLET | Refills: 0 | Status: SHIPPED | OUTPATIENT
Start: 2024-01-01

## 2024-01-01 RX ORDER — LOPERAMIDE HCL 2 MG
4 CAPSULE ORAL DAILY PRN
Status: DISCONTINUED | OUTPATIENT
Start: 2024-01-01 | End: 2024-01-01

## 2024-01-01 RX ORDER — ALBUTEROL SULFATE 0.83 MG/ML
2.5 SOLUTION RESPIRATORY (INHALATION)
Status: CANCELLED | OUTPATIENT
Start: 2024-01-01

## 2024-01-01 RX ORDER — PREDNISONE 50 MG/1
100 TABLET ORAL DAILY
Status: DISCONTINUED | OUTPATIENT
Start: 2024-01-01 | End: 2024-01-01

## 2024-01-01 RX ORDER — POTASSIUM CHLORIDE 20MEQ/15ML
40 LIQUID (ML) ORAL ONCE
Status: COMPLETED | OUTPATIENT
Start: 2024-01-01 | End: 2024-01-01

## 2024-01-01 RX ORDER — HEPARIN SODIUM,PORCINE 10 UNIT/ML
5-20 VIAL (ML) INTRAVENOUS DAILY PRN
Status: CANCELLED | OUTPATIENT
Start: 2024-01-01

## 2024-01-01 RX ORDER — LORAZEPAM 2 MG/ML
0.5 INJECTION INTRAMUSCULAR EVERY 4 HOURS PRN
Status: CANCELLED | OUTPATIENT
Start: 2024-01-01

## 2024-01-01 RX ORDER — PREDNISONE 20 MG/1
20 TABLET ORAL DAILY
Status: DISCONTINUED | OUTPATIENT
Start: 2024-01-01 | End: 2024-01-01 | Stop reason: HOSPADM

## 2024-01-01 RX ORDER — GADOBUTROL 604.72 MG/ML
9.5 INJECTION INTRAVENOUS ONCE
Status: COMPLETED | OUTPATIENT
Start: 2024-01-01 | End: 2024-01-01

## 2024-01-01 RX ORDER — EPINEPHRINE 1 MG/ML
0.3 INJECTION, SOLUTION INTRAMUSCULAR; SUBCUTANEOUS EVERY 5 MIN PRN
Status: CANCELLED | OUTPATIENT
Start: 2024-01-01

## 2024-01-01 RX ORDER — ALBUTEROL SULFATE 90 UG/1
1-2 AEROSOL, METERED RESPIRATORY (INHALATION)
Status: CANCELLED
Start: 2024-01-01

## 2024-01-01 RX ORDER — PANTOPRAZOLE SODIUM 40 MG/1
40 TABLET, DELAYED RELEASE ORAL
Status: DISCONTINUED | OUTPATIENT
Start: 2024-01-01 | End: 2024-01-01

## 2024-01-01 RX ORDER — EPINEPHRINE 1 MG/ML
0.3 INJECTION, SOLUTION, CONCENTRATE INTRAVENOUS EVERY 5 MIN PRN
Status: CANCELLED | OUTPATIENT
Start: 2024-01-01

## 2024-01-01 RX ORDER — OXYCODONE HYDROCHLORIDE 5 MG/1
5-10 TABLET ORAL EVERY 4 HOURS PRN
Qty: 180 TABLET | Refills: 0 | Status: SHIPPED | OUTPATIENT
Start: 2024-01-01 | End: 2024-01-01

## 2024-01-01 RX ORDER — ENOXAPARIN SODIUM 100 MG/ML
40 INJECTION SUBCUTANEOUS EVERY 24 HOURS
Status: DISCONTINUED | OUTPATIENT
Start: 2024-01-01 | End: 2024-01-01 | Stop reason: HOSPADM

## 2024-01-01 RX ORDER — SENNOSIDES 8.6 MG
1-2 TABLET ORAL 2 TIMES DAILY PRN
Qty: 120 TABLET | Refills: 2 | Status: ON HOLD | OUTPATIENT
Start: 2024-01-01 | End: 2024-01-01

## 2024-01-01 RX ORDER — POTASSIUM CHLORIDE 7.45 MG/ML
10 INJECTION INTRAVENOUS DAILY
Status: DISCONTINUED | OUTPATIENT
Start: 2024-01-01 | End: 2024-01-01

## 2024-01-01 RX ORDER — HEPARIN SODIUM (PORCINE) LOCK FLUSH IV SOLN 100 UNIT/ML 100 UNIT/ML
5 SOLUTION INTRAVENOUS
Status: CANCELLED | OUTPATIENT
Start: 2024-01-01

## 2024-01-01 RX ORDER — MAGNESIUM SULFATE 1 G/100ML
1 INJECTION INTRAVENOUS ONCE
Status: COMPLETED | OUTPATIENT
Start: 2024-01-01 | End: 2024-01-01

## 2024-01-01 RX ORDER — DIPHENHYDRAMINE HYDROCHLORIDE 50 MG/ML
50 INJECTION INTRAMUSCULAR; INTRAVENOUS
Status: CANCELLED
Start: 2024-01-01

## 2024-01-01 RX ORDER — GADOBUTROL 604.72 MG/ML
10 INJECTION INTRAVENOUS ONCE
Status: COMPLETED | OUTPATIENT
Start: 2024-01-01 | End: 2024-01-01

## 2024-01-01 RX ORDER — METHYLPREDNISOLONE SODIUM SUCCINATE 125 MG/2ML
125 INJECTION, POWDER, LYOPHILIZED, FOR SOLUTION INTRAMUSCULAR; INTRAVENOUS
Status: CANCELLED
Start: 2024-01-01

## 2024-01-01 RX ORDER — LOPERAMIDE HCL 2 MG
2 CAPSULE ORAL 4 TIMES DAILY
Status: DISCONTINUED | OUTPATIENT
Start: 2024-01-01 | End: 2024-01-01 | Stop reason: HOSPADM

## 2024-01-01 RX ORDER — NALOXONE HYDROCHLORIDE 0.4 MG/ML
0.2 INJECTION, SOLUTION INTRAMUSCULAR; INTRAVENOUS; SUBCUTANEOUS
Status: DISCONTINUED | OUTPATIENT
Start: 2024-01-01 | End: 2024-01-01 | Stop reason: HOSPADM

## 2024-01-01 RX ORDER — SODIUM CHLORIDE, SODIUM LACTATE, POTASSIUM CHLORIDE, CALCIUM CHLORIDE 600; 310; 30; 20 MG/100ML; MG/100ML; MG/100ML; MG/100ML
INJECTION, SOLUTION INTRAVENOUS CONTINUOUS
Status: DISCONTINUED | OUTPATIENT
Start: 2024-01-01 | End: 2024-01-01

## 2024-01-01 RX ORDER — AMOXICILLIN 250 MG
2 CAPSULE ORAL 2 TIMES DAILY PRN
Status: DISCONTINUED | OUTPATIENT
Start: 2024-01-01 | End: 2024-01-01 | Stop reason: HOSPADM

## 2024-01-01 RX ORDER — LIDOCAINE 4 G/G
3 PATCH TOPICAL
Status: DISCONTINUED | OUTPATIENT
Start: 2024-01-01 | End: 2024-01-01

## 2024-01-01 RX ORDER — PREDNISONE 50 MG/1
100 TABLET ORAL ONCE
Qty: 2 TABLET | Refills: 0 | Status: DISCONTINUED | OUTPATIENT
Start: 2024-01-01 | End: 2024-01-01

## 2024-01-01 RX ORDER — PREDNISONE 50 MG/1
100 TABLET ORAL ONCE
Qty: 2 TABLET | Refills: 0 | Status: COMPLETED | OUTPATIENT
Start: 2024-01-01 | End: 2024-01-01

## 2024-01-01 RX ORDER — POTASSIUM CHLORIDE 1500 MG/1
20 TABLET, EXTENDED RELEASE ORAL DAILY
Status: DISCONTINUED | OUTPATIENT
Start: 2024-01-01 | End: 2024-01-01 | Stop reason: HOSPADM

## 2024-01-01 RX ORDER — MAGNESIUM SULFATE HEPTAHYDRATE 40 MG/ML
2 INJECTION, SOLUTION INTRAVENOUS ONCE
Status: COMPLETED | OUTPATIENT
Start: 2024-01-01 | End: 2024-01-01

## 2024-01-01 RX ORDER — DEXAMETHASONE 1 MG
1 TABLET ORAL
Qty: 30 TABLET | Refills: 3 | Status: SHIPPED | OUTPATIENT
Start: 2024-01-01 | End: 2024-01-01

## 2024-01-01 RX ORDER — ONDANSETRON 4 MG/1
4 TABLET, FILM COATED ORAL EVERY 8 HOURS PRN
Status: DISCONTINUED | OUTPATIENT
Start: 2024-01-01 | End: 2024-01-01

## 2024-01-01 RX ORDER — ACETAMINOPHEN 650 MG/1
650 SUPPOSITORY RECTAL EVERY 4 HOURS PRN
Status: DISCONTINUED | OUTPATIENT
Start: 2024-01-01 | End: 2024-01-01 | Stop reason: HOSPADM

## 2024-01-01 RX ORDER — PREDNISONE 10 MG/1
TABLET ORAL
Qty: 15 TABLET | Refills: 0 | Status: SHIPPED | OUTPATIENT
Start: 2024-01-01 | End: 2024-07-19

## 2024-01-01 RX ORDER — POTASSIUM PHOS IN 0.9 % NACL 15MMOL/250
15 PLASTIC BAG, INJECTION (ML) INTRAVENOUS
Status: DISPENSED | OUTPATIENT
Start: 2024-01-01 | End: 2024-01-01

## 2024-01-01 RX ORDER — LIDOCAINE 40 MG/G
CREAM TOPICAL
Status: DISCONTINUED | OUTPATIENT
Start: 2024-01-01 | End: 2024-01-01 | Stop reason: HOSPADM

## 2024-01-01 RX ORDER — CODEINE SULFATE 15 MG/1
30 TABLET ORAL 2 TIMES DAILY PRN
Status: DISCONTINUED | OUTPATIENT
Start: 2024-01-01 | End: 2024-01-01 | Stop reason: HOSPADM

## 2024-01-01 RX ORDER — LEVETIRACETAM 500 MG/1
1000 TABLET ORAL
Status: DISCONTINUED | OUTPATIENT
Start: 2024-01-01 | End: 2024-01-01 | Stop reason: HOSPADM

## 2024-01-01 RX ORDER — POTASSIUM PHOS IN 0.9 % NACL 15MMOL/250
15 PLASTIC BAG, INJECTION (ML) INTRAVENOUS ONCE
Status: COMPLETED | OUTPATIENT
Start: 2024-01-01 | End: 2024-01-01

## 2024-01-01 RX ORDER — POTASSIUM CHLORIDE 750 MG/1
40 TABLET, EXTENDED RELEASE ORAL ONCE
Status: DISCONTINUED | OUTPATIENT
Start: 2024-01-01 | End: 2024-01-01 | Stop reason: ALTCHOICE

## 2024-01-01 RX ORDER — CARBOXYMETHYLCELLULOSE SODIUM 5 MG/ML
1 SOLUTION/ DROPS OPHTHALMIC
Status: DISCONTINUED | OUTPATIENT
Start: 2024-01-01 | End: 2024-01-01 | Stop reason: HOSPADM

## 2024-01-01 RX ORDER — LEVETIRACETAM 1000 MG/1
1000 TABLET ORAL EVERY 12 HOURS
Status: DISCONTINUED | OUTPATIENT
Start: 2024-01-01 | End: 2024-01-01

## 2024-01-01 RX ORDER — DEXAMETHASONE 4 MG/1
4 TABLET ORAL
Qty: 10 TABLET | Refills: 0 | Status: SHIPPED | OUTPATIENT
Start: 2024-01-01 | End: 2024-01-01

## 2024-01-01 RX ORDER — IOPAMIDOL 755 MG/ML
106 INJECTION, SOLUTION INTRAVASCULAR ONCE
Status: COMPLETED | OUTPATIENT
Start: 2024-01-01 | End: 2024-01-01

## 2024-01-01 RX ORDER — LANOLIN ALCOHOL/MO/W.PET/CERES
3 CREAM (GRAM) TOPICAL AT BEDTIME
Status: DISCONTINUED | OUTPATIENT
Start: 2024-01-01 | End: 2024-01-01 | Stop reason: HOSPADM

## 2024-01-01 RX ORDER — DEXAMETHASONE 1 MG
TABLET ORAL
Qty: 39 TABLET | Refills: 0 | Status: SHIPPED | OUTPATIENT
Start: 2024-01-01 | End: 2024-01-01

## 2024-01-01 RX ORDER — POTASSIUM CHLORIDE 750 MG/1
40 TABLET, EXTENDED RELEASE ORAL ONCE
Status: COMPLETED | OUTPATIENT
Start: 2024-01-01 | End: 2024-01-01

## 2024-01-01 RX ORDER — POTASSIUM CHLORIDE 7.45 MG/ML
10 INJECTION INTRAVENOUS ONCE
Status: COMPLETED | OUTPATIENT
Start: 2024-01-01 | End: 2024-01-01

## 2024-01-01 RX ORDER — DEXAMETHASONE 1 MG
TABLET ORAL
Qty: 21 TABLET | Refills: 0 | Status: SHIPPED | OUTPATIENT
Start: 2024-01-01 | End: 2024-01-01

## 2024-01-01 RX ORDER — LOPERAMIDE HCL 2 MG
2 CAPSULE ORAL 4 TIMES DAILY
Status: DISCONTINUED | OUTPATIENT
Start: 2024-01-01 | End: 2024-01-01

## 2024-01-01 RX ORDER — POLYETHYLENE GLYCOL 3350 17 G/17G
1-2 POWDER, FOR SOLUTION ORAL DAILY PRN
Qty: 850 G | Refills: 1 | Status: SHIPPED | OUTPATIENT
Start: 2024-01-01 | End: 2024-01-01

## 2024-01-01 RX ORDER — MAGNESIUM SULFATE HEPTAHYDRATE 40 MG/ML
4 INJECTION, SOLUTION INTRAVENOUS ONCE
Status: COMPLETED | OUTPATIENT
Start: 2024-01-01 | End: 2024-01-01

## 2024-01-01 RX ORDER — LEVETIRACETAM 750 MG/1
1500 TABLET ORAL EVERY EVENING
Status: DISCONTINUED | OUTPATIENT
Start: 2024-01-01 | End: 2024-01-01 | Stop reason: HOSPADM

## 2024-01-01 RX ORDER — POTASSIUM CHLORIDE 1500 MG/1
20 TABLET, EXTENDED RELEASE ORAL DAILY
Qty: 7 TABLET | Refills: 0 | Status: ON HOLD | OUTPATIENT
Start: 2024-01-01 | End: 2024-01-01

## 2024-01-01 RX ORDER — METHYLPREDNISOLONE SODIUM SUCCINATE 125 MG/2ML
50 INJECTION, POWDER, LYOPHILIZED, FOR SOLUTION INTRAMUSCULAR; INTRAVENOUS 2 TIMES DAILY
Status: COMPLETED | OUTPATIENT
Start: 2024-01-01 | End: 2024-01-01

## 2024-01-01 RX ORDER — PREDNISONE 20 MG/1
40 TABLET ORAL DAILY
Status: COMPLETED | OUTPATIENT
Start: 2024-01-01 | End: 2024-01-01

## 2024-01-01 RX ORDER — GUAIFENESIN 200 MG/10ML
200 LIQUID ORAL EVERY 4 HOURS PRN
Status: DISCONTINUED | OUTPATIENT
Start: 2024-01-01 | End: 2024-01-01 | Stop reason: HOSPADM

## 2024-01-01 RX ORDER — BENZOCAINE/MENTHOL 6 MG-10 MG
LOZENGE MUCOUS MEMBRANE 2 TIMES DAILY PRN
Status: DISCONTINUED | OUTPATIENT
Start: 2024-01-01 | End: 2024-01-01 | Stop reason: HOSPADM

## 2024-01-01 RX ORDER — AZITHROMYCIN 500 MG/1
500 TABLET, FILM COATED ORAL DAILY
Qty: 3 TABLET | Refills: 0 | Status: SHIPPED | OUTPATIENT
Start: 2024-01-01 | End: 2024-01-01

## 2024-01-01 RX ORDER — LEVETIRACETAM 500 MG/1
1000 TABLET ORAL 2 TIMES DAILY
Status: DISCONTINUED | OUTPATIENT
Start: 2024-01-01 | End: 2024-01-01

## 2024-01-01 RX ORDER — DEXAMETHASONE 1 MG
1 TABLET ORAL
Qty: 30 TABLET | Refills: 3 | Status: ON HOLD | OUTPATIENT
Start: 2024-01-01 | End: 2024-01-01

## 2024-01-01 RX ORDER — ONDANSETRON 2 MG/ML
4 INJECTION INTRAMUSCULAR; INTRAVENOUS EVERY 6 HOURS PRN
Status: DISCONTINUED | OUTPATIENT
Start: 2024-01-01 | End: 2024-01-01

## 2024-01-01 RX ORDER — LOPERAMIDE HCL 2 MG
4 CAPSULE ORAL 4 TIMES DAILY
Qty: 60 CAPSULE | Refills: 0 | Status: SHIPPED | OUTPATIENT
Start: 2024-01-01

## 2024-01-01 RX ORDER — ROSUVASTATIN CALCIUM 40 MG/1
40 TABLET, COATED ORAL DAILY
Status: DISCONTINUED | OUTPATIENT
Start: 2024-01-01 | End: 2024-01-01 | Stop reason: HOSPADM

## 2024-01-01 RX ORDER — PREDNISONE 20 MG/1
TABLET ORAL
Qty: 148 TABLET | Refills: 0 | Status: ON HOLD | OUTPATIENT
Start: 2024-01-01 | End: 2024-01-01

## 2024-01-01 RX ORDER — PANTOPRAZOLE SODIUM 40 MG/1
40 TABLET, DELAYED RELEASE ORAL
Status: DISCONTINUED | OUTPATIENT
Start: 2024-01-01 | End: 2024-01-01 | Stop reason: HOSPADM

## 2024-01-01 RX ORDER — ONDANSETRON 4 MG/1
4 TABLET, FILM COATED ORAL EVERY 8 HOURS PRN
Status: DISCONTINUED | OUTPATIENT
Start: 2024-01-01 | End: 2024-01-01 | Stop reason: HOSPADM

## 2024-01-01 RX ORDER — LOPERAMIDE HCL 2 MG
4 CAPSULE ORAL 3 TIMES DAILY
Status: DISCONTINUED | OUTPATIENT
Start: 2024-01-01 | End: 2024-01-01

## 2024-01-01 RX ORDER — SODIUM CHLORIDE 9 MG/ML
INJECTION, SOLUTION INTRAVENOUS CONTINUOUS
Status: DISCONTINUED | OUTPATIENT
Start: 2024-01-01 | End: 2024-01-01

## 2024-01-01 RX ORDER — GADOBUTROL 604.72 MG/ML
0.1 INJECTION INTRAVENOUS ONCE
Status: DISCONTINUED | OUTPATIENT
Start: 2024-01-01 | End: 2024-01-01 | Stop reason: HOSPADM

## 2024-01-01 RX ORDER — AMLODIPINE BESYLATE 5 MG/1
5 TABLET ORAL EVERY MORNING
Status: DISCONTINUED | OUTPATIENT
Start: 2024-01-01 | End: 2024-01-01 | Stop reason: HOSPADM

## 2024-01-01 RX ORDER — LOPERAMIDE HCL 2 MG
2 CAPSULE ORAL 4 TIMES DAILY
Qty: 60 CAPSULE | Refills: 0 | Status: ON HOLD | OUTPATIENT
Start: 2024-01-01 | End: 2024-01-01

## 2024-01-01 RX ORDER — PREDNISONE 20 MG/1
60 TABLET ORAL DAILY
Status: COMPLETED | OUTPATIENT
Start: 2024-01-01 | End: 2024-01-01

## 2024-01-01 RX ORDER — LOPERAMIDE HCL 2 MG
4 CAPSULE ORAL 4 TIMES DAILY
Status: DISCONTINUED | OUTPATIENT
Start: 2024-01-01 | End: 2024-01-01 | Stop reason: HOSPADM

## 2024-01-01 RX ORDER — CODEINE SULFATE 30 MG/1
30 TABLET ORAL 2 TIMES DAILY PRN
Status: CANCELLED | OUTPATIENT
Start: 2024-01-01

## 2024-01-01 RX ORDER — ROSUVASTATIN CALCIUM 10 MG/1
40 TABLET, COATED ORAL DAILY
Status: DISCONTINUED | OUTPATIENT
Start: 2024-01-01 | End: 2024-01-01 | Stop reason: HOSPADM

## 2024-01-01 RX ORDER — POTASSIUM CHLORIDE 750 MG/1
20 TABLET, EXTENDED RELEASE ORAL ONCE
Status: DISCONTINUED | OUTPATIENT
Start: 2024-01-01 | End: 2024-01-01 | Stop reason: ALTCHOICE

## 2024-01-01 RX ORDER — PREDNISONE 20 MG/1
TABLET ORAL
Qty: 53 TABLET | Refills: 0 | Status: ON HOLD | OUTPATIENT
Start: 2024-01-01 | End: 2024-01-01

## 2024-01-01 RX ORDER — ONDANSETRON 4 MG/1
4 TABLET, FILM COATED ORAL EVERY 8 HOURS PRN
Qty: 10 TABLET | Refills: 5 | Status: SHIPPED | OUTPATIENT
Start: 2024-01-01

## 2024-01-01 RX ORDER — AMLODIPINE BESYLATE 10 MG/1
5 TABLET ORAL EVERY MORNING
Qty: 90 TABLET | Refills: 3 | Status: SHIPPED | OUTPATIENT
Start: 2024-01-01

## 2024-01-01 RX ORDER — DEXAMETHASONE 1 MG
1 TABLET ORAL
Qty: 40 TABLET | Refills: 0 | Status: SHIPPED | OUTPATIENT
Start: 2024-01-01 | End: 2024-01-01

## 2024-01-01 RX ORDER — FENTANYL CITRATE 50 UG/ML
INJECTION, SOLUTION INTRAMUSCULAR; INTRAVENOUS PRN
Status: DISCONTINUED | OUTPATIENT
Start: 2024-01-01 | End: 2024-01-01

## 2024-01-01 RX ORDER — OXYCODONE HYDROCHLORIDE 5 MG/1
5 TABLET ORAL EVERY 6 HOURS PRN
Status: DISCONTINUED | OUTPATIENT
Start: 2024-01-01 | End: 2024-01-01 | Stop reason: HOSPADM

## 2024-01-01 RX ORDER — PREDNISONE 5 MG/1
10 TABLET ORAL DAILY
Status: DISCONTINUED | OUTPATIENT
Start: 2024-01-01 | End: 2024-01-01 | Stop reason: HOSPADM

## 2024-01-01 RX ORDER — TRAZODONE HYDROCHLORIDE 50 MG/1
50 TABLET, FILM COATED ORAL ONCE
Status: COMPLETED | OUTPATIENT
Start: 2024-01-01 | End: 2024-01-01

## 2024-01-01 RX ORDER — ONDANSETRON 4 MG/1
4 TABLET, ORALLY DISINTEGRATING ORAL EVERY 6 HOURS PRN
Status: DISCONTINUED | OUTPATIENT
Start: 2024-01-01 | End: 2024-01-01

## 2024-01-01 RX ORDER — LOPERAMIDE HCL 2 MG
4 CAPSULE ORAL 3 TIMES DAILY
Status: CANCELLED | OUTPATIENT
Start: 2024-01-01

## 2024-01-01 RX ORDER — IOPAMIDOL 755 MG/ML
99 INJECTION, SOLUTION INTRAVASCULAR ONCE
Status: COMPLETED | OUTPATIENT
Start: 2024-01-01 | End: 2024-01-01

## 2024-01-01 RX ORDER — POLYETHYLENE GLYCOL 3350 17 G/17G
17 POWDER, FOR SOLUTION ORAL 2 TIMES DAILY PRN
Status: DISCONTINUED | OUTPATIENT
Start: 2024-01-01 | End: 2024-01-01 | Stop reason: HOSPADM

## 2024-01-01 RX ORDER — GADOBUTROL 604.72 MG/ML
0.1 INJECTION INTRAVENOUS ONCE
Status: COMPLETED | OUTPATIENT
Start: 2024-01-01 | End: 2024-01-01

## 2024-01-01 RX ORDER — SODIUM CHLORIDE, SODIUM LACTATE, POTASSIUM CHLORIDE, CALCIUM CHLORIDE 600; 310; 30; 20 MG/100ML; MG/100ML; MG/100ML; MG/100ML
INJECTION, SOLUTION INTRAVENOUS CONTINUOUS
Status: ACTIVE | OUTPATIENT
Start: 2024-01-01 | End: 2024-01-01

## 2024-01-01 RX ORDER — PIPERACILLIN SODIUM, TAZOBACTAM SODIUM 3; .375 G/15ML; G/15ML
3.38 INJECTION, POWDER, LYOPHILIZED, FOR SOLUTION INTRAVENOUS EVERY 6 HOURS
Status: DISCONTINUED | OUTPATIENT
Start: 2024-01-01 | End: 2024-01-01

## 2024-01-01 RX ORDER — HEPARIN SODIUM 5000 [USP'U]/.5ML
5000 INJECTION, SOLUTION INTRAVENOUS; SUBCUTANEOUS EVERY 8 HOURS
Status: DISCONTINUED | OUTPATIENT
Start: 2024-01-01 | End: 2024-01-01

## 2024-01-01 RX ORDER — LEVETIRACETAM 500 MG/1
1000 TABLET ORAL 2 TIMES DAILY
Status: DISCONTINUED | OUTPATIENT
Start: 2024-01-01 | End: 2024-01-01 | Stop reason: HOSPADM

## 2024-01-01 RX ORDER — POTASSIUM CHLORIDE 750 MG/1
20 TABLET, EXTENDED RELEASE ORAL ONCE
Status: COMPLETED | OUTPATIENT
Start: 2024-01-01 | End: 2024-01-01

## 2024-01-01 RX ORDER — LIDOCAINE 4 G/G
3 PATCH TOPICAL EVERY 24 HOURS
Status: DISCONTINUED | OUTPATIENT
Start: 2024-01-01 | End: 2024-01-01 | Stop reason: HOSPADM

## 2024-01-01 RX ORDER — BACLOFEN 10 MG/1
10 TABLET ORAL 2 TIMES DAILY PRN
Status: DISCONTINUED | OUTPATIENT
Start: 2024-01-01 | End: 2024-01-01 | Stop reason: HOSPADM

## 2024-01-01 RX ADMIN — LOPERAMIDE HYDROCHLORIDE 4 MG: 2 CAPSULE ORAL at 13:05

## 2024-01-01 RX ADMIN — POTASSIUM & SODIUM PHOSPHATES POWDER PACK 280-160-250 MG 1 PACKET: 280-160-250 PACK at 07:52

## 2024-01-01 RX ADMIN — ACETAMINOPHEN 650 MG: 325 TABLET, FILM COATED ORAL at 08:09

## 2024-01-01 RX ADMIN — LEVETIRACETAM 1500 MG: 750 TABLET, FILM COATED ORAL at 19:35

## 2024-01-01 RX ADMIN — POTASSIUM CHLORIDE 10 MEQ: 7.46 INJECTION, SOLUTION INTRAVENOUS at 18:35

## 2024-01-01 RX ADMIN — AMLODIPINE BESYLATE 10 MG: 10 TABLET ORAL at 08:08

## 2024-01-01 RX ADMIN — MAGNESIUM SULFATE IN WATER 4 G: 40 INJECTION, SOLUTION INTRAVENOUS at 10:20

## 2024-01-01 RX ADMIN — IOPAMIDOL 130 ML: 755 INJECTION, SOLUTION INTRAVENOUS at 13:53

## 2024-01-01 RX ADMIN — PANTOPRAZOLE SODIUM 40 MG: 40 TABLET, DELAYED RELEASE ORAL at 09:30

## 2024-01-01 RX ADMIN — ENOXAPARIN SODIUM 40 MG: 40 INJECTION SUBCUTANEOUS at 20:18

## 2024-01-01 RX ADMIN — LOPERAMIDE HYDROCHLORIDE 4 MG: 2 CAPSULE ORAL at 20:16

## 2024-01-01 RX ADMIN — ROSUVASTATIN CALCIUM 40 MG: 10 TABLET, FILM COATED ORAL at 10:26

## 2024-01-01 RX ADMIN — LOPERAMIDE HYDROCHLORIDE 4 MG: 2 CAPSULE ORAL at 14:17

## 2024-01-01 RX ADMIN — ROSUVASTATIN CALCIUM 40 MG: 10 TABLET, FILM COATED ORAL at 08:32

## 2024-01-01 RX ADMIN — ACETAMINOPHEN 650 MG: 325 TABLET, FILM COATED ORAL at 13:04

## 2024-01-01 RX ADMIN — METHYLPREDNISOLONE SODIUM SUCCINATE 50 MG: 125 INJECTION, POWDER, FOR SOLUTION INTRAMUSCULAR; INTRAVENOUS at 09:30

## 2024-01-01 RX ADMIN — PANTOPRAZOLE SODIUM 40 MG: 40 TABLET, DELAYED RELEASE ORAL at 07:55

## 2024-01-01 RX ADMIN — SODIUM CHLORIDE 1000 ML: 9 INJECTION, SOLUTION INTRAVENOUS at 17:54

## 2024-01-01 RX ADMIN — VANCOMYCIN HYDROCHLORIDE 2000 MG: 1 INJECTION, POWDER, LYOPHILIZED, FOR SOLUTION INTRAVENOUS at 12:31

## 2024-01-01 RX ADMIN — Medication 3 MG: at 19:37

## 2024-01-01 RX ADMIN — ROSUVASTATIN CALCIUM 40 MG: 40 TABLET, COATED ORAL at 08:08

## 2024-01-01 RX ADMIN — POTASSIUM & SODIUM PHOSPHATES POWDER PACK 280-160-250 MG 1 PACKET: 280-160-250 PACK at 12:04

## 2024-01-01 RX ADMIN — ROSUVASTATIN CALCIUM 40 MG: 10 TABLET, FILM COATED ORAL at 09:30

## 2024-01-01 RX ADMIN — SODIUM CHLORIDE, POTASSIUM CHLORIDE, SODIUM LACTATE AND CALCIUM CHLORIDE: 600; 310; 30; 20 INJECTION, SOLUTION INTRAVENOUS at 13:59

## 2024-01-01 RX ADMIN — GADOBUTROL 9.5 ML: 604.72 INJECTION INTRAVENOUS at 14:20

## 2024-01-01 RX ADMIN — LOPERAMIDE HYDROCHLORIDE 2 MG: 2 CAPSULE ORAL at 21:07

## 2024-01-01 RX ADMIN — PREDNISONE 40 MG: 20 TABLET ORAL at 07:51

## 2024-01-01 RX ADMIN — LOPERAMIDE HYDROCHLORIDE 2 MG: 2 CAPSULE ORAL at 09:30

## 2024-01-01 RX ADMIN — ENOXAPARIN SODIUM 40 MG: 40 INJECTION SUBCUTANEOUS at 20:15

## 2024-01-01 RX ADMIN — LEVETIRACETAM 1000 MG: 500 TABLET, FILM COATED ORAL at 09:43

## 2024-01-01 RX ADMIN — SODIUM CHLORIDE, POTASSIUM CHLORIDE, SODIUM LACTATE AND CALCIUM CHLORIDE 1000 ML: 600; 310; 30; 20 INJECTION, SOLUTION INTRAVENOUS at 17:43

## 2024-01-01 RX ADMIN — FLUDEOXYGLUCOSE F-18 12.86 MILLICURIE: 500 INJECTION, SOLUTION INTRAVENOUS at 10:20

## 2024-01-01 RX ADMIN — SODIUM CHLORIDE 250 ML: 9 INJECTION, SOLUTION INTRAVENOUS at 12:05

## 2024-01-01 RX ADMIN — POTASSIUM & SODIUM PHOSPHATES POWDER PACK 280-160-250 MG 1 PACKET: 280-160-250 PACK at 23:34

## 2024-01-01 RX ADMIN — HEPARIN SODIUM 5000 UNITS: 5000 INJECTION, SOLUTION INTRAVENOUS; SUBCUTANEOUS at 06:40

## 2024-01-01 RX ADMIN — LOPERAMIDE HYDROCHLORIDE 2 MG: 2 CAPSULE ORAL at 19:37

## 2024-01-01 RX ADMIN — HEPARIN SODIUM 5000 UNITS: 5000 INJECTION, SOLUTION INTRAVENOUS; SUBCUTANEOUS at 12:31

## 2024-01-01 RX ADMIN — LEVETIRACETAM 1000 MG: 500 TABLET, FILM COATED ORAL at 08:05

## 2024-01-01 RX ADMIN — PIPERACILLIN AND TAZOBACTAM 3.38 G: 3; .375 INJECTION, POWDER, FOR SOLUTION INTRAVENOUS at 23:40

## 2024-01-01 RX ADMIN — PANTOPRAZOLE SODIUM 40 MG: 40 TABLET, DELAYED RELEASE ORAL at 10:26

## 2024-01-01 RX ADMIN — PIPERACILLIN AND TAZOBACTAM 3.38 G: 3; .375 INJECTION, POWDER, FOR SOLUTION INTRAVENOUS at 00:15

## 2024-01-01 RX ADMIN — ROSUVASTATIN CALCIUM 40 MG: 10 TABLET, FILM COATED ORAL at 09:15

## 2024-01-01 RX ADMIN — GADOBUTROL 10 ML: 604.72 INJECTION INTRAVENOUS at 12:33

## 2024-01-01 RX ADMIN — IOPAMIDOL 106 ML: 755 INJECTION, SOLUTION INTRAVENOUS at 11:20

## 2024-01-01 RX ADMIN — POTASSIUM & SODIUM PHOSPHATES POWDER PACK 280-160-250 MG 1 PACKET: 280-160-250 PACK at 15:30

## 2024-01-01 RX ADMIN — LOPERAMIDE HYDROCHLORIDE 2 MG: 2 CAPSULE ORAL at 11:58

## 2024-01-01 RX ADMIN — POTASSIUM CHLORIDE 40 MEQ: 750 TABLET, EXTENDED RELEASE ORAL at 10:26

## 2024-01-01 RX ADMIN — Medication 3 MG: at 20:14

## 2024-01-01 RX ADMIN — POTASSIUM & SODIUM PHOSPHATES POWDER PACK 280-160-250 MG 1 PACKET: 280-160-250 PACK at 12:51

## 2024-01-01 RX ADMIN — PREDNISONE 100 MG: 50 TABLET ORAL at 08:32

## 2024-01-01 RX ADMIN — PREDNISONE 40 MG: 20 TABLET ORAL at 08:12

## 2024-01-01 RX ADMIN — POTASSIUM & SODIUM PHOSPHATES POWDER PACK 280-160-250 MG 1 PACKET: 280-160-250 PACK at 20:25

## 2024-01-01 RX ADMIN — POTASSIUM & SODIUM PHOSPHATES POWDER PACK 280-160-250 MG 1 PACKET: 280-160-250 PACK at 08:09

## 2024-01-01 RX ADMIN — POTASSIUM & SODIUM PHOSPHATES POWDER PACK 280-160-250 MG 1 PACKET: 280-160-250 PACK at 07:55

## 2024-01-01 RX ADMIN — HEPARIN SODIUM 5000 UNITS: 5000 INJECTION, SOLUTION INTRAVENOUS; SUBCUTANEOUS at 12:52

## 2024-01-01 RX ADMIN — METHYLPREDNISOLONE SODIUM SUCCINATE 50 MG: 125 INJECTION, POWDER, FOR SOLUTION INTRAMUSCULAR; INTRAVENOUS at 09:44

## 2024-01-01 RX ADMIN — LEVETIRACETAM 1000 MG: 500 TABLET, FILM COATED ORAL at 08:12

## 2024-01-01 RX ADMIN — PREDNISONE 80 MG: 20 TABLET ORAL at 08:32

## 2024-01-01 RX ADMIN — PIPERACILLIN AND TAZOBACTAM 3.38 G: 3; .375 INJECTION, POWDER, FOR SOLUTION INTRAVENOUS at 02:06

## 2024-01-01 RX ADMIN — POTASSIUM & SODIUM PHOSPHATES POWDER PACK 280-160-250 MG 1 PACKET: 280-160-250 PACK at 17:36

## 2024-01-01 RX ADMIN — POTASSIUM & SODIUM PHOSPHATES POWDER PACK 280-160-250 MG 1 PACKET: 280-160-250 PACK at 07:58

## 2024-01-01 RX ADMIN — POTASSIUM CHLORIDE 40 MEQ: 20 SOLUTION ORAL at 18:29

## 2024-01-01 RX ADMIN — LOPERAMIDE HYDROCHLORIDE 4 MG: 2 CAPSULE ORAL at 08:12

## 2024-01-01 RX ADMIN — LOPERAMIDE HYDROCHLORIDE 2 MG: 2 CAPSULE ORAL at 09:15

## 2024-01-01 RX ADMIN — LEVETIRACETAM 1000 MG: 500 TABLET, FILM COATED ORAL at 20:14

## 2024-01-01 RX ADMIN — POTASSIUM PHOSPHATE, MONOBASIC AND POTASSIUM PHOSPHATE, DIBASIC 15 MMOL: 224; 236 INJECTION, SOLUTION, CONCENTRATE INTRAVENOUS at 13:05

## 2024-01-01 RX ADMIN — LEVETIRACETAM 1500 MG: 750 TABLET, FILM COATED ORAL at 20:16

## 2024-01-01 RX ADMIN — LOPERAMIDE HYDROCHLORIDE 2 MG: 2 CAPSULE ORAL at 13:25

## 2024-01-01 RX ADMIN — POTASSIUM & SODIUM PHOSPHATES POWDER PACK 280-160-250 MG 1 PACKET: 280-160-250 PACK at 12:31

## 2024-01-01 RX ADMIN — LOPERAMIDE HYDROCHLORIDE 2 MG: 2 CAPSULE ORAL at 12:52

## 2024-01-01 RX ADMIN — POTASSIUM & SODIUM PHOSPHATES POWDER PACK 280-160-250 MG 1 PACKET: 280-160-250 PACK at 19:53

## 2024-01-01 RX ADMIN — AMLODIPINE BESYLATE 10 MG: 10 TABLET ORAL at 07:55

## 2024-01-01 RX ADMIN — INFLIXIMAB 500 MG: 100 INJECTION, POWDER, LYOPHILIZED, FOR SOLUTION INTRAVENOUS at 11:56

## 2024-01-01 RX ADMIN — LIDOCAINE 2 PATCH: 4 PATCH TOPICAL at 15:30

## 2024-01-01 RX ADMIN — SODIUM CHLORIDE 1000 ML: 9 INJECTION, SOLUTION INTRAVENOUS at 12:25

## 2024-01-01 RX ADMIN — SODIUM CHLORIDE, POTASSIUM CHLORIDE, SODIUM LACTATE AND CALCIUM CHLORIDE 1000 ML: 600; 310; 30; 20 INJECTION, SOLUTION INTRAVENOUS at 12:31

## 2024-01-01 RX ADMIN — POTASSIUM & SODIUM PHOSPHATES POWDER PACK 280-160-250 MG 1 PACKET: 280-160-250 PACK at 15:48

## 2024-01-01 RX ADMIN — POTASSIUM & SODIUM PHOSPHATES POWDER PACK 280-160-250 MG 1 PACKET: 280-160-250 PACK at 20:17

## 2024-01-01 RX ADMIN — LOPERAMIDE HYDROCHLORIDE 2 MG: 2 CAPSULE ORAL at 17:36

## 2024-01-01 RX ADMIN — SODIUM CHLORIDE 480 MG: 9 INJECTION, SOLUTION INTRAVENOUS at 10:59

## 2024-01-01 RX ADMIN — POTASSIUM & SODIUM PHOSPHATES POWDER PACK 280-160-250 MG 1 PACKET: 280-160-250 PACK at 13:25

## 2024-01-01 RX ADMIN — PANTOPRAZOLE SODIUM 40 MG: 40 TABLET, DELAYED RELEASE ORAL at 09:15

## 2024-01-01 RX ADMIN — POTASSIUM & SODIUM PHOSPHATES POWDER PACK 280-160-250 MG 1 PACKET: 280-160-250 PACK at 10:26

## 2024-01-01 RX ADMIN — ROSUVASTATIN CALCIUM 40 MG: 40 TABLET, COATED ORAL at 08:12

## 2024-01-01 RX ADMIN — LEVETIRACETAM 1000 MG: 500 TABLET, FILM COATED ORAL at 21:07

## 2024-01-01 RX ADMIN — PANTOPRAZOLE SODIUM 40 MG: 40 TABLET, DELAYED RELEASE ORAL at 09:43

## 2024-01-01 RX ADMIN — POTASSIUM & SODIUM PHOSPHATES POWDER PACK 280-160-250 MG 1 PACKET: 280-160-250 PACK at 18:22

## 2024-01-01 RX ADMIN — POTASSIUM & SODIUM PHOSPHATES POWDER PACK 280-160-250 MG 1 PACKET: 280-160-250 PACK at 13:32

## 2024-01-01 RX ADMIN — LEVETIRACETAM 1000 MG: 500 TABLET, FILM COATED ORAL at 20:25

## 2024-01-01 RX ADMIN — PANTOPRAZOLE SODIUM 40 MG: 40 TABLET, DELAYED RELEASE ORAL at 08:05

## 2024-01-01 RX ADMIN — SODIUM CHLORIDE, POTASSIUM CHLORIDE, SODIUM LACTATE AND CALCIUM CHLORIDE: 600; 310; 30; 20 INJECTION, SOLUTION INTRAVENOUS at 05:53

## 2024-01-01 RX ADMIN — ACETAMINOPHEN 650 MG: 325 TABLET, FILM COATED ORAL at 16:30

## 2024-01-01 RX ADMIN — SODIUM CHLORIDE, POTASSIUM CHLORIDE, SODIUM LACTATE AND CALCIUM CHLORIDE: 600; 310; 30; 20 INJECTION, SOLUTION INTRAVENOUS at 15:16

## 2024-01-01 RX ADMIN — SODIUM CHLORIDE, POTASSIUM CHLORIDE, SODIUM LACTATE AND CALCIUM CHLORIDE: 600; 310; 30; 20 INJECTION, SOLUTION INTRAVENOUS at 05:00

## 2024-01-01 RX ADMIN — GADOBUTROL 9.5 ML: 604.72 INJECTION INTRAVENOUS at 08:56

## 2024-01-01 RX ADMIN — POTASSIUM & SODIUM PHOSPHATES POWDER PACK 280-160-250 MG 1 PACKET: 280-160-250 PACK at 08:05

## 2024-01-01 RX ADMIN — ROSUVASTATIN CALCIUM 40 MG: 10 TABLET, FILM COATED ORAL at 09:41

## 2024-01-01 RX ADMIN — ACETAMINOPHEN 650 MG: 325 TABLET, FILM COATED ORAL at 07:54

## 2024-01-01 RX ADMIN — SODIUM CHLORIDE, POTASSIUM CHLORIDE, SODIUM LACTATE AND CALCIUM CHLORIDE: 600; 310; 30; 20 INJECTION, SOLUTION INTRAVENOUS at 05:39

## 2024-01-01 RX ADMIN — POTASSIUM & SODIUM PHOSPHATES POWDER PACK 280-160-250 MG 1 PACKET: 280-160-250 PACK at 12:41

## 2024-01-01 RX ADMIN — ROSUVASTATIN CALCIUM 40 MG: 40 TABLET, COATED ORAL at 07:55

## 2024-01-01 RX ADMIN — LIDOCAINE 3 PATCH: 4 PATCH TOPICAL at 15:30

## 2024-01-01 RX ADMIN — LOPERAMIDE HYDROCHLORIDE 2 MG: 2 CAPSULE ORAL at 12:40

## 2024-01-01 RX ADMIN — SODIUM CHLORIDE 100 MG: 9 INJECTION, SOLUTION INTRAVENOUS at 12:11

## 2024-01-01 RX ADMIN — LOPERAMIDE HYDROCHLORIDE 4 MG: 2 CAPSULE ORAL at 07:58

## 2024-01-01 RX ADMIN — POTASSIUM & SODIUM PHOSPHATES POWDER PACK 280-160-250 MG 1 PACKET: 280-160-250 PACK at 16:28

## 2024-01-01 RX ADMIN — PIPERACILLIN AND TAZOBACTAM 3.38 G: 3; .375 INJECTION, POWDER, FOR SOLUTION INTRAVENOUS at 18:11

## 2024-01-01 RX ADMIN — PREDNISONE 60 MG: 20 TABLET ORAL at 08:05

## 2024-01-01 RX ADMIN — LOPERAMIDE HYDROCHLORIDE 2 MG: 2 CAPSULE ORAL at 08:33

## 2024-01-01 RX ADMIN — POTASSIUM PHOSPHATE, MONOBASIC POTASSIUM PHOSPHATE, DIBASIC 15 MMOL: 224; 236 INJECTION, SOLUTION, CONCENTRATE INTRAVENOUS at 10:19

## 2024-01-01 RX ADMIN — PIPERACILLIN AND TAZOBACTAM 3.38 G: 3; .375 INJECTION, POWDER, FOR SOLUTION INTRAVENOUS at 06:34

## 2024-01-01 RX ADMIN — SODIUM CHLORIDE 480 MG: 9 INJECTION, SOLUTION INTRAVENOUS at 10:35

## 2024-01-01 RX ADMIN — LIDOCAINE 2 PATCH: 4 PATCH TOPICAL at 16:30

## 2024-01-01 RX ADMIN — LOPERAMIDE HYDROCHLORIDE 4 MG: 2 CAPSULE ORAL at 08:09

## 2024-01-01 RX ADMIN — ACETAMINOPHEN 650 MG: 325 TABLET, FILM COATED ORAL at 08:15

## 2024-01-01 RX ADMIN — ACETAMINOPHEN 650 MG: 325 TABLET, FILM COATED ORAL at 07:55

## 2024-01-01 RX ADMIN — PREDNISONE 40 MG: 20 TABLET ORAL at 07:54

## 2024-01-01 RX ADMIN — PIPERACILLIN AND TAZOBACTAM 3.38 G: 3; .375 INJECTION, POWDER, FOR SOLUTION INTRAVENOUS at 11:57

## 2024-01-01 RX ADMIN — POTASSIUM & SODIUM PHOSPHATES POWDER PACK 280-160-250 MG 1 PACKET: 280-160-250 PACK at 19:35

## 2024-01-01 RX ADMIN — LOPERAMIDE HYDROCHLORIDE 2 MG: 2 CAPSULE ORAL at 16:30

## 2024-01-01 RX ADMIN — LEVETIRACETAM 1000 MG: 500 TABLET, FILM COATED ORAL at 07:52

## 2024-01-01 RX ADMIN — SODIUM CHLORIDE 250 ML: 9 INJECTION, SOLUTION INTRAVENOUS at 14:41

## 2024-01-01 RX ADMIN — PREDNISONE 20 MG: 20 TABLET ORAL at 07:58

## 2024-01-01 RX ADMIN — LEVETIRACETAM 1000 MG: 500 TABLET, FILM COATED ORAL at 20:17

## 2024-01-01 RX ADMIN — SODIUM CHLORIDE 250 ML: 9 INJECTION, SOLUTION INTRAVENOUS at 10:59

## 2024-01-01 RX ADMIN — LOPERAMIDE HYDROCHLORIDE 2 MG: 2 CAPSULE ORAL at 08:08

## 2024-01-01 RX ADMIN — SODIUM CHLORIDE 300 MG: 9 INJECTION, SOLUTION INTRAVENOUS at 12:48

## 2024-01-01 RX ADMIN — LOPERAMIDE HYDROCHLORIDE 2 MG: 2 CAPSULE ORAL at 20:17

## 2024-01-01 RX ADMIN — SODIUM CHLORIDE 300 MG: 9 INJECTION, SOLUTION INTRAVENOUS at 15:43

## 2024-01-01 RX ADMIN — PANTOPRAZOLE SODIUM 40 MG: 40 TABLET, DELAYED RELEASE ORAL at 07:51

## 2024-01-01 RX ADMIN — LOPERAMIDE HYDROCHLORIDE 2 MG: 2 CAPSULE ORAL at 07:52

## 2024-01-01 RX ADMIN — HEPARIN SODIUM 5000 UNITS: 5000 INJECTION, SOLUTION INTRAVENOUS; SUBCUTANEOUS at 04:34

## 2024-01-01 RX ADMIN — POTASSIUM & SODIUM PHOSPHATES POWDER PACK 280-160-250 MG 1 PACKET: 280-160-250 PACK at 13:24

## 2024-01-01 RX ADMIN — ROSUVASTATIN CALCIUM 40 MG: 40 TABLET, COATED ORAL at 07:57

## 2024-01-01 RX ADMIN — SODIUM CHLORIDE, POTASSIUM CHLORIDE, SODIUM LACTATE AND CALCIUM CHLORIDE: 600; 310; 30; 20 INJECTION, SOLUTION INTRAVENOUS at 21:13

## 2024-01-01 RX ADMIN — LOPERAMIDE HYDROCHLORIDE 2 MG: 2 CAPSULE ORAL at 17:55

## 2024-01-01 RX ADMIN — LEVETIRACETAM 1000 MG: 500 TABLET, FILM COATED ORAL at 08:32

## 2024-01-01 RX ADMIN — ROSUVASTATIN CALCIUM 40 MG: 40 TABLET, COATED ORAL at 08:05

## 2024-01-01 RX ADMIN — POTASSIUM & SODIUM PHOSPHATES POWDER PACK 280-160-250 MG 1 PACKET: 280-160-250 PACK at 13:05

## 2024-01-01 RX ADMIN — PIPERACILLIN AND TAZOBACTAM 3.38 G: 3; .375 INJECTION, POWDER, FOR SOLUTION INTRAVENOUS at 06:44

## 2024-01-01 RX ADMIN — AMLODIPINE BESYLATE 10 MG: 10 TABLET ORAL at 08:09

## 2024-01-01 RX ADMIN — HEPARIN SODIUM 5000 UNITS: 5000 INJECTION, SOLUTION INTRAVENOUS; SUBCUTANEOUS at 13:39

## 2024-01-01 RX ADMIN — ACETAMINOPHEN 650 MG: 325 TABLET, FILM COATED ORAL at 08:12

## 2024-01-01 RX ADMIN — LOPERAMIDE HYDROCHLORIDE 4 MG: 2 CAPSULE ORAL at 07:55

## 2024-01-01 RX ADMIN — PANTOPRAZOLE SODIUM 40 MG: 40 INJECTION, POWDER, FOR SOLUTION INTRAVENOUS at 21:37

## 2024-01-01 RX ADMIN — PANTOPRAZOLE SODIUM 40 MG: 40 TABLET, DELAYED RELEASE ORAL at 08:33

## 2024-01-01 RX ADMIN — POTASSIUM & SODIUM PHOSPHATES POWDER PACK 280-160-250 MG 1 PACKET: 280-160-250 PACK at 08:08

## 2024-01-01 RX ADMIN — LEVETIRACETAM 1000 MG: 500 TABLET, FILM COATED ORAL at 10:26

## 2024-01-01 RX ADMIN — PIPERACILLIN AND TAZOBACTAM 3.38 G: 3; .375 INJECTION, POWDER, FOR SOLUTION INTRAVENOUS at 05:05

## 2024-01-01 RX ADMIN — PREDNISONE 40 MG: 20 TABLET ORAL at 08:09

## 2024-01-01 RX ADMIN — MAGNESIUM SULFATE HEPTAHYDRATE 2 G: 2 INJECTION, SOLUTION INTRAVENOUS at 09:14

## 2024-01-01 RX ADMIN — LEVETIRACETAM 1500 MG: 750 TABLET, FILM COATED ORAL at 20:49

## 2024-01-01 RX ADMIN — LOPERAMIDE HYDROCHLORIDE 4 MG: 2 CAPSULE ORAL at 19:53

## 2024-01-01 RX ADMIN — AMLODIPINE BESYLATE 10 MG: 10 TABLET ORAL at 08:05

## 2024-01-01 RX ADMIN — SODIUM CHLORIDE, POTASSIUM CHLORIDE, SODIUM LACTATE AND CALCIUM CHLORIDE: 600; 310; 30; 20 INJECTION, SOLUTION INTRAVENOUS at 10:55

## 2024-01-01 RX ADMIN — POTASSIUM CHLORIDE 40 MEQ: 750 TABLET, EXTENDED RELEASE ORAL at 19:35

## 2024-01-01 RX ADMIN — PIPERACILLIN AND TAZOBACTAM 3.38 G: 3; .375 INJECTION, POWDER, FOR SOLUTION INTRAVENOUS at 13:26

## 2024-01-01 RX ADMIN — LEVETIRACETAM 1000 MG: 500 TABLET, FILM COATED ORAL at 07:55

## 2024-01-01 RX ADMIN — HEPARIN SODIUM 5000 UNITS: 5000 INJECTION, SOLUTION INTRAVENOUS; SUBCUTANEOUS at 05:05

## 2024-01-01 RX ADMIN — PIPERACILLIN AND TAZOBACTAM 3.38 G: 3; .375 INJECTION, POWDER, FOR SOLUTION INTRAVENOUS at 17:51

## 2024-01-01 RX ADMIN — METHYLPREDNISOLONE SODIUM SUCCINATE 50 MG: 125 INJECTION, POWDER, FOR SOLUTION INTRAMUSCULAR; INTRAVENOUS at 19:37

## 2024-01-01 RX ADMIN — SODIUM CHLORIDE 480 MG: 9 INJECTION, SOLUTION INTRAVENOUS at 16:41

## 2024-01-01 RX ADMIN — HEPARIN SODIUM 5000 UNITS: 5000 INJECTION, SOLUTION INTRAVENOUS; SUBCUTANEOUS at 19:54

## 2024-01-01 RX ADMIN — SODIUM CHLORIDE, POTASSIUM CHLORIDE, SODIUM LACTATE AND CALCIUM CHLORIDE: 600; 310; 30; 20 INJECTION, SOLUTION INTRAVENOUS at 19:53

## 2024-01-01 RX ADMIN — LOPERAMIDE HYDROCHLORIDE 2 MG: 2 CAPSULE ORAL at 20:25

## 2024-01-01 RX ADMIN — PIPERACILLIN AND TAZOBACTAM 3.38 G: 3; .375 INJECTION, POWDER, FOR SOLUTION INTRAVENOUS at 17:36

## 2024-01-01 RX ADMIN — LOPERAMIDE HYDROCHLORIDE 2 MG: 2 CAPSULE ORAL at 09:42

## 2024-01-01 RX ADMIN — POTASSIUM & SODIUM PHOSPHATES POWDER PACK 280-160-250 MG 1 PACKET: 280-160-250 PACK at 17:55

## 2024-01-01 RX ADMIN — AMLODIPINE BESYLATE 5 MG: 5 TABLET ORAL at 07:57

## 2024-01-01 RX ADMIN — SODIUM CHLORIDE 100 MG: 9 INJECTION, SOLUTION INTRAVENOUS at 15:11

## 2024-01-01 RX ADMIN — PANTOPRAZOLE SODIUM 40 MG: 40 TABLET, DELAYED RELEASE ORAL at 08:08

## 2024-01-01 RX ADMIN — POTASSIUM CHLORIDE 10 MEQ: 7.46 INJECTION, SOLUTION INTRAVENOUS at 08:32

## 2024-01-01 RX ADMIN — LEVETIRACETAM 1000 MG: 500 TABLET, FILM COATED ORAL at 07:57

## 2024-01-01 RX ADMIN — METHYLPREDNISOLONE SODIUM SUCCINATE 50 MG: 125 INJECTION, POWDER, FOR SOLUTION INTRAMUSCULAR; INTRAVENOUS at 21:07

## 2024-01-01 RX ADMIN — SODIUM CHLORIDE, POTASSIUM CHLORIDE, SODIUM LACTATE AND CALCIUM CHLORIDE: 600; 310; 30; 20 INJECTION, SOLUTION INTRAVENOUS at 02:06

## 2024-01-01 RX ADMIN — SODIUM CHLORIDE 250 ML: 9 INJECTION, SOLUTION INTRAVENOUS at 10:12

## 2024-01-01 RX ADMIN — ACETAMINOPHEN 650 MG: 325 TABLET, FILM COATED ORAL at 12:51

## 2024-01-01 RX ADMIN — HEPARIN SODIUM 5000 UNITS: 5000 INJECTION, SOLUTION INTRAVENOUS; SUBCUTANEOUS at 20:25

## 2024-01-01 RX ADMIN — PREDNISONE 40 MG: 20 TABLET ORAL at 08:08

## 2024-01-01 RX ADMIN — HUMAN ALBUMIN MICROSPHERES AND PERFLUTREN 6 ML: 10; .22 INJECTION, SOLUTION INTRAVENOUS at 13:35

## 2024-01-01 RX ADMIN — LOPERAMIDE HYDROCHLORIDE 2 MG: 2 CAPSULE ORAL at 20:14

## 2024-01-01 RX ADMIN — SODIUM CHLORIDE, POTASSIUM CHLORIDE, SODIUM LACTATE AND CALCIUM CHLORIDE 1000 ML: 600; 310; 30; 20 INJECTION, SOLUTION INTRAVENOUS at 11:35

## 2024-01-01 RX ADMIN — FLUDEOXYGLUCOSE F-18 12.71 MILLICURIE: 500 INJECTION, SOLUTION INTRAVENOUS at 12:56

## 2024-01-01 RX ADMIN — LEVETIRACETAM 1500 MG: 750 TABLET, FILM COATED ORAL at 19:53

## 2024-01-01 RX ADMIN — PANTOPRAZOLE SODIUM 40 MG: 40 TABLET, DELAYED RELEASE ORAL at 08:12

## 2024-01-01 RX ADMIN — LIDOCAINE 3 PATCH: 4 PATCH TOPICAL at 16:01

## 2024-01-01 RX ADMIN — PREDNISONE 80 MG: 20 TABLET ORAL at 09:15

## 2024-01-01 RX ADMIN — HEPARIN SODIUM 5000 UNITS: 5000 INJECTION, SOLUTION INTRAVENOUS; SUBCUTANEOUS at 21:08

## 2024-01-01 RX ADMIN — Medication 3 MG: at 20:17

## 2024-01-01 RX ADMIN — LEVETIRACETAM 1000 MG: 500 TABLET, FILM COATED ORAL at 09:30

## 2024-01-01 RX ADMIN — POTASSIUM & SODIUM PHOSPHATES POWDER PACK 280-160-250 MG 1 PACKET: 280-160-250 PACK at 20:52

## 2024-01-01 RX ADMIN — MAGNESIUM SULFATE HEPTAHYDRATE 1 G: 1 INJECTION, SOLUTION INTRAVENOUS at 21:01

## 2024-01-01 RX ADMIN — POTASSIUM & SODIUM PHOSPHATES POWDER PACK 280-160-250 MG 1 PACKET: 280-160-250 PACK at 09:30

## 2024-01-01 RX ADMIN — MAGNESIUM SULFATE HEPTAHYDRATE 2 G: 2 INJECTION, SOLUTION INTRAVENOUS at 09:30

## 2024-01-01 RX ADMIN — LOPERAMIDE HYDROCHLORIDE 4 MG: 2 CAPSULE ORAL at 20:48

## 2024-01-01 RX ADMIN — LEVETIRACETAM 1000 MG: 500 TABLET, FILM COATED ORAL at 08:08

## 2024-01-01 RX ADMIN — GADOBUTROL 9.5 ML: 604.72 INJECTION INTRAVENOUS at 08:29

## 2024-01-01 RX ADMIN — PANTOPRAZOLE SODIUM 40 MG: 40 TABLET, DELAYED RELEASE ORAL at 08:09

## 2024-01-01 RX ADMIN — POTASSIUM & SODIUM PHOSPHATES POWDER PACK 280-160-250 MG 1 PACKET: 280-160-250 PACK at 20:51

## 2024-01-01 RX ADMIN — HEPARIN SODIUM 5000 UNITS: 5000 INJECTION, SOLUTION INTRAVENOUS; SUBCUTANEOUS at 19:37

## 2024-01-01 RX ADMIN — SODIUM CHLORIDE 480 MG: 9 INJECTION, SOLUTION INTRAVENOUS at 11:27

## 2024-01-01 RX ADMIN — SODIUM CHLORIDE 250 ML: 9 INJECTION, SOLUTION INTRAVENOUS at 16:40

## 2024-01-01 RX ADMIN — SODIUM CHLORIDE: 9 INJECTION, SOLUTION INTRAVENOUS at 04:37

## 2024-01-01 RX ADMIN — LOPERAMIDE HYDROCHLORIDE 2 MG: 2 CAPSULE ORAL at 14:00

## 2024-01-01 RX ADMIN — SODIUM CHLORIDE: 9 INJECTION, SOLUTION INTRAVENOUS at 21:37

## 2024-01-01 RX ADMIN — ROSUVASTATIN CALCIUM 40 MG: 40 TABLET, COATED ORAL at 07:52

## 2024-01-01 RX ADMIN — POTASSIUM CHLORIDE 20 MEQ: 1.5 POWDER, FOR SOLUTION ORAL at 10:09

## 2024-01-01 RX ADMIN — LEVETIRACETAM 1000 MG: 500 TABLET, FILM COATED ORAL at 19:37

## 2024-01-01 RX ADMIN — POTASSIUM & SODIUM PHOSPHATES POWDER PACK 280-160-250 MG 1 PACKET: 280-160-250 PACK at 12:52

## 2024-01-01 RX ADMIN — POTASSIUM CHLORIDE 40 MEQ: 40 SOLUTION ORAL at 10:55

## 2024-01-01 RX ADMIN — SODIUM CHLORIDE 1000 ML: 9 INJECTION, SOLUTION INTRAVENOUS at 14:52

## 2024-01-01 RX ADMIN — SODIUM CHLORIDE, POTASSIUM CHLORIDE, SODIUM LACTATE AND CALCIUM CHLORIDE 1000 ML: 600; 310; 30; 20 INJECTION, SOLUTION INTRAVENOUS at 15:48

## 2024-01-01 RX ADMIN — LEVETIRACETAM 1000 MG: 500 TABLET, FILM COATED ORAL at 22:23

## 2024-01-01 RX ADMIN — HEPARIN SODIUM 5000 UNITS: 5000 INJECTION, SOLUTION INTRAVENOUS; SUBCUTANEOUS at 11:58

## 2024-01-01 RX ADMIN — POTASSIUM & SODIUM PHOSPHATES POWDER PACK 280-160-250 MG 1 PACKET: 280-160-250 PACK at 16:01

## 2024-01-01 RX ADMIN — LEVETIRACETAM 1000 MG: 500 TABLET, FILM COATED ORAL at 08:09

## 2024-01-01 RX ADMIN — POTASSIUM & SODIUM PHOSPHATES POWDER PACK 280-160-250 MG 1 PACKET: 280-160-250 PACK at 16:31

## 2024-01-01 RX ADMIN — IOPAMIDOL 99 ML: 755 INJECTION, SOLUTION INTRAVENOUS at 20:28

## 2024-01-01 RX ADMIN — GADOBUTROL 9.5 ML: 604.72 INJECTION INTRAVENOUS at 11:40

## 2024-01-01 RX ADMIN — LIDOCAINE 3 PATCH: 4 PATCH TOPICAL at 16:28

## 2024-01-01 RX ADMIN — LOPERAMIDE HYDROCHLORIDE 4 MG: 2 CAPSULE ORAL at 15:30

## 2024-01-01 RX ADMIN — POTASSIUM PHOSPHATE, MONOBASIC POTASSIUM PHOSPHATE, DIBASIC 15 MMOL: 224; 236 INJECTION, SOLUTION, CONCENTRATE INTRAVENOUS at 11:01

## 2024-01-01 RX ADMIN — POTASSIUM CHLORIDE 20 MEQ: 750 TABLET, EXTENDED RELEASE ORAL at 17:55

## 2024-01-01 RX ADMIN — POTASSIUM & SODIUM PHOSPHATES POWDER PACK 280-160-250 MG 1 PACKET: 280-160-250 PACK at 08:12

## 2024-01-01 RX ADMIN — Medication 3 MG: at 21:11

## 2024-01-01 RX ADMIN — LEVETIRACETAM 1000 MG: 500 TABLET, FILM COATED ORAL at 09:15

## 2024-01-01 RX ADMIN — ROSUVASTATIN CALCIUM 40 MG: 40 TABLET, COATED ORAL at 08:09

## 2024-01-01 RX ADMIN — SODIUM CHLORIDE 250 ML: 9 INJECTION, SOLUTION INTRAVENOUS at 11:27

## 2024-01-01 RX ADMIN — LOPERAMIDE HYDROCHLORIDE 4 MG: 2 CAPSULE ORAL at 13:32

## 2024-01-01 RX ADMIN — PIPERACILLIN AND TAZOBACTAM 3.38 G: 3; .375 INJECTION, POWDER, FOR SOLUTION INTRAVENOUS at 11:33

## 2024-01-01 RX ADMIN — LEVETIRACETAM 1000 MG: 500 TABLET, FILM COATED ORAL at 00:02

## 2024-01-01 RX ADMIN — PANTOPRAZOLE SODIUM 40 MG: 40 TABLET, DELAYED RELEASE ORAL at 07:58

## 2024-01-01 RX ADMIN — AMLODIPINE BESYLATE 5 MG: 5 TABLET ORAL at 08:12

## 2024-01-01 RX ADMIN — LOPERAMIDE HYDROCHLORIDE 4 MG: 2 CAPSULE ORAL at 19:36

## 2024-01-01 RX ADMIN — METHYLPREDNISOLONE SODIUM SUCCINATE 50 MG: 125 INJECTION, POWDER, FOR SOLUTION INTRAMUSCULAR; INTRAVENOUS at 10:21

## 2024-01-01 RX ADMIN — HEPARIN SODIUM 5000 UNITS: 5000 INJECTION, SOLUTION INTRAVENOUS; SUBCUTANEOUS at 05:13

## 2024-01-01 ASSESSMENT — ACTIVITIES OF DAILY LIVING (ADL)
ADLS_ACUITY_SCORE: 44
ADLS_ACUITY_SCORE: 28
ADLS_ACUITY_SCORE: 28
ADLS_ACUITY_SCORE: 45
ADLS_ACUITY_SCORE: 37
ADLS_ACUITY_SCORE: 37
ADLS_ACUITY_SCORE: 44
ADLS_ACUITY_SCORE: 40
ADLS_ACUITY_SCORE: 41
DEPENDENT_IADLS:: CLEANING;COOKING;LAUNDRY;SHOPPING;MEAL PREPARATION;MEDICATION MANAGEMENT;TRANSPORTATION
ADLS_ACUITY_SCORE: 40
ADLS_ACUITY_SCORE: 43
ADLS_ACUITY_SCORE: 44
ADLS_ACUITY_SCORE: 41
ADLS_ACUITY_SCORE: 41
ADLS_ACUITY_SCORE: 40
ADLS_ACUITY_SCORE: 44
ADLS_ACUITY_SCORE: 37
ADLS_ACUITY_SCORE: 28
ADLS_ACUITY_SCORE: 44
ADLS_ACUITY_SCORE: 37
ADLS_ACUITY_SCORE: 40
ADLS_ACUITY_SCORE: 43
ADLS_ACUITY_SCORE: 44
ADLS_ACUITY_SCORE: 28
ADLS_ACUITY_SCORE: 28
ADLS_ACUITY_SCORE: 41
ADLS_ACUITY_SCORE: 28
ADLS_ACUITY_SCORE: 28
ADLS_ACUITY_SCORE: 27
ADLS_ACUITY_SCORE: 35
ADLS_ACUITY_SCORE: 41
ADLS_ACUITY_SCORE: 44
ADLS_ACUITY_SCORE: 28
ADLS_ACUITY_SCORE: 27
ADLS_ACUITY_SCORE: 46
ADLS_ACUITY_SCORE: 44
ADLS_ACUITY_SCORE: 28
ADLS_ACUITY_SCORE: 28
ADLS_ACUITY_SCORE: 37
ADLS_ACUITY_SCORE: 44
ADLS_ACUITY_SCORE: 27
ADLS_ACUITY_SCORE: 44
ADLS_ACUITY_SCORE: 44
ADLS_ACUITY_SCORE: 28
ADLS_ACUITY_SCORE: 28
ADLS_ACUITY_SCORE: 41
ADLS_ACUITY_SCORE: 46
ADLS_ACUITY_SCORE: 44
ADLS_ACUITY_SCORE: 44
ADLS_ACUITY_SCORE: 43
ADLS_ACUITY_SCORE: 27
ADLS_ACUITY_SCORE: 43
ADLS_ACUITY_SCORE: 44
ADLS_ACUITY_SCORE: 43
ADLS_ACUITY_SCORE: 46
ADLS_ACUITY_SCORE: 44
ADLS_ACUITY_SCORE: 40
ADLS_ACUITY_SCORE: 28
ADLS_ACUITY_SCORE: 43
ADLS_ACUITY_SCORE: 46
ADLS_ACUITY_SCORE: 37
ADLS_ACUITY_SCORE: 28
ADLS_ACUITY_SCORE: 41
ADLS_ACUITY_SCORE: 28
DEPENDENT_IADLS:: CLEANING;COOKING;LAUNDRY;SHOPPING;MEAL PREPARATION;MEDICATION MANAGEMENT;MONEY MANAGEMENT;TRANSPORTATION;INCONTINENCE
ADLS_ACUITY_SCORE: 43
ADLS_ACUITY_SCORE: 44
ADLS_ACUITY_SCORE: 40
ADLS_ACUITY_SCORE: 28
ADLS_ACUITY_SCORE: 27
ADLS_ACUITY_SCORE: 40
ADLS_ACUITY_SCORE: 27
ADLS_ACUITY_SCORE: 44
ADLS_ACUITY_SCORE: 44
ADLS_ACUITY_SCORE: 28
ADLS_ACUITY_SCORE: 28
ADLS_ACUITY_SCORE: 40
ADLS_ACUITY_SCORE: 28
ADLS_ACUITY_SCORE: 27
ADLS_ACUITY_SCORE: 43
ADLS_ACUITY_SCORE: 40
ADLS_ACUITY_SCORE: 37
ADLS_ACUITY_SCORE: 44
ADLS_ACUITY_SCORE: 28
ADLS_ACUITY_SCORE: 40
ADLS_ACUITY_SCORE: 27
ADLS_ACUITY_SCORE: 37
ADLS_ACUITY_SCORE: 44
ADLS_ACUITY_SCORE: 44
ADLS_ACUITY_SCORE: 27
ADLS_ACUITY_SCORE: 28
ADLS_ACUITY_SCORE: 44
ADLS_ACUITY_SCORE: 41
ADLS_ACUITY_SCORE: 37
ADLS_ACUITY_SCORE: 44
ADLS_ACUITY_SCORE: 43
ADLS_ACUITY_SCORE: 27
ADLS_ACUITY_SCORE: 44
ADLS_ACUITY_SCORE: 28
ADLS_ACUITY_SCORE: 28
ADLS_ACUITY_SCORE: 46
ADLS_ACUITY_SCORE: 28
ADLS_ACUITY_SCORE: 28
ADLS_ACUITY_SCORE: 44
ADLS_ACUITY_SCORE: 28
ADLS_ACUITY_SCORE: 41
ADLS_ACUITY_SCORE: 28
ADLS_ACUITY_SCORE: 44
ADLS_ACUITY_SCORE: 43
ADLS_ACUITY_SCORE: 28
ADLS_ACUITY_SCORE: 44
ADLS_ACUITY_SCORE: 28
ADLS_ACUITY_SCORE: 44
ADLS_ACUITY_SCORE: 46
ADLS_ACUITY_SCORE: 44
ADLS_ACUITY_SCORE: 27
ADLS_ACUITY_SCORE: 44
ADLS_ACUITY_SCORE: 40
ADLS_ACUITY_SCORE: 41
ADLS_ACUITY_SCORE: 28
ADLS_ACUITY_SCORE: 28
ADLS_ACUITY_SCORE: 37
ADLS_ACUITY_SCORE: 40
ADLS_ACUITY_SCORE: 28
ADLS_ACUITY_SCORE: 46
ADLS_ACUITY_SCORE: 28
ADLS_ACUITY_SCORE: 41
ADLS_ACUITY_SCORE: 37
ADLS_ACUITY_SCORE: 41
ADLS_ACUITY_SCORE: 44
ADLS_ACUITY_SCORE: 43
ADLS_ACUITY_SCORE: 43
ADLS_ACUITY_SCORE: 28
ADLS_ACUITY_SCORE: 41
ADLS_ACUITY_SCORE: 44
ADLS_ACUITY_SCORE: 28
ADLS_ACUITY_SCORE: 28
ADLS_ACUITY_SCORE: 44
ADLS_ACUITY_SCORE: 40
ADLS_ACUITY_SCORE: 40
ADLS_ACUITY_SCORE: 44
ADLS_ACUITY_SCORE: 28
ADLS_ACUITY_SCORE: 43
ADLS_ACUITY_SCORE: 41
ADLS_ACUITY_SCORE: 44
ADLS_ACUITY_SCORE: 40
ADLS_ACUITY_SCORE: 37
ADLS_ACUITY_SCORE: 44
ADLS_ACUITY_SCORE: 46
ADLS_ACUITY_SCORE: 27
ADLS_ACUITY_SCORE: 28
ADLS_ACUITY_SCORE: 44
ADLS_ACUITY_SCORE: 28
ADLS_ACUITY_SCORE: 44
ADLS_ACUITY_SCORE: 41
ADLS_ACUITY_SCORE: 46
ADLS_ACUITY_SCORE: 28
ADLS_ACUITY_SCORE: 44
ADLS_ACUITY_SCORE: 28
ADLS_ACUITY_SCORE: 28
ADLS_ACUITY_SCORE: 40
ADLS_ACUITY_SCORE: 46
ADLS_ACUITY_SCORE: 28
ADLS_ACUITY_SCORE: 43
ADLS_ACUITY_SCORE: 44
ADLS_ACUITY_SCORE: 44
ADLS_ACUITY_SCORE: 28
ADLS_ACUITY_SCORE: 27
ADLS_ACUITY_SCORE: 28
ADLS_ACUITY_SCORE: 44
ADLS_ACUITY_SCORE: 41
ADLS_ACUITY_SCORE: 40
ADLS_ACUITY_SCORE: 44
ADLS_ACUITY_SCORE: 40
ADLS_ACUITY_SCORE: 28
ADLS_ACUITY_SCORE: 40
ADLS_ACUITY_SCORE: 28
ADLS_ACUITY_SCORE: 28
ADLS_ACUITY_SCORE: 41
ADLS_ACUITY_SCORE: 44
ADLS_ACUITY_SCORE: 28
ADLS_ACUITY_SCORE: 40
ADLS_ACUITY_SCORE: 40
ADLS_ACUITY_SCORE: 37
ADLS_ACUITY_SCORE: 44
ADLS_ACUITY_SCORE: 27
ADLS_ACUITY_SCORE: 28
ADLS_ACUITY_SCORE: 28
ADLS_ACUITY_SCORE: 46
ADLS_ACUITY_SCORE: 40
ADLS_ACUITY_SCORE: 41
ADLS_ACUITY_SCORE: 40
ADLS_ACUITY_SCORE: 37
ADLS_ACUITY_SCORE: 28
ADLS_ACUITY_SCORE: 44
ADLS_ACUITY_SCORE: 40
ADLS_ACUITY_SCORE: 28
ADLS_ACUITY_SCORE: 40
ADLS_ACUITY_SCORE: 44
ADLS_ACUITY_SCORE: 28
ADLS_ACUITY_SCORE: 43
ADLS_ACUITY_SCORE: 44
ADLS_ACUITY_SCORE: 28
ADLS_ACUITY_SCORE: 46
ADLS_ACUITY_SCORE: 46
ADLS_ACUITY_SCORE: 44
ADLS_ACUITY_SCORE: 37
ADLS_ACUITY_SCORE: 28
ADLS_ACUITY_SCORE: 44
ADLS_ACUITY_SCORE: 28
ADLS_ACUITY_SCORE: 44
ADLS_ACUITY_SCORE: 40
ADLS_ACUITY_SCORE: 40
ADLS_ACUITY_SCORE: 44
ADLS_ACUITY_SCORE: 35
ADLS_ACUITY_SCORE: 28
ADLS_ACUITY_SCORE: 46
ADLS_ACUITY_SCORE: 41
ADLS_ACUITY_SCORE: 40
ADLS_ACUITY_SCORE: 27
ADLS_ACUITY_SCORE: 44
ADLS_ACUITY_SCORE: 46
ADLS_ACUITY_SCORE: 29
ADLS_ACUITY_SCORE: 43
ADLS_ACUITY_SCORE: 43
ADLS_ACUITY_SCORE: 44
ADLS_ACUITY_SCORE: 46
ADLS_ACUITY_SCORE: 37
ADLS_ACUITY_SCORE: 43
ADLS_ACUITY_SCORE: 44
ADLS_ACUITY_SCORE: 44
ADLS_ACUITY_SCORE: 27
ADLS_ACUITY_SCORE: 28
ADLS_ACUITY_SCORE: 46
ADLS_ACUITY_SCORE: 44
ADLS_ACUITY_SCORE: 28
ADLS_ACUITY_SCORE: 44
ADLS_ACUITY_SCORE: 28
ADLS_ACUITY_SCORE: 46
ADLS_ACUITY_SCORE: 44
ADLS_ACUITY_SCORE: 40
ADLS_ACUITY_SCORE: 43
ADLS_ACUITY_SCORE: 37
ADLS_ACUITY_SCORE: 28
ADLS_ACUITY_SCORE: 43
ADLS_ACUITY_SCORE: 28
ADLS_ACUITY_SCORE: 44
ADLS_ACUITY_SCORE: 28
ADLS_ACUITY_SCORE: 41
ADLS_ACUITY_SCORE: 28
ADLS_ACUITY_SCORE: 44
ADLS_ACUITY_SCORE: 28
ADLS_ACUITY_SCORE: 41
ADLS_ACUITY_SCORE: 28
ADLS_ACUITY_SCORE: 28
ADLS_ACUITY_SCORE: 40
ADLS_ACUITY_SCORE: 43
ADLS_ACUITY_SCORE: 43
ADLS_ACUITY_SCORE: 37
ADLS_ACUITY_SCORE: 40
ADLS_ACUITY_SCORE: 28
ADLS_ACUITY_SCORE: 40
ADLS_ACUITY_SCORE: 27
ADLS_ACUITY_SCORE: 44
ADLS_ACUITY_SCORE: 28
ADLS_ACUITY_SCORE: 41
ADLS_ACUITY_SCORE: 41
ADLS_ACUITY_SCORE: 37
ADLS_ACUITY_SCORE: 27
ADLS_ACUITY_SCORE: 28
ADLS_ACUITY_SCORE: 46
ADLS_ACUITY_SCORE: 27
ADLS_ACUITY_SCORE: 28
ADLS_ACUITY_SCORE: 44
ADLS_ACUITY_SCORE: 44
ADLS_ACUITY_SCORE: 41
ADLS_ACUITY_SCORE: 44
ADLS_ACUITY_SCORE: 45
ADLS_ACUITY_SCORE: 28
ADLS_ACUITY_SCORE: 44
ADLS_ACUITY_SCORE: 43
ADLS_ACUITY_SCORE: 28
ADLS_ACUITY_SCORE: 41
ADLS_ACUITY_SCORE: 28
ADLS_ACUITY_SCORE: 44
ADLS_ACUITY_SCORE: 28
ADLS_ACUITY_SCORE: 43
ADLS_ACUITY_SCORE: 27
ADLS_ACUITY_SCORE: 40
ADLS_ACUITY_SCORE: 40

## 2024-01-01 ASSESSMENT — ENCOUNTER SYMPTOMS
CONSTIPATION: 0
BLOOD IN STOOL: 0
VOMITING: 0
SEIZURES: 0
VOMITING: 0
HEADACHES: 0
DIARRHEA: 0
BLURRED VISION: 0
FEVER: 0
WEIGHT LOSS: 0
WEIGHT LOSS: 0
DEPRESSION: 0
DOUBLE VISION: 0
DEPRESSION: 0
SHORTNESS OF BREATH: 0
NAUSEA: 0
DOUBLE VISION: 0
SEIZURES: 0
PHOTOPHOBIA: 0
HEADACHES: 1
BLURRED VISION: 0
COUGH: 0
MUSCULOSKELETAL NEGATIVE: 1
NAUSEA: 0

## 2024-01-01 ASSESSMENT — PAIN SCALES - GENERAL
PAINLEVEL: NO PAIN (0)
PAINLEVEL: NO PAIN (1)
PAINLEVEL: SEVERE PAIN (6)
PAINLEVEL: NO PAIN (0)
PAINLEVEL: NO PAIN (0)
PAINLEVEL: MILD PAIN (3)
PAINLEVEL: SEVERE PAIN (6)

## 2024-01-01 ASSESSMENT — COLUMBIA-SUICIDE SEVERITY RATING SCALE - C-SSRS
2. HAVE YOU ACTUALLY HAD ANY THOUGHTS OF KILLING YOURSELF IN THE PAST MONTH?: NO
6. HAVE YOU EVER DONE ANYTHING, STARTED TO DO ANYTHING, OR PREPARED TO DO ANYTHING TO END YOUR LIFE?: NO
2. HAVE YOU ACTUALLY HAD ANY THOUGHTS OF KILLING YOURSELF IN THE PAST MONTH?: NO
6. HAVE YOU EVER DONE ANYTHING, STARTED TO DO ANYTHING, OR PREPARED TO DO ANYTHING TO END YOUR LIFE?: NO
1. IN THE PAST MONTH, HAVE YOU WISHED YOU WERE DEAD OR WISHED YOU COULD GO TO SLEEP AND NOT WAKE UP?: NO
6. HAVE YOU EVER DONE ANYTHING, STARTED TO DO ANYTHING, OR PREPARED TO DO ANYTHING TO END YOUR LIFE?: NO
2. HAVE YOU ACTUALLY HAD ANY THOUGHTS OF KILLING YOURSELF IN THE PAST MONTH?: NO
1. IN THE PAST MONTH, HAVE YOU WISHED YOU WERE DEAD OR WISHED YOU COULD GO TO SLEEP AND NOT WAKE UP?: NO
1. IN THE PAST MONTH, HAVE YOU WISHED YOU WERE DEAD OR WISHED YOU COULD GO TO SLEEP AND NOT WAKE UP?: NO

## 2024-01-01 ASSESSMENT — VISUAL ACUITY: OU: NORMAL ACUITY

## 2024-01-02 NOTE — PROGRESS NOTES
POSTOPERATIVE FOLLOW-UP NURSE VISIT NOTE    Procedure: CRANIOTOMY, USING OPTICAL TRACKING SYSTEM, WITH NEOPLASM EXCISION, RIGHT FRONTAL CRANOTOMY WITH RESECTION OF TUMOR     Date: 12/21/23    Surgeon: Hema    Pre-op symptoms: Memory issues, generalized weakness    Post-op symptoms: denies symptoms    Pain and medications: 0/10  Acetaminophen 650 mg PO Q4 PRN: hasn't taken in a while  Dexamethasone 2 mg PO BID: taking as ordered    Bracing compliance: N/A    Medications refilled: None    Imaging ordered: N/A    Wound: CDI, approximated, no drainage, mildly tender, no erythema, no temperature change compared to surrounding tissue, monocryl still in place.

## 2024-01-02 NOTE — PATIENT INSTRUCTIONS
Schedule 6 week follow-up on your way out.    * No lifting, pushing or pulling greater than 5-10 pound (this is about a gallon of milk).  *No repetitive bending, twisting, or jarring activities  *No overhead work  *No aerobic or strenuous activity  *No activities with increased risk of falls  *You may move about your home as tolerated  *You may walk up and down stairs as tolerated  *You may increase your activity slowly over the next 4-6 weeks    * WALKING PROGRAM: As you can tolerate, walk daily-start with 5-10 minutes of continuous walking. This is in addition to the walking that you do as part of your daily activities. Increase the time that you walk by 5 minutes every couple of days. Do not exceed 30-45 minutes of continuous walking until seen in follow-up. Walking is the best exercise after surgery.  **Listen to your body, if you find that you are more painful or fatigued, you may need to proceed more slowly.    **Do not smoke or expose yourself to second hand smoke. Cigarette smoke can delay healing and cause complications.     DRIVING: Do not drive. Plan to discuss your return to driving at your 4-6 weeks follow-up appointment.    WORK: If you plan to return to work before you 4-6 weeks appointment, call and discuss with one of the nurses in the neurosurgery office.     A dressing is not required.    Keep the wound clean.    Wash your hands before touching the wound.  Ensure that anyone assisting you in the care of your wound washes her/his hands before touching the wound. Good handwashing can decrease the risk of serious infection.    If you are unable to see your wound, have someone check the wound daily for redness, swelling,or drainage. A small amount of drainage is normal.    You may shower.  Pat the wound dry. Do not rub.    No tub baths until the wound is well healed.  Usually 5-6 weeks.     If you develop redness, swelling, drainage, or temp 101 or greater, call our clinic.

## 2024-01-08 NOTE — TELEPHONE ENCOUNTER
Patient's date of service was 11/7/23, says coded as first time initial wellness check, should have been coded as subsequent wellness check.Patient says Billing told him to talk to us for coding. Priya Johnston on 1/8/2024 at 2:58 PM

## 2024-01-09 NOTE — TELEPHONE ENCOUNTER
I am not sure what patient is talking about. I went back into that encounter and I did not code this as an initial medicare wellness examination. I will also route this to our  to review the notes  Please Marta could you review the notes and coding for above patient who claims I coded it as an initial wellness examination? Thanks

## 2024-01-22 NOTE — NURSING NOTE
"Oncology Rooming Note    January 22, 2024 9:43 AM   Saeid Santa is a 68 year old male who presents for:    Chief Complaint   Patient presents with    Blood Draw     Vitals, blood drawn and PIV placed by Clinical assistant. Pt checked into appt.      Initial Vitals: /85   Pulse 108   Temp 98.2  F (36.8  C) (Oral)   Resp 18   Wt 95.6 kg (210 lb 11.2 oz)   SpO2 97%   BMI 27.80 kg/m   Estimated body mass index is 27.8 kg/m  as calculated from the following:    Height as of 12/21/23: 1.854 m (6' 1\").    Weight as of this encounter: 95.6 kg (210 lb 11.2 oz). Body surface area is 2.22 meters squared.  No Pain (0) Comment: Data Unavailable   No LMP for male patient.  Allergies reviewed: Yes  Medications reviewed: Yes    Medications: Medication refills not needed today.  Pharmacy name entered into [employer]:    [facility] [place] [place] [facility] [address] [facility] - [place], [place] - [address] [facility] [facility]  [facility] - [place], [place] - [address] [place] [address]    Frailty Screening:   Is the patient here for a new oncology consult visit in cancer care? 2. No      Clinical concerns: no other complaints      Ti Maria"

## 2024-01-22 NOTE — NURSING NOTE
Chief Complaint   Patient presents with    Blood Draw     Vitals, blood drawn and PIV placed by Clinical assistant. Pt checked into appt.      JOCELYN Jiang LPN

## 2024-01-22 NOTE — PROGRESS NOTES
Infusion Nursing Note:  Saeid Santa presents today for C1D1 Nivolumab.    Patient seen by provider today: Yes: Amber Scheierl, NP   present during visit today: Not Applicable.    Note: Saeid presents to infusion today following his clinic appointment. He denies any new concerns. He is new to nivolumab but not new to infusion. Printed information provided by RN and discussed with patient.    CBC and EKG done in infusion per orders from Mila.     TORB Amber Scheierl, NP/Annabel Medrano RN:  -OK to proceed with Nivolumab while patient is taking dexamethasone    Intravenous Access:  Peripheral IV placed.    Treatment Conditions:  Lab Results   Component Value Date    HGB 14.1 01/22/2024    WBC 8.7 01/22/2024    ANEU 7.5 09/01/2023    ANEUTAUTO 5.5 01/22/2024     01/22/2024     Lab Results   Component Value Date     01/22/2024    POTASSIUM 3.8 01/22/2024    MAG 2.5 (H) 12/22/2023    CR 0.99 01/22/2024    JOHN PAUL 9.0 01/22/2024    BILITOTAL 1.0 01/22/2024    ALBUMIN 3.9 01/22/2024    ALT 27 01/22/2024    AST 20 01/22/2024     Results reviewed, labs MET treatment parameters, ok to proceed with treatment.    Post Infusion Assessment:  Patient tolerated infusion without incident.  Blood return noted pre and post infusion.  Site patent and intact, free from redness, edema or discomfort.  No evidence of extravasations.  Access discontinued per protocol.     Discharge Plan:   Prescription refills given for dexamethasone.  Discharge instructions reviewed with: Patient and Family.  Patient and/or family verbalized understanding of discharge instructions and all questions answered.  Copy of AVS reviewed with patient and/or family.  Patient will return 2/19/24 for next appointment.  Patient discharged in stable condition accompanied by: wife.  Departure Mode: Ambulatory.      Annabel Medrano RN

## 2024-01-22 NOTE — LETTER
1/22/2024         RE: Saeid Santa  [address]  [place] [address]        Dear Colleague,    Thank you for referring your patient, Saeid Santa, to the [facility]. Please see a copy of my visit note below.    [facility]   Follow Up Visit    Name: Saeid Santa  MRN: [id number]  Date of visit: Jan 22, 2024    Diagnosis: Metastatic melanoma  Stage:    Cancer Staging   No matching staging information was found for the patient.    Molecular: BRAF G466A, TMB 48.7554 mut/Mb    Oncology History:  --8/16/22, he brings a left parietal scalp lesion to attention of his GP. It is initially observed.  --November 2022, the left parietal scalp lesion was felt to be a cyst, and the left parietal lesion was lanced and drained via scalp incision.  --January 2023, the left parietal scalp cyst would occasionally break open, drain, and bleed, largely at night.   --February 2023, he notes some mild difficulty clearing obstacles with right leg and foot.  --March/April 2023, he has 3 progressively worsening episodes over a span 3 weeks including the right leg. At first he notes the onset of right leg numbness and heaviness, like he was carrying 50 lbs of water on the leg. Then, about a week or so later, the leg began to jerk and converse while he was in the basement. Neither episode associated with loss of consciousness  --4/15/23, he recalls the right leg numbness, heaviness, jerking while driving between work. He had been found by coworkers in the parking lot, sitting in the grass, appearing confused. He was brought to the ER by EMS. CT-CAP, showed small (<6mm), bilateral indeterminate pulmonary nodules. CT-head and MRI brain showed 2 discrete ring-enhancing lesions in the left paramedian posterior frontal lobe near the precentral gyrus measuring approximately 2.2 x 1.7 x 1.9 cm and left occipital lobe near the lingual gyrus measuring approximately 2.2 x 2.1 x  2.0 cm. The occipital lesion encroaches upon the subependymal surface of the left lateral ventricle occipital horn and contacts the superior surface of the medial left tentorium. The lesions are accompanied by moderate surrounding vasogenic edema. Surgery was recommended.  --4/21/23, underwent left awake (sleep-awake-sleep) craniotomy for tumor resection, he has resection of the left frontal, motor strip tumor as well asan elliptical incision of the left parietal scalp lesion. On pathology, both the scalp excision and brain mass are positive for malignant melanoma. Tumor cells are positive for S-100 and Melan-A, and negative for cytokeratin AE1/AE3, NKX3.1, GFAP, ERG, CD3, and CD20.  CD3 highlights brisk infiltration of the tumor by T lymphocytes, while CD20 highlights scattered positive B lymphocytes. NGS identifies a BRAF G466A mutation, a class 3 mutation insensitive to BRAF kinase inhibitor monotherapy. TMB is high at 48.754 muts/Mb.   --4/22/23, post-operative MRI shows post-operative changes of the parietal craniotomy of the left frontal lobe metastasis. The other 1.7 x 1.1 cm cystic lesion in the left occipital lobe is also seen. No new lesions appreciated.  -- 4/25/23 to 5/9/23, discharged from the hospital to acute rehab stay at [facility] in [place]. He participated in a comprehensive rehabilitation program consisting of PT, OT, Speech, Psychology and Recreation Therapy. He did well and was discharged home with use of a walker.  --PET-CT on 5/18/23 with potentially involved neck lymph nodes. He has mild FDG uptake in soft tissue nodules in the scalp, in the known left occipital brain metastasis, as well as in the left lower neck region.  -5/24-/5/31/23, He was treated with gamma knife: 2500 cGy to the left resection cavity; 3000 cGy to the L parietal-occipital lesion.  -6/7/2023, Cycle 1 of pembrolizumab 200mg every 3 weeks  -6/14/23-6/16/23, admitted for vision change, reduced comprehension, speech  change, fall and headache. MRI showing likely progression of left occipital mass with associated edema. Improved on steroids and discharged on oral decadron. Plan for repeat MRI and neurosurgery follow up in 1 month.  -7/7/23 ED visit for headaches, confusion, vision changes, concern for hemorrhage of the left occipital mass, admitted to [facility] until 7/9/23, increased dexamethasone dose  -7/14/23 craniotomy of the left parieto-occipital mass, discharged on a dexamethasone taper  -8/11/2023, Cycle 2 of pembrolizumab  -8/23/2023 brain MRI with no recurrence in left frontal lobe, decreased enhancement at edges of left occipital resection cavity, new enhancement int he right front lobe concerning for new met--seen by neurosurgery and considering RT  -8/31/2023 CT/PET with increased uptake in left lower neck lymph node c/f early progression. Ground-glass, reticular changes in lungs also noted likely secondary to drug-induced pneumonitis. Started 1 mg/kg prednisone (80 mg daily) with 8 week taper  -9/8/23 GK to R frontal lobe lesion  -12/7/23 CT neck: Resolved previously hypermetabolic left level 5 lymph node. No new  cervical lymphadenopathy.  -12/7/23 CT chest/AP: Two arterially enhancing lesions in the liver measuring 0.9 - 1.0 cm were not previously visualized, likely due to differences in timing of the contrast bolus. These are indeterminate but favored to be benign perfusional abnormalities. Melanoma metastases cannot be entirely excluded. Otherwise, no evidence for metastatic disease in the chest, abdomen and pelvis. Significant improvement in linear opacities in the lungs, suggesting improving drug-induced pneumonitis. Few stable small pulmonary nodules.  -12/7/23 MR head: Progression in intracranial metastatic disease compared to 9/8/2023   with multiple new and/or enlarging metastases, including internally hemorrhagic right frontal lobe lesion with 3 mm of leftward midline shift and new lesion adjacent to the  left occipital resection bed.   -12/8/23 started on dex 4 mg BID  -12/21/23 Right frontal craniotomy with Dr. Garrido    Interval History:  Tim presents today for follow up/evaluation prior to starting nivolumab. He reports overall feeling ok; about his usual.  -he is ambulating without any tripping of his right leg. He utilizes a cane outside the home. He is walking for activity; usually at [employer].  -He stopped his decadron 3-4 days ago when Rx ran out. He's not sure exactly what he was on but thinks it was the 2mg BID in most recent Rx. Since then he's noticed an increase in fatigue. Naps about 2x per day for an hour to 2. No headaches, blurred vision or dizziness.  -No activity concerning for seizure.  -Some numbness to the inside of the right foot which is unchanged. No other paresthesias.   -Had recent upper respiratory infection. No fevers but some chills at times for a couple weeks. Cough resolved. No SOB.  -Since stopping steroids has had about 3-5 stools per day. Only 1 of which is loose. No abdominal pain. No nausea. No fever. Not exposed to anyone with diarrhea that he knows. Not on antibiotics.     Review of Systems:  As noted in interval history.     Exam:   /85   Pulse 108   Temp 98.2  F (36.8  C) (Oral)   Resp 18   Wt 95.6 kg (210 lb 11.2 oz)   SpO2 97%   BMI 27.80 kg/m    General: Well-appearing male in no acute distress.  Eyes: EOMI, PERRL. No scleral icterus.  ENT: Oral mucosa is moist without lesions or thrush.   Cardiovascular: Tachycardic. Rhythm slightly irregularNo murmurs. No peripheral edema.  Respiratory: CTA bilaterally. No wheezes or crackles.  Gastrointestinal: BS +. Abdomen soft, rounded,  non-tender.   Neurologic: Cranial nerves II through XII are grossly intact.Finger to nose coordinated.  strength and hip flexion 5/5. Ambulates with cane  Skin: No rashes, petechiae, or bruising noted on exposed skin.   Psych: affect appropriate. Pleasant.       Labs:   Reviewed in  chart labs since 12/22/23. Key findings: +human/rhino/enterovirus. WBC 22. Hgb 11.8. Mg 2.5.    Most Recent 3 CBC's:  Recent Labs   Lab Test 01/22/24  1047 12/22/23  0441 12/21/23  1424   WBC 8.7 22.0* 21.8*   HGB 14.1 11.8* 12.7*   MCV 91 91 90    302 338   ANEUTAUTO 5.5 19.5* 19.0*     Most Recent 3 BMP's:  Recent Labs   Lab Test 01/22/24  0933 12/22/23  0441 12/22/23  0359 12/21/23  1253 12/21/23  0645 12/18/23  0903 09/07/23  1339    137  --   --   --  140 139   POTASSIUM 3.8 4.4  --   --  4.0 3.9 4.5   CHLORIDE 106 102  --   --   --  106 106   CO2 21* 21*  --   --   --  20* 24   BUN 18.9 26.1*  --   --   --  23.2* 13.6   CR 0.99 0.93  --   --   --  0.97 0.96   ANIONGAP 10 14  --   --   --  14 9   JOHN PAUL 9.0 8.9  --   --   --  9.4 9.4   GLC 97 134* 125*   < > 128* 107* 92   PROTTOTAL 6.9  --   --   --   --  7.3 6.5   ALBUMIN 3.9  --   --   --   --  4.3 3.7    < > = values in this interval not displayed.    Most Recent 3 LFT's:  Recent Labs   Lab Test 01/22/24  0933 12/18/23  0903 09/07/23  1339   AST 20 15 27   ALT 27 14 16   ALKPHOS 64 74 71   BILITOTAL 1.0 0.4 0.5    Most Recent 2 TSH and T4:  Recent Labs   Lab Test 01/22/24  0933 12/07/23  1101   TSH 1.57 1.46     I reviewed the above labs today.     Imaging:  MR Brain Perfusion wo & w Contrast  Narrative:  MR BRAIN PERFUSION W/O & W CONTRAST 1/16/2024 11:40 AM    Provided History: Metastasis to brain (H).    ICD-10: Metastasis to brain (H)    Comparison: 12/22/2023 12/7/2023 and 9/8/2023.    Technique: Multiplanar T1-weighted, axial FLAIR, and susceptibility  images were obtained without intravenous contrast. Following  intravenous gadolinium-based contrast administration, axial  T2-weighted, diffusion, and T1-weighted images (in multiple planes)  were obtained. DSC perfusion was performed.    Contrast dose: 9.5mL gadavist    Findings: Right frontal craniotomy and marginally enhancing right  frontal cystic resection site. No suspicious increased  perfusion or  nodularity in the resection margins. Previously treated right  perimedian lesion lateral to anterior falx measures 12 mm in diameter.  This lesion is increased in comparison to gamma knife treatment study  from 9/8/2023 where it measured 8 mm in diameter. Within its vicinity  there is increased cerebral blood volume which could be attributed to  adjacent cortex or residual tumor.    Regarding the treated cystic left occipital lobe lesion, there ism 2.7  x 1.5 cm cystic enhancing region with slightly increased cerebral  blood volume in its margins. This cystic enhancement inferior to this  cystic area is decreased. However there is more solid looking and  nodular 1.8 cm enhancing lesion anterior to treatment site with  markedly increased cerebral blood volume, consistent with worsening  metastatic disease. There is also 11 mm new enhancing focus in the  right occipital lobe with surrounding vasogenic edema and restricted  diffusion and corresponding susceptibility artifact, consistent with  new metastatic disease.    No ventriculomegaly. Scattered periventricular white matter T2  hyperintensities in the leilani. Basal cisterns are clear. No midline  shift. Normal marrow signal. Normal soft tissues.  Impression: IMPRESSION:  1. New metastatic focus in the right occipital lobe and local  recurrent metastatic disease in the left occipital lobe anterior to  previously treated cystic mass. Suspect residual tumor in the  peripheral solid portions of left occipital cystic mass.  2. Suspect local progression in the right paramedian previously  treated lesion.  3. Right frontal cystic resection cavity with marginal enhancement  without any definite evidence of residual or recurrent tumor    DMITRY MARTIN MD         SYSTEM ID:  [id number]    I reviewed the above imaging today.       Pathology:       Component  Ref Range & Units    Case Report   Surgical Pathology Report                         Case: [id number]                                    Authorizing Provider:  Kye Garrido MD          Collected:           12/21/2023 09:02 AM           Ordering Location:     [facility]          Received:            12/21/2023 11:24 AM                                  [facility] Main OR                                                             Pathologist:           Irene Cassidy MD                                                                           Specimen:    Brain, RIGHT FRONTAL LOBE TUMOR                                                            Final Diagnosis   Brain, right frontal lobe tumor, resection:  -Metastatic melanoma, please see comment          Assessment and Plan:   Saeid Santa is a 68 year old male with a history of prostate cancer in 2009 s/p prostatectomy, and BRAF G446A mutated malignant melanoma with brain metastases at diagnosis s/p L frontal craniotomy/resection 4/21/23 followed by GK to tumor bed and L parietal-occipital lesion in May. Started pembrolizumab 6/7/23. Underwent second craniotomy for L parieto-occipital lesion resection in 7/14/23 2/2 hemorrhagic conversion and GK to R frontal lesion 9/8/23, on pembrolizumab, course c/b ICI pneumonitis necessitating steroids, with ongoing CNS progression.    # metastatic melanoma with metastatic disease to brain  # checkpoint inhibitor pneumonitis  -presented with de-francisca CNS involvement s/p craniotomy x2 and GK to additional R frontal lesion  -TMB high, BRAF mutated but not V600E  -s/p pembrolizumab x2 (first 6/7/23)  -most recent PET/CT showed bilateral FDG avid ground glass and reticular infiltrates favored to represent pneumonitis, started on steroids, now completed taper  -imaging in December with CNS progression, ongoing response systemically, improved pneumonitis radiographically  -s/p right front craniotomy on 12/21. On decadron taper with plan to start  nivolumab 480mg q 4weeks once decadron tapered.  -MRI brain on 1/16/24 as above, again with CNS progression. Has follow up with Dr. Rose on 1/31/24 for possible gamma knife to new occipital lesion  -He stopped decadron abruptly 3-4 days ago. BP normal, no lyte derangements, no dizziness which are all reassuring. However, We will resume to avoid adrenal insufficiency.  I will also ask RNCC to call later this week for an update on neuro symptoms and fatigue  --He understandably has some angst about whether immunotherapy will work. We discussed that if tolerates nivolumab we will likely look to move to dual immunotherapy to see if we get improved response.     Plan  --Start decadron: 2mg BID for 3 days, 3mg daily for 3 days, 2mg daily for 5 days, 1 mg daily for 5 days, then 0.5 mg daily for 5 days, then stop.   -- start single agent nivolumab today (I confirmed with Dr. Bunn that we will start despite steroids); monitoring closely for recurrent pneumonitis  --if tolerating immunotherapy, could consider escalation with addition of either relatlimab or ipilimumab down the line  --RTC in 4 weeks prior to cycle 2 with AMANDA  -Repeat PET-CT in 6 weeks (3 months from last)  --Follow up with Rad Onc for gamma knife on 1/31    #Loose stool  3-5 per day. Continue to monitor. Could be s/t to stopping decadron. Restarting as above. Will have RNCC get update later this week    Plan:  --Monitor  --Stool studies if increasing frequency    #Irregular heartbeat  Noted on exam. Asymptomatic     Plan:  --EKG today  ADDENDUM: EKG with sinus tachycardia. Suspect exertional. Monitor at clinic visits and follow clinically    # Liver lesions  -12/7 CT AP showed two indeterminate liver lesions not previously appreciated, favored to be benign but cannot exclude metastases    Plan:  --trend with surveillance scans    # Fatigue  -TSH normal 12/7  -worse with stopping steroids    Plan:  --restart decadron as above  --if worsening consider  adrenal insufficiency workup    # Hypermagnesemia  -Noted on prior labs    Plan:  --adding on to today's labs    #Leukocytosis, resolved  -Likely multifactorial in setting of steroids and rhinovirus. Now resolved      60 minutes spent on the date of the encounter doing chart review, review of test results, interpretation of tests, patient visit, and documentation      Amber Scheierl, CNP  [facility] [address] [address]  [phone]

## 2024-01-22 NOTE — PROGRESS NOTES
[facility] ER Cancer [place]   Follow Up Visit    Name: Saeid Santa  MRN: [id number]  Date of visit: Jan 22, 2024    Diagnosis: Metastatic melanoma  Stage:    Cancer Staging   No matching staging information was found for the patient.    Molecular: BRAF G466A, TMB 48.7554 mut/Mb    Oncology History:  --8/16/22, he brings a left parietal scalp lesion to attention of his GP. It is initially observed.  --November 2022, the left parietal scalp lesion was felt to be a cyst, and the left parietal lesion was lanced and drained via scalp incision.  --January 2023, the left parietal scalp cyst would occasionally break open, drain, and bleed, largely at night.   --February 2023, he notes some mild difficulty clearing obstacles with right leg and foot.  --March/April 2023, he has 3 progressively worsening episodes over a span 3 weeks including the right leg. At first he notes the onset of right leg numbness and heaviness, like he was carrying 50 lbs of water on the leg. Then, about a week or so later, the leg began to jerk and converse while he was in the basement. Neither episode associated with loss of consciousness  --4/15/23, he recalls the right leg numbness, heaviness, jerking while driving between work. He had been found by coworkers in the parking lot, sitting in the grass, appearing confused. He was brought to the ER by EMS. CT-CAP, showed small (<6mm), bilateral indeterminate pulmonary nodules. CT-head and MRI brain showed 2 discrete ring-enhancing lesions in the left paramedian posterior frontal lobe near the precentral gyrus measuring approximately 2.2 x 1.7 x 1.9 cm and left occipital lobe near the lingual gyrus measuring approximately 2.2 x 2.1 x 2.0 cm. The occipital lesion encroaches upon the subependymal surface of the left lateral ventricle occipital horn and contacts the superior surface of the medial left tentorium. The lesions are accompanied by moderate surrounding vasogenic edema.  Surgery was recommended.  --4/21/23, underwent left awake (sleep-awake-sleep) craniotomy for tumor resection, he has resection of the left frontal, motor strip tumor as well asan elliptical incision of the left parietal scalp lesion. On pathology, both the scalp excision and brain mass are positive for malignant melanoma. Tumor cells are positive for S-100 and Melan-A, and negative for cytokeratin AE1/AE3, NKX3.1, GFAP, ERG, CD3, and CD20.  CD3 highlights brisk infiltration of the tumor by T lymphocytes, while CD20 highlights scattered positive B lymphocytes. NGS identifies a BRAF G466A mutation, a class 3 mutation insensitive to BRAF kinase inhibitor monotherapy. TMB is high at 48.754 muts/Mb.   --4/22/23, post-operative MRI shows post-operative changes of the parietal craniotomy of the left frontal lobe metastasis. The other 1.7 x 1.1 cm cystic lesion in the left occipital lobe is also seen. No new lesions appreciated.  -- 4/25/23 to 5/9/23, discharged from the hospital to acute rehab stay at [facility] in [place]. He participated in a comprehensive rehabilitation program consisting of PT, OT, Speech, Psychology and Recreation Therapy. He did well and was discharged home with use of a walker.  --PET-CT on 5/18/23 with potentially involved neck lymph nodes. He has mild FDG uptake in soft tissue nodules in the scalp, in the known left occipital brain metastasis, as well as in the left lower neck region.  -5/24-/5/31/23, He was treated with gamma knife: 2500 cGy to the left resection cavity; 3000 cGy to the L parietal-occipital lesion.  -6/7/2023, Cycle 1 of pembrolizumab 200mg every 3 weeks  -6/14/23-6/16/23, admitted for vision change, reduced comprehension, speech change, fall and headache. MRI showing likely progression of left occipital mass with associated edema. Improved on steroids and discharged on oral decadron. Plan for repeat MRI and neurosurgery follow up in 1 month.  -7/7/23 ED visit for headaches,  confusion, vision changes, concern for hemorrhage of the left occipital mass, admitted to [facility] until 7/9/23, increased dexamethasone dose  -7/14/23 craniotomy of the left parieto-occipital mass, discharged on a dexamethasone taper  -8/11/2023, Cycle 2 of pembrolizumab  -8/23/2023 brain MRI with no recurrence in left frontal lobe, decreased enhancement at edges of left occipital resection cavity, new enhancement int he right front lobe concerning for new met--seen by neurosurgery and considering RT  -8/31/2023 CT/PET with increased uptake in left lower neck lymph node c/f early progression. Ground-glass, reticular changes in lungs also noted likely secondary to drug-induced pneumonitis. Started 1 mg/kg prednisone (80 mg daily) with 8 week taper  -9/8/23 GK to R frontal lobe lesion  -12/7/23 CT neck: Resolved previously hypermetabolic left level 5 lymph node. No new  cervical lymphadenopathy.  -12/7/23 CT chest/AP: Two arterially enhancing lesions in the liver measuring 0.9 - 1.0 cm were not previously visualized, likely due to differences in timing of the contrast bolus. These are indeterminate but favored to be benign perfusional abnormalities. Melanoma metastases cannot be entirely excluded. Otherwise, no evidence for metastatic disease in the chest, abdomen and pelvis. Significant improvement in linear opacities in the lungs, suggesting improving drug-induced pneumonitis. Few stable small pulmonary nodules.  -12/7/23 MR head: Progression in intracranial metastatic disease compared to 9/8/2023   with multiple new and/or enlarging metastases, including internally hemorrhagic right frontal lobe lesion with 3 mm of leftward midline shift and new lesion adjacent to the left occipital resection bed.   -12/8/23 started on dex 4 mg BID  -12/21/23 Right frontal craniotomy with Dr. Hema Noe History:  Tim presents today for follow up/evaluation prior to starting nivolumab. He reports overall feeling ok; about  his usual.  -he is ambulating without any tripping of his right leg. He utilizes a cane outside the home. He is walking for activity; usually at [employer].  -He stopped his decadron 3-4 days ago when Rx ran out. He's not sure exactly what he was on but thinks it was the 2mg BID in most recent Rx. Since then he's noticed an increase in fatigue. Naps about 2x per day for an hour to 2. No headaches, blurred vision or dizziness.  -No activity concerning for seizure.  -Some numbness to the inside of the right foot which is unchanged. No other paresthesias.   -Had recent upper respiratory infection. No fevers but some chills at times for a couple weeks. Cough resolved. No SOB.  -Since stopping steroids has had about 3-5 stools per day. Only 1 of which is loose. No abdominal pain. No nausea. No fever. Not exposed to anyone with diarrhea that he knows. Not on antibiotics.     Review of Systems:  As noted in interval history.     Exam:   /85   Pulse 108   Temp 98.2  F (36.8  C) (Oral)   Resp 18   Wt 95.6 kg (210 lb 11.2 oz)   SpO2 97%   BMI 27.80 kg/m    General: Well-appearing male in no acute distress.  Eyes: EOMI, PERRL. No scleral icterus.  ENT: Oral mucosa is moist without lesions or thrush.   Cardiovascular: Tachycardic. Rhythm slightly irregularNo murmurs. No peripheral edema.  Respiratory: CTA bilaterally. No wheezes or crackles.  Gastrointestinal: BS +. Abdomen soft, rounded,  non-tender.   Neurologic: Cranial nerves II through XII are grossly intact.Finger to nose coordinated.  strength and hip flexion 5/5. Ambulates with cane  Skin: No rashes, petechiae, or bruising noted on exposed skin.   Psych: affect appropriate. Pleasant.       Labs:   Reviewed in chart labs since 12/22/23. Key findings: +human/rhino/enterovirus. WBC 22. Hgb 11.8. Mg 2.5.    Most Recent 3 CBC's:  Recent Labs   Lab Test 01/22/24  1047 12/22/23  0441 12/21/23  1424   WBC 8.7 22.0* 21.8*   HGB 14.1 11.8* 12.7*   MCV 91 91 90   PLT  224 302 338   ANEUTAUTO 5.5 19.5* 19.0*     Most Recent 3 BMP's:  Recent Labs   Lab Test 01/22/24  0933 12/22/23  0441 12/22/23  0359 12/21/23  1253 12/21/23  0645 12/18/23  0903 09/07/23  1339    137  --   --   --  140 139   POTASSIUM 3.8 4.4  --   --  4.0 3.9 4.5   CHLORIDE 106 102  --   --   --  106 106   CO2 21* 21*  --   --   --  20* 24   BUN 18.9 26.1*  --   --   --  23.2* 13.6   CR 0.99 0.93  --   --   --  0.97 0.96   ANIONGAP 10 14  --   --   --  14 9   JOHN PAUL 9.0 8.9  --   --   --  9.4 9.4   GLC 97 134* 125*   < > 128* 107* 92   PROTTOTAL 6.9  --   --   --   --  7.3 6.5   ALBUMIN 3.9  --   --   --   --  4.3 3.7    < > = values in this interval not displayed.    Most Recent 3 LFT's:  Recent Labs   Lab Test 01/22/24  0933 12/18/23  0903 09/07/23  1339   AST 20 15 27   ALT 27 14 16   ALKPHOS 64 74 71   BILITOTAL 1.0 0.4 0.5    Most Recent 2 TSH and T4:  Recent Labs   Lab Test 01/22/24  0933 12/07/23  1101   TSH 1.57 1.46     I reviewed the above labs today.     Imaging:  MR Brain Perfusion wo & w Contrast  Narrative:  MR BRAIN PERFUSION W/O & W CONTRAST 1/16/2024 11:40 AM    Provided History: Metastasis to brain (H).    ICD-10: Metastasis to brain (H)    Comparison: 12/22/2023 12/7/2023 and 9/8/2023.    Technique: Multiplanar T1-weighted, axial FLAIR, and susceptibility  images were obtained without intravenous contrast. Following  intravenous gadolinium-based contrast administration, axial  T2-weighted, diffusion, and T1-weighted images (in multiple planes)  were obtained. DSC perfusion was performed.    Contrast dose: 9.5mL gadavist    Findings: Right frontal craniotomy and marginally enhancing right  frontal cystic resection site. No suspicious increased perfusion or  nodularity in the resection margins. Previously treated right  perimedian lesion lateral to anterior falx measures 12 mm in diameter.  This lesion is increased in comparison to gamma knife treatment study  from 9/8/2023 where it measured 8  mm in diameter. Within its vicinity  there is increased cerebral blood volume which could be attributed to  adjacent cortex or residual tumor.    Regarding the treated cystic left occipital lobe lesion, there ism 2.7  x 1.5 cm cystic enhancing region with slightly increased cerebral  blood volume in its margins. This cystic enhancement inferior to this  cystic area is decreased. However there is more solid looking and  nodular 1.8 cm enhancing lesion anterior to treatment site with  markedly increased cerebral blood volume, consistent with worsening  metastatic disease. There is also 11 mm new enhancing focus in the  right occipital lobe with surrounding vasogenic edema and restricted  diffusion and corresponding susceptibility artifact, consistent with  new metastatic disease.    No ventriculomegaly. Scattered periventricular white matter T2  hyperintensities in the leilani. Basal cisterns are clear. No midline  shift. Normal marrow signal. Normal soft tissues.  Impression: IMPRESSION:  1. New metastatic focus in the right occipital lobe and local  recurrent metastatic disease in the left occipital lobe anterior to  previously treated cystic mass. Suspect residual tumor in the  peripheral solid portions of left occipital cystic mass.  2. Suspect local progression in the right paramedian previously  treated lesion.  3. Right frontal cystic resection cavity with marginal enhancement  without any definite evidence of residual or recurrent tumor    DMITRY MARTIN MD         SYSTEM ID:  [id number]    I reviewed the above imaging today.       Pathology:       Component  Ref Range & Units    Case Report   Surgical Pathology Report                         Case: [id number]                                   Authorizing Provider:  Kye Garrido MD          Collected:           12/21/2023 09:02 AM           Ordering Location:     [facility]          Received:            12/21/2023 11:24 AM                                   [facility]                                                             Pathologist:           Irene Cassidy MD                                                                           Specimen:    Brain, RIGHT FRONTAL LOBE TUMOR                                                            Final Diagnosis   Brain, right frontal lobe tumor, resection:  -Metastatic melanoma, please see comment          Assessment and Plan:   Saeid Santa is a 68 year old male with a history of prostate cancer in 2009 s/p prostatectomy, and BRAF G446A mutated malignant melanoma with brain metastases at diagnosis s/p L frontal craniotomy/resection 4/21/23 followed by GK to tumor bed and L parietal-occipital lesion in May. Started pembrolizumab 6/7/23. Underwent second craniotomy for L parieto-occipital lesion resection in 7/14/23 2/2 hemorrhagic conversion and GK to R frontal lesion 9/8/23, on pembrolizumab, course c/b ICI pneumonitis necessitating steroids, with ongoing CNS progression.    # metastatic melanoma with metastatic disease to brain  # checkpoint inhibitor pneumonitis  -presented with de-francisca CNS involvement s/p craniotomy x2 and GK to additional R frontal lesion  -TMB high, BRAF mutated but not V600E  -s/p pembrolizumab x2 (first 6/7/23)  -most recent PET/CT showed bilateral FDG avid ground glass and reticular infiltrates favored to represent pneumonitis, started on steroids, now completed taper  -imaging in December with CNS progression, ongoing response systemically, improved pneumonitis radiographically  -s/p right front craniotomy on 12/21. On decadron taper with plan to start nivolumab 480mg q 4weeks once decadron tapered.  -MRI brain on 1/16/24 as above, again with CNS progression. Has follow up with Dr. Rose on 1/31/24 for possible gamma knife to new occipital lesion  -He stopped decadron abruptly 3-4 days ago. BP normal, no  lyte derangements, no dizziness which are all reassuring. However, We will resume to avoid adrenal insufficiency.  I will also ask RNCC to call later this week for an update on neuro symptoms and fatigue  --He understandably has some angst about whether immunotherapy will work. We discussed that if tolerates nivolumab we will likely look to move to dual immunotherapy to see if we get improved response.     Plan  --Start decadron: 2mg BID for 3 days, 3mg daily for 3 days, 2mg daily for 5 days, 1 mg daily for 5 days, then 0.5 mg daily for 5 days, then stop.   -- start single agent nivolumab today (I confirmed with Dr. Bunn that we will start despite steroids); monitoring closely for recurrent pneumonitis  --if tolerating immunotherapy, could consider escalation with addition of either relatlimab or ipilimumab down the line  --RTC in 4 weeks prior to cycle 2 with AMANDA  -Repeat PET-CT in 6 weeks (3 months from last)  --Follow up with Rad Onc for gamma knife on 1/31    #Loose stool  3-5 per day. Continue to monitor. Could be s/t to stopping decadron. Restarting as above. Will have RNCC get update later this week    Plan:  --Monitor  --Stool studies if increasing frequency    #Irregular heartbeat  Noted on exam. Asymptomatic     Plan:  --EKG today  ADDENDUM: EKG with sinus tachycardia. Suspect exertional. Monitor at clinic visits and follow clinically    # Liver lesions  -12/7 CT AP showed two indeterminate liver lesions not previously appreciated, favored to be benign but cannot exclude metastases    Plan:  --trend with surveillance scans    # Fatigue  -TSH normal 12/7  -worse with stopping steroids    Plan:  --restart decadron as above  --if worsening consider adrenal insufficiency workup    # Hypermagnesemia  -Noted on prior labs    Plan:  --adding on to today's labs    #Leukocytosis, resolved  -Likely multifactorial in setting of steroids and rhinovirus. Now resolved      60 minutes spent on the date of the  encounter doing chart review, review of test results, interpretation of tests, patient visit, and documentation      Amber Scheierl, CNP  [facility] [address] [id number] [phone]

## 2024-01-22 NOTE — NURSING NOTE
Date: 2024   Age: 68 year old  Ethnicity:     Sex: male  : 1955   Lives In: [place]            Diagnosis: metastatic melanoma     Prior radiation therapy:   Site Treated: left resection cavity  Facility: [facility]  Dates: 23- 23  Dose: 2500 cGy in 5 fx     Site Treated: left parietal-occipital lesion  Facility: [facility]  Dates: 23- 23  Dose: 3000 cGy in 5 fx    Site Treated: right medial frontal  Facility:  [facility] Gamma Knife  Dates: 23  Dose: 2200 cGy to 50% isodose line 1 Fx      Prior chemotherapy:   See below     Pain at time of consult?  Does patient have a living will?  Does patient have an implanted cardiac device?     RN time with patient:  Educated on gamma knife?     Fall Screen:  Have you fallen in the past week?   Have you felt unsteady when walking or standing in the past week?      Physicians: Dr. Carlos Manuel Bunn,  Dr. Kye Garrido; Dr. Asim Cervantes (PCP)     Review Since Diagnosis:  2009: Prostate Cancer, Prostatectomy, no adjuvant therapy  22: he brings a left parietal scalp lesion to attention of GP. It was initially observed.  2022: PCP appt; lesion felt to be a cyst. Lesion lanced and drained via scalp incision  2023: the left parietal scalp lesion area would occasionally break open and bleed   2023: right leg and foot not working totally right   March/2023: three episodes of right leg feeling heavy, last episode jerking of leg, lasted a minute or two, no loss of consciousness   4/15/23: pt driving from work as had a migraine. Had right leg numbness/heaviness and jerking.  A co worker was following on the same route and noticed pt having problems so assisted him when found in parking lot; sitting in grass and confused.  To ER via ambulance to [place]  4/15/23: Brain MRI:   2.2 x 1.7 x 1.9 cm lesion left paramedian posterior frontal lobe near the precentral gyrus  2.2 x 2.1 x 2.0 cm lesion left occipital lobe near  the lingual gyrus  4/15/23: CT CAP, bilateral pulmonary nodules measuring up to 0.6 cm-indeterminate   Neck CT/Angio: enlarged lymph nodes and additional scalp lesions consistent with head and neck local regional disease  Pt referred to Dr. Garrido   4/21/23: crani by Dr. Garrido, biopsy of left frontal brain lesion showed malignant melanoma, this was resected.  Biopsy of left parietal scalp lesion showed malignant melanoma, excision of this lesion  4/25/23: discharged to acute rehab [facility]   5/9/23: discharged home with use of walker, remains on keppra  5/10/23: last dose of steroids   5/12/23: pt saw Dr. Greene, unsure scalp lesion is primary or a metastatic lesion. Wanting PET/CT for extent of disease.  Plan Rad/Onc for brain lesion and surgical cavity and then plan to immediately follow with systemic immunotherapy.  Will know better which agents will be used once PET/CT completed.    5/15/23: pt saw Dr. Garrido, still some numbness right leg and foot.  Currently ambulating with a cane   5/18/23: PET/CT: shows potentially involved neck lymph nodes. Mild uptake in soft tissue nodules in the scalp, in the known left occipital brain metastasis as well as in the left lower neck region.   5/24/23- 5/31/23: gamma knife as noted above  6/7/23: C1 of pembrolizumab every 3 weeks  6/14/23- 6/16/23: admitted for vision change, reduced comprehension, speech change, fall and headache.   6/14/23: brain mri showing likely progression of left occipital mass with associated edema. Improved on steroids and discharged on oral decadron. Plan for repeat mri and neurosurgery follow up in one month  7/7/23: ED visit for headaches, confusion, vision changes concern for hemorrhage of the left occipital mass. Admitted to [facility] until 7/9/23. Increased Decadron dose.   7/14/23: craniotomy of the left parieto-occipital mass (Dr. Garrido). Discharged on Decadron taper  8/11/23: C2 of pembrolizumab  8/23/23: brain mri  No recurrence  in left frontal lobe  Decreased enhancement at edges of left occipital resection cavity  New enhancement in the right frontal lobe concerning for new met. Measures 6 mm.  8/28/23: post op visit with Dr. Garrido. Pt still has numbness involving right leg and weakness involving his right lower extremity. Loss of muscle mass in right leg. Still has balance issues with no recent falls. Generalized fatigue; sleeping up to 20 hours a day. Review of brain mri. Referred to Dr. Rose for SRS.  8/31/23: video visit with CNP hem/onc: plan for scheduled C3 dose of pembrolizumab on 9/1/23. Has PET/CT later today. Did not get C3 due to pneumonitis  8/31/23: PET/CT: incidental finding of pneumonitis. Plan to cancel Keytruda for 9/1/23. Will obtain COVID and resp panel on 9/1/23. Pt to monitor his pulse ox. Dr. Greene will discuss next steps in treatment at appointment scheduled for 9/7.   9/8/23; Gamma Knife to right medial frontal lesion   12/7/23; CT CAP, two lesions in liver favored to be benign, no evidence of mets   12/21/23; crani by Dr. Garrido, gross total resection of right frontal tumor, path showed metastatic melanoma, oncogenic, class 3 BRAF mutation identified   12/23/23; discharged home  1/16/24; MR Brain with Perfusion, new 1.1 cm lesion right occipital                                                         Right frontal crani, no increased perfusion or nodularity in margins                                                         Previously treated right perimedian lesion 1.2 cm (was 0.8 cm when treated)                                                          Treated cystic left occipital lobe lesion, cystic/solid more solid, likely increasing   Dr. Rose/Dr. Garrido/ Dr. Bunn discussed, plan Gamma Knife to new lesion, watch others as treating feel toxicity may be too high.    Only two cycles of Nivolumab to date as dealing with pneumonitis   1/22/24; Tim saw Amber Scheierl CNP Med/Onc, walking with cane if out of  house, right leg better, off the dexamethasone as ran out so stopped, will resume dex on taper schedule.  Start Nivolumab even though dex, Dr. Bunn OK with..  If tolerates may add another immunotherapy, in 4 weeks cycle 2 of Nivolumab  1/22/24; got C1D1 Nivolumab     Chief Complaint: at consult

## 2024-01-22 NOTE — PATIENT INSTRUCTIONS
Contact Numbers  [facility]: [phone] (for symptom and scheduling needs)    Please call the [facility] Triage line if you experience a temperature greater than or equal to 100.4, shaking chills, have uncontrolled nausea, vomiting and/or diarrhea, dizziness, shortness of breath, chest pain, bleeding, unexplained bruising, or if you have any other new/concerning symptoms, questions or concerns.     If you are having any concerning symptoms or wish to speak to a provider before your next infusion visit, please call your care coordinator or triage to notify them so we can adequately serve you.     If you need a refill on a narcotic prescription or other medication, please call triage before your infusion appointment.          Lab Results:  Recent Results (from the past 12 hour(s))   Comprehensive metabolic panel    Collection Time: 01/22/24  9:33 AM   Result Value Ref Range    Sodium 137 135 - 145 mmol/L    Potassium 3.8 3.4 - 5.3 mmol/L    Carbon Dioxide (CO2) 21 (L) 22 - 29 mmol/L    Anion Gap 10 7 - 15 mmol/L    Urea Nitrogen 18.9 8.0 - 23.0 mg/dL    Creatinine 0.99 0.67 - 1.17 mg/dL    GFR Estimate 82 >60 mL/min/1.73m2    Calcium 9.0 8.8 - 10.2 mg/dL    Chloride 106 98 - 107 mmol/L    Glucose 97 70 - 99 mg/dL    Alkaline Phosphatase 64 40 - 150 U/L    AST 20 0 - 45 U/L    ALT 27 0 - 70 U/L    Protein Total 6.9 6.4 - 8.3 g/dL    Albumin 3.9 3.5 - 5.2 g/dL    Bilirubin Total 1.0 <=1.2 mg/dL   TSH with free T4 reflex    Collection Time: 01/22/24  9:33 AM   Result Value Ref Range    TSH 1.57 0.30 - 4.20 uIU/mL   CBC with platelets and differential    Collection Time: 01/22/24 10:47 AM   Result Value Ref Range    WBC Count 8.7 4.0 - 11.0 10e3/uL    RBC Count 4.69 4.40 - 5.90 10e6/uL    Hemoglobin 14.1 13.3 - 17.7 g/dL    Hematocrit 42.6 40.0 - 53.0 %    MCV 91 78 - 100 fL    MCH 30.1 26.5 - 33.0 pg    MCHC 33.1 31.5 - 36.5 g/dL    RDW 14.7 10.0 - 15.0 %    Platelet Count 224 150 - 450 10e3/uL    % Neutrophils 62 %     % Lymphocytes 21 %    % Monocytes 11 %    % Eosinophils 1 %    % Basophils 1 %    % Immature Granulocytes 4 %    NRBCs per 100 WBC 0 <1 /100    Absolute Neutrophils 5.5 1.6 - 8.3 10e3/uL    Absolute Lymphocytes 1.8 0.8 - 5.3 10e3/uL    Absolute Monocytes 0.9 0.0 - 1.3 10e3/uL    Absolute Eosinophils 0.1 0.0 - 0.7 10e3/uL    Absolute Basophils 0.1 0.0 - 0.2 10e3/uL    Absolute Immature Granulocytes 0.3 <=0.4 10e3/uL    Absolute NRBCs 0.0 10e3/uL   EKG 12-lead complete w/read - Clinics    Collection Time: 01/22/24 12:28 PM   Result Value Ref Range    Systolic Blood Pressure  mmHg    Diastolic Blood Pressure  mmHg    Ventricular Rate 105 BPM    Atrial Rate 105 BPM    DC Interval 146 ms    QRS Duration 96 ms     ms    QTc 467 ms    P Axis 52 degrees    R AXIS -4 degrees    T Axis 55 degrees    Interpretation ECG       Sinus tachycardia  Otherwise normal ECG  No previous ECGs available

## 2024-01-25 NOTE — PROGRESS NOTES
[facility]: Cancer Care                                                                                          Called patient to assess per care team, left message to return call.  Also sent [employer] message dated 1/25/2024 for patient to reply.    Jenny Lemon RN  Cancer Care Coordinator  [facility]

## 2024-01-26 NOTE — PROGRESS NOTES
Department of Radiation Oncology  [facility]  [address] [place] [address]  [phone]       Consultation Note    Name: Saeid Santa MRN: [id number]   : 1955   Date of Service: 2024 Referring: Dr. Srinivasan ref. provider found     Diagnosis: Melanoma  Stage: Metastatic    Mr. Santa is a 68 year old male with metastatic melanoma    Oncologic History   Prior radiation therapy:   Site Treated: left resection cavity  Facility: [facility]  Dates: 23- 23  Dose: 2500 cGy in 5 fx     Site Treated: left parietal-occipital lesion  Facility: [facility]  Dates: 23- 23  Dose: 3000 cGy in 5 fx     Site Treated: right medial frontal  Facility:  [facility]  Dates: 23  Dose: 2200 cGy to 50% isodose line 1 Fx     RADIATION HISTORY:     GK-SRS 25Gy in 5 fractions to posterior frontal lobe  (2023)       GK-SRS 30Gy in 5 fractions to left occipital lobe lesion       2. 22 Gy in one fraction prescribed to the 50% isodose line (2023)        INTERVAL SINCE COMPLETION OF RADIATION THERAPY:  8 months since first course on 2023, ~4.5 months since 2nd course on 2023.            SUBJECTIVE: Mr. Santa is a 69 yo male with metastatic melanoma. He presented with seizure in April this year. Workup revealed 2 discrete ring-enhancing lesions, one of which was in the left paramedian posterior frontal lobe near the precentral gyrus, measuring 2.2 x 1.7 x 1.9 cm and the other in the left occipital lobe near the lingual gyrus, measuring 2.2 x 2.1 x 2.0 cm, associated with  moderate vasogenic edema.  He underwent craniotomy on 2023 for resection of the lesion left frontal lesion as this was felt to be the cause of his seizure. Additionally a scalp lesion was also resected as it had been draining, bleeding and growing in size. Pathology showed malignant melanoma. It was unclear if the scalp lesion was the primary or represent a cutaneous metastasis (it was  without epidermal melanoma in situ).  PET/CT on 5/18/2023 showed a brain metastasis in the left occipital lobe and a lymph node in the left low neck.      He then received GK-SRS to the left occipital lesion, 30 Gy in 5 fractions, as well as the left posterior frontal resection cavity, 25 Gy in 5 fractions.  He completed these treatments on 5/31/2023. He then began Keytruda under the care of Dr. Greene on 6/7/2023.        Mr. Santa developed intralesional hemorrhage of the left occipital metastasis with mass effect and ultimately required partial resection on 7/14/2023. The deeper aspect of the tumor densely adhered to the ventricle wall and the entrapped occipital horn, and thus could not be safely resected. Surgical pathology showed treatment effect with residual viable melanoma.      Mr. Santa resumed much delayed cycle 2 of Pembrolizumab on 8/11/2023. Repeat brain MRI on 8/23/2023 showed stable post-op changes in the resected parietal skull and left frontal lobe cavity and decreased enhancement of the left occipital resection cavity. However, a new focus of abnormal contrast enhancement about 6 mm in diameter in the anteromedial right frontal lobe was found.      He then proceeded another course of GK SRS to this new metastasis on 9/8/2023, receiving 22 Gy in a single fraction prescribed to the 50% isodose line.      Mr. Santa unfortunately had to discontinue from Keytruda after 2 cycles due to immune pneumonitis. He was started on a Prednisone taper with the plan to switch to Nivolumab.     12/7/2023, Brain MRI: new lesion in the right frontal lobe with vasogenic edema, a new right occipital lesion about ~ 1 cm, stable to slightly enlarged previously treated frontal falcine lesion, new enhancement surrounding the left occipital cavity. His systemic restaging CT scan showed resolution of previously enlarged left level V cervical node and no new lesions.      12/21/2023: 3rd craniotomy given the size of the  "frontal lobe metastasis by Dr. Garrido. Pathology confirmed metastatic melanoma. He was initially on Decadron 2 mg BID, but discontinued mid Jan due to running out. He was restarted on Decadron by medical oncology with a plan to taper.     1/22/23: patient begns single agent Nivolumab.     1/16/2024, Brain MRI: The right occipital lobe was bigger, measuring 11 mm in largest diameter. Additionally, the previously treated paramedial right frontal lobe lesion enlarged to 12 mm. There was also increased nodular enhancement about the left occipital cavity.  Additionally, there is a new lesion in the left frontal lobe (vs 12/27/2023 brain MRI).          Chemotherapy History: As above   Radiation History: As above  Pregnant: N/A  Implanted Cardiac Devices: No  Autoimmune History (lupus, scleroderma, Crohn's disease, or ulcerative colitis): None    On  interview the patient reports that his right leg is still weaker than his left.  The patient occasionally uses a cane but not a walker to help ambulate.  The patient denies any seizures.  The patient denies any headaches.  The patient is currently on 3 mg of steroids but he is currently tapering off.  The patient reports that he believes that there was a \"broccoli like area\" on his scalp that he noticed months prior.  He believes that this \"broccoli like area\" is growing out of the prior GK SRS treatment field.    Past Medical History:  Past Medical History:   Diagnosis Date    HTN (hypertension)     Metastasis to brain (H) 2023    Metastatic melanoma (H)     Mixed hyperlipidemia     Prostate cancer (H)     Seizures (H)     Sleep apnea      Past Surgical History:  Past Surgical History:   Procedure Laterality Date    INSERT DRAIN LUMBAR N/A 7/14/2023    Procedure: LUMBAR DRAIN;  Surgeon: Kye Garrido MD;  Location:  OR    OPTICAL TRACKING SYSTEM CRANIOTOMY, EXCISE TUMOR, COMBINED Left 04/21/2023    Procedure: stealth assisted awake craniotomy resection of motor strip " tumor;  Surgeon: Kye Garrdio MD;  Location: [facility] OR    OPTICAL TRACKING SYSTEM CRANIOTOMY, EXCISE TUMOR, COMBINED Left 2023    Procedure: STEALTH ASSISTED CRANIOTOMY, WITH NEOPLASM EXCISION LEFT PARIETO-OCCIPITAL CRANIOTOMY WITH RESECTION OF MASS,;  Surgeon: Kye Garrido MD;  Location: [facility] OR    OPTICAL TRACKING SYSTEM CRANIOTOMY, EXCISE TUMOR, COMBINED Right 2023    Procedure: CRANIOTOMY, USING OPTICAL TRACKING SYSTEM, WITH NEOPLASM EXCISION, RIGHT FRONTAL CRANOTOMY WITH RESECTION OF TUMOR;  Surgeon: Kye Garrido MD;  Location: [facility] OR    PROSTATECTOMY           Medications:  Current Outpatient Medications   Medication    acetaminophen (TYLENOL) 325 MG tablet    amLODIPine (NORVASC) 10 MG tablet    baclofen (LIORESAL) 10 MG tablet    dexAMETHasone (DECADRON) 1 MG tablet    indomethacin (INDOCIN) 25 MG capsule    levETIRAcetam (KEPPRA) 1000 MG tablet    pantoprazole (PROTONIX) 40 MG EC tablet    rosuvastatin (CRESTOR) 40 MG tablet    dexAMETHasone (DECADRON) 2 MG tablet     No current facility-administered medications for this visit.     Facility-Administered Medications Ordered in Other Visits   Medication    gadobutrol (GADAVIST) injection 9.56 mL     Allergies:  No Known Allergies  Social History:  Social History     Socioeconomic History    Marital status:      Spouse name: Smiley    Number of children: 4    Years of education: Not on file    Highest education level: Not on file   Occupational History    Occupation: [employer]     Comment: hobby job   Tobacco Use    Smoking status: Former     Packs/day: 0.50     Years: 20.00     Additional pack years: 0.00     Total pack years: 10.00     Types: Cigarettes     Quit date: 2017     Years since quittin.0     Passive exposure: Current (Second hand exposure)    Smokeless tobacco: Never   Vaping Use    Vaping Use: Never used   Substance and Sexual Activity    Alcohol use: Not Currently     Comment: socially    Drug use: Not Currently      Types: Marijuana     Comment: typically smoked daily but no use for past two weeks as of 4/20/23    Sexual activity: Not on file   Other Topics Concern    Not on file   Social History Narrative    Not on file     Social Determinants of Health     Financial Resource Strain: Low Risk  (12/18/2023)    Financial Resource Strain     Within the past 12 months, have you or your family members you live with been unable to get utilities (heat, electricity) when it was really needed?: No   Food Insecurity: Low Risk  (12/18/2023)    Food Insecurity     Within the past 12 months, did you worry that your food would run out before you got money to buy more?: No     Within the past 12 months, did the food you bought just not last and you didn t have money to get more?: No   Transportation Needs: Low Risk  (12/18/2023)    Transportation Needs     Within the past 12 months, has lack of transportation kept you from medical appointments, getting your medicines, non-medical meetings or appointments, work, or from getting things that you need?: No   Physical Activity: Not on file   Stress: Not on file   Social Connections: Not on file   Interpersonal Safety: Low Risk  (11/7/2023)    Interpersonal Safety     Do you feel physically and emotionally safe where you currently live?: Yes     Within the past 12 months, have you been hit, slapped, kicked or otherwise physically hurt by someone?: No     Within the past 12 months, have you been humiliated or emotionally abused in other ways by your partner or ex-partner?: No   Housing Stability: Low Risk  (12/18/2023)    Housing Stability     Do you have housing? : Yes     Are you worried about losing your housing?: No     Family History:  Family History   Problem Relation Age of Onset    Pancreatic Cancer Father     Diabetes Paternal Grandmother        Review of Systems   A 12-point review of systems was performed. Pertinent findings are noted in the HPI.    Physical Exam   ECOG Status:  "1    VITALS: BP (!) 151/88   Pulse 75   Resp 16   Ht 1.854 m (6' 1\")   Wt 95.3 kg (210 lb)   SpO2 99%   BMI 27.71 kg/m    GEN: Appears well, alert, oriented, and in NAD  HEENT: EOMI, normal conjunctiva, MMM  CV: warm and well-perfused  RESP: , breathing comfortably on room air  ABDOMEN:  non-distended  SKIN: Dermatologic examination of the scalp showed no questionable lesions  NEURO: Neurological exam showed grossly equal 5/5 motor strength in both lower extremities.  The patient reported that the medial aspect of his right foot was still numb compared to the left side.  PSYCH: Appropriate mood and affect    Imaging/Path/Labs   Imaging: as above  Path: as above  Labs: as above    Assessment    68 year old male with metastatic melanoma with new lesion in the left frontal lobe and a growing lesion in the right occipital lobe.    Plan   Due to the growing size of the right occipital lobe lesion and the new the found left frontal lobe lesion, we recommend GK SRS. GK SRS is the most conformal technique and spares the maximum amount of healthy brain tissue.  Because of the patient's history of multiple craniotomies, plan to proceed to treat this patient with a mask instead of a frame.    The short-term and long-term risks and benefits of radiotherapy were discussed in detail with the patient.  The itemized side effects and risks listed on the consent form were reviewed in detail with the patient as well.  The patient expressed understanding.  All questions were answered.      The patient elected to proceed with radiotherapy.  Informed consent was obtained.    Patient was seen and discussed with my attending physician, Dr. Rose.    Cosmo Barillas MD, MS PGY-3  Radiation Oncology  Department of Radiation Oncology  [facility]  Phone: [phone]    "

## 2024-01-27 NOTE — PROGRESS NOTES
Department of Therapeutic Radiology--Radiation Oncology                   [place] [address]  [address] [place]  [address]  Office:  [phone]  Fax:  [phone]   Radiation Oncology Clinic  [address] [place] [id number]  Phone:  [phone]  Fax:  [phone]     RE: Saeid Santa : 1955   MRN: [id number] CANDY: 2024     OUTPATIENT VISIT NOTE     Virtual Visit Details    DIAGNOSIS: Brain metastases secondary to cutaneous melanoma    RADIATION HISTORY:     GK-SRS 25Gy in 5 fractions to posterior frontal lobe  (2023)       GK-SRS 30Gy in 5 fractions to left occipital lobe lesion      2. 22 Gy in one fraction prescribed to the 50% isodose line (2023)      INTERVAL SINCE COMPLETION OF RADIATION THERAPY:  8 months since first course on 2023, 4.5 months since 2nd course on 2023.        SUBJECTIVE: Mr. Santa is a 69 yo male with metastatic melanoma. He presented with seizure in April this year. Workup revealed 2 discrete ring-enhancing lesions, one of which was in the left paramedian posterior frontal lobe near the precentral gyrus, measuring 2.2 x 1.7 x 1.9 cm and the other in the left occipital lobe near the lingual gyrus, measuring 2.2 x 2.1 x 2.0 cm, associated with  moderate vasogenic edema.  He underwent craniotomy on 2023 for resection of the lesion left frontal lesion as this was felt to be the cause of his seizure. Additionally a scalp lesion was also resected as it had been draining, bleeding and growing in size. Pathology showed malignant melanoma. It was unclear if the scalp lesion was the primary or represent a cutaneous metastasis (it was without epidermal melanoma in situ).  PET/CT on 2023 showed a brain metastasis in the left occipital lobe and a lymph node in the left low neck.      He then received GK-SRS to the left occipital lesion, 30 Gy in 5 fractions, as well as the left posterior frontal resection cavity, 25 Gy in 5  fractions.  He completed these treatments on 5/31/2023. He then began Keytruda under the care of Dr. Greene on 6/7/2023.       Mr. Santa developed intralesional hemorrhage of the left occipital metastasis with mass effect and ultimately required partial resection on 7/14/2023. The deeper aspect of the tumor densely adhered to the ventricle wall and the entrapped occipital horn, and thus could not be safely resected. Surgical pathology showed treatment effect with residual viable melanoma.     Mr. Santa resumed much delayed cycle 2 of Pembrolizumab on 8/11/2023. Repeat brain MRI on 8/23/2023 showed stable post-op changes in the resected parietal skull and left frontal lobe cavity and decreased enhancement of the left occipital resection cavity. However, a new focus of abnormal contrast enhancement about 6 mm in diameter in the anteromedial right frontal lobe was found.     He then proceeded another course of GK SRS to this new metastasis on 9/8/2023, receiving 22 Gy in a single fraction prescribed to the 50% isodose line.     Mr. Santa unfortunately had to discontinue from Keytruda after 2 cycles due to immune pneumonitis. He was started on a Prednisone taper with the plan to switch to Nivolumab.    Brain MRI on 12/7/2023 unfortunately showed a new lesion in the right frontal lobe with vasogenic edema, a new right occipital lesion about ~ 1 cm, stable to slightly enlarged previously treated frontal falcine lesion, new enhancement surrounding the left occipital cavity. His systemic restaging CT scan showed resolution of previously enlarged left level V cervical node and no new lesions.     Given the sie of the right frontal lobe metastasis, GK SRS was not felt to be able to provide adequate local control. He thus proceeded with 3rd craniotomy on 12/21/2023 by Dr. Garrido. Pathology confirmed metastatic melanoma. He was initially on Decadron 2 mg BID, but discontinued mid Jan due to running out. He was restarted on  "Decadron by medical oncology with a plan to taper. He began single agent Nivolumab on 2023.     Mr. Santa had a follow up MRI on 2024. The right occipital lobe lesion which was seen on  MRI, but never treated continued to enlarge , measuring 11 mm in largest diameter. Additionally, the previously treated paramedial right frontal lobe lesion enlarged to 12 mm. There was also increased nodular enhancement about the left occipital cavity.     Given this finding, Mr. Santa is here to discuss radiation therapy option. He is accompanied by his wife Smiley. On interview, Tim states that he is doing okay overall. He ambulates without assistance except in \"hazardous situation outside\" when he uses a cane. He has no weakness in his right lower extremity, but has numbness along the medial aspect of the right ankle going up towards the knee. He is still on Decadron, low dose, and expects to be tapered off soon. HE denies headaches or seizure activities.     He also mentioned that he was told that he had 2 scalp lesions, one was removed at the time of his first craniotomy in 2023. He is wondering what has happened to the 2nd \"broccoli like lesion\" and whether this could be treated with GK as well.       OBJECTIVE:   BP (!) 151/88   Pulse 75   Resp 16   Ht 1.854 m (6' 1\")   Wt 95.3 kg (210 lb)   SpO2 99%   BMI 27.71 kg/m     Gen: Alert and oritented, speech fluent, interaction appropriate.  HEENT: facial features consistent with steroid use. Face symmetric, facial muscle movements intact. A very careful exam of the scalp does not show any concerning lesion.   CV: well perfused  Resp: breathing comfortably on room air.   Neuro: muscle strength 5/5 including right lower extremity. Subjective report of numbness along the medial leg from ankle to knee.       IMAGIN2024 (Left) vs. 2024 (Right)   Untreated right occipital lesion: slightly larger       Increased nodular enhancement around L occipital " cavity (Left image) previously treated with GK SRS 5/31/2023 followed by resection), stable today (R)      Enlargement of the paremedial right frontal lesion (GK'ed on 9/8/2023)  L: 1/16/2024; R: today 1/31/2024 (stable from 2 weeks ago)      A new lesion in the left frontal convexity, not apparent on previous MRI.       Stable right frontal lobe resection cavity        ASSESSMENT AND PLAN: In summary, Mr. aSnta is a 69 yo gentleman with metastatic melanoma. He is s/p 2 courses of GK SRS to 3 lesions, two of which required resection due to symptoms.  He is status post 3 craniotomies. iTm now has an enlarging right occipital lesion and a new along the left frontal convexity. These are both amenable to single fraction GK SRS. We discussed using mask for immobilization as opposed to frame given his multiple craniotomies.     When reviewing Dr. Garrido's notes, I do not see documentation of a 2nd scalp melanoma lesion, but will certainly double check with Dr. Garrido. There is no concerning finding on today's exam.     Tim and his wife asked about prognosis. His systemic disease is controlled despite suboptimal immunotherapy. His CNS disease has always been challenging to manage. From a symptom and QOL standpoint, Tim is doing better since I first met him. It's difficult to know how quickly the treated lesions will progress or how many new lesions he'd develop in the near future. We certainly could come to a point where treatment (whether GK or another surgery) could do more harm than good. Tim and Smiley were understandably upset but expressed understanding of the situation.     Tim will come back for mask fabrication this Friday. He will also receive treatment on the same day.             Benedicto Rose M.D./Ph.D.  Radiation Oncologist   Department of Therapeutic Radiology   [facility]  Phone: [phone]          40 minutes were spent on the date of the encounter doing chart review, history and  exam, documentation and further activities as noted above.           Benedicto Rose MD

## 2024-01-30 NOTE — TELEPHONE ENCOUNTER
[facility]: Cancer Care                                                                                          Called patient to assess.  No abdominal pain, fever, emesis. Blood is pale to bright red (varies), not able to assess how much blood, colors the toilet water red, only notices blood in first stool of day, has 3 stools/day, consistency is soft (same consistency for past few months), not liquid/not diarrhea, no dizziness/lightheadedness, no SOB/chest pain. Feels ok today and will be going to UC tomorrow since he'll be down this way for appointments.  Informed care team.

## 2024-01-31 NOTE — LETTER
2024         RE: Saeid Santa  [address] [facility] [address]        Dear Colleague,    Thank you for referring your patient, Saeid Santa, to the [facility] RADIATION ONCOLOGY GAMMA KNIFE. Please see a copy of my visit note below.       Department of Radiation Oncology  [facility]  [address] [place] [address]  [phone]       Consultation Note    Name: Saeid Santa MRN: [id number]   : 1955   Date of Service: 2024 Referring: Dr. Srinivasan ref. provider found     Diagnosis: Melanoma  Stage: Metastatic    Mr. Santa is a 68 year old male with metastatic melanoma    Oncologic History   Prior radiation therapy:   Site Treated: left resection cavity  Facility: [facility]  Dates: 23- 23  Dose: 2500 cGy in 5 fx     Site Treated: left parietal-occipital lesion  Facility: [facility]  Dates: 23- 23  Dose: 3000 cGy in 5 fx     Site Treated: right medial frontal  Facility:  [facility] Gamma Knife  Dates: 23  Dose: 2200 cGy to 50% isodose line 1 Fx     RADIATION HISTORY:     GK-SRS 25Gy in 5 fractions to posterior frontal lobe  (2023)       GK-SRS 30Gy in 5 fractions to left occipital lobe lesion       2. 22 Gy in one fraction prescribed to the 50% isodose line (2023)        INTERVAL SINCE COMPLETION OF RADIATION THERAPY:  8 months since first course on 2023, ~4.5 months since 2nd course on 2023.            SUBJECTIVE: Mr. Santa is a 67 yo male with metastatic melanoma. He presented with seizure in April this year. Workup revealed 2 discrete ring-enhancing lesions, one of which was in the left paramedian posterior frontal lobe near the precentral gyrus, measuring 2.2 x 1.7 x 1.9 cm and the other in the left occipital lobe near the lingual gyrus, measuring 2.2 x 2.1 x 2.0 cm, associated with  moderate vasogenic edema.  He underwent craniotomy on 2023 for resection of the lesion left frontal lesion as this was felt to be  the cause of his seizure. Additionally a scalp lesion was also resected as it had been draining, bleeding and growing in size. Pathology showed malignant melanoma. It was unclear if the scalp lesion was the primary or represent a cutaneous metastasis (it was without epidermal melanoma in situ).  PET/CT on 5/18/2023 showed a brain metastasis in the left occipital lobe and a lymph node in the left low neck.      He then received GK-SRS to the left occipital lesion, 30 Gy in 5 fractions, as well as the left posterior frontal resection cavity, 25 Gy in 5 fractions.  He completed these treatments on 5/31/2023. He then began Keytruda under the care of Dr. Greene on 6/7/2023.        Mr. Santa developed intralesional hemorrhage of the left occipital metastasis with mass effect and ultimately required partial resection on 7/14/2023. The deeper aspect of the tumor densely adhered to the ventricle wall and the entrapped occipital horn, and thus could not be safely resected. Surgical pathology showed treatment effect with residual viable melanoma.      Mr. Santa resumed much delayed cycle 2 of Pembrolizumab on 8/11/2023. Repeat brain MRI on 8/23/2023 showed stable post-op changes in the resected parietal skull and left frontal lobe cavity and decreased enhancement of the left occipital resection cavity. However, a new focus of abnormal contrast enhancement about 6 mm in diameter in the anteromedial right frontal lobe was found.      He then proceeded another course of GK SRS to this new metastasis on 9/8/2023, receiving 22 Gy in a single fraction prescribed to the 50% isodose line.      Mr. Santa unfortunately had to discontinue from Keytruda after 2 cycles due to immune pneumonitis. He was started on a Prednisone taper with the plan to switch to Nivolumab.     12/7/2023, Brain MRI: new lesion in the right frontal lobe with vasogenic edema, a new right occipital lesion about ~ 1 cm, stable to slightly enlarged previously  "treated frontal falcine lesion, new enhancement surrounding the left occipital cavity. His systemic restaging CT scan showed resolution of previously enlarged left level V cervical node and no new lesions.      12/21/2023: 3rd craniotomy given the size of the frontal lobe metastasis by Dr. Garrido. Pathology confirmed metastatic melanoma. He was initially on Decadron 2 mg BID, but discontinued mid Jan due to running out. He was restarted on Decadron by medical oncology with a plan to taper.     1/22/23: patient begns single agent Nivolumab.     1/16/2024, Brain MRI: The right occipital lobe was bigger, measuring 11 mm in largest diameter. Additionally, the previously treated paramedial right frontal lobe lesion enlarged to 12 mm. There was also increased nodular enhancement about the left occipital cavity.  Additionally, there is a new lesion in the left frontal lobe (vs 12/27/2023 brain MRI).          Chemotherapy History: As above   Radiation History: As above  Pregnant: N/A  Implanted Cardiac Devices: No  Autoimmune History (lupus, scleroderma, Crohn's disease, or ulcerative colitis): None    On  interview the patient reports that his right leg is still weaker than his left.  The patient occasionally uses a cane but not a walker to help ambulate.  The patient denies any seizures.  The patient denies any headaches.  The patient is currently on 3 mg of steroids but he is currently tapering off.  The patient reports that he believes that there was a \"broccoli like area\" on his scalp that he noticed months prior.  He believes that this \"broccoli like area\" is growing out of the prior GK SRS treatment field.    Past Medical History:  Past Medical History:   Diagnosis Date     HTN (hypertension)      Metastasis to brain (H) 2023     Metastatic melanoma (H)      Mixed hyperlipidemia      Prostate cancer (H)      Seizures (H)      Sleep apnea      Past Surgical History:  Past Surgical History:   Procedure Laterality Date "     INSERT DRAIN LUMBAR N/A 7/14/2023    Procedure: LUMBAR DRAIN;  Surgeon: Kye Garrido MD;  Location: [facility] OR     OPTICAL TRACKING SYSTEM CRANIOTOMY, EXCISE TUMOR, COMBINED Left 04/21/2023    Procedure: stealth assisted awake craniotomy resection of motor strip tumor;  Surgeon: Kye Garrido MD;  Location: [facility] OR     OPTICAL TRACKING SYSTEM CRANIOTOMY, EXCISE TUMOR, COMBINED Left 7/14/2023    Procedure: STEALTH ASSISTED CRANIOTOMY, WITH NEOPLASM EXCISION LEFT PARIETO-OCCIPITAL CRANIOTOMY WITH RESECTION OF MASS,;  Surgeon: Kye Garrido MD;  Location: [facility] OR     OPTICAL TRACKING SYSTEM CRANIOTOMY, EXCISE TUMOR, COMBINED Right 12/21/2023    Procedure: CRANIOTOMY, USING OPTICAL TRACKING SYSTEM, WITH NEOPLASM EXCISION, RIGHT FRONTAL CRANOTOMY WITH RESECTION OF TUMOR;  Surgeon: Kye Garrido MD;  Location: [facility] OR     PROSTATECTOMY      2009     Medications:  Current Outpatient Medications   Medication     acetaminophen (TYLENOL) 325 MG tablet     amLODIPine (NORVASC) 10 MG tablet     baclofen (LIORESAL) 10 MG tablet     dexAMETHasone (DECADRON) 1 MG tablet     indomethacin (INDOCIN) 25 MG capsule     levETIRAcetam (KEPPRA) 1000 MG tablet     pantoprazole (PROTONIX) 40 MG EC tablet     rosuvastatin (CRESTOR) 40 MG tablet     dexAMETHasone (DECADRON) 2 MG tablet     No current facility-administered medications for this visit.     Facility-Administered Medications Ordered in Other Visits   Medication     gadobutrol (GADAVIST) injection 9.56 mL     Allergies:  No Known Allergies  Social History:  Social History     Socioeconomic History     Marital status:      Spouse name: Smiley     Number of children: 4     Years of education: Not on file     Highest education level: Not on file   Occupational History     Occupation: [employer] and [employer]     Comment: hobby job   Tobacco Use     Smoking status: Former     Packs/day: 0.50     Years: 20.00     Additional pack years: 0.00     Total pack years: 10.00     Types: Cigarettes      Quit date: 2017     Years since quittin.0     Passive exposure: Current (Second hand exposure)     Smokeless tobacco: Never   Vaping Use     Vaping Use: Never used   Substance and Sexual Activity     Alcohol use: Not Currently     Comment: socially     Drug use: Not Currently     Types: Marijuana     Comment: typically smoked daily but no use for past two weeks as of 23     Sexual activity: Not on file   Other Topics Concern     Not on file   Social History Narrative     Not on file     Social Determinants of Health     Financial Resource Strain: Low Risk  (2023)    Financial Resource Strain      Within the past 12 months, have you or your family members you live with been unable to get utilities (heat, electricity) when it was really needed?: No   Food Insecurity: Low Risk  (2023)    Food Insecurity      Within the past 12 months, did you worry that your food would run out before you got money to buy more?: No      Within the past 12 months, did the food you bought just not last and you didn t have money to get more?: No   Transportation Needs: Low Risk  (2023)    Transportation Needs      Within the past 12 months, has lack of transportation kept you from medical appointments, getting your medicines, non-medical meetings or appointments, work, or from getting things that you need?: No   Physical Activity: Not on file   Stress: Not on file   Social Connections: Not on file   Interpersonal Safety: Low Risk  (2023)    Interpersonal Safety      Do you feel physically and emotionally safe where you currently live?: Yes      Within the past 12 months, have you been hit, slapped, kicked or otherwise physically hurt by someone?: No      Within the past 12 months, have you been humiliated or emotionally abused in other ways by your partner or ex-partner?: No   Housing Stability: Low Risk  (2023)    Housing Stability      Do you have housing? : Yes      Are you worried about losing  "your housing?: No     Family History:  Family History   Problem Relation Age of Onset     Pancreatic Cancer Father      Diabetes Paternal Grandmother        Review of Systems   A 12-point review of systems was performed. Pertinent findings are noted in the HPI.    Physical Exam   ECOG Status: 1    VITALS: BP (!) 151/88   Pulse 75   Resp 16   Ht 1.854 m (6' 1\")   Wt 95.3 kg (210 lb)   SpO2 99%   BMI 27.71 kg/m    GEN: Appears well, alert, oriented, and in NAD  HEENT: EOMI, normal conjunctiva, MMM  CV: warm and well-perfused  RESP: , breathing comfortably on room air  ABDOMEN:  non-distended  SKIN: Dermatologic examination of the scalp showed no questionable lesions  NEURO: Neurological exam showed grossly equal 5/5 motor strength in both lower extremities.  The patient reported that the medial aspect of his right foot was still numb compared to the left side.  PSYCH: Appropriate mood and affect    Imaging/Path/Labs   Imaging: as above  Path: as above  Labs: as above    Assessment    68 year old male with metastatic melanoma with new lesion in the left frontal lobe and a growing lesion in the right occipital lobe.    Plan   Due to the growing size of the right occipital lobe lesion and the new the found left frontal lobe lesion, we recommend GK SRS. GK SRS is the most conformal technique and spares the maximum amount of healthy brain tissue.  Because of the patient's history of multiple craniotomies, plan to proceed to treat this patient with a mask instead of a frame.    The short-term and long-term risks and benefits of radiotherapy were discussed in detail with the patient.  The itemized side effects and risks listed on the consent form were reviewed in detail with the patient as well.  The patient expressed understanding.  All questions were answered.      The patient elected to proceed with radiotherapy.  Informed consent was obtained.    Patient was seen and discussed with my attending physician,  " Milton.    Cosmo Barillas MD, MS PGY-3  Radiation Oncology  Department of Radiation Oncology  [facility] [place]  Phone: [phone]      Attestation signed by Benedicto Rose MD at 2024  4:28 PM:  I saw the patient with the resident. Please see my separate note on the same day.     Department of Therapeutic Radiology--Radiation Oncology                   [place] [place] [address]  [address] [place]  [address]  Office:  [phone]  Fax:  [phone]   Radiation Oncology Clinic  [address] [place] [id number]  Phone:  [phone]  Fax:  [phone]     RE: Saeid Santa : 1955   MRN: [id number] CANDY: 2024     OUTPATIENT VISIT NOTE     Virtual Visit Details    DIAGNOSIS: Brain metastases secondary to cutaneous melanoma    RADIATION HISTORY:     GK-SRS 25Gy in 5 fractions to posterior frontal lobe  (2023)       GK-SRS 30Gy in 5 fractions to left occipital lobe lesion      2. 22 Gy in one fraction prescribed to the 50% isodose line (2023)      INTERVAL SINCE COMPLETION OF RADIATION THERAPY:  8 months since first course on 2023, 4.5 months since 2nd course on 2023.        SUBJECTIVE: Mr. Santa is a 69 yo male with metastatic melanoma. He presented with seizure in April this year. Workup revealed 2 discrete ring-enhancing lesions, one of which was in the left paramedian posterior frontal lobe near the precentral gyrus, measuring 2.2 x 1.7 x 1.9 cm and the other in the left occipital lobe near the lingual gyrus, measuring 2.2 x 2.1 x 2.0 cm, associated with  moderate vasogenic edema.  He underwent craniotomy on 2023 for resection of the lesion left frontal lesion as this was felt to be the cause of his seizure. Additionally a scalp lesion was also resected as it had been draining, bleeding and growing in size. Pathology showed malignant melanoma. It was unclear if the scalp lesion was the primary or represent a cutaneous metastasis (it  was without epidermal melanoma in situ).  PET/CT on 5/18/2023 showed a brain metastasis in the left occipital lobe and a lymph node in the left low neck.      He then received GK-SRS to the left occipital lesion, 30 Gy in 5 fractions, as well as the left posterior frontal resection cavity, 25 Gy in 5 fractions.  He completed these treatments on 5/31/2023. He then began Keytruda under the care of Dr. Greene on 6/7/2023.       Mr. Santa developed intralesional hemorrhage of the left occipital metastasis with mass effect and ultimately required partial resection on 7/14/2023. The deeper aspect of the tumor densely adhered to the ventricle wall and the entrapped occipital horn, and thus could not be safely resected. Surgical pathology showed treatment effect with residual viable melanoma.     Mr. Santa resumed much delayed cycle 2 of Pembrolizumab on 8/11/2023. Repeat brain MRI on 8/23/2023 showed stable post-op changes in the resected parietal skull and left frontal lobe cavity and decreased enhancement of the left occipital resection cavity. However, a new focus of abnormal contrast enhancement about 6 mm in diameter in the anteromedial right frontal lobe was found.     He then proceeded another course of GK SRS to this new metastasis on 9/8/2023, receiving 22 Gy in a single fraction prescribed to the 50% isodose line.     Mr. Santa unfortunately had to discontinue from Keytruda after 2 cycles due to immune pneumonitis. He was started on a Prednisone taper with the plan to switch to Nivolumab.    Brain MRI on 12/7/2023 unfortunately showed a new lesion in the right frontal lobe with vasogenic edema, a new right occipital lesion about ~ 1 cm, stable to slightly enlarged previously treated frontal falcine lesion, new enhancement surrounding the left occipital cavity. His systemic restaging CT scan showed resolution of previously enlarged left level V cervical node and no new lesions.     Given the sie of the right  "frontal lobe metastasis, GK SRS was not felt to be able to provide adequate local control. He thus proceeded with 3rd craniotomy on 12/21/2023 by Dr. Garrido. Pathology confirmed metastatic melanoma. He was initially on Decadron 2 mg BID, but discontinued mid Jan due to running out. He was restarted on Decadron by medical oncology with a plan to taper. He began single agent Nivolumab on 1/22/2023.     Mr. Santa had a follow up MRI on 1/16/2024. The right occipital lobe lesion which was seen on 12/7 MRI, but never treated continued to enlarge , measuring 11 mm in largest diameter. Additionally, the previously treated paramedial right frontal lobe lesion enlarged to 12 mm. There was also increased nodular enhancement about the left occipital cavity.     Given this finding, Mr. Santa is here to discuss radiation therapy option. He is accompanied by his wife Smiley. On interview, Tim states that he is doing okay overall. He ambulates without assistance except in \"hazardous situation outside\" when he uses a cane. He has no weakness in his right lower extremity, but has numbness along the medial aspect of the right ankle going up towards the knee. He is still on Decadron, low dose, and expects to be tapered off soon. HE denies headaches or seizure activities.     He also mentioned that he was told that he had 2 scalp lesions, one was removed at the time of his first craniotomy in 04/2023. He is wondering what has happened to the 2nd \"broccoli like lesion\" and whether this could be treated with GK as well.       OBJECTIVE:   BP (!) 151/88   Pulse 75   Resp 16   Ht 1.854 m (6' 1\")   Wt 95.3 kg (210 lb)   SpO2 99%   BMI 27.71 kg/m     Gen: Alert and oritented, speech fluent, interaction appropriate.  HEENT: facial features consistent with steroid use. Face symmetric, facial muscle movements intact. A very careful exam of the scalp does not show any concerning lesion.   CV: well perfused  Resp: breathing comfortably on " room air.   Neuro: muscle strength 5/5 including right lower extremity. Subjective report of numbness along the medial leg from ankle to knee.       IMAGIN2024 (Left) vs. 2024 (Right)   Untreated right occipital lesion: slightly larger       Increased nodular enhancement around L occipital cavity (Left image) previously treated with GK SRS 2023 followed by resection), stable today (R)      Enlargement of the paremedial right frontal lesion (GK'ed on 2023)  L: 2024; R: today 2024 (stable from 2 weeks ago)      A new lesion in the left frontal convexity, not apparent on previous MRI.       Stable right frontal lobe resection cavity        ASSESSMENT AND PLAN: In summary, Mr. Santa is a 69 yo gentleman with metastatic melanoma. He is s/p 2 courses of GK SRS to 3 lesions, two of which required resection due to symptoms.  He is status post 3 craniotomies. Tim now has an enlarging right occipital lesion and a new along the left frontal convexity. These are both amenable to single fraction GK SRS. We discussed using mask for immobilization as opposed to frame given his multiple craniotomies.     When reviewing Dr. Garrido's notes, I do not see documentation of a 2nd scalp melanoma lesion, but will certainly double check with Dr. Garrido. There is no concerning finding on today's exam.     Tim and his wife asked about prognosis. His systemic disease is controlled despite suboptimal immunotherapy. His CNS disease has always been challenging to manage. From a symptom and QOL standpoint, Tim is doing better since I first met him. It's difficult to know how quickly the treated lesions will progress or how many new lesions he'd develop in the near future. We certainly could come to a point where treatment (whether GK or another surgery) could do more harm than good. Tim and Smiley were understandably upset but expressed understanding of the situation.     Tim will come back for mask fabrication this  Friday. He will also receive treatment on the same day.             Benedicto Rose M.D./Ph.D.  Radiation Oncologist   Department of Therapeutic Radiology   [facility]  Phone: [phone]          40 minutes were spent on the date of the encounter doing chart review, history and exam, documentation and further activities as noted above.           Benedicto Rose MD                  Cancer  Pertinent negatives include no chest pain, coughing, headaches, nausea or vomiting.       Date: 2024   Age: 68 year old  Ethnicity:     Sex: male  : 1955   Lives In: [place], [place]            Diagnosis: metastatic melanoma     Prior radiation therapy:   Site Treated: left resection cavity  Facility: [facility]  Dates: 23- 23  Dose: 2500 cGy in 5 fx     Site Treated: left parietal-occipital lesion  Facility: [facility]  Dates: 23- 23  Dose: 3000 cGy in 5 fx    Site Treated: right medial frontal  Facility:  [facility] Gamma Knife  Dates: 23  Dose: 2200 cGy to 50% isodose line 1 Fx      Prior chemotherapy:   See below     Pain at time of consult?  Does patient have a living will?  Does patient have an implanted cardiac device?     RN time with patient:  Educated on gamma knife?     Fall Screen:  Have you fallen in the past week?   Have you felt unsteady when walking or standing in the past week?      Physicians: Dr. Carlos Manuel Bunn,  Dr. Kye Garrido; Dr. Asim Cervantes (PCP)     Review Since Diagnosis:  Hx : Prostate Cancer, Prostatectomy, no adjuvant therapy  22: he brings a left parietal scalp lesion to attention of GP. It was initially observed.  2022: PCP appt; lesion felt to be a cyst. Lesion lanced and drained via scalp incision  2023: the left parietal scalp lesion area would occasionally break open and bleed   2023: right leg and foot not working totally right   March/2023: three episodes of right leg feeling heavy, last episode  jerking of leg, lasted a minute or two, no loss of consciousness   4/15/23: pt driving from work as had a migraine. Had right leg numbness/heaviness and jerking.  A co worker was following on the same route and noticed pt having problems so assisted him when found in parking lot; sitting in grass and confused.  To ER via ambulance to [place]  4/15/23: Brain MRI:   2.2 x 1.7 x 1.9 cm lesion left paramedian posterior frontal lobe near the precentral gyrus  2.2 x 2.1 x 2.0 cm lesion left occipital lobe near the lingual gyrus  4/15/23: CT CAP, bilateral pulmonary nodules measuring up to 0.6 cm-indeterminate   Neck CT/Angio: enlarged lymph nodes and additional scalp lesions consistent with head and neck local regional disease  Pt referred to Dr. Garrido   4/21/23: crani by Dr. Garrido, biopsy of left frontal brain lesion showed malignant melanoma, this was resected.  Biopsy of left parietal scalp lesion showed malignant melanoma, excision of this lesion  4/25/23: discharged to acute rehab [facility]   5/9/23: discharged home with use of walker, remains on keppra  5/10/23: last dose of steroids   5/12/23: pt saw Dr. Greene, unsure scalp lesion is primary or a metastatic lesion. Wanting PET/CT for extent of disease.  Plan Rad/Onc for brain lesion and surgical cavity and then plan to immediately follow with systemic immunotherapy.  Will know better which agents will be used once PET/CT completed.    5/15/23: pt saw Dr. Garrido, still some numbness right leg and foot.  Currently ambulating with a cane   5/18/23: PET/CT: shows potentially involved neck lymph nodes. Mild uptake in soft tissue nodules in the scalp, in the known left occipital brain metastasis as well as in the left lower neck region.   5/24/23- 5/31/23: gamma knife as noted above  6/7/23: C1 of pembrolizumab every 3 weeks  6/14/23- 6/16/23: admitted for vision change, reduced comprehension, speech change, fall and headache.   6/14/23: brain mri showing  likely progression of left occipital mass with associated edema. Improved on steroids and discharged on oral decadron. Plan for repeat mri and neurosurgery follow up in one month  7/7/23: ED visit for headaches, confusion, vision changes concern for hemorrhage of the left occipital mass. Admitted to [facility] until 7/9/23. Increased Decadron dose.   7/14/23: craniotomy of the left parieto-occipital mass (Dr. Garrido). Discharged on Decadron taper  8/11/23: C2 of pembrolizumab  8/23/23: brain mri  No recurrence in left frontal lobe  Decreased enhancement at edges of left occipital resection cavity  New enhancement in the right frontal lobe concerning for new met. Measures 6 mm.  8/28/23: post op visit with Dr. Garrido. Pt still has numbness involving right leg and weakness involving his right lower extremity. Loss of muscle mass in right leg. Still has balance issues with no recent falls. Generalized fatigue; sleeping up to 20 hours a day. Review of brain mri. Referred to Dr. Rose for SRS.  8/31/23: video visit with CNP hem/onc: plan for scheduled C3 dose of pembrolizumab on 9/1/23. Has PET/CT later today. Did not get C3 due to pneumonitis  8/31/23: PET/CT: incidental finding of pneumonitis. Plan to cancel Keytruda for 9/1/23. Will obtain COVID and resp panel on 9/1/23. Pt to monitor his pulse ox. Dr. Greene will discuss next steps in treatment at appointment scheduled for 9/7.   9/8/23; Gamma Knife to right medial frontal lesion   12/7/23; CT CAP, two lesions in liver favored to be benign, no evidence of mets   12/21/23; crani by Dr. Garrido, gross total resection of right frontal tumor, path showed metastatic melanoma, oncogenic, class 3 BRAF mutation identified   12/23/23; discharged home  1/16/24; MR Brain with Perfusion, new 1.1 cm lesion right occipital                                                         Right frontal crani, no increased perfusion or nodularity in margins                                                          Previously treated right perimedian lesion 1.2 cm (was 0.8 cm when treated)                                                          Treated cystic left occipital lobe lesion, cystic/solid more solid, likely increasing   Dr. Rose/Dr. Garrido/ Dr. Bunn discussed, plan Gamma Knife to new lesion, watch others as treating feel toxicity may be too high.    Only two cycles of Nivolumab to date as dealing with pneumonitis   1/22/24; Tim saw Mila Urbanierl CNP Med/Onc, walking with cane if out of house, right leg better, off the dexamethasone as ran out so stopped, will resume dex on taper schedule.  Start Nivolumab even though dex, Dr. Bunn OK with..  If tolerates may add another immunotherapy, in 4 weeks cycle 2 of Nivolumab  1/22/24; got C1D1 Nivolumab     Chief Complaint: right leg getting stronger, obeys commands, cane on occasion, no headaches, no  visual changes, no  mentation  chages         Review of Systems   Constitutional:  Positive for malaise/fatigue. Negative for weight loss.   HENT:  Negative for hearing loss.    Eyes:  Negative for blurred vision, double vision and photophobia.   Respiratory:  Negative for cough.    Cardiovascular:  Negative for chest pain and leg swelling.   Gastrointestinal:  Negative for nausea and vomiting.   Genitourinary: Negative.    Musculoskeletal: Negative.    Neurological:  Negative for seizures and headaches.   Psychiatric/Behavioral:  Negative for depression.                  Again, thank you for allowing me to participate in the care of your patient.        Sincerely,        Benedicto Rose MD

## 2024-01-31 NOTE — PROGRESS NOTES
Cancer  Pertinent negatives include no chest pain, coughing, headaches, nausea or vomiting.       Date: 2024   Age: 68 year old  Ethnicity:     Sex: male  : 1955   Lives In: [place]            Diagnosis: metastatic melanoma     Prior radiation therapy:   Site Treated: left resection cavity  Facility: [facility]  Dates: 23- 23  Dose: 2500 cGy in 5 fx     Site Treated: left parietal-occipital lesion  Facility: [facility]  Dates: 23- 23  Dose: 3000 cGy in 5 fx    Site Treated: right medial frontal  Facility:  [facility] Gamma Knife  Dates: 23  Dose: 2200 cGy to 50% isodose line 1 Fx      Prior chemotherapy:   See below     Pain at time of consult?  Does patient have a living will?  Does patient have an implanted cardiac device?     RN time with patient:  Educated on gamma knife?     Fall Screen:  Have you fallen in the past week?   Have you felt unsteady when walking or standing in the past week?      Physicians: Dr. Carlos Manuel Bunn,  Dr. Kye Garrido; Dr. Asim Cervantes (PCP)     Review Since Diagnosis:  Hx : Prostate Cancer, Prostatectomy, no adjuvant therapy  22: he brings a left parietal scalp lesion to attention of GP. It was initially observed.  2022: PCP appt; lesion felt to be a cyst. Lesion lanced and drained via scalp incision  2023: the left parietal scalp lesion area would occasionally break open and bleed   2023: right leg and foot not working totally right   March/2023: three episodes of right leg feeling heavy, last episode jerking of leg, lasted a minute or two, no loss of consciousness   4/15/23: pt driving from work as had a migraine. Had right leg numbness/heaviness and jerking.  A co worker was following on the same route and noticed pt having problems so assisted him when found in parking lot; sitting in grass and confused.  To ER via ambulance to [place]  4/15/23: Brain MRI:   2.2 x 1.7 x 1.9 cm lesion left paramedian  posterior frontal lobe near the precentral gyrus  2.2 x 2.1 x 2.0 cm lesion left occipital lobe near the lingual gyrus  4/15/23: CT CAP, bilateral pulmonary nodules measuring up to 0.6 cm-indeterminate   Neck CT/Angio: enlarged lymph nodes and additional scalp lesions consistent with head and neck local regional disease  Pt referred to Dr. Garrido   4/21/23: crani by Dr. Garrido, biopsy of left frontal brain lesion showed malignant melanoma, this was resected.  Biopsy of left parietal scalp lesion showed malignant melanoma, excision of this lesion  4/25/23: discharged to acute rehab [facility]   5/9/23: discharged home with use of walker, remains on keppra  5/10/23: last dose of steroids   5/12/23: pt saw Dr. Greene, unsure scalp lesion is primary or a metastatic lesion. Wanting PET/CT for extent of disease.  Plan Rad/Onc for brain lesion and surgical cavity and then plan to immediately follow with systemic immunotherapy.  Will know better which agents will be used once PET/CT completed.    5/15/23: pt saw Dr. Garrido, still some numbness right leg and foot.  Currently ambulating with a cane   5/18/23: PET/CT: shows potentially involved neck lymph nodes. Mild uptake in soft tissue nodules in the scalp, in the known left occipital brain metastasis as well as in the left lower neck region.   5/24/23- 5/31/23: gamma knife as noted above  6/7/23: C1 of pembrolizumab every 3 weeks  6/14/23- 6/16/23: admitted for vision change, reduced comprehension, speech change, fall and headache.   6/14/23: brain mri showing likely progression of left occipital mass with associated edema. Improved on steroids and discharged on oral decadron. Plan for repeat mri and neurosurgery follow up in one month  7/7/23: ED visit for headaches, confusion, vision changes concern for hemorrhage of the left occipital mass. Admitted to [facility] until 7/9/23. Increased Decadron dose.   7/14/23: craniotomy of the left parieto-occipital mass (  Hema). Discharged on Decadron taper  8/11/23: C2 of pembrolizumab  8/23/23: brain mri  No recurrence in left frontal lobe  Decreased enhancement at edges of left occipital resection cavity  New enhancement in the right frontal lobe concerning for new met. Measures 6 mm.  8/28/23: post op visit with Dr. Garrido. Pt still has numbness involving right leg and weakness involving his right lower extremity. Loss of muscle mass in right leg. Still has balance issues with no recent falls. Generalized fatigue; sleeping up to 20 hours a day. Review of brain mri. Referred to Dr. Rose for SRS.  8/31/23: video visit with CNP hem/onc: plan for scheduled C3 dose of pembrolizumab on 9/1/23. Has PET/CT later today. Did not get C3 due to pneumonitis  8/31/23: PET/CT: incidental finding of pneumonitis. Plan to cancel Keytruda for 9/1/23. Will obtain COVID and resp panel on 9/1/23. Pt to monitor his pulse ox. Dr. Greene will discuss next steps in treatment at appointment scheduled for 9/7.   9/8/23; Gamma Knife to right medial frontal lesion   12/7/23; CT CAP, two lesions in liver favored to be benign, no evidence of mets   12/21/23; crani by Dr. Garrido, gross total resection of right frontal tumor, path showed metastatic melanoma, oncogenic, class 3 BRAF mutation identified   12/23/23; discharged home  1/16/24; MR Brain with Perfusion, new 1.1 cm lesion right occipital                                                         Right frontal crani, no increased perfusion or nodularity in margins                                                         Previously treated right perimedian lesion 1.2 cm (was 0.8 cm when treated)                                                          Treated cystic left occipital lobe lesion, cystic/solid more solid, likely increasing   Dr. Rose/Dr. Garrido/ Dr. Bunn discussed, plan Gamma Knife to new lesion, watch others as treating feel toxicity may be too high.    Only two cycles of Nivolumab to date  as dealing with pneumonitis   1/22/24; Jim saw Amber Scheierl CNP Med/Onc, walking with cane if out of house, right leg better, off the dexamethasone as ran out so stopped, will resume dex on taper schedule.  Start Nivolumab even though dex, Dr. Bunn OK with..  If tolerates may add another immunotherapy, in 4 weeks cycle 2 of Nivolumab  1/22/24; got C1D1 Nivolumab     Chief Complaint: right leg getting stronger, obeys commands, cane on occasion, no headaches, no  visual changes, no  mentation  chages         Review of Systems   Constitutional:  Positive for malaise/fatigue. Negative for weight loss.   HENT:  Negative for hearing loss.    Eyes:  Negative for blurred vision, double vision and photophobia.   Respiratory:  Negative for cough.    Cardiovascular:  Negative for chest pain and leg swelling.   Gastrointestinal:  Negative for nausea and vomiting.   Genitourinary: Negative.    Musculoskeletal: Negative.    Neurological:  Negative for seizures and headaches.   Psychiatric/Behavioral:  Negative for depression.

## 2024-01-31 NOTE — PROGRESS NOTES
Occasional cane for right leg    No seizure aciviy  No headache    Decadron small dose  On taper    Months ago

## 2024-02-02 NOTE — PROGRESS NOTES
[facility]                                                                                          Called patient, went straight to voicemail.  Left message to return call to check-in and inquire about urgent care re: bloody stools.  Reminded patient of MyChart message sent yesterday checking-in.    Jenny Lemon, RN  Cancer Care Coordinator  [facility]

## 2024-02-02 NOTE — PROGRESS NOTES
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   3a RT Occipital   2,200   [place] 60  2024 0     3b 3b LT Frontal 2,200 [place] 60 2024 0        DESCRIPTION OF PROCEDURE:    On 2024 the patient was brought to the [facility] Gamma Knife IconTM suite at [facility] and treated with fractionated stereotactic radiotherapy.     The [employer] Gamma Knife IconTM Plan software was used to create a highly conformal dose distribution using the number and size collimators detailed above.     TREATMENT:    The patient was brought to the Gamma Knife suite. A timeout was performed to confirm the correct patient and correct procedure.    Patient was identified by 2 methods.  Site was verified.    The mask was placed and a cone beam CT was done and fused to the previously approved plan.        The physicist and I evaluated the fusion and confirmed the target coverage after auto-registration.      The treatment was delivered using the [employer] Gamma Knife IconTM without complication.      The mask was removed and the patient was discharged home in stable condition.    The patient tolerated the treatment well and had no complications.            Approved by:  Benedicto Rose MD

## 2024-02-02 NOTE — LETTER
2024         RE: Saeid Santa  [address]  [place] [address]        Dear Colleague,    Thank you for referring your patient, Saeid Santa, to the [facility] RADIATION ONCOLOGY GAMMA KNIFE. Please see a copy of my visit note below.    Name:Saeid Santa  :1955  Medical Record # [id number]  Diagnosis:C79.31  Date of Treatment Plannin2024    Gamma Knife Simulation Note       After thorough review of this patient's clinical and radiographic data, I determined Fractionated Stereotactic Radiosurgery using the ICON Gamma Knife was indicated as explained in my consult with the patient.       The patient was brought to the Gamma Knife suite.  The MASK was fabricated consisting of an accuform mold at the posterior skull and a special aquaplast mask over the face and chin.        The High Definition Motion Management (HDMM) system consists of an infrared stereoscopic camera, a set of reference markers, and a patient marker will be placed.    A cone beam CT scan will be done to use as reference and for fusion.      A stereotactic brain   planning MRI with gadolinium  will be performed.  The MRI images will be transferred electronically to the Gamma Knife treatment planning computer.  The [employer] Gamma Knife treatment planning software will be used to design the optimal treatment plan.       procedures will be done prior to treatment.        Benedicto Rose MD

## 2024-02-02 NOTE — LETTER
2024         RE: Saeid Santa  [address] [address] [address]        Dear Colleague,    Thank you for referring your patient, Saeid Santa, to the [facility] RADIATION ONCOLOGY GAMMA KNIFE. Please see a copy of my visit note below.    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   3a RT Occipital   2,200   [place] 60  2024 0     3b 3b LT Frontal 2,200 [place] 60 2024 0        DESCRIPTION OF PROCEDURE:    On 2024 the patient was brought to the [facility] Gamma Knife IconTM suite at [facility] and treated with fractionated stereotactic radiotherapy.     The [employer] Gamma Knife IconTM Plan software was used to create a highly conformal dose distribution using the number and size collimators detailed above.     TREATMENT:    The patient was brought to the Gamma Knife suite. A timeout was performed to confirm the correct patient and correct procedure.    Patient was identified by 2 methods.  Site was verified.    The mask was placed and a cone beam CT was done and fused to the previously approved plan.        The physicist and I evaluated the fusion and confirmed the target coverage after auto-registration.      The treatment was delivered using the [employer] Gamma Knife IconTM without complication.      The mask was removed and the patient was discharged home in stable condition.    The patient tolerated the treatment well and had no complications.            Approved by:  Benedicto Rose MD      Again, thank you for allowing me to participate in the care of  your patient.        Sincerely,        Benedicto Rose MD

## 2024-02-02 NOTE — PROGRESS NOTES
Name:Saeid Santa  :1955  Medical Record # [id number]  Diagnosis:C79.31  Date of Treatment Plannin2024    Gamma Knife Simulation Note       After thorough review of this patient's clinical and radiographic data, I determined Fractionated Stereotactic Radiosurgery using the ICON Gamma Knife was indicated as explained in my consult with the patient.       The patient was brought to the Gamma Knife suite.  The MASK was fabricated consisting of an accuform mold at the posterior skull and a special aquaplast mask over the face and chin.        The High Definition Motion Management (HDMM) system consists of an infrared stereoscopic camera, a set of reference markers, and a patient marker will be placed.    A cone beam CT scan will be done to use as reference and for fusion.      A stereotactic brain   planning MRI with gadolinium  will be performed.  The MRI images will be transferred electronically to the Gamma Knife treatment planning computer.  The [employer] Gamma Knife treatment planning software will be used to design the optimal treatment plan.       procedures will be done prior to treatment.        Benedicto Rose MD

## 2024-02-03 NOTE — PROGRESS NOTES
Gamma Knife Operative Note     MRN: [id number]    Name: Saeid Santa    : 1955     Date of procedure: 24     Surgeon:  Kye Garrido MD     Pre-operative Diagnosis:   Metastatic melanoma  Post-operative Diagnosis: Metastatic melanoma        Procedure: Gamma Knife Radiosurgery using Face mask.     Indication: Saeid Santa is a 68-year-old gentleman with a known history of metastatic melanoma.    2023: Right frontal craniotomy with resection of right frontal mass   2023: Gamma knife to the new anteromedial right frontal lobe lesion likely mets.  22 Gy single fraction.  2023 Stealth assisted left parieto-occipital craniotomy with interhemispheric approach and resection of falcine mass.  2023 : Gamma knife to the resection cavity and parieto-occipital lesion 2023:stealth assisted awake craniotomy with resection of motor/sensory strip lesion    He presented with additional right occipital and left frontal lesions on follow-up MRI brain.  After case review , the joint recommendation was to treat the patient with radiosurgery. Standard measurements were obtained for coordinate calculation to facilitate SRS delivery.     On the day of the procedure, informed consent was obtained. The previous MRI was reviewed.  The patient underwent an MRI of the head with contrast few days prior. No new additional lesions were identified on this MRI.     The treatment is planned on the Gamma plan system based on the MRI taken.  Treatment parameters were verified by myself, the treating radiation oncologist, and the physicist.      Right occipital and left frontal lesions were treated.  The maximal diameter of the lesion is 11 mm.  Both the lesions were treated with 22 Gy in a single fraction.  The dose was delivered in a highly conformal fashion. The treatment was performed at the [facility] gamma knife center.      I was present and performed the key portions of this procedure.     Kye Garrido MD

## 2024-02-05 NOTE — TELEPHONE ENCOUNTER
A call was placed to Tim in follow up to his Gamma Knife treatment on 2/2/24.  Tim stated that he was a bit more tired on Friday but over the weekend he did just fine and feels back to his usual self, no specific complaints.

## 2024-02-19 NOTE — PATIENT INSTRUCTIONS
Contact Numbers  [facility]: [phone] (for symptom and scheduling needs)    Please call the [facility] Triage line if you experience a temperature greater than or equal to 100.4, shaking chills, have uncontrolled nausea, vomiting and/or diarrhea, dizziness, shortness of breath, chest pain, bleeding, unexplained bruising, or if you have any other new/concerning symptoms, questions or concerns.     If you are having any concerning symptoms or wish to speak to a provider before your next infusion visit, please call your care coordinator or triage to notify them so we can adequately serve you.     If you need a refill on a narcotic prescription or other medication, please call triage before your infusion appointment.           February 2024 Sunday Monday Tuesday Wednesday Thursday Friday Saturday                       1     2    CT SIM   8:30 AM   (60 min.)   Benedicto Rose MD   [facility] Radiation Oncology Gamma Knife    GAMMA TREATMENT   9:00 AM   (60 min.)   Kye Garrido MD   [facility] Radiation Oncology Gamma Knife    GAMMA TREATMENT   9:00 AM   (60 min.)   Benedicto Rose MD   [facility] Radiation Oncology Gamma Knife 3       4     5     6     7     8     9     10       11     12     13     14     15     16     17       18     19    LAB PERIPHERAL   9:15 AM   (15 min.)   [facility] DRAW   [facility]    RETURN CCSL   9:30 AM   (45 min.)   Scheierl, Amber J, APRN CNP   [facility]    ONC INFUSION 1 HR (60 MIN)  11:00 AM   (60 min.)    ONC INFUSION NURSE   [facility] 20     21     22     23     24       25     26     27     28     29 March 2024 Sunday Monday Tuesday Wednesday Thursday Friday Saturday                            1     2       3     4    PET ONCOLOGY WHOLE BODY  10:00 AM   (45 min.)   [facility] PET CT 1   [facility] 5     6      7    MR BRAIN WWO  10:30 AM   (45 min.)   UUMR2   [facility]    RETURN RADIATION ONCOLOGY   1:30 PM   (30 min.)   Benedicto Rose MD   [facility] Radiation Oncology 8     9       10     11     12    RETURN CCSL  12:45 PM   (30 min.)   Carlos Manuel Bunn MD   [facility] 13     14     15     16       17     18     19     20     21     22     23       24     25     26     27     28     29     30       31                                                   Lab Results:  Recent Results (from the past 12 hour(s))   Comprehensive metabolic panel    Collection Time: 02/19/24  9:42 AM   Result Value Ref Range    Sodium 141 135 - 145 mmol/L    Potassium 4.5 3.4 - 5.3 mmol/L    Carbon Dioxide (CO2) 21 (L) 22 - 29 mmol/L    Anion Gap 13 7 - 15 mmol/L    Urea Nitrogen 16.0 8.0 - 23.0 mg/dL    Creatinine 0.91 0.67 - 1.17 mg/dL    GFR Estimate >90 >60 mL/min/1.73m2    Calcium 9.1 8.8 - 10.2 mg/dL    Chloride 107 98 - 107 mmol/L    Glucose 107 (H) 70 - 99 mg/dL    Alkaline Phosphatase 65 40 - 150 U/L    AST 32 0 - 45 U/L    ALT 25 0 - 70 U/L    Protein Total 7.0 6.4 - 8.3 g/dL    Albumin 4.0 3.5 - 5.2 g/dL    Bilirubin Total 0.5 <=1.2 mg/dL   TSH with free T4 reflex    Collection Time: 02/19/24  9:42 AM   Result Value Ref Range    TSH 0.92 0.30 - 4.20 uIU/mL

## 2024-02-19 NOTE — LETTER
2/19/2024         RE: Saeid Santa  [address] [address]  [place] [facility] [address]        Dear Colleague,    Thank you for referring your patient, Saeid Santa, to the [facility]. Please see a copy of my visit note below.    [facility]   Follow Up Visit    Name: Saeid Santa  MRN: [id number]  Date of visit: Feb 19, 2024    Diagnosis: Metastatic melanoma  Stage:    Cancer Staging   No matching staging information was found for the patient.    Molecular: BRAF G466A, TMB 48.7554 mut/Mb    Oncology History:  --8/16/22, he brings a left parietal scalp lesion to attention of his GP. It is initially observed.  --November 2022, the left parietal scalp lesion was felt to be a cyst, and the left parietal lesion was lanced and drained via scalp incision.  --January 2023, the left parietal scalp cyst would occasionally break open, drain, and bleed, largely at night.   --February 2023, he notes some mild difficulty clearing obstacles with right leg and foot.  --March/April 2023, he has 3 progressively worsening episodes over a span 3 weeks including the right leg. At first he notes the onset of right leg numbness and heaviness, like he was carrying 50 lbs of water on the leg. Then, about a week or so later, the leg began to jerk and converse while he was in the basement. Neither episode associated with loss of consciousness  --4/15/23, he recalls the right leg numbness, heaviness, jerking while driving between work. He had been found by coworkers in the parking lot, sitting in the grass, appearing confused. He was brought to the ER by EMS. CT-CAP, showed small (<6mm), bilateral indeterminate pulmonary nodules. CT-head and MRI brain showed 2 discrete ring-enhancing lesions in the left paramedian posterior frontal lobe near the precentral gyrus measuring approximately 2.2 x 1.7 x 1.9 cm and left occipital lobe near the lingual gyrus measuring approximately 2.2 x 2.1 x  2.0 cm. The occipital lesion encroaches upon the subependymal surface of the left lateral ventricle occipital horn and contacts the superior surface of the medial left tentorium. The lesions are accompanied by moderate surrounding vasogenic edema. Surgery was recommended.  --4/21/23, underwent left awake (sleep-awake-sleep) craniotomy for tumor resection, he has resection of the left frontal, motor strip tumor as well asan elliptical incision of the left parietal scalp lesion. On pathology, both the scalp excision and brain mass are positive for malignant melanoma. Tumor cells are positive for S-100 and Melan-A, and negative for cytokeratin AE1/AE3, NKX3.1, GFAP, ERG, CD3, and CD20.  CD3 highlights brisk infiltration of the tumor by T lymphocytes, while CD20 highlights scattered positive B lymphocytes. NGS identifies a BRAF G466A mutation, a class 3 mutation insensitive to BRAF kinase inhibitor monotherapy. TMB is high at 48.754 muts/Mb.   --4/22/23, post-operative MRI shows post-operative changes of the parietal craniotomy of the left frontal lobe metastasis. The other 1.7 x 1.1 cm cystic lesion in the left occipital lobe is also seen. No new lesions appreciated.  -- 4/25/23 to 5/9/23, discharged from the hospital to acute rehab stay at [facility] in [place]. He participated in a comprehensive rehabilitation program consisting of PT, OT, Speech, Psychology and Recreation Therapy. He did well and was discharged home with use of a walker.  --PET-CT on 5/18/23 with potentially involved neck lymph nodes. He has mild FDG uptake in soft tissue nodules in the scalp, in the known left occipital brain metastasis, as well as in the left lower neck region.  -5/24-/5/31/23, He was treated with gamma knife: 2500 cGy to the left resection cavity; 3000 cGy to the L parietal-occipital lesion.  -6/7/2023, Cycle 1 of pembrolizumab 200mg every 3 weeks  -6/14/23-6/16/23, admitted for vision change, reduced comprehension, speech  change, fall and headache. MRI showing likely progression of left occipital mass with associated edema. Improved on steroids and discharged on oral decadron. Plan for repeat MRI and neurosurgery follow up in 1 month.  -7/7/23 ED visit for headaches, confusion, vision changes, concern for hemorrhage of the left occipital mass, admitted to [facility] until 7/9/23, increased dexamethasone dose  -7/14/23 craniotomy of the left parieto-occipital mass, discharged on a dexamethasone taper  -8/11/2023, Cycle 2 of pembrolizumab  -8/23/2023 brain MRI with no recurrence in left frontal lobe, decreased enhancement at edges of left occipital resection cavity, new enhancement int he right front lobe concerning for new met--seen by neurosurgery and considering RT  -8/31/2023 CT/PET with increased uptake in left lower neck lymph node c/f early progression. Ground-glass, reticular changes in lungs also noted likely secondary to drug-induced pneumonitis. Started 1 mg/kg prednisone (80 mg daily) with 8 week taper  -9/8/23 GK to R frontal lobe lesion  -12/7/23 CT neck: Resolved previously hypermetabolic left level 5 lymph node. No new  cervical lymphadenopathy.  -12/7/23 CT chest/AP: Two arterially enhancing lesions in the liver measuring 0.9 - 1.0 cm were not previously visualized, likely due to differences in timing of the contrast bolus. These are indeterminate but favored to be benign perfusional abnormalities. Melanoma metastases cannot be entirely excluded. Otherwise, no evidence for metastatic disease in the chest, abdomen and pelvis. Significant improvement in linear opacities in the lungs, suggesting improving drug-induced pneumonitis. Few stable small pulmonary nodules.  -12/7/23 MR head: Progression in intracranial metastatic disease compared to 9/8/2023   with multiple new and/or enlarging metastases, including internally hemorrhagic right frontal lobe lesion with 3 mm of leftward midline shift and new lesion adjacent to the  left occipital resection bed.   -12/8/23 started on dex 4 mg BID  -12/21/23 Right frontal craniotomy with Dr. Garrido  -1/22/24, starts nivolumab (480mg every 4 weeks)  -2/2/24, Gamma knife to right occipital and left frontal lesions     Interval History:  Tim presents today for follow up/evaluation prior to cycle 2 nivolumab. He is accompanied by his wife, Smiley. He reports feeling ok. He tolerated gamma knife well with no significant fatigue, nausea or vision changes.  -He decreased decadron to 0.5mg about 10 days ago and noticed a frontal headache daily when he decreased and this has continued since stopping decadron a few days ago. The headache is mild about 3/10 but does not respond to tylenol. He does not have a headache now. The headache is typically most pronounced in the mornings. There is no accompanying dizziness or vision change. His energy levels are stable. He sleeps at night about 6 hours and then cat naps during the day.  -He stays busy with tasks throughout the day and goes to the store. He is ambulating without a cane.   -Has some CRUZ with activity like lifting something but improves when sitting. This has been stable for months; no worsening. No cough  -No change in right foot numbness.  -No fevers.  -No loose stools. Had blood in stool a few weeks ago. Initially a clot but then pink for a few days and didn't recur. He had no rectal pain but thinks it was a hemorrhoid. He is having 2 BM's daily now of normal volume with no blood.   -he had some little red bumps to the right flank at some point in the last few weeks but not bothersome. Not sure if this is present today. He's not noticing it.     Review of Systems:  As noted in interval history.     Exam:   BP (!) 156/88   Pulse 64   Temp 98.1  F (36.7  C) (Oral)   Resp 16   Wt 97.8 kg (215 lb 9.6 oz)   SpO2 98%   BMI 28.44 kg/m    General: Well-appearing male in no acute distress.  Eyes: EOMI, PERRL. No scleral icterus or conjunctival  injection.  ENT: Oral mucosa is moist without lesions or thrush.   Lymphatic: Neck is supple without cervical or supraclavicular lymphadenopathy.   Cardiovascular: RRR No murmurs. No peripheral edema.  Respiratory: CTA bilaterally. No wheezes or crackles.  Gastrointestinal: BS +. Abdomen rounded, soft, non-tender.   Neurologic: Cranial nerves II through XII are grossly intact.  Skin: Right flank with a few acneiform spots a few mm in size. No rashes, petechiae, or bruising noted on exposed skin.   Psych: affect appropriate. Pleasant.       Labs:     Most Recent 3 CBC's:  Recent Labs   Lab Test 01/31/24  1003 01/22/24  1047 12/22/23  0441 12/21/23  1424   WBC 15.3* 8.7 22.0* 21.8*   HGB 13.5 14.1 11.8* 12.7*   MCV 95 91 91 90    224 302 338   ANEUTAUTO  --  5.5 19.5* 19.0*     Most Recent 3 BMP's:  Recent Labs   Lab Test 02/19/24  0942 01/22/24  0933 12/22/23  0441 12/21/23  0645 12/18/23  0903    137 137  --  140   POTASSIUM 4.5 3.8 4.4   < > 3.9   CHLORIDE 107 106 102  --  106   CO2 21* 21* 21*  --  20*   BUN 16.0 18.9 26.1*  --  23.2*   CR 0.91 0.99 0.93  --  0.97   ANIONGAP 13 10 14  --  14   JOHN PAUL 9.1 9.0 8.9  --  9.4   * 97 134*   < > 107*   PROTTOTAL 7.0 6.9  --   --  7.3   ALBUMIN 4.0 3.9  --   --  4.3    < > = values in this interval not displayed.    Most Recent 3 LFT's:  Recent Labs   Lab Test 02/19/24  0942 01/22/24  0933 12/18/23  0903   AST 32 20 15   ALT 25 27 14   ALKPHOS 65 64 74   BILITOTAL 0.5 1.0 0.4    Most Recent 2 TSH and T4:  Recent Labs   Lab Test 01/22/24  0933 12/07/23  1101   TSH 1.57 1.46     Today's TSH is pending.   I reviewed the above labs today.     Imaging:  MRI Brain w contrast  Narrative: Brain MRI with contrast primarily for the purposes of stereotactic  evaluation    History: Metastasis to brain (H)  ICD-10: Metastasis to brain (H)    Comparison: Brain MRI 1/16/2024    Technique: MR imaging performed with 3-dimensonally acquired axial  T1-weighted sequences  performed with intravenous contrast.    Contrast: 9.5 mL gadavist    Findings: Limited imaging for stereotactic imaging demonstrates   multiple intracranial metastatic lesions as follows on series 2:    -Right occipital lesion measuring up to 14 mm, image 59, increased  from 1/16/2024 when it measured 11 mm.  -Left occipital lesion with enhancement along the ependymal surface  appears stable, measuring 4.3 x 1.6 cm on image 65  -Right frontal parasagittal lesion measuring up to 12 mm, unchanged,  image 101  -7 mm lesion along the left anterior frontal convexity, image 99,  previously 5 mm  -15 mm right occipital lesion on image 57 mildly decreased from prior  when it measured 11 mm.  -Prior right frontal craniotomy for resection of underlying tumor in  the right frontal lobe. There is stable to slightly improved  peripheral nodular enhancement along the resection cavity.  -Prior left parietal craniotomy with stable appearance of the  resection bed in the high left parietal lobe without evidence of  residual enhancing component.    No definite new metastatic lesion.     The major intracranial arterial structures are grossly patent.  Impression: Impression: Some slightly increased and some stable in size multiple  intracranial metastatic lesions. No new intracranial metastasis..  Limited imaging primarily for the purposes of stereotactic  localization.     I have personally reviewed the examination and initial interpretation  and I agree with the findings.    REECE GUERRA MD         SYSTEM ID:  [id number]    I reviewed the above imaging today.       Pathology:       Component  Ref Range & Units    Case Report   Surgical Pathology Report                         Case: [id number]                                   Authorizing Provider:  Kye Garrido MD          Collected:           12/21/2023 09:02 AM           Ordering Location:     [facility]          Received:            12/21/2023 11:24 AM                                   [facility]                                                             Pathologist:           Irene Cassidy MD                                                                           Specimen:    Brain, RIGHT FRONTAL LOBE TUMOR                                                            Final Diagnosis   Brain, right frontal lobe tumor, resection:  -Metastatic melanoma, please see comment          Assessment and Plan:   Saeid Santa is a 68 year old male with a history of prostate cancer in 2009 s/p prostatectomy, and BRAF G446A mutated malignant melanoma with brain metastases at diagnosis s/p L frontal craniotomy/resection 4/21/23 followed by GK to tumor bed and L parietal-occipital lesion in May. Started pembrolizumab 6/7/23. Underwent second craniotomy for L parieto-occipital lesion resection in 7/14/23 2/2 hemorrhagic conversion and GK to R frontal lesion 9/8/23, on pembrolizumab, course c/b ICI pneumonitis necessitating steroids, with ongoing CNS progression.    # metastatic melanoma with metastatic disease to brain  # checkpoint inhibitor pneumonitis  -presented with de-francisca CNS involvement s/p craniotomy x2 and GK to additional R frontal lesion  -TMB high, BRAF mutated but not V600E  -s/p pembrolizumab x2 (first 6/7/23)  -most recent PET/CT showed bilateral FDG avid ground glass and reticular infiltrates favored to represent pneumonitis, started on steroids, now completed taper  -imaging in December with CNS progression, ongoing response systemically, improved pneumonitis radiographically  -s/p right front craniotomy on 12/21.   -MRI brain on 1/16/24 as above, again with CNS progression. Has follow up with Dr. Rose on 1/31/24 for possible gamma knife to new occipital lesion  -completed decadron about 5 days ago but daily headaches since dose of 0.5mg. We will restart low dose decadron as below for  management of headaches.   --On 1/22/24 he started monthly nivolumab. He is tolerating this well so far.   --He received gamma knife to the right occipital and left frontal lesions on 2/2//24     Oncology Plan/Follow up:  --We will proceed cycle 2 nivolumab today, monitoring closely for recurrent pneumonitis  --if tolerating immunotherapy, could consider escalation with addition of either relatlimab or ipilimumab down the line  -Repeat PET-CT in 2 weeks (3 months from last) followed by visit with Dr. Bunn    #Headaches  Emerged with decadron taper. No dizziness or new fatigue to suggest immune mediated pituitary toxicity. Will restart decadron 1mg daily for 2 weeks and then can decrease to 0.5mg daily until follow up.     #Hypertension  On amlodipine 10mg once daily  Advised to check BP once daily for 5 days and record  Message us with results. If consistently >150/100 may need to consider additional antihypertensive but could include PCP for management    #Loose stool, resolved  Blood in stool, resolved  -No recurrence of blood in stool for a few weeks  -Hgb normal on 1/31/24  -Possible hemorrhoids?  -Notify us if this recurs    #Irregular heartbeat, resolved  -EKG on 1/22/24 shows sinus tachycardia; Suspect exertional.  -Regular on exam today  -Monitor at clinic visits and follow clinically    # Liver lesions  -12/7 CT AP showed two indeterminate liver lesions not previously appreciated, favored to be benign but cannot exclude metastases  --trend with surveillance scans    # Fatigue  -TSH normal 12/7  -Stable  --if worsening consider adrenal insufficiency workup    #Leukocytosis  -Seems to coincide with steroid use. No infectious concerns today.     50 minutes spent on the date of the encounter doing chart review, review of test results, interpretation of tests, patient visit, and documentation      Amber Scheierl, CNP  [facility]  [address] [place] [id number]  [phone]

## 2024-02-19 NOTE — PROGRESS NOTES
Infusion Nursing Note:  Saeid Santa presents today for Cycle 2 Day 1 Nivolumab.    Patient seen by provider today: Yes: Amber Scheierl, NP   present during visit today: Not Applicable.    Note: Patient presents to infusion today doing well. Denies any new questions or concerns following his visit with Amber Scheierl, NP.    Intravenous Access:  Peripheral IV placed by Vascular Access.    Treatment Conditions:  Lab Results   Component Value Date     02/19/2024    POTASSIUM 4.5 02/19/2024    MAG 2.1 01/22/2024    CR 0.91 02/19/2024    JOHN PAUL 9.1 02/19/2024    BILITOTAL 0.5 02/19/2024    ALBUMIN 4.0 02/19/2024    ALT 25 02/19/2024    AST 32 02/19/2024       Results reviewed, labs MET treatment parameters, ok to proceed with treatment.    Post Infusion Assessment:  Patient tolerated infusion without incident.  Blood return noted pre and post infusion.  Site patent and intact, free from redness, edema or discomfort.  No evidence of extravasations.  Access discontinued per protocol.     Discharge Plan:   Patient declined prescription refills.  Discharge instructions reviewed with: Patient.  Patient and/or family verbalized understanding of discharge instructions and all questions answered.  Copy of AVS reviewed with patient and/or family.  Patient will return 3/4 for next PET scan and 3/12 for appointment with Dr. Bunn.  Patient discharged in stable condition accompanied by: wife.  Departure Mode: Ambulatory.      Magy Moore RN

## 2024-02-19 NOTE — NURSING NOTE
"Oncology Rooming Note    February 19, 2024 9:50 AM   Saeid Santa is a 68 year old male who presents for:    Chief Complaint   Patient presents with    Blood Draw     IV placement with blood draw by lab RN. VItals taken and appointment arrived    Oncology Clinic Visit     melanoma     Initial Vitals: BP (!) 156/88   Pulse 64   Temp 98.1  F (36.7  C) (Oral)   Resp 16   Wt 97.8 kg (215 lb 9.6 oz)   SpO2 98%   BMI 28.44 kg/m   Estimated body mass index is 28.44 kg/m  as calculated from the following:    Height as of 1/31/24: 1.854 m (6' 1\").    Weight as of this encounter: 97.8 kg (215 lb 9.6 oz). Body surface area is 2.24 meters squared.  No Pain (0) Comment: Data Unavailable   No LMP for male patient.  Allergies reviewed: Yes  Medications reviewed: Yes    Medications: Medication refills not needed today.  Pharmacy name entered into [employer]:    [facility] - [place], [place] - [address] [facility] - [place], [place] - [address] [facility]  [facility] - [place], [place] - [address] [place] [address]    Frailty Screening:   Is the patient here for a new oncology consult visit in cancer care? 2. No      Clinical concerns: none      Zora Owen, EMT  2/19/2024              "

## 2024-02-19 NOTE — PROGRESS NOTES
[facility]   Follow Up Visit    Name: Saeid Santa  MRN: [id number]  Date of visit: Feb 19, 2024    Diagnosis: Metastatic melanoma  Stage:    Cancer Staging   No matching staging information was found for the patient.    Molecular: BRAF G466A, TMB 48.7554 mut/Mb    Oncology History:  --8/16/22, he brings a left parietal scalp lesion to attention of his GP. It is initially observed.  --November 2022, the left parietal scalp lesion was felt to be a cyst, and the left parietal lesion was lanced and drained via scalp incision.  --January 2023, the left parietal scalp cyst would occasionally break open, drain, and bleed, largely at night.   --February 2023, he notes some mild difficulty clearing obstacles with right leg and foot.  --March/April 2023, he has 3 progressively worsening episodes over a span 3 weeks including the right leg. At first he notes the onset of right leg numbness and heaviness, like he was carrying 50 lbs of water on the leg. Then, about a week or so later, the leg began to jerk and converse while he was in the basement. Neither episode associated with loss of consciousness  --4/15/23, he recalls the right leg numbness, heaviness, jerking while driving between work. He had been found by coworkers in the parking lot, sitting in the grass, appearing confused. He was brought to the ER by EMS. CT-CAP, showed small (<6mm), bilateral indeterminate pulmonary nodules. CT-head and MRI brain showed 2 discrete ring-enhancing lesions in the left paramedian posterior frontal lobe near the precentral gyrus measuring approximately 2.2 x 1.7 x 1.9 cm and left occipital lobe near the lingual gyrus measuring approximately 2.2 x 2.1 x 2.0 cm. The occipital lesion encroaches upon the subependymal surface of the left lateral ventricle occipital horn and contacts the superior surface of the medial left tentorium. The lesions are accompanied by moderate surrounding vasogenic edema.  Surgery was recommended.  --4/21/23, underwent left awake (sleep-awake-sleep) craniotomy for tumor resection, he has resection of the left frontal, motor strip tumor as well asan elliptical incision of the left parietal scalp lesion. On pathology, both the scalp excision and brain mass are positive for malignant melanoma. Tumor cells are positive for S-100 and Melan-A, and negative for cytokeratin AE1/AE3, NKX3.1, GFAP, ERG, CD3, and CD20.  CD3 highlights brisk infiltration of the tumor by T lymphocytes, while CD20 highlights scattered positive B lymphocytes. NGS identifies a BRAF G466A mutation, a class 3 mutation insensitive to BRAF kinase inhibitor monotherapy. TMB is high at 48.754 muts/Mb.   --4/22/23, post-operative MRI shows post-operative changes of the parietal craniotomy of the left frontal lobe metastasis. The other 1.7 x 1.1 cm cystic lesion in the left occipital lobe is also seen. No new lesions appreciated.  -- 4/25/23 to 5/9/23, discharged from the hospital to acute rehab stay at [facility] in [place]. He participated in a comprehensive rehabilitation program consisting of PT, OT, Speech, Psychology and Recreation Therapy. He did well and was discharged home with use of a walker.  --PET-CT on 5/18/23 with potentially involved neck lymph nodes. He has mild FDG uptake in soft tissue nodules in the scalp, in the known left occipital brain metastasis, as well as in the left lower neck region.  -5/24-/5/31/23, He was treated with gamma knife: 2500 cGy to the left resection cavity; 3000 cGy to the L parietal-occipital lesion.  -6/7/2023, Cycle 1 of pembrolizumab 200mg every 3 weeks  -6/14/23-6/16/23, admitted for vision change, reduced comprehension, speech change, fall and headache. MRI showing likely progression of left occipital mass with associated edema. Improved on steroids and discharged on oral decadron. Plan for repeat MRI and neurosurgery follow up in 1 month.  -7/7/23 ED visit for headaches,  confusion, vision changes, concern for hemorrhage of the left occipital mass, admitted to [facility] until 7/9/23, increased dexamethasone dose  -7/14/23 craniotomy of the left parieto-occipital mass, discharged on a dexamethasone taper  -8/11/2023, Cycle 2 of pembrolizumab  -8/23/2023 brain MRI with no recurrence in left frontal lobe, decreased enhancement at edges of left occipital resection cavity, new enhancement int he right front lobe concerning for new met--seen by neurosurgery and considering RT  -8/31/2023 CT/PET with increased uptake in left lower neck lymph node c/f early progression. Ground-glass, reticular changes in lungs also noted likely secondary to drug-induced pneumonitis. Started 1 mg/kg prednisone (80 mg daily) with 8 week taper  -9/8/23 GK to R frontal lobe lesion  -12/7/23 CT neck: Resolved previously hypermetabolic left level 5 lymph node. No new  cervical lymphadenopathy.  -12/7/23 CT chest/AP: Two arterially enhancing lesions in the liver measuring 0.9 - 1.0 cm were not previously visualized, likely due to differences in timing of the contrast bolus. These are indeterminate but favored to be benign perfusional abnormalities. Melanoma metastases cannot be entirely excluded. Otherwise, no evidence for metastatic disease in the chest, abdomen and pelvis. Significant improvement in linear opacities in the lungs, suggesting improving drug-induced pneumonitis. Few stable small pulmonary nodules.  -12/7/23 MR head: Progression in intracranial metastatic disease compared to 9/8/2023   with multiple new and/or enlarging metastases, including internally hemorrhagic right frontal lobe lesion with 3 mm of leftward midline shift and new lesion adjacent to the left occipital resection bed.   -12/8/23 started on dex 4 mg BID  -12/21/23 Right frontal craniotomy with Dr. Garrido  -1/22/24, starts nivolumab (480mg every 4 weeks)  -2/2/24, Gamma knife to right occipital and left frontal lesions     Interval  History:  Tim presents today for follow up/evaluation prior to cycle 2 nivolumab. He is accompanied by his wife, Smiley. He reports feeling ok. He tolerated gamma knife well with no significant fatigue, nausea or vision changes.  -He decreased decadron to 0.5mg about 10 days ago and noticed a frontal headache daily when he decreased and this has continued since stopping decadron a few days ago. The headache is mild about 3/10 but does not respond to tylenol. He does not have a headache now. The headache is typically most pronounced in the mornings. There is no accompanying dizziness or vision change. His energy levels are stable. He sleeps at night about 6 hours and then cat naps during the day.  -He stays busy with tasks throughout the day and goes to the store. He is ambulating without a cane.   -Has some CRUZ with activity like lifting something but improves when sitting. This has been stable for months; no worsening. No cough  -No change in right foot numbness.  -No fevers.  -No loose stools. Had blood in stool a few weeks ago. Initially a clot but then pink for a few days and didn't recur. He had no rectal pain but thinks it was a hemorrhoid. He is having 2 BM's daily now of normal volume with no blood.   -he had some little red bumps to the right flank at some point in the last few weeks but not bothersome. Not sure if this is present today. He's not noticing it.     Review of Systems:  As noted in interval history.     Exam:   BP (!) 156/88   Pulse 64   Temp 98.1  F (36.7  C) (Oral)   Resp 16   Wt 97.8 kg (215 lb 9.6 oz)   SpO2 98%   BMI 28.44 kg/m    General: Well-appearing male in no acute distress.  Eyes: EOMI, PERRL. No scleral icterus or conjunctival injection.  ENT: Oral mucosa is moist without lesions or thrush.   Lymphatic: Neck is supple without cervical or supraclavicular lymphadenopathy.   Cardiovascular: RRR No murmurs. No peripheral edema.  Respiratory: CTA bilaterally. No wheezes or  crackles.  Gastrointestinal: BS +. Abdomen rounded, soft, non-tender.   Neurologic: Cranial nerves II through XII are grossly intact.  Skin: Right flank with a few acneiform spots a few mm in size. No rashes, petechiae, or bruising noted on exposed skin.   Psych: affect appropriate. Pleasant.       Labs:     Most Recent 3 CBC's:  Recent Labs   Lab Test 01/31/24  1003 01/22/24  1047 12/22/23  0441 12/21/23  1424   WBC 15.3* 8.7 22.0* 21.8*   HGB 13.5 14.1 11.8* 12.7*   MCV 95 91 91 90    224 302 338   ANEUTAUTO  --  5.5 19.5* 19.0*     Most Recent 3 BMP's:  Recent Labs   Lab Test 02/19/24  0942 01/22/24  0933 12/22/23  0441 12/21/23  0645 12/18/23  0903    137 137  --  140   POTASSIUM 4.5 3.8 4.4   < > 3.9   CHLORIDE 107 106 102  --  106   CO2 21* 21* 21*  --  20*   BUN 16.0 18.9 26.1*  --  23.2*   CR 0.91 0.99 0.93  --  0.97   ANIONGAP 13 10 14  --  14   JOHN PAUL 9.1 9.0 8.9  --  9.4   * 97 134*   < > 107*   PROTTOTAL 7.0 6.9  --   --  7.3   ALBUMIN 4.0 3.9  --   --  4.3    < > = values in this interval not displayed.    Most Recent 3 LFT's:  Recent Labs   Lab Test 02/19/24  0942 01/22/24  0933 12/18/23  0903   AST 32 20 15   ALT 25 27 14   ALKPHOS 65 64 74   BILITOTAL 0.5 1.0 0.4    Most Recent 2 TSH and T4:  Recent Labs   Lab Test 01/22/24  0933 12/07/23  1101   TSH 1.57 1.46     Today's TSH is pending.   I reviewed the above labs today.     Imaging:  MRI Brain w contrast  Narrative: Brain MRI with contrast primarily for the purposes of stereotactic  evaluation    History: Metastasis to brain (H)  ICD-10: Metastasis to brain (H)    Comparison: Brain MRI 1/16/2024    Technique: MR imaging performed with 3-dimensonally acquired axial  T1-weighted sequences performed with intravenous contrast.    Contrast: 9.5 mL gadavist    Findings: Limited imaging for stereotactic imaging demonstrates   multiple intracranial metastatic lesions as follows on series 2:    -Right occipital lesion measuring up to 14 mm,  image 59, increased  from 1/16/2024 when it measured 11 mm.  -Left occipital lesion with enhancement along the ependymal surface  appears stable, measuring 4.3 x 1.6 cm on image 65  -Right frontal parasagittal lesion measuring up to 12 mm, unchanged,  image 101  -7 mm lesion along the left anterior frontal convexity, image 99,  previously 5 mm  -15 mm right occipital lesion on image 57 mildly decreased from prior  when it measured 11 mm.  -Prior right frontal craniotomy for resection of underlying tumor in  the right frontal lobe. There is stable to slightly improved  peripheral nodular enhancement along the resection cavity.  -Prior left parietal craniotomy with stable appearance of the  resection bed in the high left parietal lobe without evidence of  residual enhancing component.    No definite new metastatic lesion.     The major intracranial arterial structures are grossly patent.  Impression: Impression: Some slightly increased and some stable in size multiple  intracranial metastatic lesions. No new intracranial metastasis..  Limited imaging primarily for the purposes of stereotactic  localization.     I have personally reviewed the examination and initial interpretation  and I agree with the findings.    REECE GUERRA MD         SYSTEM ID:  [id number]    I reviewed the above imaging today.       Pathology:       Component  Ref Range & Units    Case Report   Surgical Pathology Report                         Case: [id number]                                   Authorizing Provider:  Kye Garrido MD          Collected:           12/21/2023 09:02 AM           Ordering Location:     [facility]          Received:            12/21/2023 11:24 AM                                  [facility] OR                                                             Pathologist:           Irene Cassidy MD                                                                            Specimen:    Brain, RIGHT FRONTAL LOBE TUMOR                                                            Final Diagnosis   Brain, right frontal lobe tumor, resection:  -Metastatic melanoma, please see comment          Assessment and Plan:   Saeid Santa is a 68 year old male with a history of prostate cancer in 2009 s/p prostatectomy, and BRAF G446A mutated malignant melanoma with brain metastases at diagnosis s/p L frontal craniotomy/resection 4/21/23 followed by GK to tumor bed and L parietal-occipital lesion in May. Started pembrolizumab 6/7/23. Underwent second craniotomy for L parieto-occipital lesion resection in 7/14/23 2/2 hemorrhagic conversion and GK to R frontal lesion 9/8/23, on pembrolizumab, course c/b ICI pneumonitis necessitating steroids, with ongoing CNS progression.    # metastatic melanoma with metastatic disease to brain  # checkpoint inhibitor pneumonitis  -presented with de-francisca CNS involvement s/p craniotomy x2 and GK to additional R frontal lesion  -TMB high, BRAF mutated but not V600E  -s/p pembrolizumab x2 (first 6/7/23)  -most recent PET/CT showed bilateral FDG avid ground glass and reticular infiltrates favored to represent pneumonitis, started on steroids, now completed taper  -imaging in December with CNS progression, ongoing response systemically, improved pneumonitis radiographically  -s/p right front craniotomy on 12/21.   -MRI brain on 1/16/24 as above, again with CNS progression. Has follow up with Dr. Rose on 1/31/24 for possible gamma knife to new occipital lesion  -completed decadron about 5 days ago but daily headaches since dose of 0.5mg. We will restart low dose decadron as below for management of headaches.   --On 1/22/24 he started monthly nivolumab. He is tolerating this well so far.   --He received gamma knife to the right occipital and left frontal lesions on 2/2//24     Oncology Plan/Follow up:  --We will proceed cycle 2  nivolumab today, monitoring closely for recurrent pneumonitis  --if tolerating immunotherapy, could consider escalation with addition of either relatlimab or ipilimumab down the line  -Repeat PET-CT in 2 weeks (3 months from last) followed by visit with Dr. Bunn    #Headaches  Emerged with decadron taper. No dizziness or new fatigue to suggest immune mediated pituitary toxicity. Will restart decadron 1mg daily for 2 weeks and then can decrease to 0.5mg daily until follow up.     #Hypertension  On amlodipine 10mg once daily  Advised to check BP once daily for 5 days and record  Message us with results. If consistently >150/100 may need to consider additional antihypertensive but could include PCP for management    #Loose stool, resolved  Blood in stool, resolved  -No recurrence of blood in stool for a few weeks  -Hgb normal on 1/31/24  -Possible hemorrhoids?  -Notify us if this recurs    #Irregular heartbeat, resolved  -EKG on 1/22/24 shows sinus tachycardia; Suspect exertional.  -Regular on exam today  -Monitor at clinic visits and follow clinically    # Liver lesions  -12/7 CT AP showed two indeterminate liver lesions not previously appreciated, favored to be benign but cannot exclude metastases  --trend with surveillance scans    # Fatigue  -TSH normal 12/7  -Stable  --if worsening consider adrenal insufficiency workup    #Leukocytosis  -Seems to coincide with steroid use. No infectious concerns today.     50 minutes spent on the date of the encounter doing chart review, review of test results, interpretation of tests, patient visit, and documentation      Amber Scheierl, CNP  [facility]  [address] [place] [id number] [phone]

## 2024-02-19 NOTE — NURSING NOTE
Chief Complaint   Patient presents with    Blood Draw     IV placement with blood draw by lab RN. VItals taken and appointment arrived     Meghan Sargent RN

## 2024-03-01 NOTE — TELEPHONE ENCOUNTER
Oncology Nurse Triage - Reporting Symptoms    Situation:   Saeid reporting the following symptoms:headache       Background:   Treating Provider:   Dr Bunn and Amber Scheierl     Date of last office visit: 2/19/24 Amber Scheierl     Recent treatments: Yes: 2/19/24 Cycle 2 Day 1 Nivolumab.       Assessment  Onset of symptoms: had for about a week come on between 1 and 3 am in the morning, it hurts terrible. Still lingering today.     Across brow and up to the top of his head.      Taking 2 tabs of tylenol at a time has only been taking 1 tab. Last taken sometime this morning, can take 2 tabs every 6-8 hours.     Takes 1mg of dex in the AM.     Paged Dr Bunn at 11:34     12:10 Per Dr Bunn should have stat Brain MRI, increase dex to 4mg daily and see an AMANDA on Monday.   Order for Brain MRI is placed.   Dex 4 mg order is placed    Amber Scheierl has appt openings on Monday and will get him scheduled into one of those spots.   Recommendations:   12:13 call placed to Tim and asked him to call triage line back.   12:30 Call placed to Tim and left message asking him to call triage line back.    1:17 Call placed to Tim instructed to take 3mg of dex now so he has in his 4mg of dex for the day and hopefully this will help with headache. Told him if his headache gets worse he has any neurological symptoms then he should go to the ER this weekend.   Message resent to [facility] to call Tim to schedule brain MRI and appt with Amber Scheierl on Monday.

## 2024-03-04 NOTE — PROGRESS NOTES
[facility]: Cancer Care                                                                                          Called patient per care team to assess patient's headaches since increasing Dexamethasone dose last week.  Reached voicemail, left message to return call.  Also sent [employer] message for patient to follow-up.    Patient cancelled appt with oncology today and reported this is bc he has an appt with Dr. Rose on Thursday to review the scan.    Jenny Lemon RN  Cancer Care Coordinator  [facility]

## 2024-03-05 NOTE — PROGRESS NOTES
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 22 Gy in one fraction prescribed to the 50% isodose line (2023)      3.2024  3a: R Occipital: 22 Gy prescribed to 60% isodose line      2b L Frontal: 20 Gy prescribed to 50% isodose line          INTERVAL SINCE COMPLETION OF RADIATION THERAPY:  9 months since first course on 2023, 6 months since 2nd course on 2023, 5 weeks since 3rd course on 2024.     SUBJECTIVE: Mr. Santa is a 67 yo male with metastatic melanoma. He presented with seizure in April this year. Workup revealed 2 discrete ring-enhancing lesions, one of which was in the left paramedian posterior frontal lobe near the precentral gyrus, measuring 2.2 x 1.7 x 1.9 cm and the other in the left occipital lobe near the lingual gyrus, measuring 2.2 x 2.1 x 2.0 cm, associated with  moderate vasogenic edema.  He underwent craniotomy on 2023 for resection of the lesion left frontal lesion as this was felt to be the cause of his seizure. Additionally a scalp lesion was also resected as it had been draining, bleeding and growing in size. Pathology showed malignant melanoma. It was unclear if the scalp lesion was the primary or represent a cutaneous metastasis (it was without epidermal melanoma in situ).  PET/CT on 2023 showed a brain metastasis in the left occipital lobe and a lymph node in the  left low neck.      He then received GK-SRS to the left occipital lesion, 30 Gy in 5 fractions, as well as the left posterior frontal resection cavity, 25 Gy in 5 fractions.  He completed these treatments on 5/31/2023. He then began Keytruda under the care of Dr. Greene on 6/7/2023.       Mr. Santa developed intralesional hemorrhage of the left occipital metastasis with mass effect and ultimately required partial resection on 7/14/2023. The deeper aspect of the tumor densely adhered to the ventricle wall and the entrapped occipital horn, and thus could not be safely resected. Surgical pathology showed treatment effect with residual viable melanoma.     Mr. Santa resumed much delayed cycle 2 of Pembrolizumab on 8/11/2023. Repeat brain MRI on 8/23/2023 showed stable post-op changes in the resected parietal skull and left frontal lobe cavity and decreased enhancement of the left occipital resection cavity. However, a new focus of abnormal contrast enhancement about 6 mm in diameter in the anteromedial right frontal lobe was found.     He then proceeded another course of GK SRS to this new metastasis on 9/8/2023, receiving 22 Gy in a single fraction prescribed to the 50% isodose line.     Mr. Santa unfortunately had to discontinue from Keytruda after 2 cycles due to immune pneumonitis. He was started on a Prednisone taper with the plan to switch to Nivolumab.    Brain MRI on 12/7/2023 unfortunately showed a new lesion in the right frontal lobe with vasogenic edema, a new right occipital lesion about ~ 1 cm, stable to slightly enlarged previously treated frontal falcine lesion, new enhancement surrounding the left occipital cavity. His systemic restaging CT scan showed resolution of previously enlarged left level V cervical node and no new lesions.     Given the sie of the right frontal lobe metastasis, GK SRS was not felt to be able to provide adequate local control. He thus proceeded with 3rd craniotomy on  12/21/2023 by Dr. Garrido. Pathology confirmed metastatic melanoma. He was initially on Decadron 2 mg BID, but discontinued mid Jan due to running out. He was restarted on Decadron by medical oncology with a plan to taper. He began single agent Nivolumab on 1/22/2023.     Mr. Santa had a follow up MRI on 1/16/2024. The right occipital lobe lesion which was seen on 12/7 MRI, but never treated continued to enlarge , measuring 11 mm in largest diameter. Additionally, the previously treated paramedial right frontal lobe lesion enlarged to 12 mm. There was also increased nodular enhancement about the left occipital cavity. Subsequent planning MRI on 1/31/2024 also identified a new left frontal metastasis measuring 5 mm.     He then proceeded with 3rd course of GK SRS on 2/2/2024, receiving 22 Gy to the right occipital and left frontal metastasis. A mask was used as the frame was felt risky given 3 prior craniotomies.     Following his 3rd course of GK SRS, Tim proceeded with cycle 2 Nivolumab on 2/19/2024. At that visit, he endorsed worsening headache after Decadron tapering to 0.5 mg. Decadron was increased to 1 mg daily.      Due to his worsening headache, MRI of the brain was moved up to 3/2/2024. Imaging unfortunately showed progression of multiple lesions, including the most recently treated 2 lesions. Previously irradiated and resected left occipital lobe cavity had worsening enhancement, with surrounding vasogenic edema.     Tim additionally had a PET/CT scan on 3/4/2024. The previously seen hypermetabolic left neck node resolved; however, there were 2 new suspected intramuscular metastases in the lower extremities.     Tim is here today to review his MRI of the brain. On interview, Tim states that he had headache for about one week. Since increasing the Decadron dose to 1 mg a day, his headache resolved. He otherwise has no new neurologic symptoms since last seen on 2/2/2024. He continues to have some numbness in  "his right leg, but this has been stable. He is not requiring a cane to ambulate.          OBJECTIVE:   /77   Pulse 89   SpO2 96%    Gen: Alert and oritented, speech fluent, interaction appropriate.  HEENT: facial features consistent with steroid use. Face symmetric, facial muscle movements intact. A very careful exam of the scalp does not show any concerning lesion.   CV: well perfused  Resp: breathing comfortably on room air.   Neuro: grossly intact.       IMAGIN2024 (Left) vs. 3/2/2024 (Right)  Right occipital lesion: despite recent GK on 2024, continued to grow      Increased nodular enhancement around L occipital cavity     Edema:      Treated paramedial right frontal lesion (GK'ed on 2023) stable       Recently treated left frontal convexity lesion is bigger with more edema.      Right frontal lobe resection cavity improved          ASSESSMENT AND PLAN: In summary, Mr. Santa is a 67 yo gentleman with metastatic melanoma. He has minimal systemic disease, but his CNS metastases have been difficult to control. He has had 3 craniotomies and 3 courses of GK SRS. The only lesions controlled were resected upfront resected metastases. His melanoma appears to be exceedingly radioresistant. Even the most recently treated tumor has increased in size. Another possibility of the the enlargement is \"pseudoprogression\". One of the lesion treated earlier increased in size and subsequently stabilized. See images below:       At any rate, the left occipital horn lesion is clearly progressing, causing vasogenic edema. I am very hesitant to treat this area with repeat GK SRS. 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     I reviewed his imaging with Dr. Garrido, who felt there is no role for further surgeries. He has already had 3 craniotomies. I am also hesitant to prescribe any further radiation. It is unlikely to control his tumor due to the radioresistant nature of " his tumor and will only worsen necrosis.     One dilemma he is facing is the difficulty weaning off of the steroids due to edema. This makes immunotherapy less effective. We discussed potentially using Boswellia to control radiation-induced edema. There has been encouraging recent data:    https://www.appliedradiationoncology.com/articles/role-of-boswellia-bienvenido-in-management-of-cns-radiation-necrosis-after-radiosurgery-for-brain-metastases    https://www.redjournal.org/article/-5191(84)44707-4/fulltext    I will discuss with Dr. Bunn. Mr. Santa will see Dr. Bunn for follow up next Tuesday on 3/12/2024.          Benedicto Rose M.D./Ph.D.  Radiation Oncologist   Department of Therapeutic Radiology   [facility]  Phone: [phone]          30 minutes were spent on the date of the encounter doing chart review, history and exam, documentation and further activities as noted above.           Benedicto Rose MD

## 2024-03-07 NOTE — LETTER
3/7/2024         RE: Saeid Santa  [address]  [facility] [address]        Dear Colleague,    Thank you for referring your patient, Saeid Santa, to the [facility] RADIATION ONCOLOGY. Please see a copy of my visit note below.    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 22 Gy in one fraction prescribed to the 50% isodose line (2023)      3.2024  3a: R Occipital: 22 Gy prescribed to 60% isodose line      2b L Frontal: 20 Gy prescribed to 50% isodose line          INTERVAL SINCE COMPLETION OF RADIATION THERAPY:  9 months since first course on 2023, 6 months since 2nd course on 2023, 5 weeks since 3rd course on 2024.     SUBJECTIVE: Mr. Santa is a 67 yo male with metastatic melanoma. He presented with seizure in April this year. Workup revealed 2 discrete ring-enhancing lesions, one of which was in the left paramedian posterior frontal lobe near the precentral gyrus, measuring 2.2 x 1.7 x 1.9 cm and the other in the left occipital lobe near the lingual gyrus, measuring 2.2 x 2.1 x 2.0 cm, associated with  moderate vasogenic edema.  He underwent craniotomy on 2023 for resection of the lesion left frontal lesion as this was felt to be the cause of his seizure. Additionally a scalp lesion was also resected as it had been draining, bleeding and growing in size. Pathology showed  malignant melanoma. It was unclear if the scalp lesion was the primary or represent a cutaneous metastasis (it was without epidermal melanoma in situ).  PET/CT on 5/18/2023 showed a brain metastasis in the left occipital lobe and a lymph node in the left low neck.      He then received GK-SRS to the left occipital lesion, 30 Gy in 5 fractions, as well as the left posterior frontal resection cavity, 25 Gy in 5 fractions.  He completed these treatments on 5/31/2023. He then began Keytruda under the care of Dr. Greene on 6/7/2023.       Mr. Santa developed intralesional hemorrhage of the left occipital metastasis with mass effect and ultimately required partial resection on 7/14/2023. The deeper aspect of the tumor densely adhered to the ventricle wall and the entrapped occipital horn, and thus could not be safely resected. Surgical pathology showed treatment effect with residual viable melanoma.     Mr. Santa resumed much delayed cycle 2 of Pembrolizumab on 8/11/2023. Repeat brain MRI on 8/23/2023 showed stable post-op changes in the resected parietal skull and left frontal lobe cavity and decreased enhancement of the left occipital resection cavity. However, a new focus of abnormal contrast enhancement about 6 mm in diameter in the anteromedial right frontal lobe was found.     He then proceeded another course of GK SRS to this new metastasis on 9/8/2023, receiving 22 Gy in a single fraction prescribed to the 50% isodose line.     Mr. Santa unfortunately had to discontinue from Keytruda after 2 cycles due to immune pneumonitis. He was started on a Prednisone taper with the plan to switch to Nivolumab.    Brain MRI on 12/7/2023 unfortunately showed a new lesion in the right frontal lobe with vasogenic edema, a new right occipital lesion about ~ 1 cm, stable to slightly enlarged previously treated frontal falcine lesion, new enhancement surrounding the left occipital cavity. His systemic restaging CT scan showed  resolution of previously enlarged left level V cervical node and no new lesions.     Given the sie of the right frontal lobe metastasis, GK SRS was not felt to be able to provide adequate local control. He thus proceeded with 3rd craniotomy on 12/21/2023 by Dr. Garrido. Pathology confirmed metastatic melanoma. He was initially on Decadron 2 mg BID, but discontinued mid Jan due to running out. He was restarted on Decadron by medical oncology with a plan to taper. He began single agent Nivolumab on 1/22/2023.     Mr. Santa had a follow up MRI on 1/16/2024. The right occipital lobe lesion which was seen on 12/7 MRI, but never treated continued to enlarge , measuring 11 mm in largest diameter. Additionally, the previously treated paramedial right frontal lobe lesion enlarged to 12 mm. There was also increased nodular enhancement about the left occipital cavity. Subsequent planning MRI on 1/31/2024 also identified a new left frontal metastasis measuring 5 mm.     He then proceeded with 3rd course of GK SRS on 2/2/2024, receiving 22 Gy to the right occipital and left frontal metastasis. A mask was used as the frame was felt risky given 3 prior craniotomies.     Following his 3rd course of GK SRS, Tim proceeded with cycle 2 Nivolumab on 2/19/2024. At that visit, he endorsed worsening headache after Decadron tapering to 0.5 mg. Decadron was increased to 1 mg daily.      Due to his worsening headache, MRI of the brain was moved up to 3/2/2024. Imaging unfortunately showed progression of multiple lesions, including the most recently treated 2 lesions. Previously irradiated and resected left occipital lobe cavity had worsening enhancement, with surrounding vasogenic edema.     Tim additionally had a PET/CT scan on 3/4/2024. The previously seen hypermetabolic left neck node resolved; however, there were 2 new suspected intramuscular metastases in the lower extremities.     Tim is here today to review his MRI of the brain. On  "interview, Tim states that he had headache for about one week. Since increasing the Decadron dose to 1 mg a day, his headache resolved. He otherwise has no new neurologic symptoms since last seen on 2024. He continues to have some numbness in his right leg, but this has been stable. He is not requiring a cane to ambulate.          OBJECTIVE:   /77   Pulse 89   SpO2 96%    Gen: Alert and oritented, speech fluent, interaction appropriate.  HEENT: facial features consistent with steroid use. Face symmetric, facial muscle movements intact. A very careful exam of the scalp does not show any concerning lesion.   CV: well perfused  Resp: breathing comfortably on room air.   Neuro: grossly intact.       IMAGIN2024 (Left) vs. 3/2/2024 (Right)  Right occipital lesion: despite recent GK on 2024, continued to grow      Increased nodular enhancement around L occipital cavity     Edema:      Treated paramedial right frontal lesion (GK'ed on 2023) stable       Recently treated left frontal convexity lesion is bigger with more edema.      Right frontal lobe resection cavity improved          ASSESSMENT AND PLAN: In summary, Mr. Santa is a 69 yo gentleman with metastatic melanoma. He has minimal systemic disease, but his CNS metastases have been difficult to control. He has had 3 craniotomies and 3 courses of GK SRS. The only lesions controlled were resected upfront resected metastases. His melanoma appears to be exceedingly radioresistant. Even the most recently treated tumor has increased in size. Another possibility of the the enlargement is \"pseudoprogression\". One of the lesion treated earlier increased in size and subsequently stabilized. See images below:       At any rate, the left occipital horn lesion is clearly progressing, causing vasogenic edema. I am very hesitant to treat this area with repeat GK SRS. 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     I " reviewed his imaging with Dr. Garrido, who felt there is no role for further surgeries. He has already had 3 craniotomies. I am also hesitant to prescribe any further radiation. It is unlikely to control his tumor due to the radioresistant nature of his tumor and will only worsen necrosis.     One dilemma he is facing is the difficulty weaning off of the steroids due to edema. This makes immunotherapy less effective. We discussed potentially using Boswellia to control radiation-induced edema. There has been encouraging recent data:    https://www.appliedradiationoncology.com/articles/role-of-boswellia-bienvenido-in-management-of-cns-radiation-necrosis-after-radiosurgery-for-brain-metastases    https://www.redjournal.org/article/-4846(32)71973-3/fulltext    I will discuss with Dr. Bunn. Mr. Santa will see Dr. Bunn for follow up next Tuesday on 3/12/2024.          Benedicto Rose M.D./Ph.D.  Radiation Oncologist   Department of Therapeutic Radiology   [facility]  Phone: [phone]          30 minutes were spent on the date of the encounter doing chart review, history and exam, documentation and further activities as noted above.           Benedicto Rose MD                     FOLLOW-UP VISIT    Patient Name: Saeid Santa      : 1955     Age: 68 year old        ______________________________________________________________________________     Chief Complaint   Patient presents with    Cancer     Radiation follow up     /77   Pulse 89   SpO2 96%      Date Radiation Completed: Gamma Knife: mask x5 completed 23, frame 23, 24    Pain  Denies  Had been having HA last week at the 0.5 mg dose of decadron. Was instructed to go back up to 1 mg daily.     Labs  Other Labs: No    Imaging  MRI: 3/4/24 at [facility]  PET: 3/2/24 at [facility]    Other Appointments: No    Residual Radiation side effect: Here to discus results of scans     Additional Instructions:      Nurse face-to-face time: Level 3:  10 min face to face time            Again, thank you for allowing me to participate in the care of your patient.      Sincerely,    Benedicto Rose MD

## 2024-03-07 NOTE — PROGRESS NOTES
FOLLOW-UP VISIT    Patient Name: Saeid Santa      : 1955     Age: 68 year old        ______________________________________________________________________________     Chief Complaint   Patient presents with    Cancer     Radiation follow up     /77   Pulse 89   SpO2 96%      Date Radiation Completed: Gamma Knife: mask x5 completed 23, frame 23, 24    Pain  Denies  Had been having HA last week at the 0.5 mg dose of decadron. Was instructed to go back up to 1 mg daily.     Labs  Other Labs: No    Imaging  MRI: 3/4/24 at [facility]  PET: 3/2/24 at [facility]    Other Appointments: No    Residual Radiation side effect: Here to discus results of scans     Additional Instructions:     Nurse face-to-face time: Level 3:  10 min face to face time

## 2024-03-12 NOTE — PROGRESS NOTES
Virtual Visit Details    Type of service:  Video Visit     Originating Location (pt. Location): Home    Distant Location (provider location):  On-site  Platform used for Video Visit: Mariya  Could not Edit provider notes, Incomplete

## 2024-03-12 NOTE — PROGRESS NOTES
[facility]   Return Patient Visit    Name: Saeid Santa  MRN: [id number]  Date of visit: Mar 12, 2024    Diagnosis: Metastatic melanoma  Stage: IV    Molecular: BRAF G466A, TMB 48.7554 mut/Mb    Performance status: ECOG 0    Oncology History:  --8/16/22, he brings a left parietal scalp lesion to attention of his GP. It is initially observed.  --November 2022, the left parietal scalp lesion was felt to be a cyst, and the left parietal lesion was lanced and drained via scalp incision.  --January 2023, the left parietal scalp cyst would occasionally break open, drain, and bleed, largely at night.   --February 2023, he notes some mild difficulty clearing obstacles with right leg and foot.  --March/April 2023, he has 3 progressively worsening episodes over a span 3 weeks including the right leg. At first he notes the onset of right leg numbness and heaviness, like he was carrying 50 lbs of water on the leg. Then, about a week or so later, the leg began to jerk and converse while he was in the basement. Neither episode associated with loss of consciousness  --4/15/23, he recalls the right leg numbness, heaviness, jerking while driving between work. He had been found by coworkers in the parking lot, sitting in the grass, appearing confused. He was brought to the ER by EMS. CT-CAP, showed small (<6mm), bilateral indeterminate pulmonary nodules. CT-head and MRI brain showed 2 discrete ring-enhancing lesions in the left paramedian posterior frontal lobe near the precentral gyrus measuring approximately 2.2 x 1.7 x 1.9 cm and left occipital lobe near the lingual gyrus measuring approximately 2.2 x 2.1 x 2.0 cm. The occipital lesion encroaches upon the subependymal surface of the left lateral ventricle occipital horn and contacts the superior surface of the medial left tentorium. The lesions are accompanied by moderate surrounding vasogenic edema. Surgery was recommended.  --4/21/23,  underwent left awake (sleep-awake-sleep) craniotomy for tumor resection, he has resection of the left frontal, motor strip tumor as well asan elliptical incision of the left parietal scalp lesion. On pathology, both the scalp excision and brain mass are positive for malignant melanoma. Tumor cells are positive for S-100 and Melan-A, and negative for cytokeratin AE1/AE3, NKX3.1, GFAP, ERG, CD3, and CD20.  CD3 highlights brisk infiltration of the tumor by T lymphocytes, while CD20 highlights scattered positive B lymphocytes. NGS identifies a BRAF G466A mutation, a class 3 mutation insensitive to BRAF kinase inhibitor monotherapy. TMB is high at 48.754 muts/Mb.   --4/22/23, post-operative MRI shows post-operative changes of the parietal craniotomy of the left frontal lobe metastasis. The other 1.7 x 1.1 cm cystic lesion in the left occipital lobe is also seen. No new lesions appreciated.  -- 4/25/23 to 5/9/23, discharged from the hospital to acute rehab stay at [facility] in [place]. He participated in a comprehensive rehabilitation program consisting of PT, OT, Speech, Psychology and Recreation Therapy. He did well and was discharged home with use of a walker.  --PET-CT on 5/18/23 with potentially involved neck lymph nodes. He has mild FDG uptake in soft tissue nodules in the scalp, in the known left occipital brain metastasis, as well as in the left lower neck region.  -5/24-/5/31/23, He was treated with gamma knife: 2500 cGy to the left resection cavity; 3000 cGy to the L parietal-occipital lesion.  -6/7/2023, Cycle 1 of pembrolizumab 200mg every 3 weeks  -6/14/23-6/16/23, admitted for vision change, reduced comprehension, speech change, fall and headache. MRI showing likely progression of left occipital mass with associated edema. Improved on steroids and discharged on oral decadron. Plan for repeat MRI and neurosurgery follow up in 1 month.  -7/7/23 ED visit for headaches, confusion, vision changes, concern for  hemorrhage of the left occipital mass, admitted to [facility] until 7/9/23, increased dexamethasone dose  -7/14/23 craniotomy of the left parieto-occipital mass, discharged on a dexamethasone taper  -8/11/2023, Cycle 2 of pembrolizumab  -8/23/2023 brain MRI with no recurrence in left frontal lobe, decreased enhancement at edges of left occipital resection cavity, new enhancement int he right front lobe concerning for new met--seen by neurosurgery and considering RT  -8/31/2023 CT/PET with increased uptake in left lower neck lymph node c/f early progression. Ground-glass, reticular changes in lungs also noted likely secondary to drug-induced pneumonitis. Started 1 mg/kg prednisone (80 mg daily) with 8 week taper  -9/8/23 GK to R frontal lobe lesion  -12/7/23 CT neck: Resolved previously hypermetabolic left level 5 lymph node. No new  cervical lymphadenopathy.  -12/7/23 CT chest/AP: Two arterially enhancing lesions in the liver measuring 0.9 - 1.0 cm were not previously visualized, likely due to differences in timing of the contrast bolus. These are indeterminate but favored to be benign perfusional abnormalities. Melanoma metastases cannot be entirely excluded. Otherwise, no evidence for metastatic disease in the chest, abdomen and pelvis. Significant improvement in linear opacities in the lungs, suggesting improving drug-induced pneumonitis. Few stable small pulmonary nodules.  -12/7/23 MR head: Progression in intracranial metastatic disease compared to 9/8/2023   with multiple new and/or enlarging metastases, including internally hemorrhagic right frontal lobe lesion with 3 mm of leftward midline shift and new lesion adjacent to the left occipital resection bed.   -12/8/23 started on dex 4 mg BID  -12/21/23 Right frontal craniotomy with Dr. Garrido  -1/22/24, starts nivolumab (480mg every 4 weeks)  -2/2/24, Gamma knife to right occipital and left frontal lesions       Interval History:    3/2/24 MR brain:  1.   Multifocal intracranial metastatic disease with interval increase in size of multiple metastatic lesions and surrounding vasogenic edema detailed above. Unsure if this is due to disease progression or post radiation change.  2.  Interval decrease in size of the cystic resection cavity in the right frontal lobe.  3.  No acute or subacute ischemic change, extra-axial collection, midline shift or hydrocephalus.    3/4/24 PET/CT:  1.  Although the previously seen hypermetabolic left lower cervical lymph node has resolved, there are 2 new suspected intramuscular metastases located in the lower extremities.  2.  There are FDG avid brain metastases, which were not definitively seen on prior PET/CT although have been better evaluated on interval brain MRIs.    Today:  Dex dose at 0.5 mg daily right now, planing for another ~10 days  RLE numbness is longstanding 2/2 seizures in April of last year, unchanged  Does have some L sole of foot numbness, nothing in the toes  Otherwise no new focal neurologic complaints  Occasional mild headaches, but manageable, perhaps somewhat worse the past few dayds  No new seizures  No cough or shortness of breath  No diarrhea  Does get fatigued with exertion    Exam:   No vitals obtained for this virtual visit    Gen: interactive, NAD  Resp: Comfortable on room air, no increased work      Labs:   Reviewed in chart. No recent labs to review    Imaging:   All pertinent imaging studies personally reviewed, esteves findings included in oncology history above    Pathology:   Reviewed, key findings included in oncology history above.      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 Started pembrolizumab 6/7/23. Underwent second craniotomy for  L parieto-occipital lesion resection in 7/14/23 2/2 hemorrhagic conversion and GK to R frontal lesion 9/8/23, on pembrolizumab, course c/b ICI pneumonitis necessitating steroids, again with CNS progression 12/2023 s/p resection and GK.       # metastatic melanoma with metastatic disease to brain  -presented with de-francisca CNS involvement s/p craniotomy x2 and GK to additional R frontal lesion  -TMB high, BRAF mutated but not V600E  -s/p pembrolizumab x2 (first 6/7/23) c/c/b pneumonitis s/p steroid taper  -recent CNS progression in Dec 2023 s/p R frontal , ongoing response systemically, improved pneumonitis radiographically (never symptomatic)  -3/2/24 brain MR shows increased size of several previously treated metastatic lesions, no new lesions. After discussion with Rad Onc and Radiology, although progression cannot be ruled out, findings can be consistent with treatment effect  -PET/CT reviewed, two questionable small foci in the soft tissues of the lower extremities are atypical for progression of disease but certainly possible. No other concerning areas.       Plan:  --as he is generally feeling pretty well despite a steroid taper without new focal neurologic symptoms or recurrent seizures, will plan to continue with nivolumab for now with plan for short interval MR brain and PET/CT (in 6 weeks)  --can consider escalation to ipilimumab + nivolumab or nivolumab + relatlimab pending the next set of scans (and presuming continued absence of signs/symptoms of pneumonitis)  --headaches are slightly increased after dropping dexamethasone to 0.5 mg daily, will increase to 1 mg daily for now an trend his symptoms, if  unable to taper off steroid and pending upcoming MR brain, could consider addition of bevacizumab to treat treatment-related cerebral edema\      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, resolved  -8/2023 PET/CT showed bilateral FDG avid ground glass and reticular infiltrates  favored to represent pneumonitis, started on steroids, now completed taper  -denied any history of pulmonary symptoms at the time of pneumonitis findings  -most recent imaging with continued resolution  -current steroids indicated for cerebral edema, not pneumonitis    Plan:  --continue to monitor closely for new respiratory symptoms, particularly in the setting of escalation to dual checkpoint inhibitor therapy      # Liver lesions  -12/7 CT AP showed two indeterminate liver lesions not previously appreciated, favored to be benign but cannot exclude metastases  -PET/CT without concerning lesions    Plan:  --trend with surveillance scans      # Fatigue  -TSH normal  -somewhat improved today    Plan:  --monitor  --if worsening consider adrenal insufficiency workup      Carlos Manuel Bunn MD PhD   of Medicine  Division of Hematology, Oncology and Transplantation    ---  30 minutes were spent on the date of the encounter performing chart review, history and exam, documentation, and further activities as noted above.     The longitudinal plan of care for the diagnosis(es)/condition(s) as documented were addressed during this visit. Due to the added complexity in care, I will continue to support Tim in the subsequent management and with ongoing continuity of care.

## 2024-03-12 NOTE — NURSING NOTE
Is the patient currently in the state of [place]? YES    Visit mode:VIDEO    If the visit is dropped, the patient can be reconnected by: TELEPHONE VISIT: Phone number: [phone]    Will anyone else be joining the visit? Yes, spouse Smiley will be there with .(If patient encounters technical issues they should call [phone] :[phone])    How would you like to obtain your AVS? MyChart    Are changes needed to the allergy or medication list? No    Reason for visit: RECHEUN SUNG

## 2024-03-12 NOTE — LETTER
3/12/2024         RE: Saeid Santa  [address]  [place] [address]        Dear Colleague,    Thank you for referring your patient, Saeid Santa, to the [facility]. Please see a copy of my visit note below.    [facility]   Return Patient Visit    Name: Saeid Santa  MRN: [id number]  Date of visit: Mar 12, 2024    Diagnosis: Metastatic melanoma  Stage: IV    Molecular: BRAF G466A, TMB 48.7554 mut/Mb    Performance status: ECOG 0    Oncology History:  --8/16/22, he brings a left parietal scalp lesion to attention of his GP. It is initially observed.  --November 2022, the left parietal scalp lesion was felt to be a cyst, and the left parietal lesion was lanced and drained via scalp incision.  --January 2023, the left parietal scalp cyst would occasionally break open, drain, and bleed, largely at night.   --February 2023, he notes some mild difficulty clearing obstacles with right leg and foot.  --March/April 2023, he has 3 progressively worsening episodes over a span 3 weeks including the right leg. At first he notes the onset of right leg numbness and heaviness, like he was carrying 50 lbs of water on the leg. Then, about a week or so later, the leg began to jerk and converse while he was in the basement. Neither episode associated with loss of consciousness  --4/15/23, he recalls the right leg numbness, heaviness, jerking while driving between work. He had been found by coworkers in the parking lot, sitting in the grass, appearing confused. He was brought to the ER by EMS. CT-CAP, showed small (<6mm), bilateral indeterminate pulmonary nodules. CT-head and MRI brain showed 2 discrete ring-enhancing lesions in the left paramedian posterior frontal lobe near the precentral gyrus measuring approximately 2.2 x 1.7 x 1.9 cm and left occipital lobe near the lingual gyrus measuring approximately 2.2 x 2.1 x 2.0 cm. The occipital lesion encroaches  upon the subependymal surface of the left lateral ventricle occipital horn and contacts the superior surface of the medial left tentorium. The lesions are accompanied by moderate surrounding vasogenic edema. Surgery was recommended.  --4/21/23, underwent left awake (sleep-awake-sleep) craniotomy for tumor resection, he has resection of the left frontal, motor strip tumor as well asan elliptical incision of the left parietal scalp lesion. On pathology, both the scalp excision and brain mass are positive for malignant melanoma. Tumor cells are positive for S-100 and Melan-A, and negative for cytokeratin AE1/AE3, NKX3.1, GFAP, ERG, CD3, and CD20.  CD3 highlights brisk infiltration of the tumor by T lymphocytes, while CD20 highlights scattered positive B lymphocytes. NGS identifies a BRAF G466A mutation, a class 3 mutation insensitive to BRAF kinase inhibitor monotherapy. TMB is high at 48.754 muts/Mb.   --4/22/23, post-operative MRI shows post-operative changes of the parietal craniotomy of the left frontal lobe metastasis. The other 1.7 x 1.1 cm cystic lesion in the left occipital lobe is also seen. No new lesions appreciated.  -- 4/25/23 to 5/9/23, discharged from the hospital to acute rehab stay at [facility] in [place]. He participated in a comprehensive rehabilitation program consisting of PT, OT, Speech, Psychology and Recreation Therapy. He did well and was discharged home with use of a walker.  --PET-CT on 5/18/23 with potentially involved neck lymph nodes. He has mild FDG uptake in soft tissue nodules in the scalp, in the known left occipital brain metastasis, as well as in the left lower neck region.  -5/24-/5/31/23, He was treated with gamma knife: 2500 cGy to the left resection cavity; 3000 cGy to the L parietal-occipital lesion.  -6/7/2023, Cycle 1 of pembrolizumab 200mg every 3 weeks  -6/14/23-6/16/23, admitted for vision change, reduced comprehension, speech change, fall and headache. MRI showing likely  progression of left occipital mass with associated edema. Improved on steroids and discharged on oral decadron. Plan for repeat MRI and neurosurgery follow up in 1 month.  -7/7/23 ED visit for headaches, confusion, vision changes, concern for hemorrhage of the left occipital mass, admitted to [facility] until 7/9/23, increased dexamethasone dose  -7/14/23 craniotomy of the left parieto-occipital mass, discharged on a dexamethasone taper  -8/11/2023, Cycle 2 of pembrolizumab  -8/23/2023 brain MRI with no recurrence in left frontal lobe, decreased enhancement at edges of left occipital resection cavity, new enhancement int he right front lobe concerning for new met--seen by neurosurgery and considering RT  -8/31/2023 CT/PET with increased uptake in left lower neck lymph node c/f early progression. Ground-glass, reticular changes in lungs also noted likely secondary to drug-induced pneumonitis. Started 1 mg/kg prednisone (80 mg daily) with 8 week taper  -9/8/23 GK to R frontal lobe lesion  -12/7/23 CT neck: Resolved previously hypermetabolic left level 5 lymph node. No new  cervical lymphadenopathy.  -12/7/23 CT chest/AP: Two arterially enhancing lesions in the liver measuring 0.9 - 1.0 cm were not previously visualized, likely due to differences in timing of the contrast bolus. These are indeterminate but favored to be benign perfusional abnormalities. Melanoma metastases cannot be entirely excluded. Otherwise, no evidence for metastatic disease in the chest, abdomen and pelvis. Significant improvement in linear opacities in the lungs, suggesting improving drug-induced pneumonitis. Few stable small pulmonary nodules.  -12/7/23 MR head: Progression in intracranial metastatic disease compared to 9/8/2023   with multiple new and/or enlarging metastases, including internally hemorrhagic right frontal lobe lesion with 3 mm of leftward midline shift and new lesion adjacent to the left occipital resection bed.   -12/8/23  started on dex 4 mg BID  -12/21/23 Right frontal craniotomy with Dr. Garrido  -1/22/24, starts nivolumab (480mg every 4 weeks)  -2/2/24, Gamma knife to right occipital and left frontal lesions       Interval History:    3/2/24 MR brain:  1.  Multifocal intracranial metastatic disease with interval increase in size of multiple metastatic lesions and surrounding vasogenic edema detailed above. Unsure if this is due to disease progression or post radiation change.  2.  Interval decrease in size of the cystic resection cavity in the right frontal lobe.  3.  No acute or subacute ischemic change, extra-axial collection, midline shift or hydrocephalus.    3/4/24 PET/CT:  1.  Although the previously seen hypermetabolic left lower cervical lymph node has resolved, there are 2 new suspected intramuscular metastases located in the lower extremities.  2.  There are FDG avid brain metastases, which were not definitively seen on prior PET/CT although have been better evaluated on interval brain MRIs.    Today:  Dex dose at 0.5 mg daily right now, planing for another ~10 days  RLE numbness is longstanding 2/2 seizures in April of last year, unchanged  Does have some L sole of foot numbness, nothing in the toes  Otherwise no new focal neurologic complaints  Occasional mild headaches, but manageable, perhaps somewhat worse the past few dayds  No new seizures  No cough or shortness of breath  No diarrhea  Does get fatigued with exertion    Exam:   No vitals obtained for this virtual visit    Gen: interactive, NAD  Resp: Comfortable on room air, no increased work      Labs:   Reviewed in chart. No recent labs to review    Imaging:   All pertinent imaging studies personally reviewed, esteves findings included in oncology history above    Pathology:   Reviewed, key findings included in oncology history above.      Assessment and Plan:   Saeid Santa is a 68 year old male with a history of prostate cancer in 2009 s/p prostatectomy, and BRAF  G446A mutated malignant melanoma with brain metastases at diagnosis s/p L frontal craniotomy/resection 4/21/23 followed by GK to tumor bed and L parietal-occipital lesion in May. Started pembrolizumab 6/7/23. Underwent second craniotomy for L parieto-occipital lesion resection in 7/14/23 2/2 hemorrhagic conversion and GK to R frontal lesion 9/8/23, on pembrolizumab, course c/b ICI pneumonitis necessitating steroids, again with CNS progression 12/2023 s/p resection and GK.       # metastatic melanoma with metastatic disease to brain  -presented with de-francisca CNS involvement s/p craniotomy x2 and GK to additional R frontal lesion  -TMB high, BRAF mutated but not V600E  -s/p pembrolizumab x2 (first 6/7/23) c/c/b pneumonitis s/p steroid taper  -recent CNS progression in Dec 2023 s/p R frontal , ongoing response systemically, improved pneumonitis radiographically (never symptomatic)  -3/2/24 brain MR shows increased size of several previously treated metastatic lesions, no new lesions. After discussion with Rad Onc and Radiology, although progression cannot be ruled out, findings can be consistent with treatment effect  -PET/CT reviewed, two questionable small foci in the soft tissues of the lower extremities are atypical for progression of disease but certainly possible. No other concerning areas.       Plan:  --as he is generally feeling pretty well despite a steroid taper without new focal neurologic symptoms or recurrent seizures, will plan to continue with nivolumab for now with plan for short interval MR brain and PET/CT (in 6 weeks)  --can consider escalation to ipilimumab + nivolumab or nivolumab + relatlimab pending the next set of scans (and presuming continued absence of signs/symptoms of pneumonitis)  --headaches are slightly increased after dropping dexamethasone to 0.5 mg daily, will increase to 1 mg daily for now an trend his symptoms, if  unable to taper off steroid and pending upcoming MR brain, could  consider addition of bevacizumab to treat treatment-related cerebral edema\      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, resolved  -8/2023 PET/CT showed bilateral FDG avid ground glass and reticular infiltrates favored to represent pneumonitis, started on steroids, now completed taper  -denied any history of pulmonary symptoms at the time of pneumonitis findings  -most recent imaging with continued resolution  -current steroids indicated for cerebral edema, not pneumonitis    Plan:  --continue to monitor closely for new respiratory symptoms, particularly in the setting of escalation to dual checkpoint inhibitor therapy    # Liver lesions  -12/7 CT AP showed two indeterminate liver lesions not previously appreciated, favored to be benign but cannot exclude metastases  -PET/CT without concerning lesions    Plan:  --trend with surveillance scans    # Fatigue  -TSH normal  -somewhat improved today    Plan:  --monitor  --if worsening consider adrenal insufficiency workup    Carlos Manuel Bunn MD PhD   of Medicine  Division of Hematology, Oncology and Transplantation    ---  30 minutes were spent on the date of the encounter performing chart review, history and exam, documentation, and further activities as noted above.     The longitudinal plan of care for the diagnosis(es)/condition(s) as documented were addressed during this visit. Due to the added complexity in care, I will continue to support Tim in the subsequent management and with ongoing continuity of care.    Virtual Visit Details    Type of service:  Video Visit     Originating Location (pt. Location): Home    Distant Location (provider location):  On-site  Platform used for Video Visit: Mariya  Could not Edit provider notes, Incomplete

## 2024-03-13 NOTE — PROGRESS NOTES
Social Work - Distress Screen Intervention  [facility]    Identified Concern and Score from Distress Screenin. How concerned are you about your ability to eat? 0     2. How concerned are you about unintended weight loss or your current weight? 3     3. How concerned are you about feeling depressed or very sad?  3     4. How concerned are you about feeling anxious or very scared?  2     5. Do you struggle with the loss of meaning and shivani in your life?  Somewhat     6. How concerned are you about work and home life issues that may be affected by your cancer?  0     7. How concerned are you about knowing what resources are available to help you?  (!) 10     8. Do you currently have what you would describe as Mormon or spiritual struggles? Not at all     9. If you want to be contacted by one of our professionals, I can send a message to them right now.  Oncology Dietitian       Date of Distress Screen: 3/12/2024  Data: At time of last visit, patient scored positive on distress screening.  outreached to patient today to follow up on elevated distress and introduce psychosocial services and support.  Intervention/Education provided:  contacted patient by phone to discuss distress screening results.  Patient expressed concern with his diet which he had requested a dietitian reach out to him for in the distress screening. Patient also expressed concern for his spouse needing emotional support. This Social Work Student provided Patient with several support group/mentor options for his spouse and advised that she speak to her primary care provider if she is interested in more individual therapeutic services.    Follow-up Required:  will remain available to patient for support as needed    HANNAH Morgan Student Intern  Outpatient Specialty Clinics   [facility]  Available Monday, Tuesday, Wednesday  Direct Phone  [phone]  : Mary Sarabia, [facility], [phone]

## 2024-03-13 NOTE — PROGRESS NOTES
Social Work - Intervention  [facility]  Data/Intervention:    Patient Name: Saeid Santa Goes By: Tim LOPEZ/Age: 1955 (68 year old)     Visit Type: telephone  Referral Source: ***  Reason for Referral: ***    Collaborated With:    -***  -***     Psychosocial Information/Concerns:  ***     Intervention/Education/Resources Provided:  ***     Assessment/Plan:  ***     Provided patient/family with contact information and availability.    {SIGNATURE}

## 2024-03-19 NOTE — NURSING NOTE
Chief Complaint   Patient presents with    Blood Draw     Labs drawn with piv start by rn.  VS taken.     Labs drawn with PIV start by rn.  Pt tolerated well.  VS taken and pt checked in for next appt.    Jayna Niño RN

## 2024-03-19 NOTE — PROGRESS NOTES
Infusion Nursing Note:  Saeid Santa presents today for Cycle 3 Nivolumab.    Patient had a virtual visit with Dr Bunn on 3/12/24    Note: Pt reports no questions or concerns since speaking with Dr Bunn and wishes to proceed with today's treatment as planned.      Intravenous Access:  Peripheral IV placed in lab    Treatment Conditions:  Component      Latest Ref Rng 3/19/2024  3:34 PM   WBC      4.0 - 11.0 10e3/uL 12.4 (H)    RBC Count      4.40 - 5.90 10e6/uL 4.89    Hemoglobin      13.3 - 17.7 g/dL 14.9    Hematocrit      40.0 - 53.0 % 45.0    MCV      78 - 100 fL 92    MCH      26.5 - 33.0 pg 30.5    MCHC      31.5 - 36.5 g/dL 33.1    RDW      10.0 - 15.0 % 15.8 (H)    Platelet Count      150 - 450 10e3/uL 237    % Neutrophils      % 73    % Lymphocytes      % 18    % Monocytes      % 5    % Eosinophils      % 1    % Basophils      % 1    % Immature Granulocytes      % 2    NRBCs per 100 WBC      <1 /100 0    Absolute Neutrophils      1.6 - 8.3 10e3/uL 9.2 (H)    Absolute Lymphocytes      0.8 - 5.3 10e3/uL 2.2    Absolute Monocytes      0.0 - 1.3 10e3/uL 0.6    Absolute Eosinophils      0.0 - 0.7 10e3/uL 0.1    Absolute Basophils      0.0 - 0.2 10e3/uL 0.1    Absolute Immature Granulocytes      <=0.4 10e3/uL 0.2    Absolute NRBCs      10e3/uL 0.0    Sodium      135 - 145 mmol/L 142    Potassium      3.4 - 5.3 mmol/L 3.6    Carbon Dioxide (CO2)      22 - 29 mmol/L 22    Anion Gap      7 - 15 mmol/L 11    Urea Nitrogen      8.0 - 23.0 mg/dL 19.1    Creatinine      0.67 - 1.17 mg/dL 1.16    GFR Estimate      >60 mL/min/1.73m2 68    Calcium      8.8 - 10.2 mg/dL 9.0    Chloride      98 - 107 mmol/L 109 (H)    Glucose      70 - 99 mg/dL 113 (H)    Alkaline Phosphatase      40 - 150 U/L 60    AST      0 - 45 U/L 23    ALT      0 - 70 U/L 40    Protein Total      6.4 - 8.3 g/dL 6.5    Albumin      3.5 - 5.2 g/dL 4.0    Bilirubin Total      <=1.2 mg/dL 0.8         Results reviewed, labs MET treatment parameters,  ok to proceed with treatment.      Post Infusion Assessment:  Patient tolerated infusion without incident.       Discharge Plan:   Patient declined prescription refills.  AVS to patient via "[employer]T.  Patient will return in one month for cycle 4.  This appt has not been made yet.  A message to Dr Sepideh Hurtado RNCC, notifying her this has not been scheduled, so she could f/up.  Patient discharged in stable condition accompanied by: wife.  Departure Mode: Ambulatory.      Dianna Syed RN

## 2024-03-21 NOTE — PROGRESS NOTES
CLINICAL NUTRITION SERVICES     Reason for Contact: Questions from Oncology Distress Screening  1. How concerned are you about your ability to eat? :  0  2. How concerned are you about unintended weight loss or your current weight? : 3  9. If you want to be contacted by one of our professionals, I can send a message to them right now.: Oncology Dietitian    Action: RD called patient indicating reason for phone call. Left a VM with a return call back number.     Follow up: Wait for a return phone call.    Imani Tripathi RD, LD  [phone]

## 2024-03-25 NOTE — TELEPHONE ENCOUNTER
Medication requested: dexamethasone 1 MG    Last prescribing provider: Amber scheierl on 2/19/24    Last clinic visit date: 3/12/24 with Dr. Bunn    Recommendations for requested medication (if none, N/A): Per Dr. Bunn's note on 3/12/24: --headaches are slightly increased after dropping dexamethasone to 0.5 mg daily, will increase to 1 mg daily for now an trend his symptoms, if  unable to taper off steroid and pending upcoming MR brain, could consider addition of bevacizumab to treat treatment-related cerebral edema\       Any other pertinent information (if none, N/A): NA    Refilled: Y/N, if NO, why?    Pended and Routed to Dr. Carlos Manuel Bunn

## 2024-03-29 NOTE — TELEPHONE ENCOUNTER
"Oncology Nurse Triage- HEADACHE  Situation: Saeid  reporting headache.    Background:   Treating Provider: Carlos Manuel Bunn    Date of last office visit: 3/12/24 with Dr. Bunn    Recent Treatments:3/19/24:  Cycle 3 Nivolumab.     Went up on Dex to 1.0 mg on 3/25 per Dr. Bunn's recommendation.    Assessment:     Onset: one week  Location: same place where he's discussed before with Care team. Goes along brow on both sides of his face; especially bottom L side  Hx of headaches? States when he was on keytruda, he didn't get HA, but after starting this new drug, he has. After the last two infusions, they come on in the second to third week, intensity 6/10, don't go away until 2 pm. Taking 2 tabs tylenol BID and it doesn't help. Describes as constantly irritating, not throbbing, but steady.  Worsens or wakes pt up from sleep? Pt states that \"sleep is a whole other issuer.\" AGUILAR does wake him up from sleep and keeps him awake. He also has difficulty sleeping d/t the steroid he is on.  Radiating, tingling, weakness, numbness in arms, legs, neck?  No   Any signs of facial drooping, numbness, uneven smile, vision changes? No  Is pt currently on a steroid? Dexamethasone  What is current dose? 1 mg daily with breakfast  Other medications Tried?: has tried a little ibuprofen, almost seemed like it helped better.  No fever BP normal. Alert and oriented. No N/V  What has pt tried: Uses ice and heat. Heat feels better. Has tried tylenol which doesn't work and ibuprofen which worked better. Does not take anything for sleep. Drinks clear liquids and feels he's drinking all the time, but cannot say an amount that he drinks per day.    Recommendations:   This writer informed pt that this information will be sent to Dr. Bunn for review and we will call him back.  This writer will educate on the following after hearing Dr. Bunn's recommendations.   Observe for change in headache or head pain, drink clear liquids if unknown " origin, rest in a dark, quiet room, elevate head, apply ice or heat depending on preference to head and neck, take medications as prescribed.    Routed to Care Team.

## 2024-04-02 NOTE — PROGRESS NOTES
[facility]: Cancer Care                                                                                          Called patient and advised he go to ER for headaches and STAT brain MRI.  Patient asked about getting pain meds instead.  Informed patient on the importance of getting a brain scan in the ER and that pain meds can be administered there.  Patient then said does not want to miss his palliative appt tomorrow.  Informed patient palliative appt can easily be rescheduled.  Patient started to get escalated with writer and hung up the phone.  Informed care team.    Jenny Lemon RN  Cancer Care Coordinator  [facility]

## 2024-04-02 NOTE — PROGRESS NOTES
[facility]: Cancer Care                                                                                          Per [employer] messages, patient reports lasting headaches.  See MyChart encounter from 3/25/2024 for more details.  Checked with scheduling to see if scans could be moved up, but no availability, informed MD.  Patient requested palliative referral, ordered.    Jenny Lemon, RN  Cancer Care Coordinator  [facility]

## 2024-04-02 NOTE — PROGRESS NOTES
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 Tried Keytruda but unfortunately sustained pneumonitis.  Currently on nivolumab.     Reviewed: Yes, no controlled Rx's in last 9 months.    Interim History:  Saeid Santa is a 68 year old male who is seen today for follow up with Palliative Care via billable video visit.  His wife Smiley is also present and provides additional history.     Reviewed oncology note from 3/12.  Imaging studies had shown findings concerning for disease progression, however overall felt to be secondary to treatment changes.  Plan was to continue with nivolumab and repeat imaging in short interval of 6 weeks.  Consider escalation of treatment pending next scans.    Says headaches started to increase when Opdivo was started. Most significant in second and third weeks after his infusions.  States that they have become more progressive and are nearly constant.  He only gets a few hours of relief every day.  Says that they travel across his forehead/brow and around the back of his skull.  They have tried all sorts of over-the-counter options including ibuprofen, ice, and heat with really no effect.  He cannot tell a difference in the headache when increasing the dexamethasone from 0.5 mg to 1 mg.  He is also really not sleeping at all, he might get a few hours of relief overnight, however wife notes that they are now on day 4 of him not sleeping (which also means she is not sleeping).      Wife states that they are both at their breaking point.    Discussed recommendation to go to the emergency room, and wife states that they have done that and \"they loaded him up with steroids " "and send him home\".      He otherwise reports that he tolerates the infusions okay.  He denies any nausea, vomiting, or bowel changes.  Getting less and less exercise due to fatigue.  Notes that he has gotten weaker over the last few months and that he gets tired more easily.  He did have some questions about the best diet to eat.    Does express some hesitancies about getting his PET scan, MRI, and his infusion on the same day.  Also says they have wondered about a second opinion.    Knows this is progressive disease that we will ultimately take his life, however he states \"I am not can help it along by giving up\".  While he is frustrated by all of the challenges he is facing, he is very much interested in continuing cancer directed therapy at this time.    Social History:   to Smiley. They have 8 children and 6 grandchildren-all live around the [facility]. Was a -retired .  Social History     Tobacco Use    Smoking status: Former     Packs/day: 0.50     Years: 20.00     Additional pack years: 0.00     Total pack years: 10.00     Types: Cigarettes     Quit date: 2017     Years since quittin.2     Passive exposure: Current (Second hand exposure)    Smokeless tobacco: Never   Vaping Use    Vaping Use: Never used   Substance Use Topics    Alcohol use: Not Currently     Comment: socially    Drug use: Not Currently     Types: Marijuana     Comment: typically smoked daily but no use for past two weeks as of 23     Advanced Care Planning: Has an HCD. Wife is primary HCA and brother is alternate. Thinks he'd like to review it before having it uploaded to his chart.    Family History- Reviewed in Epic.    Medications- Reviewed in Epic.    Past Medical History- Reviewed in [facility].    Past Surgical History- Reviewed in Epic.    Physical Exam:   Constitutional: Alert, pleasant, no apparent distress. Sitting up in chair.  Eyes: Sclera non-icteric, no eye discharge.  ENT: No " nasal discharge. Ears grossly normal.  Respiratory: Unlabored respirations. Speaking in full sentences.  Musculoskeletal: Extremities appear normal- no gross deformities noted. No edema noted on upper body.   Skin: No suspicious lesions or rashes on visible skin.  Neurologic: Clear speech, no aphasia. No facial droop.  Psychiatric: Mentation appears normal, appropriate attention. Affect normal/bright. Does not appear anxious or depressed.    Key Data Reviewed:  LABS: 3/19/2024- Cr 1.16, GFR 68, Albumin 4, LFTs WNL. WBC 12.4, Hgb 14.9, Plts 237.     IMAGING: 3/2/24 MR brain: Impression-  1.  Multifocal intracranial metastatic disease with interval increase in size of multiple metastatic lesions and surrounding vasogenic edema detailed above. Unsure if this is due to disease progression or post radiation change.  2.  Interval decrease in size of the cystic resection cavity in the right frontal lobe.  3.  No acute or subacute ischemic change, extra-axial collection, midline shift or hydrocephalus.    PET scan 3/4/2024- IMPRESSION:  1.  Although the previously seen hypermetabolic left lower cervical lymph node has resolved, there are 2 new suspected intramuscular metastases located in the lower extremities.  2.  There are FDG avid brain metastases, which were not definitively seen on prior PET/CT although have been better evaluated on interval brain MRIs.      Impression & Recommendations & Counseling:  Saeid Santa is a 68 year old male with history of metastatic melanoma, mets to brain.    Pain - Headaches have been progressive and not responsive to any over-the-counter options.  There is concern that the worsening headaches are indicating ongoing tumor growth.  -Rx for oxycodone 5-10mg Q4H PRN.  Asked them to let me know how effective this is.  -Depending on results of oxycodone, could consider other options for opioid management versus additional headache management such as Topamax or even  amitriptyline/nortriptyline.  -Discussed recommendation to go to the emergency room this time was to try to expedite imaging of his brain, which could help facilitate additional treatment decisions happening faster.  -I did give a prescription for Narcan as well.    Bowels - Discussed possibility of constipation with opioids.  -Discussed bowel regimen, including dosing for miralax and senna.  Sent prescriptions for these, so we will see if they can be covered.    Fatigue - Has been feeling weaker, and with his headache, has not been as active and is not really doing any focused exercise.  -Discussed that physical activity is the one thing that has been shown in medical literature to help improve fatigue related to cancer and cancer treatments. Recommended starting some activity, such as 10 to 20 minutes 2-3 times weekly and then build up from here.  -Encouraged focusing on protein intake.  If he would like to look into an all-around healthy diet, recommended Mediterranean diet.    Metastatic melanoma - He is very aware that his condition is progressive and would ultimately be fatal.  He also understands that there is a very high likelihood his tumors are growing.  At this point in time, he is still interested in pursuing further cancer directed treatment.  He does have some hesitancies about doing imaging and infusion the same day.  Discussed that referencing imaging studies first would likely make the most sense, because of updated imaging showed further tumor growth, his infusions might be changed.  I also did discuss the topic of a second opinion a little bit and that he is absolutely free to look into something like this.    ACP - He has completed an HCD noting his wife is his primary HCA and his brother is alternate.  He says it has been a little while, and he thinks he would like to look over it before sharing it with the health system.  I offered information about honoring choices [place] in case he would  like to update it, and he agreed.  Placed in AVS.    Follow up: 3 to 4 weeks      Video-Visit Details  Video Start Time: 1:54 PM  Video End Time: 2:26 PM  Video cut out, so continued on telephone for additional 31 minutes.    Originating Location (pt. Location): Home     Distant Location (provider location):  Offsite- Personal Home      Platform used for Video Visit: Mariya     Total time spent on day of encounter is 82 mins, including reviewing record, review of above studies, above visit with patient, symptomatic discussion as above, including medication adjustments/prescription management, and documentation.     Cristina Calero, DO  Palliative Medicine   [id number]    Some chart documentation performed using Dragon Voice recognition Software. Although reviewed after completion, some words and grammatical errors may remain.

## 2024-04-03 NOTE — LETTER
4/3/2024         RE: Saeid Santa  [address]  [facility] [address]        Dear Colleague,    Thank you for referring your patient, Saeid Santa, to the [facility] CANCER [facility]. Please see a copy of my visit note below.    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 Tried Keytruda but unfortunately sustained pneumonitis.  Currently on nivolumab.     Reviewed: Yes, no controlled Rx's in last 9 months.    Interim History:  Saeid Santa is a 68 year old male who is seen today for follow up with Palliative Care via billable video visit.  His wife Smiley is also present and provides additional history.     Reviewed oncology note from 3/12.  Imaging studies had shown findings concerning for disease progression, however overall felt to be secondary to treatment changes.  Plan was to continue with nivolumab and repeat imaging in short interval of 6 weeks.  Consider escalation of treatment pending next scans.    Says headaches started to increase when Opdivo was started. Most significant in second and third weeks after his infusions.  States that they have become more progressive and are nearly constant.  He only gets a few hours of relief every day.  Says that they travel across his forehead/brow and around the back of his skull.  They have tried all sorts of over-the-counter options including ibuprofen, ice, and heat with really no effect.  He cannot tell a difference in the headache when increasing the dexamethasone from 0.5 mg to 1 mg.  He is also really not sleeping at all, he might get a few hours of relief overnight, however wife notes that they are now on day 4 of  "him not sleeping (which also means she is not sleeping).      Wife states that they are both at their breaking point.    Discussed recommendation to go to the emergency room, and wife states that they have done that and \"they loaded him up with steroids and send him home\".      He otherwise reports that he tolerates the infusions okay.  He denies any nausea, vomiting, or bowel changes.  Getting less and less exercise due to fatigue.  Notes that he has gotten weaker over the last few months and that he gets tired more easily.  He did have some questions about the best diet to eat.    Does express some hesitancies about getting his PET scan, MRI, and his infusion on the same day.  Also says they have wondered about a second opinion.    Knows this is progressive disease that we will ultimately take his life, however he states \"I am not can help it along by giving up\".  While he is frustrated by all of the challenges he is facing, he is very much interested in continuing cancer directed therapy at this time.    Social History:   to Smiley. They have 8 children and 6 grandchildren-all live around the [facility]. Was a -retired .  Social History     Tobacco Use     Smoking status: Former     Packs/day: 0.50     Years: 20.00     Additional pack years: 0.00     Total pack years: 10.00     Types: Cigarettes     Quit date: 2017     Years since quittin.2     Passive exposure: Current (Second hand exposure)     Smokeless tobacco: Never   Vaping Use     Vaping Use: Never used   Substance Use Topics     Alcohol use: Not Currently     Comment: socially     Drug use: Not Currently     Types: Marijuana     Comment: typically smoked daily but no use for past two weeks as of 23     Advanced Care Planning: Has an HCD. Wife is primary HCA and brother is alternate. Thinks he'd like to review it before having it uploaded to his chart.    Family History- Reviewed in Epic.    Medications- " Reviewed in [facility].    Past Medical History- Reviewed in [facility].    Past Surgical History- Reviewed in Epic.    Physical Exam:   Constitutional: Alert, pleasant, no apparent distress. Sitting up in chair.  Eyes: Sclera non-icteric, no eye discharge.  ENT: No nasal discharge. Ears grossly normal.  Respiratory: Unlabored respirations. Speaking in full sentences.  Musculoskeletal: Extremities appear normal- no gross deformities noted. No edema noted on upper body.   Skin: No suspicious lesions or rashes on visible skin.  Neurologic: Clear speech, no aphasia. No facial droop.  Psychiatric: Mentation appears normal, appropriate attention. Affect normal/bright. Does not appear anxious or depressed.    Key Data Reviewed:  LABS: 3/19/2024- Cr 1.16, GFR 68, Albumin 4, LFTs WNL. WBC 12.4, Hgb 14.9, Plts 237.     IMAGING: 3/2/24 MR brain: Impression-  1.  Multifocal intracranial metastatic disease with interval increase in size of multiple metastatic lesions and surrounding vasogenic edema detailed above. Unsure if this is due to disease progression or post radiation change.  2.  Interval decrease in size of the cystic resection cavity in the right frontal lobe.  3.  No acute or subacute ischemic change, extra-axial collection, midline shift or hydrocephalus.    PET scan 3/4/2024- IMPRESSION:  1.  Although the previously seen hypermetabolic left lower cervical lymph node has resolved, there are 2 new suspected intramuscular metastases located in the lower extremities.  2.  There are FDG avid brain metastases, which were not definitively seen on prior PET/CT although have been better evaluated on interval brain MRIs.      Impression & Recommendations & Counseling:  Saeid Santa is a 68 year old male with history of metastatic melanoma, mets to brain.    Pain - Headaches have been progressive and not responsive to any over-the-counter options.  There is concern that the worsening headaches are indicating ongoing tumor growth.  -Rx  for oxycodone 5-10mg Q4H PRN.  Asked them to let me know how effective this is.  -Depending on results of oxycodone, could consider other options for opioid management versus additional headache management such as Topamax or even amitriptyline/nortriptyline.  -Discussed recommendation to go to the emergency room this time was to try to expedite imaging of his brain, which could help facilitate additional treatment decisions happening faster.  -I did give a prescription for Narcan as well.    Bowels - Discussed possibility of constipation with opioids.  -Discussed bowel regimen, including dosing for miralax and senna.  Sent prescriptions for these, so we will see if they can be covered.    Fatigue - Has been feeling weaker, and with his headache, has not been as active and is not really doing any focused exercise.  -Discussed that physical activity is the one thing that has been shown in medical literature to help improve fatigue related to cancer and cancer treatments. Recommended starting some activity, such as 10 to 20 minutes 2-3 times weekly and then build up from here.  -Encouraged focusing on protein intake.  If he would like to look into an all-around healthy diet, recommended Mediterranean diet.    Metastatic melanoma - He is very aware that his condition is progressive and would ultimately be fatal.  He also understands that there is a very high likelihood his tumors are growing.  At this point in time, he is still interested in pursuing further cancer directed treatment.  He does have some hesitancies about doing imaging and infusion the same day.  Discussed that referencing imaging studies first would likely make the most sense, because of updated imaging showed further tumor growth, his infusions might be changed.  I also did discuss the topic of a second opinion a little bit and that he is absolutely free to look into something like this.    ACP - He has completed an HCD noting his wife is his primary  HCA and his brother is alternate.  He says it has been a little while, and he thinks he would like to look over it before sharing it with the health system.  I offered information about honoring choices [place] in case he would like to update it, and he agreed.  Placed in AVS.    Follow up: 3 to 4 weeks      Video-Visit Details  Video Start Time: 1:54 PM  Video End Time: 2:26 PM  Video cut out, so continued on telephone for additional 31 minutes.    Originating Location (pt. Location): Home     Distant Location (provider location):  Offsite- Personal Home      Platform used for Video Visit: CaritoWell     Total time spent on day of encounter is 82 mins, including reviewing record, review of above studies, above visit with patient, symptomatic discussion as above, including medication adjustments/prescription management, and documentation.     Cristina Calero DO  Palliative Medicine   [id number]    Some chart documentation performed using Dragon Voice recognition Software. Although reviewed after completion, some words and grammatical errors may remain.      Again, thank you for allowing me to participate in the care of your patient.        Sincerely,        Cristina Calero DO

## 2024-04-03 NOTE — PROGRESS NOTES
[facility]: Cancer Care                                                                                          Attempted to connect with patient regarding [employer] message sent in about upcoming scans and infusion on the same day.  Patient reported he was busy and could not talk right now.  Writer attempted to reach patient.  Sent [employer] message with details and contact info to return call.    Jenny Lemon RN  Cancer Care Coordinator  [facility]

## 2024-04-03 NOTE — PATIENT INSTRUCTIONS
"Recommendations:  -I sent a prescription for oxycodone for you to use for the severe episodes of headache.  You can take 1 to 2 tablets every 4 hours as needed.  -Please let me know how effective the oxycodone is for your headache.  There are other medications we can try as well.  -I sent a prescription for Narcan, which is the opioid reversal agents.  It is standard practice to have this on hand in the event of an accidental opioid overdose.  I do not think that you will need this, however I would rather you have it and not needed than the other way around.  -I would recommend continuing with Tylenol 1000 mg 2 or 3 times daily as well for headache.    -Regular physical activity is the one thing that has been shown in medical literature to help improve fatigue related to cancer and cancer treatments.  I recommend starting some activity, such as 10 to 20 minutes 2-3 times weekly and then build up from here.  We could also consider a referral for physical therapy in the future to help provide some guidance/framework for you.    -There are 2 different types of laxatives: 1) \"Pushers\" such as senna, which are stimulant laxatives and tell your body and needs to have a bowel movement and 2) \"Mushers\" such as MiraLAX, which is an osmotic laxative bringing water into the colon and making it easier to go to the bathroom.  In general people need a combination of both types.    -For the senna, recommend 1 to 2 tablets twice a day. May increase up to 4 tablets twice a day if needed for regular bowel movement and may need to decrease if your bowels become loose.  -For MiraLAX, start with 1 dose as needed daily. Can increase to 2-3 doses daily as needed.  You also can do partial doses if an additional full dose is too much (such as one half capful later on in the day).  -Recommend focusing on protein intake.  Following the Mediterranean diet is also a good recommendation.  -We talked about starting to look at completing a " "healthcare directive.  I recommend you check out- https://www.[employer].org/resources/patients-and-visitors/honoring-choices     They have several tools and some advice about getting started.  Under the \"How do I document my choices\" section, I am a fan of the full length healthcare directive (for adults with serious illness) because I think it has a good combination of the medical questions that are important as well as the social and personal questions that allow healthcare providers to get to know you as a person if you are unable to speak for yourself.  You do not have to complete every question on the document.  I generally recommend patients start with 3 or 4 questions on the goals page and work from there.  -It would absolutely be reasonable to look into a second opinion.    Follow up: 3 to 4 weeks      Reasons to Call    If you are having worsening/uncontrolled symptoms we want you to call!    You or your other physicians make any changes to medications we have prescribed.  -Please call for refills 4-5 days before you will run out of medication.    Important Phone Numbers, including: Refills, scheduling, and general questions     Palliative Care RN: Rabia Seals - [phone]  *After hours or on weekends. Will connect you with on call MD. [phone]    "

## 2024-04-03 NOTE — LETTER
4/3/2024         RE: Saeid Santa  [address]  [facility] [address]        Dear Colleague,    Thank you for referring your patient, Saeid Santa, to the [facility] CANCER [facility]. Please see a copy of my visit note below.    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 Tried Keytruda but unfortunately sustained pneumonitis.  Currently on nivolumab.     Reviewed: Yes, no controlled Rx's in last 9 months.    Interim History:  Saeid Santa is a 68 year old male who is seen today for follow up with Palliative Care via billable video visit.  His wife Smiley is also present and provides additional history.     Reviewed oncology note from 3/12.  Imaging studies had shown findings concerning for disease progression, however overall felt to be secondary to treatment changes.  Plan was to continue with nivolumab and repeat imaging in short interval of 6 weeks.  Consider escalation of treatment pending next scans.    Says headaches started to increase when Opdivo was started. Most significant in second and third weeks after his infusions.  States that they have become more progressive and are nearly constant.  He only gets a few hours of relief every day.  Says that they travel across his forehead/brow and around the back of his skull.  They have tried all sorts of over-the-counter options including ibuprofen, ice, and heat with really no effect.  He cannot tell a difference in the headache when increasing the dexamethasone from 0.5 mg to 1 mg.  He is also really not sleeping at all, he might get a few hours of relief overnight, however wife notes that they are now on day 4 of  "him not sleeping (which also means she is not sleeping).      Wife states that they are both at their breaking point.    Discussed recommendation to go to the emergency room, and wife states that they have done that and \"they loaded him up with steroids and send him home\".      He otherwise reports that he tolerates the infusions okay.  He denies any nausea, vomiting, or bowel changes.  Getting less and less exercise due to fatigue.  Notes that he has gotten weaker over the last few months and that he gets tired more easily.  He did have some questions about the best diet to eat.    Does express some hesitancies about getting his PET scan, MRI, and his infusion on the same day.  Also says they have wondered about a second opinion.    Knows this is progressive disease that we will ultimately take his life, however he states \"I am not can help it along by giving up\".  While he is frustrated by all of the challenges he is facing, he is very much interested in continuing cancer directed therapy at this time.    Social History:   to Smiley. They have 8 children and 6 grandchildren-all live around the [facility]. Was a -retired .  Social History     Tobacco Use     Smoking status: Former     Packs/day: 0.50     Years: 20.00     Additional pack years: 0.00     Total pack years: 10.00     Types: Cigarettes     Quit date: 2017     Years since quittin.2     Passive exposure: Current (Second hand exposure)     Smokeless tobacco: Never   Vaping Use     Vaping Use: Never used   Substance Use Topics     Alcohol use: Not Currently     Comment: socially     Drug use: Not Currently     Types: Marijuana     Comment: typically smoked daily but no use for past two weeks as of 23     Advanced Care Planning: Has an HCD. Wife is primary HCA and brother is alternate. Thinks he'd like to review it before having it uploaded to his chart.    Family History- Reviewed in Epic.    Medications- " Reviewed in [facility].    Past Medical History- Reviewed in [facility].    Past Surgical History- Reviewed in Epic.    Physical Exam:   Constitutional: Alert, pleasant, no apparent distress. Sitting up in chair.  Eyes: Sclera non-icteric, no eye discharge.  ENT: No nasal discharge. Ears grossly normal.  Respiratory: Unlabored respirations. Speaking in full sentences.  Musculoskeletal: Extremities appear normal- no gross deformities noted. No edema noted on upper body.   Skin: No suspicious lesions or rashes on visible skin.  Neurologic: Clear speech, no aphasia. No facial droop.  Psychiatric: Mentation appears normal, appropriate attention. Affect normal/bright. Does not appear anxious or depressed.    Key Data Reviewed:  LABS: 3/19/2024- Cr 1.16, GFR 68, Albumin 4, LFTs WNL. WBC 12.4, Hgb 14.9, Plts 237.     IMAGING: 3/2/24 MR brain: Impression-  1.  Multifocal intracranial metastatic disease with interval increase in size of multiple metastatic lesions and surrounding vasogenic edema detailed above. Unsure if this is due to disease progression or post radiation change.  2.  Interval decrease in size of the cystic resection cavity in the right frontal lobe.  3.  No acute or subacute ischemic change, extra-axial collection, midline shift or hydrocephalus.    PET scan 3/4/2024- IMPRESSION:  1.  Although the previously seen hypermetabolic left lower cervical lymph node has resolved, there are 2 new suspected intramuscular metastases located in the lower extremities.  2.  There are FDG avid brain metastases, which were not definitively seen on prior PET/CT although have been better evaluated on interval brain MRIs.      Impression & Recommendations & Counseling:  Saeid Santa is a 68 year old male with history of metastatic melanoma, mets to brain.    Pain - Headaches have been progressive and not responsive to any over-the-counter options.  There is concern that the worsening headaches are indicating ongoing tumor growth.  -Rx  for oxycodone 5-10mg Q4H PRN.  Asked them to let me know how effective this is.  -Depending on results of oxycodone, could consider other options for opioid management versus additional headache management such as Topamax or even amitriptyline/nortriptyline.  -Discussed recommendation to go to the emergency room this time was to try to expedite imaging of his brain, which could help facilitate additional treatment decisions happening faster.  -I did give a prescription for Narcan as well.    Bowels - Discussed possibility of constipation with opioids.  -Discussed bowel regimen, including dosing for miralax and senna.  Sent prescriptions for these, so we will see if they can be covered.    Fatigue - Has been feeling weaker, and with his headache, has not been as active and is not really doing any focused exercise.  -Discussed that physical activity is the one thing that has been shown in medical literature to help improve fatigue related to cancer and cancer treatments. Recommended starting some activity, such as 10 to 20 minutes 2-3 times weekly and then build up from here.  -Encouraged focusing on protein intake.  If he would like to look into an all-around healthy diet, recommended Mediterranean diet.    Metastatic melanoma - He is very aware that his condition is progressive and would ultimately be fatal.  He also understands that there is a very high likelihood his tumors are growing.  At this point in time, he is still interested in pursuing further cancer directed treatment.  He does have some hesitancies about doing imaging and infusion the same day.  Discussed that referencing imaging studies first would likely make the most sense, because of updated imaging showed further tumor growth, his infusions might be changed.  I also did discuss the topic of a second opinion a little bit and that he is absolutely free to look into something like this.    ACP - He has completed an HCD noting his wife is his primary  HCA and his brother is alternate.  He says it has been a little while, and he thinks he would like to look over it before sharing it with the health system.  I offered information about honoring choices [place] in case he would like to update it, and he agreed.  Placed in AVS.    Follow up: 3 to 4 weeks      Video-Visit Details  Video Start Time: 1:54 PM  Video End Time: 2:26 PM  Video cut out, so continued on telephone for additional 31 minutes.    Originating Location (pt. Location): Home     Distant Location (provider location):  Offsite- Personal Home      Platform used for Video Visit: CaritoWell     Total time spent on day of encounter is 82 mins, including reviewing record, review of above studies, above visit with patient, symptomatic discussion as above, including medication adjustments/prescription management, and documentation.     Cristina Calero DO  Palliative Medicine   [id number]    Some chart documentation performed using Dragon Voice recognition Software. Although reviewed after completion, some words and grammatical errors may remain.      Again, thank you for allowing me to participate in the care of your patient.        Sincerely,        Cristina Calero DO

## 2024-04-03 NOTE — NURSING NOTE
Is the patient currently in the state of [place]? YES    Visit mode:VIDEO    If the visit is dropped, the patient can be reconnected by: VIDEO VISIT: Text to cell phone:   Telephone Information:   Mobile [phone]       Will anyone else be joining the visit? NO  (If patient encounters technical issues they should call [phone]    How would you like to obtain your AVS? MyChart    Are changes needed to the allergy or medication list? Pt stated no changes to allergies and Pt stated no med changes      Reason for visit: Consult    Parisa Bauer LPN

## 2024-04-04 NOTE — TELEPHONE ENCOUNTER
Retail Pharmacy Prior Authorization Team   Phone: [phone]    PA Initiation    Medication: OXYCODONE HCL 5 MG PO TABS  Insurance Company: [employer] - Phone [phone] Fax [phone]  Pharmacy Filling the Rx: [facility] - [place] - [id number] [address]  Filling Pharmacy Phone: [phone]  Filling Pharmacy Fax:    Start Date: 4/4/2024

## 2024-04-05 NOTE — TELEPHONE ENCOUNTER
Prior Authorization Approval-Approved Since patient is new to plan, they show him as opioid naive. He can fill a 7-day supply on the approval. Future fills will not require PA as he is within limits of #360/30 days. Rejection at pharmacy is a DUR. Pharmacist needs to override.  Prior Authorization Approval    Medication: OXYCODONE HCL 5 MG PO TABS  Authorization Effective Date: 4/4/2024  Authorization Expiration Date: 4/11/2024  Approved Dose/Quantity:   Reference #:     Insurance Company: [employer] - [place] [phone] Fax [phone]  Expected CoPay: $    CoPay Card Available:      Financial Assistance Needed:   Which Pharmacy is filling the prescription: [facility] - [place] - [id number] [address] [address]  Pharmacy Notified: Yes  Patient Notified:

## 2024-04-17 NOTE — NURSING NOTE
Chief Complaint   Patient presents with    Chemotherapy     C4D1 Nivolumab    Blood Draw     Labs drawn via piv placement by RN in lab. VS taken.      Labs drawn from PIV placed by RN. Line flushed with saline. Vitals taken. Pt checked in for appointment(s).    Mary Monzon RN

## 2024-04-17 NOTE — PROGRESS NOTES
Infusion Nursing Note:  Saeid Santa presents today for C4D1 Nivolumab.    Patient seen by provider today: No   present during visit today: Not Applicable.    Note: Patient arrives in infusion feeling well overall. No new complaints or concerns. Patient states having a headache of 6/10 due to fasting for imaging this afternoon.      Intravenous Access:  Peripheral IV placed.    Treatment Conditions:  Lab Results   Component Value Date    HGB 14.7 04/17/2024    WBC 14.1 (H) 04/17/2024    ANEU 11.2 (H) 01/31/2024    ANEUTAUTO 9.9 (H) 04/17/2024     04/17/2024        Lab Results   Component Value Date     04/17/2024    POTASSIUM 3.6 04/17/2024    MAG 2.1 01/22/2024    CR 0.92 04/17/2024    JOHN PAUL 9.2 04/17/2024    BILITOTAL 1.0 04/17/2024    ALBUMIN 4.2 04/17/2024    ALT 25 04/17/2024    AST 21 04/17/2024       Results reviewed, labs MET treatment parameters, ok to proceed with treatment.      Post Infusion Assessment:  Patient tolerated infusion without incident.  Blood return noted pre and post infusion.  Site patent and intact, free from redness, edema or discomfort.  No evidence of extravasations.   PIV left intact for scans at the hospital later today.      Discharge Plan:   Patient declined prescription refills.  Discharge instructions reviewed with: Patient and Family.  Patient and/or family verbalized understanding of discharge instructions and all questions answered.  Copy of AVS reviewed with patient and/or family.  Patient will return 5/14/2024 for next appointment.  Patient discharged in stable condition accompanied by: self and wife.  Departure Mode: Ambulatory.      Peña Gomez RN

## 2024-04-17 NOTE — PATIENT INSTRUCTIONS
[facility] Triage and after hours / weekends / holidays:  [phone] option 5, option 2    Please call the triage or after hours line if you experience a temperature greater than or equal to 100.4, shaking chills, have uncontrolled nausea, vomiting and/or diarrhea, dizziness, shortness of breath, chest pain, bleeding, unexplained bruising, or if you have any other new/concerning symptoms, questions or concerns.      If you are having any concerning symptoms or wish to speak to a provider before your next infusion visit, please call triage to notify your care team so we can adequately serve you.     If you need a refill on a narcotic prescription or other medication, please call before your infusion appointment.        Lab Results:  Recent Results (from the past 12 hour(s))   Comprehensive metabolic panel    Collection Time: 04/17/24  9:13 AM   Result Value Ref Range    Sodium 140 135 - 145 mmol/L    Potassium 3.6 3.4 - 5.3 mmol/L    Carbon Dioxide (CO2) 21 (L) 22 - 29 mmol/L    Anion Gap 12 7 - 15 mmol/L    Urea Nitrogen 18.3 8.0 - 23.0 mg/dL    Creatinine 0.92 0.67 - 1.17 mg/dL    GFR Estimate 89 >60 mL/min/1.73m2    Calcium 9.2 8.8 - 10.2 mg/dL    Chloride 107 98 - 107 mmol/L    Glucose 90 70 - 99 mg/dL    Alkaline Phosphatase 59 40 - 150 U/L    AST 21 0 - 45 U/L    ALT 25 0 - 70 U/L    Protein Total 7.0 6.4 - 8.3 g/dL    Albumin 4.2 3.5 - 5.2 g/dL    Bilirubin Total 1.0 <=1.2 mg/dL   TSH with free T4 reflex    Collection Time: 04/17/24  9:13 AM   Result Value Ref Range    TSH 2.30 0.30 - 4.20 uIU/mL   CBC with platelets and differential    Collection Time: 04/17/24  9:13 AM   Result Value Ref Range    WBC Count 14.1 (H) 4.0 - 11.0 10e3/uL    RBC Count 4.75 4.40 - 5.90 10e6/uL    Hemoglobin 14.7 13.3 - 17.7 g/dL    Hematocrit 43.9 40.0 - 53.0 %    MCV 92 78 - 100 fL    MCH 30.9 26.5 - 33.0 pg    MCHC 33.5 31.5 - 36.5 g/dL    RDW 14.9 10.0 - 15.0 %    Platelet Count 244 150 - 450 10e3/uL    % Neutrophils 70 %    %  Lymphocytes 20 %    % Monocytes 6 %    % Eosinophils 1 %    % Basophils 1 %    % Immature Granulocytes 2 %    NRBCs per 100 WBC 0 <1 /100    Absolute Neutrophils 9.9 (H) 1.6 - 8.3 10e3/uL    Absolute Lymphocytes 2.9 0.8 - 5.3 10e3/uL    Absolute Monocytes 0.8 0.0 - 1.3 10e3/uL    Absolute Eosinophils 0.1 0.0 - 0.7 10e3/uL    Absolute Basophils 0.1 0.0 - 0.2 10e3/uL    Absolute Immature Granulocytes 0.3 <=0.4 10e3/uL    Absolute NRBCs 0.0 10e3/uL

## 2024-04-19 NOTE — PROGRESS NOTES
[facility]   Return Patient Visit    Name: Saeid Santa  MRN: [id number]  Date of visit: Apr 19, 2024    Diagnosis: Metastatic melanoma  Stage: IV    Molecular: BRAF G466A, TMB 48.7554 mut/Mb    Performance status: ECOG 0    Oncology History:  --8/16/22, he brings a left parietal scalp lesion to attention of his GP. It is initially observed.  --November 2022, the left parietal scalp lesion was felt to be a cyst, and the left parietal lesion was lanced and drained via scalp incision.  --January 2023, the left parietal scalp cyst would occasionally break open, drain, and bleed, largely at night.   --February 2023, he notes some mild difficulty clearing obstacles with right leg and foot.  --March/April 2023, he has 3 progressively worsening episodes over a span 3 weeks including the right leg. At first he notes the onset of right leg numbness and heaviness, like he was carrying 50 lbs of water on the leg. Then, about a week or so later, the leg began to jerk and converse while he was in the basement. Neither episode associated with loss of consciousness  --4/15/23, he recalls the right leg numbness, heaviness, jerking while driving between work. He had been found by coworkers in the parking lot, sitting in the grass, appearing confused. He was brought to the ER by EMS. CT-CAP, showed small (<6mm), bilateral indeterminate pulmonary nodules. CT-head and MRI brain showed 2 discrete ring-enhancing lesions in the left paramedian posterior frontal lobe near the precentral gyrus measuring approximately 2.2 x 1.7 x 1.9 cm and left occipital lobe near the lingual gyrus measuring approximately 2.2 x 2.1 x 2.0 cm. The occipital lesion encroaches upon the subependymal surface of the left lateral ventricle occipital horn and contacts the superior surface of the medial left tentorium. The lesions are accompanied by moderate surrounding vasogenic edema. Surgery was recommended.  --4/21/23,  underwent left awake (sleep-awake-sleep) craniotomy for tumor resection, he has resection of the left frontal, motor strip tumor as well asan elliptical incision of the left parietal scalp lesion. On pathology, both the scalp excision and brain mass are positive for malignant melanoma. Tumor cells are positive for S-100 and Melan-A, and negative for cytokeratin AE1/AE3, NKX3.1, GFAP, ERG, CD3, and CD20.  CD3 highlights brisk infiltration of the tumor by T lymphocytes, while CD20 highlights scattered positive B lymphocytes. NGS identifies a BRAF G466A mutation, a class 3 mutation insensitive to BRAF kinase inhibitor monotherapy. TMB is high at 48.754 muts/Mb.   --4/22/23, post-operative MRI shows post-operative changes of the parietal craniotomy of the left frontal lobe metastasis. The other 1.7 x 1.1 cm cystic lesion in the left occipital lobe is also seen. No new lesions appreciated.  -- 4/25/23 to 5/9/23, discharged from the hospital to acute rehab stay at [facility] in [place]. He participated in a comprehensive rehabilitation program consisting of PT, OT, Speech, Psychology and Recreation Therapy. He did well and was discharged home with use of a walker.  --PET-CT on 5/18/23 with potentially involved neck lymph nodes. He has mild FDG uptake in soft tissue nodules in the scalp, in the known left occipital brain metastasis, as well as in the left lower neck region.  -5/24-/5/31/23, He was treated with gamma knife: 2500 cGy to the left resection cavity; 3000 cGy to the L parietal-occipital lesion.  -6/7/2023, Cycle 1 of pembrolizumab 200mg every 3 weeks  -6/14/23-6/16/23, admitted for vision change, reduced comprehension, speech change, fall and headache. MRI showing likely progression of left occipital mass with associated edema. Improved on steroids and discharged on oral decadron. Plan for repeat MRI and neurosurgery follow up in 1 month.  -7/7/23 ED visit for headaches, confusion, vision changes, concern for  hemorrhage of the left occipital mass, admitted to [facility] until 7/9/23, increased dexamethasone dose  -7/14/23 craniotomy of the left parieto-occipital mass, discharged on a dexamethasone taper  -8/11/2023, Cycle 2 of pembrolizumab  -8/23/2023 brain MRI with no recurrence in left frontal lobe, decreased enhancement at edges of left occipital resection cavity, new enhancement int he right front lobe concerning for new met--seen by neurosurgery and considering RT  -8/31/2023 CT/PET with increased uptake in left lower neck lymph node c/f early progression. Ground-glass, reticular changes in lungs also noted likely secondary to drug-induced pneumonitis. Started 1 mg/kg prednisone (80 mg daily) with 8 week taper  -9/8/23 GK to R frontal lobe lesion  -12/7/23 CT neck: Resolved previously hypermetabolic left level 5 lymph node. No new  cervical lymphadenopathy.  -12/7/23 CT chest/AP: Two arterially enhancing lesions in the liver measuring 0.9 - 1.0 cm were not previously visualized, likely due to differences in timing of the contrast bolus. These are indeterminate but favored to be benign perfusional abnormalities. Melanoma metastases cannot be entirely excluded. Otherwise, no evidence for metastatic disease in the chest, abdomen and pelvis. Significant improvement in linear opacities in the lungs, suggesting improving drug-induced pneumonitis. Few stable small pulmonary nodules.  -12/7/23 MR head: Progression in intracranial metastatic disease compared to 9/8/2023   with multiple new and/or enlarging metastases, including internally hemorrhagic right frontal lobe lesion with 3 mm of leftward midline shift and new lesion adjacent to the left occipital resection bed.   -12/8/23 started on dex 4 mg BID  -12/21/23 Right frontal craniotomy with Dr. Garrido  -1/22/24, starts nivolumab (480mg every 4 weeks)  -2/2/24, Gamma knife to right occipital and left frontal lesions   -3/2/24 PET/CT: previously seen L lower cervical node  "resolved, two new suspected intramuscular mets to the LE  -3/2/24 MR brain: Multifocal intracranial metastatic disease with interval increase in size of multiple metastatic lesions and surrounding vasogenic edema detailed above. Unsure if this is due to disease progression or post radiation change.      Interval History:    4/17/24 C4 nivolumab    4/17/24 MR brain:   1. Increased size of the enhancing lesions in the right frontal and right occipital lobes, likely representing interval disease progression.   2. Interval decreased size of the left frontal lobe lesion. Additional intracranial metastatic lesions are stable. No new enhancing lesions identified.   3. Stable surrounding vasogenic edema in the bifrontal lobes, right occipital lobe, and left temporoparietooccipital lobes.     4/17/24 PET/CT:  1. There is interval progression of disease with increase in size and uptake of enhancing cerebral metastatic lesions in the right frontal and left occipital lobes as well as interval increase in size and uptake of enhancing soft tissue nodules in the left medial thigh and  right posterior leg as detailed above.  2. There is new hypermetabolic uptake in the left fifth and sixth ribs at the costovertebral junction with focal sclerosis on corresponding CT suspicious, indeterminate.   3. Please refer to same day MRI brain for further description of cerebral metastatic lesions.      Tolerating therapy pretty well but does have headaches he thinks are possibly temporally associated with the second week of each infusion cycle. Overall, headaches have been a bit better despite no increase in his steroid (on dex 1 mg daily)  Taking acetaminophen, oxy prn only a couple of times  No new respiratory complaints   No diarrhea or abdominal pain  Some lightheadedness with standing  Balance is \"the same\" but did fall twice while climbing up the stairs carrying a tray up the stairs without using the hand rail  No new weakness or " "numbness  No vision complaints    Taking 1 mg dexamethasone once daily, sleep has been \"better\" the past couple of weeks      Exam:   /81 (BP Location: Right arm, Patient Position: Sitting, Cuff Size: Adult Regular)   Pulse 101   Temp 98  F (36.7  C) (Oral)   Resp 16   Wt 98.3 kg (216 lb 11.2 oz)   SpO2 98%   BMI 28.59 kg/m      Gen: interactive, NAD  HEENT: Pupils equal, non-icteric  CV: Regular, well perfused  Resp: Comfortable on room air, no increased work  Abd: Non-distended  Ext: No swelling  Neuro: Normal gait       Labs:   Reviewed in chart. Normal renal and hepatic function, normal TSH, mild leukocytosis with otherwise unremarkable CBC    Imaging:   All pertinent imaging studies personally reviewed, esteves findings included in oncology history above    Pathology:   Reviewed, key findings included in oncology history above.      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 Started pembrolizumab 6/7/23. Underwent second craniotomy for L parieto-occipital lesion resection in 7/14/23 2/2 hemorrhagic conversion and GK to R frontal lesion 9/8/23, on pembrolizumab, course c/b ICI pneumonitis necessitating steroids, again with CNS progression 12/2023 s/p resection and GK.       # metastatic melanoma with metastatic disease to brain  -presented with de-francisca CNS involvement s/p craniotomy x2 and GK to additional R frontal lesion  -TMB high, BRAF mutated but not V600E  -s/p pembrolizumab x2 (first 6/7/23) c/c/b pneumonitis s/p steroid taper  -recent CNS progression in Dec 2023 s/p R frontal , ongoing response systemically, improved pneumonitis radiographically (never symptomatic)  -3/2/24 brain MR shows increased size of several previously treated " metastatic lesions, no new lesions. After discussion with Rad Onc and Radiology, although progression cannot be ruled out, findings can be consistent with treatment effect  -now with repeat MR brain with continued significant increase in size of R frontal lesion (18 x 15 mm, previously 8 x 7 mm), anterior R frontal  lesion and R occipital lesion marginally increased. L frontal lesion decreased. L occipital lesion stable. Vasogenic edema significant but stable  -PET/CT also reviewed, overall continued concern for metastatic disease involving the soft tissues in both the R lateral calf and L medial thigh. These lesions remain small but slightly increased in both size and avidity, and likely represent sites of metastatic disease. No new areas of systemic disease noted.   -today we had a long conversation regarding his imaging findings. We discussed the possible role for escalating to dual checkpoint inhibitor therapy with the goal of gaining improved control of his CNS disease, however, we also discussed the response rates to ipi/nivo are lower in the brain than for systemic disease and of course the risk of toxicities are higher, including recurrence of his prior pneumonitis. We also discussed the time to response can be months rather than weeks, and there may be further progression in the brain in the interim even if he does ultimately achieve a response  -it is unclear if additional local therapies in the brain are an option for him given his significant prior treatment  -alternatively, a palliative care based approach would also be very appropriate, and we had a long conversation today about this option as well      Plan:  --will discuss local options with Radiation Oncology  --he would like to think about the choice of escalating to ipilimumab + nivolumab, and will plan to make this decision next week after further conversations with his wife and family  --as his his headaches are slightly improved somewhat the  past couple of weeks, we will keep his dexamethasone at 1 mg daily for now        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, resolved  -8/2023 PET/CT showed bilateral FDG avid ground glass and reticular infiltrates favored to represent pneumonitis, started on steroids, now completed taper  -denied any history of pulmonary symptoms at the time of pneumonitis findings  -most recent imaging with continued resolution, asymptomatic today  -current steroids indicated for cerebral edema, not pneumonitis    Plan:  --continue to monitor closely for new respiratory symptoms, particularly in the setting of escalation to dual checkpoint inhibitor therapy      # Liver lesions  -12/7 CT AP showed two indeterminate liver lesions not previously appreciated, favored to be benign but cannot exclude metastases  -no concerning liver lesions at this time    Plan:  --trend with surveillance scans      # Fatigue  -TSH normal    Plan:  --monitor      Carlos Manuel Bunn MD PhD   of Medicine  Division of Hematology, Oncology and Transplantation    ---  64 minutes were spent on the date of the encounter performing chart review, history and exam, documentation, and further activities as noted above.     The longitudinal plan of care for the diagnosis(es)/condition(s) as documented were addressed during this visit. Due to the added complexity in care, I will continue to support Tim in the subsequent management and with ongoing continuity of care.

## 2024-04-19 NOTE — LETTER
4/19/2024         RE: Saeid Santa  [address]  [place] [facility] [id number]        Dear Colleague,    Thank you for referring your patient, Saeid Santa, to the [facility]. Please see a copy of my visit note below.    [facility]   Return Patient Visit    Name: Saeid Santa  MRN: [id number]  Date of visit: Apr 19, 2024    Diagnosis: Metastatic melanoma  Stage: IV    Molecular: BRAF G466A, TMB 48.7554 mut/Mb    Performance status: ECOG 0    Oncology History:  --8/16/22, he brings a left parietal scalp lesion to attention of his GP. It is initially observed.  --November 2022, the left parietal scalp lesion was felt to be a cyst, and the left parietal lesion was lanced and drained via scalp incision.  --January 2023, the left parietal scalp cyst would occasionally break open, drain, and bleed, largely at night.   --February 2023, he notes some mild difficulty clearing obstacles with right leg and foot.  --March/April 2023, he has 3 progressively worsening episodes over a span 3 weeks including the right leg. At first he notes the onset of right leg numbness and heaviness, like he was carrying 50 lbs of water on the leg. Then, about a week or so later, the leg began to jerk and converse while he was in the basement. Neither episode associated with loss of consciousness  --4/15/23, he recalls the right leg numbness, heaviness, jerking while driving between work. He had been found by coworkers in the parking lot, sitting in the grass, appearing confused. He was brought to the ER by EMS. CT-CAP, showed small (<6mm), bilateral indeterminate pulmonary nodules. CT-head and MRI brain showed 2 discrete ring-enhancing lesions in the left paramedian posterior frontal lobe near the precentral gyrus measuring approximately 2.2 x 1.7 x 1.9 cm and left occipital lobe near the lingual gyrus measuring approximately 2.2 x 2.1 x 2.0 cm. The occipital lesion encroaches  upon the subependymal surface of the left lateral ventricle occipital horn and contacts the superior surface of the medial left tentorium. The lesions are accompanied by moderate surrounding vasogenic edema. Surgery was recommended.  --4/21/23, underwent left awake (sleep-awake-sleep) craniotomy for tumor resection, he has resection of the left frontal, motor strip tumor as well asan elliptical incision of the left parietal scalp lesion. On pathology, both the scalp excision and brain mass are positive for malignant melanoma. Tumor cells are positive for S-100 and Melan-A, and negative for cytokeratin AE1/AE3, NKX3.1, GFAP, ERG, CD3, and CD20.  CD3 highlights brisk infiltration of the tumor by T lymphocytes, while CD20 highlights scattered positive B lymphocytes. NGS identifies a BRAF G466A mutation, a class 3 mutation insensitive to BRAF kinase inhibitor monotherapy. TMB is high at 48.754 muts/Mb.   --4/22/23, post-operative MRI shows post-operative changes of the parietal craniotomy of the left frontal lobe metastasis. The other 1.7 x 1.1 cm cystic lesion in the left occipital lobe is also seen. No new lesions appreciated.  -- 4/25/23 to 5/9/23, discharged from the hospital to acute rehab stay at [facility] in [place]. He participated in a comprehensive rehabilitation program consisting of PT, OT, Speech, Psychology and Recreation Therapy. He did well and was discharged home with use of a walker.  --PET-CT on 5/18/23 with potentially involved neck lymph nodes. He has mild FDG uptake in soft tissue nodules in the scalp, in the known left occipital brain metastasis, as well as in the left lower neck region.  -5/24-/5/31/23, He was treated with gamma knife: 2500 cGy to the left resection cavity; 3000 cGy to the L parietal-occipital lesion.  -6/7/2023, Cycle 1 of pembrolizumab 200mg every 3 weeks  -6/14/23-6/16/23, admitted for vision change, reduced comprehension, speech change, fall and headache. MRI showing likely  progression of left occipital mass with associated edema. Improved on steroids and discharged on oral decadron. Plan for repeat MRI and neurosurgery follow up in 1 month.  -7/7/23 ED visit for headaches, confusion, vision changes, concern for hemorrhage of the left occipital mass, admitted to [facility] until 7/9/23, increased dexamethasone dose  -7/14/23 craniotomy of the left parieto-occipital mass, discharged on a dexamethasone taper  -8/11/2023, Cycle 2 of pembrolizumab  -8/23/2023 brain MRI with no recurrence in left frontal lobe, decreased enhancement at edges of left occipital resection cavity, new enhancement int he right front lobe concerning for new met--seen by neurosurgery and considering RT  -8/31/2023 CT/PET with increased uptake in left lower neck lymph node c/f early progression. Ground-glass, reticular changes in lungs also noted likely secondary to drug-induced pneumonitis. Started 1 mg/kg prednisone (80 mg daily) with 8 week taper  -9/8/23 GK to R frontal lobe lesion  -12/7/23 CT neck: Resolved previously hypermetabolic left level 5 lymph node. No new  cervical lymphadenopathy.  -12/7/23 CT chest/AP: Two arterially enhancing lesions in the liver measuring 0.9 - 1.0 cm were not previously visualized, likely due to differences in timing of the contrast bolus. These are indeterminate but favored to be benign perfusional abnormalities. Melanoma metastases cannot be entirely excluded. Otherwise, no evidence for metastatic disease in the chest, abdomen and pelvis. Significant improvement in linear opacities in the lungs, suggesting improving drug-induced pneumonitis. Few stable small pulmonary nodules.  -12/7/23 MR head: Progression in intracranial metastatic disease compared to 9/8/2023   with multiple new and/or enlarging metastases, including internally hemorrhagic right frontal lobe lesion with 3 mm of leftward midline shift and new lesion adjacent to the left occipital resection bed.   -12/8/23  started on dex 4 mg BID  -12/21/23 Right frontal craniotomy with Dr. Garrido  -1/22/24, starts nivolumab (480mg every 4 weeks)  -2/2/24, Gamma knife to right occipital and left frontal lesions   -3/2/24 PET/CT: previously seen L lower cervical node resolved, two new suspected intramuscular mets to the LE  -3/2/24 MR brain: Multifocal intracranial metastatic disease with interval increase in size of multiple metastatic lesions and surrounding vasogenic edema detailed above. Unsure if this is due to disease progression or post radiation change.      Interval History:    4/17/24 C4 nivolumab    4/17/24 MR brain:   1. Increased size of the enhancing lesions in the right frontal and right occipital lobes, likely representing interval disease progression.   2. Interval decreased size of the left frontal lobe lesion. Additional intracranial metastatic lesions are stable. No new enhancing lesions identified.   3. Stable surrounding vasogenic edema in the bifrontal lobes, right occipital lobe, and left temporoparietooccipital lobes.     4/17/24 PET/CT:  1. There is interval progression of disease with increase in size and uptake of enhancing cerebral metastatic lesions in the right frontal and left occipital lobes as well as interval increase in size and uptake of enhancing soft tissue nodules in the left medial thigh and  right posterior leg as detailed above.  2. There is new hypermetabolic uptake in the left fifth and sixth ribs at the costovertebral junction with focal sclerosis on corresponding CT suspicious, indeterminate.   3. Please refer to same day MRI brain for further description of cerebral metastatic lesions.      Tolerating therapy pretty well but does have headaches he thinks are possibly temporally associated with the second week of each infusion cycle. Overall, headaches have been a bit better despite no increase in his steroid (on dex 1 mg daily)  Taking acetaminophen, oxy prn only a couple of times  No new  "respiratory complaints   No diarrhea or abdominal pain  Some lightheadedness with standing  Balance is \"the same\" but did fall twice while climbing up the stairs carrying a tray up the stairs without using the hand rail  No new weakness or numbness  No vision complaints    Taking 1 mg dexamethasone once daily, sleep has been \"better\" the past couple of weeks      Exam:   /81 (BP Location: Right arm, Patient Position: Sitting, Cuff Size: Adult Regular)   Pulse 101   Temp 98  F (36.7  C) (Oral)   Resp 16   Wt 98.3 kg (216 lb 11.2 oz)   SpO2 98%   BMI 28.59 kg/m      Gen: interactive, NAD  HEENT: Pupils equal, non-icteric  CV: Regular, well perfused  Resp: Comfortable on room air, no increased work  Abd: Non-distended  Ext: No swelling  Neuro: Normal gait       Labs:   Reviewed in chart. Normal renal and hepatic function, normal TSH, mild leukocytosis with otherwise unremarkable CBC    Imaging:   All pertinent imaging studies personally reviewed, esteves findings included in oncology history above    Pathology:   Reviewed, key findings included in oncology history above.      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 Started pembrolizumab 6/7/23. Underwent second craniotomy for L parieto-occipital lesion resection in 7/14/23 2/2 hemorrhagic conversion and GK to R frontal lesion 9/8/23, on pembrolizumab, course c/b ICI pneumonitis necessitating steroids, again with CNS progression 12/2023 s/p resection and GK.       # metastatic melanoma with metastatic disease to brain  -presented with de-francisca CNS involvement s/p craniotomy x2 and GK to additional R frontal lesion  -TMB high, BRAF mutated but not V600E  -s/p pembrolizumab x2 (first 6/7/23) c/c/b " pneumonitis s/p steroid taper  -recent CNS progression in Dec 2023 s/p R frontal , ongoing response systemically, improved pneumonitis radiographically (never symptomatic)  -3/2/24 brain MR shows increased size of several previously treated metastatic lesions, no new lesions. After discussion with Rad Onc and Radiology, although progression cannot be ruled out, findings can be consistent with treatment effect  -now with repeat MR brain with continued significant increase in size of R frontal lesion (18 x 15 mm, previously 8 x 7 mm), anterior R frontal  lesion and R occipital lesion marginally increased. L frontal lesion decreased. L occipital lesion stable. Vasogenic edema significant but stable  -PET/CT also reviewed, overall continued concern for metastatic disease involving the soft tissues in both the R lateral calf and L medial thigh. These lesions remain small but slightly increased in both size and avidity, and likely represent sites of metastatic disease. No new areas of systemic disease noted.   -today we had a long conversation regarding his imaging findings. We discussed the possible role for escalating to dual checkpoint inhibitor therapy with the goal of gaining improved control of his CNS disease, however, we also discussed the response rates to ipi/nivo are lower in the brain than for systemic disease and of course the risk of toxicities are higher, including recurrence of his prior pneumonitis. We also discussed the time to response can be months rather than weeks, and there may be further progression in the brain in the interim even if he does ultimately achieve a response  -it is unclear if additional local therapies in the brain are an option for him given his significant prior treatment  -alternatively, a palliative care based approach would also be very appropriate, and we had a long conversation today about this option as well      Plan:  --will discuss local options with Radiation  Oncology  --he would like to think about the choice of escalating to ipilimumab + nivolumab, and will plan to make this decision next week after further conversations with his wife and family  --as his his headaches are slightly improved somewhat the past couple of weeks, we will keep his dexamethasone at 1 mg daily for now        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, resolved  -8/2023 PET/CT showed bilateral FDG avid ground glass and reticular infiltrates favored to represent pneumonitis, started on steroids, now completed taper  -denied any history of pulmonary symptoms at the time of pneumonitis findings  -most recent imaging with continued resolution, asymptomatic today  -current steroids indicated for cerebral edema, not pneumonitis    Plan:  --continue to monitor closely for new respiratory symptoms, particularly in the setting of escalation to dual checkpoint inhibitor therapy      # Liver lesions  -12/7 CT AP showed two indeterminate liver lesions not previously appreciated, favored to be benign but cannot exclude metastases  -no concerning liver lesions at this time    Plan:  --trend with surveillance scans      # Fatigue  -TSH normal    Plan:  --monitor      Carlos Manuel Bunn MD PhD   of Medicine  Division of Hematology, Oncology and Transplantation    ---  64 minutes were spent on the date of the encounter performing chart review, history and exam, documentation, and further activities as noted above.     The longitudinal plan of care for the diagnosis(es)/condition(s) as documented were addressed during this visit. Due to the added complexity in care, I will continue to support Tim in the subsequent management and with ongoing continuity of care.

## 2024-04-19 NOTE — NURSING NOTE
"Oncology Rooming Note    April 19, 2024 2:00 PM   Saedi Santa is a 68 year old male who presents for:    Chief Complaint   Patient presents with    Oncology Clinic Visit     RTN for Malignant melanoma of scalp     Initial Vitals: /81 (BP Location: Right arm, Patient Position: Sitting, Cuff Size: Adult Regular)   Pulse 101   Temp 98  F (36.7  C) (Oral)   Resp 16   Wt 98.3 kg (216 lb 11.2 oz)   SpO2 98%   BMI 28.59 kg/m   Estimated body mass index is 28.59 kg/m  as calculated from the following:    Height as of 4/3/24: 1.854 m (6' 1\").    Weight as of this encounter: 98.3 kg (216 lb 11.2 oz). Body surface area is 2.25 meters squared.  No Pain (1) Comment: Data Unavailable   No LMP for male patient.  Allergies reviewed: Yes  Medications reviewed: Yes    Medications: Medication refills not needed today.  Pharmacy name entered into [employer]:    [facility] - [place], [place] - [address] [facility] - [place], [place] - [address] [facility] - [place], [place] - [address] [place] [address]    Frailty Screening:   Is the patient here for a new oncology consult visit in cancer care? 2. No      Clinical concerns:  PT's family is curious if there is other pain management he can be on due to the intensity of some headaches. Pt's family is also confused why they are here today      Edinson Bean             "

## 2024-04-22 NOTE — PROGRESS NOTES
"Saeid Santa is a 68 year old male who presents with a chief complaint of a painful ingrown toenail to the right great toe.  The patient relates pain when wearing shoes.  The patient denies any redness extending up the big toe into the foot.  The patient relates the condition has been getting worse over the past several weeks.         Pertinent medical, surgical and family history was reviewed in the chart.    Vitals: /89   Pulse 89   Ht 1.854 m (6' 1\")   Wt 98 kg (216 lb)   BMI 28.50 kg/m    BMI= Body mass index is 28.5 kg/m .    LOWER EXTREMITY PHYSICAL EXAM    Dermatologic: One notes an inflamed nail border of the right great toe.  There is noted erythema and edema located around the labial fold and does not extend past the interphalangeal joint.  There is no apparent purulent drainage noted.  There is pain on palpation to the proximal aspect of the nail border.  Otherwise, the skin is intact to both lower extremities without significant lesions, rash or abrasion.           Vascular: DP & PT pulses are intact & regular on the right.   CFT and skin temperature is normal to the right lower extremities.     Neurologic: Lower extremity sensation is intact to light touch.  No evidence of weakness in the right lower extremities.        Musculoskeletal: Patient is ambulatory without assistive device or brace.  No gross ankle deformity noted.  No foot or ankle joint effusion is noted.         ASSESSMENT / PLAN:     ICD-10-CM    1. Ingrown nail of great toe of right foot  L60.0           Plan:  I have explained to Saeid about the condition.  The potential causes and nature of an ingrown toenail were discussed with the patient.  We reviewed the natural history and prognosis of the condition and potential risks if no treatment is provided.  Treatment options discussed included conservative management (oral antibiotics, soaking of foot, adequate width shoes)  as well as surgical management (partial or total nail " removal).  The pros and cons of both forms as well as risks and benefits of treatment were reviewed.       At this point, I recommended having the offending nail border permanently removed from the right great toe.  I have explained to the patient all of the possible risks, benefits, alternatives to the procedure.  The patient consented to the proposed procedure.  The right hallux was swabbed with alcohol.  Next, approximately 3 cc of 1% lidocaine plain was injected around the right hallux.  The right hallux was then prepped with Betadine ointment.  A tourniquet was applied to the right hallux.  A Vashon elevator was utilized to free up the eponychium of the offending nail border.  Next, the offending nail border was split using an English anvil back to the matrix.  Next, utilizing a straight hemostat, the offending nail border was avulsed in toto.  The wound bed was debrided on any remaining nail and hyperkeratotic skin.  Next, the offending nail matrix was treated with 89% phenol using microtip cotton applicators for 30 seconds.  The wound was then irrigated and dressed with bacitracin antibiotic ointment and a compressive  sterile dressing.  The tourniquet was removed and a prompt hyperemic response was noted.  The patient tolerated the procedure well with no complications.  The patient was given post procedure instructions for the care of the wound.  The patient was informed that it is common to experience redness with watery drainage coming from the treated areas of the toe related to the phenol application.  The patient may return for reevaluation and was instructed to notify the office if any redness extending past the big toe joint or fever with chills are experienced before then.      There is low risk of morbidity with the procedure.  There was no overlap in work associated with the evaluation/management and the work associated with the procedure.    Saeid verbalized agreement with and understanding of the  rational for the diagnosis and treatment plan.  All questions were answered to best of my ability and the patient's satisfaction. The patient was advised to contact the clinic with any questions that may arise after the clinic visit.      Disclaimer: This note consists of symbols derived from keyboarding, dictation and/or voice recognition software. As a result, there may be errors in the script that have gone undetected. Please consider this when interpreting information found in this chart.      ISAMAR Sen D.P.M., [email or web address].

## 2024-04-22 NOTE — NURSING NOTE
"Chief Complaint   Patient presents with    Ingrown Toenail     Great right toenail       Initial /89   Pulse 89   Ht 1.854 m (6' 1\")   Wt 98 kg (216 lb)   BMI 28.50 kg/m   Estimated body mass index is 28.5 kg/m  as calculated from the following:    Height as of this encounter: 1.854 m (6' 1\").    Weight as of this encounter: 98 kg (216 lb).  Medications and allergies reviewed.      Nathalie RUIZ MA    "

## 2024-04-22 NOTE — LETTER
"    4/22/2024         RE: Saeid Santa  [address]  [facility] [address]        Dear Colleague,    Thank you for referring your patient, Saeid Santa, to the [facility] [place]. Please see a copy of my visit note below.    Saeid Santa is a 68 year old male who presents with a chief complaint of a painful ingrown toenail to the right great toe.  The patient relates pain when wearing shoes.  The patient denies any redness extending up the big toe into the foot.  The patient relates the condition has been getting worse over the past several weeks.         Pertinent medical, surgical and family history was reviewed in the chart.    Vitals: /89   Pulse 89   Ht 1.854 m (6' 1\")   Wt 98 kg (216 lb)   BMI 28.50 kg/m    BMI= Body mass index is 28.5 kg/m .    LOWER EXTREMITY PHYSICAL EXAM    Dermatologic: One notes an inflamed nail border of the right great toe.  There is noted erythema and edema located around the labial fold and does not extend past the interphalangeal joint.  There is no apparent purulent drainage noted.  There is pain on palpation to the proximal aspect of the nail border.  Otherwise, the skin is intact to both lower extremities without significant lesions, rash or abrasion.           Vascular: DP & PT pulses are intact & regular on the right.   CFT and skin temperature is normal to the right lower extremities.     Neurologic: Lower extremity sensation is intact to light touch.  No evidence of weakness in the right lower extremities.        Musculoskeletal: Patient is ambulatory without assistive device or brace.  No gross ankle deformity noted.  No foot or ankle joint effusion is noted.         ASSESSMENT / PLAN:     ICD-10-CM    1. Ingrown nail of great toe of right foot  L60.0           Plan:  I have explained to Saeid about the condition.  The potential causes and nature of an ingrown toenail were discussed with the patient.  We reviewed the natural history " and prognosis of the condition and potential risks if no treatment is provided.  Treatment options discussed included conservative management (oral antibiotics, soaking of foot, adequate width shoes)  as well as surgical management (partial or total nail removal).  The pros and cons of both forms as well as risks and benefits of treatment were reviewed.       At this point, I recommended having the offending nail border permanently removed from the right great toe.  I have explained to the patient all of the possible risks, benefits, alternatives to the procedure.  The patient consented to the proposed procedure.  The right hallux was swabbed with alcohol.  Next, approximately 3 cc of 1% lidocaine plain was injected around the right hallux.  The right hallux was then prepped with Betadine ointment.  A tourniquet was applied to the right hallux.  A Shiloh elevator was utilized to free up the eponychium of the offending nail border.  Next, the offending nail border was split using an English anvil back to the matrix.  Next, utilizing a straight hemostat, the offending nail border was avulsed in toto.  The wound bed was debrided on any remaining nail and hyperkeratotic skin.  Next, the offending nail matrix was treated with 89% phenol using microtip cotton applicators for 30 seconds.  The wound was then irrigated and dressed with bacitracin antibiotic ointment and a compressive  sterile dressing.  The tourniquet was removed and a prompt hyperemic response was noted.  The patient tolerated the procedure well with no complications.  The patient was given post procedure instructions for the care of the wound.  The patient was informed that it is common to experience redness with watery drainage coming from the treated areas of the toe related to the phenol application.  The patient may return for reevaluation and was instructed to notify the office if any redness extending past the big toe joint or fever with chills are  experienced before then.      There is low risk of morbidity with the procedure.  There was no overlap in work associated with the evaluation/management and the work associated with the procedure.    Saeid verbalized agreement with and understanding of the rational for the diagnosis and treatment plan.  All questions were answered to best of my ability and the patient's satisfaction. The patient was advised to contact the clinic with any questions that may arise after the clinic visit.      Disclaimer: This note consists of symbols derived from keyboarding, dictation and/or voice recognition software. As a result, there may be errors in the script that have gone undetected. Please consider this when interpreting information found in this chart.      ISAMAR Sen D.P.M., F.A.C.F.A.S.      Again, thank you for allowing me to participate in the care of your patient.        Sincerely,        Peña Sen DPM

## 2024-04-30 NOTE — PROGRESS NOTES
[facility]: Cancer Care                                                                                          Reached out to Tim per the request of Dr. Bunn to see if he has any further thoughts on if he would like to escalate systemic treatment to ipi/nivo vs go palliative route.    Tim reports he would like to start ipi/nivo. Updated provider.    Addendum 11:59 AM  Additionally reached out per the request of Dr. Bunn to let Tim know that the radiation oncology team will be contacting him to arrange for further GK to his R frontal lesion. Tim had no further questions.    Samantha Lincoln, RN, BSN  RN Care Coordinator  [facility]

## 2024-05-02 NOTE — TELEPHONE ENCOUNTER
Called Tim to schedule gamma knife treatments starting 5/29. Tim wanted to reschedule his 5/15 infusion, and start his radiation on 5/15. Tim said he would call to reschedule his infusion, and I scheduled MRI, Consult with Dr Rose on 5/15. If you have questions, Call me at [phone].    Thank you,  Celestino

## 2024-05-02 NOTE — NURSING NOTE
Date: 2024   Age: 68 year old  Ethnicity:    Sex: male  : 1955   Lives In: [place]      Diagnosis: metastatic melanoma     Prior radiation therapy:   Site Treated: left resection cavity  Facility: [facility]  Dates: 23- 23  Dose: 2500 cGy in 5 fx     Site Treated: left parietal-occipital lesion  Facility: [facility]  Dates: 23- 23  Dose: 3000 cGy in 5 fx     Site Treated: right medial frontal  Facility:  [facility] Gamma Knife  Dates: 23  Dose: 2200 cGy to 50% isodose line 1 Fx     Site Treated: left frontal and right occipital margin  Facility:  [facility] Gamma Knife  Dates: 24  Dose:      Prior chemotherapy:   See below     Pain at time of consult?  Does patient have a living will?  Does patient have an implanted cardiac device?     RN time with patient:  Educated on gamma knife?     Fall Screen:  Have you fallen in the past week?   Have you felt unsteady when walking or standing in the past week?      Physicians: Dr. Carlos Manuel Bunn,  Dr. Kye Garrido; Dr. Asim Cervantes (PCP)     Review Since Diagnosis:  Hx : Prostate Cancer, Prostatectomy, no adjuvant therapy  22: he brings a left parietal scalp lesion to attention of GP. It was initially observed.  2022: PCP appt; lesion felt to be a cyst. Lesion lanced and drained via scalp incision  2023: the left parietal scalp lesion area would occasionally break open and bleed   2023: right leg and foot not working totally right   March/2023: three episodes of right leg feeling heavy, last episode jerking of leg, lasted a minute or two, no loss of consciousness   4/15/23: pt driving from work as had a migraine. Had right leg numbness/heaviness and jerking.  A co worker was following on the same route and noticed pt having problems so assisted him when found in parking lot; sitting in grass and confused.  To ER via ambulance to [place]  4/15/23: Brain MRI:   2.2 x 1.7 x 1.9 cm lesion left paramedian  posterior frontal lobe near the precentral gyrus  2.2 x 2.1 x 2.0 cm lesion left occipital lobe near the lingual gyrus  4/15/23: CT CAP, bilateral pulmonary nodules measuring up to 0.6 cm-indeterminate   Neck CT/Angio: enlarged lymph nodes and additional scalp lesions consistent with head and neck local regional disease  Pt referred to Dr. Garrido   4/21/23: crani by Dr. Garrido, biopsy of left frontal brain lesion showed malignant melanoma, this was resected.  Biopsy of left parietal scalp lesion showed malignant melanoma, excision of this lesion  4/25/23: discharged to acute rehab [facility]   5/9/23: discharged home with use of walker, remains on keppra  5/10/23: last dose of steroids   5/12/23: pt saw Dr. Greene, unsure scalp lesion is primary or a metastatic lesion. Wanting PET/CT for extent of disease.  Plan Rad/Onc for brain lesion and surgical cavity and then plan to immediately follow with systemic immunotherapy.  Will know better which agents will be used once PET/CT completed.    5/15/23: pt saw Dr. Garrido, still some numbness right leg and foot.  Currently ambulating with a cane   5/18/23: PET/CT: shows potentially involved neck lymph nodes. Mild uptake in soft tissue nodules in the scalp, in the known left occipital brain metastasis as well as in the left lower neck region.   5/24/23- 5/31/23: gamma knife as noted above  6/7/23: C1 of pembrolizumab every 3 weeks  6/14/23- 6/16/23: admitted for vision change, reduced comprehension, speech change, fall and headache.   6/14/23: brain mri showing likely progression of left occipital mass with associated edema. Improved on steroids and discharged on oral decadron. Plan for repeat mri and neurosurgery follow up in one month  7/7/23: ED visit for headaches, confusion, vision changes concern for hemorrhage of the left occipital mass. Admitted to [facility] until 7/9/23. Increased Decadron dose.   7/14/23: craniotomy of the left parieto-occipital mass (  Hema). Discharged on Decadron taper  8/11/23: C2 of pembrolizumab  8/23/23: brain mri  No recurrence in left frontal lobe  Decreased enhancement at edges of left occipital resection cavity  New enhancement in the right frontal lobe concerning for new met. Measures 6 mm.  8/28/23: post op visit with Dr. Garrido. Pt still has numbness involving right leg and weakness involving his right lower extremity. Loss of muscle mass in right leg. Still has balance issues with no recent falls. Generalized fatigue; sleeping up to 20 hours a day. Review of brain mri. Referred to Dr. Rose for SRS.  8/31/23: video visit with CNP hem/onc: plan for scheduled C3 dose of pembrolizumab on 9/1/23. Has PET/CT later today. Did not get C3 due to pneumonitis  8/31/23: PET/CT: incidental finding of pneumonitis. Plan to cancel Keytruda for 9/1/23. Will obtain COVID and resp panel on 9/1/23. Pt to monitor his pulse ox. Dr. Greene will discuss next steps in treatment at appointment scheduled for 9/7.   9/8/23; Gamma Knife to right medial frontal lesion   12/7/23; CT CAP, two lesions in liver favored to be benign, no evidence of mets   12/21/23; crani by Dr. Garrido, gross total resection of right frontal tumor, path showed metastatic melanoma, oncogenic, class 3 BRAF mutation identified   12/23/23; discharged home  1/16/24; MR Brain with Perfusion, new 1.1 cm lesion right occipital                                                         Right frontal crani, no increased perfusion or nodularity in margins                                                         Previously treated right perimedian lesion 1.2 cm (was 0.8 cm when treated)                                                          Treated cystic left occipital lobe lesion, cystic/solid more solid, likely increasing   Dr. Rose/Dr. Garrido/ Dr. Bunn discussed, plan Gamma Knife to new lesion, watch others as treating feel toxicity may be too high.    Only two cycles of Nivolumab to date  as dealing with pneumonitis   1/22/24; Tim saw Amber Scheierl CNP Med/Onc, walking with cane if out of house, right leg better, off the dexamethasone as ran out so stopped, will resume dex on taper schedule.  Start Nivolumab even though dex, Dr. Bunn OK with..  If tolerates may add another immunotherapy, in 4 weeks cycle 2 of Nivolumab  1/22/24; got C1D1 Nivolumab   2/2/24; Gamma Knife to two areas  3/2/24; PET, previously seen left lower cervical node resolved, two new suspected intramuscular mets to lower extremity.    3/2/24; Brain MRI, interval increase in size of multiple mets with edema, unsure disease progression or post radiation change. Decision to watch  4/17/24; PET/CT, increase in size and uptake of enhancing soft tissue nodules left medial thigh and right posterior leg.  New uptake in left fifth and sixth ribs-indeterminate.  Two indeterminate liver lesions favored to be benign but cannot exclude mets.    4/17/24; Brain MRI, 1.8 x 1.5 cm lesion right frontal increased (was 0.8 x 0.7 cm)                                   Other lesions stable, to slight increase to slight decrease and edema persists  4/17/24; C4 of Nivolumab  4/19/24; pt saw Dr. Bunn, pneumonitis pt had developed from what thought was the Pembrolizumab resolved. On dexamethasone 1 mg po daily for headaches but will not increase dexamethasone as pt feels in past couple of weeks headaches a bit improved.  Discussed escalating to dual checkpoint inhibitor therapy hoping to improved control of CNS involvement  (Ipi/Nivo)Pt will discuss with family   5/15/24; pt scheduled for labs and to have C1D1 of Ipi/Nivo but will change as that is when can start Gamma Knife preparation.  Pt opted to change infusion therapy vs changing Gamma Knife dates.            Chief Complaint: at consult

## 2024-05-09 NOTE — PROGRESS NOTES
Virtual Visit Details    Type of service:  Video Visit   Video Start Time: 2:44 PM  Video End Time:2:24 PM    Originating Location (pt. Location): Home  Distant Location (provider location):  Off-site  Platform used for Video Visit: [employer]    [facility]   Return Patient Visit    Name: Saeid Santa  MRN: [id number]  Date of visit: May 13, 2024    Diagnosis: Metastatic melanoma  Stage: IV    Molecular: BRAF G466A, TMB 48.7554 mut/Mb    Performance status: ECOG 0    Oncology History:  --8/16/22, he brings a left parietal scalp lesion to attention of his GP. It is initially observed.  --November 2022, the left parietal scalp lesion was felt to be a cyst, and the left parietal lesion was lanced and drained via scalp incision.  --January 2023, the left parietal scalp cyst would occasionally break open, drain, and bleed, largely at night.   --February 2023, he notes some mild difficulty clearing obstacles with right leg and foot.  --March/April 2023, he has 3 progressively worsening episodes over a span 3 weeks including the right leg. At first he notes the onset of right leg numbness and heaviness, like he was carrying 50 lbs of water on the leg. Then, about a week or so later, the leg began to jerk and converse while he was in the basement. Neither episode associated with loss of consciousness  --4/15/23, he recalls the right leg numbness, heaviness, jerking while driving between work. He had been found by coworkers in the parking lot, sitting in the grass, appearing confused. He was brought to the ER by EMS. CT-CAP, showed small (<6mm), bilateral indeterminate pulmonary nodules. CT-head and MRI brain showed 2 discrete ring-enhancing lesions in the left paramedian posterior frontal lobe near the precentral gyrus measuring approximately 2.2 x 1.7 x 1.9 cm and left occipital lobe near the lingual gyrus measuring approximately 2.2 x 2.1 x 2.0 cm. The occipital lesion encroaches upon the  subependymal surface of the left lateral ventricle occipital horn and contacts the superior surface of the medial left tentorium. The lesions are accompanied by moderate surrounding vasogenic edema. Surgery was recommended.  --4/21/23, underwent left awake (sleep-awake-sleep) craniotomy for tumor resection, he has resection of the left frontal, motor strip tumor as well asan elliptical incision of the left parietal scalp lesion. On pathology, both the scalp excision and brain mass are positive for malignant melanoma. Tumor cells are positive for S-100 and Melan-A, and negative for cytokeratin AE1/AE3, NKX3.1, GFAP, ERG, CD3, and CD20.  CD3 highlights brisk infiltration of the tumor by T lymphocytes, while CD20 highlights scattered positive B lymphocytes. NGS identifies a BRAF G466A mutation, a class 3 mutation insensitive to BRAF kinase inhibitor monotherapy. TMB is high at 48.754 muts/Mb.   --4/22/23, post-operative MRI shows post-operative changes of the parietal craniotomy of the left frontal lobe metastasis. The other 1.7 x 1.1 cm cystic lesion in the left occipital lobe is also seen. No new lesions appreciated.  -- 4/25/23 to 5/9/23, discharged from the hospital to acute rehab stay at [facility] in [place]. He participated in a comprehensive rehabilitation program consisting of PT, OT, Speech, Psychology and Recreation Therapy. He did well and was discharged home with use of a walker.  --PET-CT on 5/18/23 with potentially involved neck lymph nodes. He has mild FDG uptake in soft tissue nodules in the scalp, in the known left occipital brain metastasis, as well as in the left lower neck region.  -5/24-/5/31/23, He was treated with gamma knife: 2500 cGy to the left resection cavity; 3000 cGy to the L parietal-occipital lesion.  -6/7/2023, Cycle 1 of pembrolizumab 200mg every 3 weeks  -6/14/23-6/16/23, admitted for vision change, reduced comprehension, speech change, fall and headache. MRI showing likely  progression of left occipital mass with associated edema. Improved on steroids and discharged on oral decadron. Plan for repeat MRI and neurosurgery follow up in 1 month.  -7/7/23 ED visit for headaches, confusion, vision changes, concern for hemorrhage of the left occipital mass, admitted to [facility] until 7/9/23, increased dexamethasone dose  -7/14/23 craniotomy of the left parieto-occipital mass, discharged on a dexamethasone taper  -8/11/2023, Cycle 2 of pembrolizumab  -8/23/2023 brain MRI with no recurrence in left frontal lobe, decreased enhancement at edges of left occipital resection cavity, new enhancement int he right front lobe concerning for new met--seen by neurosurgery and considering RT  -8/31/2023 CT/PET with increased uptake in left lower neck lymph node c/f early progression. Ground-glass, reticular changes in lungs also noted likely secondary to drug-induced pneumonitis. Started 1 mg/kg prednisone (80 mg daily) with 8 week taper  -9/8/23 GK to R frontal lobe lesion  -12/7/23 CT neck: Resolved previously hypermetabolic left level 5 lymph node. No new  cervical lymphadenopathy.  -12/7/23 CT chest/AP: Two arterially enhancing lesions in the liver measuring 0.9 - 1.0 cm were not previously visualized, likely due to differences in timing of the contrast bolus. These are indeterminate but favored to be benign perfusional abnormalities. Melanoma metastases cannot be entirely excluded. Otherwise, no evidence for metastatic disease in the chest, abdomen and pelvis. Significant improvement in linear opacities in the lungs, suggesting improving drug-induced pneumonitis. Few stable small pulmonary nodules.  -12/7/23 MR head: Progression in intracranial metastatic disease compared to 9/8/2023   with multiple new and/or enlarging metastases, including internally hemorrhagic right frontal lobe lesion with 3 mm of leftward midline shift and new lesion adjacent to the left occipital resection bed.   -12/8/23  started on dex 4 mg BID  -12/21/23 Right frontal craniotomy with Dr. Garrido  -1/22/24, starts nivolumab (480mg every 4 weeks)  -2/2/24, Gamma knife to right occipital and left frontal lesions   -3/2/24 PET/CT: previously seen L lower cervical node resolved, two new suspected intramuscular mets to the LE  -3/2/24 MR brain: Multifocal intracranial metastatic disease with interval increase in size of multiple metastatic lesions and surrounding vasogenic edema detailed above. Unsure if this is due to disease progression or post radiation change.  -4/17/24 C4 nivolumab  -4/17/24 MR brain: Increased size of the enhancing lesions in the right frontal and right occipital lobes, likely representing interval disease progression. Interval decreased size of the left frontal lobe lesion. Additional intracranial metastatic lesions are stable. No new enhancing lesions identified. Stable surrounding vasogenic edema in the bifrontal lobes, right occipital lobe, and left temporoparietooccipital lobes.   -4/17/24 PET/CT: There is interval progression of disease with increase in size and uptake of enhancing cerebral metastatic lesions in the right frontal and left occipital lobes as well as interval increase in size and uptake of enhancing soft tissue nodules in the left medial thigh and right posterior leg as detailed above. There is new hypermetabolic uptake in the left fifth and sixth ribs at the costovertebral junction with focal sclerosis on corresponding CT suspicious, indeterminate.     Interval History:  -Doing okay overall.  -Has mild headaches in the mornings, managed with Tylenol.   -Remains on dex at 1 mg daily at 7am-8am.   -Has frequent lightheadedness, more when moving around. Sometimes will use a cane.   -Has fallen 4 times in the last month without serious injury. Has some neck pain that he feels is muscular.   -Eating and drinking well.  -Denies any bowel concerns.   -Denies any new numbness.  -Feels legs tire easily,  right worse than left.   -Energy is stable. Naps about 1-3 hours/day. Sleep quality at night is poor. Often awake 1-2 am with difficulty falling back asleep.   -Not taking any sleep aides.   -Usually up and active by around 10am and does things around the house and outside and running errands on good days. On bad days, spends more time napping. Good vs bad days are about 50/50.     Objective:  General: patient appears well in no acute distress, alert and oriented, speech clear and fluid, lying down on couch  Skin: no visualized rash or lesions on visualized skin  Resp: Appears to be breathing comfortably without accessory muscle usage, speaking in full sentences, no audible wheezes or cough.  Psych: Coherent speech, normal rate and volume, able to articulate logical thoughts, able to abstract reason, no tangential thoughts, no hallucinations or delusions  Patient's affect is appropriate.    Labs:   Most Recent 3 CBC's:  Recent Labs   Lab Test 04/17/24  0913 03/19/24  1534 01/31/24  1003 01/22/24  1047   WBC 14.1* 12.4* 15.3* 8.7   HGB 14.7 14.9 13.5 14.1   MCV 92 92 95 91    237 261 224   ANEUTAUTO 9.9* 9.2*  --  5.5     Most Recent 3 BMP's:  Recent Labs   Lab Test 04/17/24  0913 03/19/24  1534 03/04/24  1011 02/19/24  0942    142  --  141   POTASSIUM 3.6 3.6  --  4.5   CHLORIDE 107 109*  --  107   CO2 21* 22  --  21*   BUN 18.3 19.1  --  16.0   CR 0.92 1.16  --  0.91   ANIONGAP 12 11  --  13   JOHN PAUL 9.2 9.0  --  9.1   GLC 90 113* 82 107*   PROTTOTAL 7.0 6.5  --  7.0   ALBUMIN 4.2 4.0  --  4.0    Most Recent 3 LFT's:  Recent Labs   Lab Test 04/17/24  0913 03/19/24  1534 02/19/24  0942   AST 21 23 32   ALT 25 40 25   ALKPHOS 59 60 65   BILITOTAL 1.0 0.8 0.5    Most Recent 2 TSH and T4:  Recent Labs   Lab Test 04/17/24  0913 03/19/24  1534   TSH 2.30 0.84   I reviewed the above labs today.    Assessment and Plan:   Saeid Santa is a 68 year old male with a history of prostate cancer in 2009 s/p  prostatectomy, and BRAF G446A mutated malignant melanoma with brain metastases at diagnosis s/p L frontal craniotomy/resection 4/21/23 followed by GK to tumor bed and L parietal-occipital lesion in May. Started pembrolizumab 6/7/23. Underwent second craniotomy for L parieto-occipital lesion resection in 7/14/23 2/2 hemorrhagic conversion and GK to R frontal lesion 9/8/23, on pembrolizumab, course c/b ICI pneumonitis necessitating steroids, again with CNS progression 12/2023 s/p resection and GK.     # metastatic melanoma with metastatic disease to brain  -presented with de-francisca CNS involvement s/p craniotomy x2 and GK to additional R frontal lesion  -TMB high, BRAF mutated but not V600E  -s/p pembrolizumab x2 (first 6/7/23) c/c/b pneumonitis s/p steroid taper  -recent CNS progression in Dec 2023 s/p R frontal , ongoing response systemically, improved pneumonitis radiographically (never symptomatic)  -3/2/24 brain MR shows increased size of several previously treated metastatic lesions, no new lesions. After discussion with Rad Onc and Radiology, although progression cannot be ruled out, findings can be consistent with treatment effect  -now with repeat MR brain with continued significant increase in size of R frontal lesion (18 x 15 mm, previously 8 x 7 mm), anterior R frontal  lesion and R occipital lesion marginally increased. L frontal lesion decreased. L occipital lesion stable. Vasogenic edema significant but stable  -Last PET/CT showed overall continued concern for metastatic disease involving the soft tissues in both the R lateral calf and L medial thigh. These lesions remain small but slightly increased in both size and avidity, and likely represent sites of metastatic disease. No new areas of systemic disease noted.     Plan:  --Patient plans to start gammaknife on 5/15 and will also start ipi/nivo that day. We reviewed the potential side effects and their management in detail. We also discussed the  potential for worsening brain edema, which could cause increased neurologic symptoms necessitating the use of higher dose steroids.   --Patient is nervous, but expresses a desire to proceed with treatment this week.  --I will see him back prior to consideration of cycle 2.  --Will plan to repeat imaging after 4 cycles of ipi/nivo.  --Headaches are currently managed on dexamethasone at 1 mg daily which he will continue for now.  --I discussed his case with Dr. Bunn today.    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, resolved  -8/2023 PET/CT showed bilateral FDG avid ground glass and reticular infiltrates favored to represent pneumonitis, started on steroids, now completed taper  -denied any history of pulmonary symptoms at the time of pneumonitis findings  -most recent imaging with continued resolution, asymptomatic today  -current steroids indicated for cerebral edema, not pneumonitis    Plan:  --continue to monitor closely for new respiratory symptoms, particularly in the setting of escalation to dual checkpoint inhibitor therapy    # Liver lesions  -12/7 CT AP showed two indeterminate liver lesions not previously appreciated, favored to be benign but cannot exclude metastases  -no concerning liver lesions at this time    Plan:  --trend with surveillance scans    # Fatigue  -TSH normal    Plan:  --monitor    Meghan Rinaldi PA-C  [facility]  [address] [place] [id number] [phone]    32 minutes spent on the date of the encounter doing chart review, review of test results, interpretation of tests, patient visit, and documentation     The longitudinal plan of care for the diagnosis(es)/condition(s) as documented were addressed during this visit. Due to the added complexity in care, I will continue to support Tim in the subsequent management and with ongoing continuity of care.

## 2024-05-11 NOTE — PROGRESS NOTES
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 22 Gy in one fraction prescribed to the 50% isodose line (2023)      3.2024  3a: R Occipital: 22 Gy prescribed to 60% isodose line      3b L Frontal: 20 Gy prescribed to 50% isodose line          INTERVAL SINCE COMPLETION OF RADIATION THERAPY:  12 months since first course on 2023, 9 months since 2nd course on 2023, 3 months since 3rd course on 2024.     SUBJECTIVE: Mr. Santa is a 69 yo male with metastatic melanoma. He presented with seizure in 2023. Workup revealed 2 discrete ring-enhancing lesions, one of which was in the left paramedian posterior frontal lobe near the precentral gyrus, measuring 2.2 x 1.7 x 1.9 cm and the other in the left occipital lobe near the lingual gyrus, measuring 2.2 x 2.1 x 2.0 cm, associated with  moderate vasogenic edema.  He underwent craniotomy on 2023 for resection of the lesion left frontal lesion as this was felt to be the cause of his seizure. Additionally a scalp lesion was also resected as it had been draining, bleeding and growing in size. Pathology showed malignant melanoma. It was unclear if the scalp lesion was the primary or represent a cutaneous metastasis (it was without epidermal melanoma in situ).  PET/CT on 2023 showed a brain metastasis in the left occipital lobe and a lymph node in the left low  neck.      He then received GK-SRS to the left occipital lesion, 30 Gy in 5 fractions, as well as the left posterior frontal resection cavity, 25 Gy in 5 fractions.  He completed these treatments on 5/31/2023. He then began Keytruda under the care of Dr. Greene on 6/7/2023.       Mr. Santa developed intralesional hemorrhage of the left occipital metastasis with mass effect and ultimately required partial resection on 7/14/2023. The deeper aspect of the tumor densely adhered to the ventricle wall and the entrapped occipital horn, and thus could not be safely resected. Surgical pathology showed treatment effect with residual viable melanoma.     Mr. Santa resumed much delayed cycle 2 of Pembrolizumab on 8/11/2023. Repeat brain MRI on 8/23/2023 showed stable post-op changes in the resected parietal skull and left frontal lobe cavity and decreased enhancement of the left occipital resection cavity. However, a new focus of abnormal contrast enhancement about 6 mm in diameter in the anteromedial right frontal lobe was found.     He then proceeded another course of GK SRS to this new metastasis on 9/8/2023, receiving 22 Gy in a single fraction prescribed to the 50% isodose line.     Mr. Santa unfortunately had to discontinue from Keytruda after 2 cycles due to immune pneumonitis. He was started on a Prednisone taper with the plan to switch to Nivolumab.    Brain MRI on 12/7/2023 unfortunately showed a new lesion in the right frontal lobe with vasogenic edema, a new right occipital lesion about ~ 1 cm, stable to slightly enlarged previously treated frontal falcine lesion, new enhancement surrounding the left occipital cavity. His systemic restaging CT scan showed resolution of previously enlarged left level V cervical node and no new lesions.     Given the size of the right frontal lobe metastasis, GK SRS was not felt to be able to provide adequate local control. He thus proceeded with 3rd craniotomy on 12/21/2023 by  Dr. Garrido. Pathology confirmed metastatic melanoma. He was initially on Decadron 2 mg BID, but discontinued mid Jan due to running out. He was restarted on Decadron by medical oncology with a plan to taper. He began single agent Nivolumab on 1/22/2023.     Mr. Santa had a follow up MRI on 1/16/2024. The right occipital lobe lesion which was seen on 12/7 MRI, but never treated continued to enlarge , measuring 11 mm in largest diameter. Additionally, the previously treated paramedial right frontal lobe lesion enlarged to 12 mm. There was also increased nodular enhancement about the left occipital cavity. Subsequent planning MRI on 1/31/2024 also identified a new left frontal metastasis measuring 5 mm.     He then proceeded with 3rd course of GK SRS on 2/2/2024, receiving 22 Gy to the right occipital and left frontal metastasis. A mask was used as the frame was felt risky given 3 prior craniotomies.     Following his 3rd course of GK SRS, Tim proceeded with cycle 2 Nivolumab on 2/19/2024. At that visit, he endorsed worsening headache after Decadron tapering to 0.5 mg. Decadron was increased to 1 mg daily.      Due to his worsening headache, MRI of the brain was moved up to 3/2/2024. Imaging unfortunately showed progression of multiple lesions, including the most recently treated 2 lesions. Previously irradiated and resected left occipital lobe cavity had worsening enhancement, with surrounding vasogenic edema. PET/CT scan on 3/4/202 showed that the previously seen hypermetabolic left neck node resolved; however, there were 2 new suspected intramuscular metastases in the lower extremities.     He was last contacted on 3/7/2024. Due to radioresistant nature of his brain metastases and significant edema in the left occipital lobe, repeat GK SRS to this region was not recommended.     He followed up with Dr. Bunn on 3/12/204 and was recommended to continue with Nivolumab and completed cycle 4 on 4/17/2024.     Tim had  "a follow up PET/CT on 2024, which unfortunately showed interval progression of soft tissue nodules in the left medial thigh and right posterior leg. Brain MRI showed increased size of the right frontal lobe and right occipital lobe lesions, stable left occipital and right frontal parafalcine lesion, decreased size of the left frontal lesion.     Given the MRI finding, Tim is asked to be seen for discussion of radiation options.     On interview, Tim states that he has daily headache, which wakes him up around midnight and early morning. He has been taking Decadron and is at 1 mg daily for quite some time. He complains of lightheadedness when he stands up, which is mitigated by drinking more water. He has persistent right leg weakness, and has fallen 4 times in the past month. He uses a cane to provide some balance.       OBJECTIVE:   /83   Pulse 75   Ht 1.854 m (6' 1\")   Wt 95.3 kg (210 lb)   SpO2 97%   BMI 27.71 kg/m     Gen: Alert and oritented, speech fluent, interaction appropriate.  HEENT: facial features consistent with steroid use. Face symmetric, facial muscle movements intact.    CV: well perfused  Resp: breathing comfortably on room air.   Neuro: Weak, right leg extension, dorsiflexion and plantar flexion.     IMAGIN2024 vs. 3/2/2024 vs. 2024   Right occipital lesion: 22 Gy to 60% treated on 2024, continued to enlarge despite treatment       L occipital cystic lesion: 30 Gy in 5 fractions treated on 2023; nodular component relative stable from last MRI          Treated paramedial right frontal lesion (GK'ed on 2023, 22 Gy to 50% isodose line) stable         Recently treated left frontal convexity lesion: 20 Gy to 50% isodose 2024:  is bigger with more edema, on 3/2/2024, but now slightly decreased in size         Right frontal lobe resection cavity: not been treated, enlarged enhancing component posteriorly        MRI 5/15/2024, right frontal " metastasis        ASSESSMENT AND PLAN: In summary, Mr. Santa is a 67 yo gentleman with metastatic melanoma. He has limited systemic disease but significant CNS disease burden. He has been through 3 craniotomies and 3 courses of GK SRS. His CNS disease appears to be radioresistant, with progression despite radiosurgery. I have had gavi conversation with in the past, voicing concern of cumulative toxicity to his brain from various courses of GK treatment.     I reviewed all of his imaging studies. Most of the treated lesions are stable to slightly enlarged. The right frontal metastasis cavity has enlarged the most. Like all his other lesions, it is cystic. Reviewing of the radiation records show that this area has not been previously treated. Thus, treating this lesion is likely feasible. We discussed a course of 6 Gy x 5 fractions using mask based immobilization.     We emphasized that the cumulative dose to his brain is still significant and that he will be at risk of developing radiation necrosis. On the other hand, his CNS disease has no other great way of control. He is willing to proceed and voiced an understanding the challenge of managing his brain metastases.     Tim will have his mask made today. We have scheduled for him to start treatment tomorrow for a total of 5 sessions.          Benedicto Rose M.D./Ph.D.  Radiation Oncologist   Department of Therapeutic Radiology   [facility]  Phone: [phone]          40 minutes were spent on the date of the encounter doing chart review, history and exam, documentation and further activities as noted above.           Benedicto Rose MD

## 2024-05-12 NOTE — NURSING NOTE
Is the patient currently in the state of [place]? YES    Visit mode:VIDEO    If the visit is dropped, the patient can be reconnected by: VIDEO VISIT: Text to cell phone:   Telephone Information:   Mobile [phone]       Will anyone else be joining the visit? NO  (If patient encounters technical issues they should call [phone])    How would you like to obtain your AVS? MyChart    Are changes needed to the allergy or medication list? No    Reason for visit: RECHECK    Belle COLONF    
12-May-2024 13:45

## 2024-05-13 NOTE — NURSING NOTE
Is the patient currently in the state of [place]? YES    Visit mode:VIDEO    If the visit is dropped, the patient can be reconnected by: VIDEO VISIT: Text to cell phone:   Telephone Information:   Mobile [phone]       Will anyone else be joining the visit? NO  (If patient encounters technical issues they should call [phone]    How would you like to obtain your AVS? MyChart    Are changes needed to the allergy or medication list? No    Are refills needed on medications prescribed by this physician? NO    Reason for visit: ANNE SUNG

## 2024-05-13 NOTE — LETTER
5/13/2024         RE: Saeid Santa  [address]  [facility] [address]        Dear Colleague,    Thank you for referring your patient, Saeid Santa, to the [facility]. Please see a copy of my visit note below.    Virtual Visit Details    Type of service:  Video Visit   Video Start Time: 2:44 PM  Video End Time:2:24 PM    Originating Location (pt. Location): Home  Distant Location (provider location):  Off-site  Platform used for Video Visit: [employer]    [facility]   Return Patient Visit    Name: Saeid Santa  MRN: [id number]  Date of visit: May 13, 2024    Diagnosis: Metastatic melanoma  Stage: IV    Molecular: BRAF G466A, TMB 48.7554 mut/Mb    Performance status: ECOG 0    Oncology History:  --8/16/22, he brings a left parietal scalp lesion to attention of his GP. It is initially observed.  --November 2022, the left parietal scalp lesion was felt to be a cyst, and the left parietal lesion was lanced and drained via scalp incision.  --January 2023, the left parietal scalp cyst would occasionally break open, drain, and bleed, largely at night.   --February 2023, he notes some mild difficulty clearing obstacles with right leg and foot.  --March/April 2023, he has 3 progressively worsening episodes over a span 3 weeks including the right leg. At first he notes the onset of right leg numbness and heaviness, like he was carrying 50 lbs of water on the leg. Then, about a week or so later, the leg began to jerk and converse while he was in the basement. Neither episode associated with loss of consciousness  --4/15/23, he recalls the right leg numbness, heaviness, jerking while driving between work. He had been found by coworkers in the parking lot, sitting in the grass, appearing confused. He was brought to the ER by EMS. CT-CAP, showed small (<6mm), bilateral indeterminate pulmonary nodules. CT-head and MRI brain showed 2 discrete ring-enhancing  lesions in the left paramedian posterior frontal lobe near the precentral gyrus measuring approximately 2.2 x 1.7 x 1.9 cm and left occipital lobe near the lingual gyrus measuring approximately 2.2 x 2.1 x 2.0 cm. The occipital lesion encroaches upon the subependymal surface of the left lateral ventricle occipital horn and contacts the superior surface of the medial left tentorium. The lesions are accompanied by moderate surrounding vasogenic edema. Surgery was recommended.  --4/21/23, underwent left awake (sleep-awake-sleep) craniotomy for tumor resection, he has resection of the left frontal, motor strip tumor as well asan elliptical incision of the left parietal scalp lesion. On pathology, both the scalp excision and brain mass are positive for malignant melanoma. Tumor cells are positive for S-100 and Melan-A, and negative for cytokeratin AE1/AE3, NKX3.1, GFAP, ERG, CD3, and CD20.  CD3 highlights brisk infiltration of the tumor by T lymphocytes, while CD20 highlights scattered positive B lymphocytes. NGS identifies a BRAF G466A mutation, a class 3 mutation insensitive to BRAF kinase inhibitor monotherapy. TMB is high at 48.754 muts/Mb.   --4/22/23, post-operative MRI shows post-operative changes of the parietal craniotomy of the left frontal lobe metastasis. The other 1.7 x 1.1 cm cystic lesion in the left occipital lobe is also seen. No new lesions appreciated.  -- 4/25/23 to 5/9/23, discharged from the hospital to acute rehab stay at [facility] in [place]. He participated in a comprehensive rehabilitation program consisting of PT, OT, Speech, Psychology and Recreation Therapy. He did well and was discharged home with use of a walker.  --PET-CT on 5/18/23 with potentially involved neck lymph nodes. He has mild FDG uptake in soft tissue nodules in the scalp, in the known left occipital brain metastasis, as well as in the left lower neck region.  -5/24-/5/31/23, He was treated with gamma knife: 2500 cGy to the  left resection cavity; 3000 cGy to the L parietal-occipital lesion.  -6/7/2023, Cycle 1 of pembrolizumab 200mg every 3 weeks  -6/14/23-6/16/23, admitted for vision change, reduced comprehension, speech change, fall and headache. MRI showing likely progression of left occipital mass with associated edema. Improved on steroids and discharged on oral decadron. Plan for repeat MRI and neurosurgery follow up in 1 month.  -7/7/23 ED visit for headaches, confusion, vision changes, concern for hemorrhage of the left occipital mass, admitted to [facility] until 7/9/23, increased dexamethasone dose  -7/14/23 craniotomy of the left parieto-occipital mass, discharged on a dexamethasone taper  -8/11/2023, Cycle 2 of pembrolizumab  -8/23/2023 brain MRI with no recurrence in left frontal lobe, decreased enhancement at edges of left occipital resection cavity, new enhancement int he right front lobe concerning for new met--seen by neurosurgery and considering RT  -8/31/2023 CT/PET with increased uptake in left lower neck lymph node c/f early progression. Ground-glass, reticular changes in lungs also noted likely secondary to drug-induced pneumonitis. Started 1 mg/kg prednisone (80 mg daily) with 8 week taper  -9/8/23 GK to R frontal lobe lesion  -12/7/23 CT neck: Resolved previously hypermetabolic left level 5 lymph node. No new  cervical lymphadenopathy.  -12/7/23 CT chest/AP: Two arterially enhancing lesions in the liver measuring 0.9 - 1.0 cm were not previously visualized, likely due to differences in timing of the contrast bolus. These are indeterminate but favored to be benign perfusional abnormalities. Melanoma metastases cannot be entirely excluded. Otherwise, no evidence for metastatic disease in the chest, abdomen and pelvis. Significant improvement in linear opacities in the lungs, suggesting improving drug-induced pneumonitis. Few stable small pulmonary nodules.  -12/7/23 MR head: Progression in intracranial metastatic  disease compared to 9/8/2023   with multiple new and/or enlarging metastases, including internally hemorrhagic right frontal lobe lesion with 3 mm of leftward midline shift and new lesion adjacent to the left occipital resection bed.   -12/8/23 started on dex 4 mg BID  -12/21/23 Right frontal craniotomy with Dr. Garrido  -1/22/24, starts nivolumab (480mg every 4 weeks)  -2/2/24, Gamma knife to right occipital and left frontal lesions   -3/2/24 PET/CT: previously seen L lower cervical node resolved, two new suspected intramuscular mets to the LE  -3/2/24 MR brain: Multifocal intracranial metastatic disease with interval increase in size of multiple metastatic lesions and surrounding vasogenic edema detailed above. Unsure if this is due to disease progression or post radiation change.  -4/17/24 C4 nivolumab  -4/17/24 MR brain: Increased size of the enhancing lesions in the right frontal and right occipital lobes, likely representing interval disease progression. Interval decreased size of the left frontal lobe lesion. Additional intracranial metastatic lesions are stable. No new enhancing lesions identified. Stable surrounding vasogenic edema in the bifrontal lobes, right occipital lobe, and left temporoparietooccipital lobes.   -4/17/24 PET/CT: There is interval progression of disease with increase in size and uptake of enhancing cerebral metastatic lesions in the right frontal and left occipital lobes as well as interval increase in size and uptake of enhancing soft tissue nodules in the left medial thigh and right posterior leg as detailed above. There is new hypermetabolic uptake in the left fifth and sixth ribs at the costovertebral junction with focal sclerosis on corresponding CT suspicious, indeterminate.     Interval History:  -Doing okay overall.  -Has mild headaches in the mornings, managed with Tylenol.   -Remains on dex at 1 mg daily at 7am-8am.   -Has frequent lightheadedness, more when moving around.  Sometimes will use a cane.   -Has fallen 4 times in the last month without serious injury. Has some neck pain that he feels is muscular.   -Eating and drinking well.  -Denies any bowel concerns.   -Denies any new numbness.  -Feels legs tire easily, right worse than left.   -Energy is stable. Naps about 1-3 hours/day. Sleep quality at night is poor. Often awake 1-2 am with difficulty falling back asleep.   -Not taking any sleep aides.   -Usually up and active by around 10am and does things around the house and outside and running errands on good days. On bad days, spends more time napping. Good vs bad days are about 50/50.     Objective:  General: patient appears well in no acute distress, alert and oriented, speech clear and fluid, lying down on couch  Skin: no visualized rash or lesions on visualized skin  Resp: Appears to be breathing comfortably without accessory muscle usage, speaking in full sentences, no audible wheezes or cough.  Psych: Coherent speech, normal rate and volume, able to articulate logical thoughts, able to abstract reason, no tangential thoughts, no hallucinations or delusions  Patient's affect is appropriate.    Labs:   Most Recent 3 CBC's:  Recent Labs   Lab Test 04/17/24  0913 03/19/24  1534 01/31/24  1003 01/22/24  1047   WBC 14.1* 12.4* 15.3* 8.7   HGB 14.7 14.9 13.5 14.1   MCV 92 92 95 91    237 261 224   ANEUTAUTO 9.9* 9.2*  --  5.5     Most Recent 3 BMP's:  Recent Labs   Lab Test 04/17/24  0913 03/19/24  1534 03/04/24  1011 02/19/24  0942    142  --  141   POTASSIUM 3.6 3.6  --  4.5   CHLORIDE 107 109*  --  107   CO2 21* 22  --  21*   BUN 18.3 19.1  --  16.0   CR 0.92 1.16  --  0.91   ANIONGAP 12 11  --  13   JOHN PAUL 9.2 9.0  --  9.1   GLC 90 113* 82 107*   PROTTOTAL 7.0 6.5  --  7.0   ALBUMIN 4.2 4.0  --  4.0    Most Recent 3 LFT's:  Recent Labs   Lab Test 04/17/24  0913 03/19/24  1534 02/19/24  0942   AST 21 23 32   ALT 25 40 25   ALKPHOS 59 60 65   BILITOTAL 1.0 0.8 0.5     Most Recent 2 TSH and T4:  Recent Labs   Lab Test 04/17/24  0913 03/19/24  1534   TSH 2.30 0.84   I reviewed the above labs today.    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 Started pembrolizumab 6/7/23. Underwent second craniotomy for L parieto-occipital lesion resection in 7/14/23 2/2 hemorrhagic conversion and GK to R frontal lesion 9/8/23, on pembrolizumab, course c/b ICI pneumonitis necessitating steroids, again with CNS progression 12/2023 s/p resection and GK.     # metastatic melanoma with metastatic disease to brain  -presented with de-francisca CNS involvement s/p craniotomy x2 and GK to additional R frontal lesion  -TMB high, BRAF mutated but not V600E  -s/p pembrolizumab x2 (first 6/7/23) c/c/b pneumonitis s/p steroid taper  -recent CNS progression in Dec 2023 s/p R frontal , ongoing response systemically, improved pneumonitis radiographically (never symptomatic)  -3/2/24 brain MR shows increased size of several previously treated metastatic lesions, no new lesions. After discussion with Rad Onc and Radiology, although progression cannot be ruled out, findings can be consistent with treatment effect  -now with repeat MR brain with continued significant increase in size of R frontal lesion (18 x 15 mm, previously 8 x 7 mm), anterior R frontal  lesion and R occipital lesion marginally increased. L frontal lesion decreased. L occipital lesion stable. Vasogenic edema significant but stable  -Last PET/CT showed overall continued concern for metastatic disease involving the soft tissues in both the R lateral calf and L medial thigh. These lesions remain small but slightly increased in both size and avidity, and likely represent sites of metastatic  disease. No new areas of systemic disease noted.     Plan:  --Patient plans to start gammaknife on 5/15 and will also start ipi/nivo that day. We reviewed the potential side effects and their management in detail. We also discussed the potential for worsening brain edema, which could cause increased neurologic symptoms necessitating the use of higher dose steroids.   --Patient is nervous, but expresses a desire to proceed with treatment this week.  --I will see him back prior to consideration of cycle 2.  --Will plan to repeat imaging after 4 cycles of ipi/nivo.  --Headaches are currently managed on dexamethasone at 1 mg daily which he will continue for now.    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, resolved  -8/2023 PET/CT showed bilateral FDG avid ground glass and reticular infiltrates favored to represent pneumonitis, started on steroids, now completed taper  -denied any history of pulmonary symptoms at the time of pneumonitis findings  -most recent imaging with continued resolution, asymptomatic today  -current steroids indicated for cerebral edema, not pneumonitis    Plan:  --continue to monitor closely for new respiratory symptoms, particularly in the setting of escalation to dual checkpoint inhibitor therapy    # Liver lesions  -12/7 CT AP showed two indeterminate liver lesions not previously appreciated, favored to be benign but cannot exclude metastases  -no concerning liver lesions at this time    Plan:  --trend with surveillance scans    # Fatigue  -TSH normal    Plan:  --monitor    Meghan Rinaldi PA-C  [facility]  [address] [place] [address] [id number] [phone]    32 minutes spent on the date of the encounter doing chart review, review of test results, interpretation of tests, patient visit, and documentation     The longitudinal plan of care for the diagnosis(es)/condition(s) as documented were addressed during this visit. Due to the added complexity in  care, I will continue to support Tim in the subsequent management and with ongoing continuity of care.

## 2024-05-15 NOTE — PROGRESS NOTES
Cancer  Associated symptoms include headaches. Pertinent negatives include no fever, nausea or vomiting.       Date: 2024   Age: 68 year old  Ethnicity:    Sex: male  : 1955   Lives In: [place]      Diagnosis: metastatic melanoma     Prior radiation therapy:   Site Treated: left resection cavity  Facility: [facility]  Dates: 23- 23  Dose: 2500 cGy in 5 fx     Site Treated: left parietal-occipital lesion  Facility: [facility]  Dates: 23- 23  Dose: 3000 cGy in 5 fx     Site Treated: right medial frontal  Facility:  [facility] Gamma Knife  Dates: 23  Dose: 2200 cGy to 50% isodose line 1 Fx     Site Treated: left frontal and right occipital margin  Facility:  [facility] Gamma Knife  Dates: 24  Dose:      Prior chemotherapy:   See below     Pain at time of consult? Pt denies pain at time of consult  Does patient have a living will? Pt states he has  Does patient have an implanted cardiac device? No implanted cardiac device     RN time with patient: 50 minutes in person  Educated on gamma knife? Has had before , refreshed     Fall Screen:  Have you fallen in the past week? x1  Have you felt unsteady when walking or standing in the past week? Uses a cane when outside, if gets up too quick loses balance and legs weak     Physicians: Dr. Carlos Manuel Bunn,  Dr. Kye Garrido; Dr. Asim Cervantes (PCP)     Review Since Diagnosis:  2009: Prostate Cancer, Prostatectomy, no adjuvant therapy  22: he brings a left parietal scalp lesion to attention of GP. It was initially observed.  2022: PCP appt; lesion felt to be a cyst. Lesion lanced and drained via scalp incision  2023: the left parietal scalp lesion area would occasionally break open and bleed   2023: right leg and foot not working totally right   March/2023: three episodes of right leg feeling heavy, last episode jerking of leg, lasted a minute or two, no loss of consciousness   4/15/23: pt driving from  work as had a migraine. Had right leg numbness/heaviness and jerking.  A co worker was following on the same route and noticed pt having problems so assisted him when found in parking lot; sitting in grass and confused.  To ER via ambulance to [place]  4/15/23: Brain MRI:   2.2 x 1.7 x 1.9 cm lesion left paramedian posterior frontal lobe near the precentral gyrus  2.2 x 2.1 x 2.0 cm lesion left occipital lobe near the lingual gyrus  4/15/23: CT CAP, bilateral pulmonary nodules measuring up to 0.6 cm-indeterminate   Neck CT/Angio: enlarged lymph nodes and additional scalp lesions consistent with head and neck local regional disease  Pt referred to Dr. Garrido   4/21/23: crani by Dr. Garrido, biopsy of left frontal brain lesion showed malignant melanoma, this was resected.  Biopsy of left parietal scalp lesion showed malignant melanoma, excision of this lesion  4/25/23: discharged to acute rehab [facility]   5/9/23: discharged home with use of walker, remains on keppra  5/10/23: last dose of steroids   5/12/23: pt saw Dr. Greene, unsure scalp lesion is primary or a metastatic lesion. Wanting PET/CT for extent of disease.  Plan Rad/Onc for brain lesion and surgical cavity and then plan to immediately follow with systemic immunotherapy.  Will know better which agents will be used once PET/CT completed.    5/15/23: pt saw Dr. Garrido, still some numbness right leg and foot.  Currently ambulating with a cane   5/18/23: PET/CT: shows potentially involved neck lymph nodes. Mild uptake in soft tissue nodules in the scalp, in the known left occipital brain metastasis as well as in the left lower neck region.   5/24/23- 5/31/23: gamma knife as noted above  6/7/23: C1 of pembrolizumab every 3 weeks  6/14/23- 6/16/23: admitted for vision change, reduced comprehension, speech change, fall and headache.   6/14/23: brain mri showing likely progression of left occipital mass with associated edema. Improved on steroids and  discharged on oral decadron. Plan for repeat mri and neurosurgery follow up in one month  7/7/23: ED visit for headaches, confusion, vision changes concern for hemorrhage of the left occipital mass. Admitted to [facility] until 7/9/23. Increased Decadron dose.   7/14/23: craniotomy of the left parieto-occipital mass (Dr. Garrido). Discharged on Decadron taper  8/11/23: C2 of pembrolizumab  8/23/23: brain mri  No recurrence in left frontal lobe  Decreased enhancement at edges of left occipital resection cavity  New enhancement in the right frontal lobe concerning for new met. Measures 6 mm.  8/28/23: post op visit with Dr. Garrido. Pt still has numbness involving right leg and weakness involving his right lower extremity. Loss of muscle mass in right leg. Still has balance issues with no recent falls. Generalized fatigue; sleeping up to 20 hours a day. Review of brain mri. Referred to Dr. Rose for SRS.  8/31/23: video visit with CNP hem/onc: plan for scheduled C3 dose of pembrolizumab on 9/1/23. Has PET/CT later today. Did not get C3 due to pneumonitis  8/31/23: PET/CT: incidental finding of pneumonitis. Plan to cancel Keytruda for 9/1/23. Will obtain COVID and resp panel on 9/1/23. Pt to monitor his pulse ox. Dr. Greene will discuss next steps in treatment at appointment scheduled for 9/7.   9/8/23; Gamma Knife to right medial frontal lesion   12/7/23; CT CAP, two lesions in liver favored to be benign, no evidence of mets   12/21/23; crani by Dr. Garrido, gross total resection of right frontal tumor, path showed metastatic melanoma, oncogenic, class 3 BRAF mutation identified   12/23/23; discharged home  1/16/24; MR Brain with Perfusion, new 1.1 cm lesion right occipital                                                         Right frontal crani, no increased perfusion or nodularity in margins                                                         Previously treated right perimedian lesion 1.2 cm (was 0.8 cm when  treated)                                                          Treated cystic left occipital lobe lesion, cystic/solid more solid, likely increasing   Dr. Rose/Dr. Garrido/ Dr. Bunn discussed, plan Gamma Knife to new lesion, watch others as treating feel toxicity may be too high.    Only two cycles of Nivolumab to date as dealing with pneumonitis   1/22/24; Tim saw Mila rUbantammy CNP Med/Onc, walking with cane if out of house, right leg better, off the dexamethasone as ran out so stopped, will resume dex on taper schedule.  Start Nivolumab even though dex, Dr. Bunn OK with..  If tolerates may add another immunotherapy, in 4 weeks cycle 2 of Nivolumab  1/22/24; got C1D1 Nivolumab   2/2/24; Gamma Knife to two areas  3/2/24; PET, previously seen left lower cervical node resolved, two new suspected intramuscular mets to lower extremity.    3/2/24; Brain MRI, interval increase in size of multiple mets with edema, unsure disease progression or post radiation change. Decision to watch  4/17/24; PET/CT, increase in size and uptake of enhancing soft tissue nodules left medial thigh and right posterior leg.  New uptake in left fifth and sixth ribs-indeterminate.  Two indeterminate liver lesions favored to be benign but cannot exclude mets.    4/17/24; Brain MRI, 1.8 x 1.5 cm lesion right frontal increased (was 0.8 x 0.7 cm)                                   Other lesions stable, to slight increase to slight decrease and edema persists  4/17/24; C4 of Nivolumab  4/19/24; pt saw Dr. Bunn, pneumonitis pt had developed from what thought was the Pembrolizumab resolved. On dexamethasone 1 mg po daily for headaches but will not increase dexamethasone as pt feels in past couple of weeks headaches a bit improved.  Discussed escalating to dual checkpoint inhibitor therapy hoping to improved control of CNS involvement  (Ipi/Nivo)Pt will discuss with family   5/15/24; pt scheduled for labs and to have C1D1 of Ipi/Nivo but will  change as that is when can start Gamma Knife preparation.  Pt opted to change infusion therapy vs changing Gamma Knife dates.            Chief Complaint: pt notes wakes up with headache, takes a couple of glasses of water, meds., lays down a few hours and wakes up and gone so feels may be related to hydration.  No visual changes .  Pt bilateral leg weakness, right weaker   Review of Systems   Constitutional:  Positive for malaise/fatigue. Negative for fever and weight loss.   HENT:  Negative for hearing loss.    Eyes:  Negative for blurred vision and double vision.   Respiratory:  Negative for shortness of breath.    Cardiovascular:  Negative for leg swelling.   Gastrointestinal:  Negative for blood in stool, constipation, diarrhea, nausea and vomiting.   Genitourinary: Negative.    Neurological:  Positive for headaches. Negative for seizures.   Psychiatric/Behavioral:  Negative for depression.

## 2024-05-15 NOTE — PROGRESS NOTES
Name:Saeid Santa  :1955  Medical Record # [id number]  Diagnosis:C79.31  Date of Treatment Plannin/15/2024    Gamma Knife Simulation Note       After thorough review of this patient's clinical and radiographic data, I determined Fractionated Stereotactic Radiosurgery using the ICON Gamma Knife was indicated as explained in my consult with the patient.       The patient was brought to the Gamma Knife suite.  The MASK was fabricated consisting of an accuform mold at the posterior skull and a special aquaplast mask over the face and chin.        The High Definition Motion Management (HDMM) system consists of an infrared stereoscopic camera, a set of reference markers, and a patient marker will be placed.    A cone beam CT scan will be done to use as reference and for fusion.      A stereotactic brain   planning MRI with gadolinium  will be performed.  The MRI images will be transferred electronically to the Gamma Knife treatment planning computer.  The [employer] Gamma Knife treatment planning software will be used to design the optimal treatment plan.       procedures will be done prior to treatment.        Benedicto Rose MD/PhD

## 2024-05-15 NOTE — LETTER
5/15/2024         RE: Saeid Santa  [id number] [facility] [place] [address]        Dear Colleague,    Thank you for referring your patient, Saeid Santa, to the [facility] RADIATION ONCOLOGY GAMMA KNIFE. Please see a copy of my visit note below.    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 22 Gy in one fraction prescribed to the 50% isodose line (2023)      3.2024  3a: R Occipital: 22 Gy prescribed to 60% isodose line      3b L Frontal: 20 Gy prescribed to 50% isodose line          INTERVAL SINCE COMPLETION OF RADIATION THERAPY:  12 months since first course on 2023, 9 months since 2nd course on 2023, 3 months since 3rd course on 2024.     SUBJECTIVE: Mr. Santa is a 69 yo male with metastatic melanoma. He presented with seizure in 2023. Workup revealed 2 discrete ring-enhancing lesions, one of which was in the left paramedian posterior frontal lobe near the precentral gyrus, measuring 2.2 x 1.7 x 1.9 cm and the other in the left occipital lobe near the lingual gyrus, measuring 2.2 x 2.1 x 2.0 cm, associated with  moderate vasogenic edema.  He underwent craniotomy on 2023 for resection of the lesion left frontal lesion as this was felt to be the cause of his seizure. Additionally a scalp lesion was also resected as it had been draining, bleeding and growing in size. Pathology showed  malignant melanoma. It was unclear if the scalp lesion was the primary or represent a cutaneous metastasis (it was without epidermal melanoma in situ).  PET/CT on 5/18/2023 showed a brain metastasis in the left occipital lobe and a lymph node in the left low neck.      He then received GK-SRS to the left occipital lesion, 30 Gy in 5 fractions, as well as the left posterior frontal resection cavity, 25 Gy in 5 fractions.  He completed these treatments on 5/31/2023. He then began Keytruda under the care of Dr. Greene on 6/7/2023.       Mr. Santa developed intralesional hemorrhage of the left occipital metastasis with mass effect and ultimately required partial resection on 7/14/2023. The deeper aspect of the tumor densely adhered to the ventricle wall and the entrapped occipital horn, and thus could not be safely resected. Surgical pathology showed treatment effect with residual viable melanoma.     Mr. Santa resumed much delayed cycle 2 of Pembrolizumab on 8/11/2023. Repeat brain MRI on 8/23/2023 showed stable post-op changes in the resected parietal skull and left frontal lobe cavity and decreased enhancement of the left occipital resection cavity. However, a new focus of abnormal contrast enhancement about 6 mm in diameter in the anteromedial right frontal lobe was found.     He then proceeded another course of GK SRS to this new metastasis on 9/8/2023, receiving 22 Gy in a single fraction prescribed to the 50% isodose line.     Mr. Santa unfortunately had to discontinue from Keytruda after 2 cycles due to immune pneumonitis. He was started on a Prednisone taper with the plan to switch to Nivolumab.    Brain MRI on 12/7/2023 unfortunately showed a new lesion in the right frontal lobe with vasogenic edema, a new right occipital lesion about ~ 1 cm, stable to slightly enlarged previously treated frontal falcine lesion, new enhancement surrounding the left occipital cavity. His systemic restaging CT scan showed  resolution of previously enlarged left level V cervical node and no new lesions.     Given the size of the right frontal lobe metastasis, GK SRS was not felt to be able to provide adequate local control. He thus proceeded with 3rd craniotomy on 12/21/2023 by Dr. Garrido. Pathology confirmed metastatic melanoma. He was initially on Decadron 2 mg BID, but discontinued mid Jan due to running out. He was restarted on Decadron by medical oncology with a plan to taper. He began single agent Nivolumab on 1/22/2023.     Mr. Santa had a follow up MRI on 1/16/2024. The right occipital lobe lesion which was seen on 12/7 MRI, but never treated continued to enlarge , measuring 11 mm in largest diameter. Additionally, the previously treated paramedial right frontal lobe lesion enlarged to 12 mm. There was also increased nodular enhancement about the left occipital cavity. Subsequent planning MRI on 1/31/2024 also identified a new left frontal metastasis measuring 5 mm.     He then proceeded with 3rd course of GK SRS on 2/2/2024, receiving 22 Gy to the right occipital and left frontal metastasis. A mask was used as the frame was felt risky given 3 prior craniotomies.     Following his 3rd course of GK SRS, Tim proceeded with cycle 2 Nivolumab on 2/19/2024. At that visit, he endorsed worsening headache after Decadron tapering to 0.5 mg. Decadron was increased to 1 mg daily.      Due to his worsening headache, MRI of the brain was moved up to 3/2/2024. Imaging unfortunately showed progression of multiple lesions, including the most recently treated 2 lesions. Previously irradiated and resected left occipital lobe cavity had worsening enhancement, with surrounding vasogenic edema. PET/CT scan on 3/4/202 showed that the previously seen hypermetabolic left neck node resolved; however, there were 2 new suspected intramuscular metastases in the lower extremities.     He was last contacted on 3/7/2024. Due to radioresistant nature of his  "brain metastases and significant edema in the left occipital lobe, repeat GK SRS to this region was not recommended.     He followed up with Dr. Bunn on 3/12/204 and was recommended to continue with Nivolumab and completed cycle 4 on 2024.     Tim had a follow up PET/CT on 2024, which unfortunately showed interval progression of soft tissue nodules in the left medial thigh and right posterior leg. Brain MRI showed increased size of the right frontal lobe and right occipital lobe lesions, stable left occipital and right frontal parafalcine lesion, decreased size of the left frontal lesion.     Given the MRI finding, Tim is asked to be seen for discussion of radiation options.     On interview, Tim states that he has daily headache, which wakes him up around midnight and early morning. He has been taking Decadron and is at 1 mg daily for quite some time. He complains of lightheadedness when he stands up, which is mitigated by drinking more water. He has persistent right leg weakness, and has fallen 4 times in the past month. He uses a cane to provide some balance.       OBJECTIVE:   /83   Pulse 75   Ht 1.854 m (6' 1\")   Wt 95.3 kg (210 lb)   SpO2 97%   BMI 27.71 kg/m     Gen: Alert and oritented, speech fluent, interaction appropriate.  HEENT: facial features consistent with steroid use. Face symmetric, facial muscle movements intact.    CV: well perfused  Resp: breathing comfortably on room air.   Neuro: Weak, right leg extension, dorsiflexion and plantar flexion.     IMAGIN2024 vs. 3/2/2024 vs. 2024   Right occipital lesion: 22 Gy to 60% treated on 2024, continued to enlarge despite treatment       L occipital cystic lesion: 30 Gy in 5 fractions treated on 2023; nodular component relative stable from last MRI          Treated paramedial right frontal lesion (GK'ed on 2023, 22 Gy to 50% isodose line) stable         Recently treated left frontal convexity lesion: 20 " Gy to 50% isodose 2/2/2024:  is bigger with more edema, on 3/2/2024, but now slightly decreased in size         Right frontal lobe resection cavity: not been treated, enlarged enhancing component posteriorly        MRI 5/15/2024, right frontal metastasis        ASSESSMENT AND PLAN: In summary, Mr. Santa is a 69 yo gentleman with metastatic melanoma. He has limited systemic disease but significant CNS disease burden. He has been through 3 craniotomies and 3 courses of GK SRS. His CNS disease appears to be radioresistant, with progression despite radiosurgery. I have had gavi conversation with in the past, voicing concern of cumulative toxicity to his brain from various courses of GK treatment.     I reviewed all of his imaging studies. Most of the treated lesions are stable to slightly enlarged. The right frontal metastasis cavity has enlarged the most. Like all his other lesions, it is cystic. Reviewing of the radiation records show that this area has not been previously treated. Thus, treating this lesion is likely feasible. We discussed a course of 6 Gy x 5 fractions using mask based immobilization.     We emphasized that the cumulative dose to his brain is still significant and that he will be at risk of developing radiation necrosis. On the other hand, his CNS disease has no other great way of control. He is willing to proceed and voiced an understanding the challenge of managing his brain metastases.     Tim will have his mask made today. We have scheduled for him to start treatment tomorrow for a total of 5 sessions.          Benedicto Rose M.D./Ph.D.  Radiation Oncologist   Department of Therapeutic Radiology   [facility]  Phone: [phone]          40 minutes were spent on the date of the encounter doing chart review, history and exam, documentation and further activities as noted above.           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     HISTORY OF PRESENT ILLNESS: Tim Santa is a 68 year old gentleman with metastatic melanoma (BRAF [id number]) with involvement of soft tissue and brain. He has had multiple courses of Gamma Knife stereotactic radiosurgery and presents today to discuss possible treatment to a growing, untreated right frontal lesion.     His diagnosis of melanoma was brought to light after he started noting some difficulty with his right leg and foot, and then presented with seizure on 4/15/23. MRI Brain demonstrated 2 ring-enhancing lesions in the left paramedian posterior frontal lobe near the precentral gyrus measuring 2.2 x 1.7 x 1.9 cm and left occipital lobe near the lingual gyrus measuring approximately 2.2 x 2.1 x 2.0 cm with moderate edema. He underwent left awake craniotomy for tumor resection with Dr. Garrido on 23. A scalp lesion that had been noted and observed dating back to 2022 was also biopsied at this time; pathology of both the scalp and the left frontal lesion demonstrated malignant melanoma, BRAF G466A mutation.     He underwent post-operative fractionated Gamma Knife SRS to this area, 25 Gy in 5 fractions from 23-23 as well as 30 Gy in 5 fractions to  the left parieto-occipital lesion. He was then started on Pembrolizumab on 6/7/23.     He developed vision changes, reduced comprehension with speech change, fall and headache and was admitted from 6/14/23-6/16/23. MRI demonstrated progression of the left occipital metastasis.     Unfortunately, he then developed worsening headaches, confusion, vision changes, and concern for hemorrhage of the left occipital mass on 7/7/23, and was admitted to [facility]. He underwent craniotomy on 7/14/23 for the left parieto-occipital mass, and then continued on the 2nd cycle of Pembrolizumab on 8/11/23. Brain MRI on 8/23/23 demonstrated no recurrence in the left frontal lobe, decreased enhancement at the edges of the left occipital resection cavity, but now with new enhancement in the right frontal lobe concerning for a new metastasis.     PET/CT on 8/31/23 showed possible drug induced pneumonitis and started 80 mg prednisone with 8 week taper.     For the new right frontal lobe metastasis, he underwent GK SRS in a single fraction on 9/8/23.     He unfortunately had continued intracranial progression (in the context of stable systemic imaging); on 12/7/23 rosetta MRI demonstrated multiple new and/or enlarging metastases including internally hemorrhagic right frontal lobe lesion with 3 mm of leftward midline shift and new lesion adjacent to the left occipital resection bed. He underwent right frontal craniotomy with Dr. Garrido on 12/21/23 and was then started on Nivolumab on 1/22/24.     He underwent GK SRS to the right occipital and left frontal metastases on 2/2/24 (using a frame and not mask due to now 3 craniotomies). Cycle 2 of Nivolumab on 2/19/24, Mr. Santa noted that he was having worsening headache after decreasing his Dexamethasone dose to 0.5 mg, so it was increased to 1 mg daily. In this context, his brain MRI was scheduled earlier for 3/2/24 and this demonstrated progression of multiple lesions, including the most  recently treated lesions. The previously irradiated and resected left occipital lobe cavity had also worsened. PET/CT at this time also demonstrated 2 new intramuscular metastases in the lower extremities.     At this time, his imaging was reviewed and treatment was not recommended given that these lesions were growing through treatment and significant associated edema of the left occipital lobe. He has since continued on Nivolumab.    His most recent PET/CT on 4/17/24 after completing cycle 4 of Nivolumab demonstrated interval progression of disease with increasing size and uptake of enhancing cerebral metastatic lesions right femoral metaphyseal as well as interval increase in size and uptake enhancing soft tissue nodule in the medial thigh and right posterior leg.  There is new hypermetabolic uptake in left sixth ribs at the costovertebral junction with focal sclerosis on corresponding CT suspicious/indeterminate.  Brain MRI on 4/24 answered increased size of enhancing lesions in the right frontal occipital lobes, likely presenting interval disease progression.  Left upper lobe lesion demonstrated interval decrease in size.  The additional cranial mass that lesions are stable.  There are no new enhancing identified.  There was stable vasogenic edema in the bifrontal lobes, right occipital lobe, and left temporal parietal occipital lobes.     He is here today to discuss possible treatment options. Dr. Bunn is planning on initiating Ipi/Nivo today.     On interview today, Tim notes that he is falling more - about 4 falls in the last month. He attributes this to getting light-headed when standing, but then his right leg will give out. The last fall was yesterday, and when his right leg gave way, he ended up falling forward. He does have daily headaches while on 1 mg of Decadron. They are at 1-2 AM, waking him from sleep, and notes that it gets better when he gets up from lying down. He also notes daily headaches  around 7 am when he wakes up. These get better with fluids and after he takes his morning medications. He notes these headaches in the same location - across the forehead and then in his low occiput/upper neck. He does note that there is pain when he turns his neck, as well. He otherwise has not noted any changes in vision, or any new numbness, tingling or weakness elsewhere.     PAST RADIATION THERAPY HISTORY:   GK SRS 25 Gy in 5 fractions to the posterior frontal lobe (5/31/23)  GK SRS 30 Gy in 5 fractions to the left occipital lobe   22 Gy in a single fraction prescribed to the 50% isodose line (9/8/23)  22 Gy Rx to 60% IDL, R occipital, 2/2/24  20 Gy Rx to 50% IDL, L frontal    MEDICATIONS:   Current Outpatient Medications   Medication Sig Dispense Refill     acetaminophen (TYLENOL) 325 MG tablet Take 2 tablets (650 mg) by mouth every 4 hours as needed for other (For optimal non-opioid multimodal pain management to improve pain control.) 15 tablet 0     amLODIPine (NORVASC) 10 MG tablet Take 1 tablet (10 mg) by mouth every morning 90 tablet 3     baclofen (LIORESAL) 10 MG tablet Take 1 tablet by mouth 2 times daily as needed for muscle spasms       dexAMETHasone (DECADRON) 1 MG tablet Take 1 tablet (1 mg) by mouth daily (with breakfast) 40 tablet 0     indomethacin (INDOCIN) 25 MG capsule TAKE 1 CAPSULE BY MOUTH THREE TIMES DAILY AS DIRECTED (Patient not taking: Reported on 3/7/2024) 30 capsule 5     levETIRAcetam (KEPPRA) 1000 MG tablet 1 tablet Orally every 12 hrs 180 tablet 3     naloxone (NARCAN) 4 MG/0.1ML nasal spray Spray 1 spray (4 mg) into one nostril alternating nostrils as needed for opioid reversal every 2-3 minutes until assistance arrives (Patient not taking: Reported on 4/17/2024) 0.2 mL 1     oxyCODONE (ROXICODONE) 5 MG tablet Take 1-2 tablets (5-10 mg) by mouth every 4 hours as needed for pain (Patient not taking: Reported on 4/17/2024) 180 tablet 0     pantoprazole (PROTONIX) 40 MG EC tablet  Take 1 tablet (40 mg) by mouth 2 times daily (before meals) 60 tablet 1     polyethylene glycol (MIRALAX) 17 GM/Dose powder Take 17-34 g (1-2 Capfuls) by mouth daily as needed for constipation 850 g 1     rosuvastatin (CRESTOR) 40 MG tablet Take 1 tablet (40 mg) by mouth daily 90 tablet 3     sennosides (SENOKOT) 8.6 MG tablet Take 1-2 tablets by mouth 2 times daily as needed for constipation 120 tablet 2       ALLERGIES:  No Known Allergies.    SOCIAL HISTORY:      Social History     Socioeconomic History     Marital status:      Spouse name: Smiley     Number of children: 4     Years of education: Not on file     Highest education level: Not on file   Occupational History     Occupation: [employer] and [employer]     Comment: hobby job   Tobacco Use     Smoking status: Former     Current packs/day: 0.00     Average packs/day: 0.5 packs/day for 20.0 years (10.0 ttl pk-yrs)     Types: Cigarettes     Start date: 1997     Quit date: 2017     Years since quittin.3     Passive exposure: Current (Second hand exposure)     Smokeless tobacco: Never   Vaping Use     Vaping status: Never Used   Substance and Sexual Activity     Alcohol use: Not Currently     Comment: socially     Drug use: Not Currently     Types: Marijuana     Comment: typically smoked daily but no use for past two weeks as of 23     Sexual activity: Not on file   Other Topics Concern     Not on file   Social History Narrative     Not on file     Social Determinants of Health     Financial Resource Strain: Low Risk  (2023)    Financial Resource Strain      Within the past 12 months, have you or your family members you live with been unable to get utilities (heat, electricity) when it was really needed?: No   Food Insecurity: Low Risk  (2023)    Food Insecurity      Within the past 12 months, did you worry that your food would run out before you got money to buy more?: No      Within the past 12 months, did the food you  "bought just not last and you didn t have money to get more?: No   Transportation Needs: Low Risk  (12/18/2023)    Transportation Needs      Within the past 12 months, has lack of transportation kept you from medical appointments, getting your medicines, non-medical meetings or appointments, work, or from getting things that you need?: No   Physical Activity: Not on file   Stress: Not on file   Social Connections: Not on file   Interpersonal Safety: Low Risk  (11/7/2023)    Interpersonal Safety      Do you feel physically and emotionally safe where you currently live?: Yes      Within the past 12 months, have you been hit, slapped, kicked or otherwise physically hurt by someone?: No      Within the past 12 months, have you been humiliated or emotionally abused in other ways by your partner or ex-partner?: No   Housing Stability: Low Risk  (12/18/2023)    Housing Stability      Do you have housing? : Yes      Are you worried about losing your housing?: No        FAMILY HISTORY:   family history includes Diabetes in his paternal grandmother; Pancreatic Cancer in his father.    REVIEW OF SYMPTOMS:  A full 14-point review of systems was performed. See HPI for details.     PHYSICAL EXAMINATION:    /83   Pulse 75   Ht 1.854 m (6' 1\")   Wt 95.3 kg (210 lb)   SpO2 97%   BMI 27.71 kg/m    Body mass index is 27.71 kg/m .  Gen: alert and oriented, no acute distress  HEENT: unremarkable   CV: well perfused  Resp: breathing comfortably on room air  Alert and oriented x 3, speech fluent, repetition intact, EOMI,  SILT V1-3, face/smile symmetric, palate/uvula elevate midline, SCM/trapezius full    Reflexes:   Pronater drift: Mild pronater drift on left  Gan's: No  DTRs: 2+ Patellar on right, 1+ patellar on left    Strength:  UE D B T HG WF IOs   R 5 5 5 5 5 5   L 5 5 5 5 5 5     LE HF LF LE DF PF EHL   R 5 5 5 5 5 5   L 5 5 5 5 5 5       Sensation: Stable numbness that begins in the left big toe and extends along the " medial aspect up to the proximal thigh. Otherwise sensation intact and equal throughout.     Cerebellar: no dysmetria on finger-to-nose, no dysdiadochokinesia    Gait not assessed.     IMAGING:  Brain MRI 5/15/24      ASSESSMENT AND PLAN: In summary, this is a 68 year old gentleman with malignant metastatic melanoma that has progressed through multiple courses of stereotactic radiosurgery and has required 3 craniotomies for tumor resection. He unfortunately has developed recurrence and progression, but his systemic disease largely has remained controlled. Options are limited for intracranial disease burden. In light of this, it is reasonable to consider treatment of the right frontal lesion. MRI today on our review does demonstrate some growth since his MRI on 24, with the remainder of his lesions either stable to slight progression. Final read is pending of this exam.     We discussed risks, benefits, and alternatives today. We discussed the ongoing need for steroids given the significant edema, and the possibility that this may get worse in the short term during treatment.    The patient consented to proceed with GK-SRS, which we plan to do in a fractionated course. He will undergo mask making today, proceed with Ipi/Nivo today, and then start treatment tomorrow.     Thank you for allowing us to participate in this patient's care.  Please feel free to call with any questions or concerns.     The patient was seen and assessed by my attending, Dr. Rose, who agrees with the above assessment and plan.      Juliann Sotelo MD PGY5  Department of Radiation Oncology  [phone] Clinic  [phone] Pager               Cancer  Associated symptoms include headaches. Pertinent negatives include no fever, nausea or vomiting.       Date: 2024   Age: 68 year old  Ethnicity:    Sex: male  : 1955   Lives In: [place], [place]      Diagnosis: metastatic melanoma     Prior radiation therapy:   Site  Treated: left resection cavity  Facility: [facility]  Dates: 5/24/23- 5/31/23  Dose: 2500 cGy in 5 fx     Site Treated: left parietal-occipital lesion  Facility: [facility]  Dates: 5/24/23- 5/31/23  Dose: 3000 cGy in 5 fx     Site Treated: right medial frontal  Facility:  [facility] Gamma Knife  Dates: 9/8/23  Dose: 2200 cGy to 50% isodose line 1 Fx     Site Treated: left frontal and right occipital margin  Facility:  [facility] Gamma Knife  Dates: 2/2/24  Dose:      Prior chemotherapy:   See below     Pain at time of consult? Pt denies pain at time of consult  Does patient have a living will? Pt states he has  Does patient have an implanted cardiac device? No implanted cardiac device     RN time with patient: 50 minutes in person  Educated on gamma knife? Has had before , refreshed     Fall Screen:  Have you fallen in the past week? x1  Have you felt unsteady when walking or standing in the past week? Uses a cane when outside, if gets up too quick loses balance and legs weak     Physicians: Dr. Carlos Manuel Bunn,  Dr. Kye Garrido; Dr. Asim Cervantes (PCP)     Review Since Diagnosis:  Hx 2009: Prostate Cancer, Prostatectomy, no adjuvant therapy  8/16/22: he brings a left parietal scalp lesion to attention of GP. It was initially observed.  November 2022: PCP appt; lesion felt to be a cyst. Lesion lanced and drained via scalp incision  January 2023: the left parietal scalp lesion area would occasionally break open and bleed   February 2023: right leg and foot not working totally right   March/April 2023: three episodes of right leg feeling heavy, last episode jerking of leg, lasted a minute or two, no loss of consciousness   4/15/23: pt driving from work as had a migraine. Had right leg numbness/heaviness and jerking.  A co worker was following on the same route and noticed pt having problems so assisted him when found in parking lot; sitting in grass and confused.  To ER via ambulance to [place]  4/15/23: Brain MRI:   2.2 x 1.7 x 1.9 cm  lesion left paramedian posterior frontal lobe near the precentral gyrus  2.2 x 2.1 x 2.0 cm lesion left occipital lobe near the lingual gyrus  4/15/23: CT CAP, bilateral pulmonary nodules measuring up to 0.6 cm-indeterminate   Neck CT/Angio: enlarged lymph nodes and additional scalp lesions consistent with head and neck local regional disease  Pt referred to Dr. Garrido   4/21/23: crani by Dr. Garrido, biopsy of left frontal brain lesion showed malignant melanoma, this was resected.  Biopsy of left parietal scalp lesion showed malignant melanoma, excision of this lesion  4/25/23: discharged to acute rehab [facility] in [place]   5/9/23: discharged home with use of walker, remains on keppra  5/10/23: last dose of steroids   5/12/23: pt saw Dr. Greene, unsure scalp lesion is primary or a metastatic lesion. Wanting PET/CT for extent of disease.  Plan Rad/Onc for brain lesion and surgical cavity and then plan to immediately follow with systemic immunotherapy.  Will know better which agents will be used once PET/CT completed.    5/15/23: pt saw Dr. Garrido, still some numbness right leg and foot.  Currently ambulating with a cane   5/18/23: PET/CT: shows potentially involved neck lymph nodes. Mild uptake in soft tissue nodules in the scalp, in the known left occipital brain metastasis as well as in the left lower neck region.   5/24/23- 5/31/23: gamma knife as noted above  6/7/23: C1 of pembrolizumab every 3 weeks  6/14/23- 6/16/23: admitted for vision change, reduced comprehension, speech change, fall and headache.   6/14/23: brain mri showing likely progression of left occipital mass with associated edema. Improved on steroids and discharged on oral decadron. Plan for repeat mri and neurosurgery follow up in one month  7/7/23: ED visit for headaches, confusion, vision changes concern for hemorrhage of the left occipital mass. Admitted to [facility] until 7/9/23. Increased Decadron dose.   7/14/23: craniotomy of the left  parieto-occipital mass (Dr. Garrido). Discharged on Decadron taper  8/11/23: C2 of pembrolizumab  8/23/23: brain mri  No recurrence in left frontal lobe  Decreased enhancement at edges of left occipital resection cavity  New enhancement in the right frontal lobe concerning for new met. Measures 6 mm.  8/28/23: post op visit with Dr. Garrido. Pt still has numbness involving right leg and weakness involving his right lower extremity. Loss of muscle mass in right leg. Still has balance issues with no recent falls. Generalized fatigue; sleeping up to 20 hours a day. Review of brain mri. Referred to Dr. Rose for SRS.  8/31/23: video visit with CNP hem/onc: plan for scheduled C3 dose of pembrolizumab on 9/1/23. Has PET/CT later today. Did not get C3 due to pneumonitis  8/31/23: PET/CT: incidental finding of pneumonitis. Plan to cancel Keytruda for 9/1/23. Will obtain COVID and resp panel on 9/1/23. Pt to monitor his pulse ox. Dr. Greene will discuss next steps in treatment at appointment scheduled for 9/7.   9/8/23; Gamma Knife to right medial frontal lesion   12/7/23; CT CAP, two lesions in liver favored to be benign, no evidence of mets   12/21/23; crani by Dr. Garrido, gross total resection of right frontal tumor, path showed metastatic melanoma, oncogenic, class 3 BRAF mutation identified   12/23/23; discharged home  1/16/24; MR Brain with Perfusion, new 1.1 cm lesion right occipital                                                         Right frontal crani, no increased perfusion or nodularity in margins                                                         Previously treated right perimedian lesion 1.2 cm (was 0.8 cm when treated)                                                          Treated cystic left occipital lobe lesion, cystic/solid more solid, likely increasing   Dr. Rose/Dr. Garrido/ Dr. Bunn discussed, plan Gamma Knife to new lesion, watch others as treating feel toxicity may be too high.    Only two  cycles of Nivolumab to date as dealing with pneumonitis   1/22/24; Tim saw Amber Scheierl CNP Med/Onc, walking with cane if out of house, right leg better, off the dexamethasone as ran out so stopped, will resume dex on taper schedule.  Start Nivolumab even though dex, Dr. Bunn OK with..  If tolerates may add another immunotherapy, in 4 weeks cycle 2 of Nivolumab  1/22/24; got C1D1 Nivolumab   2/2/24; Gamma Knife to two areas  3/2/24; PET, previously seen left lower cervical node resolved, two new suspected intramuscular mets to lower extremity.    3/2/24; Brain MRI, interval increase in size of multiple mets with edema, unsure disease progression or post radiation change. Decision to watch  4/17/24; PET/CT, increase in size and uptake of enhancing soft tissue nodules left medial thigh and right posterior leg.  New uptake in left fifth and sixth ribs-indeterminate.  Two indeterminate liver lesions favored to be benign but cannot exclude mets.    4/17/24; Brain MRI, 1.8 x 1.5 cm lesion right frontal increased (was 0.8 x 0.7 cm)                                   Other lesions stable, to slight increase to slight decrease and edema persists  4/17/24; C4 of Nivolumab  4/19/24; pt saw Dr. Bunn, pneumonitis pt had developed from what thought was the Pembrolizumab resolved. On dexamethasone 1 mg po daily for headaches but will not increase dexamethasone as pt feels in past couple of weeks headaches a bit improved.  Discussed escalating to dual checkpoint inhibitor therapy hoping to improved control of CNS involvement  (Ipi/Nivo)Pt will discuss with family   5/15/24; pt scheduled for labs and to have C1D1 of Ipi/Nivo but will change as that is when can start Gamma Knife preparation.  Pt opted to change infusion therapy vs changing Gamma Knife dates.            Chief Complaint: pt notes wakes up with headache, takes a couple of glasses of water, meds., lays down a few hours and wakes up and gone so feels may be related  to hydration.  No visual changes .  Pt bilateral leg weakness, right weaker   Review of Systems   Constitutional:  Positive for malaise/fatigue. Negative for fever and weight loss.   HENT:  Negative for hearing loss.    Eyes:  Negative for blurred vision and double vision.   Respiratory:  Negative for shortness of breath.    Cardiovascular:  Negative for leg swelling.   Gastrointestinal:  Negative for blood in stool, constipation, diarrhea, nausea and vomiting.   Genitourinary: Negative.    Neurological:  Positive for headaches. Negative for seizures.   Psychiatric/Behavioral:  Negative for depression.                  Again, thank you for allowing me to participate in the care of your patient.        Sincerely,        Benedicto Rose MD

## 2024-05-15 NOTE — PROGRESS NOTES
Infusion Nursing Note:  Saeid BROOKS Santa presents today for Opdivo and Yervoy.    Patient seen by provider today: No, but he had a visit with PIPER Knott on 5/13.   present during visit today: Not Applicable.    Note: N/A.      Intravenous Access:  Peripheral IV placed.    Treatment Conditions:  Results reviewed, labs MET treatment parameters, ok to proceed with treatment.      Post Infusion Assessment:  Patient tolerated infusion without incident.  Access discontinued per protocol.       Discharge Plan:   [facility]  Patient will return 6/5 for next appointment.   Patient discharged in stable condition accompanied by: self.  Departure Mode: Ambulatory.      Parisa Cervantes RN

## 2024-05-15 NOTE — LETTER
5/15/2024         RE: Saeid Santa  [address] [place]  [place] [address]        Dear Colleague,    Thank you for referring your patient, Saeid Santa, to the [facility] RADIATION ONCOLOGY GAMMA KNIFE. Please see a copy of my visit note below.    Name:Saeid Santa  :1955  Medical Record # [id number]  Diagnosis:C79.31  Date of Treatment Plannin/15/2024    Gamma Knife Simulation Note       After thorough review of this patient's clinical and radiographic data, I determined Fractionated Stereotactic Radiosurgery using the ICON Gamma Knife was indicated as explained in my consult with the patient.       The patient was brought to the Gamma Knife suite.  The MASK was fabricated consisting of an accuform mold at the posterior skull and a special aquaplast mask over the face and chin.        The High Definition Motion Management (HDMM) system consists of an infrared stereoscopic camera, a set of reference markers, and a patient marker will be placed.    A cone beam CT scan will be done to use as reference and for fusion.      A stereotactic brain   planning MRI with gadolinium  will be performed.  The MRI images will be transferred electronically to the Gamma Knife treatment planning computer.  The [employer] Gamma Knife treatment planning software will be used to design the optimal treatment plan.       procedures will be done prior to treatment.        Benedicto Rose MD/PhD      Again, thank you for allowing me to participate in the care of your patient.        Sincerely,        Benedicto Rose MD

## 2024-05-15 NOTE — PROGRESS NOTES
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     HISTORY OF PRESENT ILLNESS: Tim Santa is a 68 year old gentleman with metastatic melanoma (BRAF [id number]) with involvement of soft tissue and brain. He has had multiple courses of Gamma Knife stereotactic radiosurgery and presents today to discuss possible treatment to a growing, untreated right frontal lesion.     His diagnosis of melanoma was brought to light after he started noting some difficulty with his right leg and foot, and then presented with seizure on 4/15/23. MRI Brain demonstrated 2 ring-enhancing lesions in the left paramedian posterior frontal lobe near the precentral gyrus measuring 2.2 x 1.7 x 1.9 cm and left occipital lobe near the lingual gyrus measuring approximately 2.2 x 2.1 x 2.0 cm with moderate edema. He underwent left awake craniotomy for tumor resection with Dr. Garrido on 23. A scalp lesion that had been noted and observed dating back to 2022 was also biopsied at this time; pathology of both the scalp and the left frontal lesion demonstrated malignant melanoma, BRAF G466A mutation.     He underwent post-operative fractionated Gamma Knife SRS to this area, 25 Gy in 5 fractions from 23-23 as  Michael SANDRINE Knight presents to Urgent Care Patient arriving with: with sons with complaint of dry cough, stye in R eye, R side abdominal pain that radiates to back.  Symptoms started 5 days ago.     Patient and visitor wearing mask? Yes    Myself mask    Can leave detailed message on   mobile phone:    Mobile [phone]        well as 30 Gy in 5 fractions to the left parieto-occipital lesion. He was then started on Pembrolizumab on 6/7/23.     He developed vision changes, reduced comprehension with speech change, fall and headache and was admitted from 6/14/23-6/16/23. MRI demonstrated progression of the left occipital metastasis.     Unfortunately, he then developed worsening headaches, confusion, vision changes, and concern for hemorrhage of the left occipital mass on 7/7/23, and was admitted to [facility]. He underwent craniotomy on 7/14/23 for the left parieto-occipital mass, and then continued on the 2nd cycle of Pembrolizumab on 8/11/23. Brain MRI on 8/23/23 demonstrated no recurrence in the left frontal lobe, decreased enhancement at the edges of the left occipital resection cavity, but now with new enhancement in the right frontal lobe concerning for a new metastasis.     PET/CT on 8/31/23 showed possible drug induced pneumonitis and started 80 mg prednisone with 8 week taper.     For the new right frontal lobe metastasis, he underwent GK SRS in a single fraction on 9/8/23.     He unfortunately had continued intracranial progression (in the context of stable systemic imaging); on 12/7/23 rosetta MRI demonstrated multiple new and/or enlarging metastases including internally hemorrhagic right frontal lobe lesion with 3 mm of leftward midline shift and new lesion adjacent to the left occipital resection bed. He underwent right frontal craniotomy with Dr. Garrido on 12/21/23 and was then started on Nivolumab on 1/22/24.     He underwent GK SRS to the right occipital and left frontal metastases on 2/2/24 (using a frame and not mask due to now 3 craniotomies). Cycle 2 of Nivolumab on 2/19/24, Mr. Santa noted that he was having worsening headache after decreasing his Dexamethasone dose to 0.5 mg, so it was increased to 1 mg daily. In this context, his brain MRI was scheduled earlier for 3/2/24 and this demonstrated progression of multiple  lesions, including the most recently treated lesions. The previously irradiated and resected left occipital lobe cavity had also worsened. PET/CT at this time also demonstrated 2 new intramuscular metastases in the lower extremities.     At this time, his imaging was reviewed and treatment was not recommended given that these lesions were growing through treatment and significant associated edema of the left occipital lobe. He has since continued on Nivolumab.    His most recent PET/CT on 4/17/24 after completing cycle 4 of Nivolumab demonstrated interval progression of disease with increasing size and uptake of enhancing cerebral metastatic lesions right femoral metaphyseal as well as interval increase in size and uptake enhancing soft tissue nodule in the medial thigh and right posterior leg.  There is new hypermetabolic uptake in left sixth ribs at the costovertebral junction with focal sclerosis on corresponding CT suspicious/indeterminate.  Brain MRI on 4/24 answered increased size of enhancing lesions in the right frontal occipital lobes, likely presenting interval disease progression.  Left upper lobe lesion demonstrated interval decrease in size.  The additional cranial mass that lesions are stable.  There are no new enhancing identified.  There was stable vasogenic edema in the bifrontal lobes, right occipital lobe, and left temporal parietal occipital lobes.     He is here today to discuss possible treatment options. Dr. Bunn is planning on initiating Ipi/Nivo today.     On interview today, Tim notes that he is falling more - about 4 falls in the last month. He attributes this to getting light-headed when standing, but then his right leg will give out. The last fall was yesterday, and when his right leg gave way, he ended up falling forward. He does have daily headaches while on 1 mg of Decadron. They are at 1-2 AM, waking him from sleep, and notes that it gets better when he gets up from lying down. He  also notes daily headaches around 7 am when he wakes up. These get better with fluids and after he takes his morning medications. He notes these headaches in the same location - across the forehead and then in his low occiput/upper neck. He does note that there is pain when he turns his neck, as well. He otherwise has not noted any changes in vision, or any new numbness, tingling or weakness elsewhere.     PAST RADIATION THERAPY HISTORY:   GK SRS 25 Gy in 5 fractions to the posterior frontal lobe (5/31/23)  GK SRS 30 Gy in 5 fractions to the left occipital lobe   22 Gy in a single fraction prescribed to the 50% isodose line (9/8/23)  22 Gy Rx to 60% IDL, R occipital, 2/2/24  20 Gy Rx to 50% IDL, L frontal    MEDICATIONS:   Current Outpatient Medications   Medication Sig Dispense Refill    acetaminophen (TYLENOL) 325 MG tablet Take 2 tablets (650 mg) by mouth every 4 hours as needed for other (For optimal non-opioid multimodal pain management to improve pain control.) 15 tablet 0    amLODIPine (NORVASC) 10 MG tablet Take 1 tablet (10 mg) by mouth every morning 90 tablet 3    baclofen (LIORESAL) 10 MG tablet Take 1 tablet by mouth 2 times daily as needed for muscle spasms      dexAMETHasone (DECADRON) 1 MG tablet Take 1 tablet (1 mg) by mouth daily (with breakfast) 40 tablet 0    indomethacin (INDOCIN) 25 MG capsule TAKE 1 CAPSULE BY MOUTH THREE TIMES DAILY AS DIRECTED (Patient not taking: Reported on 3/7/2024) 30 capsule 5    levETIRAcetam (KEPPRA) 1000 MG tablet 1 tablet Orally every 12 hrs 180 tablet 3    naloxone (NARCAN) 4 MG/0.1ML nasal spray Spray 1 spray (4 mg) into one nostril alternating nostrils as needed for opioid reversal every 2-3 minutes until assistance arrives (Patient not taking: Reported on 4/17/2024) 0.2 mL 1    oxyCODONE (ROXICODONE) 5 MG tablet Take 1-2 tablets (5-10 mg) by mouth every 4 hours as needed for pain (Patient not taking: Reported on 4/17/2024) 180 tablet 0    pantoprazole (PROTONIX)  40 MG EC tablet Take 1 tablet (40 mg) by mouth 2 times daily (before meals) 60 tablet 1    polyethylene glycol (MIRALAX) 17 GM/Dose powder Take 17-34 g (1-2 Capfuls) by mouth daily as needed for constipation 850 g 1    rosuvastatin (CRESTOR) 40 MG tablet Take 1 tablet (40 mg) by mouth daily 90 tablet 3    sennosides (SENOKOT) 8.6 MG tablet Take 1-2 tablets by mouth 2 times daily as needed for constipation 120 tablet 2       ALLERGIES:  No Known Allergies.    SOCIAL HISTORY:      Social History     Socioeconomic History    Marital status:      Spouse name: Smiley    Number of children: 4    Years of education: Not on file    Highest education level: Not on file   Occupational History    Occupation: [employer]     Comment: hobby job   Tobacco Use    Smoking status: Former     Current packs/day: 0.00     Average packs/day: 0.5 packs/day for 20.0 years (10.0 ttl pk-yrs)     Types: Cigarettes     Start date: 1997     Quit date: 2017     Years since quittin.3     Passive exposure: Current (Second hand exposure)    Smokeless tobacco: Never   Vaping Use    Vaping status: Never Used   Substance and Sexual Activity    Alcohol use: Not Currently     Comment: socially    Drug use: Not Currently     Types: Marijuana     Comment: typically smoked daily but no use for past two weeks as of 23    Sexual activity: Not on file   Other Topics Concern    Not on file   Social History Narrative    Not on file     Social Determinants of Health     Financial Resource Strain: Low Risk  (2023)    Financial Resource Strain     Within the past 12 months, have you or your family members you live with been unable to get utilities (heat, electricity) when it was really needed?: No   Food Insecurity: Low Risk  (2023)    Food Insecurity     Within the past 12 months, did you worry that your food would run out before you got money to buy more?: No     Within the past 12 months, did the food you  "bought just not last and you didn t have money to get more?: No   Transportation Needs: Low Risk  (12/18/2023)    Transportation Needs     Within the past 12 months, has lack of transportation kept you from medical appointments, getting your medicines, non-medical meetings or appointments, work, or from getting things that you need?: No   Physical Activity: Not on file   Stress: Not on file   Social Connections: Not on file   Interpersonal Safety: Low Risk  (11/7/2023)    Interpersonal Safety     Do you feel physically and emotionally safe where you currently live?: Yes     Within the past 12 months, have you been hit, slapped, kicked or otherwise physically hurt by someone?: No     Within the past 12 months, have you been humiliated or emotionally abused in other ways by your partner or ex-partner?: No   Housing Stability: Low Risk  (12/18/2023)    Housing Stability     Do you have housing? : Yes     Are you worried about losing your housing?: No        FAMILY HISTORY:   family history includes Diabetes in his paternal grandmother; Pancreatic Cancer in his father.    REVIEW OF SYMPTOMS:  A full 14-point review of systems was performed. See HPI for details.     PHYSICAL EXAMINATION:    /83   Pulse 75   Ht 1.854 m (6' 1\")   Wt 95.3 kg (210 lb)   SpO2 97%   BMI 27.71 kg/m    Body mass index is 27.71 kg/m .  Gen: alert and oriented, no acute distress  HEENT: unremarkable   CV: well perfused  Resp: breathing comfortably on room air  Alert and oriented x 3, speech fluent, repetition intact, EOMI,  SILT V1-3, face/smile symmetric, palate/uvula elevate midline, SCM/trapezius full    Reflexes:   Pronater drift: Mild pronater drift on left  Gan's: No  DTRs: 2+ Patellar on right, 1+ patellar on left    Strength:  UE D B T HG WF IOs   R 5 5 5 5 5 5   L 5 5 5 5 5 5     LE HF LF LE DF PF EHL   R 5 5 5 5 5 5   L 5 5 5 5 5 5       Sensation: Stable numbness that begins in the left big toe and extends along the " medial aspect up to the proximal thigh. Otherwise sensation intact and equal throughout.     Cerebellar: no dysmetria on finger-to-nose, no dysdiadochokinesia    Gait not assessed.     IMAGING:  Brain MRI 5/15/24      ASSESSMENT AND PLAN: In summary, this is a 68 year old gentleman with malignant metastatic melanoma that has progressed through multiple courses of stereotactic radiosurgery and has required 3 craniotomies for tumor resection. He unfortunately has developed recurrence and progression, but his systemic disease largely has remained controlled. Options are limited for intracranial disease burden. In light of this, it is reasonable to consider treatment of the right frontal lesion. MRI today on our review does demonstrate some growth since his MRI on 4/17/24, with the remainder of his lesions either stable to slight progression. Final read is pending of this exam.     We discussed risks, benefits, and alternatives today. We discussed the ongoing need for steroids given the significant edema, and the possibility that this may get worse in the short term during treatment.    The patient consented to proceed with GK-SRS, which we plan to do in a fractionated course. He will undergo mask making today, proceed with Ipi/Nivo today, and then start treatment tomorrow.     Thank you for allowing us to participate in this patient's care.  Please feel free to call with any questions or concerns.     The patient was seen and assessed by my attending, Dr. Rose, who agrees with the above assessment and plan.      Juliann Sotelo MD PGY5  Department of Radiation Oncology  [phone] Clinic  [phone] Pager

## 2024-05-16 NOTE — LETTER
2024         RE: Saeid Santa  [address] [facility] [address]        Dear Colleague,    Thank you for referring your patient, Saeid Santa, to the [facility] RADIATION ONCOLOGY GAMMA KNIFE. Please see a copy of my visit note below.    Gamma Knife Operative Note     MRN: [id number]    Name: Saeid Santa    : 1955     Date of procedure: 24 (30 Gy/ 5#, Day 1)     Surgeon:  Kye Garrido MD     PPre-operative Diagnosis:   Metastatic melanoma    Post-operative Diagnosis: Metastatic melanoma     Procedure: Gamma Knife Radiosurgery using Face mask.     Indication: Saeid Santa is a 68-year-old gentleman with a known history of metastatic melanoma.    2024: Right occipital and left frontal GK radiosurgery (22 Gy single fraction).  2023: Right frontal craniotomy with resection of right frontal mass   2023: Gamma knife to the new anteromedial right frontal lobe lesion likely mets. 22 Gy single fraction.  2023 Stealth assisted left parieto-occipital craniotomy with interhemispheric approach and resection of falcine mass.  2023 : Gamma knife to the resection cavity and parieto-occipital lesion   2023:stealth assisted awake craniotomy with resection of motor/sensory strip lesion     He presented with additional right frontal lesion on follow-up MRI brain. After case review, the joint recommendation was to treat the patient with radiosurgery. Standard measurements were obtained for coordinate calculation to facilitate SRS delivery.     On the day of the procedure, informed consent was obtained. The previous MRI was reviewed.  The patient underwent an MRI of the head with contrast on 05/15/2024. No new additional lesions were identified on this MRI.     The treatment is planned on the Gamma plan system based on the MRI taken.  Treatment parameters were verified by myself, the treating radiation oncologist, and the physicist.      Right frontal lesion was  treated.  The maximal diameter of the lesion is 3.8 cm.  The right frontal lesion was treated with 30 Gy in 5 fractions. The dose was delivered in a highly conformal fashion. The treatment was performed at the [facility] gamma knife center.      I was present and performed the key portions of this procedure.        Kye Garrido MD       Again, thank you for allowing me to participate in the care of your patient.        Sincerely,        Kye Garrido MD

## 2024-05-16 NOTE — LETTER
2024         RE: Saeid Santa  [address] [facility] [address]        Dear Colleague,    Thank you for referring your patient, Saeid Santa, to the [facility] RADIATION ONCOLOGY GAMMA KNIFE. Please see a copy of my visit note below.    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   4a R Frontal  600 cGy [place] 60  2024 0  1     Total shots: 54             DESCRIPTION OF PROCEDURE:    On 2024 the patient was brought to the [facility] Gamma Knife IconTM suite at [facility] and treated with fractionated stereotactic radiotherapy.     The [facility] Gamma Knife IconTM Plan software was used to create a highly conformal dose distribution using the number and size collimators detailed above.     TREATMENT:    The patient was brought to the Gamma Knife suite. A timeout was performed to confirm the correct patient and correct procedure.    Patient was identified by 2 methods.  Site was verified.    The mask was placed and a cone beam CT was done and fused to the previously approved plan.        The physicist and I evaluated the fusion and confirmed the target coverage after auto-registration.      The treatment was delivered using the [facility] Gamma Knife IconTM without complication.      The mask was removed and the patient was discharged home in stable condition.    The patient tolerated the treatment well and had no complications.      Approved by:  Benedicto Rose MD/PhD      Again, thank you for allowing me to participate in the care of your patient.        Sincerely,        Benedicto  MD [place]

## 2024-05-16 NOTE — PROGRESS NOTES
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   4a R Frontal  600 cGy [place] 60  2024 0  1/     Total shots: 54             DESCRIPTION OF PROCEDURE:    On 2024 the patient was brought to the [facility] Gamma Knife IconTM suite at [facility] and treated with fractionated stereotactic radiotherapy.     The [facility] Gamma Knife IconTM Plan software was used to create a highly conformal dose distribution using the number and size collimators detailed above.     TREATMENT:    The patient was brought to the Gamma Knife suite. A timeout was performed to confirm the correct patient and correct procedure.    Patient was identified by 2 methods.  Site was verified.    The mask was placed and a cone beam CT was done and fused to the previously approved plan.        The physicist and I evaluated the fusion and confirmed the target coverage after auto-registration.      The treatment was delivered using the [facility] Gamma Knife IconTM without complication.      The mask was removed and the patient was discharged home in stable condition.    The patient tolerated the treatment well and had no complications.      Approved by:  Benedicto Rose MD/PhD

## 2024-05-17 NOTE — LETTER
2024         RE: Saeid Santa  [address] [facility] [address]        Dear Colleague,    Thank you for referring your patient, Saeid Santa, to the [facility] RADIATION ONCOLOGY GAMMA KNIFE. Please see a copy of my visit note below.    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   4a R Frontal  1,200 cGy [place] 60  2024 1  2/5     Total shots: 54             DESCRIPTION OF PROCEDURE:    On 2024 the patient was brought to the [facility] Gamma Knife IconTM suite at [facility] and treated with fractionated stereotactic radiotherapy.     The [facility] Gamma Knife IconTM Plan software was used to create a highly conformal dose distribution using the number and size collimators detailed above.     TREATMENT:    The patient was brought to the Gamma Knife suite. A timeout was performed to confirm the correct patient and correct procedure.    Patient was identified by 2 methods.  Site was verified.    The mask was placed and a cone beam CT was done and fused to the previously approved plan.        The physicist and I evaluated the fusion and confirmed the target coverage after auto-registration.      The treatment was delivered using the [facility] Gamma Knife IconTM without complication.      The mask was removed and the patient was discharged home in stable condition.    The patient tolerated the treatment well and had no complications.      Approved by:  Benedicto Rose MD/PhD      Again, thank you for allowing me to participate in the care of your patient.        Sincerely,        Benedicto  MD [place]

## 2024-05-17 NOTE — PROGRESS NOTES
Gamma Knife Operative Note     MRN: [id number]    Name: Saeid Santa    : 1955     Date of procedure: 24 (30 Gy/ 5#, Day 1)     Surgeon:  Kye Garrido MD     PPre-operative Diagnosis:   Metastatic melanoma    Post-operative Diagnosis: Metastatic melanoma     Procedure: Gamma Knife Radiosurgery using Face mask.     Indication: Saeid Santa is a 68-year-old gentleman with a known history of metastatic melanoma.    2024: Right occipital and left frontal GK radiosurgery (22 Gy single fraction).  2023: Right frontal craniotomy with resection of right frontal mass   2023: Gamma knife to the new anteromedial right frontal lobe lesion likely mets. 22 Gy single fraction.  2023 Stealth assisted left parieto-occipital craniotomy with interhemispheric approach and resection of falcine mass.  2023 : Gamma knife to the resection cavity and parieto-occipital lesion   2023:stealth assisted awake craniotomy with resection of motor/sensory strip lesion     He presented with additional right frontal lesion on follow-up MRI brain. After case review, the joint recommendation was to treat the patient with radiosurgery. Standard measurements were obtained for coordinate calculation to facilitate SRS delivery.     On the day of the procedure, informed consent was obtained. The previous MRI was reviewed.  The patient underwent an MRI of the head with contrast on 05/15/2024. No new additional lesions were identified on this MRI.     The treatment is planned on the Gamma plan system based on the MRI taken.  Treatment parameters were verified by myself, the treating radiation oncologist, and the physicist.      Right frontal lesion was treated.  The maximal diameter of the lesion is 3.8 cm.  The right frontal lesion was treated with 30 Gy in 5 fractions. The dose was delivered in a highly conformal fashion. The treatment was performed at the [facility] gamma knife center.      I was present and  performed the key portions of this procedure.        Kye Garrido MD

## 2024-05-17 NOTE — PROGRESS NOTES
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   4a R Frontal  1,200 cGy [place] 60  2024 1  2/5     Total shots: 54             DESCRIPTION OF PROCEDURE:    On 2024 the patient was brought to the [facility] Gamma Knife IconTM suite at [facility] and treated with fractionated stereotactic radiotherapy.     The [facility] Gamma Knife IconTM Plan software was used to create a highly conformal dose distribution using the number and size collimators detailed above.     TREATMENT:    The patient was brought to the Gamma Knife suite. A timeout was performed to confirm the correct patient and correct procedure.    Patient was identified by 2 methods.  Site was verified.    The mask was placed and a cone beam CT was done and fused to the previously approved plan.        The physicist and I evaluated the fusion and confirmed the target coverage after auto-registration.      The treatment was delivered using the [facility] Gamma Knife IconTM without complication.      The mask was removed and the patient was discharged home in stable condition.    The patient tolerated the treatment well and had no complications.      Approved by:  Benedicto Rose MD/PhD

## 2024-05-20 NOTE — PROGRESS NOTES
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   4a R Frontal  1,800 cGy [place] 60  2024 4  3/5     Total shots: 54             DESCRIPTION OF PROCEDURE:    On 2024 the patient was brought to the [facility] Gamma Knife IconTM suite at [facility] and treated with fractionated stereotactic radiotherapy.     The [facility] Gamma Knife IconTM Plan software was used to create a highly conformal dose distribution using the number and size collimators detailed above.     TREATMENT:    The patient was brought to the Gamma Knife suite. A timeout was performed to confirm the correct patient and correct procedure.    Patient was identified by 2 methods.  Site was verified.    The mask was placed and a cone beam CT was done and fused to the previously approved plan.        The physicist and I evaluated the fusion and confirmed the target coverage after auto-registration.      The treatment was delivered using the [facility] Gamma Knife IconTM without complication.      The mask was removed and the patient was discharged home in stable condition.    The patient tolerated the treatment well and had no complications.  We gave him decadron ( see nurse note) due to terrible headache and nausea.  We will re-evaluate tomrrow and he will see  med onc      Approved by:  Jazmyn Segura MD

## 2024-05-20 NOTE — NURSING NOTE
Tim presented to his 3/5 fractionated Gamma Knife treatment today stating he had a terrible headache that started during the night last night.  Tim took his 1mg of dexamethasone this morning prior to coming in, he is on that daily.  Tim has also had nausea with the headache.  Dr. Jazmyn Segura was Rad/Onc covering Tim's treatment today, she called and spoke to Meghan Rinaldi, NP, also and decision to give Tim a 4 mg tablet of dexamethasone now, which was given post Gamma Knife.  Tim is then to take 2 mg of the dexamethasone tomorrow morning 5/21/24 and then will see Meghan Rinaldi after the Gamma Knife treatment on 5/21/24.  I took Tim's BP and it was 161/100, Dr. Segura aware of.  Tim took crackers with the dexamethasone and they stayed down with no emesis.  This was all reviewed with Tim and his brother Jose Luis, both voiced understanding and know to call if things worsen or do not improve.

## 2024-05-20 NOTE — PROGRESS NOTES
Headache today after infustion with ipi/nivo    Much nausea and vomiting.  Headache a 7 of 10    On only 1 mg of decadron    Discussed with Meghan Rinaldi    WIll increase today to 3mg total of decadron  He will take 2 mg in the am    He will report on his headache.    Hopefully we can just do a short course of 2mg /day and go back to 1 mg      [facility]  [id number]

## 2024-05-20 NOTE — LETTER
2024         RE: Saeid Santa  [address] [facility]  [facility] [address]        Dear Colleague,    Thank you for referring your patient, Saeid Santa, to the [facility] RADIATION ONCOLOGY. Please see a copy of my visit note below.      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   4a R Frontal  1,800 cGy [place] 60  2024 4  3     Total shots: 54             DESCRIPTION OF PROCEDURE:    On 2024 the patient was brought to the [facility] Gamma Knife IconTM suite at [facility] and treated with fractionated stereotactic radiotherapy.     The [facility] Gamma Knife IconTM Plan software was used to create a highly conformal dose distribution using the number and size collimators detailed above.     TREATMENT:    The patient was brought to the Gamma Knife suite. A timeout was performed to confirm the correct patient and correct procedure.    Patient was identified by 2 methods.  Site was verified.    The mask was placed and a cone beam CT was done and fused to the previously approved plan.        The physicist and I evaluated the fusion and confirmed the target coverage after auto-registration.      The treatment was delivered using the [facility] Gamma Knife IconTM without complication.      The mask was removed and the patient was discharged home in stable condition.    The patient tolerated the treatment well and had no complications.  We gave him decadron ( see nurse note) due to terrible headache and nausea.  We will re-evaluate tomrrow and he will see  med onc      Approved by:  Jazmyn  MD [place]    Again, thank you for allowing me to participate in the care of your patient.        Sincerely,        Jazmyn Segura MD

## 2024-05-21 NOTE — PATIENT INSTRUCTIONS
Dexamethasone: Take 2 mg daily for 1 week, then decrease back down to to 1 mg daily.  Diarrhea: Take Imodium (loperamide) for loose stools, one tablet with each loose stool. Call clinic if diarrhea gets worse and not controlled with Imodium

## 2024-05-21 NOTE — PROGRESS NOTES
[facility]   Return Patient Visit    Name: Saeid Santa  MRN: [id number]  Date of visit: May 21, 2024    Diagnosis: Metastatic melanoma  Stage: IV    Molecular: BRAF G466A, TMB 48.7554 mut/Mb    Performance status: ECOG 0    Oncology History:  --8/16/22, he brings a left parietal scalp lesion to attention of his GP. It is initially observed.  --November 2022, the left parietal scalp lesion was felt to be a cyst, and the left parietal lesion was lanced and drained via scalp incision.  --January 2023, the left parietal scalp cyst would occasionally break open, drain, and bleed, largely at night.   --February 2023, he notes some mild difficulty clearing obstacles with right leg and foot.  --March/April 2023, he has 3 progressively worsening episodes over a span 3 weeks including the right leg. At first he notes the onset of right leg numbness and heaviness, like he was carrying 50 lbs of water on the leg. Then, about a week or so later, the leg began to jerk and converse while he was in the basement. Neither episode associated with loss of consciousness  --4/15/23, he recalls the right leg numbness, heaviness, jerking while driving between work. He had been found by coworkers in the parking lot, sitting in the grass, appearing confused. He was brought to the ER by EMS. CT-CAP, showed small (<6mm), bilateral indeterminate pulmonary nodules. CT-head and MRI brain showed 2 discrete ring-enhancing lesions in the left paramedian posterior frontal lobe near the precentral gyrus measuring approximately 2.2 x 1.7 x 1.9 cm and left occipital lobe near the lingual gyrus measuring approximately 2.2 x 2.1 x 2.0 cm. The occipital lesion encroaches upon the subependymal surface of the left lateral ventricle occipital horn and contacts the superior surface of the medial left tentorium. The lesions are accompanied by moderate surrounding vasogenic edema. Surgery was recommended.  --4/21/23,  underwent left awake (sleep-awake-sleep) craniotomy for tumor resection, he has resection of the left frontal, motor strip tumor as well asan elliptical incision of the left parietal scalp lesion. On pathology, both the scalp excision and brain mass are positive for malignant melanoma. Tumor cells are positive for S-100 and Melan-A, and negative for cytokeratin AE1/AE3, NKX3.1, GFAP, ERG, CD3, and CD20.  CD3 highlights brisk infiltration of the tumor by T lymphocytes, while CD20 highlights scattered positive B lymphocytes. NGS identifies a BRAF G466A mutation, a class 3 mutation insensitive to BRAF kinase inhibitor monotherapy. TMB is high at 48.754 muts/Mb.   --4/22/23, post-operative MRI shows post-operative changes of the parietal craniotomy of the left frontal lobe metastasis. The other 1.7 x 1.1 cm cystic lesion in the left occipital lobe is also seen. No new lesions appreciated.  -- 4/25/23 to 5/9/23, discharged from the hospital to acute rehab stay at [facility] in [place]. He participated in a comprehensive rehabilitation program consisting of PT, OT, Speech, Psychology and Recreation Therapy. He did well and was discharged home with use of a walker.  --PET-CT on 5/18/23 with potentially involved neck lymph nodes. He has mild FDG uptake in soft tissue nodules in the scalp, in the known left occipital brain metastasis, as well as in the left lower neck region.  -5/24-/5/31/23, He was treated with gamma knife: 2500 cGy to the left resection cavity; 3000 cGy to the L parietal-occipital lesion.  -6/7/2023, Cycle 1 of pembrolizumab 200mg every 3 weeks  -6/14/23-6/16/23, admitted for vision change, reduced comprehension, speech change, fall and headache. MRI showing likely progression of left occipital mass with associated edema. Improved on steroids and discharged on oral decadron. Plan for repeat MRI and neurosurgery follow up in 1 month.  -7/7/23 ED visit for headaches, confusion, vision changes, concern for  hemorrhage of the left occipital mass, admitted to [facility] until 7/9/23, increased dexamethasone dose  -7/14/23 craniotomy of the left parieto-occipital mass, discharged on a dexamethasone taper  -8/11/2023, Cycle 2 of pembrolizumab  -8/23/2023 brain MRI with no recurrence in left frontal lobe, decreased enhancement at edges of left occipital resection cavity, new enhancement int he right front lobe concerning for new met--seen by neurosurgery and considering RT  -8/31/2023 CT/PET with increased uptake in left lower neck lymph node c/f early progression. Ground-glass, reticular changes in lungs also noted likely secondary to drug-induced pneumonitis. Started 1 mg/kg prednisone (80 mg daily) with 8 week taper  -9/8/23 GK to R frontal lobe lesion  -12/7/23 CT neck: Resolved previously hypermetabolic left level 5 lymph node. No new  cervical lymphadenopathy.  -12/7/23 CT chest/AP: Two arterially enhancing lesions in the liver measuring 0.9 - 1.0 cm were not previously visualized, likely due to differences in timing of the contrast bolus. These are indeterminate but favored to be benign perfusional abnormalities. Melanoma metastases cannot be entirely excluded. Otherwise, no evidence for metastatic disease in the chest, abdomen and pelvis. Significant improvement in linear opacities in the lungs, suggesting improving drug-induced pneumonitis. Few stable small pulmonary nodules.  -12/7/23 MR head: Progression in intracranial metastatic disease compared to 9/8/2023   with multiple new and/or enlarging metastases, including internally hemorrhagic right frontal lobe lesion with 3 mm of leftward midline shift and new lesion adjacent to the left occipital resection bed.   -12/8/23 started on dex 4 mg BID  -12/21/23 Right frontal craniotomy with Dr. Garrido  -1/22/24, starts nivolumab (480mg every 4 weeks)  -2/2/24, Gamma knife to right occipital and left frontal lesions   -3/2/24 PET/CT: previously seen L lower cervical node  resolved, two new suspected intramuscular mets to the LE  -3/2/24 MR brain: Multifocal intracranial metastatic disease with interval increase in size of multiple metastatic lesions and surrounding vasogenic edema detailed above. Unsure if this is due to disease progression or post radiation change.  -4/17/24 C4 nivolumab  -4/17/24 MR brain: Increased size of the enhancing lesions in the right frontal and right occipital lobes, likely representing interval disease progression. Interval decreased size of the left frontal lobe lesion. Additional intracranial metastatic lesions are stable. No new enhancing lesions identified. Stable surrounding vasogenic edema in the bifrontal lobes, right occipital lobe, and left temporoparietooccipital lobes.   -4/17/24 PET/CT: There is interval progression of disease with increase in size and uptake of enhancing cerebral metastatic lesions in the right frontal and left occipital lobes as well as interval increase in size and uptake of enhancing soft tissue nodules in the left medial thigh and right posterior leg as detailed above. There is new hypermetabolic uptake in the left fifth and sixth ribs at the costovertebral junction with focal sclerosis on corresponding CT suspicious, indeterminate.   -5/15/24 Cycle 1 ipi/nivo and start gammaknife    Interval History:  Patient reports that Sunday night he developed nausea and headaches.  He took oxycodone and then slept for much of Monday.  He continued to feel sick on Monday and was given 4 mg of dexamethasone while in radiation oncology.  He took 2 mg this morning.  He reports no nausea or headaches today.  Since starting on the immunotherapy, he has noticed looser stools having 2-3 loose stools per day with some urgency.  He has not taken anything for his stools.  He denies any shortness of breath.  He reports an adequate appetite and fluid intake.  He reports his mood is variable and he is weeping at times.  He continues to feel  lightheaded and fell this morning, but denies any injury from this fall.  He reports he has been falling about once per week.  He also feels his right leg weakness has been getting worse and he has been having more difficulty with word finding.  He is napping several hours per day.  He denies other concerns.    Physical Exam:  General: The patient is a pleasant male in no acute distress. He is here today in a wheelchair. He is here today with his brother, Martin.  /83 (BP Location: Right arm, Patient Position: Sitting, Cuff Size: Adult Regular)   Pulse 91   Temp 98.1  F (36.7  C) (Oral)   Resp 20   Wt 94.8 kg (209 lb)   SpO2 98%   BMI 27.57 kg/m    Wt Readings from Last 10 Encounters:   05/21/24 94.8 kg (209 lb)   05/15/24 98.4 kg (217 lb)   05/15/24 95.3 kg (210 lb)   05/13/24 95.3 kg (210 lb)   04/22/24 98 kg (216 lb)   04/19/24 98.3 kg (216 lb 11.2 oz)   04/17/24 96.3 kg (212 lb 3.2 oz)   04/03/24 97.5 kg (215 lb)   03/19/24 95.9 kg (211 lb 6.4 oz)   03/12/24 95.3 kg (210 lb)   HEENT: EOMI. Sclerae are anicteric.   Lungs: Breathing is not labored.   Neuro: Speech is clear, but slow at times.  Skin: No rashes, petechiae, or bruising noted on exposed skin.    Labs:   Most Recent 3 CBC's:  Recent Labs   Lab Test 04/17/24  0913 03/19/24  1534 01/31/24  1003 01/22/24  1047   WBC 14.1* 12.4* 15.3* 8.7   HGB 14.7 14.9 13.5 14.1   MCV 92 92 95 91    237 261 224   ANEUTAUTO 9.9* 9.2*  --  5.5     Most Recent 3 BMP's:  Recent Labs   Lab Test 05/15/24  1349 04/17/24  0913 03/19/24  1534    140 142   POTASSIUM 3.9 3.6 3.6   CHLORIDE 108* 107 109*   CO2 26 21* 22   BUN 21.1 18.3 19.1   CR 1.07 0.92 1.16   ANIONGAP 10 12 11   JOHN PAUL 9.4 9.2 9.0   * 90 113*   PROTTOTAL 6.9 7.0 6.5   ALBUMIN 4.2 4.2 4.0    Most Recent 3 LFT's:  Recent Labs   Lab Test 05/15/24  1349 04/17/24  0913 03/19/24  1534   AST 16 21 23   ALT 21 25 40   ALKPHOS 64 59 60   BILITOTAL 0.7 1.0 0.8    Most Recent 2 TSH and T4:  Recent  Labs   Lab Test 05/15/24  1349 04/17/24  0913   TSH 0.45 2.30   I reviewed the above labs today.    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 Started pembrolizumab 6/7/23. Underwent second craniotomy for L parieto-occipital lesion resection in 7/14/23 2/2 hemorrhagic conversion and GK to R frontal lesion 9/8/23, on pembrolizumab, course c/b ICI pneumonitis necessitating steroids, again with CNS progression 12/2023 s/p resection and GK.     # metastatic melanoma with metastatic disease to brain  -presented with de-francisca CNS involvement s/p craniotomy x2 and GK to additional R frontal lesion  -TMB high, BRAF mutated but not V600E  -s/p pembrolizumab x2 (first 6/7/23) c/c/b pneumonitis s/p steroid taper  -recent CNS progression in Dec 2023 s/p R frontal , ongoing response systemically, improved pneumonitis radiographically (never symptomatic)  -3/2/24 brain MR shows increased size of several previously treated metastatic lesions, no new lesions. After discussion with Rad Onc and Radiology, although progression cannot be ruled out, findings can be consistent with treatment effect  -now with repeat MR brain with continued significant increase in size of R frontal lesion (18 x 15 mm, previously 8 x 7 mm), anterior R frontal  lesion and R occipital lesion marginally increased. L frontal lesion decreased. L occipital lesion stable. Vasogenic edema significant but stable  -Last PET/CT showed overall continued concern for metastatic disease involving the soft tissues in both the R lateral calf and L medial thigh. These lesions remain small but slightly increased in both size and avidity, and likely represent sites of metastatic disease. No new areas of systemic  disease noted.   -On 5/15, he started on gammaknife and ipi/nivo. He developed some fatigue, nausea, and headaches, as well as mild diarrhea.    Plan:  --He will complete gammaknife radiation tomorrow. 5/22/24   --He will take 2 mg dexamethasone daily x 7 days (through 5/27), then decrease back down to 1 mg daily on 5/28 as headaches and nausea improved after a single 4 mg dose of dex on 5/20/24. Patient asked about boswellia to manage brain edema, which I will review with Dr. Bunn.   --I will see him back prior to consideration of cycle 2.  --Will plan to repeat imaging after 4 cycles of ipi/nivo.    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, resolved  -8/2023 PET/CT showed bilateral FDG avid ground glass and reticular infiltrates favored to represent pneumonitis, started on steroids, now completed taper  -denied any history of pulmonary symptoms at the time of pneumonitis findings  -most recent imaging with continued resolution, asymptomatic today  -current steroids indicated for cerebral edema, not pneumonitis    Plan:  --continue to monitor closely for new respiratory symptoms, particularly in the setting of escalation to dual checkpoint inhibitor therapy  --no respiratory concerns today    # Liver lesions  -12/7 CT AP showed two indeterminate liver lesions not previously appreciated, favored to be benign but cannot exclude metastases  -no concerning liver lesions at this time    Plan:  --trend with surveillance scans    # Fatigue  -TSH normal    Plan:  --monitor    # diarrhea  -Mild. May be secondary to immunotherapy.    Plan:  --Recommend using 1 Imodium with each loose stool and contact us if symptoms worsen or not controlled with Imodium    Meghan Rinaldi PA-C  [facility]  [address] [place] [address] [phone]    The longitudinal plan of care for the diagnosis(es)/condition(s) as documented were addressed during this visit. Due to the added complexity  in care, I will continue to support Tim in the subsequent management and with ongoing continuity of care.

## 2024-05-21 NOTE — LETTER
2024         RE: Saeid Santa  [address]  [facility] [address]        Dear Colleague,    Thank you for referring your patient, Saeid Santa, to the [facility]. Please see a copy of my visit note below.      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  4a R Frontal  2,400 cGy [place] 60  2024 5  4/    Total shots: 54          DESCRIPTION OF PROCEDURE:  On 2024 the patient was brought to the [employer] Gamma Knife IconTM suite at [facility] and treated with fractionated stereotactic radiotherapy.     The [employer] Gamma Knife IconTM Plan software was used to create a highly conformal dose distribution using the number and size collimators detailed above.     TREATMENT:    The patient was brought to the Gamma Knife suite. A timeout was performed to confirm the correct patient and correct procedure.    Patient was identified by 2 methods. Site was verified.    The mask was placed and a cone beam CT was done and fused to the previously approved plan.        The physicist and I evaluated the fusion and confirmed the target coverage after auto-registration.      The treatment was delivered using the [employer] Gamma Knife IconTM without complication.     The mask was removed and the patient was discharged home in stable condition.    The patient tolerated the treatment well and had no complications.      Approved by:  Aubree Saldana MD      Again, thank you for allowing me to participate in the care of your patient.        Sincerely,        Aubree Saldana MD

## 2024-05-21 NOTE — LETTER
5/21/2024         RE: Saeid Santa  [address] [facility]  [facility] [address]        Dear Colleague,    Thank you for referring your patient, Saeid Santa, to the [facility]. Please see a copy of my visit note below.    [facility]   Return Patient Visit    Name: Saeid Santa  MRN: [id number]  Date of visit: May 21, 2024    Diagnosis: Metastatic melanoma  Stage: IV    Molecular: BRAF G466A, TMB 48.7554 mut/Mb    Performance status: ECOG 0    Oncology History:  --8/16/22, he brings a left parietal scalp lesion to attention of his GP. It is initially observed.  --November 2022, the left parietal scalp lesion was felt to be a cyst, and the left parietal lesion was lanced and drained via scalp incision.  --January 2023, the left parietal scalp cyst would occasionally break open, drain, and bleed, largely at night.   --February 2023, he notes some mild difficulty clearing obstacles with right leg and foot.  --March/April 2023, he has 3 progressively worsening episodes over a span 3 weeks including the right leg. At first he notes the onset of right leg numbness and heaviness, like he was carrying 50 lbs of water on the leg. Then, about a week or so later, the leg began to jerk and converse while he was in the basement. Neither episode associated with loss of consciousness  --4/15/23, he recalls the right leg numbness, heaviness, jerking while driving between work. He had been found by coworkers in the parking lot, sitting in the grass, appearing confused. He was brought to the ER by EMS. CT-CAP, showed small (<6mm), bilateral indeterminate pulmonary nodules. CT-head and MRI brain showed 2 discrete ring-enhancing lesions in the left paramedian posterior frontal lobe near the precentral gyrus measuring approximately 2.2 x 1.7 x 1.9 cm and left occipital lobe near the lingual gyrus measuring approximately 2.2 x 2.1 x 2.0 cm. The occipital lesion encroaches  upon the subependymal surface of the left lateral ventricle occipital horn and contacts the superior surface of the medial left tentorium. The lesions are accompanied by moderate surrounding vasogenic edema. Surgery was recommended.  --4/21/23, underwent left awake (sleep-awake-sleep) craniotomy for tumor resection, he has resection of the left frontal, motor strip tumor as well asan elliptical incision of the left parietal scalp lesion. On pathology, both the scalp excision and brain mass are positive for malignant melanoma. Tumor cells are positive for S-100 and Melan-A, and negative for cytokeratin AE1/AE3, NKX3.1, GFAP, ERG, CD3, and CD20.  CD3 highlights brisk infiltration of the tumor by T lymphocytes, while CD20 highlights scattered positive B lymphocytes. NGS identifies a BRAF G466A mutation, a class 3 mutation insensitive to BRAF kinase inhibitor monotherapy. TMB is high at 48.754 muts/Mb.   --4/22/23, post-operative MRI shows post-operative changes of the parietal craniotomy of the left frontal lobe metastasis. The other 1.7 x 1.1 cm cystic lesion in the left occipital lobe is also seen. No new lesions appreciated.  -- 4/25/23 to 5/9/23, discharged from the hospital to acute rehab stay at [facility] in [place]. He participated in a comprehensive rehabilitation program consisting of PT, OT, Speech, Psychology and Recreation Therapy. He did well and was discharged home with use of a walker.  --PET-CT on 5/18/23 with potentially involved neck lymph nodes. He has mild FDG uptake in soft tissue nodules in the scalp, in the known left occipital brain metastasis, as well as in the left lower neck region.  -5/24-/5/31/23, He was treated with gamma knife: 2500 cGy to the left resection cavity; 3000 cGy to the L parietal-occipital lesion.  -6/7/2023, Cycle 1 of pembrolizumab 200mg every 3 weeks  -6/14/23-6/16/23, admitted for vision change, reduced comprehension, speech change, fall and headache. MRI showing likely  progression of left occipital mass with associated edema. Improved on steroids and discharged on oral decadron. Plan for repeat MRI and neurosurgery follow up in 1 month.  -7/7/23 ED visit for headaches, confusion, vision changes, concern for hemorrhage of the left occipital mass, admitted to [facility] until 7/9/23, increased dexamethasone dose  -7/14/23 craniotomy of the left parieto-occipital mass, discharged on a dexamethasone taper  -8/11/2023, Cycle 2 of pembrolizumab  -8/23/2023 brain MRI with no recurrence in left frontal lobe, decreased enhancement at edges of left occipital resection cavity, new enhancement int he right front lobe concerning for new met--seen by neurosurgery and considering RT  -8/31/2023 CT/PET with increased uptake in left lower neck lymph node c/f early progression. Ground-glass, reticular changes in lungs also noted likely secondary to drug-induced pneumonitis. Started 1 mg/kg prednisone (80 mg daily) with 8 week taper  -9/8/23 GK to R frontal lobe lesion  -12/7/23 CT neck: Resolved previously hypermetabolic left level 5 lymph node. No new  cervical lymphadenopathy.  -12/7/23 CT chest/AP: Two arterially enhancing lesions in the liver measuring 0.9 - 1.0 cm were not previously visualized, likely due to differences in timing of the contrast bolus. These are indeterminate but favored to be benign perfusional abnormalities. Melanoma metastases cannot be entirely excluded. Otherwise, no evidence for metastatic disease in the chest, abdomen and pelvis. Significant improvement in linear opacities in the lungs, suggesting improving drug-induced pneumonitis. Few stable small pulmonary nodules.  -12/7/23 MR head: Progression in intracranial metastatic disease compared to 9/8/2023   with multiple new and/or enlarging metastases, including internally hemorrhagic right frontal lobe lesion with 3 mm of leftward midline shift and new lesion adjacent to the left occipital resection bed.   -12/8/23  started on dex 4 mg BID  -12/21/23 Right frontal craniotomy with Dr. Garrido  -1/22/24, starts nivolumab (480mg every 4 weeks)  -2/2/24, Gamma knife to right occipital and left frontal lesions   -3/2/24 PET/CT: previously seen L lower cervical node resolved, two new suspected intramuscular mets to the LE  -3/2/24 MR brain: Multifocal intracranial metastatic disease with interval increase in size of multiple metastatic lesions and surrounding vasogenic edema detailed above. Unsure if this is due to disease progression or post radiation change.  -4/17/24 C4 nivolumab  -4/17/24 MR brain: Increased size of the enhancing lesions in the right frontal and right occipital lobes, likely representing interval disease progression. Interval decreased size of the left frontal lobe lesion. Additional intracranial metastatic lesions are stable. No new enhancing lesions identified. Stable surrounding vasogenic edema in the bifrontal lobes, right occipital lobe, and left temporoparietooccipital lobes.   -4/17/24 PET/CT: There is interval progression of disease with increase in size and uptake of enhancing cerebral metastatic lesions in the right frontal and left occipital lobes as well as interval increase in size and uptake of enhancing soft tissue nodules in the left medial thigh and right posterior leg as detailed above. There is new hypermetabolic uptake in the left fifth and sixth ribs at the costovertebral junction with focal sclerosis on corresponding CT suspicious, indeterminate.   -5/15/24 Cycle 1 ipi/nivo and start gammaknife    Interval History:  Patient reports that Sunday night he developed nausea and headaches.  He took oxycodone and then slept for much of Monday.  He continued to feel sick on Monday and was given 4 mg of dexamethasone while in radiation oncology.  He took 2 mg this morning.  He reports no nausea or headaches today.  Since starting on the immunotherapy, he has noticed looser stools having 2-3 loose  stools per day with some urgency.  He has not taken anything for his stools.  He denies any shortness of breath.  He reports an adequate appetite and fluid intake.  He reports his mood is variable and he is weeping at times.  He continues to feel lightheaded and fell this morning, but denies any injury from this fall.  He reports he has been falling about once per week.  He also feels his right leg weakness has been getting worse and he has been having more difficulty with word finding.  He is napping several hours per day.  He denies other concerns.    Physical Exam:  General: The patient is a pleasant male in no acute distress. He is here today in a wheelchair. He is here today with his brother, Martin.  /83 (BP Location: Right arm, Patient Position: Sitting, Cuff Size: Adult Regular)   Pulse 91   Temp 98.1  F (36.7  C) (Oral)   Resp 20   Wt 94.8 kg (209 lb)   SpO2 98%   BMI 27.57 kg/m    Wt Readings from Last 10 Encounters:   05/21/24 94.8 kg (209 lb)   05/15/24 98.4 kg (217 lb)   05/15/24 95.3 kg (210 lb)   05/13/24 95.3 kg (210 lb)   04/22/24 98 kg (216 lb)   04/19/24 98.3 kg (216 lb 11.2 oz)   04/17/24 96.3 kg (212 lb 3.2 oz)   04/03/24 97.5 kg (215 lb)   03/19/24 95.9 kg (211 lb 6.4 oz)   03/12/24 95.3 kg (210 lb)   HEENT: EOMI. Sclerae are anicteric.   Lungs: Breathing is not labored.   Neuro: Speech is clear, but slow at times.  Skin: No rashes, petechiae, or bruising noted on exposed skin.    Labs:   Most Recent 3 CBC's:  Recent Labs   Lab Test 04/17/24  0913 03/19/24  1534 01/31/24  1003 01/22/24  1047   WBC 14.1* 12.4* 15.3* 8.7   HGB 14.7 14.9 13.5 14.1   MCV 92 92 95 91    237 261 224   ANEUTAUTO 9.9* 9.2*  --  5.5     Most Recent 3 BMP's:  Recent Labs   Lab Test 05/15/24  1349 04/17/24  0913 03/19/24  1534    140 142   POTASSIUM 3.9 3.6 3.6   CHLORIDE 108* 107 109*   CO2 26 21* 22   BUN 21.1 18.3 19.1   CR 1.07 0.92 1.16   ANIONGAP 10 12 11   JOHN PAUL 9.4 9.2 9.0   * 90 113*    PROTTOTAL 6.9 7.0 6.5   ALBUMIN 4.2 4.2 4.0    Most Recent 3 LFT's:  Recent Labs   Lab Test 05/15/24  1349 04/17/24  0913 03/19/24  1534   AST 16 21 23   ALT 21 25 40   ALKPHOS 64 59 60   BILITOTAL 0.7 1.0 0.8    Most Recent 2 TSH and T4:  Recent Labs   Lab Test 05/15/24  1349 04/17/24  0913   TSH 0.45 2.30   I reviewed the above labs today.    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 Started pembrolizumab 6/7/23. Underwent second craniotomy for L parieto-occipital lesion resection in 7/14/23 2/2 hemorrhagic conversion and GK to R frontal lesion 9/8/23, on pembrolizumab, course c/b ICI pneumonitis necessitating steroids, again with CNS progression 12/2023 s/p resection and GK.     # metastatic melanoma with metastatic disease to brain  -presented with de-francisca CNS involvement s/p craniotomy x2 and GK to additional R frontal lesion  -TMB high, BRAF mutated but not V600E  -s/p pembrolizumab x2 (first 6/7/23) c/c/b pneumonitis s/p steroid taper  -recent CNS progression in Dec 2023 s/p R frontal , ongoing response systemically, improved pneumonitis radiographically (never symptomatic)  -3/2/24 brain MR shows increased size of several previously treated metastatic lesions, no new lesions. After discussion with Rad Onc and Radiology, although progression cannot be ruled out, findings can be consistent with treatment effect  -now with repeat MR brain with continued significant increase in size of R frontal lesion (18 x 15 mm, previously 8 x 7 mm), anterior R frontal  lesion and R occipital lesion marginally increased. L frontal lesion decreased. L occipital lesion stable. Vasogenic edema significant but stable  -Last PET/CT showed overall continued concern  for metastatic disease involving the soft tissues in both the R lateral calf and L medial thigh. These lesions remain small but slightly increased in both size and avidity, and likely represent sites of metastatic disease. No new areas of systemic disease noted.   -On 5/15, he started on gammaknife and ipi/nivo. He developed some fatigue, nausea, and headaches, as well as mild diarrhea.    Plan:  --He will complete gammaknife radiation tomorrow. 5/22/24   --He will take 2 mg dexamethasone daily x 7 days (through 5/27), then decrease back down to 1 mg daily on 5/28 as headaches and nausea improved after a single 4 mg dose of dex on 5/20/24. Patient asked about boswellia to manage brain edema, which I will review with Dr. Bunn.   --I will see him back prior to consideration of cycle 2.  --Will plan to repeat imaging after 4 cycles of ipi/nivo.    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, resolved  -8/2023 PET/CT showed bilateral FDG avid ground glass and reticular infiltrates favored to represent pneumonitis, started on steroids, now completed taper  -denied any history of pulmonary symptoms at the time of pneumonitis findings  -most recent imaging with continued resolution, asymptomatic today  -current steroids indicated for cerebral edema, not pneumonitis    Plan:  --continue to monitor closely for new respiratory symptoms, particularly in the setting of escalation to dual checkpoint inhibitor therapy  --no respiratory concerns today    # Liver lesions  -12/7 CT AP showed two indeterminate liver lesions not previously appreciated, favored to be benign but cannot exclude metastases  -no concerning liver lesions at this time    Plan:  --trend with surveillance scans    # Fatigue  -TSH normal    Plan:  --monitor    # diarrhea  -Mild. May be secondary to immunotherapy.    Plan:  --Recommend using 1 Imodium with each loose stool and contact us if symptoms worsen or not controlled with  Imdarian Rinaldi PA-C  [facility]  [address] [place] [address]  [phone]    The longitudinal plan of care for the diagnosis(es)/condition(s) as documented were addressed during this visit. Due to the added complexity in care, I will continue to support Tim in the subsequent management and with ongoing continuity of care.

## 2024-05-21 NOTE — NURSING NOTE
Tim had his fourth of five fraction Gamma Knife treatment today, he will complete treatment tomorrow 5/22/24.  I reviewed discharge instructions with Tim and his brother Jose Luis for after his last treatment tomorrow, both voiced understanding.  Tim had a severe headache yesterday 5/20/24 when he was here, an additional 4 mg of po dexamethasone given to Tim, he stated he slept most of the day and night when he got home, he took 2 mg of po dexamethasone this morning as directed by Dr. Segura, Rad/Onc covering his treatment yesterday and when here for his Gamma Knife the headache was resolved.  Tim is going to see Meghan Rinaldi NP, after Gamma Knife to review with her what went on yesterday and to ask about the dosing of the dexamethasone going forward, Tim has been on dexamethasone 1 mg po daily for quite some time.   77, oxygen 97%, pulse 90, resp 16

## 2024-05-21 NOTE — PROGRESS NOTES
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  4a R Frontal  2,400 cGy Williamson 60  2024 5      Total shots: 54          DESCRIPTION OF PROCEDURE:  On 2024 the patient was brought to the [facility] Gamma Knife IconTM suite at [facility] and treated with fractionated stereotactic radiotherapy.     The [employer] Gamma Knife IconTM Plan software was used to create a highly conformal dose distribution using the number and size collimators detailed above.     TREATMENT:    The patient was brought to the Gamma Knife suite. A timeout was performed to confirm the correct patient and correct procedure.    Patient was identified by 2 methods. Site was verified.    The mask was placed and a cone beam CT was done and fused to the previously approved plan.        The physicist and I evaluated the fusion and confirmed the target coverage after auto-registration.      The treatment was delivered using the [employer] Gamma Knife IconTM without complication.     The mask was removed and the patient was discharged home in stable condition.    The patient tolerated the treatment well and had no complications.      Approved by:  Aubree Saldana MD

## 2024-05-21 NOTE — NURSING NOTE
"Oncology Rooming Note    May 21, 2024 1:17 PM   Saeid Santa is a 68 year old male who presents for:    Chief Complaint   Patient presents with    Oncology Clinic Visit     Metastasis to brain     Initial Vitals: /83 (BP Location: Right arm, Patient Position: Sitting, Cuff Size: Adult Regular)   Pulse 91   Temp 98.1  F (36.7  C) (Oral)   Resp 20   Wt 94.8 kg (209 lb)   SpO2 98%   BMI 27.57 kg/m   Estimated body mass index is 27.57 kg/m  as calculated from the following:    Height as of 5/15/24: 1.854 m (6' 1\").    Weight as of this encounter: 94.8 kg (209 lb). Body surface area is 2.21 meters squared.  No Pain (0) Comment: Data Unavailable   No LMP for male patient.  Allergies reviewed: Yes  Medications reviewed: Yes    Medications: Medication refills not needed today.  Pharmacy name entered into [employer]:    [facility] [place] [place] [facility] [address] [facility] - [place], [place] - [address] [facility]  [facility] - [facility], [place] - [address] [place] [address]    Frailty Screening:   Is the patient here for a new oncology consult visit in cancer care? 2. No      Clinical concerns: none.       Art Ortiz"

## 2024-05-22 NOTE — ED PROVIDER NOTES
[place] EMERGENCY DEPARTMENT ([facility])    5/22/24       ED PROVIDER NOTE       History     Chief Complaint   Patient presents with    Fall    Head Injury     HPI  Saeid Santa is a 68 year old male with a past medical history of Metastatic melanoma, hypertension, seizure disorder, brain tumor, JUSTEN, s/p 3 craniotomies, scheduled to complete gamma knife radiation today (5/22) who presents to the ED for evaluation of head injury after a fall. Patient reports he experiences intermittent lightheadedness and fell 3 times this morning.  He reports he had a new infusion recently.  He denies loss of consciousness, change in vision, fever, vomiting, chest pain, and shortness of breath.     Past Medical History  Past Medical History:   Diagnosis Date    HTN (hypertension)     Metastasis to brain (H) 2023    Metastatic melanoma (H)     Mixed hyperlipidemia     Prostate cancer (H)     Seizures (H)     Sleep apnea      Past Surgical History:   Procedure Laterality Date    INSERT DRAIN LUMBAR N/A 7/14/2023    Procedure: LUMBAR DRAIN;  Surgeon: Kye Garrido MD;  Location: [facility] OR    OPTICAL TRACKING SYSTEM CRANIOTOMY, EXCISE TUMOR, COMBINED Left 04/21/2023    Procedure: stealth assisted awake craniotomy resection of motor strip tumor;  Surgeon: Kye Garrido MD;  Location: [facility] OR    OPTICAL TRACKING SYSTEM CRANIOTOMY, EXCISE TUMOR, COMBINED Left 7/14/2023    Procedure: STEALTH ASSISTED CRANIOTOMY, WITH NEOPLASM EXCISION LEFT PARIETO-OCCIPITAL CRANIOTOMY WITH RESECTION OF MASS,;  Surgeon: Kye Garrido MD;  Location: [facility] OR    OPTICAL TRACKING SYSTEM CRANIOTOMY, EXCISE TUMOR, COMBINED Right 12/21/2023    Procedure: CRANIOTOMY, USING OPTICAL TRACKING SYSTEM, WITH NEOPLASM EXCISION, RIGHT FRONTAL CRANOTOMY WITH RESECTION OF TUMOR;  Surgeon: Kye Garrido MD;  Location: [facility] OR    PROSTATECTOMY      2009     acetaminophen (TYLENOL) 325 MG tablet  amLODIPine (NORVASC) 10 MG tablet  baclofen (LIORESAL) 10 MG  tablet  dexAMETHasone (DECADRON) 1 MG tablet  dexAMETHasone (DECADRON) 4 MG tablet  dexAMETHasone (DECADRON) 4 MG tablet  indomethacin (INDOCIN) 25 MG capsule  levETIRAcetam (KEPPRA) 1000 MG tablet  naloxone (NARCAN) 4 MG/0.1ML nasal spray  ondansetron (ZOFRAN) 4 MG tablet  oxyCODONE (ROXICODONE) 5 MG tablet  pantoprazole (PROTONIX) 40 MG EC tablet  polyethylene glycol (MIRALAX) 17 GM/Dose powder  rosuvastatin (CRESTOR) 40 MG tablet  sennosides (SENOKOT) 8.6 MG tablet      No Known Allergies  Family History  Family History   Problem Relation Age of Onset    Pancreatic Cancer Father     Diabetes Paternal Grandmother      Social History   Social History     Tobacco Use    Smoking status: Former     Current packs/day: 0.00     Average packs/day: 0.5 packs/day for 20.0 years (10.0 ttl pk-yrs)     Types: Cigarettes     Start date: 1997     Quit date: 2017     Years since quittin.3     Passive exposure: Current (Second hand exposure)    Smokeless tobacco: Never   Vaping Use    Vaping status: Never Used   Substance Use Topics    Alcohol use: Not Currently     Comment: socially    Drug use: Not Currently     Types: Marijuana     Comment: typically smoked daily but no use for past two weeks as of 23         A medically appropriate review of systems was performed with pertinent positives and negatives noted in the HPI, and all other systems negative.    Physical Exam   BP: 117/83  Pulse: 104  Temp: 97.3  F (36.3  C)  Resp: 20  SpO2: 97 %  Physical Exam  Vitals and nursing note reviewed.   Constitutional:       General: He is not in acute distress.     Appearance: Normal appearance. He is not toxic-appearing.   HENT:      Head: Atraumatic.   Eyes:      General: No scleral icterus.     Extraocular Movements: Extraocular movements intact.      Conjunctiva/sclera: Conjunctivae normal.      Pupils: Pupils are equal, round, and reactive to light.   Cardiovascular:      Rate and Rhythm: Normal rate.      Heart  sounds: Normal heart sounds.   Pulmonary:      Effort: Pulmonary effort is normal. No respiratory distress.      Breath sounds: Normal breath sounds.   Abdominal:      Palpations: Abdomen is soft.      Tenderness: There is no abdominal tenderness.   Musculoskeletal:         General: No deformity.      Cervical back: Neck supple.   Skin:     General: Skin is warm.   Neurological:      General: No focal deficit present.      Mental Status: He is alert and oriented to person, place, and time.      Cranial Nerves: No cranial nerve deficit.      Comments: Patient able to ambulate, but unsteady on his feet           ED Course, Procedures, & Data      Procedures               No results found for any visits on 05/22/24.  Medications - No data to display  Labs Ordered and Resulted from Time of ED Arrival to Time of ED Departure - No data to display  Head CT w/o contrast    (Results Pending)   CT Cervical Spine w/o Contrast    (Results Pending)          Critical care was not performed.     Medical Decision Making  The patient's presentation was of high complexity (an acute health issue posing potential threat to life or bodily function).    The patient's evaluation involved:  review of external note(s) from 3+ sources (see separate area of note for details)  review of 3+ test result(s) ordered prior to this encounter (see separate area of note for details)  ordering and/or review of 3+ test(s) in this encounter (see separate area of note for details)  discussion of management or test interpretation with another health professional (incoming physician)    The patient's management necessitated further care after sign-out to incoming physician pending CTH and CT c-spine (see their note for further management).    Assessment & Plan    Labs as reviewed and interpreted by me are significant for leukocytosis to 7.4 and FELICITY with a creatinine 1.37.  Patient previously has had leukocytosis, but I did order a bolus of fluid for his  likely prerenal FELICITY.  Patient requesting to discharge home after imaging, does not want to be admitted to the hospital.  I discussed case with patient and recommended admission due to weakness and frequent falls as well as new FELICITY, but patient said he will think about it but does not want to stay.  At time of signout to incoming provider patient pending CT head, and CT cervical spine, and then hopefully conversation with patient which she will be more amenable to admission.    I have reviewed the nursing notes. I have reviewed the findings, diagnosis, plan and need for follow up with the patient.    New Prescriptions    No medications on file       Final diagnoses:   None   I, Parisa Wise, am serving as a trained medical scribe to document services personally performed by Dami Ye MD, based on the provider's statements to me.     IDami MD, was physically present and have reviewed and verified the accuracy of this note documented by Parisa Wise.     Dami Ye MD  [facility] EMERGENCY DEPARTMENT  5/22/2024        Dami Ye MD  05/22/24 1527

## 2024-05-22 NOTE — DISCHARGE INSTRUCTIONS
Please follow-up with your primary care doctor and/or palliative care to help discuss ways to prevent fall.  Please make sure you are using things like your walker or wheelchair as needed.    Your creatinine was slightly elevated.  We think this is due to dehydration.  This should be rechecked within a week.    Return to the ER for any new or worsening symptoms.

## 2024-05-22 NOTE — ED NOTES
Patient was seen and evaluated in the emergency department by Dr. Ye.  Please see his note for full details.  In short patient has a history of metastatic cancer in the brain which is leading to frequent falls.  Patient suffered a fall today and has resulting headache.  CT head and neck are pending.  Patient is declining admission despite elevated creatinine and high white count (which appears acute on chronic).  Patient getting IV fluids.    Patient's C-spine shows no acute fracture or subluxation.  He does have severe foraminal narrowing but is denying any upper extremity or lower extremity numbness tingling or weakness.  He does have right lower leg weakness that he states is baseline from his metastatic disease.  Head CT showed no acute intracranial pathology.    I discussed again the risks of frequent falls and the risk of serious injury and our recommendation for admission and follow-up PT OT.  Patient is adamant he would like to be discharged.  He is understands the risks.  He will be discharged.  I encouraged that he follow-up with primary care or palliative care to see if we can help mitigate some of the risk in his home environment.  Patient notes that he has a walker, cane, and wheelchair at home and does not require any additional resources at this time..    MD Breann Law, Oliver Doshi MD  05/22/24 1558

## 2024-05-22 NOTE — ED TRIAGE NOTES
Arrives ambulatory after a fall. Per patient he loss his balance, fell back and hit his head. Denies being on thinners. Denies LOC.      Triage Assessment (Adult)       Row Name 05/22/24 1247          Triage Assessment    Airway WDL WDL        Respiratory WDL    Respiratory WDL WDL        Skin Circulation/Temperature WDL    Skin Circulation/Temperature WDL WDL        Cardiac WDL    Cardiac WDL X;rhythm     Pulse Rate & Regularity tachycardic        Peripheral/Neurovascular WDL    Peripheral Neurovascular WDL WDL        Cognitive/Neuro/Behavioral WDL    Cognitive/Neuro/Behavioral WDL WDL

## 2024-05-23 NOTE — LETTER
2024         RE: Saeid Santa  [address]  [place] [address]        Dear Colleague,    Thank you for referring your patient, Saeid Santa, to the [facility] RADIATION [facility]COLOG[facility]. Please see a copy of my visit note below.    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  4a R Frontal  3,000 cGy [place] 60  2024 7      Total shots: 54          DESCRIPTION OF PROCEDURE:  On 2024 the patient was brought to the [facility] Gamma Knife IconTM suite at [facility] and treated with fractionated stereotactic radiotherapy.     The [employer] Gamma Knife IconTM Plan software was used to create a highly conformal dose distribution using the number and size collimators detailed above.     TREATMENT:    The patient was brought to the Gamma Knife suite. A timeout was performed to confirm the correct patient and correct procedure.    Patient was identified by 2 methods. Site was verified.    The mask was placed and a cone beam CT was done and fused to the previously approved plan.        The physicist and I evaluated the fusion and confirmed the target coverage after auto-registration.      The treatment was delivered using the [employer] Gamma Knife IconTM without complication.     The mask was removed and the patient was discharged home in stable condition.    The patient tolerated the treatment well and had no complications.      Approved by:  Benedicto Rose MD/PhD      Again, thank you for allowing me to participate in the care of your patient.        Sincerely,        Benedicto Rose MD

## 2024-05-23 NOTE — NURSING NOTE
Pt arrived for last fractionated Gamma Knife treatment today.  Pt denied headache today and said he truly felt quite good.

## 2024-05-23 NOTE — PROGRESS NOTES
Name: Saeid Santa  : 1955   Medical Record #: [id number]  Diagnosis: C79.31 Secondary malignant neoplasm of brain  Date of Treatment: May 23, 2024  Referring Physicians: Carlos Manuel Bunn, Tumor Registry    GAMMA KNIFE DAILY TREATMENT PROCEDURE NOTE     Fraction 5 of 5  Treatment Summary:  Radiation Oncology - Course: 3:   Treatment Site Current Dose Modality From To Elapsed Days Fx.  4a R Frontal  3,000 cGy [place] 60  2024 7      Total shots: 54          DESCRIPTION OF PROCEDURE:  On 2024 the patient was brought to the [facility] Gamma Knife IconTM suite at [facility] and treated with fractionated stereotactic radiotherapy.     The [employer] Gamma Knife IconTM Plan software was used to create a highly conformal dose distribution using the number and size collimators detailed above.     TREATMENT:    The patient was brought to the Gamma Knife suite. A timeout was performed to confirm the correct patient and correct procedure.    Patient was identified by 2 methods. Site was verified.    The mask was placed and a cone beam CT was done and fused to the previously approved plan.        The physicist and I evaluated the fusion and confirmed the target coverage after auto-registration.      The treatment was delivered using the [employer] Gamma Knife IconTM without complication.     The mask was removed and the patient was discharged home in stable condition.    The patient tolerated the treatment well and had no complications.      Approved by:  Benedicto Rose MD/PhD

## 2024-05-24 NOTE — TELEPHONE ENCOUNTER
Follow up phone call made to the patient to check status post completion of gamma knife. Left a voicemail for the patient to call with update.

## 2024-05-28 NOTE — TELEPHONE ENCOUNTER
A telephone voice message was left for Tim on 5/28/24 to see how he did post Gamma Knife treatments on 5/23/24, no return call as of yet.  A telephone voice message was left for Tim on 5/24/24 also with no return call.

## 2024-05-29 NOTE — PROGRESS NOTES
[facility]/Voicemail    Clinical Data: Care Coordinator Outreach    Outreach attempted x 2.  Left message on patient's voicemail with call back information and requested return call.    Plan: Care Coordinator will try to reach patient again in 1-2 business days.    Addendum 05/31/24 9:18 AM  Another attempt. LVM requesting a return call.    Samantha Lincoln, RN, BSN  RN Care Coordinator  [facility]

## 2024-06-03 NOTE — PROGRESS NOTES
Virtual Visit Details    Type of service:  Video Visit   Video Start Time: 1:00 PM  Video End Time:1:14 PM    Originating Location (pt. Location): Home  Distant Location (provider location):  Off-site  Platform used for Video Visit: Mariya

## 2024-06-03 NOTE — PROGRESS NOTES
[facility]   Return Patient Visit    Name: Saeid Santa  MRN: [id number]  Date of visit: Saad 3, 2024    Diagnosis: Metastatic melanoma  Stage: IV    Molecular: BRAF G466A, TMB 48.7554 mut/Mb    Performance status: ECOG 2    Oncology History:  --8/16/22, he brings a left parietal scalp lesion to attention of his GP. It is initially observed.  --November 2022, the left parietal scalp lesion was felt to be a cyst, and the left parietal lesion was lanced and drained via scalp incision.  --January 2023, the left parietal scalp cyst would occasionally break open, drain, and bleed, largely at night.   --February 2023, he notes some mild difficulty clearing obstacles with right leg and foot.  --March/April 2023, he has 3 progressively worsening episodes over a span 3 weeks including the right leg. At first he notes the onset of right leg numbness and heaviness, like he was carrying 50 lbs of water on the leg. Then, about a week or so later, the leg began to jerk and converse while he was in the basement. Neither episode associated with loss of consciousness  --4/15/23, he recalls the right leg numbness, heaviness, jerking while driving between work. He had been found by coworkers in the parking lot, sitting in the grass, appearing confused. He was brought to the ER by EMS. CT-CAP, showed small (<6mm), bilateral indeterminate pulmonary nodules. CT-head and MRI brain showed 2 discrete ring-enhancing lesions in the left paramedian posterior frontal lobe near the precentral gyrus measuring approximately 2.2 x 1.7 x 1.9 cm and left occipital lobe near the lingual gyrus measuring approximately 2.2 x 2.1 x 2.0 cm. The occipital lesion encroaches upon the subependymal surface of the left lateral ventricle occipital horn and contacts the superior surface of the medial left tentorium. The lesions are accompanied by moderate surrounding vasogenic edema. Surgery was recommended.  --4/21/23,  underwent left awake (sleep-awake-sleep) craniotomy for tumor resection, he has resection of the left frontal, motor strip tumor as well asan elliptical incision of the left parietal scalp lesion. On pathology, both the scalp excision and brain mass are positive for malignant melanoma. Tumor cells are positive for S-100 and Melan-A, and negative for cytokeratin AE1/AE3, NKX3.1, GFAP, ERG, CD3, and CD20.  CD3 highlights brisk infiltration of the tumor by T lymphocytes, while CD20 highlights scattered positive B lymphocytes. NGS identifies a BRAF G466A mutation, a class 3 mutation insensitive to BRAF kinase inhibitor monotherapy. TMB is high at 48.754 muts/Mb.   --4/22/23, post-operative MRI shows post-operative changes of the parietal craniotomy of the left frontal lobe metastasis. The other 1.7 x 1.1 cm cystic lesion in the left occipital lobe is also seen. No new lesions appreciated.  -- 4/25/23 to 5/9/23, discharged from the hospital to acute rehab stay at [facility] in [place]. He participated in a comprehensive rehabilitation program consisting of PT, OT, Speech, Psychology and Recreation Therapy. He did well and was discharged home with use of a walker.  --PET-CT on 5/18/23 with potentially involved neck lymph nodes. He has mild FDG uptake in soft tissue nodules in the scalp, in the known left occipital brain metastasis, as well as in the left lower neck region.  -5/24-/5/31/23, He was treated with gamma knife: 2500 cGy to the left resection cavity; 3000 cGy to the L parietal-occipital lesion.  -6/7/2023, Cycle 1 of pembrolizumab 200mg every 3 weeks  -6/14/23-6/16/23, admitted for vision change, reduced comprehension, speech change, fall and headache. MRI showing likely progression of left occipital mass with associated edema. Improved on steroids and discharged on oral decadron. Plan for repeat MRI and neurosurgery follow up in 1 month.  -7/7/23 ED visit for headaches, confusion, vision changes, concern for  hemorrhage of the left occipital mass, admitted to [facility] until 7/9/23, increased dexamethasone dose  -7/14/23 craniotomy of the left parieto-occipital mass, discharged on a dexamethasone taper  -8/11/2023, Cycle 2 of pembrolizumab  -8/23/2023 brain MRI with no recurrence in left frontal lobe, decreased enhancement at edges of left occipital resection cavity, new enhancement int he right front lobe concerning for new met--seen by neurosurgery and considering RT  -8/31/2023 CT/PET with increased uptake in left lower neck lymph node c/f early progression. Ground-glass, reticular changes in lungs also noted likely secondary to drug-induced pneumonitis. Started 1 mg/kg prednisone (80 mg daily) with 8 week taper  -9/8/23 GK to R frontal lobe lesion  -12/7/23 CT neck: Resolved previously hypermetabolic left level 5 lymph node. No new  cervical lymphadenopathy.  -12/7/23 CT chest/AP: Two arterially enhancing lesions in the liver measuring 0.9 - 1.0 cm were not previously visualized, likely due to differences in timing of the contrast bolus. These are indeterminate but favored to be benign perfusional abnormalities. Melanoma metastases cannot be entirely excluded. Otherwise, no evidence for metastatic disease in the chest, abdomen and pelvis. Significant improvement in linear opacities in the lungs, suggesting improving drug-induced pneumonitis. Few stable small pulmonary nodules.  -12/7/23 MR head: Progression in intracranial metastatic disease compared to 9/8/2023   with multiple new and/or enlarging metastases, including internally hemorrhagic right frontal lobe lesion with 3 mm of leftward midline shift and new lesion adjacent to the left occipital resection bed.   -12/8/23 started on dex 4 mg BID  -12/21/23 Right frontal craniotomy with Dr. Garrido  -1/22/24, starts nivolumab (480 mg every 4 weeks)  -2/2/24, Gamma knife to right occipital and left frontal lesions   -3/2/24 PET/CT: previously seen L lower cervical  node resolved, two new suspected intramuscular mets to the LE  -3/2/24 MR brain: Multifocal intracranial metastatic disease with interval increase in size of multiple metastatic lesions and surrounding vasogenic edema detailed above. Unsure if this is due to disease progression or post radiation change.  -4/17/24 C4 nivolumab  -4/17/24 MR brain: Increased size of the enhancing lesions in the right frontal and right occipital lobes, likely representing interval disease progression. Interval decreased size of the left frontal lobe lesion. Additional intracranial metastatic lesions are stable. No new enhancing lesions identified. Stable surrounding vasogenic edema in the bifrontal lobes, right occipital lobe, and left temporoparietooccipital lobes.   -4/17/24 PET/CT: There is interval progression of disease with increase in size and uptake of enhancing cerebral metastatic lesions in the right frontal and left occipital lobes as well as interval increase in size and uptake of enhancing soft tissue nodules in the left medial thigh and right posterior leg as detailed above. There is new hypermetabolic uptake in the left fifth and sixth ribs at the costovertebral junction with focal sclerosis on corresponding CT suspicious, indeterminate.   -5/15/24 Cycle 1 ipi/nivo and start gammaknife, completed gammaknife on 5/23/24  -5/21/24 increased dexamethasone to 4 mg x 1, then 2 mg daily x 7 days for severe headaches and nausea  -5/22/24 ED visit for head injury after fall, head and neck CT negative for bleed or fracture, given IV fluids for FELICITY    Interval History:  -Had one fall coming home on 5/31. Hit back of head, and has some bruising on toes.   -Denies headaches or nausea.   -Now taking 2 mg dexamethasone since 5/21.   -Eating fair and drinking fairly well.  -Bowels are normal. Loose stools have resolved.   -Denies any dyspnea.  -Has fatigue. Not taking naps, but does rest.   -Has noticed dry eyes. Has some eye drops to  use.   -Lightheadedness is a bit better.   -Feels generalized weakness is a bit worse.   -Has ongoing word finding difficulty.   -Mood remains fair.   -Denies any rashes.    Objective:  General: patient appears well in no acute distress, alert and oriented, speech clear and fluid  Skin: no visualized rash or lesions on visualized skin  Resp: Appears to be breathing comfortably without accessory muscle usage, speaking in full sentences, no audible wheezes or cough.  Psych: Coherent speech, normal rate and volume, able to articulate logical thoughts, able to abstract reason, no tangential thoughts, no hallucinations or delusions  Patient's affect is appropriate. No significant word finding difficulty noted. Speech is mildly slow.     Labs:   Most Recent 3 CBC's:  Recent Labs   Lab Test 05/22/24  1340 04/17/24  0913 03/19/24  1534   WBC 17.4* 14.1* 12.4*   HGB 16.8 14.7 14.9   MCV 95 92 92    244 237   ANEUTAUTO 14.2* 9.9* 9.2*     Most Recent 3 BMP's:  Recent Labs   Lab Test 05/22/24  1340 05/15/24  1349 04/17/24  0913 03/19/24  1534    144 140 142   POTASSIUM 3.9 3.9 3.6 3.6   CHLORIDE 99 108* 107 109*   CO2 20* 26 21* 22   BUN 29.7* 21.1 18.3 19.1   CR 1.37* 1.07 0.92 1.16   ANIONGAP 17* 10 12 11   JOHN PAUL 9.9 9.4 9.2 9.0   * 129* 90 113*   PROTTOTAL  --  6.9 7.0 6.5   ALBUMIN  --  4.2 4.2 4.0    Most Recent 3 LFT's:  Recent Labs   Lab Test 05/15/24  1349 04/17/24  0913 03/19/24  1534   AST 16 21 23   ALT 21 25 40   ALKPHOS 64 59 60   BILITOTAL 0.7 1.0 0.8    Most Recent 2 TSH and T4:  Recent Labs   Lab Test 05/15/24  1349 04/17/24  0913   TSH 0.45 2.30   I reviewed the above labs today.    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  Started pembrolizumab 6/7/23. Underwent second craniotomy for L parieto-occipital lesion resection in 7/14/23 2/2 hemorrhagic conversion and GK to R frontal lesion 9/8/23, on pembrolizumab, course c/b ICI pneumonitis necessitating steroids, again with CNS progression 12/2023 s/p resection and GK.     # metastatic melanoma with metastatic disease to brain  -presented with de-francisca CNS involvement s/p craniotomy x2 and GK to additional R frontal lesion  -TMB high, BRAF mutated but not V600E  -s/p pembrolizumab x2 (first 6/7/23) c/c/b pneumonitis s/p steroid taper  -recent CNS progression in Dec 2023 s/p R frontal , ongoing response systemically, improved pneumonitis radiographically (never symptomatic)  -3/2/24 brain MR shows increased size of several previously treated metastatic lesions, no new lesions. After discussion with Rad Onc and Radiology, although progression cannot be ruled out, findings can be consistent with treatment effect  -now with repeat MR brain with continued significant increase in size of R frontal lesion (18 x 15 mm, previously 8 x 7 mm), anterior R frontal  lesion and R occipital lesion marginally increased. L frontal lesion decreased. L occipital lesion stable. Vasogenic edema significant but stable  -Last PET/CT showed overall continued concern for metastatic disease involving the soft tissues in both the R lateral calf and L medial thigh. These lesions remain small but slightly increased in both size and avidity, and likely represent sites of metastatic disease. No new areas of systemic disease noted.   -On 5/15, he started on gammaknife and ipi/nivo. He developed some fatigue, nausea, and headaches, as well as mild diarrhea, which has now improved.  -He completed gammaknife radiation on 5/23/24.     Plan:  --He will decrease dexamethasone to 1 mg daily.   --He will continue with cycle 2 ipi/nivo this week.  --I will see him back prior to consideration of cycle 3.  --Will plan to repeat  PET/CT after 4 cycles of ipi/nivo in early August.  --Will plan for next brain MRI on 8/22/24.     # FELICITY  -Noted on 5/22/24 in the ED and given IV fluids.      Plan:  --Encouraged him to push fluids with a goal of at least 8 cups/day.  --Will recheck Cr this week.    # Deconditioning with falls  -Secondary to generalized weakness and brain mets.    Plan:  --Recommend consistent use of his cane or walker. If falls, recommend using walker for the rest of the day.     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, resolved  -8/2023 PET/CT showed bilateral FDG avid ground glass and reticular infiltrates favored to represent pneumonitis, started on steroids, now completed taper  -denied any history of pulmonary symptoms at the time of pneumonitis findings  -most recent imaging with continued resolution, asymptomatic today  -current steroids indicated for cerebral edema, not pneumonitis    Plan:  --continue to monitor closely for new respiratory symptoms, particularly in the setting of escalation to dual checkpoint inhibitor therapy  --no respiratory concerns today    # Liver lesions  -12/7 CT AP showed two indeterminate liver lesions not previously appreciated, favored to be benign but cannot exclude metastases  -no concerning liver lesions at this time    Plan:  --trend with surveillance scans    # diarrhea  -Resolved. May be secondary to immunotherapy.    Plan:  --Recommend using 1 Imodium with each loose stool if symptoms recur and contact us if symptoms worsen or not controlled with Imodium    Meghan Rinaldi PA-C  [facility]  [address] [place] [id number]  [phone]    40 minutes spent on the date of the encounter doing chart review, review of test results, interpretation of tests, patient visit, and documentation     The longitudinal plan of care for the diagnosis(es)/condition(s) as documented were addressed during this visit. Due to the added complexity in care, I  will continue to support Tim in the subsequent management and with ongoing continuity of care.

## 2024-06-03 NOTE — NURSING NOTE
Is the patient currently in the state of [place]? YES    Visit mode:VIDEO    If the visit is dropped, the patient can be reconnected by: VIDEO VISIT: Text to cell phone:   Telephone Information:   Mobile [phone]       Will anyone else be joining the visit? NO  (If patient encounters technical issues they should call [phone]    How would you like to obtain your AVS? MyChart    Are changes needed to the allergy or medication list? No    Are refills needed on medications prescribed by this physician? NO    Reason for visit: ANNE SUNG      auscultated w/ stethoscope

## 2024-06-03 NOTE — LETTER
6/3/2024         RE: Saeid Santa  [address]  [facility] [address]        Dear Colleague,    Thank you for referring your patient, Saeid Santa, to the [facility]. Please see a copy of my visit note below.    [facility]   Return Patient Visit    Name: Saeid Santa  MRN: [id number]  Date of visit: Saad 3, 2024    Diagnosis: Metastatic melanoma  Stage: IV    Molecular: BRAF G466A, TMB 48.7554 mut/Mb    Performance status: ECOG 2    Oncology History:  --8/16/22, he brings a left parietal scalp lesion to attention of his GP. It is initially observed.  --November 2022, the left parietal scalp lesion was felt to be a cyst, and the left parietal lesion was lanced and drained via scalp incision.  --January 2023, the left parietal scalp cyst would occasionally break open, drain, and bleed, largely at night.   --February 2023, he notes some mild difficulty clearing obstacles with right leg and foot.  --March/April 2023, he has 3 progressively worsening episodes over a span 3 weeks including the right leg. At first he notes the onset of right leg numbness and heaviness, like he was carrying 50 lbs of water on the leg. Then, about a week or so later, the leg began to jerk and converse while he was in the basement. Neither episode associated with loss of consciousness  --4/15/23, he recalls the right leg numbness, heaviness, jerking while driving between work. He had been found by coworkers in the parking lot, sitting in the grass, appearing confused. He was brought to the ER by EMS. CT-CAP, showed small (<6mm), bilateral indeterminate pulmonary nodules. CT-head and MRI brain showed 2 discrete ring-enhancing lesions in the left paramedian posterior frontal lobe near the precentral gyrus measuring approximately 2.2 x 1.7 x 1.9 cm and left occipital lobe near the lingual gyrus measuring approximately 2.2 x 2.1 x 2.0 cm. The occipital lesion encroaches upon  the subependymal surface of the left lateral ventricle occipital horn and contacts the superior surface of the medial left tentorium. The lesions are accompanied by moderate surrounding vasogenic edema. Surgery was recommended.  --4/21/23, underwent left awake (sleep-awake-sleep) craniotomy for tumor resection, he has resection of the left frontal, motor strip tumor as well asan elliptical incision of the left parietal scalp lesion. On pathology, both the scalp excision and brain mass are positive for malignant melanoma. Tumor cells are positive for S-100 and Melan-A, and negative for cytokeratin AE1/AE3, NKX3.1, GFAP, ERG, CD3, and CD20.  CD3 highlights brisk infiltration of the tumor by T lymphocytes, while CD20 highlights scattered positive B lymphocytes. NGS identifies a BRAF G466A mutation, a class 3 mutation insensitive to BRAF kinase inhibitor monotherapy. TMB is high at 48.754 muts/Mb.   --4/22/23, post-operative MRI shows post-operative changes of the parietal craniotomy of the left frontal lobe metastasis. The other 1.7 x 1.1 cm cystic lesion in the left occipital lobe is also seen. No new lesions appreciated.  -- 4/25/23 to 5/9/23, discharged from the hospital to acute rehab stay at [facility] in [place]. He participated in a comprehensive rehabilitation program consisting of PT, OT, Speech, Psychology and Recreation Therapy. He did well and was discharged home with use of a walker.  --PET-CT on 5/18/23 with potentially involved neck lymph nodes. He has mild FDG uptake in soft tissue nodules in the scalp, in the known left occipital brain metastasis, as well as in the left lower neck region.  -5/24-/5/31/23, He was treated with gamma knife: 2500 cGy to the left resection cavity; 3000 cGy to the L parietal-occipital lesion.  -6/7/2023, Cycle 1 of pembrolizumab 200mg every 3 weeks  -6/14/23-6/16/23, admitted for vision change, reduced comprehension, speech change, fall and headache. MRI showing likely  progression of left occipital mass with associated edema. Improved on steroids and discharged on oral decadron. Plan for repeat MRI and neurosurgery follow up in 1 month.  -7/7/23 ED visit for headaches, confusion, vision changes, concern for hemorrhage of the left occipital mass, admitted to [facility] until 7/9/23, increased dexamethasone dose  -7/14/23 craniotomy of the left parieto-occipital mass, discharged on a dexamethasone taper  -8/11/2023, Cycle 2 of pembrolizumab  -8/23/2023 brain MRI with no recurrence in left frontal lobe, decreased enhancement at edges of left occipital resection cavity, new enhancement int he right front lobe concerning for new met--seen by neurosurgery and considering RT  -8/31/2023 CT/PET with increased uptake in left lower neck lymph node c/f early progression. Ground-glass, reticular changes in lungs also noted likely secondary to drug-induced pneumonitis. Started 1 mg/kg prednisone (80 mg daily) with 8 week taper  -9/8/23 GK to R frontal lobe lesion  -12/7/23 CT neck: Resolved previously hypermetabolic left level 5 lymph node. No new  cervical lymphadenopathy.  -12/7/23 CT chest/AP: Two arterially enhancing lesions in the liver measuring 0.9 - 1.0 cm were not previously visualized, likely due to differences in timing of the contrast bolus. These are indeterminate but favored to be benign perfusional abnormalities. Melanoma metastases cannot be entirely excluded. Otherwise, no evidence for metastatic disease in the chest, abdomen and pelvis. Significant improvement in linear opacities in the lungs, suggesting improving drug-induced pneumonitis. Few stable small pulmonary nodules.  -12/7/23 MR head: Progression in intracranial metastatic disease compared to 9/8/2023   with multiple new and/or enlarging metastases, including internally hemorrhagic right frontal lobe lesion with 3 mm of leftward midline shift and new lesion adjacent to the left occipital resection bed.   -12/8/23  started on dex 4 mg BID  -12/21/23 Right frontal craniotomy with Dr. Garrido  -1/22/24, starts nivolumab (480 mg every 4 weeks)  -2/2/24, Gamma knife to right occipital and left frontal lesions   -3/2/24 PET/CT: previously seen L lower cervical node resolved, two new suspected intramuscular mets to the LE  -3/2/24 MR brain: Multifocal intracranial metastatic disease with interval increase in size of multiple metastatic lesions and surrounding vasogenic edema detailed above. Unsure if this is due to disease progression or post radiation change.  -4/17/24 C4 nivolumab  -4/17/24 MR brain: Increased size of the enhancing lesions in the right frontal and right occipital lobes, likely representing interval disease progression. Interval decreased size of the left frontal lobe lesion. Additional intracranial metastatic lesions are stable. No new enhancing lesions identified. Stable surrounding vasogenic edema in the bifrontal lobes, right occipital lobe, and left temporoparietooccipital lobes.   -4/17/24 PET/CT: There is interval progression of disease with increase in size and uptake of enhancing cerebral metastatic lesions in the right frontal and left occipital lobes as well as interval increase in size and uptake of enhancing soft tissue nodules in the left medial thigh and right posterior leg as detailed above. There is new hypermetabolic uptake in the left fifth and sixth ribs at the costovertebral junction with focal sclerosis on corresponding CT suspicious, indeterminate.   -5/15/24 Cycle 1 ipi/nivo and start gammaknife, completed gammaknife on 5/23/24  -5/21/24 increased dexamethasone to 4 mg x 1, then 2 mg daily x 7 days for severe headaches and nausea  -5/22/24 ED visit for head injury after fall, head and neck CT negative for bleed or fracture, given IV fluids for FELICITY    Interval History:  -Had one fall coming home on 5/31. Hit back of head, and has some bruising on toes.   -Denies headaches or nausea.   -Now  taking 2 mg dexamethasone since 5/21.   -Eating fair and drinking fairly well.  -Bowels are normal. Loose stools have resolved.   -Denies any dyspnea.  -Has fatigue. Not taking naps, but does rest.   -Has noticed dry eyes. Has some eye drops to use.   -Lightheadedness is a bit better.   -Feels generalized weakness is a bit worse.   -Has ongoing word finding difficulty.   -Mood remains fair.   -Denies any rashes.    Objective:  General: patient appears well in no acute distress, alert and oriented, speech clear and fluid  Skin: no visualized rash or lesions on visualized skin  Resp: Appears to be breathing comfortably without accessory muscle usage, speaking in full sentences, no audible wheezes or cough.  Psych: Coherent speech, normal rate and volume, able to articulate logical thoughts, able to abstract reason, no tangential thoughts, no hallucinations or delusions  Patient's affect is appropriate. No significant word finding difficulty noted. Speech is mildly slow.     Labs:   Most Recent 3 CBC's:  Recent Labs   Lab Test 05/22/24  1340 04/17/24  0913 03/19/24  1534   WBC 17.4* 14.1* 12.4*   HGB 16.8 14.7 14.9   MCV 95 92 92    244 237   ANEUTAUTO 14.2* 9.9* 9.2*     Most Recent 3 BMP's:  Recent Labs   Lab Test 05/22/24  1340 05/15/24  1349 04/17/24  0913 03/19/24  1534    144 140 142   POTASSIUM 3.9 3.9 3.6 3.6   CHLORIDE 99 108* 107 109*   CO2 20* 26 21* 22   BUN 29.7* 21.1 18.3 19.1   CR 1.37* 1.07 0.92 1.16   ANIONGAP 17* 10 12 11   JOHN PAUL 9.9 9.4 9.2 9.0   * 129* 90 113*   PROTTOTAL  --  6.9 7.0 6.5   ALBUMIN  --  4.2 4.2 4.0    Most Recent 3 LFT's:  Recent Labs   Lab Test 05/15/24  1349 04/17/24  0913 03/19/24  1534   AST 16 21 23   ALT 21 25 40   ALKPHOS 64 59 60   BILITOTAL 0.7 1.0 0.8    Most Recent 2 TSH and T4:  Recent Labs   Lab Test 05/15/24  1349 04/17/24  0913   TSH 0.45 2.30   I reviewed the above labs today.    Assessment and Plan:   Saeid Santa is a 68 year old male with a  history of prostate cancer in 2009 s/p prostatectomy, and BRAF G446A mutated malignant melanoma with brain metastases at diagnosis s/p L frontal craniotomy/resection 4/21/23 followed by GK to tumor bed and L parietal-occipital lesion in May. Started pembrolizumab 6/7/23. Underwent second craniotomy for L parieto-occipital lesion resection in 7/14/23 2/2 hemorrhagic conversion and GK to R frontal lesion 9/8/23, on pembrolizumab, course c/b ICI pneumonitis necessitating steroids, again with CNS progression 12/2023 s/p resection and GK.     # metastatic melanoma with metastatic disease to brain  -presented with de-francisca CNS involvement s/p craniotomy x2 and GK to additional R frontal lesion  -TMB high, BRAF mutated but not V600E  -s/p pembrolizumab x2 (first 6/7/23) c/c/b pneumonitis s/p steroid taper  -recent CNS progression in Dec 2023 s/p R frontal , ongoing response systemically, improved pneumonitis radiographically (never symptomatic)  -3/2/24 brain MR shows increased size of several previously treated metastatic lesions, no new lesions. After discussion with Rad Onc and Radiology, although progression cannot be ruled out, findings can be consistent with treatment effect  -now with repeat MR brain with continued significant increase in size of R frontal lesion (18 x 15 mm, previously 8 x 7 mm), anterior R frontal  lesion and R occipital lesion marginally increased. L frontal lesion decreased. L occipital lesion stable. Vasogenic edema significant but stable  -Last PET/CT showed overall continued concern for metastatic disease involving the soft tissues in both the R lateral calf and L medial thigh. These lesions remain small but slightly increased in both size and avidity, and likely represent sites of metastatic disease. No new areas of systemic disease noted.   -On 5/15, he started on gammaknife and ipi/nivo. He developed some fatigue, nausea, and headaches, as well as mild diarrhea, which has now improved.  -He  completed gammaknife radiation on 5/23/24.     Plan:  --He will decrease dexamethasone to 1 mg daily.   --He will continue with cycle 2 ipi/nivo this week.  --I will see him back prior to consideration of cycle 3.  --Will plan to repeat PET/CT after 4 cycles of ipi/nivo in early August.  --Will plan for next brain MRI on 8/22/24.     # FELICITY  -Noted on 5/22/24 in the ED and given IV fluids.      Plan:  --Encouraged him to push fluids with a goal of at least 8 cups/day.  --Will recheck Cr this week.    # Deconditioning with falls  -Secondary to generalized weakness and brain mets.    Plan:  --Recommend consistent use of his cane or walker. If falls, recommend using walker for the rest of the day.     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, resolved  -8/2023 PET/CT showed bilateral FDG avid ground glass and reticular infiltrates favored to represent pneumonitis, started on steroids, now completed taper  -denied any history of pulmonary symptoms at the time of pneumonitis findings  -most recent imaging with continued resolution, asymptomatic today  -current steroids indicated for cerebral edema, not pneumonitis    Plan:  --continue to monitor closely for new respiratory symptoms, particularly in the setting of escalation to dual checkpoint inhibitor therapy  --no respiratory concerns today    # Liver lesions  -12/7 CT AP showed two indeterminate liver lesions not previously appreciated, favored to be benign but cannot exclude metastases  -no concerning liver lesions at this time    Plan:  --trend with surveillance scans    # diarrhea  -Resolved. May be secondary to immunotherapy.    Plan:  --Recommend using 1 Imodium with each loose stool if symptoms recur and contact us if symptoms worsen or not controlled with Imodium    Meghan Rinaldi PA-C  [facility]  [address], [place] [id number] [phone]    40 minutes spent on the date of the encounter doing chart review,  review of test results, interpretation of tests, patient visit, and documentation     The longitudinal plan of care for the diagnosis(es)/condition(s) as documented were addressed during this visit. Due to the added complexity in care, I will continue to support Tim in the subsequent management and with ongoing continuity of care.      Virtual Visit Details    Type of service:  Video Visit   Video Start Time: 1:00 PM  Video End Time:1:14 PM    Originating Location (pt. Location): Home  Distant Location (provider location):  Off-site  Platform used for Video Visit: Mariya

## 2024-06-05 NOTE — PROGRESS NOTES
Infusion Nursing Note:  Saeid BROOKS Santa presents today for C2D1 Opdivo/Yervoy.    Patient seen by provider today: No   present during visit today: Not Applicable.    Note: N/A.      Intravenous Access:  Labs drawn without difficulty.  Peripheral IV placed.    Treatment Conditions:  Lab Results   Component Value Date    HGB 16.8 05/22/2024    WBC 17.4 (H) 05/22/2024    ANEU 11.2 (H) 01/31/2024    ANEUTAUTO 14.2 (H) 05/22/2024     05/22/2024        Lab Results   Component Value Date     06/05/2024    POTASSIUM 3.4 06/05/2024    MAG 2.1 01/22/2024    CR 0.94 06/05/2024    JOHN PAUL 9.0 06/05/2024    BILITOTAL 0.9 06/05/2024    ALBUMIN 4.1 06/05/2024    ALT 41 06/05/2024    AST 22 06/05/2024       Results reviewed, labs MET treatment parameters, ok to proceed with treatment.      Post Infusion Assessment:  Patient tolerated infusion without incident.  Blood return noted pre and post infusion.  Site patent and intact, free from redness, edema or discomfort.  No evidence of extravasations.  Access discontinued per protocol.       Discharge Plan:   Patient discharged in stable condition accompanied by: self.  Departure Mode: Ambulatory.  Pt to return on 6/26/24 at 11:00 pm for labs followed by PAUL Jin.    Cely Dailey RN

## 2024-06-05 NOTE — PLAN OF CARE
Status: Pt POD #2 s/p Left parieto-occipital craniotomy for tumor resection  Vitals: VSS on RA  Neuros: N/T to R foot/ankle prior to surgery. R leg slightly weaker. Flat affect, but pleasant and cooperative  IV: L PIV SL  Labs/Electrolytes: low sodium Na+ this AM, NSG FYI'd  Diet: Regular  Bowel status: BS+ LBM 7/14  : voiding spontaneously  Skin: Head incision covered with primapore dressing, CDI. R arm with large bruising around previous PICC site. Blanchable redness to buttocks. Lumbar puncture site with bandaid.   Pain: incisional pain managed with tylenol and oxy  Activity: A1 with GB/walker  Social: brother visited, lives nearby  Plan: discharge today      Discharge time/date: 7/16/2023 1545  Walked or Wheelchair: WC  PIV removed: yes  Reviewed AVS with patient: yes   Medication due times added to AVS in EPIC: yes  Verbalized understanding of discharge with teachback: yes  Medications retrieved from pharmacy: yes  Supplies sent home: N/A  Belongings from security with patient: N/A       Doctor's office

## 2024-06-05 NOTE — LETTER
6/5/2024      Saeid Santa  [address] [address]      Dear Colleague,    Thank you for referring your patient, Saeid Santa, to the [facility]. Please see a copy of my visit note below.    No notes on file    Again, thank you for allowing me to participate in the care of your patient.        Sincerely,        Benedicto Rose MD

## 2024-06-07 NOTE — PROCEDURES
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     Treatment Summary:  Radiation Oncology - Course: 4 Protocol:   Treatment Site Current Dose Modality From To Elapsed Days Fx.  4a R Frontal  3,000 cGy Randolph 60  2024   7  5                Dose per Fraction:600 cGy  Total Dose:      3000 cGy        COMMENTS:  Mr. Santa is a 69 yo male with metastatic melanoma. He presented with seizure in 2023.   Workup revealed 2 discrete ring-enhancing lesions. He underwent craniotomy on 2023 for resection of the left   frontal lesion as this was felt to be the cause of his seizure. Additionally a scalp lesion was also resected as it had been   draining, bleeding and growing in size. Pathology showed malignant melanoma.  PET/CT on 2023 showed a brain   metastasis in the left occipital lobe and a lymph node in the left low neck. He initially received Keytruda, which had to be   switched to nivolumab due to autoimmune pneumonitis.      Regarding his brain metastases, he underwent 3 craniotomies by Dr. Garrido. He received multiple courses of GK SRS   either to the resection cavity or unresected smaller lesions. Despite these treatments, his brain metastases continued to   grow in size and number. His most recent brain MRI showed increased size of the right frontal lobe and right occipital   lobe lesions, stable left occipital and right frontal parafalcine lesion, decreased size of the left frontal lesion. The right   frontal lobe metastasis was previously resected but not  been treated with radiation therapy. As such, he was brought back   for another course of GK SRS.      Mr. Santa tolerated the treatment well with no acute toxicities.            ED visits/hospitalizations: ED visit on 5/22 due to fall and concern for head injury. Imaging was unremarkable   for acute injuries.      Missed treatments: 5/22 due to ED visit.      Acute Toxicity Profile by CTC v5.0: None     PAIN MANAGEMENT:     Not required.                          FOLLOW UP PLAN:       1. Follow up with Dr. Bunn as scheduled.      2. Follow up MRI in 3 months.                        Staff Physician: Benedicto Rose M.D.     CC: , MD Carlos Manuel Bunn MD                 Radiation Oncology:  [facility] [address] [place] [address]

## 2024-06-10 NOTE — TELEPHONE ENCOUNTER
Dexamethasone Refill   Last prescribing provider: Dr Bunn     Last clinic visit date: 6/3/24 Meghan Rinaldi     Recommendations for requested medication (if none, N/A): Copied from chart note   Plan:  --He will decrease dexamethasone to 1 mg daily.  --He will continue with cycle 2 ipi/nivo this week.  --I will see him back prior to consideration of cycle 3.     Any other pertinent information (if none, N/A): N/A    Refilled: Y/N, if NO, why?

## 2024-06-11 PROBLEM — K52.9 COLITIS: Status: ACTIVE | Noted: 2024-01-01

## 2024-06-11 NOTE — TELEPHONE ENCOUNTER
Oncology Nurse Triage - Diarrhea    Situation:   Smiley reporting Diarrhea and incontinence of stool    Background:   Treating Provider:   Dr. Bunn    Date of last office visit: 6/3/2024 meghan ayon    Is patient on chemotherapy or immunotherapy? Yes: ipilimumab nivolumab    Has patient started any new medications: No    Is patient currently on or recently on antibiotics: No    Does patient have history of Clostridium difficile infection: No    Is patient receiving radiation to the pelvis area: Yes gamma knife to head      Assessment  Onset of symptoms: Sat/Sunday 6/8/6/9.      Duration of symptoms: 2-3days    Number of bowel movements:  4 thus far in 12hours.     Bowel Characteristics:     Color:   Brown Watery, mucusy, minimal to no chunks  No chunks of food particles.     Consistency:   Liquid    Difficult passing stool:  No   No, patient denies straining during bowel movement.     Passing gas: Yes    Associated symptoms:denies abdominal cramping  Denies blood/black tarry stools.   Continues to have the cancer pain but no new abdomen pain.     Oral intake: Pt is eating/drinking okay  For example last 24hours; yeserday at rice, t-bone steak, teriyaki pork ribs, salad, yogurt with berries. Drinking water electrolyte/pedialyte and close to 50-60oz a day.     Decrease in urine output:  Mixed in with stool hard to determine, No odor or dark urine, urin appearance seems clear, yellow mixed in with poop.     Did you take a laxative or stool softener recently? No     Have you taken anything to treat the diarrhea: No     Denies fever, chest pain, SOB.     Pt is still able to get out and about on walker,participates in therapy.     Instructed patient to seek care immediately for worsening symptoms, including: fever, chest pain, shortness of breath, dizziness. Bloody stools, Lethargy, Weakness, Severe, constant cramping    Best Pharmacy Today [facility]    Recommendations:   [id number] Paged Meghan ayon PA-C  [id number] Per  Meghan HERRERA for CBC, CMP< Magnesium, Cdiff, Enteric Stool sample. If gets clean catch/ziploc back then can transfer stool sample to lab kit when arrives, IN-Person visit today     0839 Discussed with Pt/Family about labs and visit today. Pt/family concern with incontinence of bowel, concern about how to make trip, this writer educated on adult briefs, absorption pads to use. Educated offer for outpatient appts today, concern would be if s/s continue to escalate without being addressed possible pt would need ED visit in a few days. Pt/family in agreement with plan to come today for labs and evaluation.     This writer educated on:   Take two capsules of Imodium at onset of diarrhea  Take one capsule after each unformed stool (Maximum of 8 capsules in 24hour period)  Avoid fried, fatty, greasy, and spicy foods  Avoid high fiber foods  Avoid caffeine, alcohol, and milk products  Apply over the counter skin barrier to protect rectum from irritation and breakdown (such as Aquaphor, Diaper rash cream).   Call Triage back if no improvement or worsening of diarrhea in next couple days.

## 2024-06-11 NOTE — NURSING NOTE
1L IV Fluids administered in clinic per PIPER Jaimes. Patient unable to collect stool sample at this time. Supplies sent home with patient.     Hanh Moore RN

## 2024-06-11 NOTE — PROGRESS NOTES
[facility]   Return Patient Visit    Name: Saeid Santa  MRN: [id number]  Date of visit: Jun 11, 2024    Diagnosis: Metastatic melanoma  Stage: IV    Molecular: BRAF G466A, TMB 48.7554 mut/Mb    Performance status: ECOG 2    Oncology History:  --8/16/22, he brings a left parietal scalp lesion to attention of his GP. It is initially observed.  --November 2022, the left parietal scalp lesion was felt to be a cyst, and the left parietal lesion was lanced and drained via scalp incision.  --January 2023, the left parietal scalp cyst would occasionally break open, drain, and bleed, largely at night.   --February 2023, he notes some mild difficulty clearing obstacles with right leg and foot.  --March/April 2023, he has 3 progressively worsening episodes over a span 3 weeks including the right leg. At first he notes the onset of right leg numbness and heaviness, like he was carrying 50 lbs of water on the leg. Then, about a week or so later, the leg began to jerk and converse while he was in the basement. Neither episode associated with loss of consciousness  --4/15/23, he recalls the right leg numbness, heaviness, jerking while driving between work. He had been found by coworkers in the parking lot, sitting in the grass, appearing confused. He was brought to the ER by EMS. CT-CAP, showed small (<6mm), bilateral indeterminate pulmonary nodules. CT-head and MRI brain showed 2 discrete ring-enhancing lesions in the left paramedian posterior frontal lobe near the precentral gyrus measuring approximately 2.2 x 1.7 x 1.9 cm and left occipital lobe near the lingual gyrus measuring approximately 2.2 x 2.1 x 2.0 cm. The occipital lesion encroaches upon the subependymal surface of the left lateral ventricle occipital horn and contacts the superior surface of the medial left tentorium. The lesions are accompanied by moderate surrounding vasogenic edema. Surgery was recommended.  --4/21/23,  underwent left awake (sleep-awake-sleep) craniotomy for tumor resection, he has resection of the left frontal, motor strip tumor as well asan elliptical incision of the left parietal scalp lesion. On pathology, both the scalp excision and brain mass are positive for malignant melanoma. Tumor cells are positive for S-100 and Melan-A, and negative for cytokeratin AE1/AE3, NKX3.1, GFAP, ERG, CD3, and CD20.  CD3 highlights brisk infiltration of the tumor by T lymphocytes, while CD20 highlights scattered positive B lymphocytes. NGS identifies a BRAF G466A mutation, a class 3 mutation insensitive to BRAF kinase inhibitor monotherapy. TMB is high at 48.754 muts/Mb.   --4/22/23, post-operative MRI shows post-operative changes of the parietal craniotomy of the left frontal lobe metastasis. The other 1.7 x 1.1 cm cystic lesion in the left occipital lobe is also seen. No new lesions appreciated.  -- 4/25/23 to 5/9/23, discharged from the hospital to acute rehab stay at [facility] in [place]. He participated in a comprehensive rehabilitation program consisting of PT, OT, Speech, Psychology and Recreation Therapy. He did well and was discharged home with use of a walker.  --PET-CT on 5/18/23 with potentially involved neck lymph nodes. He has mild FDG uptake in soft tissue nodules in the scalp, in the known left occipital brain metastasis, as well as in the left lower neck region.  -5/24-/5/31/23, He was treated with gamma knife: 2500 cGy to the left resection cavity; 3000 cGy to the L parietal-occipital lesion.  -6/7/2023, Cycle 1 of pembrolizumab 200mg every 3 weeks  -6/14/23-6/16/23, admitted for vision change, reduced comprehension, speech change, fall and headache. MRI showing likely progression of left occipital mass with associated edema. Improved on steroids and discharged on oral decadron. Plan for repeat MRI and neurosurgery follow up in 1 month.  -7/7/23 ED visit for headaches, confusion, vision changes, concern for  hemorrhage of the left occipital mass, admitted to [facility] until 7/9/23, increased dexamethasone dose  -7/14/23 craniotomy of the left parieto-occipital mass, discharged on a dexamethasone taper  -8/11/2023, Cycle 2 of pembrolizumab  -8/23/2023 brain MRI with no recurrence in left frontal lobe, decreased enhancement at edges of left occipital resection cavity, new enhancement int he right front lobe concerning for new met--seen by neurosurgery and considering RT  -8/31/2023 CT/PET with increased uptake in left lower neck lymph node c/f early progression. Ground-glass, reticular changes in lungs also noted likely secondary to drug-induced pneumonitis. Started 1 mg/kg prednisone (80 mg daily) with 8 week taper  -9/8/23 GK to R frontal lobe lesion  -12/7/23 CT neck: Resolved previously hypermetabolic left level 5 lymph node. No new  cervical lymphadenopathy.  -12/7/23 CT chest/AP: Two arterially enhancing lesions in the liver measuring 0.9 - 1.0 cm were not previously visualized, likely due to differences in timing of the contrast bolus. These are indeterminate but favored to be benign perfusional abnormalities. Melanoma metastases cannot be entirely excluded. Otherwise, no evidence for metastatic disease in the chest, abdomen and pelvis. Significant improvement in linear opacities in the lungs, suggesting improving drug-induced pneumonitis. Few stable small pulmonary nodules.  -12/7/23 MR head: Progression in intracranial metastatic disease compared to 9/8/2023   with multiple new and/or enlarging metastases, including internally hemorrhagic right frontal lobe lesion with 3 mm of leftward midline shift and new lesion adjacent to the left occipital resection bed.   -12/8/23 started on dex 4 mg BID  -12/21/23 Right frontal craniotomy with Dr. Garrido  -1/22/24, starts nivolumab (480 mg every 4 weeks)  -2/2/24, Gamma knife to right occipital and left frontal lesions   -3/2/24 PET/CT: previously seen L lower cervical  node resolved, two new suspected intramuscular mets to the LE  -3/2/24 MR brain: Multifocal intracranial metastatic disease with interval increase in size of multiple metastatic lesions and surrounding vasogenic edema detailed above. Unsure if this is due to disease progression or post radiation change.  -4/17/24 C4 nivolumab  -4/17/24 MR brain: Increased size of the enhancing lesions in the right frontal and right occipital lobes, likely representing interval disease progression. Interval decreased size of the left frontal lobe lesion. Additional intracranial metastatic lesions are stable. No new enhancing lesions identified. Stable surrounding vasogenic edema in the bifrontal lobes, right occipital lobe, and left temporoparietooccipital lobes.   -4/17/24 PET/CT: There is interval progression of disease with increase in size and uptake of enhancing cerebral metastatic lesions in the right frontal and left occipital lobes as well as interval increase in size and uptake of enhancing soft tissue nodules in the left medial thigh and right posterior leg as detailed above. There is new hypermetabolic uptake in the left fifth and sixth ribs at the costovertebral junction with focal sclerosis on corresponding CT suspicious, indeterminate.   -5/15/24 Cycle 1 ipi/nivo and start gammaknife, completed gammaknife on 5/23/24  -5/21/24 increased dexamethasone to 4 mg x 1, then 2 mg daily x 7 days for severe headaches and nausea  -5/22/24 ED visit for head injury after fall, head and neck CT negative for bleed or fracture, given IV fluids for FELICITY  -6/5/24 Cycle 2 ipi/nivo    I am seeing him today to evaluate diarrhea.     Interval History:  Patient reports that he has been having diarrhea for the past 5 days with between 5-10 episodes per day of loose to yellow stools.  He has noticed some blood in his stools.  He has intermittent left-sided abdominal pain.  He denies any nausea, vomiting, or fevers.  He reports eating and  drinking less due to the diarrhea and reports a low appetite.  He has also been sleeping a lot.  He notes that his voice is mildly hoarse.  He has had mild headaches in the mornings that are improved with Tylenol.  He denies feeling daily dizzy.  He has fallen once since his last visit.  He reports sleeping poorly due to headaches and also diarrhea.  He denies other concerns.    Physical Exam:  General: The patient is a pleasant male in no acute distress. He appears moderately fatigued and is lying down for our visit. He is here today with his wife.   /85 (BP Location: Left arm, Patient Position: Left side, Cuff Size: Adult Regular)   Pulse 97   Temp 98.5  F (36.9  C) (Oral)   Resp (!) 128   Wt 94.4 kg (208 lb 1.6 oz)   SpO2 97%   BMI 27.46 kg/m    Wt Readings from Last 10 Encounters:   06/11/24 94.4 kg (208 lb 1.6 oz)   06/05/24 95.2 kg (209 lb 12.8 oz)   06/03/24 99.8 kg (220 lb)   05/21/24 94.8 kg (209 lb)   05/15/24 98.4 kg (217 lb)   05/15/24 95.3 kg (210 lb)   05/13/24 95.3 kg (210 lb)   04/22/24 98 kg (216 lb)   04/19/24 98.3 kg (216 lb 11.2 oz)   04/17/24 96.3 kg (212 lb 3.2 oz)   HEENT: EOMI. Sclerae are anicteric.   Heart: Mildly tachycardic with regular rhythm.   Lungs: Clear to auscultation bilaterally.   Abdomen: Bowel sounds present, soft, mild lower abdominal tenderness, remainder nontender with no palpable masses.   Extremities: No lower extremity edema noted bilaterally.   Neuro: Cranial nerves II through XII are grossly intact.  Skin: No rashes, petechiae, or bruising noted on exposed skin.    Labs:   Most Recent 3 CBC's:  Recent Labs   Lab Test 06/11/24  1101 05/22/24  1340 04/17/24  0913   WBC 13.2* 17.4* 14.1*   HGB 16.0 16.8 14.7   MCV 94 95 92    271 244   ANEUTAUTO 10.0* 14.2* 9.9*     Most Recent 3 BMP's:  Recent Labs   Lab Test 06/11/24  1101 06/05/24  1104 05/22/24  1340 05/15/24  1349    143 136 144   POTASSIUM 4.1 3.4 3.9 3.9   CHLORIDE 107 105 99 108*   CO2 21*  23 20* 26   BUN 23.5* 16.1 29.7* 21.1   CR 1.14 0.94 1.37* 1.07   ANIONGAP 12 15 17* 10   JOHN PAUL 9.1 9.0 9.9 9.4   GLC 95 101* 123* 129*   PROTTOTAL 6.7 6.9  --  6.9   ALBUMIN 3.9 4.1  --  4.2    Most Recent 3 LFT's:  Recent Labs   Lab Test 06/11/24  1101 06/05/24  1104 05/15/24  1349   AST 19 22 16   ALT 27 41 21   ALKPHOS 84 75 64   BILITOTAL 1.2 0.9 0.7    Most Recent 2 TSH and T4:  Recent Labs   Lab Test 06/05/24  1104 05/15/24  1349   TSH 0.78 0.45     Magnesium   Date Value Ref Range Status   06/11/2024 2.0 1.7 - 2.3 mg/dL Final   01/22/2024 2.1 1.7 - 2.3 mg/dL Final   12/22/2023 2.5 (H) 1.7 - 2.3 mg/dL Final   07/16/2023 2.2 1.7 - 2.3 mg/dL Final     I reviewed the above labs today.    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 Started pembrolizumab 6/7/23. Underwent second craniotomy for L parieto-occipital lesion resection in 7/14/23 2/2 hemorrhagic conversion and GK to R frontal lesion 9/8/23, on pembrolizumab, course c/b ICI pneumonitis necessitating steroids, again with CNS progression 12/2023 s/p resection and GK. Now on ipi/nivo    #diarrhea  -likely immune mediated, but will rule out infection  -labs are adequate today with normal Cr and electrolytees, but patient feels dehydrated    Plan:  --will test stool with enteric stool panel and C.diff  --will give 1L IV NS for dehydration  --will empirically start him on 1 mg/kg (100 mg) prednisone daily  --will also start him on omeprazole for GI prophylaxis  --once diarrhea has improved, will plan to add in Bactrim for PCP prophylaxis   --given rx for a commode, given difficulty in getting to the bathroom on time with stool urgency    # metastatic melanoma with metastatic disease to  brain  -presented with de-francisca CNS involvement s/p craniotomy x2 and GK to additional R frontal lesion  -TMB high, BRAF mutated but not V600E  -s/p pembrolizumab x2 (first 6/7/23) c/c/b pneumonitis s/p steroid taper  -recent CNS progression in Dec 2023 s/p R frontal , ongoing response systemically, improved pneumonitis radiographically (never symptomatic)  -3/2/24 brain MR shows increased size of several previously treated metastatic lesions, no new lesions. After discussion with Rad Onc and Radiology, although progression cannot be ruled out, findings can be consistent with treatment effect  -now with repeat MR brain with continued significant increase in size of R frontal lesion (18 x 15 mm, previously 8 x 7 mm), anterior R frontal  lesion and R occipital lesion marginally increased. L frontal lesion decreased. L occipital lesion stable. Vasogenic edema significant but stable  -Last PET/CT showed overall continued concern for metastatic disease involving the soft tissues in both the R lateral calf and L medial thigh. These lesions remain small but slightly increased in both size and avidity, and likely represent sites of metastatic disease. No new areas of systemic disease noted.   -On 5/15, he started on gammaknife and ipi/nivo. He developed some fatigue, nausea, and headaches, as well as mild diarrhea, which improved.  -He completed gammaknife radiation on 5/23/24.   -Cycle 2 ipi/nivo was given on 6/5/24.    Plan:  --Will hold ipi/nivo to treat colitis, as above.   --Will hold dexamethasone while on prednisone.   --I will see him back next week to follow-up on his symptoms.   --Will plan to repeat PET/CT after 4 cycles of ipi/nivo in early August.  --Will plan for next brain MRI on 8/22/24.   --Will place  referral to assist with caregiver resources.     # FELICITY  -Noted on 5/22/24 in the ED and given IV fluids.  Creatinine is normal today, but mildly above his baseline    Plan:  --Will give 1L IV NS today.     #  Deconditioning with falls  -Secondary to generalized weakness and brain mets.    Plan:  --Recommend consistent use of his cane or walker. If falls, recommend using walker for the rest of the day.     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, resolved  -8/2023 PET/CT showed bilateral FDG avid ground glass and reticular infiltrates favored to represent pneumonitis, started on steroids, now completed taper  -denied any history of pulmonary symptoms at the time of pneumonitis findings  -most recent imaging with continued resolution, asymptomatic today  -current steroids indicated for cerebral edema, not pneumonitis    Plan:  --continue to monitor closely for new respiratory symptoms, particularly in the setting of escalation to dual checkpoint inhibitor therapy  --no respiratory concerns today    # Liver lesions  -12/7 CT AP showed two indeterminate liver lesions not previously appreciated, favored to be benign but cannot exclude metastases  -no concerning liver lesions at this time    Plan:  --trend with surveillance scans    Meghan Rinaldi PA-C  [facility]  [address] [place] [id number] [phone]    30 minutes spent on the date of the encounter doing chart review, review of test results, interpretation of tests, patient visit, and documentation     The longitudinal plan of care for the diagnosis(es)/condition(s) as documented were addressed during this visit. Due to the added complexity in care, I will continue to support Tim in the subsequent management and with ongoing continuity of care.

## 2024-06-11 NOTE — LETTER
6/11/2024      Saeid Santa  [address] [facility] [address]      Dear Colleague,    Thank you for referring your patient, Saeid Santa, to the [facility]. Please see a copy of my visit note below.    [facility]   Return Patient Visit    Name: Saeid Santa  MRN: [id number]  Date of visit: Jun 11, 2024    Diagnosis: Metastatic melanoma  Stage: IV    Molecular: BRAF G466A, TMB 48.7554 mut/Mb    Performance status: ECOG 2    Oncology History:  --8/16/22, he brings a left parietal scalp lesion to attention of his GP. It is initially observed.  --November 2022, the left parietal scalp lesion was felt to be a cyst, and the left parietal lesion was lanced and drained via scalp incision.  --January 2023, the left parietal scalp cyst would occasionally break open, drain, and bleed, largely at night.   --February 2023, he notes some mild difficulty clearing obstacles with right leg and foot.  --March/April 2023, he has 3 progressively worsening episodes over a span 3 weeks including the right leg. At first he notes the onset of right leg numbness and heaviness, like he was carrying 50 lbs of water on the leg. Then, about a week or so later, the leg began to jerk and converse while he was in the basement. Neither episode associated with loss of consciousness  --4/15/23, he recalls the right leg numbness, heaviness, jerking while driving between work. He had been found by coworkers in the parking lot, sitting in the grass, appearing confused. He was brought to the ER by EMS. CT-CAP, showed small (<6mm), bilateral indeterminate pulmonary nodules. CT-head and MRI brain showed 2 discrete ring-enhancing lesions in the left paramedian posterior frontal lobe near the precentral gyrus measuring approximately 2.2 x 1.7 x 1.9 cm and left occipital lobe near the lingual gyrus measuring approximately 2.2 x 2.1 x 2.0 cm. The occipital lesion encroaches upon the  subependymal surface of the left lateral ventricle occipital horn and contacts the superior surface of the medial left tentorium. The lesions are accompanied by moderate surrounding vasogenic edema. Surgery was recommended.  --4/21/23, underwent left awake (sleep-awake-sleep) craniotomy for tumor resection, he has resection of the left frontal, motor strip tumor as well asan elliptical incision of the left parietal scalp lesion. On pathology, both the scalp excision and brain mass are positive for malignant melanoma. Tumor cells are positive for S-100 and Melan-A, and negative for cytokeratin AE1/AE3, NKX3.1, GFAP, ERG, CD3, and CD20.  CD3 highlights brisk infiltration of the tumor by T lymphocytes, while CD20 highlights scattered positive B lymphocytes. NGS identifies a BRAF G466A mutation, a class 3 mutation insensitive to BRAF kinase inhibitor monotherapy. TMB is high at 48.754 muts/Mb.   --4/22/23, post-operative MRI shows post-operative changes of the parietal craniotomy of the left frontal lobe metastasis. The other 1.7 x 1.1 cm cystic lesion in the left occipital lobe is also seen. No new lesions appreciated.  -- 4/25/23 to 5/9/23, discharged from the hospital to acute rehab stay at [facility] in [place]. He participated in a comprehensive rehabilitation program consisting of PT, OT, Speech, Psychology and Recreation Therapy. He did well and was discharged home with use of a walker.  --PET-CT on 5/18/23 with potentially involved neck lymph nodes. He has mild FDG uptake in soft tissue nodules in the scalp, in the known left occipital brain metastasis, as well as in the left lower neck region.  -5/24-/5/31/23, He was treated with gamma knife: 2500 cGy to the left resection cavity; 3000 cGy to the L parietal-occipital lesion.  -6/7/2023, Cycle 1 of pembrolizumab 200mg every 3 weeks  -6/14/23-6/16/23, admitted for vision change, reduced comprehension, speech change, fall and headache. MRI showing likely  progression of left occipital mass with associated edema. Improved on steroids and discharged on oral decadron. Plan for repeat MRI and neurosurgery follow up in 1 month.  -7/7/23 ED visit for headaches, confusion, vision changes, concern for hemorrhage of the left occipital mass, admitted to [facility] until 7/9/23, increased dexamethasone dose  -7/14/23 craniotomy of the left parieto-occipital mass, discharged on a dexamethasone taper  -8/11/2023, Cycle 2 of pembrolizumab  -8/23/2023 brain MRI with no recurrence in left frontal lobe, decreased enhancement at edges of left occipital resection cavity, new enhancement int he right front lobe concerning for new met--seen by neurosurgery and considering RT  -8/31/2023 CT/PET with increased uptake in left lower neck lymph node c/f early progression. Ground-glass, reticular changes in lungs also noted likely secondary to drug-induced pneumonitis. Started 1 mg/kg prednisone (80 mg daily) with 8 week taper  -9/8/23 GK to R frontal lobe lesion  -12/7/23 CT neck: Resolved previously hypermetabolic left level 5 lymph node. No new  cervical lymphadenopathy.  -12/7/23 CT chest/AP: Two arterially enhancing lesions in the liver measuring 0.9 - 1.0 cm were not previously visualized, likely due to differences in timing of the contrast bolus. These are indeterminate but favored to be benign perfusional abnormalities. Melanoma metastases cannot be entirely excluded. Otherwise, no evidence for metastatic disease in the chest, abdomen and pelvis. Significant improvement in linear opacities in the lungs, suggesting improving drug-induced pneumonitis. Few stable small pulmonary nodules.  -12/7/23 MR head: Progression in intracranial metastatic disease compared to 9/8/2023   with multiple new and/or enlarging metastases, including internally hemorrhagic right frontal lobe lesion with 3 mm of leftward midline shift and new lesion adjacent to the left occipital resection bed.   -12/8/23  started on dex 4 mg BID  -12/21/23 Right frontal craniotomy with Dr. Garrido  -1/22/24, starts nivolumab (480 mg every 4 weeks)  -2/2/24, Gamma knife to right occipital and left frontal lesions   -3/2/24 PET/CT: previously seen L lower cervical node resolved, two new suspected intramuscular mets to the LE  -3/2/24 MR brain: Multifocal intracranial metastatic disease with interval increase in size of multiple metastatic lesions and surrounding vasogenic edema detailed above. Unsure if this is due to disease progression or post radiation change.  -4/17/24 C4 nivolumab  -4/17/24 MR brain: Increased size of the enhancing lesions in the right frontal and right occipital lobes, likely representing interval disease progression. Interval decreased size of the left frontal lobe lesion. Additional intracranial metastatic lesions are stable. No new enhancing lesions identified. Stable surrounding vasogenic edema in the bifrontal lobes, right occipital lobe, and left temporoparietooccipital lobes.   -4/17/24 PET/CT: There is interval progression of disease with increase in size and uptake of enhancing cerebral metastatic lesions in the right frontal and left occipital lobes as well as interval increase in size and uptake of enhancing soft tissue nodules in the left medial thigh and right posterior leg as detailed above. There is new hypermetabolic uptake in the left fifth and sixth ribs at the costovertebral junction with focal sclerosis on corresponding CT suspicious, indeterminate.   -5/15/24 Cycle 1 ipi/nivo and start gammaknife, completed gammaknife on 5/23/24  -5/21/24 increased dexamethasone to 4 mg x 1, then 2 mg daily x 7 days for severe headaches and nausea  -5/22/24 ED visit for head injury after fall, head and neck CT negative for bleed or fracture, given IV fluids for FELICITY  -6/5/24 Cycle 2 ipi/nivo    I am seeing him today to evaluate diarrhea.     Interval History:      Physical Exam:  General: The patient is a  pleasant male in no acute distress.  /85 (BP Location: Left arm, Patient Position: Left side, Cuff Size: Adult Regular)   Pulse 97   Temp 98.5  F (36.9  C) (Oral)   Resp (!) 128   Wt 94.4 kg (208 lb 1.6 oz)   SpO2 97%   BMI 27.46 kg/m    Wt Readings from Last 10 Encounters:   06/11/24 94.4 kg (208 lb 1.6 oz)   06/05/24 95.2 kg (209 lb 12.8 oz)   06/03/24 99.8 kg (220 lb)   05/21/24 94.8 kg (209 lb)   05/15/24 98.4 kg (217 lb)   05/15/24 95.3 kg (210 lb)   05/13/24 95.3 kg (210 lb)   04/22/24 98 kg (216 lb)   04/19/24 98.3 kg (216 lb 11.2 oz)   04/17/24 96.3 kg (212 lb 3.2 oz)   HEENT: EOMI. Sclerae are anicteric.   Lymph: Neck is supple with no lymphadenopathy in the cervical or supraclavicular areas.   Heart: Regular rate and rhythm.   Lungs: Clear to auscultation bilaterally.   Abdomen: Bowel sounds present, soft, nontender with no palpable hepatosplenomegaly or masses.   Extremities: No lower extremity edema noted bilaterally.   Neuro: Cranial nerves II through XII are grossly intact.  Skin: No rashes, petechiae, or bruising noted on exposed skin.    Labs:   Most Recent 3 CBC's:  Recent Labs   Lab Test 06/11/24  1101 05/22/24  1340 04/17/24  0913   WBC 13.2* 17.4* 14.1*   HGB 16.0 16.8 14.7   MCV 94 95 92    271 244   ANEUTAUTO 10.0* 14.2* 9.9*     Most Recent 3 BMP's:  Recent Labs   Lab Test 06/11/24  1101 06/05/24  1104 05/22/24  1340 05/15/24  1349    143 136 144   POTASSIUM 4.1 3.4 3.9 3.9   CHLORIDE 107 105 99 108*   CO2 21* 23 20* 26   BUN 23.5* 16.1 29.7* 21.1   CR 1.14 0.94 1.37* 1.07   ANIONGAP 12 15 17* 10   JOHN PAUL 9.1 9.0 9.9 9.4   GLC 95 101* 123* 129*   PROTTOTAL 6.7 6.9  --  6.9   ALBUMIN 3.9 4.1  --  4.2    Most Recent 3 LFT's:  Recent Labs   Lab Test 06/11/24  1101 06/05/24  1104 05/15/24  1349   AST 19 22 16   ALT 27 41 21   ALKPHOS 84 75 64   BILITOTAL 1.2 0.9 0.7    Most Recent 2 TSH and T4:  Recent Labs   Lab Test 06/05/24  1104 05/15/24  1349   TSH 0.78 0.45      Magnesium   Date Value Ref Range Status   06/11/2024 2.0 1.7 - 2.3 mg/dL Final   01/22/2024 2.1 1.7 - 2.3 mg/dL Final   12/22/2023 2.5 (H) 1.7 - 2.3 mg/dL Final   07/16/2023 2.2 1.7 - 2.3 mg/dL Final     I reviewed the above labs today.    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 Started pembrolizumab 6/7/23. Underwent second craniotomy for L parieto-occipital lesion resection in 7/14/23 2/2 hemorrhagic conversion and GK to R frontal lesion 9/8/23, on pembrolizumab, course c/b ICI pneumonitis necessitating steroids, again with CNS progression 12/2023 s/p resection and GK.     #diarrhea      # metastatic melanoma with metastatic disease to brain  -presented with de-francisca CNS involvement s/p craniotomy x2 and GK to additional R frontal lesion  -TMB high, BRAF mutated but not V600E  -s/p pembrolizumab x2 (first 6/7/23) c/c/b pneumonitis s/p steroid taper  -recent CNS progression in Dec 2023 s/p R frontal , ongoing response systemically, improved pneumonitis radiographically (never symptomatic)  -3/2/24 brain MR shows increased size of several previously treated metastatic lesions, no new lesions. After discussion with Rad Onc and Radiology, although progression cannot be ruled out, findings can be consistent with treatment effect  -now with repeat MR brain with continued significant increase in size of R frontal lesion (18 x 15 mm, previously 8 x 7 mm), anterior R frontal  lesion and R occipital lesion marginally increased. L frontal lesion decreased. L occipital lesion stable. Vasogenic edema significant but stable  -Last PET/CT showed overall continued concern for metastatic disease involving the soft tissues in both the R lateral calf and L medial  thigh. These lesions remain small but slightly increased in both size and avidity, and likely represent sites of metastatic disease. No new areas of systemic disease noted.   -On 5/15, he started on gammaknife and ipi/nivo. He developed some fatigue, nausea, and headaches, as well as mild diarrhea, which has now improved.  -He completed gammaknife radiation on 5/23/24.     Plan:  --He will decrease dexamethasone to 1 mg daily.   --He will continue with cycle 2 ipi/nivo this week.  --I will see him back prior to consideration of cycle 3.  --Will plan to repeat PET/CT after 4 cycles of ipi/nivo in early August.  --Will plan for next brain MRI on 8/22/24.     # FELICITY  -Noted on 5/22/24 in the ED and given IV fluids.      Plan:  --Encouraged him to push fluids with a goal of at least 8 cups/day.  --Will recheck Cr this week.    # Deconditioning with falls  -Secondary to generalized weakness and brain mets.    Plan:  --Recommend consistent use of his cane or walker. If falls, recommend using walker for the rest of the day.     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, resolved  -8/2023 PET/CT showed bilateral FDG avid ground glass and reticular infiltrates favored to represent pneumonitis, started on steroids, now completed taper  -denied any history of pulmonary symptoms at the time of pneumonitis findings  -most recent imaging with continued resolution, asymptomatic today  -current steroids indicated for cerebral edema, not pneumonitis    Plan:  --continue to monitor closely for new respiratory symptoms, particularly in the setting of escalation to dual checkpoint inhibitor therapy  --no respiratory concerns today    # Liver lesions  -12/7 CT AP showed two indeterminate liver lesions not previously appreciated, favored to be benign but cannot exclude metastases  -no concerning liver lesions at this time    Plan:  --trend with surveillance scans    # diarrhea  -Resolved. May be secondary to  immunotherapy.    Plan:  --Recommend using 1 Imodium with each loose stool if symptoms recur and contact us if symptoms worsen or not controlled with Imodium    Meghan Rinaldi PA-C  [facility]  [address] [place] [address]  [phone]    40 minutes spent on the date of the encounter doing chart review, review of test results, interpretation of tests, patient visit, and documentation     The longitudinal plan of care for the diagnosis(es)/condition(s) as documented were addressed during this visit. Due to the added complexity in care, I will continue to support Tim in the subsequent management and with ongoing continuity of care.      [facility]   Return Patient Visit    Name: Saeid Santa  MRN: [id number]  Date of visit: Jun 11, 2024    Diagnosis: Metastatic melanoma  Stage: IV    Molecular: BRAF G466A, TMB 48.7554 mut/Mb    Performance status: ECOG 2    Oncology History:  --8/16/22, he brings a left parietal scalp lesion to attention of his GP. It is initially observed.  --November 2022, the left parietal scalp lesion was felt to be a cyst, and the left parietal lesion was lanced and drained via scalp incision.  --January 2023, the left parietal scalp cyst would occasionally break open, drain, and bleed, largely at night.   --February 2023, he notes some mild difficulty clearing obstacles with right leg and foot.  --March/April 2023, he has 3 progressively worsening episodes over a span 3 weeks including the right leg. At first he notes the onset of right leg numbness and heaviness, like he was carrying 50 lbs of water on the leg. Then, about a week or so later, the leg began to jerk and converse while he was in the basement. Neither episode associated with loss of consciousness  --4/15/23, he recalls the right leg numbness, heaviness, jerking while driving between work. He had been found by coworkers in the parking lot, sitting in the grass, appearing  confused. He was brought to the ER by EMS. CT-CAP, showed small (<6mm), bilateral indeterminate pulmonary nodules. CT-head and MRI brain showed 2 discrete ring-enhancing lesions in the left paramedian posterior frontal lobe near the precentral gyrus measuring approximately 2.2 x 1.7 x 1.9 cm and left occipital lobe near the lingual gyrus measuring approximately 2.2 x 2.1 x 2.0 cm. The occipital lesion encroaches upon the subependymal surface of the left lateral ventricle occipital horn and contacts the superior surface of the medial left tentorium. The lesions are accompanied by moderate surrounding vasogenic edema. Surgery was recommended.  --4/21/23, underwent left awake (sleep-awake-sleep) craniotomy for tumor resection, he has resection of the left frontal, motor strip tumor as well asan elliptical incision of the left parietal scalp lesion. On pathology, both the scalp excision and brain mass are positive for malignant melanoma. Tumor cells are positive for S-100 and Melan-A, and negative for cytokeratin AE1/AE3, NKX3.1, GFAP, ERG, CD3, and CD20.  CD3 highlights brisk infiltration of the tumor by T lymphocytes, while CD20 highlights scattered positive B lymphocytes. NGS identifies a BRAF G466A mutation, a class 3 mutation insensitive to BRAF kinase inhibitor monotherapy. TMB is high at 48.754 muts/Mb.   --4/22/23, post-operative MRI shows post-operative changes of the parietal craniotomy of the left frontal lobe metastasis. The other 1.7 x 1.1 cm cystic lesion in the left occipital lobe is also seen. No new lesions appreciated.  -- 4/25/23 to 5/9/23, discharged from the hospital to acute rehab stay at [facility] in [place]. He participated in a comprehensive rehabilitation program consisting of PT, OT, Speech, Psychology and Recreation Therapy. He did well and was discharged home with use of a walker.  --PET-CT on 5/18/23 with potentially involved neck lymph nodes. He has mild FDG uptake in soft tissue nodules  in the scalp, in the known left occipital brain metastasis, as well as in the left lower neck region.  -5/24-/5/31/23, He was treated with gamma knife: 2500 cGy to the left resection cavity; 3000 cGy to the L parietal-occipital lesion.  -6/7/2023, Cycle 1 of pembrolizumab 200mg every 3 weeks  -6/14/23-6/16/23, admitted for vision change, reduced comprehension, speech change, fall and headache. MRI showing likely progression of left occipital mass with associated edema. Improved on steroids and discharged on oral decadron. Plan for repeat MRI and neurosurgery follow up in 1 month.  -7/7/23 ED visit for headaches, confusion, vision changes, concern for hemorrhage of the left occipital mass, admitted to [facility] until 7/9/23, increased dexamethasone dose  -7/14/23 craniotomy of the left parieto-occipital mass, discharged on a dexamethasone taper  -8/11/2023, Cycle 2 of pembrolizumab  -8/23/2023 brain MRI with no recurrence in left frontal lobe, decreased enhancement at edges of left occipital resection cavity, new enhancement int he right front lobe concerning for new met--seen by neurosurgery and considering RT  -8/31/2023 CT/PET with increased uptake in left lower neck lymph node c/f early progression. Ground-glass, reticular changes in lungs also noted likely secondary to drug-induced pneumonitis. Started 1 mg/kg prednisone (80 mg daily) with 8 week taper  -9/8/23 GK to R frontal lobe lesion  -12/7/23 CT neck: Resolved previously hypermetabolic left level 5 lymph node. No new  cervical lymphadenopathy.  -12/7/23 CT chest/AP: Two arterially enhancing lesions in the liver measuring 0.9 - 1.0 cm were not previously visualized, likely due to differences in timing of the contrast bolus. These are indeterminate but favored to be benign perfusional abnormalities. Melanoma metastases cannot be entirely excluded. Otherwise, no evidence for metastatic disease in the chest, abdomen and pelvis. Significant improvement in  linear opacities in the lungs, suggesting improving drug-induced pneumonitis. Few stable small pulmonary nodules.  -12/7/23 MR head: Progression in intracranial metastatic disease compared to 9/8/2023   with multiple new and/or enlarging metastases, including internally hemorrhagic right frontal lobe lesion with 3 mm of leftward midline shift and new lesion adjacent to the left occipital resection bed.   -12/8/23 started on dex 4 mg BID  -12/21/23 Right frontal craniotomy with Dr. Garrido  -1/22/24, starts nivolumab (480 mg every 4 weeks)  -2/2/24, Gamma knife to right occipital and left frontal lesions   -3/2/24 PET/CT: previously seen L lower cervical node resolved, two new suspected intramuscular mets to the LE  -3/2/24 MR brain: Multifocal intracranial metastatic disease with interval increase in size of multiple metastatic lesions and surrounding vasogenic edema detailed above. Unsure if this is due to disease progression or post radiation change.  -4/17/24 C4 nivolumab  -4/17/24 MR brain: Increased size of the enhancing lesions in the right frontal and right occipital lobes, likely representing interval disease progression. Interval decreased size of the left frontal lobe lesion. Additional intracranial metastatic lesions are stable. No new enhancing lesions identified. Stable surrounding vasogenic edema in the bifrontal lobes, right occipital lobe, and left temporoparietooccipital lobes.   -4/17/24 PET/CT: There is interval progression of disease with increase in size and uptake of enhancing cerebral metastatic lesions in the right frontal and left occipital lobes as well as interval increase in size and uptake of enhancing soft tissue nodules in the left medial thigh and right posterior leg as detailed above. There is new hypermetabolic uptake in the left fifth and sixth ribs at the costovertebral junction with focal sclerosis on corresponding CT suspicious, indeterminate.   -5/15/24 Cycle 1 ipi/nivo and  start gammaknife, completed gammaknife on 5/23/24  -5/21/24 increased dexamethasone to 4 mg x 1, then 2 mg daily x 7 days for severe headaches and nausea  -5/22/24 ED visit for head injury after fall, head and neck CT negative for bleed or fracture, given IV fluids for FELICITY  -6/5/24 Cycle 2 ipi/nivo    I am seeing him today to evaluate diarrhea.     Interval History:  Patient reports that he has been having diarrhea for the past 5 days with between 5-10 episodes per day of loose to yellow stools.  He has noticed some blood in his stools.  He has intermittent left-sided abdominal pain.  He denies any nausea, vomiting, or fevers.  He reports eating and drinking less due to the diarrhea and reports a low appetite.  He has also been sleeping a lot.  He notes that his voice is mildly hoarse.  He has had mild headaches in the mornings that are improved with Tylenol.  He denies feeling daily dizzy.  He has fallen once since his last visit.  He reports sleeping poorly due to headaches and also diarrhea.  He denies other concerns.    Physical Exam:  General: The patient is a pleasant male in no acute distress. He appears moderately fatigued and is lying down for our visit. He is here today with his wife.   /85 (BP Location: Left arm, Patient Position: Left side, Cuff Size: Adult Regular)   Pulse 97   Temp 98.5  F (36.9  C) (Oral)   Resp (!) 128   Wt 94.4 kg (208 lb 1.6 oz)   SpO2 97%   BMI 27.46 kg/m    Wt Readings from Last 10 Encounters:   06/11/24 94.4 kg (208 lb 1.6 oz)   06/05/24 95.2 kg (209 lb 12.8 oz)   06/03/24 99.8 kg (220 lb)   05/21/24 94.8 kg (209 lb)   05/15/24 98.4 kg (217 lb)   05/15/24 95.3 kg (210 lb)   05/13/24 95.3 kg (210 lb)   04/22/24 98 kg (216 lb)   04/19/24 98.3 kg (216 lb 11.2 oz)   04/17/24 96.3 kg (212 lb 3.2 oz)   HEENT: EOMI. Sclerae are anicteric.   Heart: Mildly tachycardic with regular rhythm.   Lungs: Clear to auscultation bilaterally.   Abdomen: Bowel sounds present, soft, mild  lower abdominal tenderness, remainder nontender with no palpable masses.   Extremities: No lower extremity edema noted bilaterally.   Neuro: Cranial nerves II through XII are grossly intact.  Skin: No rashes, petechiae, or bruising noted on exposed skin.    Labs:   Most Recent 3 CBC's:  Recent Labs   Lab Test 06/11/24  1101 05/22/24  1340 04/17/24  0913   WBC 13.2* 17.4* 14.1*   HGB 16.0 16.8 14.7   MCV 94 95 92    271 244   ANEUTAUTO 10.0* 14.2* 9.9*     Most Recent 3 BMP's:  Recent Labs   Lab Test 06/11/24  1101 06/05/24  1104 05/22/24  1340 05/15/24  1349    143 136 144   POTASSIUM 4.1 3.4 3.9 3.9   CHLORIDE 107 105 99 108*   CO2 21* 23 20* 26   BUN 23.5* 16.1 29.7* 21.1   CR 1.14 0.94 1.37* 1.07   ANIONGAP 12 15 17* 10   JOHN PAUL 9.1 9.0 9.9 9.4   GLC 95 101* 123* 129*   PROTTOTAL 6.7 6.9  --  6.9   ALBUMIN 3.9 4.1  --  4.2    Most Recent 3 LFT's:  Recent Labs   Lab Test 06/11/24  1101 06/05/24  1104 05/15/24  1349   AST 19 22 16   ALT 27 41 21   ALKPHOS 84 75 64   BILITOTAL 1.2 0.9 0.7    Most Recent 2 TSH and T4:  Recent Labs   Lab Test 06/05/24  1104 05/15/24  1349   TSH 0.78 0.45     Magnesium   Date Value Ref Range Status   06/11/2024 2.0 1.7 - 2.3 mg/dL Final   01/22/2024 2.1 1.7 - 2.3 mg/dL Final   12/22/2023 2.5 (H) 1.7 - 2.3 mg/dL Final   07/16/2023 2.2 1.7 - 2.3 mg/dL Final     I reviewed the above labs today.    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 Started pembrolizumab 6/7/23. Underwent second craniotomy for L parieto-occipital lesion resection in 7/14/23 2/2 hemorrhagic conversion and GK to R frontal lesion 9/8/23, on pembrolizumab, course c/b ICI pneumonitis necessitating steroids, again with CNS progression 12/2023 s/p  resection and GK. Now on ipi/nivo    #diarrhea  -likely immune mediated, but will rule out infection  -labs are adequate today with normal Cr and electrolytees, but patient feels dehydrated    Plan:  --will test stool with enteric stool panel and C.diff  --will give 1L IV NS for dehydration  --will empirically start him on 1 mg/kg (100 mg) prednisone daily  --will also start him on omeprazole for GI prophylaxis  --once diarrhea has improved, will plan to add in Bactrim for PCP prophylaxis   --given rx for a commode, given difficulty in getting to the bathroom on time with stool urgency    # metastatic melanoma with metastatic disease to brain  -presented with de-francisca CNS involvement s/p craniotomy x2 and GK to additional R frontal lesion  -TMB high, BRAF mutated but not V600E  -s/p pembrolizumab x2 (first 6/7/23) c/c/b pneumonitis s/p steroid taper  -recent CNS progression in Dec 2023 s/p R frontal , ongoing response systemically, improved pneumonitis radiographically (never symptomatic)  -3/2/24 brain MR shows increased size of several previously treated metastatic lesions, no new lesions. After discussion with Rad Onc and Radiology, although progression cannot be ruled out, findings can be consistent with treatment effect  -now with repeat MR brain with continued significant increase in size of R frontal lesion (18 x 15 mm, previously 8 x 7 mm), anterior R frontal  lesion and R occipital lesion marginally increased. L frontal lesion decreased. L occipital lesion stable. Vasogenic edema significant but stable  -Last PET/CT showed overall continued concern for metastatic disease involving the soft tissues in both the R lateral calf and L medial thigh. These lesions remain small but slightly increased in both size and avidity, and likely represent sites of metastatic disease. No new areas of systemic disease noted.   -On 5/15, he started on gammaknife and ipi/nivo. He developed some fatigue, nausea, and headaches, as  well as mild diarrhea, which improved.  -He completed gammaknife radiation on 5/23/24.   -Cycle 2 ipi/nivo was given on 6/5/24.    Plan:  --Will hold ipi/nivo to treat colitis, as above.   --Will hold dexamethasone while on prednisone.   --I will see him back next week to follow-up on his symptoms.   --Will plan to repeat PET/CT after 4 cycles of ipi/nivo in early August.  --Will plan for next brain MRI on 8/22/24.   --Will place  referral to assist with caregiver resources.     # FELICITY  -Noted on 5/22/24 in the ED and given IV fluids.  Creatinine is normal today, but mildly above his baseline    Plan:  --Will give 1L IV NS today.     # Deconditioning with falls  -Secondary to generalized weakness and brain mets.    Plan:  --Recommend consistent use of his cane or walker. If falls, recommend using walker for the rest of the day.     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, resolved  -8/2023 PET/CT showed bilateral FDG avid ground glass and reticular infiltrates favored to represent pneumonitis, started on steroids, now completed taper  -denied any history of pulmonary symptoms at the time of pneumonitis findings  -most recent imaging with continued resolution, asymptomatic today  -current steroids indicated for cerebral edema, not pneumonitis    Plan:  --continue to monitor closely for new respiratory symptoms, particularly in the setting of escalation to dual checkpoint inhibitor therapy  --no respiratory concerns today    # Liver lesions  -12/7 CT AP showed two indeterminate liver lesions not previously appreciated, favored to be benign but cannot exclude metastases  -no concerning liver lesions at this time    Plan:  --trend with surveillance scans    Meghan Rinaldi PA-C  [facility]  [address] [place] [id number] [phone]    40 minutes spent on the date of the encounter doing chart review, review of test results, interpretation of tests, patient visit, and  documentation     The longitudinal plan of care for the diagnosis(es)/condition(s) as documented were addressed during this visit. Due to the added complexity in care, I will continue to support Tim in the subsequent management and with ongoing continuity of care.

## 2024-06-11 NOTE — LETTER
June 14, 2024      Tim Santa  [address] [address]        Dear ,    We are writing to inform you of your test results.    Your test result ( Hep C screen ) was within normal parameters.     Resulted Orders   Hepatitis C Screen Reflex to HCV RNA Quant and Genotype   Result Value Ref Range    Hepatitis C Antibody Nonreactive Nonreactive      Comment:      A nonreactive screening test result does not exclude the possibility of exposure to or infection with HCV. Nonreactive screening test results in individuals with prior exposure to HCV may be due to antibody levels below the limit of detection of this assay or lack of reactivity to the HCV antigens used in this assay. Patients with recent HCV infections (<3 months from time of exposure) may have false-negative HCV antibody results due to the time needed for seroconversion (average of 8 to 9 weeks).       If you have any questions or concerns, please call the clinic at the number listed above.       Sincerely,      Rock Grajeda MD

## 2024-06-11 NOTE — NURSING NOTE
"Oncology Rooming Note    June 11, 2024 11:43 AM   Saeid Santa is a 68 year old male who presents for:    Chief Complaint   Patient presents with    Oncology Clinic Visit     RTN for Melanoma     Initial Vitals: /85 (BP Location: Left arm, Patient Position: Left side, Cuff Size: Adult Regular)   Pulse 97   Temp 98.5  F (36.9  C) (Oral)   Resp (!) 128   Wt 94.4 kg (208 lb 1.6 oz)   SpO2 97%   BMI 27.46 kg/m   Estimated body mass index is 27.46 kg/m  as calculated from the following:    Height as of 6/3/24: 1.854 m (6' 1\").    Weight as of this encounter: 94.4 kg (208 lb 1.6 oz). Body surface area is 2.21 meters squared.  Mild Pain (3) Comment: Data Unavailable   No LMP for male patient.  Allergies reviewed: Yes  Medications reviewed: Yes    Medications: Medication refills not needed today.  Pharmacy name entered into [facility] [employer]:    [facility] [place], [place] - [address] [facility] - [place], [place] - [address]  [facility] - [place], [place] - [address]    Frailty Screening:   Is the patient here for a new oncology consult visit in cancer care? 2. No      Clinical concerns: Pt and wife are here due to pt having a lot of diarrhea. Wife is very frustrated with the fact that they had to drive an hour away when they have a clinic that is about 10 mins away. The  is having diarrhea so often that he is not able to make it to the toilet and goes on himself. And has to clean up.       Susan Ramey MA             "

## 2024-06-12 NOTE — TELEPHONE ENCOUNTER
Prior Authorization Approval    Medication: PREDNISONE 20 MG PO TABS  Authorization Effective Date: 6/12/2024  Authorization Expiration Date: 6/12/2025  Approved Dose/Quantity:   Reference #:     Insurance Company: [employer] - Phone [phone] Fax [phone]  Expected CoPay: $    CoPay Card Available:      Financial Assistance Needed:   Which Pharmacy is filling the prescription: [facility] - [place], [place] - [address] [address] [facility]  Pharmacy Notified: Yes  Patient Notified:

## 2024-06-12 NOTE — TELEPHONE ENCOUNTER
Retail Pharmacy Prior Authorization Team   Phone: [phone]    PA Initiation    Medication: PREDNISONE 20 MG PO TABS  Insurance Company: [employer] - Phone [phone] Fax [phone]  Pharmacy Filling the Rx: [facility] - [place] - [address] [facility]  Filling Pharmacy Phone: [phone]  Filling Pharmacy Fax: [phone]  Start Date: 6/11/2024

## 2024-06-12 NOTE — PROGRESS NOTES
[facility]/Voicemail    Clinical Data: Care Coordinator Outreach    Attempting to reach out to Tim per the request of Meghan Gentry to review that his stool shows E.coli infection. We would like him to stop prednisone and omeprazole. Resume dexamethasone at 1 mg daily and start on azithromycin for 3 days (sent to [facility]).    Outreach attempted x 1.  Left message on patient's voicemail with call back information and requested return call. [employer] message sent.    Plan: Care Coordinator will try to reach patient again in 1-2 business days.    Addendum 06/13/24 10:26 AM  Spoke with Tim. Discussed that his stool panel showed E. Coli and provided instructions to: stop prednisone and omeprazole, resume dexamethasone at 1 mg daily and start on azithromycin for 3 days. Tim confirmed he actually started azithromycin yesterday when it was sent to his pharmacy.     Samantha Lincoln, RN, BSN  RN Care Coordinator  [facility]

## 2024-06-14 NOTE — PROGRESS NOTES
"Social Work - Intervention  [facility]  Data/Intervention:  Tim has dx of stage IV metastatic melanoma follows at [facility] with oncology and palliative care.  Patient Name: Saeid Santa Goes By: Tim IBARRAB/Age: 1955 (68 year old)     Visit Type: telephone  Referral Source: Meghan Rinaldi NP  Reason for Referral: caregiver support    Collaborated With:    -[employer] mayr accessed for $200.00 gift cards to [employer] for food and incontinent supplies.  -[employer] [facility] - Application and medical verification form completed with spouse approval and emailed to [place].    Psychosocial Information/Concerns:  Increased care needs due to c-diff. Spouse cannot contain loose stools and going through so many supplies. Goal is to stay home and not be hospitalized.      Intervention/Education/Resources Provided:  Offered emotional support, active listening as Smiley expressed feelings around caregiving. She has lost 5 siblings this past few years of covid and took care of them. Tim's needs have been increasing.     Educated on use of [facility] when Tim is needing treatments/hospitalizations. Spouse had been sleeping in car in parking lot.    Accessed grants to assist with paying for supplies as with C-diff infection he is going through multiple chux pads, incontinent briefs, towels, etc. Spouse worried she is going to get an infection as well.     Discussed goals of care and Tim still plans treatment. Has not \"given up\". They have educated themselves on hospice care. (Neighbor is hospice nurse).     Assessment/Plan:  Will reach out in one week. Gift cards sent today. Awaiting outcome of [employer] mary. Spouse verbalized \"feeling more hopeful\" after talking to writer.     Provided patient/family with contact information and availability.    HANNAH Oliveira, [facility]   Adult Oncology - [place]  [phone]  Onsite [place] on    *Please note does not work on . "   Support Groups at [facility]: Social Work Services for Cancer Patients ([email or web address])

## 2024-06-17 PROBLEM — E86.0 DEHYDRATION: Status: ACTIVE | Noted: 2024-01-01

## 2024-06-17 PROBLEM — R94.31 PROLONGED QT INTERVAL: Status: ACTIVE | Noted: 2024-01-01

## 2024-06-17 PROBLEM — E87.6 HYPOKALEMIA: Status: ACTIVE | Noted: 2024-01-01

## 2024-06-17 NOTE — ED PROVIDER NOTES
[place] EMERGENCY DEPARTMENT ([facility])    6/17/24       ED PROVIDER NOTE    History     Chief Complaint   Patient presents with    Dehydration    Diarrhea     The history is provided by the patient and medical records.     Saeid Santa is a 68 year old male with PMH notable for prostate cancer s/p prostatectomy, melanoma with brain metastasis s/p multiple gamma knife (most recent on 05/15-05/23/24) currently on ipi/nivo (last infusion on 06/05/24) who presents to the Emergency Department with frequent diarrhea, positive E. coli testing, and dehydration. He reports he has had ongoing diarrhea for the last 2 to 3 weeks with numerous episodes of liquid stools per day. Over the last 5 days he has also had bloody stools.  He he notes bright red blood per rectum. He has had associated lower abdominal pain. Denies any vomiting but has been nauseated. He has not had any fevers. He denies any urinary symptoms. He did test positive for E. coli and took he believes 2 out of 3 tablets of azithromycin without any improvement. He is currently taking 1 mg dexamethasone and has had on any prednisone. He was seen in oncology clinic today and referred into the Emergency Department for admission due to severe dehydration and ongoing diarrhea.      This part of the document was transcribed by Mk Mazariegos Scribe.     Physical Exam   BP: 115/83  Pulse: 76  Temp: 98.3  F (36.8  C)  Resp: 16  Height: 182.9 cm (6')  Weight: 95.3 kg (210 lb)  SpO2: 97 %  Physical Exam  General: patient is alert and oriented, fatigued appearing  Head: atraumatic and normocephalic   EENT: tacky mucus membranes without tonsillar erythema or exudates, sclera anicteric   Neck: supple   Cardiovascular: regular rate and rhythm, extremities warm and well perfused, no lower extremity edema  Pulmonary: lungs clear to auscultation bilaterally   Abdomen: soft, generalized tenderness to palpation without rebound or guarding  musculoskeletal: normal range of motion   Neurological: alert and oriented, moving all extremities symmetrically  Skin: warm, dry, erythematous macular rash on the chest and back  ED Course, Procedures, & Data      Procedures            EKG Interpretation:      Interpreted by Kitty Alba MD  Time reviewed: 1809  Symptoms at time of EKG: fatigue   Rhythm: sinus tachycardia  Rate: Tachycardia  Axis: Normal  Ectopy: premature ventricular contractions (frequent)  Conduction: normal  ST Segments/ T Waves: QTc prolongation 616  Q Waves: none  Comparison to prior: QT prolongation, frequent PVCs    Clinical Impression: prolonged QT interval                  No results found for any visits on 06/17/24.  Medications - No data to display  Labs Ordered and Resulted from Time of ED Arrival to Time of ED Departure - No data to display  No orders to display          Critical care was not performed.     Medical Decision Making  The patient's presentation was of high complexity (an acute health issue posing potential threat to life or bodily function).    The patient's evaluation involved:  ordering and/or review of 3+ test(s) in this encounter (see separate area of note for details)  discussion of management or test interpretation with another health professional (IM)    The patient's management necessitated moderate risk (prescription drug management including medications given in the ED) and high risk (a decision regarding hospitalization).    Assessment & Plan    Mr. Santa is a 68-year-old male with metastatic melanoma presenting to the Emergency Department with persistent diarrhea, generalized weakness and deconditioning.  He is noted to be hemodynamically stable, afebrile and in no respiratory distress though does appear ill appearing. Laboratory evaluation today shows leukocytosis of 13.6, , procalcitonin 0.85.  Point-of-care potassium was 2.8, lab potassium 3.2, sodium 131, creatinine is elevated at 1.57,  magnesium is 2.4.  He did have repeat stool study sent. CT of the abdomen is pending.  He was given IV fluids and potassium supplemented.  Oral prednisone was ordered for tomorrow as he took his home dexamethasone dose today already per oncology recommendations.  Patient will be admitted to medicine for ongoing management.      I have reviewed the nursing notes. I have reviewed the findings, diagnosis, plan and need for follow up with the patient.    New Prescriptions    No medications on file       Final diagnoses:   Colitis   Hypokalemia   Dehydration   Prolonged QT interval       Kitty Alba MD  [facility] EMERGENCY DEPARTMENT  6/17/2024           Kitty Alba MD  06/17/24 [id number]

## 2024-06-17 NOTE — PROGRESS NOTES
Virtual Visit Details    Type of service:  Video Visit   Video Start Time: 2:12 PM  Video End Time:2:24 PM    Originating Location (pt. Location): Home  Distant Location (provider location):  Off-site  Platform used for Video Visit: D2S    [facility]   Return Patient Visit    Name: Saeid Santa  MRN: [id number]  Date of visit: Jun 17, 2024    Diagnosis: Metastatic melanoma  Stage: IV    Molecular: BRAF G466A, TMB 48.7554 mut/Mb    Performance status: ECOG 2    Oncology History:  --8/16/22, he brings a left parietal scalp lesion to attention of his GP. It is initially observed.  --November 2022, the left parietal scalp lesion was felt to be a cyst, and the left parietal lesion was lanced and drained via scalp incision.  --January 2023, the left parietal scalp cyst would occasionally break open, drain, and bleed, largely at night.   --February 2023, he notes some mild difficulty clearing obstacles with right leg and foot.  --March/April 2023, he has 3 progressively worsening episodes over a span 3 weeks including the right leg. At first he notes the onset of right leg numbness and heaviness, like he was carrying 50 lbs of water on the leg. Then, about a week or so later, the leg began to jerk and converse while he was in the basement. Neither episode associated with loss of consciousness  --4/15/23, he recalls the right leg numbness, heaviness, jerking while driving between work. He had been found by coworkers in the parking lot, sitting in the grass, appearing confused. He was brought to the ER by EMS. CT-CAP, showed small (<6mm), bilateral indeterminate pulmonary nodules. CT-head and MRI brain showed 2 discrete ring-enhancing lesions in the left paramedian posterior frontal lobe near the precentral gyrus measuring approximately 2.2 x 1.7 x 1.9 cm and left occipital lobe near the lingual gyrus measuring approximately 2.2 x 2.1 x 2.0 cm. The occipital lesion encroaches upon the  subependymal surface of the left lateral ventricle occipital horn and contacts the superior surface of the medial left tentorium. The lesions are accompanied by moderate surrounding vasogenic edema. Surgery was recommended.  --4/21/23, underwent left awake (sleep-awake-sleep) craniotomy for tumor resection, he has resection of the left frontal, motor strip tumor as well asan elliptical incision of the left parietal scalp lesion. On pathology, both the scalp excision and brain mass are positive for malignant melanoma. Tumor cells are positive for S-100 and Melan-A, and negative for cytokeratin AE1/AE3, NKX3.1, GFAP, ERG, CD3, and CD20.  CD3 highlights brisk infiltration of the tumor by T lymphocytes, while CD20 highlights scattered positive B lymphocytes. NGS identifies a BRAF G466A mutation, a class 3 mutation insensitive to BRAF kinase inhibitor monotherapy. TMB is high at 48.754 muts/Mb.   --4/22/23, post-operative MRI shows post-operative changes of the parietal craniotomy of the left frontal lobe metastasis. The other 1.7 x 1.1 cm cystic lesion in the left occipital lobe is also seen. No new lesions appreciated.  -- 4/25/23 to 5/9/23, discharged from the hospital to acute rehab stay at [facility] in [place]. He participated in a comprehensive rehabilitation program consisting of PT, OT, Speech, Psychology and Recreation Therapy. He did well and was discharged home with use of a walker.  --PET-CT on 5/18/23 with potentially involved neck lymph nodes. He has mild FDG uptake in soft tissue nodules in the scalp, in the known left occipital brain metastasis, as well as in the left lower neck region.  -5/24-/5/31/23, He was treated with gamma knife: 2500 cGy to the left resection cavity; 3000 cGy to the L parietal-occipital lesion.  -6/7/2023, Cycle 1 of pembrolizumab 200mg every 3 weeks  -6/14/23-6/16/23, admitted for vision change, reduced comprehension, speech change, fall and headache. MRI showing likely  progression of left occipital mass with associated edema. Improved on steroids and discharged on oral decadron. Plan for repeat MRI and neurosurgery follow up in 1 month.  -7/7/23 ED visit for headaches, confusion, vision changes, concern for hemorrhage of the left occipital mass, admitted to [facility] until 7/9/23, increased dexamethasone dose  -7/14/23 craniotomy of the left parieto-occipital mass, discharged on a dexamethasone taper  -8/11/2023, Cycle 2 of pembrolizumab  -8/23/2023 brain MRI with no recurrence in left frontal lobe, decreased enhancement at edges of left occipital resection cavity, new enhancement int he right front lobe concerning for new met--seen by neurosurgery and considering RT  -8/31/2023 CT/PET with increased uptake in left lower neck lymph node c/f early progression. Ground-glass, reticular changes in lungs also noted likely secondary to drug-induced pneumonitis. Started 1 mg/kg prednisone (80 mg daily) with 8 week taper  -9/8/23 GK to R frontal lobe lesion  -12/7/23 CT neck: Resolved previously hypermetabolic left level 5 lymph node. No new  cervical lymphadenopathy.  -12/7/23 CT chest/AP: Two arterially enhancing lesions in the liver measuring 0.9 - 1.0 cm were not previously visualized, likely due to differences in timing of the contrast bolus. These are indeterminate but favored to be benign perfusional abnormalities. Melanoma metastases cannot be entirely excluded. Otherwise, no evidence for metastatic disease in the chest, abdomen and pelvis. Significant improvement in linear opacities in the lungs, suggesting improving drug-induced pneumonitis. Few stable small pulmonary nodules.  -12/7/23 MR head: Progression in intracranial metastatic disease compared to 9/8/2023   with multiple new and/or enlarging metastases, including internally hemorrhagic right frontal lobe lesion with 3 mm of leftward midline shift and new lesion adjacent to the left occipital resection bed.   -12/8/23  started on dex 4 mg BID  -12/21/23 Right frontal craniotomy with Dr. Garrido  -1/22/24, starts nivolumab (480 mg every 4 weeks)  -2/2/24, Gamma knife to right occipital and left frontal lesions   -3/2/24 PET/CT: previously seen L lower cervical node resolved, two new suspected intramuscular mets to the LE  -3/2/24 MR brain: Multifocal intracranial metastatic disease with interval increase in size of multiple metastatic lesions and surrounding vasogenic edema detailed above. Unsure if this is due to disease progression or post radiation change.  -4/17/24 C4 nivolumab  -4/17/24 MR brain: Increased size of the enhancing lesions in the right frontal and right occipital lobes, likely representing interval disease progression. Interval decreased size of the left frontal lobe lesion. Additional intracranial metastatic lesions are stable. No new enhancing lesions identified. Stable surrounding vasogenic edema in the bifrontal lobes, right occipital lobe, and left temporoparietooccipital lobes.   -4/17/24 PET/CT: There is interval progression of disease with increase in size and uptake of enhancing cerebral metastatic lesions in the right frontal and left occipital lobes as well as interval increase in size and uptake of enhancing soft tissue nodules in the left medial thigh and right posterior leg as detailed above. There is new hypermetabolic uptake in the left fifth and sixth ribs at the costovertebral junction with focal sclerosis on corresponding CT suspicious, indeterminate.   -5/15/24 Cycle 1 ipi/nivo and start gammaknife, completed gammaknife on 5/23/24  -5/21/24 increased dexamethasone to 4 mg x 1, then 2 mg daily x 7 days for severe headaches and nausea  -5/22/24 ED visit for head injury after fall, head and neck CT negative for bleed or fracture, given IV fluids for FELICITY  -6/5/24 Cycle 2 ipi/nivo  -6/11/24 started prednisone for immune mediated colitis  -6/12/24 started azithromycin for Enteropathogenic E. Coli,  unclear if patient stopped prednisone at that time    I am seeing him today in close follow-up of diarrhea.     Interval History:  -Still having bad diarrhea.  -Has stools hourly.   -Stools did not improve with azithromycin. He thinks he took 2 of the three doses.  -Feeling dehydrated.   -Has some blood in stools and foul odor.   -Stools are green and loose.   -Left sided abdominal pain has resolved.  -Not eating and drinking much due to diarrhea.     Objective:  General: patient ill lying in bed, moderately fatigued, alert and oriented, speech clear and fluid  Skin: no visualized rash or lesions on visualized skin  Resp: Appears to be breathing comfortably without accessory muscle usage, speaking in full sentences, no audible wheezes or cough.  Psych: Coherent speech, mildly slow rate and normal volume, able to articulate logical thoughts, able to abstract reason, no tangential thoughts, no hallucinations or delusions  Patient's affect is appropriate.    Labs:   Most Recent 3 CBC's:  Recent Labs   Lab Test 06/11/24  1101 05/22/24  1340 04/17/24  0913   WBC 13.2* 17.4* 14.1*   HGB 16.0 16.8 14.7   MCV 94 95 92    271 244   ANEUTAUTO 10.0* 14.2* 9.9*     Most Recent 3 BMP's:  Recent Labs   Lab Test 06/11/24  1101 06/05/24  1104 05/22/24  1340 05/15/24  1349    143 136 144   POTASSIUM 4.1 3.4 3.9 3.9   CHLORIDE 107 105 99 108*   CO2 21* 23 20* 26   BUN 23.5* 16.1 29.7* 21.1   CR 1.14 0.94 1.37* 1.07   ANIONGAP 12 15 17* 10   JOHN PAUL 9.1 9.0 9.9 9.4   GLC 95 101* 123* 129*   PROTTOTAL 6.7 6.9  --  6.9   ALBUMIN 3.9 4.1  --  4.2    Most Recent 3 LFT's:  Recent Labs   Lab Test 06/11/24  1101 06/05/24  1104 05/15/24  1349   AST 19 22 16   ALT 27 41 21   ALKPHOS 84 75 64   BILITOTAL 1.2 0.9 0.7    Most Recent 2 TSH and T4:  Recent Labs   Lab Test 06/05/24  1104 05/15/24  1349   TSH 0.78 0.45     Magnesium   Date Value Ref Range Status   06/11/2024 2.0 1.7 - 2.3 mg/dL Final   01/22/2024 2.1 1.7 - 2.3 mg/dL Final    12/22/2023 2.5 (H) 1.7 - 2.3 mg/dL Final   07/16/2023 2.2 1.7 - 2.3 mg/dL Final     I reviewed the above labs today.    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 Started pembrolizumab 6/7/23. Underwent second craniotomy for L parieto-occipital lesion resection in 7/14/23 2/2 hemorrhagic conversion and GK to R frontal lesion 9/8/23, on pembrolizumab, course c/b ICI pneumonitis necessitating steroids, again with CNS progression 12/2023 s/p resection and GK. Now on ipi/nivo    #diarrhea  -question if immune mediated and with Enteropathogenic E. Coli.   -He is not doing well today.     Plan:  --will either direct admit patient or send to the ED based on bed availability for failure to thrive with diarrhea and associated dehydration  --recommend resuming 100 mg prednisone daily for possible immune mediated colitis  --consider rechecking stool studies    # metastatic melanoma with metastatic disease to brain  -presented with de-francisca CNS involvement s/p craniotomy x2 and GK to additional R frontal lesion  -TMB high, BRAF mutated but not V600E  -s/p pembrolizumab x2 (first 6/7/23) c/c/b pneumonitis s/p steroid taper  -recent CNS progression in Dec 2023 s/p R frontal , ongoing response systemically, improved pneumonitis radiographically (never symptomatic)  -3/2/24 brain MR shows increased size of several previously treated metastatic lesions, no new lesions. After discussion with Rad Onc and Radiology, although progression cannot be ruled out, findings can be consistent with treatment effect  -now with repeat MR brain with continued significant increase in size of R frontal lesion (18 x 15 mm, previously 8 x 7 mm), anterior R frontal  lesion and R occipital  lesion marginally increased. L frontal lesion decreased. L occipital lesion stable. Vasogenic edema significant but stable  -Last PET/CT showed overall continued concern for metastatic disease involving the soft tissues in both the R lateral calf and L medial thigh. These lesions remain small but slightly increased in both size and avidity, and likely represent sites of metastatic disease. No new areas of systemic disease noted.   -On 5/15, he started on gammaknife and ipi/nivo. He developed some fatigue, nausea, and headaches, as well as mild diarrhea, which improved.  -He completed gammaknife radiation on 5/23/24.   -Cycle 2 ipi/nivo was given on 6/5/24.    Plan:  --Will continue to hold ipi/nivo to treat colitis, as above.   --Will hold dexamethasone while on prednisone (has been on 1 mg dex daily for neuro sxs).   --Will plan to repeat PET/CT in early August.  --Will plan for next brain MRI on 8/22/24.   --Working with SW to assist with caregiver and financial resources.     # Deconditioning with falls  -Secondary to generalized weakness and brain mets.    Plan:  --Recommend consistent use of his cane or walker. If falls, recommend using walker for the rest of the day.     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, resolved  -8/2023 PET/CT showed bilateral FDG avid ground glass and reticular infiltrates favored to represent pneumonitis, started on steroids, now completed taper  -denied any history of pulmonary symptoms at the time of pneumonitis findings  -most recent imaging with continued resolution, asymptomatic today  -current steroids indicated for cerebral edema, not pneumonitis    Plan:  --continue to monitor closely for new respiratory symptoms, particularly in the setting of escalation to dual checkpoint inhibitor therapy  --no respiratory concerns today    # Liver lesions  -12/7 CT AP showed two indeterminate liver lesions not previously appreciated, favored to be benign but  cannot exclude metastases  -no concerning liver lesions at this time    Plan:  --trend with surveillance scans    Meghan Rinaldi PA-C  [facility] [id number] [place], [place] [address] [phone]    The longitudinal plan of care for the diagnosis(es)/condition(s) as documented were addressed during this visit. Due to the added complexity in care, I will continue to support Tim in the subsequent management and with ongoing continuity of care.

## 2024-06-17 NOTE — NURSING NOTE
Is the patient currently in the state of [place]? YES    Visit mode:VIDEO    If the visit is dropped, the patient can be reconnected by: VIDEO VISIT: Text to cell phone:   Telephone Information:   Mobile [phone]       Will anyone else be joining the visit? NO  (If patient encounters technical issues they should call [phone]    How would you like to obtain your AVS? MyChart    Are changes needed to the allergy or medication list? No    Are refills needed on medications prescribed by this physician? NO    Reason for visit: ANNE SUNG

## 2024-06-17 NOTE — ED TRIAGE NOTES
Triage Assessment & Note:    /83   Pulse 76   Temp 98.3  F (36.8  C) (Oral)   Resp 16   Ht 1.829 m (6')   Wt 95.3 kg (210 lb)   SpO2 97%   BMI 28.48 kg/m      Patient presents with: C/o Dehydration and echoli infection.     Home Treatments/Remedies: Prescribed meds    Febrile / Afebrile? Afebrile     Duration of C/o:  2 weeks    Forrest Shrestha RN  June 17, 2024       Triage Assessment (Adult)       Row Name 06/17/24 1645          Triage Assessment    Airway WDL WDL        Respiratory WDL    Respiratory WDL WDL        Cardiac WDL    Cardiac WDL WDL

## 2024-06-17 NOTE — LETTER
6/17/2024      Saeid Santa  [address]  [facility] [address]      Dear Colleague,    Thank you for referring your patient, Saeid Santa, to the [facility]. Please see a copy of my visit note below.    Virtual Visit Details    Type of service:  Video Visit   Video Start Time: 2:12 PM  Video End Time:2:24 PM    Originating Location (pt. Location): Home  Distant Location (provider location):  Off-site  Platform used for Video Visit: [employer]    [facility]   Return Patient Visit    Name: Saeid Santa  MRN: [id number]  Date of visit: Jun 17, 2024    Diagnosis: Metastatic melanoma  Stage: IV    Molecular: BRAF G466A, TMB 48.7554 mut/Mb    Performance status: ECOG 2    Oncology History:  --8/16/22, he brings a left parietal scalp lesion to attention of his GP. It is initially observed.  --November 2022, the left parietal scalp lesion was felt to be a cyst, and the left parietal lesion was lanced and drained via scalp incision.  --January 2023, the left parietal scalp cyst would occasionally break open, drain, and bleed, largely at night.   --February 2023, he notes some mild difficulty clearing obstacles with right leg and foot.  --March/April 2023, he has 3 progressively worsening episodes over a span 3 weeks including the right leg. At first he notes the onset of right leg numbness and heaviness, like he was carrying 50 lbs of water on the leg. Then, about a week or so later, the leg began to jerk and converse while he was in the basement. Neither episode associated with loss of consciousness  --4/15/23, he recalls the right leg numbness, heaviness, jerking while driving between work. He had been found by coworkers in the parking lot, sitting in the grass, appearing confused. He was brought to the ER by EMS. CT-CAP, showed small (<6mm), bilateral indeterminate pulmonary nodules. CT-head and MRI brain showed 2 discrete ring-enhancing lesions in the  left paramedian posterior frontal lobe near the precentral gyrus measuring approximately 2.2 x 1.7 x 1.9 cm and left occipital lobe near the lingual gyrus measuring approximately 2.2 x 2.1 x 2.0 cm. The occipital lesion encroaches upon the subependymal surface of the left lateral ventricle occipital horn and contacts the superior surface of the medial left tentorium. The lesions are accompanied by moderate surrounding vasogenic edema. Surgery was recommended.  --4/21/23, underwent left awake (sleep-awake-sleep) craniotomy for tumor resection, he has resection of the left frontal, motor strip tumor as well asan elliptical incision of the left parietal scalp lesion. On pathology, both the scalp excision and brain mass are positive for malignant melanoma. Tumor cells are positive for S-100 and Melan-A, and negative for cytokeratin AE1/AE3, NKX3.1, GFAP, ERG, CD3, and CD20.  CD3 highlights brisk infiltration of the tumor by T lymphocytes, while CD20 highlights scattered positive B lymphocytes. NGS identifies a BRAF G466A mutation, a class 3 mutation insensitive to BRAF kinase inhibitor monotherapy. TMB is high at 48.754 muts/Mb.   --4/22/23, post-operative MRI shows post-operative changes of the parietal craniotomy of the left frontal lobe metastasis. The other 1.7 x 1.1 cm cystic lesion in the left occipital lobe is also seen. No new lesions appreciated.  -- 4/25/23 to 5/9/23, discharged from the hospital to acute rehab stay at [facility] in [place]. He participated in a comprehensive rehabilitation program consisting of PT, OT, Speech, Psychology and Recreation Therapy. He did well and was discharged home with use of a walker.  --PET-CT on 5/18/23 with potentially involved neck lymph nodes. He has mild FDG uptake in soft tissue nodules in the scalp, in the known left occipital brain metastasis, as well as in the left lower neck region.  -5/24-/5/31/23, He was treated with gamma knife: 2500 cGy to the left resection  cavity; 3000 cGy to the L parietal-occipital lesion.  -6/7/2023, Cycle 1 of pembrolizumab 200mg every 3 weeks  -6/14/23-6/16/23, admitted for vision change, reduced comprehension, speech change, fall and headache. MRI showing likely progression of left occipital mass with associated edema. Improved on steroids and discharged on oral decadron. Plan for repeat MRI and neurosurgery follow up in 1 month.  -7/7/23 ED visit for headaches, confusion, vision changes, concern for hemorrhage of the left occipital mass, admitted to [facility] until 7/9/23, increased dexamethasone dose  -7/14/23 craniotomy of the left parieto-occipital mass, discharged on a dexamethasone taper  -8/11/2023, Cycle 2 of pembrolizumab  -8/23/2023 brain MRI with no recurrence in left frontal lobe, decreased enhancement at edges of left occipital resection cavity, new enhancement int he right front lobe concerning for new met--seen by neurosurgery and considering RT  -8/31/2023 CT/PET with increased uptake in left lower neck lymph node c/f early progression. Ground-glass, reticular changes in lungs also noted likely secondary to drug-induced pneumonitis. Started 1 mg/kg prednisone (80 mg daily) with 8 week taper  -9/8/23 GK to R frontal lobe lesion  -12/7/23 CT neck: Resolved previously hypermetabolic left level 5 lymph node. No new  cervical lymphadenopathy.  -12/7/23 CT chest/AP: Two arterially enhancing lesions in the liver measuring 0.9 - 1.0 cm were not previously visualized, likely due to differences in timing of the contrast bolus. These are indeterminate but favored to be benign perfusional abnormalities. Melanoma metastases cannot be entirely excluded. Otherwise, no evidence for metastatic disease in the chest, abdomen and pelvis. Significant improvement in linear opacities in the lungs, suggesting improving drug-induced pneumonitis. Few stable small pulmonary nodules.  -12/7/23 MR head: Progression in intracranial metastatic disease compared  to 9/8/2023   with multiple new and/or enlarging metastases, including internally hemorrhagic right frontal lobe lesion with 3 mm of leftward midline shift and new lesion adjacent to the left occipital resection bed.   -12/8/23 started on dex 4 mg BID  -12/21/23 Right frontal craniotomy with Dr. Garrido  -1/22/24, starts nivolumab (480 mg every 4 weeks)  -2/2/24, Gamma knife to right occipital and left frontal lesions   -3/2/24 PET/CT: previously seen L lower cervical node resolved, two new suspected intramuscular mets to the LE  -3/2/24 MR brain: Multifocal intracranial metastatic disease with interval increase in size of multiple metastatic lesions and surrounding vasogenic edema detailed above. Unsure if this is due to disease progression or post radiation change.  -4/17/24 C4 nivolumab  -4/17/24 MR brain: Increased size of the enhancing lesions in the right frontal and right occipital lobes, likely representing interval disease progression. Interval decreased size of the left frontal lobe lesion. Additional intracranial metastatic lesions are stable. No new enhancing lesions identified. Stable surrounding vasogenic edema in the bifrontal lobes, right occipital lobe, and left temporoparietooccipital lobes.   -4/17/24 PET/CT: There is interval progression of disease with increase in size and uptake of enhancing cerebral metastatic lesions in the right frontal and left occipital lobes as well as interval increase in size and uptake of enhancing soft tissue nodules in the left medial thigh and right posterior leg as detailed above. There is new hypermetabolic uptake in the left fifth and sixth ribs at the costovertebral junction with focal sclerosis on corresponding CT suspicious, indeterminate.   -5/15/24 Cycle 1 ipi/nivo and start gammaknife, completed gammaknife on 5/23/24  -5/21/24 increased dexamethasone to 4 mg x 1, then 2 mg daily x 7 days for severe headaches and nausea  -5/22/24 ED visit for head injury  after fall, head and neck CT negative for bleed or fracture, given IV fluids for FELICITY  -6/5/24 Cycle 2 ipi/nivo  -6/11/24 started prednisone for immune mediated colitis  -6/12/24 started azithromycin for Enteropathogenic E. Coli, unclear if patient stopped prednisone at that time    I am seeing him today in close follow-up of diarrhea.     Interval History:  -Still having bad diarrhea.  -Has stools hourly.   -Stools did not improve with azithromycin. He thinks he took 2 of the three doses.  -Feeling dehydrated.   -Has some blood in stools and foul odor.   -Stools are green and loose.   -Left sided abdominal pain has resolved.  -Not eating and drinking much due to diarrhea.     Objective:  General: patient ill lying in bed, moderately fatigued, alert and oriented, speech clear and fluid  Skin: no visualized rash or lesions on visualized skin  Resp: Appears to be breathing comfortably without accessory muscle usage, speaking in full sentences, no audible wheezes or cough.  Psych: Coherent speech, mildly slow rate and normal volume, able to articulate logical thoughts, able to abstract reason, no tangential thoughts, no hallucinations or delusions  Patient's affect is appropriate.    Labs:   Most Recent 3 CBC's:  Recent Labs   Lab Test 06/11/24  1101 05/22/24  1340 04/17/24  0913   WBC 13.2* 17.4* 14.1*   HGB 16.0 16.8 14.7   MCV 94 95 92    271 244   ANEUTAUTO 10.0* 14.2* 9.9*     Most Recent 3 BMP's:  Recent Labs   Lab Test 06/11/24  1101 06/05/24  1104 05/22/24  1340 05/15/24  1349    143 136 144   POTASSIUM 4.1 3.4 3.9 3.9   CHLORIDE 107 105 99 108*   CO2 21* 23 20* 26   BUN 23.5* 16.1 29.7* 21.1   CR 1.14 0.94 1.37* 1.07   ANIONGAP 12 15 17* 10   JOHN PAUL 9.1 9.0 9.9 9.4   GLC 95 101* 123* 129*   PROTTOTAL 6.7 6.9  --  6.9   ALBUMIN 3.9 4.1  --  4.2    Most Recent 3 LFT's:  Recent Labs   Lab Test 06/11/24  1101 06/05/24  1104 05/15/24  1349   AST 19 22 16   ALT 27 41 21   ALKPHOS 84 75 64   BILITOTAL 1.2  0.9 0.7    Most Recent 2 TSH and T4:  Recent Labs   Lab Test 06/05/24  1104 05/15/24  1349   TSH 0.78 0.45     Magnesium   Date Value Ref Range Status   06/11/2024 2.0 1.7 - 2.3 mg/dL Final   01/22/2024 2.1 1.7 - 2.3 mg/dL Final   12/22/2023 2.5 (H) 1.7 - 2.3 mg/dL Final   07/16/2023 2.2 1.7 - 2.3 mg/dL Final     I reviewed the above labs today.    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 Started pembrolizumab 6/7/23. Underwent second craniotomy for L parieto-occipital lesion resection in 7/14/23 2/2 hemorrhagic conversion and GK to R frontal lesion 9/8/23, on pembrolizumab, course c/b ICI pneumonitis necessitating steroids, again with CNS progression 12/2023 s/p resection and GK. Now on ipi/nivo    #diarrhea  -question if immune mediated and with Enteropathogenic E. Coli.   -He is not doing well today.     Plan:  --will either direct admit patient or send to the ED based on bed availability for failure to thrive with diarrhea and associated dehydration  --recommend resuming 100 mg prednisone daily for possible immune mediated colitis  --consider rechecking stool studies    # metastatic melanoma with metastatic disease to brain  -presented with de-francisca CNS involvement s/p craniotomy x2 and GK to additional R frontal lesion  -TMB high, BRAF mutated but not V600E  -s/p pembrolizumab x2 (first 6/7/23) c/c/b pneumonitis s/p steroid taper  -recent CNS progression in Dec 2023 s/p R frontal , ongoing response systemically, improved pneumonitis radiographically (never symptomatic)  -3/2/24 brain MR shows increased size of several previously treated metastatic lesions, no new lesions. After discussion with Rad Onc and Radiology, although progression cannot be  ruled out, findings can be consistent with treatment effect  -now with repeat MR brain with continued significant increase in size of R frontal lesion (18 x 15 mm, previously 8 x 7 mm), anterior R frontal  lesion and R occipital lesion marginally increased. L frontal lesion decreased. L occipital lesion stable. Vasogenic edema significant but stable  -Last PET/CT showed overall continued concern for metastatic disease involving the soft tissues in both the R lateral calf and L medial thigh. These lesions remain small but slightly increased in both size and avidity, and likely represent sites of metastatic disease. No new areas of systemic disease noted.   -On 5/15, he started on gammaknife and ipi/nivo. He developed some fatigue, nausea, and headaches, as well as mild diarrhea, which improved.  -He completed gammaknife radiation on 5/23/24.   -Cycle 2 ipi/nivo was given on 6/5/24.    Plan:  --Will continue to hold ipi/nivo to treat colitis, as above.   --Will hold dexamethasone while on prednisone (has been on 1 mg dex daily for neuro sxs).   --Will plan to repeat PET/CT in early August.  --Will plan for next brain MRI on 8/22/24.   --Working with SW to assist with caregiver and financial resources.     # Deconditioning with falls  -Secondary to generalized weakness and brain mets.    Plan:  --Recommend consistent use of his cane or walker. If falls, recommend using walker for the rest of the day.     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, resolved  -8/2023 PET/CT showed bilateral FDG avid ground glass and reticular infiltrates favored to represent pneumonitis, started on steroids, now completed taper  -denied any history of pulmonary symptoms at the time of pneumonitis findings  -most recent imaging with continued resolution, asymptomatic today  -current steroids indicated for cerebral edema, not pneumonitis    Plan:  --continue to monitor closely for new respiratory symptoms,  particularly in the setting of escalation to dual checkpoint inhibitor therapy  --no respiratory concerns today    # Liver lesions  -12/7 CT AP showed two indeterminate liver lesions not previously appreciated, favored to be benign but cannot exclude metastases  -no concerning liver lesions at this time    Plan:  --trend with surveillance scans    Meghan Rinaldi PA-C  [facility] [address] [address]  [phone]    The longitudinal plan of care for the diagnosis(es)/condition(s) as documented were addressed during this visit. Due to the added complexity in care, I will continue to support Tim in the subsequent management and with ongoing continuity of care.      Again, thank you for allowing me to participate in the care of your patient.        Sincerely,        Meghan Rinaldi PA-C

## 2024-06-18 NOTE — MEDICATION SCRIBE - ADMISSION MEDICATION HISTORY
Medication Scribe Admission Medication History    Admission medication history is complete. The information provided in this note is only as accurate as the sources available at the time of the update.    Information Source(s): Patient via in-person    Pertinent Information: Spoke with patient in person and completed medication hx.     Changes made to PTA medication list:  Added: None  Deleted: None  Changed: None    Allergies reviewed with patient and updates made in EHR: no    Medication History Completed By: Jeanne Rice 6/18/2024 8:32 AM    PTA Med List   Medication Sig Last Dose    acetaminophen (TYLENOL) 325 MG tablet Take 2 tablets (650 mg) by mouth every 4 hours as needed for other (For optimal non-opioid multimodal pain management to improve pain control.) Unknown    amLODIPine (NORVASC) 10 MG tablet Take 1 tablet (10 mg) by mouth every morning 6/17/2024 at AM    baclofen (LIORESAL) 10 MG tablet Take 1 tablet by mouth 2 times daily as needed for muscle spasms Unknown    dexAMETHasone (DECADRON) 1 MG tablet Take 1 tablet (1 mg) by mouth daily (with breakfast) 6/17/2024 at AM    levETIRAcetam (KEPPRA) 1000 MG tablet 1 tablet Orally every 12 hrs 6/17/2024 at PM    naloxone (NARCAN) 4 MG/0.1ML nasal spray Spray 1 spray (4 mg) into one nostril alternating nostrils as needed for opioid reversal every 2-3 minutes until assistance arrives More than a month at [facility]    omeprazole (PRILOSEC) 20 MG DR capsule Take 1 capsule (20 mg) by mouth every morning Take 1 hour prior to prednisone Unknown    ondansetron (ZOFRAN) 4 MG tablet Take 1 tablet (4 mg) by mouth every 8 hours as needed for nausea Unknown    predniSONE (DELTASONE) 20 MG tablet Take 5 tablets (100 mg) by mouth daily for 7 days, THEN 4.5 tablets (90 mg) daily for 5 days, THEN 4 tablets (80 mg) daily for 5 days, THEN 3.5 tablets (70 mg) daily for 5 days, THEN 3 tablets (60 mg) daily for 5 days, THEN 2.5 tablets (50 mg) daily for 5 days, THEN 2 tablets  (40 mg) daily for 5 days, THEN 1.5 tablets (30 mg) daily for 5 days, THEN 1 tablet (20 mg) daily for 5 days, THEN 0.5 tablets (10 mg) daily for 5 days. Unknown    rosuvastatin (CRESTOR) 40 MG tablet Take 1 tablet (40 mg) by mouth daily 6/17/2024 at PM    sennosides (SENOKOT) 8.6 MG tablet Take 1-2 tablets by mouth 2 times daily as needed for constipation Unknown

## 2024-06-18 NOTE — DISCHARGE INSTRUCTIONS
[facility] (Ph: [phone]) - The Senior [facility] Line is a service of the [place] [employer] in partnership with [place]'s [facility] agencies on aging.      Support Services  Many communities have programs and services specifically for people who need help with transportation, meals, bathing, and more. These services may be useful to you down the road -- or if you re caring for a relative or friend. Services can be provided by your local agency on aging, or other public or nonprofit agencies, as well as for profit companies. Fees vary by service and location, but some may be free or offered on a sliding scale, depending on your income.    The senior linkage line can help connect you to these additional resources:    Learning about how to file for medical assistance or if you would qualify.  Adult day services are provided at a community setting where people come for several hours a day to receive medical, social, and recreation services. They are usually offered during the daytime, which helps people who may be caring for a loved one while working.  Assisted transportation helps people get to appointments and other necessary places. It can include [id number] van service, discount taxi programs, and volunteer drivers and escorts.  Caregiver services can include respite (a break from caregiving), information, referrals to services, and training or support groups.  Care assessment and management, most often by a nurse or , can help assess a person s needs, develop a plan of care, and arrange and monitor services.  Friendly visitors are volunteers who stop by regularly to see how you re doing.  Home care services provide help with personal care like bathing or getting dressed.  Home health care includes nursing and physical, speech or occupational therapy for a specific condition.  Homemaker/chore services help with housekeeping and preparing meals, or chores like mowing the lawn and  shoveling snow.  Information and assistance specialists provide information and connect you to local resources and services.  Meals may be delivered to your home or served in a senior center or community facility.  Senior centers offer meals, recreation, classes, information and referral services, volunteer opportunities, employment services, public benefits counseling, and much more.

## 2024-06-18 NOTE — PROGRESS NOTES
Shift: 07--1900    V/S & Pain: VSS on RA. Denies pain.   Neuro: AOx4, calm and cooperative  Respiratory: Stable on RA, lung sounds clear bilaterally  Cardiac: tachy in the 120s  Skin: generalized bruising on arms and legs  GI/: Voids spontaneously, numerous watery BM today   Nutrition: Regular diet with fair appetite, denies N/V  L/D: needs new IV, previous one dislodged, vascular consulted  Activity: assist x1  Labs: potassium shifted this morning, redraw at 0600    Events this shift: no acute events this shift. Pt remains vitally stable on RA. Call light within reach, calls appropriately      Report given to 7C RN

## 2024-06-18 NOTE — CONSULTS
Care Management Initial Consult    General Information  Assessment completed with: Patient, Spouse or significant other, Sheryl  Type of CM/SW Visit: Initial Assessment    Primary Care Provider verified and updated as needed: Yes   Readmission within the last 30 days: no previous admission in last 30 days      Reason for Consult: discharge planning  Advance Care Planning: Advance Care Planning Reviewed: no concerns identified          Communication Assessment  Patient's communication style: spoken language (English or Bilingual)             Cognitive  Cognitive/Neuro/Behavioral: WDL  Level of Consciousness: alert  Arousal Level: opens eyes spontaneously  Orientation: oriented x 4  Mood/Behavior: calm, cooperative     Speech: clear    Living Environment:   People in home: spouse     Current living Arrangements: house      Able to return to prior arrangements:  (PT eval pending; Anticipate will be able to return home)       Family/Social Support:  Care provided by: spouse/significant other  Provides care for: no one, unable/limited ability to care for self  Marital Status:   Wife, Children, Sibling(s)          Description of Support System: Supportive, Involved    Support Assessment: Adequate social supports, Adequate family and caregiver support, Caregiver experiencing high stress    Current Resources:   Patient receiving home care services: No     Community Resources: None  Equipment currently used at home: walker, rolling, shower chair, commode chair  Supplies currently used at home: Wipes, Chux    Employment/Financial:  Employment Status: retired (Retired [employer])        Financial Concerns: other (see comments) (Information given on filing for MA in AVS)   Referral to Financial Worker: No       Does the patient's insurance plan have a 3 day qualifying hospital stay waiver?  Yes     Which insurance plan 3 day waiver is available? Alternative insurance waiver    Will the waiver be used for post-acute placement?  Undetermined at this time    Lifestyle & Psychosocial Needs:  Social Determinants of Health     Food Insecurity: Low Risk  (12/18/2023)    Food Insecurity     Within the past 12 months, did you worry that your food would run out before you got money to buy more?: No     Within the past 12 months, did the food you bought just not last and you didn t have money to get more?: No   Depression: Not at risk (4/22/2024)    PHQ-2     PHQ-2 Score: 0   Housing Stability: Low Risk  (12/18/2023)    Housing Stability     Do you have housing? : Yes     Are you worried about losing your housing?: No   Tobacco Use: Medium Risk (6/17/2024)    Patient History     Smoking Tobacco Use: Former     Smokeless Tobacco Use: Never     Passive Exposure: Current   Financial Resource Strain: Low Risk  (12/18/2023)    Financial Resource Strain     Within the past 12 months, have you or your family members you live with been unable to get utilities (heat, electricity) when it was really needed?: No   Alcohol Use: Not on file   Transportation Needs: Low Risk  (12/18/2023)    Transportation Needs     Within the past 12 months, has lack of transportation kept you from medical appointments, getting your medicines, non-medical meetings or appointments, work, or from getting things that you need?: No   Physical Activity: Not on file   Interpersonal Safety: Low Risk  (11/7/2023)    Interpersonal Safety     Do you feel physically and emotionally safe where you currently live?: Yes     Within the past 12 months, have you been hit, slapped, kicked or otherwise physically hurt by someone?: No     Within the past 12 months, have you been humiliated or emotionally abused in other ways by your partner or ex-partner?: No   Stress: Not on file   Social Connections: Not on file   Health Literacy: Not on file       Functional Status:  Prior to admission patient needed assistance:   Dependent ADLs:: Ambulation-walker, Bathing, Incontinence  Dependent IADLs::  Cleaning, Cooking, Laundry, Shopping, Meal Preparation, Medication Management, Money Management, Transportation, Incontinence  Assesssment of Functional Status:  (PT eval pending)    Mental Health Status:  Mental Health Status: No Current Concerns       Chemical Dependency Status:  Chemical Dependency Status: No Current Concerns             Values/Beliefs:  Spiritual, Cultural Beliefs, [employer] Practices, Values that affect care: no               Additional Information:  Saeid Santa is a 68 year old male who presents with hx of HTN, seizures, hx of prostate cancer sp prostatectomy, and metastatic melanaoms w/ mets to brain, currently treated with ipilimab/nivolumab. He completed cycle 2 of treatment on 6/5, and is being admitted for dehydration, weakness, and 2 weeks of watery diarrhea, 4-8 stools per day, concerning for enterocolitis vs checkpoint induce colitis.     Writer met with pt and his spouse, Smiley, in pt's room in the ED. Smiley reports that she is the primary caregiver for pt and she is exhausted. Sheryl reports that she is fine with managing pt's cares at one moment, but then make a joke at how she cannot keep managing all of his care. It makes it difficult to gauge how burnout and exhausted she is, however, Sheryl reports that she is still willing to be in the caregiver role and to try to help fulfill pt's goal of remaining home, even when he may eventually need hospice.     Pt's brother, Jose Luis, is also a significant support for pt. Sheryl reports that Jose Luis helped install railings in the home and helped arrange for cleaning of the soiled linens and clothing that have been soiled over the past week from pt's diarrhea. Pt also have children, but [place] and are less able to help on short notice.    Sheryl reports that she they have exhausted much of their funds recently. Discussed filing for MA, family would like information on being able to file for MA. Pt is likely over income at this point in time,  but may be interested in the future. Senior Linkage Line (Ph: [phone]) resources placed in AVS.     Pt would likely benefit from the following [facility] services: RN (Med management, lab draws), PT (Home safety eval), HHISABEL, HANNAH (ongoing financial resources in community). Defer to ongoing [facility] therapy services to PT/OT evals.    Social work will continue to follow and provide assistance to ensure a safe and timely discharge.     ________________    HANNAH Mccall, [facility]  ED/Observation   [facility]  Phone: [phone]  Fax: [phone]    After hours [employer] and After Hours [employer]  Available from 4:00pm - Midnight

## 2024-06-18 NOTE — SUMMARY OF CARE
Reason for admission: electrolyte imbalance and sigmoidoscopy  Admitted from:  ED  Report received from: Ashley Galicia 2 RN skin assessment completed by: Smiley Eckert RN and Phyllis Browning RN      - Findings (add LDA if needed): pinpoint rash on chest, MD aware, generalized bruising  Bed algorithm reevaluated: yes  Was airflow pump ordered?: no, skin intact, continent, up SBA  Suction set up in room? yes  Care plan (primary problem) and education initiated: yes  MDRO education done if applicable: n/a  Pt informed about policy regarding no IV pumps off unit: yes  Detailed Belongings: phone, shoes, cane, , lunch box, clothes

## 2024-06-18 NOTE — H&P
[facility]    History and Physical - Hospitalist Service, GOLD TEAM        Date of Admission:  6/17/2024    Assessment & Plan      Saeid Santa is a 68 year old male who presents with hx of HTN, seizures, hx of prostate cancer sp prostatectomy, and metastatic melanaoms w/ mets to brain, currently treated with ipilimab/nivolumab. He completed cycle 2 of treatment on 6/5, and is being admitted for dehydration, weakness, and 2 weeks of watery diarrhea, 4-8 stools per day, concerning for enterocolitis vs checkpoint induce colitis.     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   #Recent E. Coli positive stool test  #Metabolic alkalosis, likely contraction  Patient w/ hx of metastatic melanoma currently treated w/ ipilumab/nivolumab, last infusion 06/05. Has had diarrhea since 06/08, with 4-8 watery stools per day. Initially no black, bloody, or tarry stools, but over the last week has had multiple bloody stools w/ bright red blood per rectum. During this time he has maintained good PO intake, although required 1L IVF infusion on 6/11, and has had one episode of falling due to weakness, without head trauma or LOC. 6/12 stool  sample tested positive for E.coli, and his primary oncologist stopped prednisone, omperazole. Started azithromycin for 3 days, completed 2 without improvement. Besides azithromycin, denies any other antibiotic use or hospitalizations.  C. Diff negative on admission.  Outpatient oncology team started prednisone 100mg/d for 5 days, last day 6/17 with concerns for immune checkpoint inhibitor colitis. Dose should be reduced by 10mg every 5 days, managed by his outpatient oncologist. While on steroids, he will need to be on prophylactic PPI and TMP/SMX.  Patient received 1L IVF bolus on admission. Procal to 0.85, , lactate WNL.  Will continue mIVF, follow up infectious and  inflammatory panel. TSH WNL, lipase elevated although patient denies steatorrhea. Can consider fecal elastase if rest of workup is negative.   Can consider GI consult in AM for flex sig, although they likely will want to wait until all infectious workup is negative. Admission CT AP w/ pericolic fat stranding, suspicious for mild colitis. Incidental finding of diverticulosis w/o diverticulitis.   -Follow up enteric pathogens panel, fecal calprotectin, lactoferrin. Consider fecal elastase.  -125mL/hr NS  -Start pantoprazole IV for ulcer ppx on steroid taper  -Continue steroid taper,  6/18 is Day One for 90mg/d. Will need outpatient follow up w/ primary oncology to adjust.  -Consider GI consult in AM, consider flex-sig once infectious rule out is definitive   -Oncology consulted to see in AM, appreciate recs    #Hypokalemia-resolved  #Hypermagnesemia  #Hyponatremia  #Prolonged QTC (616)-resolved (476)  Patient with multiple electrolyte disturbances given ~ 2 weeks of copious watery diarrhea, despite endorsed adequate PO intake and pedialyte supplementation. Admitted w/ , K 2.8, Mg 2.4, Na 131. Received 40meq KCL in ED W/ 1L LR bolus. Lytes rechecked 2 hrs later to K 3.7 w/ repeat EKG of 476. Patient on telemetry  -Continue telemetry  -10meQ/d KCL IV  -AM BMP    #Acute kidney injury (Baseline Cr 0.9, admitted w/ 1.57)  Likely pre-renal given copious diarrhea. Will continue to monitor and continue mIVF  -Avoid starting tmp/smx at this time  -Holding PTA baclofen    #Deconditioning with falls.   Likely 2/2 generalized weakness, brain mets, and dehydration.  -Consulted PT/OT, appreciate recs    #Metastatic Melanoma (4/2023) w/ mets to brain  #Seizures  Patient found to have ring enhancing lesions on MRI . 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  Has continued to show CNS involvement of disease status post multiple craniotomies  and resections (tumor resection 4/2023, L parieto-occipital craniotomy 7/2023, r frontal craniotomy 12/2023, gamma knife 02/2024). Recent PET scan w/ concerns for mets to soft tissues in R lateral calf and L medial thigh as well as liver lesions.  Last seizure was 04/2023.   -PTA keppra  -Consider oncology consult in AM    #Hx of prostate cancer sp 2009 prostatectomy        Diet: Combination Diet Regular Diet Adult    DVT Prophylaxis: Heparin SQ  Aragon Catheter: Not present  Fluids: 125ml/hr LR  Lines: None     Cardiac Monitoring: ACTIVE order. Indication: electrolyte abnormalties  Code Status: Full Code      Clinically Significant Risk Factors Present on Admission        # Hypokalemia: Lowest K = 2.8 mmol/L in last 2 days, will replace as needed       # Hypoalbuminemia: Lowest albumin = 3.3 g/dL at 6/17/2024  5:20 PM, will monitor as appropriate    # Acute Kidney Injury, unspecified: based on a >150% or 0.3 mg/dL increase in last creatinine compared to past 90 day average, will monitor renal function  # Hypertension: Noted on problem list             # Overweight: Estimated body mass index is 28.48 kg/m  as calculated from the following:    Height as of this encounter: 1.829 m (6').    Weight as of this encounter: 95.3 kg (210 lb).       # Financial/Environmental Concerns:           Disposition Plan      Expected Discharge Date: 06/21/2024                The patient's care was discussed with the Attending Physician, Dr. Herman .      Teja Young MD  Hospitalist Service, [facility]  Securely message with [employer] (more info)  Text page via [facility] Paging/Directory   See signed in provider for up to date coverage information  ______________________________________________________________________    Chief Complaint   Diarrhea    History is obtained from the patient    History of Present Illness   Saeid Santa is a 68 year old male who presents with hx of HTN,  seizures, hx of prostate cancer sp prostatectomy, and metastatic melanaoms w/ mets to brain, currently treated with ipilimab/nivolumab. He completed cycle 2 of treatment on 6/5, and is being admitted for dehydration, weakness, and 2 weeks of watery diarrhea, 4-8 stools per day.     Patient w/ hx of metastatic melanoma currently treated w/ ipilumab/nivolumab, last infusion 06/05. Has had diarrhea since 06/08, with 4-8 watery stools per day. Initially no black, bloody, or tarry stools, but over the last week has had multiple bloody stools w/ bright red blood per rectum. During this time he has maintained good PO intake, although required 1L IVF infusion on 6/11, and has had one episode of falling due to weakness, without head trauma or LOC. 6/12 stool  sample tested positive for E.coli, and his primary oncologist stopped prednisone, omperazole. Started azithromycin for 3 days, completed 2 without improvement. Has continued. Outpatient oncology team started prednisone 100mg/d for 5 days, last day 6/17 with concerns for immune checkpoint inhibitor colitis.   Admitted w/ , K 2.8, Mg 2.4, Na 131. Received 40meq KCL in ED W/ 1L LR bolus. Procal to 0.85, , lactate WNL.    Denies fevers, chills, night sweats, chest pain, SOB, hemoptysis, hematuria, dysuria, vomiting. Endorses consistent headache attributed to multiple surgeries and brain mets. Endorses intermittent nausea.       Past Medical History    Past Medical History:   Diagnosis Date    HTN (hypertension)     Metastasis to brain (H) 2023    Metastatic melanoma (H)     Mixed hyperlipidemia     Prostate cancer (H)     Seizures (H)     Sleep apnea        Past Surgical History   Past Surgical History:   Procedure Laterality Date    INSERT DRAIN LUMBAR N/A 7/14/2023    Procedure: LUMBAR DRAIN;  Surgeon: Kye Garrido MD;  Location: [facility] OR    OPTICAL TRACKING SYSTEM CRANIOTOMY, EXCISE TUMOR, COMBINED Left 04/21/2023    Procedure: stealth assisted awake  craniotomy resection of motor strip tumor;  Surgeon: Kye Garrido MD;  Location: [facility] OR    OPTICAL TRACKING SYSTEM CRANIOTOMY, EXCISE TUMOR, COMBINED Left 2023    Procedure: STEALTH ASSISTED CRANIOTOMY, WITH NEOPLASM EXCISION LEFT PARIETO-OCCIPITAL CRANIOTOMY WITH RESECTION OF MASS,;  Surgeon: Kye Garrido MD;  Location: [facility] OR    OPTICAL TRACKING SYSTEM CRANIOTOMY, EXCISE TUMOR, COMBINED Right 2023    Procedure: CRANIOTOMY, USING OPTICAL TRACKING SYSTEM, WITH NEOPLASM EXCISION, RIGHT FRONTAL CRANOTOMY WITH RESECTION OF TUMOR;  Surgeon: Kye Garrido MD;  Location: [facility] OR    PROSTATECTOMY             Prior to Admission Medications   Prior to Admission Medications   Prescriptions Last Dose Informant Patient Reported? Taking?   acetaminophen (TYLENOL) 325 MG tablet  Self Yes No   Sig: Take 2 tablets (650 mg) by mouth every 4 hours as needed for other (For optimal non-opioid multimodal pain management to improve pain control.)   amLODIPine (NORVASC) 10 MG tablet  Self No No   Sig: Take 1 tablet (10 mg) by mouth every morning   baclofen (LIORESAL) 10 MG tablet  Self Yes No   Sig: Take 1 tablet by mouth 2 times daily as needed for muscle spasms   dexAMETHasone (DECADRON) 1 MG tablet   No No   Sig: Take 1 tablet (1 mg) by mouth daily (with breakfast)   levETIRAcetam (KEPPRA) 1000 MG tablet  Self No No   Si tablet Orally every 12 hrs   naloxone (NARCAN) 4 MG/0.1ML nasal spray   No No   Sig: Spray 1 spray (4 mg) into one nostril alternating nostrils as needed for opioid reversal every 2-3 minutes until assistance arrives   Patient not taking: Reported on 2024   omeprazole (PRILOSEC) 20 MG DR capsule   No No   Sig: Take 1 capsule (20 mg) by mouth every morning Take 1 hour prior to prednisone   ondansetron (ZOFRAN) 4 MG tablet   No No   Sig: Take 1 tablet (4 mg) by mouth every 8 hours as needed for nausea   predniSONE (DELTASONE) 20 MG tablet   No No   Sig: Take 5 tablets (100 mg) by mouth daily for  7 days, THEN 4.5 tablets (90 mg) daily for 5 days, THEN 4 tablets (80 mg) daily for 5 days, THEN 3.5 tablets (70 mg) daily for 5 days, THEN 3 tablets (60 mg) daily for 5 days, THEN 2.5 tablets (50 mg) daily for 5 days, THEN 2 tablets (40 mg) daily for 5 days, THEN 1.5 tablets (30 mg) daily for 5 days, THEN 1 tablet (20 mg) daily for 5 days, THEN 0.5 tablets (10 mg) daily for 5 days.   rosuvastatin (CRESTOR) 40 MG tablet  Self No No   Sig: Take 1 tablet (40 mg) by mouth daily   sennosides (SENOKOT) 8.6 MG tablet   No No   Sig: Take 1-2 tablets by mouth 2 times daily as needed for constipation      Facility-Administered Medications: None        Review of Systems    The 10 point Review of Systems is negative other than noted in the HPI or here.      Physical Exam   Vital Signs: Temp: 98.3  F (36.8  C) Temp src: Oral BP: (!) 143/125 Pulse: 59   Resp: 18 SpO2: 95 % O2 Device: None (Room air)    Weight: 210 lbs 0 oz    General: tired  Cards: No murmurs, rubs, gallops. Regular rate and rhythm   Pulm: No crackles, wheezes, rales. Lungs clear to auscultation BL   Abd: Soft, nondistended, nontympanitic. LLQ RLQ tenderess, no guarding or rebounding  Extremeties: Radial, pedal, tibial pulses present. 5/5 strength upper/lower BL. Legs cold, patient says is baseline  Neuro: No focal deficits noted , AO4      Data     I have personally reviewed the following data over the past 24 hrs:    13.6 (H)  \   14.3   / 173     133 (L) 98 29.0 (H) /  103 (H)   2.8 (L) 15 (L) 1.57 (H) \     ALT: 23 AST: 36 AP: 68 TBILI: 0.8   ALB: 3.3 (L) TOT PROTEIN: 6.1 (L) LIPASE: N/A     Procal: 0.85 (H) CRP: 120.00 (H) Lactic Acid: 1.6

## 2024-06-18 NOTE — CONSULTS
Oncology  Consult Note   Date of Service: 06/18/2024    Patient: Saeid Santa  MRN: [id number]  Admission Date: 6/17/2024  Hospital Day # 1  Cancer Diagnosis: IV melanoma    Primary Outpatient Oncologist: Dr. 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 He notes that his stools have not increased in frequency recently, but he continues to have up to 10-20 small stools/day. He did start to notice some bloody stools as his diarrhea persisted. Also had weakness and 1 episode of falling. He also endorsed chills, a rash on his upper chest and back for the past 2 weeks, and poor appetite. Pt denies fever, HA, nausea, vomiting, chest pain, abdominal cramps, or dysuria.     He was started on 100 mg prednisone 6/11 due to concerns for immune mediated colitis. E coli in his stool from 6/11 ended up being positive and he was trailed on azithromycin for about 2 days, however this was discontinued as his diarrhea did not improve. He does not believe his diarrhea improved with either steroids or antibiotics so far.    In the ED, he was giving 1L IVF, labs notable for multiple electrolyte abnormalities Na 131, K+ 3.2, Cr 1.57, bicarb 15, phos 2.4, WBC 13.6, , fecal lactoferrin positive, Cdiff negative, repeat enteric panel positive for adenovirus. CT A/P 6/17 showed mild pericolonic fat stranding along ascending, descending, and sigmoid colon.       Oncologic History:  -IV melanoma, BRAF G466A, TMB 48.7554 mut/Mb   -ECOG 2    -Diagnosed 4/2023:    CT-head and MRI brain showed 2 discrete ring-enhancing lesions in the left paramedian posterior frontal lobe near the precentral  gyrus measuring approximately 2.2 x 1.7 x 1.9 cm and left occipital lobe near the lingual gyrus measuring approximately 2.2 x 2.1 x 2.0 cm. 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 TMB is high at 48.754 muts/Mb.   -Pembrolizumab (start 6/7/23) for 2 cycles, but complicated by pneumonitis s/p steroid  -3/2/24 brain MR shows increased size of several previously treated metastatic lesions, no new lesions. After discussion with Rad Onc and Radiology, although progression cannot be ruled out, findings can be consistent with treatment effect   repeat MR brain with continued significant increase in size of R frontal lesion (18 x 15 mm, previously 8 x 7 mm), anterior R frontal  lesion and R occipital lesion marginally increased. L frontal lesion decreased. L occipital lesion stable. Vasogenic edema significant but stable  -Last PET/CT showed overall continued concern for metastatic disease involving the soft tissues in both the R lateral calf and L medial thigh. These lesions remain small but slightly increased in both size and avidity, and likely represent sites of metastatic disease.  -On 5/15, he started on gammaknife and ipi/nivo. He developed some fatigue, nausea, and headaches, as well as mild diarrhea, which improved.  -He completed gammaknife radiation on 5/23/24.   -Cycle 2 ipi/nivo was given on 6/5/24.    Review of Systems:  A comprehensive ROS was performed and found to be negative or non-contributory with the exception of that noted in the HPI above.    Past Medical History:  Past Medical History:   Diagnosis Date    HTN (hypertension)     Metastasis to brain (H) 2023    Metastatic melanoma (H)     Mixed hyperlipidemia     Prostate cancer (H)     Seizures (H)     Sleep apnea        Past Surgical History:  Past Surgical History:   Procedure Laterality Date    INSERT DRAIN LUMBAR N/A  2023    Procedure: LUMBAR DRAIN;  Surgeon: Kye Garrido MD;  Location: [facility] OR    OPTICAL TRACKING SYSTEM CRANIOTOMY, EXCISE TUMOR, COMBINED Left 2023    Procedure: stealth assisted awake craniotomy resection of motor strip tumor;  Surgeon: Kye Garrido MD;  Location: [facility] OR    OPTICAL TRACKING SYSTEM CRANIOTOMY, EXCISE TUMOR, COMBINED Left 2023    Procedure: STEALTH ASSISTED CRANIOTOMY, WITH NEOPLASM EXCISION LEFT PARIETO-OCCIPITAL CRANIOTOMY WITH RESECTION OF MASS,;  Surgeon: Kye Garrido MD;  Location: [facility] OR    OPTICAL TRACKING SYSTEM CRANIOTOMY, EXCISE TUMOR, COMBINED Right 2023    Procedure: CRANIOTOMY, USING OPTICAL TRACKING SYSTEM, WITH NEOPLASM EXCISION, RIGHT FRONTAL CRANOTOMY WITH RESECTION OF TUMOR;  Surgeon: Kye Garrido MD;  Location: [facility] OR    PROSTATECTOMY             Social History:  Social History     Socioeconomic History    Marital status:      Spouse name: Smiley    Number of children: 4   Occupational History    Occupation: [employer]     Comment: hobby job   Tobacco Use    Smoking status: Former     Current packs/day: 0.00     Average packs/day: 0.5 packs/day for 20.0 years (10.0 ttl pk-yrs)     Types: Cigarettes     Start date: 1997     Quit date: 2017     Years since quittin.4     Passive exposure: Current (Second hand exposure)    Smokeless tobacco: Never   Vaping Use    Vaping status: Never Used   Substance and Sexual Activity    Alcohol use: Not Currently     Comment: socially    Drug use: Not Currently     Types: Marijuana     Comment: typically smoked daily but no use for past two weeks as of 23     Social Determinants of Health     Financial Resource Strain: Low Risk  (2023)    Financial Resource Strain     Within the past 12 months, have you or your family members you live with been unable to get utilities (heat, electricity) when it was really needed?: No   Food Insecurity: Low Risk  (2023)    Food Insecurity      Within the past 12 months, did you worry that your food would run out before you got money to buy more?: No     Within the past 12 months, did the food you bought just not last and you didn t have money to get more?: No   Transportation Needs: Low Risk  (12/18/2023)    Transportation Needs     Within the past 12 months, has lack of transportation kept you from medical appointments, getting your medicines, non-medical meetings or appointments, work, or from getting things that you need?: No   Interpersonal Safety: Low Risk  (11/7/2023)    Interpersonal Safety     Do you feel physically and emotionally safe where you currently live?: Yes     Within the past 12 months, have you been hit, slapped, kicked or otherwise physically hurt by someone?: No     Within the past 12 months, have you been humiliated or emotionally abused in other ways by your partner or ex-partner?: No   Housing Stability: Low Risk  (12/18/2023)    Housing Stability     Do you have housing? : Yes     Are you worried about losing your housing?: No        Family History  Family History   Problem Relation Age of Onset    Pancreatic Cancer Father     Diabetes Paternal Grandmother        Outpatient Medications:  Current Facility-Administered Medications   Medication Dose Route Frequency Provider Last Rate Last Admin    acetaminophen (TYLENOL) tablet 650 mg  650 mg Oral Q4H PRN Teja Young MD        Or    acetaminophen (TYLENOL) Suppository 650 mg  650 mg Rectal Q4H PRN Teja Young MD        amLODIPine (NORVASC) tablet 10 mg  10 mg Oral QAM Teja Young MD        benzocaine-menthol (CHLORASEPTIC MAX) 15-10 MG lozenge 1 lozenge  1 lozenge Buccal Q1H PRN Teja Young MD        calcium carbonate (TUMS) chewable tablet 1,000 mg  1,000 mg Oral 4x Daily PRN Teja Young MD        guaiFENesin (ROBITUSSIN) 20 mg/mL solution 200 mg  200 mg Oral Q4H PRN Teja Young MD        heparin ANTICOAGULANT injection 5,000 Units  5,000 Units  Subcutaneous Q8H Teja Young MD   5,000 Units at 06/18/24 0434    levETIRAcetam (KEPPRA) tablet 1,000 mg  1,000 mg Oral BID Teja Young MD   1,000 mg at 06/17/24 2223    lidocaine (LMX4) cream   Topical Q1H PRN Teja Young MD        lidocaine 1 % 0.1-1 mL  0.1-1 mL Other Q1H PRN Teja Young MD        melatonin tablet 1 mg  1 mg Oral At Bedtime Teja Young MD        pantoprazole (PROTONIX) IV push injection 40 mg  40 mg Intravenous Q24H Teja Young MD   40 mg at 06/17/24 2137    polyethylene glycol (MIRALAX) Packet 17 g  17 g Oral BID PRN Teja Young MD        potassium chloride 10 mEq in 100 mL sterile water infusion  10 mEq Intravenous Daily Teja Young MD        predniSONE (DELTASONE) tablet 100 mg  100 mg Oral Daily Teja Young MD        rosuvastatin (CRESTOR) tablet 40 mg  40 mg Oral Daily Teja Young MD        senna-docusate (SENOKOT-S/PERICOLACE) 8.6-50 MG per tablet 1 tablet  1 tablet Oral BID PRN Teja Young MD        Or    senna-docusate (SENOKOT-S/PERICOLACE) 8.6-50 MG per tablet 2 tablet  2 tablet Oral BID PRN Teja Young MD        sodium chloride (PF) 0.9% PF flush 3 mL  3 mL Intracatheter Q8H Teja Young MD   3 mL at 06/17/24 2013    sodium chloride (PF) 0.9% PF flush 3 mL  3 mL Intracatheter q1 min prn Teja Young MD        sodium chloride 0.9 % infusion   Intravenous Continuous Teja Young  mL/hr at 06/18/24 0437 New Bag at 06/18/24 0437     Current Outpatient Medications   Medication Sig Dispense Refill    acetaminophen (TYLENOL) 325 MG tablet Take 2 tablets (650 mg) by mouth every 4 hours as needed for other (For optimal non-opioid multimodal pain management to improve pain control.) 15 tablet 0    amLODIPine (NORVASC) 10 MG tablet Take 1 tablet (10 mg) by mouth every morning 90 tablet 3    baclofen (LIORESAL) 10 MG tablet Take 1 tablet by mouth 2 times daily as needed for muscle spasms      dexAMETHasone  (DECADRON) 1 MG tablet Take 1 tablet (1 mg) by mouth daily (with breakfast) 30 tablet 3    levETIRAcetam (KEPPRA) 1000 MG tablet 1 tablet Orally every 12 hrs 180 tablet 3    naloxone (NARCAN) 4 MG/0.1ML nasal spray Spray 1 spray (4 mg) into one nostril alternating nostrils as needed for opioid reversal every 2-3 minutes until assistance arrives (Patient not taking: Reported on 4/17/2024) 0.2 mL 1    omeprazole (PRILOSEC) 20 MG DR capsule Take 1 capsule (20 mg) by mouth every morning Take 1 hour prior to prednisone 30 capsule 1    ondansetron (ZOFRAN) 4 MG tablet Take 1 tablet (4 mg) by mouth every 8 hours as needed for nausea 10 tablet 5    predniSONE (DELTASONE) 20 MG tablet Take 5 tablets (100 mg) by mouth daily for 7 days, THEN 4.5 tablets (90 mg) daily for 5 days, THEN 4 tablets (80 mg) daily for 5 days, THEN 3.5 tablets (70 mg) daily for 5 days, THEN 3 tablets (60 mg) daily for 5 days, THEN 2.5 tablets (50 mg) daily for 5 days, THEN 2 tablets (40 mg) daily for 5 days, THEN 1.5 tablets (30 mg) daily for 5 days, THEN 1 tablet (20 mg) daily for 5 days, THEN 0.5 tablets (10 mg) daily for 5 days. 148 tablet 0    rosuvastatin (CRESTOR) 40 MG tablet Take 1 tablet (40 mg) by mouth daily 90 tablet 3    sennosides (SENOKOT) 8.6 MG tablet Take 1-2 tablets by mouth 2 times daily as needed for constipation 120 tablet 2        Physical Exam:    Blood pressure 112/68, pulse 108, temperature 98.9  F (37.2  C), temperature source Oral, resp. rate 18, height 1.829 m (6'), weight 95.3 kg (210 lb), SpO2 98%.  General: Tired appearing, lying in bed, no acute distress  HEENT: NC/AT, dry MM  Neck: supple  CV: RRR  Resp: CTAB, normal work of breathing  GI: soft, non-tender, non-distended  MSK: warm and well-perfused  Skin: blotchy patches of erythema on chest and upper back  Neuro: Alert and interactive, moves all extremities equally    Labs & Studies: I personally reviewed the following studies:  ROUTINE LABS (Last four  results):  CMP  Recent Labs   Lab 06/18/24  0525 06/17/24 2055 06/17/24 1742 06/17/24  1720 06/11/24  1101   * 131* 133* 131* 140   POTASSIUM 3.3* 3.7 2.8* 3.2* 4.1   CHLORIDE 99 102  --  98 107   CO2 19* 16*  --  15* 21*   ANIONGAP 14 13  --  18* 12   GLC 85 109* 103* 115* 95   BUN 28.1* 27.9*  --  29.0* 23.5*   CR 1.47* 1.52*  --  1.57* 1.14   GFRESTIMATED 51* 49*  --  47* 69   JOHN PAUL 8.3* 8.1*  --  8.6* 9.1   MAG 2.3 2.5*  --  2.4* 2.0   PHOS 2.7 2.8  --   --   --    PROTTOTAL 5.8*  --   --  6.1* 6.7   ALBUMIN 3.1*  --   --  3.3* 3.9   BILITOTAL 0.8  --   --  0.8 1.2   ALKPHOS 63  --   --  68 84   AST 36  --   --  36 19   ALT 24  --   --  23 27     CBC  Recent Labs   Lab 06/18/24 0525 06/17/24 1742 06/17/24 1720 06/11/24  1101   WBC 11.9*  --  13.6* 13.2*   RBC 4.76  --  5.05 5.02   HGB 15.0 14.3 16.1 16.0   HCT 42.9 42 44.0 47.0   MCV 90  --  87 94   MCH 31.5  --  31.9 31.9   MCHC 35.0  --  36.6* 34.0   RDW 14.6  --  14.3 14.7     --  173 181     INRNo lab results found in last 7 days.    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 Recently complicated by bloody stools as well. As an outpatient he was started on 100 mg prednisone daily (6/11) without improvement so far. He also tested positive for E Coli (6/11) in his stool and received 2 days of azithromycin, but stopped as his diarrhea did not improve. Work-up on admission notable for multiple electrolyte abnormalities, positive fecal lactoferrin, repeat enteric panel positive for adenovirus, and CT AP with mild pericolonic fat stranding. LFTs WNL.    #IV melanoma  #Diarrhea  #Concern for immune mediated colitis   #Adenovirus     We are most concerned that his persistent diarrhea could be due to immune checkpoint inhibitor colitis versus infectious (w/  adenovirus and recent E coli). Unfortunately he has not responded well to high dose steroids or ABX so far. Recommend GI consult to consider colonoscopy and or EGD, biopsy to help assess for immune mediated colitis as well as work-up for bloody diarrhea. If his diarrhea does not improve in the coming days, we are interested in adding infliximab, however we would want to avoid this if his diarrhea is driven by infection. Please also consider ID consult to comment on if adenovirus could explain the extent of his diarrhea.     Recommendations:   - Continue prednisone 100 mg daily   - Consult GI   - Consider ID consultation   - We will continue to consider adding infliximab in the coming days if diarrhea does not improve and infectious etiology is thought to be less likely   - Screen for TB, hep B, hep C, HIV in case pt needs infliximab (ordered)      We will continue to follow this patient. Please do not hesitate to page with any questions or concerns.    Patient was seen and plan of care was discussed with attending physician Dr. Jj.     Chance Moctezuma MD PGY5  Hematology/Oncology   Pager: [phone]

## 2024-06-18 NOTE — CONSULTS
"    Gastroenterology Consultation  GI Luminal Service    Date of Admission:  6/17/2024  Reason for Admission: Acute Diarrhea  Date of Consult  6/18/2024   Requesting Physician:  John Remy           ASSESSMENT AND RECOMMENDATIONS:   Assessment:  Saeid \"Tim\" Kiet is a 68 year old male with a history of stage IV melanoma (with metastases to the brain, right lateral calf, left medial thigh; on Ipilimumab-Nivolumab [Ipi-Nivo]), history of prostate cancer status-post prostatectomy, hypertension, seizures, hyperlipidemia, obstructive sleep apnea who presented to the ED with a 4 week history of loose stools, reportedly 20 per day. The GI Luminal Service was consulted regarding: \"subacute diarrhea - adenovirus +/- checkpoint inhibitor? Recent e coli, flex sig needed?\"      # Subacute Diarrhea  # Elevated C-Reactive Protein (CRP)  # Mild Pericolonic Fat Stranding of Ascending, Descending and Sigmoid Colon on CT Scan 6/17/2024   # Metastatic Melanoma on Immunotherapy (Immune Checkpoint Inhibitors)  # History of Seizures, last seizure 4/2023  Presented with 4 weeks of acute diarrhea (reportedly 20 daily including nocturnal bowel movements) with fecal urgency and intermittent fecal incontinence that started after most recent Ipilimumab-Nivolumab dose on 6/5/2024 for metastatic melanoma.  - 6/11/2024: Enteric Bacteria and Virus Panel PCR POSITIVE for Enteropathogenic E. Coli (EPEC), for which oncology treated with azithromycin for 3 days; patient completed 2 days of treatment without improvement. Patient did not start PO prednisone as an outpatient.   - 6/17/2024: Enteric Bacteria and Virus Panel PCR POSITIVE for Adenovirus; NEGATIVE C. Diff toxin.  - 6/17/2024: CT Abdomen/Pelvis Without Contrast reports no dilated loops of small bowel or colon, colonic diverticulosis, mild pericolonic fat stranding throughout the ascending, descending and sigmoid colon.     Per UpToDate, medication adverse effect:   - " Ipilimumab (Antineoplastic Agent, Anti-CTLA4 Monoclonal Antibody; Antineoplastic Agent, Immune Checkpoint Inhibitor; Antineoplastic Agent, Monoclonal Antibody): Colitis (8% to 31%), diarrhea (32% to 49%; grades ?3: 5% to 10%).   - Nivolumab (Antineoplastic Agent, Anti-PD-1 Monoclonal Antibody; Antineoplastic Agent, Immune Checkpoint Inhibitor; Antineoplastic Agent, Monoclonal Antibody): diarrhea (23% to 37%; grades 3/4: ?5%).    Labs:   - Elevated  mg/L (6/17) --> 115 mg/L (6/18).   - Albumin low at 3.1 g/dL.   - Slightly elevated WBC at 11.9 10e3/uL in the setting of PO Prednisone 100 mg daily (potentially reactive).     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 Will proceed with Flexible Sigmoidoscopy with biopsy tomorrow 6/19/2024. Given grade 4 symptoms, recommend switching to IV methylprednisolone. After flexible sigmoidoscopy tomorrow (not before), recommend scheduling antidiarrheal medication (loperamide) and adding fiber. Additionally recommend RD consult for evaluation and nutritional optimization.          Recommendations:  -- Strict documentation of rectal output.  - Appreciate detailed documentation of stool appearance, including color, consistency, frequency and amount.    - Consider smearing stool thinly on white paper towel to distinguish color.    -- NPO at midnight for planned Flexible Sigmoidoscopy tomorrow 6/19/2024 at 0900.  - Tap water enemas at 0715 and 0745.   - DO NOT start antidiarrheals or supplemental fiber until after the Flexible Sigmoidoscopy.      -- Recommend starting IV methylprednisolone given grade 4 symptoms from a GI standpoint (instead of PO prednisone).     -- Trend daily CRP, albumin, WBC, creatinine.     -- RD consult for evaluation and nutritional optimization; appreciate supplemental fiber recommendations (to be started after Flexible sigmoidoscopy tomorrow).      --  Differential includes osmotic component to diarrhea, so GI ordered stool sodium and stool potassium and utilize these values to calculate the stool osmotic gap using equation: 290 - 2 (Stool Sodium + Stool Potassium).  - An osmotic gap < 50 mOsm/kg is considered normal.  - An osmotic gap > 125 mOsm/kg is consistent with a pure osmotic diarrhea.   -- DO NOT order fecal elastase until patient is having formed stool; this test should NOT be run on loose stools as it will provide a falsely low value.     -- Continue Supportive Cares  - Adequate volume resuscitation with IVF, pRBCs.  - Monitor Hemoglobin closely. Transfuse to keep Hgb > 7 g/dL from GI standpoint.   - Monitor Platelets closely. Keep PLT > 50 10e3/uL from GI standpoint.  - Maintain hemodynamics: MAP >65 mmHg and HR <100.  - Monitor and optimize electrolytes.  - Reposition every 30 minutes while awake.  - Encourage Ambulation as able: 4-6 walks per day.   -- Avoid NSAIDs.  -- Analgesics/Antiemetics per primary team.  -- If the patient experiences overt GI bleeding with hemodynamic instability, please page the GI Luminal Service (listed on [facility]).       COVID status: not tested this admission.     Discussed with Dr. Michelle Tirado - Medicine [place] [address] Resident.     Thank you for involving us in this patient's care. Please do not hesitate to contact the GI service with any questions or concerns.     The patient was discussed and plan agreed upon with Dr. Cosmo Sweet, GI Luminal Service staff physician.    Overall time spent on the date of this encounter preparing to see the patient (including chart review of available notes, clinical status events, imaging and labs); obtaining and/or reviewing separately obtained history from patient's wife Smiley and brother Jose Luis present in the room today; discussing, ordering, coordinating recommended medications, tests or procedures; communicating with other health care professionals; and documenting the above clinical  "information in the electronic medical record was 100 minutes.    Nevaeh Wright PA-C  Inpatient Gastroenterology Service  [facility]  Text Page         History of Present Illness:   Patient seen and examined at 1230. History is obtained from patient, chart review, patient's wife Smiley and brother Jose Luis present in the room today.     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 The GI Luminal Service was consulted regarding: \"subacute diarrhea - adenovirus +/- checkpoint inhibitor? Recent e coli, flex sig needed?\"      Patient's wife Smiley states when she returned from son's wedding on 5/20/2024, patient was already having loose stools, so diarrhea has been occurring since just before that. Patient's brother Jose Luis confirms diarrhea started after first dose of the checkpoint inhibitor immunotherapy.     Reports 20+ bowel movements daily, including nocturnal bowel movements. Endorses fecal urgency and intermittent incontinence, noting feeling humiliated. Intermittently tearful when discussing the diarrhea course. Patient's wife Smiley intermittently tearful as well. Both note this has been tough.     Additional reports [facility] once 1.5 week ago and intermittent since. Does not recall seeing [facility] the last couple days.     Endorses abdominal bloating but no pain.     Reports all previous colonoscopies with [facility], 5 year interval, history of colon polyps, most recent colon ~2 years ago without polyps - patient reports normal.       Previous Procedures:  -- Reports all previous colonoscopies at [facility], last 2 years ago, reports being on a 5 year " recall.               Past Medical History:   Reviewed and edited as appropriate  Past Medical History:   Diagnosis Date    HTN (hypertension)     Metastasis to brain (H) 2023    Metastatic melanoma (H)     Mixed hyperlipidemia     Prostate cancer (H)     Seizures (H)     Sleep apnea             Past Surgical History:   Reviewed and edited as appropriate   Past Surgical History:   Procedure Laterality Date    INSERT DRAIN LUMBAR N/A 7/14/2023    Procedure: LUMBAR DRAIN;  Surgeon: Kye Garrido MD;  Location: [facility] OR    OPTICAL TRACKING SYSTEM CRANIOTOMY, EXCISE TUMOR, COMBINED Left 04/21/2023    Procedure: stealth assisted awake craniotomy resection of motor strip tumor;  Surgeon: Kye Garrido MD;  Location: [facility] OR    OPTICAL TRACKING SYSTEM CRANIOTOMY, EXCISE TUMOR, COMBINED Left 7/14/2023    Procedure: STEALTH ASSISTED CRANIOTOMY, WITH NEOPLASM EXCISION LEFT PARIETO-OCCIPITAL CRANIOTOMY WITH RESECTION OF MASS,;  Surgeon: Kye Garrido MD;  Location: [facility] OR    OPTICAL TRACKING SYSTEM CRANIOTOMY, EXCISE TUMOR, COMBINED Right 12/21/2023    Procedure: CRANIOTOMY, USING OPTICAL TRACKING SYSTEM, WITH NEOPLASM EXCISION, RIGHT FRONTAL CRANOTOMY WITH RESECTION OF TUMOR;  Surgeon: Kye Garrido MD;  Location: [facility] OR    PROSTATECTOMY      2009              Social History:   The patient lives in [place], [place].     Per history tab:  Alcohol: Not currently.  Tobacco: Former cigarette smoker.    Illicit drugs: history of marijuana.            Family History:   Patient's family history is reviewed today and is non-contributory    Family History   Problem Relation Age of Onset    Pancreatic Cancer Father     Diabetes Paternal Grandmother              Allergies:   Reviewed and edited as appropriate   No Known Allergies         Medications:     Current Facility-Administered Medications   Medication Dose Route Frequency Provider Last Rate Last Admin    acetaminophen (TYLENOL) tablet 650 mg  650 mg Oral Q4H PRN Teja Young MD         Or    acetaminophen (TYLENOL) Suppository 650 mg  650 mg Rectal Q4H PRN Teja Young MD        [Held by provider] amLODIPine (NORVASC) tablet 10 mg  10 mg Oral QAM Teja Young MD        benzocaine-menthol (CHLORASEPTIC MAX) 15-10 MG lozenge 1 lozenge  1 lozenge Buccal Q1H PRN Teja Young MD        calcium carbonate (TUMS) chewable tablet 1,000 mg  1,000 mg Oral 4x Daily PRN Teja Young MD        guaiFENesin (ROBITUSSIN) 20 mg/mL solution 200 mg  200 mg Oral Q4H PRN Teja Young MD        heparin ANTICOAGULANT injection 5,000 Units  5,000 Units Subcutaneous Q8H Teja Young MD   5,000 Units at 06/18/24 0434    lactated ringers infusion   Intravenous Continuous Michelle Tirado  mL/hr at 06/18/24 1055 New Bag at 06/18/24 1055    levETIRAcetam (KEPPRA) tablet 1,000 mg  1,000 mg Oral BID Teja Young MD   1,000 mg at 06/18/24 0832    lidocaine (LMX4) cream   Topical Q1H PRN Teja Young MD        lidocaine 1 % 0.1-1 mL  0.1-1 mL Other Q1H PRN Teja Young MD        melatonin tablet 1 mg  1 mg Oral At Bedtime Teja Young MD        pantoprazole (PROTONIX) IV push injection 40 mg  40 mg Intravenous Q24H Teja Young MD   40 mg at 06/17/24 2137    polyethylene glycol (MIRALAX) Packet 17 g  17 g Oral BID PRN Teja Young MD        potassium chloride 10 mEq in 100 mL sterile water infusion  10 mEq Intravenous Daily Teja Young MD   Stopped at 06/18/24 1036    predniSONE (DELTASONE) tablet 100 mg  100 mg Oral Daily Teja Young MD   100 mg at 06/18/24 0832    rosuvastatin (CRESTOR) tablet 40 mg  40 mg Oral Daily Teja Young MD   40 mg at 06/18/24 0832    senna-docusate (SENOKOT-S/PERICOLACE) 8.6-50 MG per tablet 1 tablet  1 tablet Oral BID PRN Teja Young MD        Or    senna-docusate (SENOKOT-S/PERICOLACE) 8.6-50 MG per tablet 2 tablet  2 tablet Oral BID PRN Teja Young MD        sodium chloride (PF) 0.9% PF flush 3 mL  3  mL Intracatheter Q8H Teja Young MD   3 mL at 06/17/24 2013    sodium chloride (PF) 0.9% PF flush 3 mL  3 mL Intracatheter q1 min prn Teja Young MD         Current Outpatient Medications   Medication Sig Dispense Refill    acetaminophen (TYLENOL) 325 MG tablet Take 2 tablets (650 mg) by mouth every 4 hours as needed for other (For optimal non-opioid multimodal pain management to improve pain control.) 15 tablet 0    amLODIPine (NORVASC) 10 MG tablet Take 1 tablet (10 mg) by mouth every morning 90 tablet 3    baclofen (LIORESAL) 10 MG tablet Take 1 tablet by mouth 2 times daily as needed for muscle spasms      dexAMETHasone (DECADRON) 1 MG tablet Take 1 tablet (1 mg) by mouth daily (with breakfast) 30 tablet 3    levETIRAcetam (KEPPRA) 1000 MG tablet 1 tablet Orally every 12 hrs 180 tablet 3    naloxone (NARCAN) 4 MG/0.1ML nasal spray Spray 1 spray (4 mg) into one nostril alternating nostrils as needed for opioid reversal every 2-3 minutes until assistance arrives 0.2 mL 1    omeprazole (PRILOSEC) 20 MG DR capsule Take 1 capsule (20 mg) by mouth every morning Take 1 hour prior to prednisone 30 capsule 1    ondansetron (ZOFRAN) 4 MG tablet Take 1 tablet (4 mg) by mouth every 8 hours as needed for nausea 10 tablet 5    predniSONE (DELTASONE) 20 MG tablet Take 5 tablets (100 mg) by mouth daily for 7 days, THEN 4.5 tablets (90 mg) daily for 5 days, THEN 4 tablets (80 mg) daily for 5 days, THEN 3.5 tablets (70 mg) daily for 5 days, THEN 3 tablets (60 mg) daily for 5 days, THEN 2.5 tablets (50 mg) daily for 5 days, THEN 2 tablets (40 mg) daily for 5 days, THEN 1.5 tablets (30 mg) daily for 5 days, THEN 1 tablet (20 mg) daily for 5 days, THEN 0.5 tablets (10 mg) daily for 5 days. 148 tablet 0    rosuvastatin (CRESTOR) 40 MG tablet Take 1 tablet (40 mg) by mouth daily 90 tablet 3    sennosides (SENOKOT) 8.6 MG tablet Take 1-2 tablets by mouth 2 times daily as needed for constipation 120 tablet 2              Review of Systems:     A complete review of systems was performed and is negative except as noted in the HPI           Physical Exam:   Temp: 98.8  F (37.1  C) Temp src: Oral BP: (!) 123/91 Pulse: 118   Resp: 18 SpO2: 99 % O2 Device: None (Room air)    Wt:   Wt Readings from Last 2 Encounters:   06/17/24 95.3 kg (210 lb)   06/17/24 90.7 kg (200 lb)        General: 68 year old male sitting on the side of the hospital bed in NAD. Appears comfortable.  Answers appropriately.     HEENT: Head is AT/NC. Sclera anicteric.   Lungs: No increased work of breathing, speaking in full sentences, equal chest rise. On Room Air.   Heart: Regular rate. Peripheral perfusion intact.  Abdomen: Soft, non-tender, +moderately distended.  No peritoneal signs.  Extremities: WWP.  Musculoskeletal: No gross deformity.  Skin: No jaundice or rash on exposed skin.  Neurologic: Grossly non-focal.  CN 2-12 grossly intact.   Mental status/Psych: A&O. Asks/answers questions appropriately. Pleasant, interactive. +intermittently tearful.             Data:   LAB WORK:    BMP  Recent Labs   Lab 06/18/24 0525 06/17/24 2055 06/17/24 1742 06/17/24  1720   * 131* 133* 131*   POTASSIUM 3.3* 3.7 2.8* 3.2*   CHLORIDE 99 102  --  98   JOHN PAUL 8.3* 8.1*  --  8.6*   CO2 19* 16*  --  15*   BUN 28.1* 27.9*  --  29.0*   CR 1.47* 1.52*  --  1.57*   GLC 85 109* 103* 115*     CBC  Recent Labs   Lab 06/18/24 0525 06/17/24 1742 06/17/24  1720   WBC 11.9*  --  13.6*   RBC 4.76  --  5.05   HGB 15.0 14.3 16.1   HCT 42.9 42 44.0   MCV 90  --  87   MCH 31.5  --  31.9   MCHC 35.0  --  36.6*   RDW 14.6  --  14.3     --  173     INRNo lab results found in last 7 days.  LFTs  Recent Labs   Lab 06/18/24 0525 06/17/24  1720   ALKPHOS 63 68   AST 36 36   ALT 24 23   BILITOTAL 0.8 0.8   PROTTOTAL 5.8* 6.1*   ALBUMIN 3.1* 3.3*      PANC  Recent Labs   Lab 06/17/24  1720   LIPASE 280*       IMAGING:    CT abdomen and pelvis without intravenous contrast. 6/17/2024  7:56  PM  HISTORY: abdominal pain, diarrhea        TECHNIQUE: Helical acquisition of image data was performed for the  abdomen and pelvis without intravenous contrast.     COMPARISON: 4/17/2024     FINDINGS:  Lower thorax: No focal airspace consolidation. Patchy subsegmental  atelectasis. Stable 4 mm subpleural nodule in right middle lobe.     Liver: No intrahepatic biliary dilatation. No focal hepatic mass  within the limitations of noncontrast technique.     Gallbladder/biliary tree: The common bile duct is not dilated.  Gallbladder appears unremarkable.     Pancreas: The pancreatic duct is not dilated.     Spleen: The spleen is not enlarged.     Adrenal glands: No adrenal nodules.     Kidneys/ureters: No hydronephrosis. No renal calculi. Bilateral  parapelvic renal cysts.     Bladder/pelvic organs: Decompressed urinary bladder.     Bowel/mesentery: No dilated loops of small bowel or colon. Colonic  diverticulosis. Mild pericolonic fat stranding throughout the  ascending, descending and sigmoid colon.     Stomach: Unremarkable.     Peritoneum/retroperitoneum: No extraluminal bowel gas. Trace free  fluid in the pelvis.     Lymph nodes: No enlarged abdominal or pelvic lymph nodes by short axis  criteria. Prominent subcentimeter short-axis right lower quadrant  mesenteric lymph nodes are likely reactive.     Major vessels: No aneurysmal dilatation.     Bones/soft tissues: Degenerative changes of the spine. No acute or  suspicious osseous abnormality. AVN changes in both hips. Small  fat-containing bilateral inguinal hernias.                                                                      IMPRESSION:  1. Mild pericolonic fat stranding along the ascending, descending and  sigmoid colon is concerning for infective/inflammatory colitis.  2. Colonic diverticulosis.        =======================================================================

## 2024-06-18 NOTE — PROGRESS NOTES
[facility]    Medicine Progress Note - Medicine Service, BAN TEAM 4    Date of Admission:  6/17/2024    Assessment & Plan   Saeid Santa is a 68 year old male who presents with hx of HTN, seizures, hx of prostate cancer sp prostatectomy, and metastatic melanaoms w/ mets to brain, currently treated with ipilimab/nivolumab. He completed cycle 2 of treatment on 6/5, and is being admitted for dehydration, weakness, and 2 weeks of watery diarrhea, 4-8 stools per day, concerning for enterocolitis vs checkpoint induce colitis.     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 Coli positive stool test  #NAGMA 2/2 above  Patient w/ hx of metastatic melanoma currently treated w/ ipilumab/nivolumab, last infusion 06/05. Has had diarrhea since 06/08, with 4-8 watery stools per day. Initially no black, bloody, or tarry stools, but over the last week has had intermittent bloody stools (not present on admission). 6/12 stool  sample tested positive for E.coli, and his primary oncologist stopped prednisone, omperazole. Started azithromycin for 3 days, completed 2 without improvement. C. Diff negative on admission. Outpatient oncology team started prednisone 100mg/d for 5 days, though patient states that he never starting taking this. Patient received 1L IVF bolus on admission. Procal to 0.85, , lactate WNL, positive fecal lactoferrin, positive adenovirus. Admission CT AP w/ pericolic fat stranding, suspicious for mild colitis.  - GI following; recs appreciated   - Plan for flex sig on 6/19   - NPO at midnight   - Stool lytes pending  - Oncology following; recs appreciated   - Considering infliximab if  no improvement in diarrhea  - PO prednisone 100mg daily > IV methylprednisolone 50mg BID (6/19-present)   - Will need ppx if on extended steroid taper  - Hold off on empiric abx for now  - LR @ 125cc/hr     #Acute kidney injury, suspected prerenal 2/2 diarrhea  Baseline Cr 0.9, admitted w/ 1.57. Likely pre-renal given copious diarrhea.   - mIVF as above  - Hold PTA baclofen    #Hypokalemia-resolved  #Hypermagnesemia  #Hyponatremia  #Prolonged QTC (616)-resolved (476)  Patient with multiple electrolyte disturbances given ~ 2 weeks of copious watery diarrhea, despite endorsed adequate PO intake and pedialyte supplementation. Admitted w/ , K 2.8, Mg 2.4, Na 131. Received 40meq KCL in ED W/ 1L LR bolus. Lytes rechecked 2 hrs later to K 3.7 w/ repeat EKG of 476. Patient on telemetry  - Telemetry     #Deconditioning with falls  Likely 2/2 generalized weakness, brain mets, and dehydration.  -Consulted PT/OT, appreciate recs     #Metastatic Melanoma (4/2023) w/ mets to brain  #Seizures  Patient found to have ring enhancing lesions on MRI . 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  Has continued to show CNS involvement of disease status post multiple craniotomies and resections (tumor resection 4/2023, L parieto-occipital craniotomy 7/2023, r frontal craniotomy 12/2023, gamma knife 02/2024). Recent PET scan w/ concerns for mets to soft tissues in R lateral calf and L medial thigh as well as liver lesions.  Last seizure was 04/2023.   -PTA keppra  -Consider oncology consult in AM     #Hx of prostate cancer sp 2009 prostatectomy          Diet: Combination Diet Regular Diet Adult    DVT Prophylaxis: Heparin SQ  Aragon Catheter: Not present  Lines: None     Cardiac Monitoring: ACTIVE order. Indication: electrolyte abnormalties  Code Status: Full Code      Clinically Significant Risk Factors Present on Admission        # Hypokalemia:  Lowest K = 2.8 mmol/L in last 2 days, will replace as needed       # Hypoalbuminemia: Lowest albumin = 3.1 g/dL at 6/18/2024  5:25 AM, will monitor as appropriate     # Hypertension: Noted on problem list               # Overweight: Estimated body mass index is 28.48 kg/m  as calculated from the following:    Height as of this encounter: 1.829 m (6').    Weight as of this encounter: 95.3 kg (210 lb).         # Financial/Environmental Concerns:           Disposition Plan     Medically Ready for Discharge: Anticipated in 2-4 Days pending steroid taper and flex sig, improvement in diarrhea           The patient's care was discussed with the Attending Physician, Dr. Remy .    Michelle Tirado MD  Medicine Service, [address]  Securely message with [employer] (more info)  Text page via [facility] Paging/Directory   See signed in provider for up to date coverage information  ______________________________________________________________________    Interval History   No acute events overnight. Had > 10 episodes of diarrhea overnight. No blood in stool. Denies nausea, vomiting, fever, chills, abdominal pain, headache. Poor PO intake. States that he never started prednisone PTA and took azithromycin for 2 days without improvement. Feels disappointed by cares overnight.       Physical Exam   Vital Signs: Temp: 98.8  F (37.1  C) Temp src: Oral BP: (!) 123/91 Pulse: 118   Resp: 18 SpO2: 99 % O2 Device: None (Room air)    Weight: 210 lbs 0 oz    Constitutional: NAD. Conversant. Chronically ill appearing.  HEENT: NC/AT, anicteric sclera, pupils round and equal, EOMI, MMM, no oral lesions/ulcers, no cervical lymphadenopathy.  CV: RRR, normal S1/S2, no murmurs/gallops/rubs, intact distal pulses.  Pulm: Non-labored respirations on room air, CTAB.  Abdomen: Normoactive bowel sounds, soft, non-distended, non-tender.  Extremities: Warm and well-perfused, no peripheral edema.  Skin:  Scattered macular rash across chest and back. Scattered seborrheic keratosis.  Neuro: Alert and oriented x3, moves all four extremities independently.  Psych: Normal affect.      Medical Decision Making             Data   ED Course  Vitals: -110, afebrile, /60s  I/O: 5 BM  Labs: Na 132, K 3.3, bicarb 19 (NAGMA), Cr 1.57 > 1.47 (baseline 0.9), , lipase 280, procal 0.85, VBG 7.46/22, WBC 13.6 > 11.9 w/ absolute neutrophilia, positive fecal lactoferrin  - WNL: lactic acid 1.6, C diff  - Pending: enteric panel  EKG: sinus tachy w/ PVCs, Qtc 476  Imaging:  - CT AP WO CONT  1. Mild pericolonic fat stranding along the ascending, descending and  sigmoid colon is concerning for infective/inflammatory colitis.  2. Colonic diverticulosis.  Interventions: 1L LR, NS @ 125cc/hr    Imaging results reviewed over the past 24 hrs:   Recent Results (from the past 24 hour(s))   CT Abdomen Pelvis w/o Contrast    Narrative    EXAM: CT abdomen and pelvis without intravenous contrast. 6/17/2024  7:56 PM    HISTORY: abdominal pain, diarrhea       TECHNIQUE: Helical acquisition of image data was performed for the  abdomen and pelvis without intravenous contrast.    COMPARISON: 4/17/2024    FINDINGS:  Lower thorax: No focal airspace consolidation. Patchy subsegmental  atelectasis. Stable 4 mm subpleural nodule in right middle lobe.    Liver: No intrahepatic biliary dilatation. No focal hepatic mass  within the limitations of noncontrast technique.    Gallbladder/biliary tree: The common bile duct is not dilated.  Gallbladder appears unremarkable.    Pancreas: The pancreatic duct is not dilated.    Spleen: The spleen is not enlarged.    Adrenal glands: No adrenal nodules.    Kidneys/ureters: No hydronephrosis. No renal calculi. Bilateral  parapelvic renal cysts.    Bladder/pelvic organs: Decompressed urinary bladder.    Bowel/mesentery: No dilated loops of small bowel or colon. Colonic  diverticulosis. Mild pericolonic fat  stranding throughout the  ascending, descending and sigmoid colon.    Stomach: Unremarkable.    Peritoneum/retroperitoneum: No extraluminal bowel gas. Trace free  fluid in the pelvis.    Lymph nodes: No enlarged abdominal or pelvic lymph nodes by short axis  criteria. Prominent subcentimeter short-axis right lower quadrant  mesenteric lymph nodes are likely reactive.    Major vessels: No aneurysmal dilatation.    Bones/soft tissues: Degenerative changes of the spine. No acute or  suspicious osseous abnormality. AVN changes in both hips. Small  fat-containing bilateral inguinal hernias.      Impression    IMPRESSION:  1. Mild pericolonic fat stranding along the ascending, descending and  sigmoid colon is concerning for infective/inflammatory colitis.  2. Colonic diverticulosis.    I have personally reviewed the examination and initial interpretation  and I agree with the findings.    ARUNA DUMONT MD         SYSTEM ID:  [id number]

## 2024-06-18 NOTE — UTILIZATION REVIEW
Admission Status; Secondary Review Determination         Under the authority of the Utilization Management Committee, the utilization review process indicated a secondary review on the above patient.  The review outcome is based on review of the medical records, discussions with staff, and applying clinical experience noted on the date of the review.        (x)      Inpatient Status Appropriate - This patient's medical care is consistent with medical management for inpatient care and reasonable inpatient medical practice.       RATIONALE FOR DETERMINATION     The patient is a 68-year-old male admitted on 6/17/2024.  Patient has stage IV melanoma with metastasis to the brain, right lateral calf, left medial thigh and is on chemotherapy.  Patient comes in to the ED with dehydration and 2 weeks of watery diarrhea 4-8 stools per day concerning for enterocolitis.  Patient did have recent E. coli positive stool test.  He also has metabolic acidosis.  He is on a steroid taper at this time and has multiple deficiencies including potassium, magnesium which are both being replaced.  He also has hyponatremia.  Admission creatinine likely prerenal elevation at 1.57.  GI consult was obtained and plan is for flexible sigmoidoscopy tomorrow and additional workup for recurrent diarrhea.  Based on significant findings metabolically with dehydration and significant severe diarrhea given additional complications, agree with inpatient status.    The severity of illness, intensity of service provided, expected LOS and risk for adverse outcome make the care complex, high risk and appropriate for hospital admission.        The information on this document is developed by the utilization review team in order for the business office to ensure compliance.  This only denotes the appropriateness of proper admission status and does not reflect the quality of care rendered.         The definitions of Inpatient Status and Observation Status  used in making the determination above are those provided in the CMS Coverage Manual, Chapter 1 and Chapter 6, section 70.4.      Sincerely,     Mo Santos MD  Physician Advisor  Utilization Review/ Case Management  [facility].

## 2024-06-19 NOTE — PLAN OF CARE
Goal Outcome Evaluation:      Plan of Care Reviewed With: patient    Overall Patient Progress: declining    Outcome Evaluation: x2 tap water enemas given prior to flex sig this AM. Upon return pt w/ rigors, more lethargic requiring reptative stimulation to remain awake. Temp up to 99s, tachy w/ HR in 130s- provider ordered lactic acid, fluid bolus, iv abx & BCs- lactic came back at 7.0 so RRT was called. Pt vitals & mentation improved & LA rechecked for 1.4 by afternoon s/p 2L LR, IV zosyn & IV vanco. UA collected, abd xray done, echo completed, MRSA nasal swab sent to lab and pt placed on tele which reads sinus/ sinus tach w/ PVCs. K replaced PO x2, phos replaced PO- both & mag w/ rechecks in AM. Started imodium QID for diarrhea. Advanced to regular diet. Declined working w/ PT. Family at bedside

## 2024-06-19 NOTE — PROGRESS NOTES
"CLINICAL NUTRITION SERVICES - ASSESSMENT NOTE     Nutrition Prescription    RECOMMENDATIONS FOR MDs/PROVIDERS TO ORDER:  Continue with diet as tolerated per GI    Malnutrition Status:    Severe malnutrition in the context of acute presentation    Recommendations already ordered by Registered Dietitian (RD):  Ordered Malka ruth standard chocolate flavor with lunch and dinner trays ( contains soluble fiber)  Upgraded diet to high kcal, high protein due to family request for wanting to order multiple side dishes instead of main entree.   Ordered snacks in between meals: String cheese x2 at 2:00, Yogurt at HS to optimize intake      Future/Additional Recommendations:  PO adequacy  Resolution of altered GI         REASON FOR ASSESSMENT  Saeid Santa is a/an 68 year old male assessed by the dietitian for Provider Order - severe diarrhea, appreciate nutrition optimization and supplemental fiber recs     Chart reviewed:  PMH: Stage IV melanoma (with metastases to the brain, right lateral calf, left medial thigh; on immunotherapy ( immune check point inhibitors),   - History of prostate cancer status-post prostatectomy,   - Hypertension, seizures, hyperlipidemia, obstructive sleep apnea     - Presented to the ED with a 4 week history of loose stools, reportedly 20 per day. Started after most recent immunotherapy ( 6/5/24).   Patient admitted with diarrhea 2/2 adenovirus, possible checkpoint inhibitor related colitis. S/p flex sigmoidoscopy 6/19 this morning, revealed severe colitis most c/w immune checkpoint colitis, path pending.     - 6/11/2024: Enteric Bacteria and Virus Panel PCR POSITIVE for E.coli  - 6/17/2024: Enteric Bacteria and Virus Panel PCR POSITIVE for Adenovirus;   - Per GI note, \" DO NOT start antidiarrheals or supplemental fiber until after the Flexible Sigmoidoscopy\".        NUTRITION HISTORY  Met with patient today. Patient was resting. Spouse and daughter at bedside. Wife states, patient with minimal po " "intake for 1 week prior to admit. Currently continues with limited po intake due to altered GI status. Family is bringing ensure standard oral supplements from home and would like to start oral supplements with each meal trays.     Family reports inability to order more than 2 sides on the regular meal trays. Family would like to try small meals if possible and to try to encourage patient to eat.     CURRENT NUTRITION ORDERS  Diet: NPO for flex sigmoidoscopy today   Intake/Tolerance: was on regular diet on admit -. Patient with fair intake, 50% of small meal intake per flow sheet.       LABS  CRP: 80.30 (H) <-- was 115 on 618  BUN: 25.6 (H), Cr: 1.20 (H), GFR: 65  B      MEDICATIONS  Imodium 2 mg QID starting today after sigmoidoscopy  Vanco  Neutrophos       ANTHROPOMETRICS  Height: 182.9 cm (6' 0\")  Most Recent Weight: 93.4 kg (206 lb) standing wt on 24  IBW: 80.9 kg ( 115% IbW)  BMI: Overweight BMI 25-29.9  Weight History: 1.9% wt loss in 2 weeks vs. 5.1% in 1 month   Wt Readings from Last 10 Encounters:   24 93.4 kg (206 lb)   24 90.7 kg (200 lb)   24 94.4 kg (208 lb 1.6 oz)   24 95.2 kg (209 lb 12.8 oz)   24 99.8 kg (220 lb)   24 94.8 kg (209 lb)   05/15/24 98.4 kg (217 lb)   05/15/24 95.3 kg (210 lb)   24 95.3 kg (210 lb)   24 98 kg (216 lb)         Dosing Weight: 93 kg admit wt    ASSESSED NUTRITION NEEDS  Estimated Energy Needs: 2325 +  kcals/day (25 - 30 kcals/kg)  Justification: Maintenance  Estimated Protein Needs: 102+  grams protein/day (1.1 - 1.4 grams of pro/kg)  Justification: Immunotherapy   Estimated Fluid Needs:  (1 mL/kcal)   Justification: Maintenance      PHYSICAL FINDINGS  See malnutrition section below.      MALNUTRITION  % Intake: </= 50% for >/= 5 days (severe)  % Weight Loss: > 5% in 1 month (severe)  Subcutaneous Fat Loss: None observed  Muscle Loss: Facial & jaw region:  Mild  Fluid Accumulation/Edema: None noted per " flow sheet review.   Malnutrition Diagnosis: Severe malnutrition in the context of acute presentation      NUTRITION DIAGNOSIS  Inadequate oral intake related to altered GI associated with diarrhea as evidenced by patient with limited po x1 week PTA and x 2 days since admit.         INTERVENTIONS  Implementation  Nutrition Education: Discussed snack and oral supplement availabilities and encouraged intake with low po and appetite. Encouraged family to order soft foods for better tolerance.      Medical food supplement therapy  Modify composition of meals/snacks   Provided patient with few cafeteria meal tickets to optimize meals and snacks options       Goals  Patient to consume % of nutritionally adequate meal trays TID, or the equivalent with supplements/snacks.       Monitoring/Evaluation  Progress toward goals will be monitored and evaluated per protocol.    Estephania Infante RD/LD  Unit 7C (2863-5883) and (2880-0540),  Monday-Friday: Vocera -> (7C clinical Dietitian),   Weekend/Holiday: Vocera -> (Weekend Clinical Dietitian)

## 2024-06-19 NOTE — PLAN OF CARE
Goal Outcome Evaluation:  Plan of Care Reviewed With: patient  Overall Patient Progress: no change  Outcome Evaluation:     Assumed Cares 8316-7086  Dx: Electrolyte imbalance, metabolic acidosis due to diarrhea caused by adinovirus. Plan is for flexible sigmoidoscopy tomorrow and additional workup for recurrent diarrhea.     Shift Events: Admitted to 7C from ED  Lines/Drains: LR infusing 125cc/hr    VSS on RA, afebrile, tachycardic     Temp: 97.4  F (36.3  C)    BP: 98/78    Pulse: 106    Resp: 18    SpO2: 96 %    O2 Device: None (Room air)          LABS: K replaced and mag + phos WNL w/ AM recheck.   Neuro: A&Ox4, pleasant and calm. 1x event at 9pm where staff entered room on hourly rounds and pt expressed extreme frustration with the care he is receiving on the floor and frustration w/ the IV lines, alarming pumps and inconveniences of stooling so frequently. Writer listened to patients concerns and attempted to address all issues to improve pt comfort. Writer unable to determine what specifically changed the patient's mood/approval of care based on the patient's complaints. Writer reassured pt that staff would do their best to stay on top of alarming pumps throughout the night.   Cardiac: Sinus tachycardia. Tele discontinued.   Respiratory: WDL  GI/: Voiding adequately. Frequent diarrhea green watery stools.    Diet/appetite: Tolerating regular diet. Denies nausea.  Activity: Independent to bedside commode and assist x1 to bathroom.   Pain: Denies   Skin: Hydrocortisone and camphor lotion ordered for chest rash (pt reports this started 3 weeks ago w/ diarrhea).     Plan: NPO midnight. 7:15 + 7:45am tap water enema for 9am sigmoidoscopy.

## 2024-06-19 NOTE — PROGRESS NOTES
Care Management Follow Up    Length of Stay (days): 2    Expected Discharge Date: 06/21/2024     Concerns to be Addressed:  Discharge planning     Patient plan of care discussed at interdisciplinary rounds: Yes    Anticipated Discharge Disposition: Home Care, Home     Anticipated Discharge Services: None  Anticipated Discharge DME: None    Patient/family educated on Medicare website which has current facility and service quality ratings: other (see comments) (Choice offered)  Education Provided on the Discharge Plan: Yes  Patient/Family in Agreement with the Plan: yes    Referrals Placed by CM/SW: Homecare  Private pay costs discussed: Not applicable    Additional Information:  Yesterday, PETE Mccall, completed Care Management Consult and recommended skilled home care RN, PT, HHA & SW services. Referral was started but not sent yet. This morning I sent referral to [facility]. Wife is pt's primary caregiver; she reported being exhausted. Has support from pt's brother.     PT & OT consults pending.   Sigmoidoscopy planned for today.       Maribel Valente RN Care Coordinator   [facility]  On 6- RNCC coverage for Unit [address]C rooms 1593-1105. Call [phone].       [facility] (referral pending for RN, PT, HHA, SW)

## 2024-06-19 NOTE — CODE/RAPID RESPONSE
Rapid Response Team Note    Assessment   A rapid response was called on Saeid Santa due to lactic acidosis most likely secondary to sepsis/severe sepsis, intravascular volume depletion. Needs further evaluation for possible intra-abdominal process, severely elevated lactate of 7.0 raises concern for perforation following flex sig.    Plan   -  STAT AXR  -  UA/UC  -  Blood cultures x2 already drawn  -  Repeat lactate in 1 hour  -  Agree with IV Zosyn  -  2 liter LR bolus  -  Cardiac monitoring  -  Low threshold for CT if AXR unrevealing and lactate not improving with IVF resuscitation  -  The Internal Medicine primary team was able to be reached and they are in agreement with the above plan.  -  [place]: [place] [facility] [place]  -  Reassessment and plan follow-up will be performed by the primary team    Shanice Sandoval PA-C  Rapid Response Team AMANDA  Securely message with [employer]     Medical Decision Making       25 MINUTES SPENT BY ME on the date of service doing chart review, history, exam, documentation & further activities per the note.          Hospital Course   Brief Summary of events leading to rapid response:   Code Sepsis called for lactic acid of 7.0. Patient admitted with diarrhea 2/2 adenovirus, possible checkpoint inhibitor related colitis. S/p flex sigmoidoscopy this morning, revealed severe colitis most c/w immune checkpoint colitis, path obtained and pending.     Patient's condition declining throughout the morning with new tachycardia, soft blood pressures, rigors, ill appearance. Primary team ordered 1 liter LR bolus, Zosyn, blood cultures, AXR, lactic acid.     Upon my arrival patient found supine in bed, restless and agitated, incontinent of watery stool, BP 80/50s with MAP of 63, HR in 130s, O2 sats high 90s on room air. Patient denying any pain, including no abdominal or chest pain. Blood pressure improved with initiation of LR bolus.     Physical Exam   Vital Signs:  Temp: 99.9  F (37.7  C) Temp src: Oral BP: 127/88 Pulse: (!) 126   Resp: 22 SpO2: 96 % O2 Device: None (Room air)    Weight: 206 lbs 0 oz      Exam:   Awake and alert. Agitated. Ill appearing. Tachycardic, regular rhythm, no audible murmurs. Lungs CTAB, breathing non-labored. Abdomen distended but soft and non-tender with deep palpation, bowel sounds present.     Significant Results and Procedures   ROUTINE IP LABS (Last four results)  Recent Labs   Lab 06/19/24  1109 06/19/24  0613 06/18/24 0525 06/17/24 2055 06/17/24 1742 06/17/24  1720   NA  --  135 132* 131* 133* 131*   POTASSIUM 3.6 3.3* 3.3* 3.7 2.8* 3.2*   CHLORIDE  --  103 99 102  --  98   CO2  --  20* 19* 16*  --  15*   ANIONGAP  --  12 14 13  --  18*   GLC  --  89 85 109* 103* 115*   BUN  --  25.6* 28.1* 27.9*  --  29.0*   CR  --  1.20* 1.47* 1.52*  --  1.57*   JOHN PAUL  --  8.5* 8.3* 8.1*  --  8.6*   MAG  --  2.3 2.3 2.5*  --  2.4*   PHOS  --  2.2* 2.7 2.8  --   --    PROTTOTAL  --  5.9* 5.8*  --   --  6.1*   ALBUMIN  --  3.1* 3.1*  --   --  3.3*   BILITOTAL  --  0.6 0.8  --   --  0.8   ALKPHOS  --  64 63  --   --  68   AST  --  36 36  --   --  36   ALT  --  25 24  --   --  23     Recent Labs   Lab 06/19/24 0613 06/18/24 0525 06/17/24 1742 06/17/24  1720   WBC 9.4 11.9*  --  13.6*   RBC 4.10* 4.76  --  5.05   HGB 12.7* 15.0 14.3 16.1   HCT 37.2* 42.9 42 44.0   MCV 91 90  --  87   MCH 31.0 31.5  --  31.9   MCHC 34.1 35.0  --  36.6*   RDW 14.6 14.6  --  14.3    163  --  173     No lab results found in last 7 days.         Latest Ref Rng & Units 5/22/2024     1:40 PM 6/5/2024    11:04 AM 6/11/2024    11:01 AM 6/17/2024     5:20 PM 6/17/2024     8:55 PM 6/18/2024     5:25 AM 6/19/2024     6:13 AM   Glucose Values   Glucose 70 - 99 mg/dL 123  101  95  115  109  85  89             Sepsis Evaluation   The patient is  suspected  to have an infection.    Vital signs, lab and physical exam findings constitute a diagnosis of SEVERE SEPSIS, based on:SIRS  "criteria met and Lactic acid resulted with a level > 2.0          Anti-infectives (From now, onward)      Start     Dose/Rate Route Frequency Ordered Stop    06/19/24 1200  piperacillin-tazobactam (ZOSYN) 3.375 g vial to attach to  mL bag        Note to Pharmacy: For [facility], [facility] and [facility]: For Zosyn-naive patients, use the \"Zosyn initial dose + extended infusion\" order panel.    3.375 g  over 30 Minutes Intravenous EVERY 6 HOURS 06/19/24 1044            Current antibiotic coverage is appropriate for source of infection.    3 Hour Severe Sepsis Bundle Completion:  1. Initial Lactic Acid result shown above. Repeat lactic acid ordered by reflex for 2 hours from initial collection.  2. Blood Cultures: Ordered, to be drawn prior to antibiotics.  3. Broad Spectrum Antibiotics Administered: yes  4. Is hypotension present? No (IV fluid bolus NOT required). IV Fluid volume administered: 2 liters    "

## 2024-06-19 NOTE — PROGRESS NOTES
"   06/19/24 1100   Call Information   Date of Call 06/19/24   Time of Call 1128   Name of person requesting the team Thuy   Title of person requesting team RN   RRT Arrival time 1130   Time RRT ended 1145   Reason for call   Type of RRT Adult   Primary reason for call Sepsis suspected;Fluid status   Fluid status Other (describe)  (allison kongredella)   Sepsis Suspected Elevated Lactate level   Was patient transferred from the ED, ICU, or PACU within last 24 hours prior to RRT call? Yes   SBAR   Situation LA=7.0   Background 68 year old male with PMH notable for prostate cancer s/p prostatectomy, melanoma with brain metastasis s/p multiple gamma knife (most recent on 05/15-05/23/24) currently on ipi/nivo (last infusion on 06/05/24) who presents to the Emergency Department with frequent diarrhea, positive E. coli testing, and dehydration.   Notable History/Conditions Cancer   Assessment Lethargic, red faced, per patient, \"you guys are pissing me off.\" NST and RN cleaning him up from an incontinent stool.   Interventions Fluid bolus;Labs;Meds   Patient Outcome   Patient Outcome Stabilized on unit   RRT Team   Attending/Primary/Covering Physician John Remy   Date Attending Physician notified 06/19/24   Time Attending Physician notified 1128   Physician(s) Shanice Sandoval   Lead RN Nathalie Khan   Post RRT Intervention Assessment   Post RRT Assessment Stable/Improved   Date Follow Up Done 06/19/24   Time Follow Up Done 1300   Comments LA=1.4       "

## 2024-06-19 NOTE — PLAN OF CARE
Shift: [id number]     D: See flowsheets for full assessment & vitals    PRNs: none  Lines: PIV running LR @125mL/hr.  RN managed Labs: None    A/R: Getting up frequently overnight with loose/watery bowel movements. Mostly green in color, small amounts of bright red blood within stool, however minimal. X1 Enema given around 0705 this AM, second to be given on day shift. Pt reports mild abdominal discomfort but denies pain. NPO since midnight.      P: Sigmoidoscopy this AM     Goal Outcome Evaluation: No change, discussed with pt.     AM Vitals: /69 (BP Location: Left arm, Patient Position: Right side)   Pulse 101   Temp 97.6  F (36.4  C) (Oral)   Resp 18   Ht 1.829 m (6')   Wt 93.4 kg (206 lb)   SpO2 94%   BMI 27.94 kg/m

## 2024-06-19 NOTE — PROGRESS NOTES
[facility]    Medicine Progress Note - Medicine Service, BAN TEAM 4    Date of Admission:  6/17/2024    Assessment & Plan   Saeid Santa is a 68 year old male who presents with hx of HTN, seizures, hx of prostate cancer sp prostatectomy, and metastatic melanaoms w/ mets to brain, currently treated with ipilimab/nivolumab. He completed cycle 2 of treatment on 6/5, and is being admitted for dehydration, weakness, and 2 weeks of watery diarrhea, 4-8 stools per day, concerning for checkpoint induce colitis, as well as adenovirus.    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 Coli positive stool test  #NAGMA 2/2 above  Patient w/ hx of metastatic melanoma currently treated w/ ipilumab/nivolumab, last infusion 06/05. Has had diarrhea since 06/08, with 4-8 watery stools per day. Initially no black, bloody, or tarry stools, but over the last week has had intermittent bloody stools (not present on admission). 6/12 stool  sample tested positive for E.coli, and his primary oncologist stopped prednisone, omperazole. Started azithromycin for 3 days, completed 2 without improvement. C. Diff negative on admission. Outpatient oncology team started prednisone 100mg/d for 5 days, though patient states that he never starting taking this. Patient received 1L IVF bolus on admission. Procal to 0.85, , lactate WNL, positive fecal lactoferrin, positive adenovirus. Admission CT AP w/ pericolic fat stranding, suspicious for mild colitis.  - GI following; recs appreciated   - Flex sig (6/19): severe colitis (suspect immune checkpoint colitis)   - Follow pending  pathology   - Stool lytes pending  - Oncology following; recs appreciated   - Considering infliximab if no improvement in diarrhea (pre-biologic labs pending)  - PO prednisone 100mg daily > IV methylprednisolone 50mg BID (6/19-present)   - Will need ppx if on extended steroid taper  - LR @ 125cc/hr    #Severe sepsis 2/2 suspected intraabdominal source  Patient developed rigors following flex sig on 6/19. SIRS 2 w/ lactic acid 7. Suspect bacteremia w/ intraabdominal source given ongoing diarrhea and recent procedure.   - Current abx: pip-tazo (6/19-present), vancomycin (6/19-present)  - IVF: s/p 2L LR (6/19); mIVF as above  - Workup: lactic acid, AXR, CXR, UA, BCx, MRSA nares  - If vitals do not stabilize or patient develops abdominal pain, low threshold for CT AP    #Metastatic Melanoma (4/2023) w/ mets to brain  #Seizures  Patient found to have ring enhancing lesions on MRI . 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  Has continued to show CNS involvement of disease status post multiple craniotomies and resections (tumor resection 4/2023, L parieto-occipital craniotomy 7/2023, r frontal craniotomy 12/2023, gamma knife 02/2024). Recent PET scan w/ concerns for mets to soft tissues in R lateral calf and L medial thigh as well as liver lesions.  Last seizure was 04/2023.   -PTA keppra  -Consider oncology consult in AM    #Acute kidney injury, suspected prerenal 2/2 diarrhea; improving  Baseline Cr 0.9, admitted w/ 1.57. Likely pre-renal given copious diarrhea.   - mIVF as above  - Hold PTA baclofen    #Hypokalemia; resolved  #Hyponatremia; resolved  #Prolonged QTC (616); resolved  Patient with multiple electrolyte disturbances given ~ 2 weeks of copious watery diarrhea, despite endorsed adequate PO intake and pedialyte supplementation. Admitted w/ , K 2.8, Mg 2.4, Na 131. Received 40meq KCL in ED W/ 1L LR bolus. Lytes  rechecked 2 hrs later to K 3.7 w/ repeat EKG of 476. Patient on telemetry  - Telemetry discontinued     #Deconditioning with falls  Likely 2/2 generalized weakness, brain mets, and dehydration.  -Consulted PT/OT, appreciate recs     #Hx of prostate cancer sp 2009 prostatectomy          Diet: NPO per Anesthesia Guidelines for Procedure/Surgery Except for: Meds    DVT Prophylaxis: Heparin SQ  Aragon Catheter: Not present  Lines: None     Cardiac Monitoring: ACTIVE order. Indication: Tachyarrhythmias, acute (48 hours)  Code Status: Full Code      Clinically Significant Risk Factors        # Hypokalemia: Lowest K = 2.8 mmol/L in last 2 days, will replace as needed       # Hypoalbuminemia: Lowest albumin = 3.1 g/dL at 6/19/2024  6:13 AM, will monitor as appropriate     # Hypertension: Noted on problem list               #Precipitous drop in Hgb/Hct: Lowest Hgb this hospitalization: 12.7 g/dL. Will continue to monitor and treat/transfuse as appropriate.     # Overweight: Estimated body mass index is 27.94 kg/m  as calculated from the following:    Height as of this encounter: 1.829 m (6').    Weight as of this encounter: 93.4 kg (206 lb)., PRESENT ON ADMISSION       # Financial/Environmental Concerns: other (see comments) (Information given on filing for MA in AVS)         Disposition Plan     Medically Ready for Discharge: Anticipated in 2-4 Days pending steroid taper           The patient's care was discussed with the Attending Physician, Dr. Remy .    Michelle Tirado MD  Medicine Service, [address]  Securely message with [employer] (more info)  Text page via [employer] Paging/Directory   See signed in provider for up to date coverage information  ______________________________________________________________________    Interval History   No acute events overnight. Had 11 episodes of diarrhea yesterday and 4 since midnight. No blood in stool. Feels very cold this  "morning and is having rigors. Denies nausea, vomiting, abdominal pain, SOB, progression of rash. Requesting to rest.    Assessed again in the afternoon after 2L LR. Tim states that he is feeling \"fine\" and denies any issues. Frustrated by the number of people who have been checking in on him and wants to rest. Does not want to answer any further questions. /88 and .    Physical Exam   Vital Signs: Temp: 99.9  F (37.7  C) Temp src: Oral BP: 127/88 Pulse: (!) 126   Resp: 22 SpO2: 96 % O2 Device: None (Room air)    Weight: 206 lbs 0 oz    Constitutional: NAD. Conversant. Chronically ill appearing. Rigors.  HEENT: NC/AT, anicteric sclera, pupils round and equal, EOMI.  CV: RRR, normal S1/S2.  Pulm: Non-labored respirations on room air, CTAB.  Abdomen: Soft, non-distended, non-tender.  Extremities: Warm and well-perfused, no peripheral edema.  Skin: Scattered macular rash across chest and back. Scattered seborrheic keratosis.  Neuro: Alert and oriented x3, moves all four extremities independently.   Psych: Normal affect.      Medical Decision Making             Data     Imaging results reviewed over the past 24 hrs:   No results found for this or any previous visit (from the past 24 hour(s)).    "

## 2024-06-19 NOTE — PROGRESS NOTES
06/19/24 1400   Appointment Info   Signing Clinician's Name / Credentials (PT) Humaira St, PT, DPT   Living Environment   People in Home spouse   Current Living Arrangements house   Home Accessibility stairs to enter home   Number of Stairs, Main Entrance greater than 10 stairs  (12)   Transportation Anticipated family or friend will provide   Living Environment Comments Pt lives at home with his wife, reports 12 stairs to get to his car.   Self-Care   Usual Activity Tolerance good   Current Activity Tolerance fair   Regular Exercise No   Equipment Currently Used at Home cane, straight;shower chair   Activity/Exercise/Self-Care Comment Per pt he was IND with ADL's prior to admission.  Him and his wife are normally active in the community but over the last 3 weeks he has not been feeling well and not moving around much, using his cane more.   General Information   Onset of Illness/Injury or Date of Surgery 06/17/24   Patient/Family Therapy Goals Statement (PT) Pt wants to go home   Pertinent History of Current Problem (include personal factors and/or comorbidities that impact the POC) 68 year old male who presents with hx of HTN, seizures, hx of prostate cancer sp prostatectomy, and metastatic melanaoms w/ mets to brain, currently treated with ipilimab/nivolumab. He completed cycle 2 of treatment on 6/5, and is being admitted for dehydration, weakness, and 2 weeks of watery diarrhea, 4-8 stools per day, concerning for enterocolitis vs checkpoint induce colitis.   Existing Precautions/Restrictions fall   Weight-Bearing Status - LUE full weight-bearing   Weight-Bearing Status - RUE full weight-bearing   Weight-Bearing Status - LLE full weight-bearing   Weight-Bearing Status - RLE full weight-bearing   General Observations Activity: up with assist   Cognition   Affect/Mental Status (Cognition) WNL   Orientation Status (Cognition) oriented x 3   Follows Commands (Cognition) follows multi-step commands   Pain  Assessment   Patient Currently in Pain No   Integumentary/Edema   Integumentary/Edema no deficits were identifed   Posture    Posture Protracted shoulders;Forward head position   Posture Comments Rounded back/shoulders   Range of Motion (ROM)   Range of Motion ROM is WFL   Strength (Manual Muscle Testing)   Strength (Manual Muscle Testing) Deficits observed during functional mobility   Strength Comments Weakness in B LE's, worse in R LE (this is normal for him), SLR hard to complete; very deconditioned and fatigues quickly   Bed Mobility   Comment, (Bed Mobility) ModA for bed mobility   Transfers   Comment, (Transfers) Garcia for sit<>stand   Gait/Stairs (Locomotion)   Comment, (Gait/Stairs) Unable to assess   Balance   Balance Comments IND sitting balance   Sensory Examination   Sensory Perception patient reports no sensory changes   Coordination   Coordination no deficits were identified   Muscle Tone   Muscle Tone no deficits were identified   Clinical Impression   Criteria for Skilled Therapeutic Intervention Yes, treatment indicated   PT Diagnosis (PT) Impaired functional mobility   Influenced by the following impairments Decreased strength and activty tolerance   Functional limitations due to impairments Inability to complete functional mobility at IND baseline level   Clinical Presentation (PT Evaluation Complexity) evolving   Clinical Presentation Rationale Clinical judgement   Clinical Decision Making (Complexity) low complexity   Planned Therapy Interventions (PT) balance training;bed mobility training;gait training;home exercise program;patient/family education;strengthening;stair training;transfer training;risk factor education   Risk & Benefits of therapy have been explained evaluation/treatment results reviewed;care plan/treatment goals reviewed;risks/benefits reviewed;current/potential barriers reviewed;participants voiced agreement with care plan;participants included;patient   Clinical Impression  Comments Pt would benefit from IP PT to progress strength and IND with functional mobility to ensure safety with d/c home.   PT Total Evaluation Time   PT Eval, Low Complexity Minutes (92902) 10   Physical Therapy Goals   PT Frequency 5x/week   PT Predicted Duration/Target Date for Goal Attainment 06/26/24   PT Goals Bed Mobility;Transfers;Gait;Stairs   PT: Bed Mobility Independent   PT: Transfers Modified independent   PT: Gait Modified independent;150 feet   PT: Stairs Supervision/stand-by assist;Greater than 10 stairs   Interventions   Interventions Quick Adds Therapeutic Activity   Therapeutic Activity   Therapeutic Activities: dynamic activities to improve functional performance Minutes (86805) 10   Treatment Detail/Skilled Intervention PT: Post eval pt not wanting to mobilize today due to diahrea and fatigue from procedure/medications.  Pt and wife educated on being up OOB, ambulation when feeling better and PT POC to help facilitate safe d/c home.  Pt able to complete ankle pumps and quad sets, educated on completion on his own tonight and doing more mobilizing with PT tomorrow or tonight with RN if feeling any better.   PT Discharge Planning   PT Plan PT: assess ambulation, standing tolerance and IND with STS   PT Discharge Recommendation (DC Rec) home with assist;home with home care physical therapy   PT Rationale for DC Rec Pt has support frmo wife at home, should be able to progress and d/c home, would benefit from continued home vs OP PT.   PT Brief overview of current status A x 1 with walker, encourage mobility   PT Equipment Needed at Discharge   (Per pt has cane, 2WW and 4WW at home already)   Total Session Time   Timed Code Treatment Minutes 10   Total Session Time (sum of timed and untimed services) 20

## 2024-06-19 NOTE — PHARMACY-VANCOMYCIN DOSING SERVICE
"Pharmacy Vancomycin Initial Note  Date of Service 2024  Patient's  1955  68 year old, male    Indication: Sepsis    Current estimated CrCl = Estimated Creatinine Clearance: 68 mL/min (A) (based on SCr of 1.2 mg/dL (H)).    Creatinine for last 3 days  2024:  5:20 PM Creatinine 1.57 mg/dL;  8:55 PM Creatinine 1.52 mg/dL  2024:  5:25 AM Creatinine 1.47 mg/dL  2024:  6:13 AM Creatinine 1.20 mg/dL    Recent Vancomycin Level(s) for last 3 days  No results found for requested labs within last 3 days.      Vancomycin IV Administrations (past 72 hours)        No vancomycin orders with administrations in past 72 hours.                    Nephrotoxins and other renal medications (From now, onward)      Start     Dose/Rate Route Frequency Ordered Stop    24 2300  vancomycin (VANCOCIN) 1,500 mg in 0.9% NaCl 250 mL intermittent infusion        Placed in \"Followed by\" Linked Group    1,500 mg  over 90 Minutes Intravenous EVERY 12 HOURS 24 1204      24 1230  vancomycin (VANCOCIN) 2,000 mg in sodium chloride 0.9 % 500 mL intermittent infusion        Placed in \"Followed by\" Linked Group    2,000 mg  over 120 Minutes Intravenous ONCE 24 1204      24 1200  piperacillin-tazobactam (ZOSYN) 3.375 g vial to attach to  mL bag        Note to Pharmacy: For [facility], [facility] and [facility]: For Zosyn-naive patients, use the \"Zosyn initial dose + extended infusion\" order panel.    3.375 g  over 30 Minutes Intravenous EVERY 6 HOURS 24 1044              Contrast Orders - past 72 hours (72h ago, onward)      None            [employer] Prediction of Planned Initial Vancomycin Regimen  Loading dose: 2000 mg at 13:00 2024.  Regimen: 1500 mg IV every 24 hours.  Start time: 13:00 on 2024  Exposure target: AUC24 (range)400-600 mg/L.hr   AUC24,ss: 478 mg/L.hr  Probability of AUC24 > 400: 69 %  Ctrough,ss: 13.9 mg/L  Probability of Ctrough,ss > 20: 22 %  Probability of nephrotoxicity " (Lodise GIL 2009): 9 %          Plan:  Start vancomycin  2000 mg IV once, followed by 1500 mg IVPB Q 24 hrs  Vancomycin monitoring method: AUC  Vancomycin therapeutic monitoring goal: 400-600 mg*h/L  Pharmacy will check vancomycin levels as appropriate in 3-5 Days.    Serum creatinine levels will be ordered a minimum of twice weekly.      Tri Muse RPH

## 2024-06-20 NOTE — PLAN OF CARE
Occupational Therapy: Orders received. Chart reviewed and discussed with care team.? Occupational Therapy not indicated due to pt transferring to/from commode with SBA-Mod I. Pt likely would not tolerate >1 therapy. Please re consult if needs arise.? Defer discharge recommendations to Physical Therapy and Medical Team.? Will complete orders.

## 2024-06-20 NOTE — PLAN OF CARE
Goal Outcome Evaluation:      Plan of Care Reviewed With: patient           /68 (BP Location: Left arm)   Pulse 64   Temp 97.8  F (36.6  C) (Oral)   Resp 18   Ht 1.829 m (6')   Wt 93.4 kg (206 lb)   SpO2 97%   BMI 27.94 kg/m      Goal Outcome Evaluation:     Plan of Care Reviewed With: patient   Overall Patient Progress:     VS: VSS  Neuros: A&OX4  Cardiac: WNL  Respiratory: RA  GI/: Pt is is voiding using the bedside commode, 2 loose green BM this shift   Diet/Nausea: Reg diet but NPO for overnight  Skin: WNL  LDA: PIV infusing LR @125ml  Labs:   Pain: pt denies pain  Activity: SBA  New changes this shift: Pt slept all night and had no new health concerns at this time.  Plan: CPC

## 2024-06-20 NOTE — PROGRESS NOTES
[facility]    Medicine Progress Note - Medicine Service, BAN TEAM 4    Date of Admission:  6/17/2024    Assessment & Plan   Saeid Santa is a 68 year old male who presents with hx of HTN, seizures, hx of prostate cancer sp prostatectomy, and metastatic melanaoms w/ mets to brain, currently treated with ipilimab/nivolumab. He completed cycle 2 of treatment on 6/5, and is being admitted for dehydration, weakness, and 2 weeks of watery diarrhea, 4-8 stools per day concerning for checkpoint induce colitis, as well as adenovirus.    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 Coli positive stool test  #NAGMA 2/2 above  Patient w/ hx of metastatic melanoma currently treated w/ ipilumab/nivolumab, last infusion 06/05. Has had diarrhea since 06/08, with 4-8 watery stools per day. Initially no black, bloody, or tarry stools, but over the last week has had intermittent bloody stools (not present on admission). 6/12 stool  sample tested positive for E.coli, and his primary oncologist stopped prednisone, omperazole. Started azithromycin for 3 days, completed 2 without improvement. C. Diff negative on admission. Outpatient oncology team started prednisone 100mg/d for 5 days, though patient states that he never starting taking this. Patient received 1L IVF bolus on admission. Procal to 0.85, , lactate WNL, positive fecal lactoferrin, positive adenovirus. Admission CT AP w/ pericolic fat stranding, suspicious for mild colitis.  - GI following; recs appreciated   - Flex sig (6/19): severe colitis (suspect immune checkpoint colitis)   - Follow pending pathology   - Stool lytes pending  - Oncology following;  recs appreciated   - Considering infliximab if no improvement in diarrhea (pre-biologic labs pending)  - PO prednisone 100mg daily > IV methylprednisolone 50mg BID (6/19-present)   - Will need ppx if on extended steroid taper  - mIVF    #Severe sepsis 2/2 suspected intraabdominal source; resolved  Patient developed rigors following flex sig on 6/19. SIRS 2 w/ lactic acid 7. Suspect bacteremia w/ intraabdominal source given ongoing diarrhea and recent procedure. AXR w/o free air. CXR w/o new focal consolidation. LVEF 55-60%. MRSA nares negative. UA bland.  - Current abx: pip-tazo (6/19-present)  - IVF: s/p 2L LR (6/19); mIVF as above  - If vitals do not stabilize or patient develops abdominal pain, low threshold for CT AP    #Metastatic Melanoma (4/2023) w/ mets to brain  #Seizures  Patient found to have ring enhancing lesions on MRI . 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  Has continued to show CNS involvement of disease status post multiple craniotomies and resections (tumor resection 4/2023, L parieto-occipital craniotomy 7/2023, r frontal craniotomy 12/2023, gamma knife 02/2024). Recent PET scan w/ concerns for mets to soft tissues in R lateral calf and L medial thigh as well as liver lesions.  Last seizure was 04/2023.   -PTA keppra  -Consider oncology consult in AM    #Acute kidney injury, suspected prerenal 2/2 diarrhea; improving  Baseline Cr 0.9, admitted w/ 1.57. Likely pre-renal given copious diarrhea.   - mIVF as above  - Hold PTA baclofen    #Hypokalemia; resolved  #Hyponatremia; resolved  #Prolonged QTC (616); resolved  Patient with multiple electrolyte disturbances given ~ 2 weeks of copious watery diarrhea, despite endorsed adequate PO intake and pedialyte supplementation. Admitted w/ , K 2.8, Mg 2.4, Na 131. Received 40meq KCL in ED W/ 1L LR bolus. Lytes rechecked 2 hrs later to K 3.7 w/ repeat EKG of  476. Patient on telemetry  - Telemetry discontinued     #Deconditioning with falls  Likely 2/2 generalized weakness, brain mets, and dehydration.  -Consulted PT/OT, appreciate recs     #Hx of prostate cancer sp 2009 prostatectomy          Diet: Snacks/Supplements Adult: Other; chocolate [employer] 1.0 with lunch and dinner trays; With Meals  Snacks/Supplements Adult: Other; string cheese x2 @ 2:00 pm, yogurt at HS; Between Meals  High Kcal/High Protein Diet, ADULT    DVT Prophylaxis: Heparin SQ  Aragon Catheter: Not present  Lines: None     Cardiac Monitoring: ACTIVE order. Indication: Tachyarrhythmias, acute (48 hours)  Code Status: Full Code      Clinically Significant Risk Factors        # Hypokalemia: Lowest K = 3.3 mmol/L in last 2 days, will replace as needed       # Hypoalbuminemia: Lowest albumin = 2 g/dL at 6/20/2024  5:30 AM, will monitor as appropriate   # Thrombocytopenia: Lowest platelets = 110 in last 2 days, will monitor for bleeding   # Hypertension: Noted on problem list               #Precipitous drop in Hgb/Hct: Lowest Hgb this hospitalization: 9.8 g/dL. Will continue to monitor and treat/transfuse as appropriate.     # Overweight: Estimated body mass index is 27.94 kg/m  as calculated from the following:    Height as of this encounter: 1.829 m (6').    Weight as of this encounter: 93.4 kg (206 lb)., PRESENT ON ADMISSION  # Severe Malnutrition: based on nutrition assessment, PRESENT ON ADMISSION     # Financial/Environmental Concerns: other (see comments) (Information given on filing for MA in AVS)         Disposition Plan     Medically Ready for Discharge: Anticipated in 2-4 Days pending steroid taper           The patient's care was discussed with the Attending Physician, Dr. Remy .    Michelle Tirado MD  Medicine Service, [facility] TEAM [address] [place] [address]  Securely message with [email or web address] (more info)  Text page via [email or web address] Paging/Directory   See signed in  provider for up to date coverage information  ______________________________________________________________________    Interval History   No acute events overnight.  Stool output improving with steroids and scheduled Imodium.  Had 5 bowel movements yesterday and 2 since midnight.  Patient is resting comfortably in bed.  Denies nausea, vomiting, fever, chills, abdominal pain.  Fecal incontinence improved significantly.  Still having watery stools.  Patient voices desire to get some sleep and better food.      Physical Exam   Vital Signs: Temp: 97.7  F (36.5  C) Temp src: Oral BP: (!) 128/90 Pulse: 82   Resp: 16 SpO2: 98 % O2 Device: None (Room air)    Weight: 206 lbs 0 oz    Constitutional: NAD. Conversant.   HEENT: NC/AT, anicteric sclera, pupils round and equal, EOMI.  CV: RRR, normal S1/S2.  Pulm: Non-labored respirations on room air, CTAB.  Abdomen: Soft, non-distended, non-tender.  Extremities: Warm and well-perfused, no peripheral edema.  Skin: Scattered macular rash across chest and back. Scattered seborrheic keratosis.  Neuro: Alert and oriented x3, moves all four extremities independently.   Psych: Normal affect.      Medical Decision Making             Data     Imaging results reviewed over the past 24 hrs:   Recent Results (from the past 24 hour(s))   XR Chest Port 1 View    Narrative    EXAM: XR CHEST PORT 1 VIEW 6/19/2024 1:29 PM    INDICATION: sepsis    COMPARISON: CT 4/17/2024    TECHNIQUE: Single portable upright AP view of the chest.    FINDINGS:   Low lung volumes. Mild diffuse interstitial opacities throughout both  lungs. Cardiac silhouette within normal limits. Trachea is midline. No  pneumothorax, focal consolidation or pleural effusion. No abnormal  calcifications or acute osseous abnormality. Limited upper abdomen  within normal limits.      Impression    IMPRESSION:   1. No focal consolidation. Mild interstitial opacities bilaterally  which could be related to atelectasis versus pulmonary  edema.    I have personally reviewed the examination and initial interpretation  and I agree with the findings.    SYDNEY BARKER MD         SYSTEM ID:  [id number]   XR Abdomen Port 1 View    Narrative    EXAMINATION:  XR ABDOMEN PORT 1 VIEW 2024 1:34 PM     COMPARISON: CT abdomen and pelvis 2024    HISTORY: s/p endoscopy, evaluate for free air    TECHNIQUE: Frontal view of the abdomen.    FINDINGS: The small intestine and colon are not distended. No abnormal  soft tissue densities.  No evidence of free air, pneumatosis or portal  venous gas. The lung bases are unremarkable.      Impression    IMPRESSION: Non-obstructed bowel gas pattern. No evidence of free air.    I have personally reviewed the examination and initial interpretation  and I agree with the findings.    SHE QUINTANILLA MD         SYSTEM ID:  [id number]   Echo Complete   Result Value    LVEF  55-60%    Narrative    [id number]  [id number]  [id number]  [id number]^[id number]^PERLA     [facility],[place]  Echocardiography Laboratory  [address] [place] [address]     Name: IKER MICHELE  MRN: [id number]  : 1955  Study Date: 2024 12:58 PM  Age: 68 yrs  Gender: Male  [place] [facility] [place] For Study: Shock  Ordering Physician: PERLA ECHOLS  Performed By: Mary Sun RDCS     BSA: 2.2 m2  Height: 72 in  Weight: 206 lb  ______________________________________________________________________________  Procedure  Complete Portable Echo Adult. Contrast Optison. Echocardiogram with two-  dimensional, color and spectral Doppler performed. Optison (NDC #5613-2810-44)  given intravenously. Patient was given 6 ml mixture of 3 ml Optison and 6 ml  saline. 3 ml wasted.  ______________________________________________________________________________  Interpretation Summary  Left ventricular size, wall motion and function are normal. The ejection  fraction is 55-60%.  Right ventricular function, chamber  size, wall motion, and thickness are  normal.  No significant valvular abnormalities were noted.  No pericardial effusion is present.  Previous study not available for comparison.  ______________________________________________________________________________  Left Ventricle  Left ventricular size, wall motion and function are normal. The ejection  fraction is 55-60%. Left ventricular diastolic function is normal. Diastolic  Doppler findings (E/E' ratio and/or other parameters) suggest left ventricular  filling pressures are normal.     Right Ventricle  Right ventricular function, chamber size, wall motion, and thickness are  normal.     Atria  Both atria appear normal.     Mitral Valve  Mild mitral annular calcification is present. Trace mitral insufficiency is  present.     Aortic Valve  Trileaflet aortic sclerosis without stenosis.     Tricuspid Valve  The tricuspid valve is normal. Trace tricuspid insufficiency is present. The  peak velocity of the tricuspid regurgitant jet is not obtainable. Pulmonary  artery systolic pressure cannot be assessed.     Pulmonic Valve  The valve leaflets are not well visualized. On Doppler interrogation, there is  no significant stenosis or regurgitation.     Vessels  The aorta root is normal. The thoracic aorta is normal. The pulmonary artery  cannot be assessed. The inferior vena cava was normal in size with preserved  respiratory variability. IVC diameter <2.1 cm collapsing >50% with sniff  suggests a normal RA pressure of 3 mmHg.     Pericardium  No pericardial effusion is present.     Compared to Previous Study  Previous study not available for comparison.  ______________________________________________________________________________  MMode/2D Measurements & Calculations  IVSd: 1.0 cm  LVIDd: 5.3 cm  LVIDs: 3.8 cm  LVPWd: 0.91 cm  FS: 27.8 %  LV mass(C)d: 190.1 grams  LV mass(C)dI: 88.1 grams/m2  Ao root diam: 3.9 cm  asc Aorta Diam: 3.8 cm  LVOT diam: 2.6 cm  LVOT area: 5.3  cm2  Ao root diam index Ht(cm/m): 2.1  Ao root diam index BSA (cm/m2): 1.8  Asc Ao diam index BSA (cm/m2): 1.7  Asc Ao diam index Ht(cm/m): 2.1  LA Volume (BP): 33.4 ml     LA Volume Index (BP): 15.5 ml/m2  RWT: 0.35     Doppler Measurements & Calculations  MV E max bret: 46.8 cm/sec  MV A max bret: 60.8 cm/sec  MV E/A: 0.77  MV dec time: 0.13 sec  E/E' av.6  Lateral E/e': 4.5  Medial E/e': 6.7  RV S Bret: 18.0 cm/sec     ______________________________________________________________________________  Report approved by: Dc Martin 2024 01:59 PM

## 2024-06-20 NOTE — PLAN OF CARE
Goal Outcome Evaluation:      Plan of Care Reviewed With: patient    Overall Patient Progress: improvingOverall Patient Progress: improving       Pt admitted on 6/17 for dehydration, diarrhea, and sepsis. Pt A&Ox4, up to the commode with a stand by assist. Pt sleeping between cares this evening. Pt denied any  pain. Pt had green liquid diarrhea. Pt tolerated his regular diet. IV antibiotics given. IVF infusing at 125 ml/ hr. Tele was NSR. Potassium and phos was replaced, recheck in the AM. Pt able to make his needs known. Continue with plan of care.

## 2024-06-20 NOTE — PLAN OF CARE
Shift: [id number]     D: See flowsheets for full assessment & vitals    PRNs: None  Labs: K 4.3, Mag 1.7, Phos 2.4, Co2 16, CRP 64.10  Running: x2 R PIVs one running LR at 75mL/hr, other running NS TKO     A/R: AOx4. VSS on RA. Tele reading NSR. Vanco discontinued. Mag replaced IV, phos replaced PO- both rechecks w/ K in AM. LR decreased to 75mL/hr. Poor appetite on regular diet, denies nausea, pt states he does not like the hospital food. Abd round/distended, denies pain. Diarrhea improving w/ scheduled imodium, pt estimates about x8 loose BMs today which is an improvement from yesterday. Voiding spontaneously. Up SBA to commode. Declined working w/ PT.      P: Continue monitoring for s/s of infection, Q6h IV zosyn to possibly be discontinued tomorrow if BCs remain negative per provider note. Pathology from EGD pending. Continue methylprednisolone, per oncology, possible Infliximab infusion tomorrow. Daily labs, replace electrolytes per RN managed protocol    Goal Outcome Evaluation:  Plan of Care Reviewed With: patient  Overall Patient Progress: improving  Outcome Evaluation: AOx4. VSS on RA, tele reading NSR. Continues w/ diarrhea, improving per pt. Mag & phos replaced, K WNL. Poor appetite. Denies abd pain. LR decreased. Refused PT. Continues IV abx

## 2024-06-20 NOTE — PROGRESS NOTES
Hematology / Oncology  Daily Progress Note   Date of Service: 06/20/2024  Patient: Saeid Santa  MRN: [id number]  Admission Date: 6/17/2024  Hospital Day # 3  Cancer Diagnosis:  IV melanoma   Primary Outpatient Oncologist: Dr. uBnn   Current Treatment Plan: Nivolumab + Ipilimumab      Recommendations:   - Pending response to increased steroid dosing, Consider Infliximab 5mg/kg IV for steroid refractory IRAE colitis   - Appreciate input from GI   - Will continue to follow along     Assessment & Plan:   Saeid Santa is a 68 year old male with pmh HTN, seizures, h/o prostate cancer s/p prostatectomy and metastatic melanoma w/ mets to brain most recently on Ipi/Nivo. Currently being admitted for dehydration, weakness and 2 weeks of watery diarrhea concerning for enterocolitis vs IRAE colitis.     # Diarrhea   # Adenovirus   # Concern for IRAE Colitis   Per chart review patient was seen in clinic on 6/11/24 and reported 5 days of diarrhea with 5-10 episodes of loose stool per day. He was started on Prednisone 6/11/24 for concern for immune mediated colitis. Infectious work up was also notable for E.Coli infection and patient was started on Azithromycin. It was unclear if Prednisone was stopped or not. Patient was then seen in clinic 6/17 for follow up of diarrhea and reported ongoing stooling with almost hourly.  - Currently on Methylpred 50 mg IV BID     # Metastatic Melanoma   Oncology history obtained from outpatient notes. In brief, Patient was closely monitoring a left parietal scalp lesions with PCP in Aug 2022. He had an I&D Nov 2022 as it was felt to be a cyst. He then developed difficulty with his right leg and foot in February 2023. In April 2023 he was found at work sitting in the grass appearing confused and was brought to the ED. Imaging showed small bilateral indeterminate pulmonary nodules. CT Head and MRI showed 2 discrete ring-enhancing lesions in the left paramedian posterior frontal lobe  and left occipital lobe. He underwent an awake craniotomy 4/21/23 with resection of the left frontal, motor strip tumor as well as elliptical incision of left parietal scalp lesion. Pathology c/w malignant melanoma. 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 PET/CT 5/18/23 showed potential involved neck LN, mild FDG uptake in soft tissue nodules in the scalp, and known left occipital brain met. He received gamma knife 2500 cGy to the left resection cavity and 3000 cGy to the left parietal-occipital lesion. Then began systemic treatment with Keytruda (C1D1= 6/7/23) x 2 cycles. This was complicated by pneumonitis for which he was treated with a course of steroids. In Sept 2023 he received gamma knife to right frontal lobe lesion. He was noted to have intracranial disease progression in December 2023, he underwent right frontal craniotomy with Dr. Garrido 12/21/23. He then began Nivolumab (C1D1=1/22/24) x 4 cycles.  He was noted to have disease progression in April 2024 so he was changed to Ipi/Nivo (C1D1=5/15/24)x 2 cycles  and received further gamma knife.   - Last cycle of Ipi/Nivo C2D1=6/5/24    Patient was seen and plan of care was discussed with attending physician Dr. Emmanuel.    Thank you for the opportunity to partake in this patients plan of care. Please do not hesitate to page with questions. We will continue to follow.     I spent 40 minutes face-to-face or coordinating care of Saeid Santa. Over 50% of our time on the unit was spent counseling the patient and/or coordinating care.    Racheal Cee PA-C   Hematology/Oncology   Pager: [phone]   ___________________________________________________________________    Subjective & Interval History:    No acute events noted overnight.  Tim reports some improvement in his diarrhea though continues to have multiple stools a day. Overall depressed mood. Denies nausea/vomtiing. No fevers or chills. No abdominal pain noted at time of visit.    A  comprehensive review of systems was obtained and is negative other than noted here or in the HPI.     Physical Exam:    Blood pressure (!) 128/90, pulse 82, temperature 97.7  F (36.5  C), temperature source Oral, resp. rate 16, height 1.829 m (6'), weight 93.4 kg (206 lb), SpO2 98%.    General: lying in bed, no acute distress  HEENT: sclera anicteric, EOMI, MMM  Neck: supple, normal ROM  Resp: CTAB  GI: soft, non-tender, non-distended  MSK: warm and well-perfused, normal tone  Skin: no rashes on limited exam, no jaundice  Neuro: Alert and interactive, moves all extremities equally, no focal deficits    Labs & Studies: I personally reviewed the following studies:  ROUTINE LABS (Last four results):  CMP  Recent Labs   Lab 06/20/24  0530 06/19/24  1109 06/19/24  0613 06/18/24  0525 06/17/24  2055 06/17/24  1742 06/17/24  1720     --  135 132* 131*   < > 131*   POTASSIUM 4.3 3.6 3.3* 3.3* 3.7   < > 3.2*   CHLORIDE 110*  --  103 99 102  --  98   CO2 16*  --  20* 19* 16*  --  15*   ANIONGAP 11  --  12 14 13  --  18*   *  --  89 85 109*   < > 115*   BUN 17.6  --  25.6* 28.1* 27.9*  --  29.0*   CR 1.05  --  1.20* 1.47* 1.52*  --  1.57*   GFRESTIMATED 76  --  65 51* 49*  --  47*   JOHN PAUL 7.2*  --  8.5* 8.3* 8.1*  --  8.6*   MAG 1.7  --  2.3 2.3 2.5*  --  2.4*   PHOS 2.4*  --  2.2* 2.7 2.8  --   --    PROTTOTAL 3.7*  --  5.9* 5.8*  --   --  6.1*   ALBUMIN 2.0*  --  3.1* 3.1*  --   --  3.3*   BILITOTAL 0.5  --  0.6 0.8  --   --  0.8   ALKPHOS 39*  --  64 63  --   --  68   AST 29  --  36 36  --   --  36   ALT 17  --  25 24  --   --  23    < > = values in this interval not displayed.     CBC  Recent Labs   Lab 06/20/24  0530 06/19/24  0613 06/18/24  0525 06/17/24  1742 06/17/24  1720   WBC 6.7 9.4 11.9*  --  13.6*   RBC 3.07* 4.10* 4.76  --  5.05   HGB 9.8* 12.7* 15.0 14.3 16.1   HCT 28.7* 37.2* 42.9 42 44.0   MCV 94 91 90  --  87   MCH 31.9 31.0 31.5  --  31.9   MCHC 34.1 34.1 35.0  --  36.6*   RDW 14.8 14.6 14.6  --   14.3   * 155 163  --  173     INRNo lab results found in last 7 days.    Medications list for reference:  Current Facility-Administered Medications   Medication Dose Route Frequency Provider Last Rate Last Admin    acetaminophen (TYLENOL) tablet 650 mg  650 mg Oral Q4H PRN Teja Young MD        Or    acetaminophen (TYLENOL) Suppository 650 mg  650 mg Rectal Q4H PRN Teja Young MD        benzocaine-menthol (CHLORASEPTIC MAX) 15-10 MG lozenge 1 lozenge  1 lozenge Buccal Q1H PRN Teja Young MD        calcium carbonate (TUMS) chewable tablet 1,000 mg  1,000 mg Oral 4x Daily PRN Teja Young MD        camphor-menthol (DERMASARRA) lotion   Topical Q6H PRN Michelle Tirado MD        carboxymethylcellulose PF (REFRESH PLUS) 0.5 % ophthalmic solution 1 drop  1 drop Both Eyes Q1H PRN Michelle Tirado MD        fentaNYL (PF) (SUBLIMAZE) injection   Intravenous PRN Prosper Sweet MD   50 mcg at 06/19/24 0944    guaiFENesin (ROBITUSSIN) 20 mg/mL solution 200 mg  200 mg Oral Q4H PRN Teja Young MD        heparin ANTICOAGULANT injection 5,000 Units  5,000 Units Subcutaneous Q8H Teja Young MD   5,000 Units at 06/20/24 1252    hydrocortisone (CORTAID) 1 % cream   Topical BID PRN Michelle Tirado MD        lactated ringers infusion   Intravenous Continuous Michelle Tirado MD 75 mL/hr at 06/20/24 0844 Rate Change at 06/20/24 0844    levETIRAcetam (KEPPRA) tablet 1,000 mg  1,000 mg Oral BID Teja Young MD   1,000 mg at 06/20/24 0930    lidocaine (LMX4) cream   Topical Q1H PRN Teja Young MD        lidocaine 1 % 0.1-1 mL  0.1-1 mL Other Q1H PRN Teja Young MD        loperamide (IMODIUM) capsule 2 mg  2 mg Oral 4x Daily Michelle Tirado MD   2 mg at 06/20/24 1252    melatonin tablet 3 mg  3 mg Oral At Bedtime Michelle Tirado MD   3 mg at 06/19/24 1937    methylPREDNISolone sodium succinate (solu-MEDROL) injection 50 mg  50 mg Intravenous BID Michelle Tirado MD    50 mg at 06/20/24 0930    midazolam (VERSED) injection   Intravenous PRN Prosper Sweet MD   1 mg at 06/19/24 0944    pantoprazole (PROTONIX) EC tablet 40 mg  40 mg Oral QAM AC Michelle Tirado MD   40 mg at 06/20/24 0930    piperacillin-tazobactam (ZOSYN) 3.375 g vial to attach to  mL bag  3.375 g Intravenous Q6H John Remy MD   3.375 g at 06/20/24 1326    polyethylene glycol (MIRALAX) Packet 17 g  17 g Oral BID PRN Teja Young MD        potassium & sodium phosphates (NEUTRA-PHOS) Packet 1 packet  1 packet Oral or Feeding Tube Q4H Michelle Tirado MD   1 packet at 06/20/24 1252    rosuvastatin (CRESTOR) tablet 40 mg  40 mg Oral Daily Teja Young MD   40 mg at 06/20/24 0930    senna-docusate (SENOKOT-S/PERICOLACE) 8.6-50 MG per tablet 1 tablet  1 tablet Oral BID PRN Teja Young MD        Or    senna-docusate (SENOKOT-S/PERICOLACE) 8.6-50 MG per tablet 2 tablet  2 tablet Oral BID PRN Teja Young MD        sodium chloride (PF) 0.9% PF flush 3 mL  3 mL Intracatheter Q8H Teja Young MD   3 mL at 06/19/24 1027    sodium chloride (PF) 0.9% PF flush 3 mL  3 mL Intracatheter q1 min prn Teja Young MD

## 2024-06-21 NOTE — PLAN OF CARE
Goal Outcome Evaluation:      Plan of Care Reviewed With: patient    Overall Patient Progress: improving    Outcome Evaluation: AOx4. VSS on RA. Tele dc'd. Diarrhea decrease, x1 today, low output. Electrolyte protocols, potassium replacement given. Poor appetite. Denies abd pain. Refused EKG. Fluctuating mood. Completed IV abx and IV Remicade. Heparin dc'd, Lovenox to start pm.

## 2024-06-21 NOTE — PLAN OF CARE
Goal Outcome Evaluation:      Plan of Care Reviewed With: patient    Overall Patient Progress: no changeOverall Patient Progress: no change    Outcome Evaluation: Pt A&Ox4 with VSS. Denies pain. 2x IVABx given on shift. IVF of LR running @ 75mL/hr. Waiting vascular to place another IV, one IV infiltrated overnight. Voiding spontaneously, ~7BM overnight, liquid green. Plan possible onc infusion today.

## 2024-06-21 NOTE — PROGRESS NOTES
Hematology / Oncology  Daily Progress Note   Date of Service: 06/21/2024  Patient: Saeid Santa  MRN: [id number]  Admission Date: 6/17/2024  Hospital Day # 4  Cancer Diagnosis:  IV melanoma   Primary Outpatient Oncologist: Dr. Bunn   Current Treatment Plan: Nivolumab + Ipilimumab      Recommendations:   - Given persistent diarrhea, recommend proceeding with Infliximab 5mg/kg IV for steroid refractory IRAE colitis   - Consider decrease in steroid to prednisone 1mg/kg PO. Will discuss further taper pending response to infliximab  - Agree with de escalation of abx  - Will continue to follow along     Assessment & Plan:   Saeid Santa is a 68 year old male with pmh HTN, seizures, h/o prostate cancer s/p prostatectomy and metastatic melanoma w/ mets to brain most recently on Ipi/Nivo. Currently being admitted for dehydration, weakness and 2 weeks of watery diarrhea concerning for enterocolitis vs IRAE colitis.     # Diarrhea   # Adenovirus   # Concern for IRAE Colitis   Per chart review patient was seen in clinic on 6/11/24 and reported 5 days of diarrhea with 5-10 episodes of loose stool per day. He was started on Prednisone 6/11/24 for concern for immune mediated colitis. Infectious work up was also notable for E.Coli infection and patient was started on Azithromycin. It was unclear if Prednisone was stopped or not. Patient was then seen in clinic 6/17 for follow up of diarrhea and reported ongoing stooling with almost hourly.  - Given persistent diarrhea despite HD methylpred 1mg/kg IV, recommend proceeding with Infliximab 5mg/kg IV for steroid refractory IRAE colitis   - Appreciate GI involvement and primary team management  - Agree with de escalation of abx    # Metastatic Melanoma   Oncology history obtained from outpatient notes. In brief, Patient was closely monitoring a left parietal scalp lesions with PCP in Aug 2022. He had an I&D Nov 2022 as it was felt to be a cyst. He then developed  difficulty with his right leg and foot in February 2023. In April 2023 he was found at work sitting in the grass appearing confused and was brought to the ED. Imaging showed small bilateral indeterminate pulmonary nodules. CT Head and MRI showed 2 discrete ring-enhancing lesions in the left paramedian posterior frontal lobe and left occipital lobe. He underwent an awake craniotomy 4/21/23 with resection of the left frontal, motor strip tumor as well as elliptical incision of left parietal scalp lesion. Pathology c/w malignant melanoma. 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 PET/CT 5/18/23 showed potential involved neck LN, mild FDG uptake in soft tissue nodules in the scalp, and known left occipital brain met. He received gamma knife 2500 cGy to the left resection cavity and 3000 cGy to the left parietal-occipital lesion. Then began systemic treatment with Keytruda (C1D1= 6/7/23) x 2 cycles. This was complicated by pneumonitis for which he was treated with a course of steroids. In Sept 2023 he received gamma knife to right frontal lobe lesion. He was noted to have intracranial disease progression in December 2023, he underwent right frontal craniotomy with Dr. Garrido 12/21/23. He then began Nivolumab (C1D1=1/22/24) x 4 cycles.  He was noted to have disease progression in April 2024 so he was changed to Ipi/Nivo (C1D1=5/15/24)x 2 cycles  and received further gamma knife.   - Last cycle of Ipi/Nivo C2D1=6/5/24  - No indication for inpatient cancer directed therapy at this time      Patient was seen and plan of care was discussed with attending physician Dr. Emmanuel.    Thank you for the opportunity to partake in this patients plan of care. Please do not hesitate to page with questions. We will continue to follow.     I spent 35 minutes face-to-face or coordinating care of Saeid Santa. Over 50% of our time on the unit was spent counseling the patient and/or coordinating care.    Racheal Cee PA-C    Hematology/Oncology   Pager: [phone]   ___________________________________________________________________    Subjective & Interval History:    No acute events noted overnight.  Tim is in brighter spirits today. Slept better, though diarrhea worsening. Increasing frequency of stools. No fevers or chills. No abdominal pain noted at time of visit, states it is more of a discomfort.     A comprehensive review of systems was obtained and is negative other than noted here or in the HPI.     Physical Exam:    Blood pressure 122/86, pulse 91, temperature 97.8  F (36.6  C), temperature source Oral, resp. rate 16, height 1.829 m (6'), weight 93.4 kg (206 lb), SpO2 94%.    General: lying in bed, no acute distress  HEENT: sclera anicteric, EOMI, MMM  Neck: supple, normal ROM  Resp: CTAB  GI: soft, non-tender, non-distended  MSK: warm and well-perfused, normal tone  Skin: no rashes on limited exam, no jaundice  Neuro: Alert and interactive, moves all extremities equally, no focal deficits    Labs & Studies: I personally reviewed the following studies:  ROUTINE LABS (Last four results):  CMP  Recent Labs   Lab 06/21/24  0611 06/20/24  0530 06/19/24  1109 06/19/24  0613 06/18/24  0525    137  --  135 132*   POTASSIUM 3.6 4.3 3.6 3.3* 3.3*   CHLORIDE 111* 110*  --  103 99   CO2 19* 16*  --  20* 19*   ANIONGAP 8 11  --  12 14   * 118*  --  89 85   BUN 16.9 17.6  --  25.6* 28.1*   CR 1.13 1.05  --  1.20* 1.47*   GFRESTIMATED 70 76  --  65 51*   JOHN PAUL 7.7* 7.2*  --  8.5* 8.3*   MAG 2.3 1.7  --  2.3 2.3   PHOS 3.0 2.4*  --  2.2* 2.7   PROTTOTAL 4.9* 3.7*  --  5.9* 5.8*   ALBUMIN 2.6* 2.0*  --  3.1* 3.1*   BILITOTAL 0.7 0.5  --  0.6 0.8   ALKPHOS 58 39*  --  64 63   AST 52* 29  --  36 36   ALT 32 17  --  25 24     CBC  Recent Labs   Lab 06/21/24  0611 06/20/24  0530 06/19/24  0613 06/18/24  0525   WBC 8.5 6.7 9.4 11.9*   RBC 3.66* 3.07* 4.10* 4.76   HGB 11.5* 9.8* 12.7* 15.0   HCT 33.7* 28.7* 37.2* 42.9   MCV 92 94 91 90    MCH 31.4 31.9 31.0 31.5   MCHC 34.1 34.1 34.1 35.0   RDW 14.7 14.8 14.6 14.6   * 110* 155 163     INRNo lab results found in last 7 days.    Medications list for reference:  Current Facility-Administered Medications   Medication Dose Route Frequency Provider Last Rate Last Admin    acetaminophen (TYLENOL) tablet 650 mg  650 mg Oral Q4H PRN Teja Young MD        Or    acetaminophen (TYLENOL) Suppository 650 mg  650 mg Rectal Q4H PRN Teja Young MD        benzocaine-menthol (CHLORASEPTIC MAX) 15-10 MG lozenge 1 lozenge  1 lozenge Buccal Q1H PRN Teja Young MD        calcium carbonate (TUMS) chewable tablet 1,000 mg  1,000 mg Oral 4x Daily PRN Teja Young MD        camphor-menthol (DERMASARRA) lotion   Topical Q6H PRN Michelle Tirado MD        carboxymethylcellulose PF (REFRESH PLUS) 0.5 % ophthalmic solution 1 drop  1 drop Both Eyes Q1H PRN Michelle Tirado MD        fentaNYL (PF) (SUBLIMAZE) injection   Intravenous PRN Prosper Sweet MD   50 mcg at 06/19/24 0944    guaiFENesin (ROBITUSSIN) 20 mg/mL solution 200 mg  200 mg Oral Q4H PRN Teja Young MD        heparin ANTICOAGULANT injection 5,000 Units  5,000 Units Subcutaneous Q8H Teja Young MD   5,000 Units at 06/21/24 1158    hydrocortisone (CORTAID) 1 % cream   Topical BID PRN Michelle Tirado MD        inFLIXimab (REMICADE) 500 mg in sodium chloride 0.9 % 275 mL infusion  500 mg Intravenous Once John Remy .5 mL/hr at 06/21/24 1156 500 mg at 06/21/24 1156    lactated ringers infusion   Intravenous Continuous Michelle Tirado MD   Paused at 06/21/24 1213    levETIRAcetam (KEPPRA) tablet 1,000 mg  1,000 mg Oral BID Teja Young MD   1,000 mg at 06/21/24 0943    lidocaine (LMX4) cream   Topical Q1H PRN Teja Young MD        lidocaine 1 % 0.1-1 mL  0.1-1 mL Other Q1H PRN Teja Young MD        loperamide (IMODIUM) capsule 2 mg  2 mg Oral 4x Daily Michelle Tirado MD   2 mg  at 06/21/24 1158    melatonin tablet 3 mg  3 mg Oral At Bedtime Michelle Tirado MD   3 mg at 06/20/24 2111    methylPREDNISolone sodium succinate (solu-MEDROL) injection 50 mg  50 mg Intravenous BID Michelle Tirado MD   50 mg at 06/21/24 0944    midazolam (VERSED) injection   Intravenous PRN Prosper Sweet MD   1 mg at 06/19/24 0944    pantoprazole (PROTONIX) EC tablet 40 mg  40 mg Oral QAM AC Michelle Tirado MD   40 mg at 06/21/24 0943    piperacillin-tazobactam (ZOSYN) 3.375 g vial to attach to  mL bag  3.375 g Intravenous Q6H John Remy MD   3.375 g at 06/21/24 1157    polyethylene glycol (MIRALAX) Packet 17 g  17 g Oral BID PRN Teja Young MD        rosuvastatin (CRESTOR) tablet 40 mg  40 mg Oral Daily Teja Young MD   40 mg at 06/21/24 0941    senna-docusate (SENOKOT-S/PERICOLACE) 8.6-50 MG per tablet 1 tablet  1 tablet Oral BID PRN Teja Young MD        Or    senna-docusate (SENOKOT-S/PERICOLACE) 8.6-50 MG per tablet 2 tablet  2 tablet Oral BID PRN Teja Young MD        sodium chloride (PF) 0.9% PF flush 3 mL  3 mL Intracatheter Q8H Teja Young MD   3 mL at 06/21/24 0209    sodium chloride (PF) 0.9% PF flush 3 mL  3 mL Intracatheter q1 min prn Teja Young MD

## 2024-06-21 NOTE — PROGRESS NOTES
Antimicrobial Stewardship Team Note    Antimicrobial Stewardship Program - A joint venture between [facility] and  Physicians to optimize antibiotic management.  NOT a formal consult - Restricted Antimicrobial Review     Patient: Saeid Santa  MRN: [id number]  Allergies: Patient has no known allergies.    Brief Summary:  jaci Santa is a 68 year old male who presents with hx of HTN, seizures, hx of prostate cancer sp prostatectomy, and metastatic melanaoms w/ mets to brain, currently treated with ipilimab/nivolumab. He completed cycle 2 of treatment on 6/5, and was admitted on 6/17 for dehydration, weakness, and 2 weeks of watery diarrhea that progressed to bloody stools, 4-8 stools per day, concerning for checkpoint induce colitis, and subsequently found to be adenovirus positive.     HPI: Recent chemo on 6/5 with new onset of diarrhea ~3days post (startted 06/08), with 4-8 watery stools per day. Initially no black, bloody, or tarry stools, but over the last week has had multiple bloody stools w/ bright red blood per rectum. During this time he has maintained good PO intake.  He was also noted to be Prednisone 6/11/24 for concern for immune mediated colitis.  It was also noted that in outpt setting 6/12 stool sample tested positive for E.coli treated with azithromycin  500 mg PO X3 days.   On presentation he was afebrile (and has remained afebrile throughout admission), mild leukocytosis (WBC 13.6), tachycardia (), other VS wnl.  6/19 XR abd: No evidence of free air.  6/17 CT AP: Mild pericolonic fat stranding along the ascending, descending and sigmoid colon is concerning for infective/inflammatory colitis. Colonic diverticulosis.  6/19 CXR: Mild diffuse interstitial opacities throughout both lungs. No pneumothorax, focal consolidation or pleural effusion.  Blood cultures remain negative, Nares PCR for MRSA/MSSA is negative but enteric panel was positive for Adenovirus. He was  empirically started on zosyn  and vanco on 6/19, vancomycin was discontinued but he remains on zosyn.       Active Anti-infective Medications   (From admission, onward)                 Start     Stop    06/19/24 1200  piperacillin-tazobactam  3.375 g,   Intravenous,   EVERY 6 HOURS        Sepsis       --                  Assessment: Colitis in setting of recent chemo (ipilumab/nivolumab, last infusion 06/05) likely secondary to Concern for IRAE Colitis and adenovirus. His presenting symptoms and imaging  are consistent with infectious or inflammatory colitis and no evidence of perforation and infectious colitis is likely Adenovirus.  Initially did have enteropathgenic Ecoli in stool on enteric bacteria and virus panel but this was appropriately treated with the 3 days of azithromycin so low suspicion this current diarrhea is consistent with recurrent Ecoli.  Adenovirus associated diarrhea will require supportive cares at this stage as little to no evidence for treatment of this virus.  Given no evidence of another intraabdominal infectious process, bacterial coverage is no long warranted.     Recommend to stop zosyn and observe off broad spectrum antibacterial coverage.  Of note, he has been started on prednisone (current dose of 80mg daily) with plan for prolonged course, therefore consider addition of PJP prophylaxis with bactrim 1 DS daily (or standard hem/onc ppx protocol)    Recommendations:  Medication Change: discontinue one or more antibiotic(s), better empiric antibiotic therapy -  .      Pharmacy took the following actions: Called/paged provider, Electronic note created.    Discussed with ID Staff Dr. Amina Clemente, PharmD, BCIDP  Vocera available/pager [phone]    Vital Signs/Clinical Features:  Vitals         06/19 0700  06/20 0659 06/20 0700  06/21 0659 06/21 0700  06/21 1533   Most Recent      Temp ( F) 97.6 -  99.9    97.6 -  98.3    97.4 -  97.8     97.4 (36.3) 06/21 1434    Pulse 64  -  134    71 -  107    87 -  91     87 06/21 1434    Resp 16 -  24    14 -  18    16 -  18     18 06/21 1434    BP 82/53 -  129/107    106/73 -  128/90    121/81 -  122/86     121/81 06/21 1434    SpO2 (%) 92 -  100    92 -  98    94 -  95     95 06/21 1434            Labs  Estimated Creatinine Clearance: 72.2 mL/min (based on SCr of 1.13 mg/dL).  Recent Labs   Lab Test 06/17/24  1720 06/17/24  2055 06/18/24  0525 06/19/24  0613 06/20/24  0530 06/21/24  0611   CR 1.57* 1.52* 1.47* 1.20* 1.05 1.13       Recent Labs   Lab Test 06/21/23  1313 07/07/23  0510 08/09/23  0836 09/01/23  0818 09/07/23  1339 01/31/24  1003 03/19/24  1534 06/11/24  1101 06/17/24  1720 06/17/24  1742 06/18/24  0525 06/19/24  0613 06/20/24  0530 06/21/24  0611   WBC 15.5*   < > 7.7 11.3*   < > 15.3*   < > 13.2* 13.6*  --  11.9* 9.4 6.7 8.5   ANEU 11.9*  --  5.8 7.5  --  11.2*  --   --  11.6*  --   --   --   --   --    ALYM 1.6  --  1.6 2.5  --  2.4  --   --  1.0  --   --   --   --   --    STEVEN 1.6*  --  0.3 1.4*  --  1.1  --   --  1.0  --   --   --   --   --    AEOS 0.2  --  0.0 0.0  --  0.5  --   --  0.0  --   --   --   --   --    HGB 13.1*   < > 13.2* 12.5*   < > 13.5   < > 16.0 16.1 14.3 15.0 12.7* 9.8* 11.5*   HCT 39.8*   < > 40.1 38.0*   < > 41.4   < > 47.0 44.0 42 42.9 37.2* 28.7* 33.7*   MCV 92   < > 96 93   < > 95   < > 94 87  --  90 91 94 92      < > 241 317   < > 261   < > 181 173  --  163 155 110* 126*    < > = values in this interval not displayed.       Recent Labs   Lab Test 06/11/24  1101 06/17/24  1720 06/18/24  0525 06/19/24  0613 06/20/24  0530 06/21/24  0611   BILITOTAL 1.2 0.8 0.8 0.6 0.5 0.7   ALKPHOS 84 68 63 64 39* 58   ALBUMIN 3.9 3.3* 3.1* 3.1* 2.0* 2.6*   AST 19 36 36 36 29 52*   ALT 27 23 24 25 17 32       Recent Labs   Lab Test 04/15/23  1206 07/16/23  1251 08/09/23  0836 06/17/24  1720 06/18/24  0525 06/19/24  0613 06/19/24  1109 06/19/24  1220 06/20/24  0530   PCAL  --   --   --  0.85*  --   --   --   --   --     LACT 1.2 1.5  --  1.6  --   --  7.0* 1.4  --    CRPI  --   --  196.47* 120.00* 115.00* 80.30*  --   --  64.10*   SED  --   --  64*  --   --   --   --   --   --              Culture Results:  7-Day Micro Results       Procedure Component Value Units Date/Time    Blood Culture Arm, Left [[id number]]  (Normal) Collected: 06/20/24 0539    Order Status: Completed Lab Status: Preliminary result Updated: 06/21/24 0606    Specimen: Blood from Arm, Left      Culture No growth after 1 day    Blood Culture Arm, Right [[id number]]  (Normal) Collected: 06/20/24 0530    Order Status: Completed Lab Status: Preliminary result Updated: 06/21/24 0606    Specimen: Blood from Arm, Right      Culture No growth after 1 day    MRSA MSSA PCR, Nasal Swab [[id number]] Collected: 06/19/24 1247    Order Status: Completed Lab Status: Final result Updated: 06/19/24 1525    Specimen: Swab from Nose      MRSA Target DNA Negative     SA Target DNA Positive    Narrative:      The [employer]  Xpert SA Nasal Complete assay performed in the [employer]  Dx System is a qualitative in vitro diagnostic test designed for rapid detection of Staphylococcus aureus (SA) and methicillin-resistant Staphylococcus aureus (MRSA) from nasal swabs in patients at risk for nasal colonization. The test utilizes automated real-time polymerase chain reaction (PCR) to detect MRSA/SA DNA. The Xpert SA Nasal Complete assay is intended to aid in the prevention and control of MRSA/SA infections in healthcare settings. The assay is not intended to diagnose, guide or monitor treatment for MRSA/SA infections, or provide results of susceptibility to methicillin. A negative result does not preclude MRSA/SA nasal colonization.     Blood Culture Arm, Right [[id number]]  (Normal) Collected: 06/19/24 1109    Order Status: Completed Lab Status: Preliminary result Updated: 06/21/24 1404    Specimen: Blood from Arm, Right      Culture No growth after 2 days    Blood Culture Line,  venous     Order Status: Sent Lab Status: No result     Specimen: Blood from Line, venous     Quantiferon TB Gold Plus Grey Tube [[id number]] Collected: 06/19/24 0613    Order Status: Completed Lab Status: Final result Updated: 06/20/24 1043    Specimen: Blood from Arm, Right      Quantiferon Nil Tube 1.75 IU/mL     Quantiferon TB Gold Plus Green Tube [[id number]] Collected: 06/19/24 0613    Order Status: Completed Lab Status: Final result Updated: 06/20/24 1446    Specimen: Blood from Arm, Right      Quantiferon TB1 Tube 1.57 IU/mL     Quantiferon TB Gold Plus Yellow Tube [[id number]] Collected: 06/19/24 0613    Order Status: Completed Lab Status: Final result Updated: 06/20/24 1446    Specimen: Blood from Arm, Right      Quantiferon TB2 Tube 1.49    Quantiferon TB Gold Plus Purple Tube [[id number]] Collected: 06/19/24 0613    Order Status: Completed Lab Status: Final result Updated: 06/20/24 1042    Specimen: Blood from Arm, Right      Quantiferon Mitogen 10.00 IU/mL     Quantiferon TB Gold Plus [[id number]] Collected: 06/19/24 0613    Order Status: Completed Lab Status: Final result Updated: 06/20/24 1446    Specimen: Blood from Arm, Right      Quantiferon-TB Gold Plus Negative     Comment: No interferon gamma response to M.tuberculosis antigens was detected. Infection with M.tuberculosis is unlikely, however a single negative result does not exclude infection. In patients at high risk for infection, a second test should be considered in accordance with the 2017 ATS/IDSA/CDC Clinical Pract  ice Guidelines for Diagnosis of Tuberculosis in Adults and Children         TB1 Ag minus Nil Value -0.18 IU/mL      TB2 Ag minus Nil Value -0.26 IU/mL      Mitogen minus Nil Result 8.25 IU/mL      Nil Result 1.75 IU/mL     Enteric Bacteria and Virus Panel PCR [[id number]]  (Abnormal) Collected: 06/17/24 2010    Order Status: Completed Lab Status: Final result Updated: 06/18/24 0728    Specimen: Stool from Per  Rectum      Campylobacter species Negative     Salmonella species Negative     Vibrio species Negative     Vibrio cholerae Negative     Yersinia enterocolitica Negative     Enteropathogenic E. coli (EPEC) Negative     Shiga-like toxin-producing E. coli (STEC) Negative     Shigella/Enteroinvasive E. coli (EIEC) Negative     Cryptosporidium species Negative     Giardia lamblia Negative     Norovirus Gl/Gll Negative     Rotavirus A Negative     Plesiomonas shigelloides Negative     Enteroaggregative E. coli (EAEC) Negative     Enterotoxigenic E. coli (ETEC) Negative     E. coli O157 NA     Cyclospora cayetanensis Negative     Entamoeba histolytica Negative     Adenovirus F40/41 Positive     Astrovirus Negative     Sapovirus Negative    Narrative:      Assay performed using the FDA-cleared [employer] GI Panel from [employer]  A negative result should not rule out infection in patients with a probability for gastrointestinal infection. The assay does not test for all potential infectious agents of diarrheal disease.  Positive results do not distinguish between a viable or replicating organism and the presence of a nonviable organism or nucleic acid, nor do they exclude the possibility of coinfection by organisms not in the panel.  Results are intended to aid in the diagnosis of illness and are meant to be used in conjunction with other clinical findings.  This test has been verified and is performed by the [facility] at [facility]. This laboratory is certified under the Clinical Laboratory Improvement Amendments of 1988 (CLIA-88) as qualified to perform high complexity clinical laboratory testing.    C. difficile Toxin B PCR with reflex to C. difficile Antigen and Toxins A/B EIA [[id number]]  (Normal) Collected: 06/17/24 2010    Order Status: Completed Lab Status: Final result Updated: 06/17/24 210    Specimen: Stool from Per Rectum      C Difficile Toxin B by PCR  Negative     Comment: A negative result does not exclude actual disease due to C. difficile and may be due to improper collection, handling and storage of the specimen or the number of organisms in the specimen is below the detection limit of the assay.       Narrative:      The [employer] Xpert C. difficile Assay, performed on the [employer]  Instrument Systems, is a qualitative in vitro diagnostic test for rapid detection of toxin B gene sequences from unformed (liquid or soft) stool specimens collected from patients suspected of having Clostridioides difficile infection (CDI). The test utilizes automated real-time polymerase chain reaction (PCR) to detect toxin gene sequences associated with toxin producing C. difficile. The Xpert C. difficile Assay is intended as an aid in the diagnosis of CDI.            Recent Labs   Lab Test 10/06/21  1214 04/15/23  1248 06/19/24  1449   URINEPH 6.0 7.0 5.5   NITRITE Negative Negative Negative   LEUKEST Negative Negative Negative   WBCU <1  --  2             Recent Labs   Lab Test 01/11/24  1038   IFLUA Not Detected   FLUAH1 Not Detected   FLUAH3 Not Detected   UA9691 Not Detected   IFLUB Not Detected   RSVA Not Detected   RSVB Not Detected   PIV1 Not Detected   PIV2 Not Detected   PIV3 Not Detected   HMPV Not Detected       Recent Labs   Lab Test 06/17/24 2010   CDBPCT Negative       Imaging: XR Abdomen Port 1 View    Result Date: 6/19/2024  EXAMINATION:  XR ABDOMEN PORT 1 VIEW 6/19/2024 1:34 PM COMPARISON: CT abdomen and pelvis 6/17/2024 HISTORY: s/p endoscopy, evaluate for free air TECHNIQUE: Frontal view of the abdomen. FINDINGS: The small intestine and colon are not distended. No abnormal soft tissue densities.  No evidence of free air, pneumatosis or portal venous gas. The lung bases are unremarkable.     IMPRESSION: Non-obstructed bowel gas pattern. No evidence of free air. I have personally reviewed the examination and initial interpretation and I agree with the  findings. SHE QUINTANILLA MD   SYSTEM ID:  [id number]    XR Chest Port 1 View    Result Date: 2024  EXAM: XR CHEST PORT 1 VIEW 2024 1:29 PM INDICATION: sepsis COMPARISON: CT 2024 TECHNIQUE: Single portable upright AP view of the chest. FINDINGS: Low lung volumes. Mild diffuse interstitial opacities throughout both lungs. Cardiac silhouette within normal limits. Trachea is midline. No pneumothorax, focal consolidation or pleural effusion. No abnormal calcifications or acute osseous abnormality. Limited upper abdomen within normal limits.     IMPRESSION: 1. No focal consolidation. Mild interstitial opacities bilaterally which could be related to atelectasis versus pulmonary edema. I have personally reviewed the examination and initial interpretation and I agree with the findings. SYDNEY BARKER MD   SYSTEM ID:  [id number]    Echo Complete    Result Date: 2024  [id number]^[email or web address]^PERLA  [facility] [address] [place] [address]  Name: IKER MICHELE MRN: [id number] : 1955 Study Date: 2024 12:58 PM Age: 68 yrs Gender: [place] [facility] [place]: Shock Ordering Physician: PERLA ECHOLS Performed By: Mary Sun RDCS  BSA: 2.2 m2 Height: 72 in Weight: 206 lb ______________________________________________________________________________ Procedure Complete Portable Echo Adult. Contrast Optison. Echocardiogram with two- dimensional, color and spectral Doppler performed. Optison (NDC #2795-1434-69) given intravenously. Patient was given 6 ml mixture of 3 ml Optison and 6 ml saline. 3 ml wasted. ______________________________________________________________________________ Interpretation Summary Left ventricular size, wall motion and function are normal. The ejection fraction is 55-60%. Right ventricular function, chamber size, wall motion, and thickness are normal. No  significant valvular abnormalities were noted. No pericardial effusion is present. Previous study not available for comparison. ______________________________________________________________________________ Left Ventricle Left ventricular size, wall motion and function are normal. The ejection fraction is 55-60%. Left ventricular diastolic function is normal. Diastolic Doppler findings (E/E' ratio and/or other parameters) suggest left ventricular filling pressures are normal.  Right Ventricle Right ventricular function, chamber size, wall motion, and thickness are normal.  Atria Both atria appear normal.  Mitral Valve Mild mitral annular calcification is present. Trace mitral insufficiency is present.  Aortic Valve Trileaflet aortic sclerosis without stenosis.  Tricuspid Valve The tricuspid valve is normal. Trace tricuspid insufficiency is present. The peak velocity of the tricuspid regurgitant jet is not obtainable. Pulmonary artery systolic pressure cannot be assessed.  Pulmonic Valve The valve leaflets are not well visualized. On Doppler interrogation, there is no significant stenosis or regurgitation.  Vessels The aorta root is normal. The thoracic aorta is normal. The pulmonary artery cannot be assessed. The inferior vena cava was normal in size with preserved respiratory variability. IVC diameter <2.1 cm collapsing >50% with sniff suggests a normal RA pressure of 3 mmHg.  Pericardium No pericardial effusion is present.  Compared to Previous Study Previous study not available for comparison. ______________________________________________________________________________ MMode/2D Measurements & Calculations IVSd: 1.0 cm LVIDd: 5.3 cm LVIDs: 3.8 cm LVPWd: 0.91 cm FS: 27.8 % LV mass(C)d: 190.1 grams LV mass(C)dI: 88.1 grams/m2 Ao root diam: 3.9 cm asc Aorta Diam: 3.8 cm LVOT diam: 2.6 cm LVOT area: 5.3 cm2 Ao root diam index Ht(cm/m): 2.1 Ao root diam index BSA (cm/m2): 1.8 Asc Ao diam index BSA (cm/m2): 1.7 Asc Ao  diam index Ht(cm/m): 2.1 LA Volume (BP): 33.4 ml  LA Volume Index (BP): 15.5 ml/m2 RWT: 0.35  Doppler Measurements & Calculations MV E max bret: 46.8 cm/sec MV A max bret: 60.8 cm/sec MV E/A: 0.77 MV dec time: 0.13 sec E/E' av.6 Lateral E/e': 4.5 Medial E/e': 6.7 RV S Bret: 18.0 cm/sec  ______________________________________________________________________________ Report approved by: Dc Martin 2024 01:59 PM       CT Abdomen Pelvis w/o Contrast    Result Date: 2024  EXAM: CT abdomen and pelvis without intravenous contrast. 2024 7:56 PM HISTORY: abdominal pain, diarrhea   TECHNIQUE: Helical acquisition of image data was performed for the abdomen and pelvis without intravenous contrast. COMPARISON: 2024 FINDINGS: Lower thorax: No focal airspace consolidation. Patchy subsegmental atelectasis. Stable 4 mm subpleural nodule in right middle lobe. Liver: No intrahepatic biliary dilatation. No focal hepatic mass within the limitations of noncontrast technique. Gallbladder/biliary tree: The common bile duct is not dilated. Gallbladder appears unremarkable. Pancreas: The pancreatic duct is not dilated. Spleen: The spleen is not enlarged. Adrenal glands: No adrenal nodules. Kidneys/ureters: No hydronephrosis. No renal calculi. Bilateral parapelvic renal cysts. Bladder/pelvic organs: Decompressed urinary bladder. Bowel/mesentery: No dilated loops of small bowel or colon. Colonic diverticulosis. Mild pericolonic fat stranding throughout the ascending, descending and sigmoid colon. Stomach: Unremarkable. Peritoneum/retroperitoneum: No extraluminal bowel gas. Trace free fluid in the pelvis. Lymph nodes: No enlarged abdominal or pelvic lymph nodes by short axis criteria. Prominent subcentimeter short-axis right lower quadrant mesenteric lymph nodes are likely reactive. Major vessels: No aneurysmal dilatation. Bones/soft tissues: Degenerative changes of the spine. No acute or suspicious osseous  abnormality. AVN changes in both hips. Small fat-containing bilateral inguinal hernias.     IMPRESSION: 1. Mild pericolonic fat stranding along the ascending, descending and sigmoid colon is concerning for infective/inflammatory colitis. 2. Colonic diverticulosis. I have personally reviewed the examination and initial interpretation and I agree with the findings. ARUNA DUMONT MD   SYSTEM ID:  [id number]

## 2024-06-21 NOTE — PROGRESS NOTES
[facility]    Medicine Progress Note - Medicine Service, BAN TEAM 4    Date of Admission:  6/17/2024    Assessment & Plan       Saeid Santa is a 68 year old male who presents with hx of HTN, seizures, hx of prostate cancer sp prostatectomy, and metastatic melanaoms w/ mets to brain, currently treated with ipilimab/nivolumab. He completed cycle 2 of treatment on 6/5, and is being admitted for dehydration, weakness, and 2 weeks of watery diarrhea, 4-8 stools per day concerning for checkpoint induce colitis, as well as adenovirus.    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 Coli positive stool test  #NAGMA    Patient w/ hx of metastatic melanoma currently treated w/ ipilumab/nivolumab, last infusion 06/05 and diarrhea since 6/8.  Stool PCR positive for E Coli as outpatient and prednisone held, but symptoms continued to worsen.  At admission, stool PCR positive for adenovirus and imaging consistent with colitis.  Most likely diagnosis is checkpoint inhibitor induced colitis perhaps exacerbated by infection.    S/p flex sig on 6/19 with severe colitis, pathology still pending.    - Appreciate oncology and GI assistance  - infliximab 5 mg/kg x 1 today, may repeat as outpatient  - stop methylprednisone after today and transition to prednisone 80 mg daily for now with a slow reduction as outpatient; will need to consider PJP prophylaxis depending on course.  - continue fluids for now    #Severe sepsis 2/2 suspected intraabdominal source; resolved    Patient developed rigors following flex sig on 6/19. SIRS 2 w/ lactic acid 7 that rapidly improved.  Suspect related to bacterial translocation in the setting of flex sig.     - continue to follow cultures  - continue  piperacillin/tazobactam for now - would like to do a quinolone +/- metronidazole, but with long QTc and concerned about tendonopathy in the setting of high dose steroids.   Needs a total of 5 days or so of antibiotics.   - recheck EKG for QTc    #Metastatic Melanoma (4/2023) w/ mets to brain  #Seizures    Patient found to have ring enhancing lesions on MRI . 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  Has continued to show CNS involvement of disease status post multiple craniotomies and resections (tumor resection 4/2023, L parieto-occipital craniotomy 7/2023, r frontal craniotomy 12/2023, gamma knife 02/2024). Recent PET scan w/ concerns for mets to soft tissues in R lateral calf and L medial thigh as well as liver lesions.  Last seizure was 04/2023.   -PTA [facility]  - oncology assistance appreciated    #Acute kidney injury, suspected prerenal 2/2 diarrhea; improving  Baseline Cr 0.9, admitted w/ 1.5, now ~1.1  - clarify baclofen, consider restarting.  - continue fluids  - change heparin to LMWH      #Hypokalemia; resolved  #Hyponatremia; resolved  #Prolonged QTC (616); resolved      Patient with multiple electrolyte disturbances at admisison given marked diarrhea.      - repeat EKG       #Deconditioning with falls  Likely 2/2 generalized weakness, brain mets, and dehydration.  Improving.    - continue physical and occupational therapy        #Hx of prostate cancer sp 2009 prostatectomy  - monitor, no acute issues          Diet: Snacks/Supplements Adult: Other; chocolate [employer] 1.0 with lunch and dinner trays; With Meals  Snacks/Supplements Adult: Other; string cheese x2 @ 2:00 pm, yogurt at HS; Between Meals  High Kcal/High Protein Diet, ADULT    DVT Prophylaxis: Enoxaparin (Lovenox) SQ  Aragon Catheter: Not present  Lines: None     Cardiac Monitoring: ACTIVE order. Indication: Tachyarrhythmias, acute (48 hours)  Code Status: Full  Code      Clinically Significant Risk Factors              # Hypoalbuminemia: Lowest albumin = 2 g/dL at 6/20/2024  5:30 AM, will monitor as appropriate   # Thrombocytopenia: Lowest platelets = 110 in last 2 days, will monitor for bleeding   # Hypertension: Noted on problem list           #Precipitous drop in Hgb/Hct: Lowest Hgb this hospitalization: 9.8 g/dL. Will continue to monitor and treat/transfuse as appropriate.     # Overweight: Estimated body mass index is 27.94 kg/m  as calculated from the following:    Height as of this encounter: 1.829 m (6').    Weight as of this encounter: 93.4 kg (206 lb)., PRESENT ON ADMISSION  # Severe Malnutrition: based on nutrition assessment, PRESENT ON ADMISSION   # Financial/Environmental Concerns: other (see comments) (Information given on filing for MA in AVS)         Disposition Plan     Medically Ready for Discharge: Anticipated in 2-4 Days             John Remy MD  Medicine Service, [address]  Securely message with [employer] (more info)  Text page via [facility] Paging/Directory   See signed in provider for up to date coverage information  ______________________________________________________________________    Interval History     Feeling much better, was able to get some sleep.  Still having copious diarrhea although it is somewhat improved.  Tolerating some oral intake.  Doesn't like the food here.     Physical Exam   Vital Signs: Temp: 97.8  F (36.6  C) Temp src: Oral BP: 122/86 Pulse: 91   Resp: 16 SpO2: 94 % O2 Device: None (Room air)    Weight: 206 lbs 0 oz    General Appearance: Lying on his side in bed, more conversational  Respiratory: clear to auscultation bilaterally with good air entry   Cardiovascular: normal rate, regular rhythm. No murmurs, rubs, or gallops   GI: soft, non-distended, non-tender, no hepatosplenomegaly or mass   Skin: no rash  Other: No lower extremity edema.      Medical  Decision Making       MANAGEMENT DISCUSSED with the following over the past 24 hours: Oncology       Data     I have personally reviewed the following data over the past 24 hrs:    8.5  \   11.5 (L)   / 126 (L)     138 111 (H) 16.9 /  116 (H)   3.6 19 (L) 1.13 \     ALT: 32 AST: 52 (H) AP: 58 TBILI: 0.7   ALB: 2.6 (L) TOT PROTEIN: 4.9 (L) LIPASE: N/A

## 2024-06-22 NOTE — PROGRESS NOTES
Hematology / Oncology  Daily Progress Note   Date of Service: 06/22/2024  Patient: Saeid Santa  MRN: [id number]  Admission Date: 6/17/2024  Hospital Day # 5  Cancer Diagnosis:  IV melanoma   Primary Outpatient Oncologist: Dr. Bunn   Current Treatment Plan: Nivolumab + Ipilimumab      Recommendations:   - Given persistent diarrhea, recommend proceeding with Infliximab 5mg/kg IV for steroid refractory IRAE colitis   - Consider decrease in steroid to prednisone 1mg/kg PO. Will discuss further taper pending response to infliximab  - Agree with de escalation of abx  - Will continue to follow along     Assessment & Plan:   Saeid Santa is a 68 year old male with pmh HTN, seizures, h/o prostate cancer s/p prostatectomy and metastatic melanoma w/ mets to brain most recently on Ipi/Nivo. Currently being admitted for dehydration, weakness and 2 weeks of watery diarrhea concerning for enterocolitis vs IRAE colitis. He had one dose of infliximab yesterday and diarrhea seems to be improving.    # Diarrhea   # Adenovirus   # Concern for IRAE Colitis   Per chart review patient was seen in clinic on 6/11/24 and reported 5 days of diarrhea with 5-10 episodes of loose stool per day. He was started on Prednisone 6/11/24 for concern for immune mediated colitis. Infectious work up was also notable for E.Coli infection and patient was started on Azithromycin. It was unclear if Prednisone was stopped or not. Patient was then seen in clinic 6/17 for follow up of diarrhea and reported ongoing stooling with almost hourly.  - Given persistent diarrhea despite HD methylpred 1mg/kg IV agree that we proceeded with Infliximab 5mg/kg IV for steroid refractory IRAE colitis.  We will arrange for follow up in Oncology Clinic for next infliximab infusion in 2 weeks.  Steroids should be tapered over the next 2 to 4 weeks.  - Appreciate GI involvement and primary team management  - Agree with de escalation of abx    # Metastatic Melanoma    Oncology history obtained from outpatient notes. In brief, Patient was closely monitoring a left parietal scalp lesions with PCP in Aug 2022. He had an I&D Nov 2022 as it was felt to be a cyst. He then developed difficulty with his right leg and foot in February 2023. In April 2023 he was found at work sitting in the grass appearing confused and was brought to the ED. Imaging showed small bilateral indeterminate pulmonary nodules. CT Head and MRI showed 2 discrete ring-enhancing lesions in the left paramedian posterior frontal lobe and left occipital lobe. He underwent an awake craniotomy 4/21/23 with resection of the left frontal, motor strip tumor as well as elliptical incision of left parietal scalp lesion. Pathology c/w malignant melanoma. 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 PET/CT 5/18/23 showed potential involved neck LN, mild FDG uptake in soft tissue nodules in the scalp, and known left occipital brain met. He received gamma knife 2500 cGy to the left resection cavity and 3000 cGy to the left parietal-occipital lesion. Then began systemic treatment with Keytruda (C1D1= 6/7/23) x 2 cycles. This was complicated by pneumonitis for which he was treated with a course of steroids. In Sept 2023 he received gamma knife to right frontal lobe lesion. He was noted to have intracranial disease progression in December 2023, he underwent right frontal craniotomy with Dr. Garrido 12/21/23. He then began Nivolumab (C1D1=1/22/24) x 4 cycles.  He was noted to have disease progression in April 2024 so he was changed to Ipi/Nivo (C1D1=5/15/24)x 2 cycles  and received further gamma knife.     - Last cycle of Ipi/Nivo C2D1=6/5/24.  Ipi will likely be discontinued for future infusions but this will be determined with Dr. Bunn as outpatient.    - No indication for inpatient cancer directed therapy at this time      Thank you for the opportunity to partake in this patients plan of care. Please do not hesitate to  page with questions. We will continue to follow.     I spent 35 minutes face-to-face or coordinating care of Saeid Santa. Over 50% of our time on the unit was spent counseling the patient and/or coordinating care.    ___________________________________________________________________    Subjective & Interval History:    No acute events noted overnight.  Tim is in brighter spirits today. Slept better, though diarrhea worsening. Increasing frequency of stools. No fevers or chills. No abdominal pain noted at time of visit, states it is more of a discomfort.     A comprehensive review of systems was obtained and is negative other than noted here or in the HPI.     Physical Exam:    Blood pressure 125/79, pulse 68, temperature 98.1  F (36.7  C), temperature source Oral, resp. rate 16, height 1.829 m (6'), weight 93.4 kg (206 lb), SpO2 97%.    General: lying in bed, no acute distress  HEENT: sclera anicteric, EOMI, MMM  Neck: supple, normal ROM  Resp: CTAB  GI: soft, non-tender, non-distended  MSK: warm and well-perfused, normal tone  Skin: no rashes on limited exam, no jaundice  Neuro: Alert and interactive, moves all extremities equally, no focal deficits    Labs & Studies: I personally reviewed the following studies:  ROUTINE LABS (Last four results):  CMP  Recent Labs   Lab 06/22/24  0633 06/21/24  1548 06/21/24  0611 06/20/24  0530 06/19/24  1109 06/19/24  0613     --  138 137  --  135   POTASSIUM 3.7  --  3.6 4.3 3.6 3.3*   CHLORIDE 110*  --  111* 110*  --  103   CO2 23  --  19* 16*  --  20*   ANIONGAP 7  --  8 11  --  12   GLC 89  --  116* 118*  --  89   BUN 12.9  --  16.9 17.6  --  25.6*   CR 1.14 1.08 1.13 1.05  --  1.20*   GFRESTIMATED 69 74 70 76  --  65   JOHN PAUL 7.9*  --  7.7* 7.2*  --  8.5*   MAG 2.2  --  2.3 1.7  --  2.3   PHOS 2.0*  --  3.0 2.4*  --  2.2*   PROTTOTAL 5.1*  --  4.9* 3.7*  --  5.9*   ALBUMIN 2.8*  --  2.6* 2.0*  --  3.1*   BILITOTAL 0.8  --  0.7 0.5  --  0.6   ALKPHOS 82  --  58 39*  --   64   AST 69*  --  52* 29  --  36   ALT 53  --  32 17  --  25     CBC  Recent Labs   Lab 06/22/24  0633 06/21/24  0611 06/20/24  0530 06/19/24  0613   WBC 13.4* 8.5 6.7 9.4   RBC 3.83* 3.66* 3.07* 4.10*   HGB 11.9* 11.5* 9.8* 12.7*   HCT 35.6* 33.7* 28.7* 37.2*   MCV 93 92 94 91   MCH 31.1 31.4 31.9 31.0   MCHC 33.4 34.1 34.1 34.1   RDW 14.7 14.7 14.8 14.6   * 126* 110* 155     INRNo lab results found in last 7 days.    Medications list for reference:  Current Facility-Administered Medications   Medication Dose Route Frequency Provider Last Rate Last Admin    acetaminophen (TYLENOL) tablet 650 mg  650 mg Oral Q4H PRN Teja Young MD        Or    acetaminophen (TYLENOL) Suppository 650 mg  650 mg Rectal Q4H PRN Teja Young MD        benzocaine-menthol (CHLORASEPTIC MAX) 15-10 MG lozenge 1 lozenge  1 lozenge Buccal Q1H PRN Teja Young MD        calcium carbonate (TUMS) chewable tablet 1,000 mg  1,000 mg Oral 4x Daily PRN Teja Young MD        camphor-menthol (DERMASARRA) lotion   Topical Q6H PRN Michelle Tirado MD        carboxymethylcellulose PF (REFRESH PLUS) 0.5 % ophthalmic solution 1 drop  1 drop Both Eyes Q1H PRN Michelle Tirado MD        enoxaparin ANTICOAGULANT (LOVENOX) injection 40 mg  40 mg Subcutaneous Q24H John Remy MD   40 mg at 06/21/24 2018    fentaNYL (PF) (SUBLIMAZE) injection   Intravenous PRN Prosper Sweet MD   50 mcg at 06/19/24 0944    guaiFENesin (ROBITUSSIN) 20 mg/mL solution 200 mg  200 mg Oral Q4H PRN Teja Young MD        hydrocortisone (CORTAID) 1 % cream   Topical BID PRN Michelle Tirado MD        lactated ringers infusion   Intravenous Continuous Michelle Tirado MD 75 mL/hr at 06/21/24 1428 Restarted at 06/21/24 1428    levETIRAcetam (KEPPRA) tablet 1,000 mg  1,000 mg Oral BID Teja Young MD   1,000 mg at 06/22/24 0832    lidocaine (LMX4) cream   Topical Q1H PRN Teja Young MD        lidocaine 1 % 0.1-1 mL   0.1-1 mL Other Q1H PRN Teja Young MD        loperamide (IMODIUM) capsule 2 mg  2 mg Oral 4x Daily Michelle Tirado MD   2 mg at 06/22/24 0833    melatonin tablet 3 mg  3 mg Oral At Bedtime Michelle Tirado MD   3 mg at 06/21/24 2017    midazolam (VERSED) injection   Intravenous PRN Prosper Sweet MD   1 mg at 06/19/24 0944    pantoprazole (PROTONIX) EC tablet 40 mg  40 mg Oral QAM AC Michelle Tirado MD   40 mg at 06/22/24 0833    polyethylene glycol (MIRALAX) Packet 17 g  17 g Oral BID PRN Teja Young MD        Potassium Phosphate 15 mmol in NS intermittent infusion 15 mmol  15 mmol Intravenous Once John Remy MD        predniSONE (DELTASONE) tablet 80 mg  80 mg Oral Daily John Remy MD   80 mg at 06/22/24 0832    rosuvastatin (CRESTOR) tablet 40 mg  40 mg Oral Daily Teja Young MD   40 mg at 06/22/24 0832    senna-docusate (SENOKOT-S/PERICOLACE) 8.6-50 MG per tablet 1 tablet  1 tablet Oral BID PRN Teja Young MD        Or    senna-docusate (SENOKOT-S/PERICOLACE) 8.6-50 MG per tablet 2 tablet  2 tablet Oral BID PRN Teja Young MD        sodium chloride (PF) 0.9% PF flush 3 mL  3 mL Intracatheter Q8H Teja Young MD   3 mL at 06/21/24 1813    sodium chloride (PF) 0.9% PF flush 3 mL  3 mL Intracatheter q1 min prn Teja Young MD

## 2024-06-22 NOTE — PLAN OF CARE
Goal Outcome Evaluation:      Plan of Care Reviewed With: patient    Overall Patient Progress: improving    Outcome Evaluation: IV ABX discontinued.  MIVF stopped, PIV's saline locked.  Ambulating with walker.  Loose stools.  Prednisone started.  Denies pain.  A&O and able to make needs known.

## 2024-06-22 NOTE — PROVIDER NOTIFICATION
Provider notified (name):ROD Vides  Reason for notification: pt refused iv steroid this evening. When pt first declined, provider offered additional medication to aid sleep. Pt did eventually agree to trial the new sleep med and get the IV steroid. When the sleep med was brought in, pt got very agitated with having to take a medication that was an antidepressant (trazodone). Says his wife takes it for depression and he was upset and feels that people are just throwing medications at him. He refused the medication for sleep and refused the IV steroid again. Provider updated. Pt requests to speak with primary team in AM.  Response from provider: Provider acknowledged pt refusing medication for second time.

## 2024-06-22 NOTE — PLAN OF CARE
Goal Outcome Evaluation:      Plan of Care Reviewed With: patient    Overall Patient Progress: no change    Outcome Evaluation: Alert and oriented x 4. Denies pain. Up to bedside commode independently . Had 3 greenish watery stool overnight. IV antibiotic given. Vital signs stable on room air.

## 2024-06-22 NOTE — PROGRESS NOTES
[facility]    Medicine Progress Note - Medicine Service, BAN TEAM 4    Date of Admission:  6/17/2024    Assessment & Plan   Saeid Santa is a 68 year old male who presents with hx of HTN, seizures, hx of prostate cancer sp prostatectomy, and metastatic melanaoms w/ mets to brain, currently treated with ipilimab/nivolumab. He completed cycle 2 of treatment on 6/5, and is being admitted for dehydration, weakness, and 2 weeks of watery diarrhea, 4-8 stools per day concerning for checkpoint induce colitis, as well as adenovirus, improving.     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 Coli positive stool test  #NAGMA    Patient w/ hx of metastatic melanoma currently treated w/ ipilumab/nivolumab, last infusion 06/05 and diarrhea since 6/8.  Stool PCR positive for E Coli as outpatient and prednisone held, but symptoms continued to worsen.  At admission, stool PCR positive for adenovirus and imaging consistent with colitis.  Most likely diagnosis is checkpoint inhibitor induced colitis perhaps exacerbated by infection, now improving nicely.    S/p flex sig on 6/19 with severe colitis. Pathology result:    Sections of colon show severe acute cryptitis with architectural distortion, lymphoplasmacytic expansion of the lamina propria, increased crypt apoptosis, and erosion. Epithelioid granulomas are not identified. CMV, HSV, and EBV stains are NEGATIVE. The findings are compatible with checkpoint inhibitor colitis; however, other medications, infections, and idiopathic inflammatory bowel disease may also produce this injury pattern.     - Appreciate oncology and GI assistance  - continue immodium prn  - infliximab 5 mg/kg x 1 6/21, repeat in 2 weeks as outpatient   - start prednisone 80 mg daily with taper over 3-4  weeks as outpatient   - if only a month, probably no need for PJP prophylaxis.  If desired can start as outpatient.   - stop fluids    #Severe sepsis 2/2 suspected intraabdominal source; resolved    Patient developed rigors following flex sig on 6/19. SIRS 2 w/ lactic acid 7 that rapidly improved.  Suspect related to bacterial translocation in the setting of flex sig.     - continue to follow cultures  - stop piperacillin/tazobactam, monitor off antibiotics.       #Metastatic Melanoma (4/2023) w/ mets to brain  #Seizures    Patient found to have ring enhancing lesions on MRI . 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  Has continued to show CNS involvement of disease status post multiple craniotomies and resections (tumor resection 4/2023, L parieto-occipital craniotomy 7/2023, r frontal craniotomy 12/2023, gamma knife 02/2024). Recent PET scan w/ concerns for mets to soft tissues in R lateral calf and L medial thigh as well as liver lesions.  Last seizure was 04/2023.     -PTA [facility]  - oncology assistance appreciated    #Acute kidney injury, suspected prerenal 2/2 diarrhea; improving  Baseline Cr 0.9, admitted w/ 1.5, now ~1.1  - monitor      #Hypokalemia; resolved  #Hyponatremia; resolved  #Prolonged QTC (616); resolved      Patient with multiple electrolyte disturbances at admisison given marked diarrhea.      - repeat EKG if QT prolonging meds given       #Deconditioning with falls  Likely 2/2 generalized weakness, brain mets, and dehydration.  Improving.    - continue physical and occupational therapy        #Hx of prostate cancer sp 2009 prostatectomy  - monitor, no acute issues          Diet: Snacks/Supplements Adult: Other; chocolate [employer] 1.0 with lunch and dinner trays; With Meals  Snacks/Supplements Adult: Other; string cheese x2 @ 2:00 pm, yogurt at HS; Between Meals  High Kcal/High Protein Diet, ADULT    DVT  Prophylaxis: Enoxaparin (Lovenox) SQ  Aragon Catheter: Not present  Lines: None     Cardiac Monitoring: None  Code Status: Full Code      Clinically Significant Risk Factors              # Hypoalbuminemia: Lowest albumin = 2 g/dL at 6/20/2024  5:30 AM, will monitor as appropriate   # Thrombocytopenia: Lowest platelets = 126 in last 2 days, will monitor for bleeding   # Hypertension: Noted on problem list           #Precipitous drop in Hgb/Hct: Lowest Hgb this hospitalization: 9.8 g/dL. Will continue to monitor and treat/transfuse as appropriate.     # Overweight: Estimated body mass index is 27.94 kg/m  as calculated from the following:    Height as of this encounter: 1.829 m (6').    Weight as of this encounter: 93.4 kg (206 lb).   # Severe Malnutrition: based on nutrition assessment    # Financial/Environmental Concerns: other (see comments) (Information given on filing for MA in AVS)         Disposition Plan     Medically Ready for Discharge: Anticipated Tomorrow             John Remy MD  Medicine Service, [facility] TEAM [address] [place] [address]  Securely message with [email or web address] (more info)  Text page via [facility] Paging/Directory   See signed in provider for up to date coverage information  ______________________________________________________________________    Interval History      Patient frustrated about getting steroids late in the evening, which he declined to take.  Ok to take prednisone this morning.  Diarrhea still present but improving, able to sleep more overnight.  Eating and drinking ok.     Physical Exam   Vital Signs: Temp: 98.1  F (36.7  C) Temp src: Oral BP: 125/79 Pulse: 68   Resp: 16 SpO2: 97 % O2 Device: None (Room air)    Weight: 206 lbs 0 oz    General Appearance: Lying in bed, tired  Respiratory: clear to auscultation bilaterally with good air entry   Cardiovascular: normal rate, regular rhythm. No murmurs, rubs, or gallops   GI: soft, non-tender,  non-distended.  No hepatosplenomegaly or mass.   Skin: no rash  Other: 1+ lower extremity edema bilaterally.     Medical Decision Making       MANAGEMENT DISCUSSED with the following over the past 24 hours: Oncology       Data     I have personally reviewed the following data over the past 24 hrs:    13.4 (H)  \   11.9 (L)   / 142 (L)     140 110 (H) 12.9 /  89   3.7 23 1.14 \     ALT: 53 AST: 69 (H) AP: 82 TBILI: 0.8   ALB: 2.8 (L) TOT PROTEIN: 5.1 (L) LIPASE: N/A     Procal: N/A CRP: 22.40 (H) Lactic Acid: N/A

## 2024-06-22 NOTE — PLAN OF CARE
Goal Outcome Evaluation:      Plan of Care Reviewed With: patient    Overall Patient Progress: no changeOverall Patient Progress: no change    Outcome Evaluation: BM x1 this evening. Fair appetite. Reporting multiple frustrations with frequent interruptions and inability to sleep. Refused IV steroid dose this evening because he felt it keeps him awake all night. Provider was updated. Wants to see his oncology team.

## 2024-06-23 NOTE — DISCHARGE SUMMARY
[facility]    Internal Medicine Discharge Summary- [facility] Service    Date of Admission:  6/17/2024  Date of Discharge:  6/23/2024  Discharging Attending Provider: Dr. 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 Coli positive stool test  # NAGMA; improving  #Severe sepsis 2/2 suspected intraabdominal source; resolved  #Metastatic Melanoma (4/2023) w/ mets to brain  #Seizures  #Acute kidney injury, suspected prerenal 2/2 diarrhea; resolved  #Hypophosphatemia  #Hypokalemia; resolved  #Hyponatremia; resolved  #Prolonged QTC (616); resolved  #Deconditioning with falls  #Hx of prostate cancer sp 2009 prostatectomy    Follow-ups Needed After Discharge   - Oncology/PCP: repeat infliximab in 2 weeks; adjust prednisone taper as needed (will require PJP and PUD ppx if prolonged taper); repeat BMP, Mg, phos in setting of diarrhea (discharged w/ phos repletion - discontinue if no longer needed)    Hospital Course   Saeid Santa is a 68 year old male who presents with hx of HTN, seizures, hx of prostate cancer sp prostatectomy, and metastatic melanaoms w/ mets to brain, currently treated with ipilimab/nivolumab. He completed cycle 2 of treatment on 6/5, and is being admitted for dehydration, weakness, and 2 weeks of watery diarrhea, 4-8 stools per day concerning for checkpoint induce colitis, as well as adenovirus. Hospital course complicated by suspected severe sepsis following flex sig, which rapidly improved following IVF and empiric abx. Patient remained HD stable and had improved stool output. Patient was discharged in stable condition w/ close outpatient follow up w/ oncologist for repeat infliximab dose and ongoing prednisone taper.    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 Coli positive stool test  # NAGMA; improving  Patient w/ hx of metastatic melanoma currently treated w/ ipilumab/nivolumab, last infusion 06/05 and diarrhea since 6/8.  Stool PCR positive for E Coli as outpatient and prednisone held, but symptoms continued to worsen.  At admission, stool PCR positive for adenovirus and imaging consistent with colitis.  Most likely diagnosis is checkpoint inhibitor induced colitis perhaps exacerbated by infection, now improving nicely.  - Appreciate oncology and GI assistance  - S/p flex sig on 6/19 with severe colitis   - Sections of colon show severe acute cryptitis with architectural distortion, lymphoplasmacytic expansion of the lamina propria, increased crypt apoptosis, and erosion. Epithelioid granulomas are not identified. CMV, HSV, and EBV stains are NEGATIVE. The findings are compatible with checkpoint inhibitor colitis; however, other medications, infections, and idiopathic inflammatory bowel disease may also produce this injury pattern.   - Continue scheduled Imodium; educated patient regarding when to stop taking this as outpatient  - Infliximab 5 mg/kg x 1 6/21, repeat in 2 weeks as outpatient   - s/p IV methylpred > start prednisone 80 mg daily with taper over 3-4 weeks as outpatient   - If prolonged taper, will need PJP and PUD ppx     #Severe sepsis 2/2 suspected intraabdominal source; resolved  Patient developed rigors following flex sig on 6/19. SIRS 2 w/ lactic acid 7 that rapidly improved.  Suspect related to bacterial translocation in the setting of flex sig.   - Unremarkable infectious workup  - s/p empiric pip-tazo     #Metastatic Melanoma (4/2023) w/ mets to brain  #Seizures  Patient found to have ring enhancing lesions on MRI . 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  Has continued to show CNS involvement of disease status post multiple craniotomies  and resections (tumor resection 4/2023, L parieto-occipital craniotomy 7/2023, r frontal craniotomy 12/2023, gamma knife 02/2024). Recent PET scan w/ concerns for mets to soft tissues in R lateral calf and L medial thigh as well as liver lesions.  Last seizure was 04/2023.   -PTA keppra  - oncology assistance appreciated     #Acute kidney injury, suspected prerenal 2/2 diarrhea; resolved    #Hypophosphatemia  #Hypokalemia; resolved  #Hyponatremia; resolved  #Prolonged QTC (616); resolved  Patient with multiple electrolyte disturbances at admisison given marked diarrhea.    - Discharged w/ phos supplementation  - Repeat BMP, Mg, Phos around 6/25 (Cc'd results to oncologist and PCP)     #Deconditioning with falls  Likely 2/2 generalized weakness, brain mets, and dehydration.  Improving.  - PT/OT consulted     #Hx of prostate cancer sp 2009 prostatectomy       Consultations This Hospital Stay   PHYSICAL THERAPY ADULT IP CONSULT  OCCUPATIONAL THERAPY ADULT IP CONSULT  ONCOLOGY ADULT IP CONSULT  CARE MANAGEMENT / SOCIAL WORK IP CONSULT  GI LUMINAL ADULT IP CONSULT  NURSING TO CONSULT FOR VASCULAR ACCESS CARE IP CONSULT  NUTRITION SERVICES ADULT IP CONSULT  NURSING TO CONSULT FOR VASCULAR ACCESS CARE IP CONSULT  PHARMACY TO DOSE VANCO  NURSING TO CONSULT FOR VASCULAR ACCESS CARE IP CONSULT    Code Status   Full Code       The patient was discussed with Dr. Remy.    Michelle Tirado MD  Internal Medicine Resident  ______________________________________________________________________    Physical Exam   Vital Signs: Temp: 98  F (36.7  C) Temp src: Oral BP: 131/83 Pulse: 74   Resp: 16 SpO2: 98 % O2 Device: None (Room air)    Weight: 206 lbs 0 oz    General Appearance:  lying in bed  Respiratory: clear to auscultation bilaterally with good air entry   Cardiovascular: normal rate, regular rhythm. No murmurs, rubs, or gallops   GI: soft, non-tender, non-distended  Other:  trace BLE edema    Significant Results and Procedures    Results for orders placed or performed during the hospital encounter of 06/17/24   CT Abdomen Pelvis w/o Contrast    Narrative    EXAM: CT abdomen and pelvis without intravenous contrast. 6/17/2024  7:56 PM    HISTORY: abdominal pain, diarrhea       TECHNIQUE: Helical acquisition of image data was performed for the  abdomen and pelvis without intravenous contrast.    COMPARISON: 4/17/2024    FINDINGS:  Lower thorax: No focal airspace consolidation. Patchy subsegmental  atelectasis. Stable 4 mm subpleural nodule in right middle lobe.    Liver: No intrahepatic biliary dilatation. No focal hepatic mass  within the limitations of noncontrast technique.    Gallbladder/biliary tree: The common bile duct is not dilated.  Gallbladder appears unremarkable.    Pancreas: The pancreatic duct is not dilated.    Spleen: The spleen is not enlarged.    Adrenal glands: No adrenal nodules.    Kidneys/ureters: No hydronephrosis. No renal calculi. Bilateral  parapelvic renal cysts.    Bladder/pelvic organs: Decompressed urinary bladder.    Bowel/mesentery: No dilated loops of small bowel or colon. Colonic  diverticulosis. Mild pericolonic fat stranding throughout the  ascending, descending and sigmoid colon.    Stomach: Unremarkable.    Peritoneum/retroperitoneum: No extraluminal bowel gas. Trace free  fluid in the pelvis.    Lymph nodes: No enlarged abdominal or pelvic lymph nodes by short axis  criteria. Prominent subcentimeter short-axis right lower quadrant  mesenteric lymph nodes are likely reactive.    Major vessels: No aneurysmal dilatation.    Bones/soft tissues: Degenerative changes of the spine. No acute or  suspicious osseous abnormality. AVN changes in both hips. Small  fat-containing bilateral inguinal hernias.      Impression    IMPRESSION:  1. Mild pericolonic fat stranding along the ascending, descending and  sigmoid colon is concerning for infective/inflammatory colitis.  2. Colonic diverticulosis.    I have  personally reviewed the examination and initial interpretation  and I agree with the findings.    RAUNA DUMONT MD         SYSTEM ID:  [id number]   XR Abdomen Port 1 View    Narrative    EXAMINATION:  XR ABDOMEN PORT 1 VIEW 2024 1:34 PM     COMPARISON: CT abdomen and pelvis 2024    HISTORY: s/p endoscopy, evaluate for free air    TECHNIQUE: Frontal view of the abdomen.    FINDINGS: The small intestine and colon are not distended. No abnormal  soft tissue densities.  No evidence of free air, pneumatosis or portal  venous gas. The lung bases are unremarkable.      Impression    IMPRESSION: Non-obstructed bowel gas pattern. No evidence of free air.    I have personally reviewed the examination and initial interpretation  and I agree with the findings.    SHE QUINTANILLA MD         SYSTEM ID:  [id number]   XR Chest Port 1 View    Narrative    EXAM: XR CHEST PORT 1 VIEW 2024 1:29 PM    INDICATION: sepsis    COMPARISON: CT 2024    TECHNIQUE: Single portable upright AP view of the chest.    FINDINGS:   Low lung volumes. Mild diffuse interstitial opacities throughout both  lungs. Cardiac silhouette within normal limits. Trachea is midline. No  pneumothorax, focal consolidation or pleural effusion. No abnormal  calcifications or acute osseous abnormality. Limited upper abdomen  within normal limits.      Impression    IMPRESSION:   1. No focal consolidation. Mild interstitial opacities bilaterally  which could be related to atelectasis versus pulmonary edema.    I have personally reviewed the examination and initial interpretation  and I agree with the findings.    SYDNEY BARKER MD         SYSTEM ID:  [id number]   Echo Complete     Value    LVEF  55-60%    Narrative    [id number]  [id number]  [id number]  [id number]^KINZA^[facility]  Echocardiography Laboratory  [address] [place] [id number]     Name: IKER MICHELE  MRN: [id number]  :  1955  Study Date: 06/19/2024 12:58 PM  Age: 68 yrs  Gender: Male  [place] [facility] [place] For Study: Shock  Ordering Physician: PERLA ECHOLS  Performed By: Mary Sun RDCS     BSA: 2.2 m2  Height: 72 in  Weight: 206 lb  ______________________________________________________________________________  Procedure  Complete Portable Echo Adult. Contrast Optison. Echocardiogram with two-  dimensional, color and spectral Doppler performed. Optison (NDC #5170-3998-78)  given intravenously. Patient was given 6 ml mixture of 3 ml Optison and 6 ml  saline. 3 ml wasted.  ______________________________________________________________________________  Interpretation Summary  Left ventricular size, wall motion and function are normal. The ejection  fraction is 55-60%.  Right ventricular function, chamber size, wall motion, and thickness are  normal.  No significant valvular abnormalities were noted.  No pericardial effusion is present.  Previous study not available for comparison.  ______________________________________________________________________________  Left Ventricle  Left ventricular size, wall motion and function are normal. The ejection  fraction is 55-60%. Left ventricular diastolic function is normal. Diastolic  Doppler findings (E/E' ratio and/or other parameters) suggest left ventricular  filling pressures are normal.     Right Ventricle  Right ventricular function, chamber size, wall motion, and thickness are  normal.     Atria  Both atria appear normal.     Mitral Valve  Mild mitral annular calcification is present. Trace mitral insufficiency is  present.     Aortic Valve  Trileaflet aortic sclerosis without stenosis.     Tricuspid Valve  The tricuspid valve is normal. Trace tricuspid insufficiency is present. The  peak velocity of the tricuspid regurgitant jet is not obtainable. Pulmonary  artery systolic pressure cannot be assessed.     Pulmonic Valve  The valve leaflets are not well  visualized. On Doppler interrogation, there is  no significant stenosis or regurgitation.     Vessels  The aorta root is normal. The thoracic aorta is normal. The pulmonary artery  cannot be assessed. The inferior vena cava was normal in size with preserved  respiratory variability. IVC diameter <2.1 cm collapsing >50% with sniff  suggests a normal RA pressure of 3 mmHg.     Pericardium  No pericardial effusion is present.     Compared to Previous Study  Previous study not available for comparison.  ______________________________________________________________________________  MMode/2D Measurements & Calculations  IVSd: 1.0 cm  LVIDd: 5.3 cm  LVIDs: 3.8 cm  LVPWd: 0.91 cm  FS: 27.8 %  LV mass(C)d: 190.1 grams  LV mass(C)dI: 88.1 grams/m2  Ao root diam: 3.9 cm  asc Aorta Diam: 3.8 cm  LVOT diam: 2.6 cm  LVOT area: 5.3 cm2  Ao root diam index Ht(cm/m): 2.1  Ao root diam index BSA (cm/m2): 1.8  Asc Ao diam index BSA (cm/m2): 1.7  Asc Ao diam index Ht(cm/m): 2.1  LA Volume (BP): 33.4 ml     LA Volume Index (BP): 15.5 ml/m2  RWT: 0.35     Doppler Measurements & Calculations  MV E max bret: 46.8 cm/sec  MV A max bret: 60.8 cm/sec  MV E/A: 0.77  MV dec time: 0.13 sec  E/E' av.6  Lateral E/e': 4.5  Medial E/e': 6.7  RV S Bret: 18.0 cm/sec     ______________________________________________________________________________  Report approved by: Dc Martin 2024 01:59 PM             Pending Results   Unresulted Labs Ordered in the Past 30 Days of this Admission       Date and Time Order Name Status Description    2024  1:01 AM Blood Culture Arm, Left Preliminary     2024  1:01 AM Blood Culture Arm, Right Preliminary     2024 10:44 AM Blood Culture Arm, Right Preliminary                Primary Care Physician   Rock Grajeda    Discharge Disposition   Discharged to home  Condition at discharge: Stable    Discharge Orders      Reason for your hospital stay    You were admitted to the  hospital for diarrhea due to your cancer treatment.  You were given fluids and an endoscopy performed that showed inflammation of your colon.  You were given steroids and infliximab with improvement in your symptoms.  You will need to follow closely for worsening symptoms after discharge and will take prednisone for about 4 more weeks.     They will plan to repeat the infliximab in the cancer clinic in 2 weeks.    Thank you for the opportunity to participate in your care.     Activity    Your activity upon discharge: activity as tolerated     Adult [facility] Follow-up and recommended labs and tests    Follow up with the cancer clinic in 2 weeks.     Appointments on [place] and/or [facility] (with [facility] or [facility] provider or service). Call [phone] if you haven't heard regarding these appointments within 7 days of discharge.     When to contact your care team    Call your primary doctor or the cancer clinic if you have any of the following: worsening abdominal pain, worsening diarrhea, fevers, or other new symptoms.     Diet    Follow this diet upon discharge: Orders Placed This Encounter      Snacks/Supplements Adult: Other; chocolate [employer] 1.0 with lunch and dinner trays; With Meals      Snacks/Supplements Adult: Other; string cheese x2 @ 2:00 pm, yogurt at HS; Between Meals      High Kcal/High Protein Diet, ADULT     Discharge Medications   Current Discharge Medication List        START taking these medications    Details   loperamide (IMODIUM) 2 MG capsule Take 1 capsule (2 mg) by mouth 4 times daily  Qty: 60 capsule, Refills: 0    Associated Diagnoses: Colitis           CONTINUE these medications which have CHANGED    Details   predniSONE (DELTASONE) 20 MG tablet Take 4 tablets (80 mg) by mouth daily for 5 days, THEN 3 tablets (60 mg) daily for 5 days, THEN 2 tablets (40 mg) daily for 5 days, THEN 1 tablet (20 mg) daily for 5 days, THEN 0.5 tablets (10 mg) daily for 5 days.  Qty: 53 tablet, Refills:  0    Associated Diagnoses: Colitis           CONTINUE these medications which have NOT CHANGED    Details   acetaminophen (TYLENOL) 325 MG tablet Take 2 tablets (650 mg) by mouth every 4 hours as needed for other (For optimal non-opioid multimodal pain management to improve pain control.)  Qty: 15 tablet, Refills: 0    Associated Diagnoses: Brain tumor (H)      amLODIPine (NORVASC) 10 MG tablet Take 1 tablet (10 mg) by mouth every morning  Qty: 90 tablet, Refills: 3    Associated Diagnoses: Benign essential hypertension      baclofen (LIORESAL) 10 MG tablet Take 1 tablet by mouth 2 times daily as needed for muscle spasms      levETIRAcetam (KEPPRA) 1000 MG tablet 1 tablet Orally every 12 hrs  Qty: 180 tablet, Refills: 3    Associated Diagnoses: Seizure disorder (H)      naloxone (NARCAN) 4 MG/0.1ML nasal spray Spray 1 spray (4 mg) into one nostril alternating nostrils as needed for opioid reversal every 2-3 minutes until assistance arrives  Qty: 0.2 mL, Refills: 1    Associated Diagnoses: Malignant neoplasm metastatic to brain (H); Metastatic malignant melanoma (H); Cancer related pain      omeprazole (PRILOSEC) 20 MG DR capsule Take 1 capsule (20 mg) by mouth every morning Take 1 hour prior to prednisone  Qty: 30 capsule, Refills: 1    Associated Diagnoses: Gastroesophageal reflux disease, unspecified whether esophagitis present      ondansetron (ZOFRAN) 4 MG tablet Take 1 tablet (4 mg) by mouth every 8 hours as needed for nausea  Qty: 10 tablet, Refills: 5    Associated Diagnoses: Malignant neoplasm metastatic to brain (H)      rosuvastatin (CRESTOR) 40 MG tablet Take 1 tablet (40 mg) by mouth daily  Qty: 90 tablet, Refills: 3    Associated Diagnoses: Hyperlipidemia LDL goal <100           STOP taking these medications       dexAMETHasone (DECADRON) 1 MG tablet Comments:   Reason for Stopping:         sennosides (SENOKOT) 8.6 MG tablet Comments:   Reason for Stopping:             Allergies   No Known Allergies

## 2024-06-23 NOTE — PLAN OF CARE
Goal Outcome Evaluation:      Plan of Care Reviewed With: patient    Overall Patient Progress: no changeOverall Patient Progress: no change    Outcome Evaluation: [id number]: Pt slept through night, no acute changes or updates.

## 2024-06-23 NOTE — PLAN OF CARE
Physical Therapy Discharge Summary    Reason for therapy discharge:    Discharged to home with home therapy.    Progress towards therapy goal(s). See goals on Care Plan in [facility] electronic health record for goal details.  Goals partially met.  Barriers to achieving goals:   discharge from facility.    Therapy recommendation(s):    Continued therapy is recommended.  Rationale/Recommendations:  strength, endurance, home set up/safety.

## 2024-06-23 NOTE — PROGRESS NOTES
Care Management Discharge Note    Discharge Date: 06/23/2024  Discharge Disposition: Home w/ assist  Discharge Services: Home Care vs OP PT  Discharge DME: FWW  Discharge Transportation: family or friend will provide  Private pay costs discussed: Not applicable  Does the patient's insurance plan have a 3 day qualifying hospital stay waiver?  No  PAS Confirmation Code: N/A  Patient/family educated on Medicare website which has current facility and service quality ratings: n/a  Education Provided on the Discharge Plan: Yes  Persons Notified of Discharge Plans: pt; Charge RN; Bedside RN; Attnd MD  Patient/Family in Agreement with the Plan: yes    Handoff Referral Completed: Yes [facility] (accepted)  [address]  [place] [address]  Phone: [phone]  Fax: [phone]  [facility]; PT/OT    Private pay costs discussed: Not applicable    Additional Information:  Ongoing POC; RNCC continues to follow, noting confirmed home care services detailed above; alternatively, OP PT services ordered. Family available for discharge transportation. RNCC to conclude following upon safe departure from floor and facility.         Ángel Javier RN BSN  7C RNCC  Phone: [phone]  Pager: [phone]    For Weekend & Holiday on call RN Care Coordinator:  (Tasks: Home care, home infusion, medical equipment, transportation, IMM & BRENNER forms, etc.)  [place] (0800 - 1630) Saturday and Sunday    Units: 5A, 5B, & 5C: [phone]    Units: 6B, 6C, 6D: [phone]    Units: 7A, 7B, 7C, 7D: [phone]    Units: 6A/ICU : [phone]    [facility] ([id number]) Saturday and Sunday    Units: 6 Med/Surg, 8A, 10 ICU, & Pediatric Units: [phone]    Units: 5 Ortho, 5Med/Surg & WB ED: [phone]

## 2024-06-23 NOTE — PLAN OF CARE
Goal Outcome Evaluation:      Plan of Care Reviewed With: patient    Overall Patient Progress: no changeOverall Patient Progress: no change    Outcome Evaluation: Assumed cares [id number]. Pt alert and oriented x4. Denied pain, N/V. Pt reported feeling better overall. Tolerated dinner. Pt stated possible discharge home tomorrow. Monitor GI status.

## 2024-06-23 NOTE — PLAN OF CARE
Goal Outcome Evaluation:      Plan of Care Reviewed With: patient    Overall Patient Progress: improvingOverall Patient Progress: improving    Discharge to: home    Transportation: spouse  Time: 1400  Prescriptions: picking up at pharmacy downstairs   Belongings: remain with patient  Access: PIV's removed  Care plan and education discontinued: done    Paperwork:   discussed and given. Pt verbalized understanding, no further questions or concerns.

## 2024-06-24 NOTE — LETTER
6/24/2024      Saeid Santa  [address]  [facility] [address]      Dear Colleague,    Thank you for referring your patient, Saeid Santa, to the [facility]. Please see a copy of my visit note below.    Virtual Visit Details    Type of service:  Video Visit   Video Start Time: 1:15 PM  Video End Time:1:44 PM    Originating Location (pt. Location): Home  Distant Location (provider location):  Off-site  Platform used for Video Visit: [place]    [facility]   Return Patient Visit    Name: Saeid Santa  MRN: [id number]  Date of visit: Jun 24, 2024    Diagnosis: Metastatic melanoma  Stage: IV    Molecular: BRAF G466A, TMB 48.7554 mut/Mb    Performance status: ECOG 2    Oncology History:  --8/16/22, he brings a left parietal scalp lesion to attention of his GP. It is initially observed.  --November 2022, the left parietal scalp lesion was felt to be a cyst, and the left parietal lesion was lanced and drained via scalp incision.  --January 2023, the left parietal scalp cyst would occasionally break open, drain, and bleed, largely at night.   --February 2023, he notes some mild difficulty clearing obstacles with right leg and foot.  --March/April 2023, he has 3 progressively worsening episodes over a span 3 weeks including the right leg. At first he notes the onset of right leg numbness and heaviness, like he was carrying 50 lbs of water on the leg. Then, about a week or so later, the leg began to jerk and converse while he was in the basement. Neither episode associated with loss of consciousness  --4/15/23, he recalls the right leg numbness, heaviness, jerking while driving between work. He had been found by coworkers in the parking lot, sitting in the grass, appearing confused. He was brought to the ER by EMS. CT-CAP, showed small (<6mm), bilateral indeterminate pulmonary nodules. CT-head and MRI brain showed 2 discrete ring-enhancing lesions in the  left paramedian posterior frontal lobe near the precentral gyrus measuring approximately 2.2 x 1.7 x 1.9 cm and left occipital lobe near the lingual gyrus measuring approximately 2.2 x 2.1 x 2.0 cm. The occipital lesion encroaches upon the subependymal surface of the left lateral ventricle occipital horn and contacts the superior surface of the medial left tentorium. The lesions are accompanied by moderate surrounding vasogenic edema. Surgery was recommended.  --4/21/23, underwent left awake (sleep-awake-sleep) craniotomy for tumor resection, he has resection of the left frontal, motor strip tumor as well asan elliptical incision of the left parietal scalp lesion. On pathology, both the scalp excision and brain mass are positive for malignant melanoma. Tumor cells are positive for S-100 and Melan-A, and negative for cytokeratin AE1/AE3, NKX3.1, GFAP, ERG, CD3, and CD20.  CD3 highlights brisk infiltration of the tumor by T lymphocytes, while CD20 highlights scattered positive B lymphocytes. NGS identifies a BRAF G466A mutation, a class 3 mutation insensitive to BRAF kinase inhibitor monotherapy. TMB is high at 48.754 muts/Mb.   --4/22/23, post-operative MRI shows post-operative changes of the parietal craniotomy of the left frontal lobe metastasis. The other 1.7 x 1.1 cm cystic lesion in the left occipital lobe is also seen. No new lesions appreciated.  -- 4/25/23 to 5/9/23, discharged from the hospital to acute rehab stay at [facility] in [place]. He participated in a comprehensive rehabilitation program consisting of PT, OT, Speech, Psychology and Recreation Therapy. He did well and was discharged home with use of a walker.  --PET-CT on 5/18/23 with potentially involved neck lymph nodes. He has mild FDG uptake in soft tissue nodules in the scalp, in the known left occipital brain metastasis, as well as in the left lower neck region.  -5/24-/5/31/23, He was treated with gamma knife: 2500 cGy to the left resection  cavity; 3000 cGy to the L parietal-occipital lesion.  -6/7/2023, Cycle 1 of pembrolizumab 200mg every 3 weeks  -6/14/23-6/16/23, admitted for vision change, reduced comprehension, speech change, fall and headache. MRI showing likely progression of left occipital mass with associated edema. Improved on steroids and discharged on oral decadron. Plan for repeat MRI and neurosurgery follow up in 1 month.  -7/7/23 ED visit for headaches, confusion, vision changes, concern for hemorrhage of the left occipital mass, admitted to [facility] until 7/9/23, increased dexamethasone dose  -7/14/23 craniotomy of the left parieto-occipital mass, discharged on a dexamethasone taper  -8/11/2023, Cycle 2 of pembrolizumab  -8/23/2023 brain MRI with no recurrence in left frontal lobe, decreased enhancement at edges of left occipital resection cavity, new enhancement int he right front lobe concerning for new met--seen by neurosurgery and considering RT  -8/31/2023 CT/PET with increased uptake in left lower neck lymph node c/f early progression. Ground-glass, reticular changes in lungs also noted likely secondary to drug-induced pneumonitis. Started 1 mg/kg prednisone (80 mg daily) with 8 week taper  -9/8/23 GK to R frontal lobe lesion  -12/7/23 CT neck: Resolved previously hypermetabolic left level 5 lymph node. No new  cervical lymphadenopathy.  -12/7/23 CT chest/AP: Two arterially enhancing lesions in the liver measuring 0.9 - 1.0 cm were not previously visualized, likely due to differences in timing of the contrast bolus. These are indeterminate but favored to be benign perfusional abnormalities. Melanoma metastases cannot be entirely excluded. Otherwise, no evidence for metastatic disease in the chest, abdomen and pelvis. Significant improvement in linear opacities in the lungs, suggesting improving drug-induced pneumonitis. Few stable small pulmonary nodules.  -12/7/23 MR head: Progression in intracranial metastatic disease compared  to 9/8/2023   with multiple new and/or enlarging metastases, including internally hemorrhagic right frontal lobe lesion with 3 mm of leftward midline shift and new lesion adjacent to the left occipital resection bed.   -12/8/23 started on dex 4 mg BID  -12/21/23 Right frontal craniotomy with Dr. Garrido  -1/22/24, starts nivolumab (480 mg every 4 weeks)  -2/2/24, Gamma knife to right occipital and left frontal lesions   -3/2/24 PET/CT: previously seen L lower cervical node resolved, two new suspected intramuscular mets to the LE  -3/2/24 MR brain: Multifocal intracranial metastatic disease with interval increase in size of multiple metastatic lesions and surrounding vasogenic edema detailed above. Unsure if this is due to disease progression or post radiation change.  -4/17/24 C4 nivolumab  -4/17/24 MR brain: Increased size of the enhancing lesions in the right frontal and right occipital lobes, likely representing interval disease progression. Interval decreased size of the left frontal lobe lesion. Additional intracranial metastatic lesions are stable. No new enhancing lesions identified. Stable surrounding vasogenic edema in the bifrontal lobes, right occipital lobe, and left temporoparietooccipital lobes.   -4/17/24 PET/CT: There is interval progression of disease with increase in size and uptake of enhancing cerebral metastatic lesions in the right frontal and left occipital lobes as well as interval increase in size and uptake of enhancing soft tissue nodules in the left medial thigh and right posterior leg as detailed above. There is new hypermetabolic uptake in the left fifth and sixth ribs at the costovertebral junction with focal sclerosis on corresponding CT suspicious, indeterminate.   -5/15/24 Cycle 1 ipi/nivo and start gammaknife, completed gammaknife on 5/23/24  -5/21/24 increased dexamethasone to 4 mg x 1, then 2 mg daily x 7 days for severe headaches and nausea  -5/22/24 ED visit for head injury  after fall, head and neck CT negative for bleed or fracture, given IV fluids for FELICITY  -6/5/24 Cycle 2 ipi/nivo  -6/11/24 started prednisone for immune mediated colitis  -6/12/24 started azithromycin for Enteropathogenic E. Coli, unclear if patient stopped prednisone at that time  -6/17-6/23/24 admitted for immune mediated colitis, given infliximab on 6/21/24 and started on 80 mg prednisone daily.     I am seeing him today in close follow-up of diarrhea.     Interval History:  -Bowels are doing better. Yesterday and today, had minimal stooling with soft mush. Had about 3 stools yesterday.  -Has some intermittent right flank pain that started this morning.  -Denies any dysuria.   -Eating and drinking fairly well.   -Energy remains low.  -Denies any dyspnea at rest. Has some ongoing dyspnea on exertion.   -Denies any headaches.  -Vision is gradually getting more blurry.      Objective:  General: lying in bed, patient alert and oriented, speech clear and fluid  Skin: no visualized rash or lesions on visualized skin  Resp: Appears to be breathing comfortably without accessory muscle usage, speaking in full sentences, no audible wheezes or cough.  Psych: Coherent speech, mildly slow rate and normal volume, able to articulate logical thoughts, able to abstract reason, no tangential thoughts, no hallucinations or delusions  Patient's affect is appropriate.    Labs:   Most Recent 3 CBC's:  Recent Labs   Lab Test 06/23/24  0704 06/22/24  0633 06/21/24  0611 06/19/24  0613 06/18/24  0525 06/17/24  1720 06/11/24  1101 05/22/24  1340   WBC 14.9* 13.4* 8.5   < > 11.9*   < > 13.2* 17.4*   HGB 11.6* 11.9* 11.5*   < > 15.0   < > 16.0 16.8   MCV 91 93 92   < > 90   < > 94 95    142* 126*   < > 163   < > 181 271   ANEUTAUTO  --   --   --   --  9.3*  --  10.0* 14.2*    < > = values in this interval not displayed.     Most Recent 3 BMP's:  Recent Labs   Lab Test 06/23/24  0704 06/22/24  0633 06/21/24  1548 06/21/24  0611  06/20/24  0530    140  --  138 137   POTASSIUM 3.1* 3.7  --  3.6 4.3   CHLORIDE 108* 110*  --  111* 110*   CO2 24 23  --  19* 16*   BUN 11.2 12.9  --  16.9 17.6   CR 0.97 1.14 1.08 1.13 1.05   ANIONGAP 9 7  --  8 11   JOHN PAUL 7.6* 7.9*  --  7.7* 7.2*   * 89  --  116* 118*   PROTTOTAL  --  5.1*  --  4.9* 3.7*   ALBUMIN  --  2.8*  --  2.6* 2.0*    Most Recent 3 LFT's:  Recent Labs   Lab Test 06/22/24  0633 06/21/24  0611 06/20/24  0530   AST 69* 52* 29   ALT 53 32 17   ALKPHOS 82 58 39*   BILITOTAL 0.8 0.7 0.5    Most Recent 2 TSH and T4:  Recent Labs   Lab Test 06/17/24  1720 06/05/24  1104   TSH 0.52 0.78     Magnesium   Date Value Ref Range Status   06/23/2024 2.0 1.7 - 2.3 mg/dL Final   06/22/2024 2.2 1.7 - 2.3 mg/dL Final   06/21/2024 2.3 1.7 - 2.3 mg/dL Final   06/20/2024 1.7 1.7 - 2.3 mg/dL Final     I reviewed the above labs today.    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 Started pembrolizumab 6/7/23. Underwent second craniotomy for L parieto-occipital lesion resection in 7/14/23 2/2 hemorrhagic conversion and GK to R frontal lesion 9/8/23, on pembrolizumab, course c/b ICI pneumonitis necessitating steroids, again with CNS progression 12/2023 s/p resection and GK. Most recently on ipi/nivo    #diarrhea  -Multifactorial with recent Enteropathogenic E. Coli and adenovirus as well as immune mediated.   -Now much improved with infliximab on 6/21/24 and prednisone at 80 mg daily.     Plan:  --Will continue on prednisone at 80 mg daily for 7 days, then decrease by 10 mg every 7 days.  --Will consider a second dose of infliximab ~7/5  --Will plan to start Bactrim for PJP prophylaxis if prednisone is for more than 30 days. He is already on 20  mg omeprazole daily, so additional GI prophylaxis not needed.     # metastatic melanoma with metastatic disease to brain  -presented with de-francisca CNS involvement s/p craniotomy x2 and GK to additional R frontal lesion  -TMB high, BRAF mutated but not V600E  -s/p pembrolizumab x2 (first 6/7/23) c/c/b pneumonitis s/p steroid taper  -recent CNS progression in Dec 2023 s/p R frontal , ongoing response systemically, improved pneumonitis radiographically (never symptomatic)  -3/2/24 brain MR shows increased size of several previously treated metastatic lesions, no new lesions. After discussion with Rad Onc and Radiology, although progression cannot be ruled out, findings can be consistent with treatment effect  -now with repeat MR brain with continued significant increase in size of R frontal lesion (18 x 15 mm, previously 8 x 7 mm), anterior R frontal  lesion and R occipital lesion marginally increased. L frontal lesion decreased. L occipital lesion stable. Vasogenic edema significant but stable  -Last PET/CT showed overall continued concern for metastatic disease involving the soft tissues in both the R lateral calf and L medial thigh. These lesions remain small but slightly increased in both size and avidity, and likely represent sites of metastatic disease. No new areas of systemic disease noted.   -On 5/15, he started on gammaknife and ipi/nivo. He developed some fatigue, nausea, and headaches, as well as mild diarrhea, which improved.  -He completed gammaknife radiation on 5/23/24.   -Cycle 2 ipi/nivo was given on 6/5/24.    Plan:  --Will continue to hold ipi/nivo to treat colitis, as above.   --Will hold dexamethasone while on prednisone (has been on 1 mg dex daily for neuro sxs).   --Will plan to repeat PET/CT in early August.  --Will plan for next brain MRI on 8/22/24.     # Deconditioning with falls  -Secondary to generalized weakness and brain mets.    Plan:  --Recommend consistent use of his cane or walker. If  falls, recommend using walker for the rest of the day.   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, resolved  -8/2023 PET/CT showed bilateral FDG avid ground glass and reticular infiltrates favored to represent pneumonitis, started on steroids, now completed taper  -denied any history of pulmonary symptoms at the time of pneumonitis findings  -most recent imaging with continued resolution, asymptomatic today  -current steroids indicated for cerebral edema, not pneumonitis    Plan:  --continue to monitor closely for new respiratory symptoms, particularly in the setting of escalation to dual checkpoint inhibitor therapy  --no respiratory concerns today    # Liver lesions  -12/7 CT AP showed two indeterminate liver lesions not previously appreciated, favored to be benign but cannot exclude metastases  -no concerning liver lesions at this time    Plan:  --trend with surveillance scans    # Hypokalemia and hypophosphatemia  -Secondary to diarrhea.    Plan:   --Will recheck labs this week at [facility].     # Medication management  -Feeling overwhelmed with medications.     Plan:  --Home care referral is pending.     Meghan Rinaldi PA-C  [facility] [place] [address]  [phone]    39 minutes spent on the date of the encounter doing chart review, review of test results, interpretation of tests, patient visit, and documentation     The longitudinal plan of care for the diagnosis(es)/condition(s) as documented were addressed during this visit. Due to the added complexity in care, I will continue to support Tim in the subsequent management and with ongoing continuity of care.        Again, thank you for allowing me to participate in the care of your patient.        Sincerely,        Meghan Rinaldi PA-C

## 2024-06-24 NOTE — PROGRESS NOTES
Social Work - Follow-Up  [facility]    Patient Name: Saeid Santa Goes By: Tim LOPEZ/Age: 1955 (68 year old)    Reason for Follow-Up:  Checking in following hospitalization.   Checking in on mary    Intervention/Education/Resources Provided:  Provided [employer] phone number for water bill. (Was approved for mary)  Spouse is overwhelmed following hospitalization. Offered emotional support and listening.    Assessment/Plan:  SW to connect in 2-3 weeks to check in.    Previously provided patient/family with writer's contact information and availability.    HANNAH Oliveira, LICSW   Adult Oncology - [place]  [phone]  Onsite [place] on    *Please note does not work on .   Support Groups at [facility]: Social Work Services for Cancer Patients ([email or web address])

## 2024-06-24 NOTE — PROGRESS NOTES
Clinic Care Coordination Contact    Situation: Patient chart reviewed by care coordinator.    Background:   [facility]     Internal Medicine Discharge Summary- [facility]     Date of Admission:  6/17/2024  Date of Discharge:  6/23/2024  Discharging Attending Provider: Dr. 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 Coli positive stool test  # NAGMA; improving  #Severe sepsis 2/2 suspected intraabdominal source; resolved  #Metastatic Melanoma (4/2023) w/ mets to brain  #Seizures  #Acute kidney injury, suspected prerenal 2/2 diarrhea; resolved  #Hypophosphatemia  #Hypokalemia; resolved  #Hyponatremia; resolved  #Prolonged QTC (616); resolved  #Deconditioning with falls  #Hx of prostate cancer sp 2009 prostatectomy           Follow-ups Needed After Discharge  - Oncology/PCP: repeat infliximab in 2 weeks; adjust prednisone taper as needed (will require PJP and PUD ppx if prolonged taper); repeat BMP, Mg, phos in setting of diarrhea (discharged w/ phos repletion - discontinue if no longer needed)    Assessment: 6/24/2024 Oncology follow up appointment today       Plan/Recommendations: Cancer has Mets to the ronen .   Closely followed by Oncology   No outreach made today     [facility]   Tana Weldon RN, Care Coordinator   [facility]'s   E-mail [email or web address]   [phone]

## 2024-06-24 NOTE — PROGRESS NOTES
Virtual Visit Details    Type of service:  Video Visit   Video Start Time: 1:15 PM  Video End Time:1:44 PM    Originating Location (pt. Location): Home  Distant Location (provider location):  Off-site  Platform used for Video Visit: [employer]    [facility]   Return Patient Visit    Name: Saeid Santa  MRN: [id number]  Date of visit: Jun 24, 2024    Diagnosis: Metastatic melanoma  Stage: IV    Molecular: BRAF G466A, TMB 48.7554 mut/Mb    Performance status: ECOG 2    Oncology History:  --8/16/22, he brings a left parietal scalp lesion to attention of his GP. It is initially observed.  --November 2022, the left parietal scalp lesion was felt to be a cyst, and the left parietal lesion was lanced and drained via scalp incision.  --January 2023, the left parietal scalp cyst would occasionally break open, drain, and bleed, largely at night.   --February 2023, he notes some mild difficulty clearing obstacles with right leg and foot.  --March/April 2023, he has 3 progressively worsening episodes over a span 3 weeks including the right leg. At first he notes the onset of right leg numbness and heaviness, like he was carrying 50 lbs of water on the leg. Then, about a week or so later, the leg began to jerk and converse while he was in the basement. Neither episode associated with loss of consciousness  --4/15/23, he recalls the right leg numbness, heaviness, jerking while driving between work. He had been found by coworkers in the parking lot, sitting in the grass, appearing confused. He was brought to the ER by EMS. CT-CAP, showed small (<6mm), bilateral indeterminate pulmonary nodules. CT-head and MRI brain showed 2 discrete ring-enhancing lesions in the left paramedian posterior frontal lobe near the precentral gyrus measuring approximately 2.2 x 1.7 x 1.9 cm and left occipital lobe near the lingual gyrus measuring approximately 2.2 x 2.1 x 2.0 cm. The occipital lesion encroaches upon  the subependymal surface of the left lateral ventricle occipital horn and contacts the superior surface of the medial left tentorium. The lesions are accompanied by moderate surrounding vasogenic edema. Surgery was recommended.  --4/21/23, underwent left awake (sleep-awake-sleep) craniotomy for tumor resection, he has resection of the left frontal, motor strip tumor as well asan elliptical incision of the left parietal scalp lesion. On pathology, both the scalp excision and brain mass are positive for malignant melanoma. Tumor cells are positive for S-100 and Melan-A, and negative for cytokeratin AE1/AE3, NKX3.1, GFAP, ERG, CD3, and CD20.  CD3 highlights brisk infiltration of the tumor by T lymphocytes, while CD20 highlights scattered positive B lymphocytes. NGS identifies a BRAF G466A mutation, a class 3 mutation insensitive to BRAF kinase inhibitor monotherapy. TMB is high at 48.754 muts/Mb.   --4/22/23, post-operative MRI shows post-operative changes of the parietal craniotomy of the left frontal lobe metastasis. The other 1.7 x 1.1 cm cystic lesion in the left occipital lobe is also seen. No new lesions appreciated.  -- 4/25/23 to 5/9/23, discharged from the hospital to acute rehab stay at [facility] in [place]. He participated in a comprehensive rehabilitation program consisting of PT, OT, Speech, Psychology and Recreation Therapy. He did well and was discharged home with use of a walker.  --PET-CT on 5/18/23 with potentially involved neck lymph nodes. He has mild FDG uptake in soft tissue nodules in the scalp, in the known left occipital brain metastasis, as well as in the left lower neck region.  -5/24-/5/31/23, He was treated with gamma knife: 2500 cGy to the left resection cavity; 3000 cGy to the L parietal-occipital lesion.  -6/7/2023, Cycle 1 of pembrolizumab 200mg every 3 weeks  -6/14/23-6/16/23, admitted for vision change, reduced comprehension, speech change, fall and headache. MRI showing likely  progression of left occipital mass with associated edema. Improved on steroids and discharged on oral decadron. Plan for repeat MRI and neurosurgery follow up in 1 month.  -7/7/23 ED visit for headaches, confusion, vision changes, concern for hemorrhage of the left occipital mass, admitted to [facility] until 7/9/23, increased dexamethasone dose  -7/14/23 craniotomy of the left parieto-occipital mass, discharged on a dexamethasone taper  -8/11/2023, Cycle 2 of pembrolizumab  -8/23/2023 brain MRI with no recurrence in left frontal lobe, decreased enhancement at edges of left occipital resection cavity, new enhancement int he right front lobe concerning for new met--seen by neurosurgery and considering RT  -8/31/2023 CT/PET with increased uptake in left lower neck lymph node c/f early progression. Ground-glass, reticular changes in lungs also noted likely secondary to drug-induced pneumonitis. Started 1 mg/kg prednisone (80 mg daily) with 8 week taper  -9/8/23 GK to R frontal lobe lesion  -12/7/23 CT neck: Resolved previously hypermetabolic left level 5 lymph node. No new  cervical lymphadenopathy.  -12/7/23 CT chest/AP: Two arterially enhancing lesions in the liver measuring 0.9 - 1.0 cm were not previously visualized, likely due to differences in timing of the contrast bolus. These are indeterminate but favored to be benign perfusional abnormalities. Melanoma metastases cannot be entirely excluded. Otherwise, no evidence for metastatic disease in the chest, abdomen and pelvis. Significant improvement in linear opacities in the lungs, suggesting improving drug-induced pneumonitis. Few stable small pulmonary nodules.  -12/7/23 MR head: Progression in intracranial metastatic disease compared to 9/8/2023   with multiple new and/or enlarging metastases, including internally hemorrhagic right frontal lobe lesion with 3 mm of leftward midline shift and new lesion adjacent to the left occipital resection bed.   -12/8/23  started on dex 4 mg BID  -12/21/23 Right frontal craniotomy with Dr. Garrido  -1/22/24, starts nivolumab (480 mg every 4 weeks)  -2/2/24, Gamma knife to right occipital and left frontal lesions   -3/2/24 PET/CT: previously seen L lower cervical node resolved, two new suspected intramuscular mets to the LE  -3/2/24 MR brain: Multifocal intracranial metastatic disease with interval increase in size of multiple metastatic lesions and surrounding vasogenic edema detailed above. Unsure if this is due to disease progression or post radiation change.  -4/17/24 C4 nivolumab  -4/17/24 MR brain: Increased size of the enhancing lesions in the right frontal and right occipital lobes, likely representing interval disease progression. Interval decreased size of the left frontal lobe lesion. Additional intracranial metastatic lesions are stable. No new enhancing lesions identified. Stable surrounding vasogenic edema in the bifrontal lobes, right occipital lobe, and left temporoparietooccipital lobes.   -4/17/24 PET/CT: There is interval progression of disease with increase in size and uptake of enhancing cerebral metastatic lesions in the right frontal and left occipital lobes as well as interval increase in size and uptake of enhancing soft tissue nodules in the left medial thigh and right posterior leg as detailed above. There is new hypermetabolic uptake in the left fifth and sixth ribs at the costovertebral junction with focal sclerosis on corresponding CT suspicious, indeterminate.   -5/15/24 Cycle 1 ipi/nivo and start gammaknife, completed gammaknife on 5/23/24  -5/21/24 increased dexamethasone to 4 mg x 1, then 2 mg daily x 7 days for severe headaches and nausea  -5/22/24 ED visit for head injury after fall, head and neck CT negative for bleed or fracture, given IV fluids for FELICITY  -6/5/24 Cycle 2 ipi/nivo  -6/11/24 started prednisone for immune mediated colitis  -6/12/24 started azithromycin for Enteropathogenic E. Coli,  unclear if patient stopped prednisone at that time  -6/17-6/23/24 admitted for immune mediated colitis, given infliximab on 6/21/24 and started on 80 mg prednisone daily.     I am seeing him today in close follow-up of diarrhea.     Interval History:  -Bowels are doing better. Yesterday and today, had minimal stooling with soft mush. Had about 3 stools yesterday.  -Has some intermittent right flank pain that started this morning.  -Denies any dysuria.   -Eating and drinking fairly well.   -Energy remains low.  -Denies any dyspnea at rest. Has some ongoing dyspnea on exertion.   -Denies any headaches.  -Vision is gradually getting more blurry.      Objective:  General: lying in bed, patient alert and oriented, speech clear and fluid  Skin: no visualized rash or lesions on visualized skin  Resp: Appears to be breathing comfortably without accessory muscle usage, speaking in full sentences, no audible wheezes or cough.  Psych: Coherent speech, mildly slow rate and normal volume, able to articulate logical thoughts, able to abstract reason, no tangential thoughts, no hallucinations or delusions  Patient's affect is appropriate.    Labs:   Most Recent 3 CBC's:  Recent Labs   Lab Test 06/23/24  0704 06/22/24  0633 06/21/24  0611 06/19/24  0613 06/18/24  0525 06/17/24  1720 06/11/24  1101 05/22/24  1340   WBC 14.9* 13.4* 8.5   < > 11.9*   < > 13.2* 17.4*   HGB 11.6* 11.9* 11.5*   < > 15.0   < > 16.0 16.8   MCV 91 93 92   < > 90   < > 94 95    142* 126*   < > 163   < > 181 271   ANEUTAUTO  --   --   --   --  9.3*  --  10.0* 14.2*    < > = values in this interval not displayed.     Most Recent 3 BMP's:  Recent Labs   Lab Test 06/23/24  0704 06/22/24  0633 06/21/24  1548 06/21/24  0611 06/20/24  0530    140  --  138 137   POTASSIUM 3.1* 3.7  --  3.6 4.3   CHLORIDE 108* 110*  --  111* 110*   CO2 24 23  --  19* 16*   BUN 11.2 12.9  --  16.9 17.6   CR 0.97 1.14 1.08 1.13 1.05   ANIONGAP 9 7  --  8 11   JOHN PAUL 7.6* 7.9*   --  7.7* 7.2*   * 89  --  116* 118*   PROTTOTAL  --  5.1*  --  4.9* 3.7*   ALBUMIN  --  2.8*  --  2.6* 2.0*    Most Recent 3 LFT's:  Recent Labs   Lab Test 06/22/24  0633 06/21/24  0611 06/20/24  0530   AST 69* 52* 29   ALT 53 32 17   ALKPHOS 82 58 39*   BILITOTAL 0.8 0.7 0.5    Most Recent 2 TSH and T4:  Recent Labs   Lab Test 06/17/24  1720 06/05/24  1104   TSH 0.52 0.78     Magnesium   Date Value Ref Range Status   06/23/2024 2.0 1.7 - 2.3 mg/dL Final   06/22/2024 2.2 1.7 - 2.3 mg/dL Final   06/21/2024 2.3 1.7 - 2.3 mg/dL Final   06/20/2024 1.7 1.7 - 2.3 mg/dL Final     I reviewed the above labs today.    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 Started pembrolizumab 6/7/23. Underwent second craniotomy for L parieto-occipital lesion resection in 7/14/23 2/2 hemorrhagic conversion and GK to R frontal lesion 9/8/23, on pembrolizumab, course c/b ICI pneumonitis necessitating steroids, again with CNS progression 12/2023 s/p resection and GK. Most recently on ipi/nivo    #diarrhea  -Multifactorial with recent Enteropathogenic E. Coli and adenovirus as well as immune mediated.   -Now much improved with infliximab on 6/21/24 and prednisone at 80 mg daily.     Plan:  --Will continue on prednisone at 80 mg daily for 7 days, then decrease by 10 mg every 7 days.  --Will consider a second dose of infliximab ~7/5  --Will plan to start Bactrim for PJP prophylaxis if prednisone is for more than 30 days. He is already on 20 mg omeprazole daily, so additional GI prophylaxis not needed.     # metastatic melanoma with metastatic disease to brain  -presented with de-francisca CNS involvement s/p craniotomy x2 and GK to additional R frontal lesion  -TMB high, BRAF  mutated but not V600E  -s/p pembrolizumab x2 (first 6/7/23) c/c/b pneumonitis s/p steroid taper  -recent CNS progression in Dec 2023 s/p R frontal , ongoing response systemically, improved pneumonitis radiographically (never symptomatic)  -3/2/24 brain MR shows increased size of several previously treated metastatic lesions, no new lesions. After discussion with Rad Onc and Radiology, although progression cannot be ruled out, findings can be consistent with treatment effect  -now with repeat MR brain with continued significant increase in size of R frontal lesion (18 x 15 mm, previously 8 x 7 mm), anterior R frontal  lesion and R occipital lesion marginally increased. L frontal lesion decreased. L occipital lesion stable. Vasogenic edema significant but stable  -Last PET/CT showed overall continued concern for metastatic disease involving the soft tissues in both the R lateral calf and L medial thigh. These lesions remain small but slightly increased in both size and avidity, and likely represent sites of metastatic disease. No new areas of systemic disease noted.   -On 5/15, he started on gammaknife and ipi/nivo. He developed some fatigue, nausea, and headaches, as well as mild diarrhea, which improved.  -He completed gammaknife radiation on 5/23/24.   -Cycle 2 ipi/nivo was given on 6/5/24.    Plan:  --Will continue to hold ipi/nivo to treat colitis, as above.   --Will hold dexamethasone while on prednisone (has been on 1 mg dex daily for neuro sxs).   --Will plan to repeat PET/CT in early August.  --Will plan for next brain MRI on 8/22/24.     # Deconditioning with falls  -Secondary to generalized weakness and brain mets.    Plan:  --Recommend consistent use of his cane or walker. If falls, recommend using walker for the rest of the day.   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, resolved  -8/2023 PET/CT showed bilateral FDG avid ground glass and reticular  infiltrates favored to represent pneumonitis, started on steroids, now completed taper  -denied any history of pulmonary symptoms at the time of pneumonitis findings  -most recent imaging with continued resolution, asymptomatic today  -current steroids indicated for cerebral edema, not pneumonitis    Plan:  --continue to monitor closely for new respiratory symptoms, particularly in the setting of escalation to dual checkpoint inhibitor therapy  --no respiratory concerns today    # Liver lesions  -12/7 CT AP showed two indeterminate liver lesions not previously appreciated, favored to be benign but cannot exclude metastases  -no concerning liver lesions at this time    Plan:  --trend with surveillance scans    # Hypokalemia and hypophosphatemia  -Secondary to diarrhea.    Plan:   --Will recheck labs this week at [facility].     # Medication management  -Feeling overwhelmed with medications.     Plan:  --Home care referral is pending.     Meghan Rinaldi PA-C  [facility]  [address] [place] [id number]  [phone]    39 minutes spent on the date of the encounter doing chart review, review of test results, interpretation of tests, patient visit, and documentation     The longitudinal plan of care for the diagnosis(es)/condition(s) as documented were addressed during this visit. Due to the added complexity in care, I will continue to support Tim in the subsequent management and with ongoing continuity of care.

## 2024-06-24 NOTE — NURSING NOTE
Is the patient currently in the state of [place]? YES    Visit mode:VIDEO    If the visit is dropped, the patient can be reconnected by: VIDEO VISIT: Send to e-mail at: [email or web address]    Will anyone else be joining the visit? NO  (If patient encounters technical issues they should call [phone]    How would you like to obtain your AVS? MyChart    Are changes needed to the allergy or medication list? Zita for visit: Video Visit (Follow UP )    Rubina SUNG

## 2024-06-25 NOTE — TELEPHONE ENCOUNTER
Home Care is calling regarding an established patient with [facility].    Requesting orders from: Rock Grajeda  Provider is following patient: Yes  Is this a 60-day recertification request?  No    Orders Requested    Skilled Nursing  Request for initial certification (first set of orders)   Frequency:  1x per week x4 (starting this week), then every other week - for med management and colitis education.  Writer advised home care nurse that pt needs to be seen in clinic for follow up; she says that she already discussed this with him earlier today.    Physical Therapy  Request for initial evaluation and treatment (one time) - for strength and ambulation    Social Work  Request for initial evaluation and treatment (one time) - for long term planning and support.      Verbal orders given for PT/SW.  Information was gathered for skilled nursing.  Provider review needed.  RN will contact Home Care with information after provider review.  Confirmed ok to leave a detailed message with call back.  Contact information confirmed and updated as needed.    Mila Almaraz RN  [facility]

## 2024-06-28 NOTE — PROGRESS NOTES
[facility]: Cancer Care                                                                                          Phone call with Tim. Discussed that we have sent potassium chloride replacement to his local pharmacy to take for 7 days. K was 3.2 in labs yesterday. Tim is wondering if he should stop his neutraphos. Discussed to continue taking all his current medications as prescribed, and just add potassium chloride supplement for 7 days.     Samantha Lincoln RN

## 2024-07-01 PROBLEM — M62.82 NON-TRAUMATIC RHABDOMYOLYSIS: Status: ACTIVE | Noted: 2024-01-01

## 2024-07-01 PROBLEM — N17.9 AKI (ACUTE KIDNEY INJURY) (H): Status: ACTIVE | Noted: 2024-01-01

## 2024-07-01 NOTE — LETTER
[facility] [address] [address]  [phone]    FACSIMILE TRANSMITTAL SHEET    TO: Efrem Kenyon       FROM: Laura WILLINGHAM  PHONE: [phone]  DATE: 07/08/24    NOTES/COMMENTS: JUANY discharge orders                                      IF YOU DID NOT RECEIVE THE CORRECT NUMBER OF PAGES OR THE FAX DID NOT COME THROUGH CLEARLY, PLEASE CALL THE SENDER     CONFIDENTIALITY STATEMENT: Confidential information that may accompany this transmission contains protected health information under state and federal law and is legally privileged. This information is intended only for the use of the individual or entity named above and may be used only for carrying out treatment, payment or other healthcare operations. The recipient or person responsible for delivering this information is prohibited by law from disclosing this information without proper authorization to any other party, unless required to do so by law or regulation. If you are not the intended recipient, you are hereby notified that any review, dissemination, distribution, or copying of this message is strictly prohibited. If you have received this communication in error, please destroy the materials and contact us immediately by calling the number listed above. No response indicates that the information was received by the appropriate authorized party

## 2024-07-01 NOTE — NURSING NOTE
Is the patient currently in the state of [place]? YES    Visit mode:VIDEO    If the visit is dropped, the patient can be reconnected by: VIDEO VISIT: Text to cell phone:   Telephone Information:   Mobile [phone]       Will anyone else be joining the visit? NO  (If patient encounters technical issues they should call [phone]    How would you like to obtain your AVS? MyChart    Are changes needed to the allergy or medication list? No    Are refills needed on medications prescribed by this physician? NO    Reason for visit: ANNE SUNG

## 2024-07-01 NOTE — LETTER
7/1/2024      Saeid Santa  [address] [facility] [address]      Dear Colleague,    Thank you for referring your patient, Saeid Santa, to the [facility]. Please see a copy of my visit note below.    Telephone Visit:  Duration of call: 21 minutes    [facility]   Return Patient Visit    Name: Saeid Santa  MRN: [id number]  Date of visit: Jul 1, 2024    Diagnosis: Metastatic melanoma  Stage: IV    Molecular: BRAF G466A, TMB 48.7554 mut/Mb    Performance status: ECOG 2    Oncology History:  --8/16/22, he brings a left parietal scalp lesion to attention of his GP. It is initially observed.  --November 2022, the left parietal scalp lesion was felt to be a cyst, and the left parietal lesion was lanced and drained via scalp incision.  --January 2023, the left parietal scalp cyst would occasionally break open, drain, and bleed, largely at night.   --February 2023, he notes some mild difficulty clearing obstacles with right leg and foot.  --March/April 2023, he has 3 progressively worsening episodes over a span 3 weeks including the right leg. At first he notes the onset of right leg numbness and heaviness, like he was carrying 50 lbs of water on the leg. Then, about a week or so later, the leg began to jerk and converse while he was in the basement. Neither episode associated with loss of consciousness  --4/15/23, he recalls the right leg numbness, heaviness, jerking while driving between work. He had been found by coworkers in the parking lot, sitting in the grass, appearing confused. He was brought to the ER by EMS. CT-CAP, showed small (<6mm), bilateral indeterminate pulmonary nodules. CT-head and MRI brain showed 2 discrete ring-enhancing lesions in the left paramedian posterior frontal lobe near the precentral gyrus measuring approximately 2.2 x 1.7 x 1.9 cm and left occipital lobe near the lingual gyrus measuring approximately 2.2 x 2.1 x 2.0 cm.  The occipital lesion encroaches upon the subependymal surface of the left lateral ventricle occipital horn and contacts the superior surface of the medial left tentorium. The lesions are accompanied by moderate surrounding vasogenic edema. Surgery was recommended.  --4/21/23, underwent left awake (sleep-awake-sleep) craniotomy for tumor resection, he has resection of the left frontal, motor strip tumor as well asan elliptical incision of the left parietal scalp lesion. On pathology, both the scalp excision and brain mass are positive for malignant melanoma. Tumor cells are positive for S-100 and Melan-A, and negative for cytokeratin AE1/AE3, NKX3.1, GFAP, ERG, CD3, and CD20.  CD3 highlights brisk infiltration of the tumor by T lymphocytes, while CD20 highlights scattered positive B lymphocytes. NGS identifies a BRAF G466A mutation, a class 3 mutation insensitive to BRAF kinase inhibitor monotherapy. TMB is high at 48.754 muts/Mb.   --4/22/23, post-operative MRI shows post-operative changes of the parietal craniotomy of the left frontal lobe metastasis. The other 1.7 x 1.1 cm cystic lesion in the left occipital lobe is also seen. No new lesions appreciated.  -- 4/25/23 to 5/9/23, discharged from the hospital to acute rehab stay at [facility] in [place]. He participated in a comprehensive rehabilitation program consisting of PT, OT, Speech, Psychology and Recreation Therapy. He did well and was discharged home with use of a walker.  --PET-CT on 5/18/23 with potentially involved neck lymph nodes. He has mild FDG uptake in soft tissue nodules in the scalp, in the known left occipital brain metastasis, as well as in the left lower neck region.  -5/24-/5/31/23, He was treated with gamma knife: 2500 cGy to the left resection cavity; 3000 cGy to the L parietal-occipital lesion.  -6/7/2023, Cycle 1 of pembrolizumab 200mg every 3 weeks  -6/14/23-6/16/23, admitted for vision change, reduced comprehension, speech change, fall  and headache. MRI showing likely progression of left occipital mass with associated edema. Improved on steroids and discharged on oral decadron. Plan for repeat MRI and neurosurgery follow up in 1 month.  -7/7/23 ED visit for headaches, confusion, vision changes, concern for hemorrhage of the left occipital mass, admitted to [facility] until 7/9/23, increased dexamethasone dose  -7/14/23 craniotomy of the left parieto-occipital mass, discharged on a dexamethasone taper  -8/11/2023, Cycle 2 of pembrolizumab  -8/23/2023 brain MRI with no recurrence in left frontal lobe, decreased enhancement at edges of left occipital resection cavity, new enhancement int he right front lobe concerning for new met--seen by neurosurgery and considering RT  -8/31/2023 CT/PET with increased uptake in left lower neck lymph node c/f early progression. Ground-glass, reticular changes in lungs also noted likely secondary to drug-induced pneumonitis. Started 1 mg/kg prednisone (80 mg daily) with 8 week taper  -9/8/23 GK to R frontal lobe lesion  -12/7/23 CT neck: Resolved previously hypermetabolic left level 5 lymph node. No new  cervical lymphadenopathy.  -12/7/23 CT chest/AP: Two arterially enhancing lesions in the liver measuring 0.9 - 1.0 cm were not previously visualized, likely due to differences in timing of the contrast bolus. These are indeterminate but favored to be benign perfusional abnormalities. Melanoma metastases cannot be entirely excluded. Otherwise, no evidence for metastatic disease in the chest, abdomen and pelvis. Significant improvement in linear opacities in the lungs, suggesting improving drug-induced pneumonitis. Few stable small pulmonary nodules.  -12/7/23 MR head: Progression in intracranial metastatic disease compared to 9/8/2023   with multiple new and/or enlarging metastases, including internally hemorrhagic right frontal lobe lesion with 3 mm of leftward midline shift and new lesion adjacent to the left  occipital resection bed.   -12/8/23 started on dex 4 mg BID  -12/21/23 Right frontal craniotomy with Dr. Garrido  -1/22/24, starts nivolumab (480 mg every 4 weeks)  -2/2/24, Gamma knife to right occipital and left frontal lesions   -3/2/24 PET/CT: previously seen L lower cervical node resolved, two new suspected intramuscular mets to the LE  -3/2/24 MR brain: Multifocal intracranial metastatic disease with interval increase in size of multiple metastatic lesions and surrounding vasogenic edema detailed above. Unsure if this is due to disease progression or post radiation change.  -4/17/24 C4 nivolumab  -4/17/24 MR brain: Increased size of the enhancing lesions in the right frontal and right occipital lobes, likely representing interval disease progression. Interval decreased size of the left frontal lobe lesion. Additional intracranial metastatic lesions are stable. No new enhancing lesions identified. Stable surrounding vasogenic edema in the bifrontal lobes, right occipital lobe, and left temporoparietooccipital lobes.   -4/17/24 PET/CT: There is interval progression of disease with increase in size and uptake of enhancing cerebral metastatic lesions in the right frontal and left occipital lobes as well as interval increase in size and uptake of enhancing soft tissue nodules in the left medial thigh and right posterior leg as detailed above. There is new hypermetabolic uptake in the left fifth and sixth ribs at the costovertebral junction with focal sclerosis on corresponding CT suspicious, indeterminate.   -5/15/24 Cycle 1 ipi/nivo and start gammaknife, completed gammaknife on 5/23/24  -5/21/24 increased dexamethasone to 4 mg x 1, then 2 mg daily x 7 days for severe headaches and nausea  -5/22/24 ED visit for head injury after fall, head and neck CT negative for bleed or fracture, given IV fluids for FELICITY  -6/5/24 Cycle 2 ipi/nivo  -6/11/24 started prednisone for immune mediated colitis  -6/12/24 started  azithromycin for Enteropathogenic E. Coli, unclear if patient stopped prednisone at that time  -6/17-6/23/24 admitted for immune mediated colitis, given infliximab on 6/21/24 and started on 80 mg prednisone daily.     I am seeing him today in close follow-up of diarrhea.     Interval History:  -Tim fell last night trying to go the toilet.   -He spent half the night on the floor.   -Had stool incontinence overnight after eating hash browns.   -Fell again just now prior to our visit.  -Patient's wife, Smiley, wonders about hospice.   -Diarrhea was a little better with Imodium and steroids.   -Yesterday, was a good day.   -Has significant leg weakness.  -Home care nurse came out and helped with medication.   -PT was supposed to come out today, but Smiley canceled due to difficult day, as above.   -Has been eating okay.   -Naps on and off all day.   -Denies headaches or vision changes.  -Denies dizziness or lightheadedness.   -Has an open wound on his tailbone after falling. Also, has cuts on feet and many bruises.     Objective:  General: alert  Psych: Alert and oriented times; coherent speech, slow rate and normal volume   Patient's affect is appropriate.   Pulm: Speaking in short sentences, moderately labored as he tries to get off of the floor, no audible wheezes or cough.    Labs:   Most Recent 3 CBC's:  Recent Labs   Lab Test 06/23/24  0704 06/22/24  0633 06/21/24  0611 06/19/24  0613 06/18/24  0525 06/17/24  1720 06/11/24  1101 05/22/24  1340   WBC 14.9* 13.4* 8.5   < > 11.9*   < > 13.2* 17.4*   HGB 11.6* 11.9* 11.5*   < > 15.0   < > 16.0 16.8   MCV 91 93 92   < > 90   < > 94 95    142* 126*   < > 163   < > 181 271   ANEUTAUTO  --   --   --   --  9.3*  --  10.0* 14.2*    < > = values in this interval not displayed.     Most Recent 3 BMP's:  Recent Labs   Lab Test 06/27/24  1429 06/23/24  0704 06/22/24  0633 06/21/24  1548 06/21/24  0611 06/20/24  0530    141 140  --  138 137   POTASSIUM 3.2* 3.1* 3.7  --   3.6 4.3   CHLORIDE 105 108* 110*  --  111* 110*   CO2 24 24 23  --  19* 16*   BUN 11.9 11.2 12.9  --  16.9 17.6   CR 0.94 0.97 1.14   < > 1.13 1.05   ANIONGAP 14 9 7  --  8 11   JOHN PAUL 8.3* 7.6* 7.9*  --  7.7* 7.2*   * 103* 89  --  116* 118*   PROTTOTAL  --   --  5.1*  --  4.9* 3.7*   ALBUMIN  --   --  2.8*  --  2.6* 2.0*    < > = values in this interval not displayed.    Most Recent 3 LFT's:  Recent Labs   Lab Test 06/22/24  0633 06/21/24  0611 06/20/24  0530   AST 69* 52* 29   ALT 53 32 17   ALKPHOS 82 58 39*   BILITOTAL 0.8 0.7 0.5    Most Recent 2 TSH and T4:  Recent Labs   Lab Test 06/17/24  1720 06/05/24  1104   TSH 0.52 0.78     Magnesium   Date Value Ref Range Status   06/27/2024 2.2 1.7 - 2.3 mg/dL Final   06/23/2024 2.0 1.7 - 2.3 mg/dL Final   06/22/2024 2.2 1.7 - 2.3 mg/dL Final   06/21/2024 2.3 1.7 - 2.3 mg/dL Final     I reviewed the above labs today.    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 Started pembrolizumab 6/7/23. Underwent second craniotomy for L parieto-occipital lesion resection in 7/14/23 2/2 hemorrhagic conversion and GK to R frontal lesion 9/8/23, on pembrolizumab, course c/b ICI pneumonitis necessitating steroids, again with CNS progression 12/2023 s/p resection and GK. Most recently on ipi/nivo    #generalized weakness  -Likely secondary to diarrhea and progressive deconditioning. Patient is not safe to be at home with recurrent falls and inability to get up after a fall. Discussed hospice vs admit to the hospital with subsequent consideration of TCU placement. Patient is agreeable to going to the hospital.     Plan:  --Patient's wife will call 911 to have him transported to the hospital. Will call report to the  ED.  --Recommend admission to stabilize, then discharge to TCU vs consideration of hospice.  --Recommend repeating a brain MRI to look for evidence of significant disease progression.   --Recommend a full skin check for open wounds after recent falls. Wife reported a coccyx wound.     #diarrhea  -Multifactorial with recent Enteropathogenic E. Coli and adenovirus as well as immune mediated.   -Diarrhea much improved with infliximab on 6/21/24 and prednisone, now at 60 mg daily.     Plan:  --Will continue on prednisone now at 60 mg daily, then plan to decrease by 20 mg every 5 days.  --Will consider a second dose of infliximab ~7/5, can be given inpatient if still admitted.   --Will plan to start Bactrim for PJP prophylaxis if prednisone is for more than 30 days (started 6/17). He is already on 20 mg omeprazole daily, so additional GI prophylaxis not needed.     # metastatic melanoma with metastatic disease to brain  -presented with de-francisca CNS involvement s/p craniotomy x2 and GK to additional R frontal lesion  -TMB high, BRAF mutated but not V600E  -s/p pembrolizumab x2 (first 6/7/23) c/c/b pneumonitis s/p steroid taper  -recent CNS progression in Dec 2023 s/p R frontal , ongoing response systemically, improved pneumonitis radiographically (never symptomatic)  -3/2/24 brain MR shows increased size of several previously treated metastatic lesions, no new lesions. After discussion with Rad Onc and Radiology, although progression cannot be ruled out, findings can be consistent with treatment effect  -now with repeat MR brain with continued significant increase in size of R frontal lesion (18 x 15 mm, previously 8 x 7 mm), anterior R frontal  lesion and R occipital lesion marginally increased. L frontal lesion decreased. L occipital lesion stable. Vasogenic edema significant but stable  -Last PET/CT showed overall continued concern for metastatic disease involving the soft tissues in both the R lateral calf and L medial  thigh. These lesions remain small but slightly increased in both size and avidity, and likely represent sites of metastatic disease. No new areas of systemic disease noted.   -On 5/15, he started on gammaknife and ipi/nivo. He developed some fatigue, nausea, and headaches, as well as mild diarrhea, which improved.  -He completed gammaknife radiation on 5/23/24.   -Cycle 2 ipi/nivo was given on 6/5/24.    Plan:  --Will continue to hold ipi/nivo to treat colitis, as above.   --Will hold dexamethasone while on prednisone (has been on 1 mg dex daily for neuro sxs).   --Will plan to repeat PET/CT in early August.  --Plan was for next brain MRI on 8/22/24. Recommend sooner, as above.   --Alternatively, may consider hospice, as above.     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, resolved  -8/2023 PET/CT showed bilateral FDG avid ground glass and reticular infiltrates favored to represent pneumonitis, started on steroids, now completed taper  -denied any history of pulmonary symptoms at the time of pneumonitis findings  -most recent imaging with continued resolution, asymptomatic today  -current steroids indicated for cerebral edema, not pneumonitis    Plan:  --continue to monitor closely for new respiratory symptoms, particularly in the setting of escalation to dual checkpoint inhibitor therapy  --no respiratory concerns today, dyspnea today was related to exertion    # Liver lesions  -12/7 CT AP showed two indeterminate liver lesions not previously appreciated, favored to be benign but cannot exclude metastases  -no concerning liver lesions at this time    Plan:  --trend with surveillance scans    # Hypokalemia and hypophosphatemia  -Secondary to diarrhea.    Plan:   --Recommend a recheck in the ED. Given K replacement last week.     Meghan Rinaldi PA-C  [facility]  [address] [place] [address] [phone]    19 minutes spent on the date of the encounter doing chart  review, review of test results, interpretation of tests, and documentation, in addition to 21 minutes spent on the phone with the patient.       The longitudinal plan of care for the diagnosis(es)/condition(s) as documented were addressed during this visit. Due to the added complexity in care, I will continue to support Tim in the subsequent management and with ongoing continuity of care.        Again, thank you for allowing me to participate in the care of your patient.        Sincerely,        Meghan Rinaldi PA-C

## 2024-07-01 NOTE — ED TRIAGE NOTES
BIBA from home with concerns for generalized weakness, to the point of being unable to walk and mutiple falls today. Pain of the right foot.  Per patient is seen at the  for treatment of brain cancer and is currently receiving chemo and radiation.      Triage Assessment (Adult)       Row Name 07/01/24 [facility]          Triage Assessment    Airway WDL WDL        Respiratory WDL    Respiratory WDL WDL        Skin Circulation/Temperature WDL    Skin Circulation/Temperature WDL WDL        Cardiac WDL    Cardiac WDL WDL        Peripheral/Neurovascular WDL    Peripheral Neurovascular WDL WDL        Cognitive/Neuro/Behavioral WDL    Cognitive/Neuro/Behavioral WDL WDL

## 2024-07-01 NOTE — ED PROVIDER NOTES
[facility] ([facility])    7/01/24       ED PROVIDER NOTE       History     Chief Complaint   Patient presents with    Generalized Weakness    Fall     HPI  Saeid Santa is a 68 year old male with a history of right hemiparesis, cerebral edema, metastatic melanoma, HTN, seizure disorder, malignant neoplasm metastatic to brain, brain tumor s/p 3 craniotomies, and hypokalemia who presents to the ED via EMS for generalized weakness. Patient reports right leg weakness for approximately a year and a half that has recently become worse resulting in multiple falls. A recent fall has caused an injury to the back of his head and right abdominal side. Patient is experiencing pain on his right abdominal side, right leg, and back. Patient reports no neck pain. He states he uses a walker and his wife typically helps lift him at home but it has recently become difficult for her. He has taken tylenol to help relieve pain. Patient denies fevers, vomiting, vision changes, and intake of blood thinners.     Past Medical History  Past Medical History:   Diagnosis Date    HTN (hypertension)     Metastasis to brain (H) 2023    Metastatic melanoma (H)     Mixed hyperlipidemia     Prostate cancer (H)     Seizures (H)     Sleep apnea      Past Surgical History:   Procedure Laterality Date    INSERT DRAIN LUMBAR N/A 7/14/2023    Procedure: LUMBAR DRAIN;  Surgeon: Kye Garrido MD;  Location: [facility] OR    OPTICAL TRACKING SYSTEM CRANIOTOMY, EXCISE TUMOR, COMBINED Left 04/21/2023    Procedure: stealth assisted awake craniotomy resection of motor strip tumor;  Surgeon: Kye Garrido MD;  Location: [facility] OR    OPTICAL TRACKING SYSTEM CRANIOTOMY, EXCISE TUMOR, COMBINED Left 7/14/2023    Procedure: STEALTH ASSISTED CRANIOTOMY, WITH NEOPLASM EXCISION LEFT PARIETO-OCCIPITAL CRANIOTOMY WITH RESECTION OF MASS,;  Surgeon: Kye Garrido MD;  Location: [facility] OR    OPTICAL TRACKING SYSTEM CRANIOTOMY, EXCISE TUMOR, COMBINED  Right 2023    Procedure: CRANIOTOMY, USING OPTICAL TRACKING SYSTEM, WITH NEOPLASM EXCISION, RIGHT FRONTAL CRANOTOMY WITH RESECTION OF TUMOR;  Surgeon: Kye Garrido MD;  Location: [facility] OR    PROSTATECTOMY          SIGMOIDOSCOPY FLEXIBLE N/A 2024    Procedure: Sigmoidoscopy flexible;  Surgeon: Prosper Sweet MD;  Location: [facility] GI     acetaminophen (TYLENOL) 325 MG tablet  amLODIPine (NORVASC) 10 MG tablet  baclofen (LIORESAL) 10 MG tablet  levETIRAcetam (KEPPRA) 1000 MG tablet  loperamide (IMODIUM) 2 MG capsule  naloxone (NARCAN) 4 MG/0.1ML nasal spray  omeprazole (PRILOSEC) 20 MG DR capsule  ondansetron (ZOFRAN) 4 MG tablet  potassium & sodium phosphates (NEUTRA-PHOS) 280-160-250 MG Packet  potassium chloride ER (K-TAB) 20 MEQ CR tablet  predniSONE (DELTASONE) 20 MG tablet  rosuvastatin (CRESTOR) 40 MG tablet      No Known Allergies  Family History  Family History   Problem Relation Age of Onset    Pancreatic Cancer Father     Diabetes Paternal Grandmother      Social History   Social History     Tobacco Use    Smoking status: Former     Current packs/day: 0.00     Average packs/day: 0.5 packs/day for 20.0 years (10.0 ttl pk-yrs)     Types: Cigarettes     Start date: 1997     Quit date: 2017     Years since quittin.4     Passive exposure: Current (Second hand exposure)    Smokeless tobacco: Never   Vaping Use    Vaping status: Never Used   Substance Use Topics    Alcohol use: Not Currently     Comment: socially    Drug use: Not Currently     Types: Marijuana     Comment: typically smoked daily but no use for past two weeks as of 23      Past medical history, past surgical history, medications, allergies, family history, and social history were reviewed with the patient. No additional pertinent items.   ROS: 14 point ROS neg other than the symptoms noted above in the HPI.     Physical Exam   BP: 118/76  Pulse: 108  Temp: 99  F (37.2  C)  Resp: 15  Height: 182.9 cm (6')  SpO2: 92  %  Physical Exam  Physical Exam   Constitutional: oriented to person, place, and time. appears well-developed and well-nourished.   HENT:   Head: Normocephalic and atraumatic.   Neck: Normal range of motion.   Pulmonary/Chest: Effort normal. No respiratory distress.   Cardiac: No murmurs, rubs, gallops. RRR.  Abdominal: Abdomen soft, nontender, nondistended. No rebound tenderness.  MSK: Long bones without deformity or evidence of trauma  Neurological: alert and oriented to person, place, and time.   Skin: Skin is warm and dry.   Psychiatric:  normal mood and affect.  behavior is normal. Thought content normal.      ED Course, Procedures, & Data      Procedures            EKG Interpretation:      Interpreted by Pavel Denise MD  Time reviewed: 1655  Symptoms at time of EKG: weakness   Rhythm: normal sinus   Rate: 118  Axis: normal  Ectopy: none  Conduction: normal  ST Segments/ T Waves: No ST-T wave changes  Q Waves: none  Comparison to prior: No PVCs compared to prior otherwise largely unchanged    Clinical Impression: Largely unchanged, no overt signs of ischemia or infarction            Results for orders placed or performed during the hospital encounter of 07/01/24   [place] Draw     Status: None (In process)    Narrative    The following orders were created for panel order [place] Draw.  Procedure                               Abnormality         Status                     ---------                               -----------         ------                     Extra Blood Culture Bottle[[id number]]                       In process                 Extra Blue Top Tube[[id number]]                              In process                 Extra Red Top Tube[[id number]]                               In process                 Extra Green Top (Lithium...[[id number]]                      In process                 Extra Purple Top Tube[[id number]]                            In process                   Please view results  for these tests on the individual orders.   Lactic Acid Whole Blood w/ 1x repeat in 2 hrs when >2     Status: Normal   Result Value Ref Range    Lactic Acid, Initial 1.6 0.7 - 2.0 mmol/L   EKG 12-lead, tracing only     Status: None (Preliminary result)   Result Value Ref Range    Systolic Blood Pressure  mmHg    Diastolic Blood Pressure  mmHg    Ventricular Rate 118 BPM    Atrial Rate 118 BPM    KS Interval 148 ms    QRS Duration 86 ms     ms    QTc 465 ms    P Axis 57 degrees    R AXIS 14 degrees    T Axis 52 degrees    Interpretation ECG       Sinus tachycardia  Possible Left atrial enlargement  Borderline ECG       Medications - No data to display  Labs Ordered and Resulted from Time of ED Arrival to Time of ED Departure   LACTIC ACID WHOLE BLOOD WITH 1X REPEAT IN 2 HR WHEN >2 - Normal       Result Value    Lactic Acid, Initial 1.6       No orders to display          Critical care was not performed.     Medical Decision Making  The patient's presentation was of high complexity (a chronic illness severe exacerbation, progression, or side effect of treatment).    The patient's evaluation involved:  review of external note(s) from 1 sources (discharge summary from hospitalization on 6/17/2024)  review of 3+ test result(s) ordered prior to this encounter (see separate area of note for details)  ordering and/or review of 3+ test(s) in this encounter (see separate area of note for details)    The patient's management necessitated high risk (a decision regarding hospitalization).    Assessment & Plan    MDM  Patient presenting with weakness.  Has been having multiple falls at home.  I do not see any new neurologic symptoms to make stroke or worsening intracranial lesions likely etiology.  Seems like this been as long decline according to family.  He has residual right leg weakness and pain likely related to mets in brain and leg.  No fracture of spine, head.  Chest and pelvis also negative.  I do not have any  reason to start antibiotics, is a mildly elevated white blood count which is decreased from 8 days ago.  He has no fever or other infectious symptoms.  He is given fluids.  He does appear to have an FELICITY based on labs and mild elevation of CK.  Magnesium also replaced and he will be admitted to medicine.    I have reviewed the nursing notes. I have reviewed the findings, diagnosis, plan and need for follow up with the patient.    New Prescriptions    No medications on file       Final diagnoses:   FELICITY (acute kidney injury) (H24)   Non-traumatic rhabdomyolysis   I, Mckenna Unger, am serving as a trained medical scribe to document services personally performed by Pavel Denise MD, based on the provider's statements to me.     IPavel MD, was physically present and have reviewed and verified the accuracy of this note documented by Mckenna Unger.     Pavel Denise MD  [facility] EMERGENCY DEPARTMENT  7/1/2024     Pavel Denise MD  07/01/24 [id number]

## 2024-07-01 NOTE — ED NOTES
Bed: ED26  Expected date:   Expected time:   Means of arrival:   Comments:  [facility]  Male pt weakness, 100 pulse, bs 99,

## 2024-07-01 NOTE — PROGRESS NOTES
Telephone Visit:  Duration of call: 21 minutes    [facility]   Return Patient Visit    Name: Saeid Santa  MRN: [id number]  Date of visit: Jul 1, 2024    Diagnosis: Metastatic melanoma  Stage: IV    Molecular: BRAF G466A, TMB 48.7554 mut/Mb    Performance status: ECOG 2    Oncology History:  --8/16/22, he brings a left parietal scalp lesion to attention of his GP. It is initially observed.  --November 2022, the left parietal scalp lesion was felt to be a cyst, and the left parietal lesion was lanced and drained via scalp incision.  --January 2023, the left parietal scalp cyst would occasionally break open, drain, and bleed, largely at night.   --February 2023, he notes some mild difficulty clearing obstacles with right leg and foot.  --March/April 2023, he has 3 progressively worsening episodes over a span 3 weeks including the right leg. At first he notes the onset of right leg numbness and heaviness, like he was carrying 50 lbs of water on the leg. Then, about a week or so later, the leg began to jerk and converse while he was in the basement. Neither episode associated with loss of consciousness  --4/15/23, he recalls the right leg numbness, heaviness, jerking while driving between work. He had been found by coworkers in the parking lot, sitting in the grass, appearing confused. He was brought to the ER by EMS. CT-CAP, showed small (<6mm), bilateral indeterminate pulmonary nodules. CT-head and MRI brain showed 2 discrete ring-enhancing lesions in the left paramedian posterior frontal lobe near the precentral gyrus measuring approximately 2.2 x 1.7 x 1.9 cm and left occipital lobe near the lingual gyrus measuring approximately 2.2 x 2.1 x 2.0 cm. The occipital lesion encroaches upon the subependymal surface of the left lateral ventricle occipital horn and contacts the superior surface of the medial left tentorium. The lesions are accompanied by moderate surrounding vasogenic  edema. Surgery was recommended.  --4/21/23, underwent left awake (sleep-awake-sleep) craniotomy for tumor resection, he has resection of the left frontal, motor strip tumor as well asan elliptical incision of the left parietal scalp lesion. On pathology, both the scalp excision and brain mass are positive for malignant melanoma. Tumor cells are positive for S-100 and Melan-A, and negative for cytokeratin AE1/AE3, NKX3.1, GFAP, ERG, CD3, and CD20.  CD3 highlights brisk infiltration of the tumor by T lymphocytes, while CD20 highlights scattered positive B lymphocytes. NGS identifies a BRAF G466A mutation, a class 3 mutation insensitive to BRAF kinase inhibitor monotherapy. TMB is high at 48.754 muts/Mb.   --4/22/23, post-operative MRI shows post-operative changes of the parietal craniotomy of the left frontal lobe metastasis. The other 1.7 x 1.1 cm cystic lesion in the left occipital lobe is also seen. No new lesions appreciated.  -- 4/25/23 to 5/9/23, discharged from the hospital to acute rehab stay at [facility] in [place]. He participated in a comprehensive rehabilitation program consisting of PT, OT, Speech, Psychology and Recreation Therapy. He did well and was discharged home with use of a walker.  --PET-CT on 5/18/23 with potentially involved neck lymph nodes. He has mild FDG uptake in soft tissue nodules in the scalp, in the known left occipital brain metastasis, as well as in the left lower neck region.  -5/24-/5/31/23, He was treated with gamma knife: 2500 cGy to the left resection cavity; 3000 cGy to the L parietal-occipital lesion.  -6/7/2023, Cycle 1 of pembrolizumab 200mg every 3 weeks  -6/14/23-6/16/23, admitted for vision change, reduced comprehension, speech change, fall and headache. MRI showing likely progression of left occipital mass with associated edema. Improved on steroids and discharged on oral decadron. Plan for repeat MRI and neurosurgery follow up in 1 month.  -7/7/23 ED visit for  headaches, confusion, vision changes, concern for hemorrhage of the left occipital mass, admitted to [facility] until 7/9/23, increased dexamethasone dose  -7/14/23 craniotomy of the left parieto-occipital mass, discharged on a dexamethasone taper  -8/11/2023, Cycle 2 of pembrolizumab  -8/23/2023 brain MRI with no recurrence in left frontal lobe, decreased enhancement at edges of left occipital resection cavity, new enhancement int he right front lobe concerning for new met--seen by neurosurgery and considering RT  -8/31/2023 CT/PET with increased uptake in left lower neck lymph node c/f early progression. Ground-glass, reticular changes in lungs also noted likely secondary to drug-induced pneumonitis. Started 1 mg/kg prednisone (80 mg daily) with 8 week taper  -9/8/23 GK to R frontal lobe lesion  -12/7/23 CT neck: Resolved previously hypermetabolic left level 5 lymph node. No new  cervical lymphadenopathy.  -12/7/23 CT chest/AP: Two arterially enhancing lesions in the liver measuring 0.9 - 1.0 cm were not previously visualized, likely due to differences in timing of the contrast bolus. These are indeterminate but favored to be benign perfusional abnormalities. Melanoma metastases cannot be entirely excluded. Otherwise, no evidence for metastatic disease in the chest, abdomen and pelvis. Significant improvement in linear opacities in the lungs, suggesting improving drug-induced pneumonitis. Few stable small pulmonary nodules.  -12/7/23 MR head: Progression in intracranial metastatic disease compared to 9/8/2023   with multiple new and/or enlarging metastases, including internally hemorrhagic right frontal lobe lesion with 3 mm of leftward midline shift and new lesion adjacent to the left occipital resection bed.   -12/8/23 started on dex 4 mg BID  -12/21/23 Right frontal craniotomy with Dr. Garrido  -1/22/24, starts nivolumab (480 mg every 4 weeks)  -2/2/24, Gamma knife to right occipital and left frontal lesions    -3/2/24 PET/CT: previously seen L lower cervical node resolved, two new suspected intramuscular mets to the LE  -3/2/24 MR brain: Multifocal intracranial metastatic disease with interval increase in size of multiple metastatic lesions and surrounding vasogenic edema detailed above. Unsure if this is due to disease progression or post radiation change.  -4/17/24 C4 nivolumab  -4/17/24 MR brain: Increased size of the enhancing lesions in the right frontal and right occipital lobes, likely representing interval disease progression. Interval decreased size of the left frontal lobe lesion. Additional intracranial metastatic lesions are stable. No new enhancing lesions identified. Stable surrounding vasogenic edema in the bifrontal lobes, right occipital lobe, and left temporoparietooccipital lobes.   -4/17/24 PET/CT: There is interval progression of disease with increase in size and uptake of enhancing cerebral metastatic lesions in the right frontal and left occipital lobes as well as interval increase in size and uptake of enhancing soft tissue nodules in the left medial thigh and right posterior leg as detailed above. There is new hypermetabolic uptake in the left fifth and sixth ribs at the costovertebral junction with focal sclerosis on corresponding CT suspicious, indeterminate.   -5/15/24 Cycle 1 ipi/nivo and start gammaknife, completed gammaknife on 5/23/24  -5/21/24 increased dexamethasone to 4 mg x 1, then 2 mg daily x 7 days for severe headaches and nausea  -5/22/24 ED visit for head injury after fall, head and neck CT negative for bleed or fracture, given IV fluids for FELICITY  -6/5/24 Cycle 2 ipi/nivo  -6/11/24 started prednisone for immune mediated colitis  -6/12/24 started azithromycin for Enteropathogenic E. Coli, unclear if patient stopped prednisone at that time  -6/17-6/23/24 admitted for immune mediated colitis, given infliximab on 6/21/24 and started on 80 mg prednisone daily.     I am seeing him today  in close follow-up of diarrhea.     Interval History:  -Tim fell last night trying to go the toilet.   -He spent half the night on the floor.   -Had stool incontinence overnight after eating hash browns.   -Fell again just now prior to our visit.  -Patient's wife, Smiley, wonders about hospice.   -Diarrhea was a little better with Imodium and steroids.   -Yesterday, was a good day.   -Has significant leg weakness.  -Home care nurse came out and helped with medication.   -PT was supposed to come out today, but Smiley canceled due to difficult day, as above.   -Has been eating okay.   -Naps on and off all day.   -Denies headaches or vision changes.  -Denies dizziness or lightheadedness.   -Has an open wound on his tailbone after falling. Also, has cuts on feet and many bruises.     Objective:  General: alert  Psych: Alert and oriented times; coherent speech, slow rate and normal volume   Patient's affect is appropriate.   Pulm: Speaking in short sentences, moderately labored as he tries to get off of the floor, no audible wheezes or cough.    Labs:   Most Recent 3 CBC's:  Recent Labs   Lab Test 06/23/24  0704 06/22/24  0633 06/21/24  0611 06/19/24  0613 06/18/24  0525 06/17/24  1720 06/11/24  1101 05/22/24  1340   WBC 14.9* 13.4* 8.5   < > 11.9*   < > 13.2* 17.4*   HGB 11.6* 11.9* 11.5*   < > 15.0   < > 16.0 16.8   MCV 91 93 92   < > 90   < > 94 95    142* 126*   < > 163   < > 181 271   ANEUTAUTO  --   --   --   --  9.3*  --  10.0* 14.2*    < > = values in this interval not displayed.     Most Recent 3 BMP's:  Recent Labs   Lab Test 06/27/24  1429 06/23/24  0704 06/22/24  0633 06/21/24  1548 06/21/24  0611 06/20/24  0530    141 140  --  138 137   POTASSIUM 3.2* 3.1* 3.7  --  3.6 4.3   CHLORIDE 105 108* 110*  --  111* 110*   CO2 24 24 23  --  19* 16*   BUN 11.9 11.2 12.9  --  16.9 17.6   CR 0.94 0.97 1.14   < > 1.13 1.05   ANIONGAP 14 9 7  --  8 11   JOHN PAUL 8.3* 7.6* 7.9*  --  7.7* 7.2*   * 103* 89  --   116* 118*   PROTTOTAL  --   --  5.1*  --  4.9* 3.7*   ALBUMIN  --   --  2.8*  --  2.6* 2.0*    < > = values in this interval not displayed.    Most Recent 3 LFT's:  Recent Labs   Lab Test 06/22/24  0633 06/21/24  0611 06/20/24  0530   AST 69* 52* 29   ALT 53 32 17   ALKPHOS 82 58 39*   BILITOTAL 0.8 0.7 0.5    Most Recent 2 TSH and T4:  Recent Labs   Lab Test 06/17/24  1720 06/05/24  1104   TSH 0.52 0.78     Magnesium   Date Value Ref Range Status   06/27/2024 2.2 1.7 - 2.3 mg/dL Final   06/23/2024 2.0 1.7 - 2.3 mg/dL Final   06/22/2024 2.2 1.7 - 2.3 mg/dL Final   06/21/2024 2.3 1.7 - 2.3 mg/dL Final     I reviewed the above labs today.    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 Started pembrolizumab 6/7/23. Underwent second craniotomy for L parieto-occipital lesion resection in 7/14/23 2/2 hemorrhagic conversion and GK to R frontal lesion 9/8/23, on pembrolizumab, course c/b ICI pneumonitis necessitating steroids, again with CNS progression 12/2023 s/p resection and GK. Most recently on ipi/nivo    #generalized weakness  -Likely secondary to diarrhea and progressive deconditioning. Patient is not safe to be at home with recurrent falls and inability to get up after a fall. Discussed hospice vs admit to the hospital with subsequent consideration of TCU placement. Patient is agreeable to going to the hospital.     Plan:  --Patient's wife will call 911 to have him transported to the hospital. Will call report to the ED.  --Recommend admission to stabilize, then discharge to TCU vs consideration of hospice.  --Recommend repeating a brain MRI to look for evidence of significant disease progression.   --Recommend a full skin check for open wounds after  recent falls. Wife reported a coccyx wound.     #diarrhea  -Multifactorial with recent Enteropathogenic E. Coli and adenovirus as well as immune mediated.   -Diarrhea much improved with infliximab on 6/21/24 and prednisone, now at 60 mg daily.     Plan:  --Will continue on prednisone now at 60 mg daily, then plan to decrease by 20 mg every 5 days.  --Will consider a second dose of infliximab ~7/5, can be given inpatient if still admitted.   --Will plan to start Bactrim for PJP prophylaxis if prednisone is for more than 30 days (started 6/17). He is already on 20 mg omeprazole daily, so additional GI prophylaxis not needed.     # metastatic melanoma with metastatic disease to brain  -presented with de-francisca CNS involvement s/p craniotomy x2 and GK to additional R frontal lesion  -TMB high, BRAF mutated but not V600E  -s/p pembrolizumab x2 (first 6/7/23) c/c/b pneumonitis s/p steroid taper  -recent CNS progression in Dec 2023 s/p R frontal , ongoing response systemically, improved pneumonitis radiographically (never symptomatic)  -3/2/24 brain MR shows increased size of several previously treated metastatic lesions, no new lesions. After discussion with Rad Onc and Radiology, although progression cannot be ruled out, findings can be consistent with treatment effect  -now with repeat MR brain with continued significant increase in size of R frontal lesion (18 x 15 mm, previously 8 x 7 mm), anterior R frontal  lesion and R occipital lesion marginally increased. L frontal lesion decreased. L occipital lesion stable. Vasogenic edema significant but stable  -Last PET/CT showed overall continued concern for metastatic disease involving the soft tissues in both the R lateral calf and L medial thigh. These lesions remain small but slightly increased in both size and avidity, and likely represent sites of metastatic disease. No new areas of systemic disease noted.   -On 5/15, he started on gammaknife and ipi/nivo. He developed  some fatigue, nausea, and headaches, as well as mild diarrhea, which improved.  -He completed gammaknife radiation on 5/23/24.   -Cycle 2 ipi/nivo was given on 6/5/24.    Plan:  --Will continue to hold ipi/nivo to treat colitis, as above.   --Will hold dexamethasone while on prednisone (has been on 1 mg dex daily for neuro sxs).   --Will plan to repeat PET/CT in early August.  --Plan was for next brain MRI on 8/22/24. Recommend sooner, as above.   --Alternatively, may consider hospice, as above.     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, resolved  -8/2023 PET/CT showed bilateral FDG avid ground glass and reticular infiltrates favored to represent pneumonitis, started on steroids, now completed taper  -denied any history of pulmonary symptoms at the time of pneumonitis findings  -most recent imaging with continued resolution, asymptomatic today  -current steroids indicated for cerebral edema, not pneumonitis    Plan:  --continue to monitor closely for new respiratory symptoms, particularly in the setting of escalation to dual checkpoint inhibitor therapy  --no respiratory concerns today, dyspnea today was related to exertion    # Liver lesions  -12/7 CT AP showed two indeterminate liver lesions not previously appreciated, favored to be benign but cannot exclude metastases  -no concerning liver lesions at this time    Plan:  --trend with surveillance scans    # Hypokalemia and hypophosphatemia  -Secondary to diarrhea.    Plan:   --Recommend a recheck in the ED. Given K replacement last week.     Meghan Rinaldi PA-C  [facility]  [address] [place] [id number]  [phone]    19 minutes spent on the date of the encounter doing chart review, review of test results, interpretation of tests, and documentation, in addition to 21 minutes spent on the phone with the patient.       The longitudinal plan of care for the diagnosis(es)/condition(s) as documented were addressed  during this visit. Due to the added complexity in care, I will continue to support Tim in the subsequent management and with ongoing continuity of care.

## 2024-07-02 NOTE — H&P
[facility]    History and Physical - Medicine Service, BAN TEAM        Date of Admission:  7/1/2024    Assessment & Plan      69yo M w/ melanoma metastatic to brain s/p multiple craniotomies c/b right hemiparesis, cerebral edema, seizure disorder, and hypokalemia who presents to the ED via EMS for generalized weakness and increased falls iso subacute diarrheal illness found to have mild rhabdomyolysis admitted for rehydration.    #volume depleted  #hypomagnesemia  #rhabdomyolisis  - CK (1531) and UA  (blood moderate but RBCs/hpf wnl) consistent w/ rhabdomyolysis after pt lying on ground for half a night after fall.  - s/p 1L NS  - give 100cc/hr x 6hrs LR, recheck CK and Cr/BUN in AM  - given 1g magnesium because of FELICITY   - recheck Magnesium in AM --> replete mag to goal of Mg > 2.0  - check phosphorus  - corrected calcium is 8.6 (wnl) --> NTD    #diarrhea due to enteropathic E. coli and adenovirus  #colitis, immune-mediated  #non-anion gap acidosis  Pt dealing with subacute history diarrhea which was finally improved but returned again after eating out with family morning of ED presentation. Per oncology, diarrhea much improved with infliximab on 6/21/24 and prednisone (currently on 60mg daily)   - acidosis iso subacute diarrhea, electrolyte repletion and rehydration as above.  - rule out C diff w/ toxin assay --> IF NEG, continue PTA loperamide  - continue prednisone 60mg qDay for 1 day --> 40mg for 5 days --> 20mg for 5 days --> 10mg 5days  - if minimal improvement, consider a second dose of infliximab ~7/5, can be given inpatient if still admitted per oncology  - start Bactrim for PJP ppx if prednisone is for more than 30 days (started 6/17).   - continue PTA 20 mg omeprazole      #FELICITY  - iso rhabdomyolysis and poor PO intake/diarrhea  - labs and fluids as above    #metastatic melanoma with brain metastases  #R Hip/LE pain after fall  #seizures  - decreasing  QOL, increased difficulty meeting ADLs, increased frequency of falls  - bilateral femoral necrosis (stable from prior) noted on CT Pelvis, no acute fractures  - palliative care consult placed, appreciate recs, (wife wanted to know more about hospice per most recent oncology note)  - PRN oxycodone and tylenol for pain  - fall risk orders placed  - oncology consult placed for management of chemotherapy, appreciate recs  - continue home keppra  - mechanical dvt ppx because of high fall risk              Diet:  regular diet  DVT Prophylaxis: mechanical SCDs  Aragon Catheter: Not present  Fluids: 100cc LR/hr x 6hr  Lines: None     Cardiac Monitoring: None  Code Status:  FULL CODE    Clinically Significant Risk Factors Present on Admission            # Hypomagnesemia: Lowest Mg = 1.5 mg/dL in last 2 days, will replace as needed   # Hypoalbuminemia: Lowest albumin = 3 g/dL at 7/1/2024  4:50 PM, will monitor as appropriate    # Acute Kidney Injury, unspecified: based on a >150% or 0.3 mg/dL increase in last creatinine compared to past 90 day average, will monitor renal function  # Hypertension: Noted on problem list                 # Financial/Environmental Concerns:           Disposition Plan      Expected Discharge Date: 07/03/2024                The patient's care was discussed with Dr. ISAMAR Garcia MD  Medicine Service, [facility]  Securely message with [facility] (more info)  Text page via [facility] Paging/Directory   See signed in provider for up to date coverage information  ______________________________________________________________________    Chief Complaint   Weakness, increased falls, diarrhea    History obtained from patient and electronic health records.    History of Present Illness   67yo M w/ melanoma metastatic to brain s/p multiple craniotomies c/b right hemiparesis, cerebral edema, seizure disorder, and hypokalemia who presents to  the ED via EMS for generalized weakness and increased falls iso subacute diarrheal illness found to have mild rhabdomyolysis admitted for rehydration.    He was recently hospitalized for abdominal sepsis secondary to multifactorial pancolitis and discharged 6/23/24. Has been on steroid taper managed by oncology outpatient and had improvement in diarrhea until morning of hospital admission when he had another episode of diarrhea.       Past Medical History    Past Medical History:   Diagnosis Date    HTN (hypertension)     Metastasis to brain (H) 2023    Metastatic melanoma (H)     Mixed hyperlipidemia     Prostate cancer (H)     Seizures (H)     Sleep apnea        Past Surgical History   Past Surgical History:   Procedure Laterality Date    INSERT DRAIN LUMBAR N/A 7/14/2023    Procedure: LUMBAR DRAIN;  Surgeon: Kye Garrido MD;  Location: [facility] OR    OPTICAL TRACKING SYSTEM CRANIOTOMY, EXCISE TUMOR, COMBINED Left 04/21/2023    Procedure: stealth assisted awake craniotomy resection of motor strip tumor;  Surgeon: Kye Garrido MD;  Location: [facility] OR    OPTICAL TRACKING SYSTEM CRANIOTOMY, EXCISE TUMOR, COMBINED Left 7/14/2023    Procedure: STEALTH ASSISTED CRANIOTOMY, WITH NEOPLASM EXCISION LEFT PARIETO-OCCIPITAL CRANIOTOMY WITH RESECTION OF MASS,;  Surgeon: Kye Garrido MD;  Location: [facility] OR    OPTICAL TRACKING SYSTEM CRANIOTOMY, EXCISE TUMOR, COMBINED Right 12/21/2023    Procedure: CRANIOTOMY, USING OPTICAL TRACKING SYSTEM, WITH NEOPLASM EXCISION, RIGHT FRONTAL CRANOTOMY WITH RESECTION OF TUMOR;  Surgeon: Kye Garrido MD;  Location: [facility] OR    PROSTATECTOMY      2009    SIGMOIDOSCOPY FLEXIBLE N/A 6/19/2024    Procedure: Sigmoidoscopy flexible;  Surgeon: Prosper Sweet MD;  Location: [facility] GI       Prior to Admission Medications   Prior to Admission Medications   Prescriptions Last Dose Informant Patient Reported? Taking?   acetaminophen (TYLENOL) 325 MG tablet  Self Yes No   Sig: Take 2 tablets (650 mg) by mouth  every 4 hours as needed for other (For optimal non-opioid multimodal pain management to improve pain control.)   amLODIPine (NORVASC) 10 MG tablet   No No   Sig: Take 1 tablet (10 mg) by mouth every morning   baclofen (LIORESAL) 10 MG tablet  Self Yes No   Sig: Take 1 tablet by mouth 2 times daily as needed for muscle spasms   levETIRAcetam (KEPPRA) 1000 MG tablet  Self No No   Si tablet Orally every 12 hrs   loperamide (IMODIUM) 2 MG capsule   No No   Sig: Take 1 capsule (2 mg) by mouth 4 times daily   naloxone (NARCAN) 4 MG/0.1ML nasal spray   No No   Sig: Spray 1 spray (4 mg) into one nostril alternating nostrils as needed for opioid reversal every 2-3 minutes until assistance arrives   omeprazole (PRILOSEC) 20 MG DR capsule   No No   Sig: Take 1 capsule (20 mg) by mouth every morning Take 1 hour prior to prednisone   ondansetron (ZOFRAN) 4 MG tablet   No No   Sig: Take 1 tablet (4 mg) by mouth every 8 hours as needed for nausea   potassium & sodium phosphates (NEUTRA-PHOS) 280-160-250 MG Packet   No No   Sig: Take 1 packet by mouth 4 times daily Discuss with PCP and/or oncologist about when to stop (should be able to stop once diarrhea under better control)   potassium chloride ER (K-TAB) 20 MEQ CR tablet   No No   Sig: Take 1 tablet (20 mEq) by mouth daily for 7 days   predniSONE (DELTASONE) 20 MG tablet   No No   Sig: Take 4 tablets (80 mg) by mouth daily for 5 days, THEN 3 tablets (60 mg) daily for 5 days, THEN 2 tablets (40 mg) daily for 5 days, THEN 1 tablet (20 mg) daily for 5 days, THEN 0.5 tablets (10 mg) daily for 5 days.   rosuvastatin (CRESTOR) 40 MG tablet  Self No No   Sig: Take 1 tablet (40 mg) by mouth daily      Facility-Administered Medications: None           Physical Exam   Vital Signs: Temp: 97.8  F (36.6  C) Temp src: Oral BP: 112/80 Pulse: 113   Resp: 20 SpO2: 94 % O2 Device: None (Room air)    Weight: 0 lbs 0 oz    General Appearance: Tired male resting in bed  Respiratory:  CTAB  Cardiovascular: tachycardic, regular rhythm  GI: mildly TTP, no rebound or guarding  Other: Large bruising on R forearm and R hip. R hip TTP.     Medical Decision Making             Data     I have personally reviewed the following data over the past 24 hrs:    13.9 (H)  \   12.1 (L)   / 164     135 102 19.5 /  90   3.6 19 (L) 1.88 (H) \     ALT: 46 AST: 67 (H) AP: 69 TBILI: 1.1   ALB: 3.0 (L) TOT PROTEIN: 5.5 (L) LIPASE: N/A     Procal: N/A CRP: N/A Lactic Acid: 1.6         Imaging results reviewed over the past 24 hrs:   Recent Results (from the past 24 hour(s))   Head CT w/o contrast    Narrative    EXAM: CT HEAD W/O CONTRAST  LOCATION: [facility]  DATE: 7/1/2024    INDICATION: Fall, pain, hx brain cancer  COMPARISON: CT head 5/22/2024.  TECHNIQUE: Routine CT Head without IV contrast. Multiplanar reformats. Dose reduction techniques were used.    FINDINGS:  INTRACRANIAL CONTENTS: Redemonstrated multiple intracranial focal lesions located in the right frontal, bilateral occipital lobes, left parietal lobe with moderate surrounding vasogenic edema. No vasogenic edema around the lesion appears mildly improved   compared to prior. No intracranial hemorrhage or extraaxial collection.  No CT evidence of acute infarct. Mild presumed chronic small vessel ischemic changes. Normal ventricles and sulci.     VISUALIZED ORBITS/SINUSES/MASTOIDS: No intraorbital abnormality. No paranasal sinus mucosal disease. No middle ear or mastoid effusion.    BONES/SOFT TISSUES: No acute abnormality. Left parietal, right frontal and midline occipital craniotomies.      Impression    IMPRESSION:  1.  No acute findings.  2.  Redemonstrated multiple intracranial focal abnormalities. Vasogenic edema slightly improved from prior.  3.  No midline shift.   CT Chest/Abdomen/Pelvis w Contrast    Narrative    EXAM: CT CHEST/ABDOMEN/PELVIS W CONTRAST  LOCATION: [facility]  DATE: 7/1/2024    INDICATION: trauma, pain to R chest RUQ  COMPARISON: CT of the abdomen and pelvis on 6/17/2024 and PET CT on 3/4/2024.  TECHNIQUE: CT scan of the chest, abdomen, and pelvis was performed following injection of IV contrast. Multiplanar reformats were obtained. Dose reduction techniques were used.   CONTRAST: iopamidol (ISOVUE 370) solution 99 mL    FINDINGS:   LUNGS AND PLEURA: No infiltrate, pulmonary contusion, pleural effusion, pneumothorax or hemothorax. A few stable small pulmonary nodules. For example, a right middle lobe subpleural 5 mm nodule (series 3, image 188) is stable and was not hypermetabolic   on the PET CT. No new or enlarging nodules.    MEDIASTINUM/AXILLAE: No lymphadenopathy. No traumatic aortic injury. No central pulmonary emboli. No thoracic aortic aneurysm. No pericardial effusion.    CORONARY ARTERY CALCIFICATION: Mild.    HEPATOBILIARY: Normal.    PANCREAS: Normal.    SPLEEN: Normal.    ADRENAL GLANDS: Normal.    KIDNEYS/BLADDER: Small parapelvic cysts bilaterally requiring no specific follow-up. No calculi, masses or hydronephrosis bilaterally.    BOWEL: Mild pancolonic wall thickening and enhancement, overall improved since 6/17/2024, likely resolving colitis. No small bowel or colonic obstruction or inflammatory changes.    LYMPH NODES: No lymphadenopathy in the abdomen and pelvis.    VASCULATURE: Stable ectatic abdominal aorta measuring 2.7 x 2.6 cm (series 3, image 466).    PELVIC ORGANS: Prostatectomy. No pelvic masses. No free fluid or fluid collections.    MUSCULOSKELETAL: Slight increase in sclerosis in the left posterior fifth and sixth rib at the costovertebral junctions (series 3, image 127 and 143), corresponding to previous foci of hypermetabolic uptake. No acute fractures. No right chest wall   hematoma or mass. Mildly vascular necrosis of the right femoral head is stable since 6/17/2024. Nodular surface collapse. Stable avascular  necrosis of the left femoral head..      Impression    IMPRESSION:  1.  No acute traumatic injury in the chest, abdomen and pelvis.  2.  Mild pancolitis is improved since 6/17/2024.  3.  Mild sclerosis in the posterior left fifth and sixth ribs at the costovertebral junction has slightly increased since the PET CT on 3/4/2024.  4.  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   CT Lumbar Spine Reconstructed    Narrative    EXAM: CT LUMBAR SPINE RECONSTRUCTED, CT THORACIC SPINE RECONSTRUCTED  LOCATION: [facility]  DATE: 7/1/2024    INDICATION: fall pain  COMPARISON: None.  TECHNIQUE:   1) Routine CT Thoracic Spine without IV contrast. Multiplanar reformats. Dose reduction techniques were used.   2) Routine CT Lumbar Spine without IV contrast. Multiplanar reformats. Dose reduction techniques were used.     FINDINGS:   THORACIC SPINE CT:  VERTEBRA: Normal vertebral body heights and alignment. No fracture or posttraumatic subluxation.     CANAL/FORAMINA: No high-grade canal or neural foraminal stenosis.    PARASPINAL: No acute extraspinal abnormality.    LUMBAR SPINE CT:  VERTEBRA: Normal vertebral body heights and alignment. No fracture or posttraumatic subluxation.     CANAL/FORAMINA: No high-grade canal or neural foraminal stenosis.    PARASPINAL: No acute extraspinal abnormality.      Impression    IMPRESSION:  THORACIC SPINE CT:  1.  No fracture or posttraumatic subluxation.    LUMBAR SPINE CT:  1.  No fracture or posttraumatic subluxation.     CT Thoracic Spine Reconstructed    Narrative    EXAM: CT LUMBAR SPINE RECONSTRUCTED, CT THORACIC SPINE RECONSTRUCTED  LOCATION: [facility]  DATE: 7/1/2024    INDICATION: fall pain  COMPARISON: None.  TECHNIQUE:   1) Routine CT Thoracic Spine without IV contrast. Multiplanar reformats. Dose reduction techniques were used.   2) Routine CT  Lumbar Spine without IV contrast. Multiplanar reformats. Dose reduction techniques were used.     FINDINGS:   THORACIC SPINE CT:  VERTEBRA: Normal vertebral body heights and alignment. No fracture or posttraumatic subluxation.     CANAL/FORAMINA: No high-grade canal or neural foraminal stenosis.    PARASPINAL: No acute extraspinal abnormality.    LUMBAR SPINE CT:  VERTEBRA: Normal vertebral body heights and alignment. No fracture or posttraumatic subluxation.     CANAL/FORAMINA: No high-grade canal or neural foraminal stenosis.    PARASPINAL: No acute extraspinal abnormality.      Impression    IMPRESSION:  THORACIC SPINE CT:  1.  No fracture or posttraumatic subluxation.    LUMBAR SPINE CT:  1.  No fracture or posttraumatic subluxation.

## 2024-07-02 NOTE — PROGRESS NOTES
07/02/24 1000   Appointment Info   Signing Clinician's Name / Credentials (OT) SOILA Florian   Student Supervision On-site supervision provided   Living Environment   People in Home spouse   Current Living Arrangements house   Home Accessibility stairs to enter home;stairs within home   Number of Stairs, Main Entrance greater than 10 stairs   Stair Railings, Main Entrance railings on both sides of stairs   Number of Stairs, Within Home, Primary greater than 10 stairs   Stair Railings, Within Home, Primary railings on both sides of stairs   Transportation Anticipated family or friend will provide   Living Environment Comments Pt lives in a 2 story home with his wife. He reports that he needs to walk down 12 stairs to get to main level of house and then to basement there are 12 more stairts, but he does not go down to basement. Pt is IND with most ADLs but wife and brother take care of cooking, cleaning, and home tasks.   Self-Care   Usual Activity Tolerance good   Current Activity Tolerance fair   Regular Exercise No   Equipment Currently Used at Home cane, straight;walker, standard;walker, rolling   Fall history within last six months yes   Number of times patient has fallen within last six months 9   Instrumental Activities of Daily Living (IADL)   IADL Comments IADLs were completed by wife, brother or other family member   General Information   Onset of Illness/Injury or Date of Surgery 07/01/24   Referring Physician Nayla Freeman MD   Patient/Family Therapy Goal Statement (OT) Return home safely   Additional Occupational Profile Info/Pertinent History of Current Problem 67yo M w/ melanoma metastatic to brain s/p multiple craniotomies c/b right hemiparesis, cerebral edema, seizure disorder, and hypokalemia who presents to the ED via EMS for generalized weakness and increased falls iso subacute diarrheal illness found to have mild rhabdomyolysis admitted for rehydration.   Existing  Precautions/Restrictions fall   Left Upper Extremity (Weight-bearing Status) full weight-bearing (FWB)   Right Upper Extremity (Weight-bearing Status) full weight-bearing (FWB)   Left Lower Extremity (Weight-bearing Status) full weight-bearing (FWB)   Right Lower Extremity (Weight-bearing Status) full weight-bearing (FWB)   Cognitive Status Examination   Orientation Status orientation to person, place and time   Affect/Mental Status (Cognitive) WNL   Follows Commands WNL   Visual Perception   Visual Impairment/Limitations corrective lenses for reading   Sensory   Sensory Quick Adds UE sensation intact   Sensory Comments RLE (foot and shin) has N/T at baseline since April per pt   Pain Assessment   Patient Currently in Pain Yes, see Vital Sign flowsheet   Posture   Posture forward head position   Range of Motion Comprehensive   General Range of Motion no range of motion deficits identified   Strength Comprehensive (MMT)   Comment, General Manual Muscle Testing (MMT) Assessment generalized weakness   Coordination   Upper Extremity Coordination No deficits were identified   Bed Mobility   Bed Mobility supine-sit   Supine-Sit [place] (Bed Mobility) minimum assist (75% patient effort);2 person assist   Assistive Device (Bed Mobility) bed rails   Transfers   Transfers sit-stand transfer;bed-chair transfer   Transfer Skill: Bed to Chair/Chair to Bed   Bed-Chair [place] (Transfers) contact guard   Assistive Device (Bed-Chair Transfers) standard walker   Sit-Stand Transfer   Sit-Stand [place] (Transfers) minimum assist (75% patient effort)   Assistive Device (Sit-Stand Transfers) walker, front-wheeled   Balance   Balance Assessment sit to stand dynamic balance;standing dynamic balance   Sit-to-Stand Balance contact guard   Standing Balance: Dynamic contact guard   Position/Device Used, Standing Balance walker, front-wheeled   Activities of Daily Living   BADL Assessment/Intervention bathing;lower body  dressing;grooming;toileting   Lower Body Dressing Assessment/Training   Comment, (Lower Body Dressing) per clinical judgement   Rains Level (Lower Body Dressing) moderate assist (50% patient effort)   Grooming Assessment/Training   Rains Level (Grooming) minimum assist (75% patient effort)   Comment, (Grooming) min A for balance, per clinical judgement   Toileting   Comment, (Toileting) per clinical judgement, for pericares   Rains Level (Toileting) moderate assist (50% patient effort)   Clinical Impression   Criteria for Skilled Therapeutic Interventions Met (OT) Yes, treatment indicated   OT Diagnosis Decreased IND with ADLs   OT Problem List-Impairments impacting ADL activity tolerance impaired;strength;pain;balance   Assessment of Occupational Performance 1-3 Performance Deficits   Planned Therapy Interventions (OT) ADL retraining;bed mobility training;strengthening;transfer training;home program guidelines;progressive activity/exercise   Clinical Decision Making Complexity (OT) problem focused assessment/low complexity   Risk & Benefits of therapy have been explained evaluation/treatment results reviewed;care plan/treatment goals reviewed;risks/benefits reviewed;participants included;patient   OT Total Evaluation Time   OT Eval, Low Complexity Minutes (37515) 8   OT Goals   Therapy Frequency (OT) 5 times/week   OT Predicted Duration/Target Date for Goal Attainment 07/10/24   OT Goals Upper Body Dressing;Lower Body Dressing;Lower Body Bathing;Bed Mobility;Toilet Transfer/Toileting   OT: Upper Body Dressing Modified independent   OT: Lower Body Dressing Modified independent   OT: Lower Body Bathing Modified independent   OT: Bed Mobility Modified independent   OT: Toilet Transfer/Toileting Modified independent   Therapeutic Activities   Therapeutic Activity Minutes (62664) 14   Symptoms noted during/after treatment fatigue;increased pain   Treatment Detail/Skilled Intervention Pt greeted  supine in bed and agreeable to therapy. Faciliated mobility and ADLs to increase tolerance and IND with functional cares. Pt supine<>sit EOB min-mod Ax2. Pt declined rolling to R side d/t pain and bruising on R side. Nurse was present in room to A. Pt scooted anteriorly IND. Pt STS Imelda with FWW. Tot A for pericares while standing. Pt ambulated to recliner ~6' with FWW and CGA. Pt stand to sit CGA and scooted posteriorly IND. Pt sitting up in recliner with all needs met and call light in reach when Th exited.   OT Discharge Planning   OT Plan toilet transfer/cleanup, FB dressing, standing ADLs,   OT Discharge Recommendation (DC Rec) home with assist   OT Rationale for DC Rec Pt currently below baseline d/t increased pain in R leg (fall), deconditioning and generalized weakness. Anticipate pt will progress while at hospital and will be safe to d/c home with assist from wife and family.   OT Brief overview of current status min-mod A for bed mobility and STS, CGA for ambulation with FWW   Total Session Time   Timed Code Treatment Minutes 14   Total Session Time (sum of timed and untimed services) 22

## 2024-07-02 NOTE — PROGRESS NOTES
Physician Attestation   I, Nayla Freeman MD, was present with the medical/AMANDA student who participated in the service and in the documentation of the note.  I have verified the history and personally performed the physical exam and medical decision making.  I agree with the assessment and plan of care as documented in the note.      Key findings: As in jointly edited note below. Non-billable, admitted same date. Please refer below for updates to plan.    Please see A&P for additional details of medical decision making.        Nayla Freeman MD  Date of Service (when I saw the patient): 07/02/24    [facility]    Progress Note - Medicine Service, [facility] TEAM 1       Date of Admission:  7/1/2024    Assessment & Plan   Saeid Santa is a 68 year old male with a PMH of metastatic melanoma with metastasis to the brain (complicated by seizure disorder, right-sided hemiparesis, and s/p 3x craniotomy), prostate cancer s/p prostatectomy, and HTN presenting with ongoing diarrhea, progressive weakness, and recurrent falls found to have FELICITY and mild rhabdomyolysis.    #FELICITY, improving  #Diarrhea  #Poor PO intake  #Hypovolemia  #Hypomagnesemia  #Rhabdomyolysis, mild, improving  Cr 1.88 on admission 7/1 from baseline 0.9-1.1. UA with moderate blood, no RBCs, small protein. CK 1531 (7/1) -> 1071 (7/2) following IVF. Electrolytes largely within normal limits (no hyperkalemia, hyperphosphatemia, or hypocalcemia). FELICITY likely multifactorial with contributions of hypovolemia from diarrhea with inadequate PO intake to match losses and additional insult of pigment-induced nephropathy.   - s/p 1L NS followed by 100cc/hr x 6hrs LR  - Additional IVF on 7/2 to ensure adequate hydration  - Daily BMP with Mg, repletion PRN per nursing protocol  - Monitor I&O's to ensure adequate self nutrition    #Colitis, immune-mediated  #Diarrhea due to enteropathic E. 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 Treated with steroids and infliximab (6/21) with some improvement in symptoms prior to discharge. Remains on steroid taper per oncology. Repeat C diff toxin assay this admission negative 7/2.  - Oncology consulted, appreciate recommendations  - Continue PTA loperamide  - Continue prednisone 60mg qDay for 1 day (7/2)--> 40mg for 5 days --> 20mg for 5 days --> 10mg 5days  -if minimal improvement, consider a second dose of infliximab ~7/5, can be given inpatient if still admitted per oncology  - Continue Bactrim for PJP ppx if prednisone is for more than 30 days (started 6/17).      #Generalized Weakness  #Recurrent falls  Decreasing QOL, increased difficulty meeting ADLs, increased frequency of falls in setting of metastatic melanoma, diarrhea. Patient unaware of events leading to most recent fall. Most likely related to acute on chronic deconditioning but question potential contribution of syncope/presyncope. Nothing on history to suggest seizures.   -Orthostatics   -See FEN goals above  -Consult PT/OT     #metastatic melanoma with brain metastases  #seizures  Patient found to have ring enhancing lesions on MRI . 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 Has continued to show CNS involvement of disease status post multiple craniotomies and resections (tumor resection 4/2023, L parieto-occipital craniotomy 7/2023, r frontal craniotomy 12/2023, gamma knife 02/2024). Recent PET scan w/ concerns for mets to soft tissues in R lateral calf and L medial thigh as well as liver lesions. Last seizure was 04/2023.   -palliative care consulted  -oncology consulted for ongoing chemotherapy management  -continue  "home keppra    #R Abd/Hip/LE pain after fall  -Tylenol PRN        Diet: Combination Diet Regular Diet Adult    DVT Prophylaxis: Pneumatic Compression Devices  Aragon Catheter: Not present  Fluids: LR 100cc/hr for 6 hours, then reasses.  Lines: 18G IV left forearm   Cardiac Monitoring: ACTIVE order. Indication: Electrolyte Imbalance (24 hours)- Magnesium <1.3 mg/ml; Potassium < =2.8 or > 5.5 mg/ml  Code Status: Full Code      Clinically Significant Risk Factors Present on Admission            # Hypomagnesemia: Lowest Mg = 1.5 mg/dL in last 2 days, will replace as needed   # Hypoalbuminemia: Lowest albumin = 2.8 g/dL at 7/2/2024  6:49 AM, will monitor as appropriate     # Hypertension: Noted on problem list                   # Financial/Environmental Concerns:           Disposition Plan      Expected Discharge Date: 07/03/2024                The patient's care was discussed with the Attending Physician, Dr. Freeman .    [facility]  Medical Student  Medicine Service, [facility] TEAM 1  [facility]  Securely message with [employer] info)  Text page via [employer] Paging/Directory   See signed in provider for up to date coverage information  ______________________________________________________________________    Interval History   Tim states that he had three episodes of diarrhea overnight, with a 4th stool passed that was \"more normal\". He states that his pain has not improved since arrival in the ED but that it is only present with palpation or pressure on the right side of his abdomen, causing 10/10 sharp pain. Tim states that his primary concern is regaining sufficient strength to return home; aside from his bruising, he denies any additional complaints, including dyspnea, headache, chest pain, fevers, or chills. He is also particularly concerned about the toll that caregiving is having on his wife and the fact that she is unable to lift him off the ground anymore when he " falls.    Physical Exam   Vital Signs: Temp: 98.2  F (36.8  C) Temp src: Oral BP: 109/79 Pulse: 113   Resp: 16 SpO2: 96 % O2 Device: None (Room air)    Weight: 0 lbs 0 oz    Constitutional: awake, alert, cooperative, no apparent distress, and appears stated age  Eyes: Lids and lashes normal, pupils equal, round and reactive to light, extra ocular muscles intact, sclera clear, conjunctiva normal  Respiratory: No increased work of breathing, good air exchange, clear to auscultation bilaterally, no crackles or wheezing  Cardiovascular: Normal apical impulse, regular rate and rhythm, normal S1 and S2, no S3 or S4, and no murmur noted  GI: normal bowel sounds, tenderness noted in the right lower quadrant and in the left lower quadrant, and no masses palpated  Skin: normal skin color, texture, turgor. Diffuse bruising on the patient's left posterior forearm, bilateral thighs, and right foot noted to be from prior to admission. Significant bruising on the patient's right lateral abdomen and suprapubic abdomen. Abdominal bruising tender to palpation rated 10/10.  Neurologic: Awake, alert, oriented to name, place and time.  Cranial nerves II-XII are grossly intact.  Motor is 5 out of 5 bilaterally.  Cerebellar finger to nose, heel to shin intact.  Sensory is intact.  Babinski down going, Romberg negative, and gait is normal.    Medical Decision Making       Please see A&P for additional details of medical decision making.      Data   Pertinent Labs since overnight:  CK = 1071  Mag = 1.9  Phos = 3.3  Cr = 1.15  BUN = 16.5  Prelim culture = negative  C diff = negative      I have personally reviewed the following data over the past 24 hrs:    11.6 (H)  \   11.9 (L)   / 143 (L)     139 107 16.5 /  76   3.5 23 1.15 \     ALT: 42 AST: 66 (H) AP: 63 TBILI: 0.9   ALB: 2.8 (L) TOT PROTEIN: 5.6 (L) LIPASE: N/A     Procal: N/A CRP: N/A Lactic Acid: 1.6         Imaging results reviewed over the past 24 hrs:   Recent Results (from the  past 24 hour(s))   Head CT w/o contrast    Narrative    EXAM: CT HEAD W/O CONTRAST  LOCATION: [facility]  DATE: 7/1/2024    INDICATION: Fall, pain, hx brain cancer  COMPARISON: CT head 5/22/2024.  TECHNIQUE: Routine CT Head without IV contrast. Multiplanar reformats. Dose reduction techniques were used.    FINDINGS:  INTRACRANIAL CONTENTS: Redemonstrated multiple intracranial focal lesions located in the right frontal, bilateral occipital lobes, left parietal lobe with moderate surrounding vasogenic edema. No vasogenic edema around the lesion appears mildly improved   compared to prior. No intracranial hemorrhage or extraaxial collection.  No CT evidence of acute infarct. Mild presumed chronic small vessel ischemic changes. Normal ventricles and sulci.     VISUALIZED ORBITS/SINUSES/MASTOIDS: No intraorbital abnormality. No paranasal sinus mucosal disease. No middle ear or mastoid effusion.    BONES/SOFT TISSUES: No acute abnormality. Left parietal, right frontal and midline occipital craniotomies.      Impression    IMPRESSION:  1.  No acute findings.  2.  Redemonstrated multiple intracranial focal abnormalities. Vasogenic edema slightly improved from prior.  3.  No midline shift.   CT Chest/Abdomen/Pelvis w Contrast    Narrative    EXAM: CT CHEST/ABDOMEN/PELVIS W CONTRAST  LOCATION: [facility]  DATE: 7/1/2024    INDICATION: trauma, pain to R chest RUQ  COMPARISON: CT of the abdomen and pelvis on 6/17/2024 and PET CT on 3/4/2024.  TECHNIQUE: CT scan of the chest, abdomen, and pelvis was performed following injection of IV contrast. Multiplanar reformats were obtained. Dose reduction techniques were used.   CONTRAST: iopamidol (ISOVUE 370) solution 99 mL    FINDINGS:   LUNGS AND PLEURA: No infiltrate, pulmonary contusion, pleural effusion, pneumothorax or hemothorax. A few stable small pulmonary nodules. For example, a right  middle lobe subpleural 5 mm nodule (series 3, image 188) is stable and was not hypermetabolic   on the PET CT. No new or enlarging nodules.    MEDIASTINUM/AXILLAE: No lymphadenopathy. No traumatic aortic injury. No central pulmonary emboli. No thoracic aortic aneurysm. No pericardial effusion.    CORONARY ARTERY CALCIFICATION: Mild.    HEPATOBILIARY: Normal.    PANCREAS: Normal.    SPLEEN: Normal.    ADRENAL GLANDS: Normal.    KIDNEYS/BLADDER: Small parapelvic cysts bilaterally requiring no specific follow-up. No calculi, masses or hydronephrosis bilaterally.    BOWEL: Mild pancolonic wall thickening and enhancement, overall improved since 6/17/2024, likely resolving colitis. No small bowel or colonic obstruction or inflammatory changes.    LYMPH NODES: No lymphadenopathy in the abdomen and pelvis.    VASCULATURE: Stable ectatic abdominal aorta measuring 2.7 x 2.6 cm (series 3, image 466).    PELVIC ORGANS: Prostatectomy. No pelvic masses. No free fluid or fluid collections.    MUSCULOSKELETAL: Slight increase in sclerosis in the left posterior fifth and sixth rib at the costovertebral junctions (series 3, image 127 and 143), corresponding to previous foci of hypermetabolic uptake. No acute fractures. No right chest wall   hematoma or mass. Mildly vascular necrosis of the right femoral head is stable since 6/17/2024. Nodular surface collapse. Stable avascular necrosis of the left femoral head..      Impression    IMPRESSION:  1.  No acute traumatic injury in the chest, abdomen and pelvis.  2.  Mild pancolitis is improved since 6/17/2024.  3.  Mild sclerosis in the posterior left fifth and sixth ribs at the costovertebral junction has slightly increased since the PET CT on 3/4/2024.  4.  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   CT Lumbar Spine Reconstructed    Narrative    EXAM: CT LUMBAR SPINE RECONSTRUCTED, CT THORACIC SPINE RECONSTRUCTED  LOCATION: [facility]  DATE: 7/1/2024    INDICATION: fall pain  COMPARISON: None.  TECHNIQUE:   1) Routine CT Thoracic Spine without IV contrast. Multiplanar reformats. Dose reduction techniques were used.   2) Routine CT Lumbar Spine without IV contrast. Multiplanar reformats. Dose reduction techniques were used.     FINDINGS:   THORACIC SPINE CT:  VERTEBRA: Normal vertebral body heights and alignment. No fracture or posttraumatic subluxation.     CANAL/FORAMINA: No high-grade canal or neural foraminal stenosis.    PARASPINAL: No acute extraspinal abnormality.    LUMBAR SPINE CT:  VERTEBRA: Normal vertebral body heights and alignment. No fracture or posttraumatic subluxation.     CANAL/FORAMINA: No high-grade canal or neural foraminal stenosis.    PARASPINAL: No acute extraspinal abnormality.      Impression    IMPRESSION:  THORACIC SPINE CT:  1.  No fracture or posttraumatic subluxation.    LUMBAR SPINE CT:  1.  No fracture or posttraumatic subluxation.     CT Thoracic Spine Reconstructed    Narrative    EXAM: CT LUMBAR SPINE RECONSTRUCTED, CT THORACIC SPINE RECONSTRUCTED  LOCATION: [facility]  DATE: 7/1/2024    INDICATION: fall pain  COMPARISON: None.  TECHNIQUE:   1) Routine CT Thoracic Spine without IV contrast. Multiplanar reformats. Dose reduction techniques were used.   2) Routine CT Lumbar Spine without IV contrast. Multiplanar reformats. Dose reduction techniques were used.     FINDINGS:   THORACIC SPINE CT:  VERTEBRA: Normal vertebral body heights and alignment. No fracture or posttraumatic subluxation.     CANAL/FORAMINA: No high-grade canal or neural foraminal stenosis.    PARASPINAL: No acute extraspinal abnormality.    LUMBAR SPINE CT:  VERTEBRA: Normal vertebral body heights and alignment. No fracture or posttraumatic subluxation.     CANAL/FORAMINA: No high-grade canal or neural foraminal stenosis.    PARASPINAL: No acute extraspinal  abnormality.      Impression    IMPRESSION:  THORACIC SPINE CT:  1.  No fracture or posttraumatic subluxation.    LUMBAR SPINE CT:  1.  No fracture or posttraumatic subluxation.

## 2024-07-02 NOTE — PLAN OF CARE
Plan of Care Reviewed With: patient, family    Overall Patient Progress: no change    Status: Pt reported doing better, just sad about being in the hospital/current situation. Bruising is noted on abdomen, chest, and forearms from recent fall. Blanchable redness on sacrum/coccyx - cream/mepilex applied. Worked with OT - ambulated to chair with gait/walker. Worked with PT. A x1-2. Incontinence care provided. BM x3 - C-DIFF ruled out. Imodium ordered. AO x4. VSS- except intermittent tachycardia. On telemetry. R PIV infusing with 1000 mL LR bolus over 4 hours. Minimal appetite, good fluid intake.    Labs: Reviewed. CK total - 1,071 (high).     Pain/Nausea: Denies pain. Only experiences pain when activity involves the R side of body. Denies nausea.     New Changes: Mepilex and cream applied. 1000 mL LR bolus over 4 hours. C-DIFF ruled out. Up to chair. Imodium ordered. New IV in place.     Plan:  Encourage repositioning/activity. Drinking fluids. Continue POC.     Vital signs:  Temp: 98.2  F (36.8  C) Temp src: Oral BP: 110/76 Pulse: 106   Resp: 16 SpO2: 99 % O2 Device: None (Room air)   Height: 182.9 cm (6')    Estimated body mass index is 24.68 kg/m  as calculated from the following:    Height as of this encounter: 1.829 m (6').    Weight as of an earlier encounter on 7/1/24: 82.6 kg (182 lb).

## 2024-07-02 NOTE — CONSULTS
Oncology  Consult Note   Date of Service: 07/02/2024    Patient: Saeid Santa  MRN: [id number]  Admission Date: 7/1/2024  Hospital Day # 1  Cancer Diagnosis: IV melanoma    Primary Outpatient Oncologist: Dr. Bunn   Current Treatment Plan: Nivolumab + ipilimumab     Reason for Consult: IV melanoma with recent immune mediated colitis     History of Present Illness:    Saeid Santa is a 68 year old man with a history of HTN, HLD, seizure disorder, prostate cancer (s/p prostatectomy), and metastatic melanoma (mets to brain, on ipi/nivo) who presents with generalized weakness and falls at home. He had a recent admission 6/17- 6/23 for immune mediated colitis. He received high dose steroids and infliximab (6/21) with plans to repeat in 2 weeks. He continued on a prednisone taper, most recently on 60 mg daily (plan was to decrease by 20 mg every 5 days.)    He had a virtual oncology visit 7/1, where he endorsed leg weakness, multiple falls at home, including falling off the toilet and spending half ot he night on the floor. He felt too weak to be able to get himself up again. He actually notes that his bowel movements have improved since his discharge. He had one episode of watery diarrhea, but otherwise for the past few days he has 2-3 BM with normal consistency.  On admission, he was tachycardic to 100-110s, Cr 1.88, mg 1.5, CK 1531, WBC 13.9, hgb 12.1. CT head did not show acute findings, but continued to have multiple intracranial focal abnormalties with vasogenic edema slightly improved from prior. CT CAP, T and L spine did not show evidence of traumatic injury. Has avascular necrosis of femoral heads.     Denies fever, chills, N/V/D, SOB, chest pain, abdominal pain, dysuria, or rashes.     Oncologic History:  -IV melanoma, BRAF G466A, TMB 48.7554 mut/Mb   -ECOG 2    -Diagnosed 4/2023:    CT-head and MRI brain showed 2 discrete ring-enhancing lesions in the left paramedian posterior frontal lobe near the  precentral gyrus measuring approximately 2.2 x 1.7 x 1.9 cm and left occipital lobe near the lingual gyrus measuring approximately 2.2 x 2.1 x 2.0 cm. 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 TMB is high at 48.754 muts/Mb.   -Pembrolizumab (start 6/7/23) for 2 cycles, but complicated by pneumonitis s/p steroid  -3/2/24 brain MR shows increased size of several previously treated metastatic lesions, no new lesions. After discussion with Rad Onc and Radiology, although progression cannot be ruled out, findings can be consistent with treatment effect   repeat MR brain with continued significant increase in size of R frontal lesion (18 x 15 mm, previously 8 x 7 mm), anterior R frontal  lesion and R occipital lesion marginally increased. L frontal lesion decreased. L occipital lesion stable. Vasogenic edema significant but stable  -Last PET/CT showed overall continued concern for metastatic disease involving the soft tissues in both the R lateral calf and L medial thigh. These lesions remain small but slightly increased in both size and avidity, and likely represent sites of metastatic disease.  -On 5/15, he started on gammaknife and ipi/nivo. He developed some fatigue, nausea, and headaches, as well as mild diarrhea, which improved.  -He completed gammaknife radiation on 5/23/24.   -Cycle 2 ipi/nivo was given on 6/5/24.    Review of Systems:  A comprehensive ROS was performed and found to be negative or non-contributory with the exception of that noted in the HPI above.    Past Medical History:  Past Medical History:   Diagnosis Date    HTN (hypertension)     Metastasis to brain (H) 2023    Metastatic melanoma (H)     Mixed hyperlipidemia     Prostate cancer (H)     Seizures (H)     Sleep apnea        Past Surgical History:  Past Surgical History:   Procedure Laterality Date    INSERT DRAIN  LUMBAR N/A 2023    Procedure: LUMBAR DRAIN;  Surgeon: Kye Garrido MD;  Location: [facility] OR    OPTICAL TRACKING SYSTEM CRANIOTOMY, EXCISE TUMOR, COMBINED Left 2023    Procedure: stealth assisted awake craniotomy resection of motor strip tumor;  Surgeon: Kye Garrido MD;  Location: [facility] OR    OPTICAL TRACKING SYSTEM CRANIOTOMY, EXCISE TUMOR, COMBINED Left 2023    Procedure: STEALTH ASSISTED CRANIOTOMY, WITH NEOPLASM EXCISION LEFT PARIETO-OCCIPITAL CRANIOTOMY WITH RESECTION OF MASS,;  Surgeon: Kye Garrido MD;  Location: [facility] OR    OPTICAL TRACKING SYSTEM CRANIOTOMY, EXCISE TUMOR, COMBINED Right 2023    Procedure: CRANIOTOMY, USING OPTICAL TRACKING SYSTEM, WITH NEOPLASM EXCISION, RIGHT FRONTAL CRANOTOMY WITH RESECTION OF TUMOR;  Surgeon: Kye Garrido MD;  Location: [facility] OR    PROSTATECTOMY          SIGMOIDOSCOPY FLEXIBLE N/A 2024    Procedure: Sigmoidoscopy flexible;  Surgeon: Prosper Sweet MD;  Location: [facility]       Social History:  Social History     Socioeconomic History    Marital status:      Spouse name: Smiley    Number of children: 4   Occupational History    Occupation: [employer]     Comment: hobby job   Tobacco Use    Smoking status: Former     Current packs/day: 0.00     Average packs/day: 0.5 packs/day for 20.0 years (10.0 ttl pk-yrs)     Types: Cigarettes     Start date: 1997     Quit date: 2017     Years since quittin.4     Passive exposure: Current (Second hand exposure)    Smokeless tobacco: Never   Vaping Use    Vaping status: Never Used   Substance and Sexual Activity    Alcohol use: Not Currently     Comment: socially    Drug use: Not Currently     Types: Marijuana     Comment: typically smoked daily but no use for past two weeks as of 23     Social Determinants of Health     Financial Resource Strain: Low Risk  (2023)    Financial Resource Strain     Within the past 12 months, have you or your family members you live with been  unable to get utilities (heat, electricity) when it was really needed?: No   Food Insecurity: Low Risk  (12/18/2023)    Food Insecurity     Within the past 12 months, did you worry that your food would run out before you got money to buy more?: No     Within the past 12 months, did the food you bought just not last and you didn t have money to get more?: No   Transportation Needs: Low Risk  (12/18/2023)    Transportation Needs     Within the past 12 months, has lack of transportation kept you from medical appointments, getting your medicines, non-medical meetings or appointments, work, or from getting things that you need?: No   Interpersonal Safety: Low Risk  (11/7/2023)    Interpersonal Safety     Do you feel physically and emotionally safe where you currently live?: Yes     Within the past 12 months, have you been hit, slapped, kicked or otherwise physically hurt by someone?: No     Within the past 12 months, have you been humiliated or emotionally abused in other ways by your partner or ex-partner?: No   Housing Stability: Low Risk  (12/18/2023)    Housing Stability     Do you have housing? : Yes     Are you worried about losing your housing?: No        Family History  Family History   Problem Relation Age of Onset    Pancreatic Cancer Father     Diabetes Paternal Grandmother        Outpatient Medications:  Current Facility-Administered Medications   Medication Dose Route Frequency Provider Last Rate Last Admin    acetaminophen (TYLENOL) tablet 650 mg  650 mg Oral Q4H PRLinda Harman MD        Or    acetaminophen (TYLENOL) Suppository 650 mg  650 mg Rectal Q4H PRLinda Harman MD        amLODIPine (NORVASC) tablet 10 mg  10 mg Oral QAM Linda Guo MD   10 mg at 07/02/24 0805    baclofen (LIORESAL) tablet 10 mg  10 mg Oral BID PRLinda Harman MD        calcium carbonate (TUMS) chewable tablet 1,000 mg  1,000 mg Oral 4x Daily PRLinda Harman MD        lactated ringers BOLUS 1,000 mL  1,000  mL Intravenous Once Hayde Bustillo  mL/hr at 07/02/24 1548 1,000 mL at 07/02/24 1548    levETIRAcetam (KEPPRA) tablet 1,000 mg  1,000 mg Oral BID Linda Guo MD   1,000 mg at 07/02/24 0805    lidocaine (LMX4) cream   Topical Q1H PRN Linda Guo MD        lidocaine 1 % 0.1-1 mL  0.1-1 mL Other Q1H PRN Linda Guo MD        loperamide (IMODIUM) capsule 2 mg  2 mg Oral 4x Daily Hayde Bustillo MD        naloxone (NARCAN) injection 0.2 mg  0.2 mg Intravenous Q2 Min PRN Asim Smith MD        Or    naloxone (NARCAN) injection 0.4 mg  0.4 mg Intravenous Q2 Min PRN Asim Smith MD        Or    naloxone (NARCAN) injection 0.2 mg  0.2 mg Intramuscular Q2 Min PRN Asim Smith MD        Or    naloxone (NARCAN) injection 0.4 mg  0.4 mg Intramuscular Q2 Min PRN Asim Smith MD        ondansetron (ZOFRAN) tablet 4 mg  4 mg Oral Q8H PRN Linda Guo MD        oxyCODONE (ROXICODONE) tablet 5 mg  5 mg Oral Q6H PRN Hipolito Garcia MD        pantoprazole (PROTONIX) EC tablet 40 mg  40 mg Oral QAM AC Linda Guo MD   40 mg at 07/02/24 0805    potassium & sodium phosphates (NEUTRA-PHOS) Packet 1 packet  1 packet Oral 4x Daily Linda Guo MD   1 packet at 07/02/24 1548    [Held by provider] potassium chloride terrie ER (KLOR-CON M20) CR tablet 20 mEq  20 mEq Oral Daily Linda Guo MD        [START ON 7/3/2024] predniSONE (DELTASONE) tablet 40 mg  40 mg Oral Daily Linda Guo MD        Followed by    [START ON 7/8/2024] predniSONE (DELTASONE) tablet 20 mg  20 mg Oral Daily Linda Guo MD        Followed by    [START ON 7/13/2024] predniSONE (DELTASONE) tablet 10 mg  10 mg Oral Daily Linda Guo MD        rosuvastatin (CRESTOR) tablet 40 mg  40 mg Oral Daily Linda Guo MD   40 mg at 07/02/24 0805    senna-docusate (SENOKOT-S/PERICOLACE) 8.6-50 MG per tablet 1 tablet  1 tablet Oral BID PRN Linda Guo MD        Or    patricio-suhailte  (SENOKOT-S/PERICOLACE) 8.6-50 MG per tablet 2 tablet  2 tablet Oral BID PRN Linda Guo MD        sodium chloride (PF) 0.9% PF flush 3 mL  3 mL Intracatheter Q8H Linda Guo MD   3 mL at 07/02/24 1014    sodium chloride (PF) 0.9% PF flush 3 mL  3 mL Intracatheter q1 min prn Linda Guo MD            Physical Exam:    Blood pressure 110/76, pulse 106, temperature 98.2  F (36.8  C), temperature source Oral, resp. rate 16, height 1.829 m (6'), SpO2 99%.  General: alert and cooperative, lying in bed, appears tired but no acute distress  HEENT: NC/AT  Neck: supple  CV: RRR  Resp: CTAB, normal respiratory effort on ambient air  GI: soft, non-tender, bowel sounds present and normoactive  MSK: warm and well-perfused  Skin: no rashes on limited exam  Neuro: Alert and interactive, moves all extremities equally, equal sensation in b/l lower extremities     Labs & Studies: I personally reviewed the following studies:  ROUTINE LABS (Last four results):  CMP  Recent Labs   Lab 07/02/24  0649 07/01/24  1650 06/27/24  1429    135 143   POTASSIUM 3.5 3.6 3.2*   CHLORIDE 107 102 105   CO2 23 19* 24   ANIONGAP 9 14 14   GLC 76 90 116*   BUN 16.5 19.5 11.9   CR 1.15 1.88* 0.94   GFRESTIMATED 68 38* 87   JOHN PAUL 8.1* 7.8* 8.3*   MAG 1.9 1.5* 2.2   PHOS 3.3  --  2.4*   PROTTOTAL 5.6* 5.5*  --    ALBUMIN 2.8* 3.0*  --    BILITOTAL 0.9 1.1  --    ALKPHOS 63 69  --    AST 66* 67*  --    ALT 42 46  --      CBC  Recent Labs   Lab 07/02/24  0649 07/01/24  1650   WBC 11.6* 13.9*   RBC 3.89* 3.88*   HGB 11.9* 12.1*   HCT 36.6* 36.4*   MCV 94 94   MCH 30.6 31.2   MCHC 32.5 33.2   RDW 15.3* 15.2*   * 164     INRNo lab results found in last 7 days.    Assessment & Plan:   Saeid Santa is a 68 year old man with a history of HTN, HLD, seizure disorder, prostate cancer (s/p prostatectomy), and metastatic melanoma (mets to brain) who presents with generalized weakness and fall at home. He was found to have rhabdomyolysis and  FELICITY on admission, which has been improving so far with IVF. He was recently admitted with immune mediated colitis, but his bowel movements have been fairly normal since discharge.     #Metastatic melanoma  #History of immune mediated colitis   IV melanoma who most recently had been treated with ipi/nivo (C2D1 6/5/24), complicated by immune checkpoint inhibitor colitis, requiring steroids and infliximab (6/21/24). His diarrhea has improved since his recent admission. Would continue with steroids as planned, currently prednisone 60mg daily, with plans to reduce by 20mg every 5 days (next planned to decrease to 40mg on 7/3).      #Fall   #Rhabdomyolysis   Unclear from an oncologic reason why pt has significant b/l lower extremity weakness resulting in fall and rhabdo. Timing for myositis if this was related to immunotherapy would be usual, especially since his colitis has been improving and pt already responded well to IVF. Suspect that this is likely related to deconditioning and limited nutrition with poor appetite. Lower suspicion for new intracranial process as he does not have any focal deficits. Additionally, a repeat brain MRI would be difficult to interpret in the setting of recently receiving gamma knife 5/23/24. Would continue with brain MRI as scheduled 8/22/24.  Appreciate PT and OT evaluation.     Recommendations:   - Continue prednisone taper as planned, reducing to 40mg for 5 days next, then 20mg for 5 days, 10mg for 5 days   - Agree with IVF, continue to trend Cr and CK   - Appreciate PT and OT recs   - Follow-up with oncology as outpatient, has [facility] appointment 7/8 and 7/15     Thank you for this interesting consult.  Please do not hesitate to page with any questions or concerns.    Patient was seen and plan of care was discussed with attending physician Dr. Bunn.     Chance Moctezuma MD PGY5  Hematology/Oncology   Pager: [phone]

## 2024-07-02 NOTE — PROGRESS NOTES
"   07/02/24 8389   Appointment Info   Signing Clinician's Name / Credentials (PT) LETY Herrera   Student Supervision Direct supervision provided;Therapy services provided with the co-signing licensed therapist guiding and directing the services, and providing the skilled judgement and assessment throughout the session   Living Environment   People in Home spouse   Current Living Arrangements house   Home Accessibility stairs to enter home;stairs within home   Number of Stairs, Main Entrance greater than 10 stairs   Stair Railings, Main Entrance railing on left side (ascending)   Number of Stairs, Within Home, Primary greater than 10 stairs   Stair Railings, Within Home, Primary railings on both sides of stairs   Transportation Anticipated family or friend will provide   Living Environment Comments Pt lives in a two story house with 12 stairs to enter with railings on the left side ascending. Pt stated that there are 12 stairs inside the house to go between the levels with railings on both sides. All needs can be met on the first level.   Self-Care   Usual Activity Tolerance good   Current Activity Tolerance fair   Regular Exercise No   Equipment Currently Used at Home cane, straight;walker, rolling;walker, standard   Fall history within last six months yes   Number of times patient has fallen within last six months 9   Activity/Exercise/Self-Care Comment Pt stated that they were IND with all mobility needs prior to admission. Reports that wife can provide some assistance but may not be able to all assistance necessary.   General Information   Onset of Illness/Injury or Date of Surgery 07/01/24   Referring Physician Linda Guo MD   Patient/Family Therapy Goals Statement (PT) Return to PLOF   Pertinent History of Current Problem (include personal factors and/or comorbidities that impact the POC) Per EMR: \"69yo M w/ melanoma metastatic to brain s/p multiple craniotomies c/b right hemiparesis, cerebral " "edema, seizure disorder, and hypokalemia who presents to the ED via EMS for generalized weakness and increased falls iso subacute diarrheal illness found to have mild rhabdomyolysis admitted for rehydration.\"   Existing Precautions/Restrictions fall   General Observations Activity up with assist   Cognition   Affect/Mental Status (Cognition) WFL   Orientation Status (Cognition) oriented x 4   Integumentary/Edema   Integumentary/Edema no deficits were identifed   Posture    Posture Forward head position;Protracted shoulders   Range of Motion (ROM)   Range of Motion ROM is WFL   Strength (Manual Muscle Testing)   Strength Comments At least 3/5 hip flexion and knee flexion. Weaker in R LE compared to L LE   Bed Mobility   Comment, (Bed Mobility) Supine <> sit with hand hold assist   Transfers   Comment, (Transfers) Sit <> stand with SBA and use of FWW   Gait/Stairs (Locomotion)   Comment, (Gait/Stairs) Ambulated ~25 ft with CGA and use of FWW   Balance   Balance Comments Pt able to sit EOB and complete standing balance with SBA   Sensory Examination   Sensory Perception patient reports no sensory changes   Sensory Perception Comments Pt demonstrated worse sensation in proximal R LE compared to L. Able to feel light touch but reports weaker feeling on R   Clinical Impression   Criteria for Skilled Therapeutic Intervention Yes, treatment indicated   PT Diagnosis (PT) Imparied functional mobility   Influenced by the following impairments Decreased strength and activity tolerance   Functional limitations due to impairments Bed mobility, transfers, gait and stairs   Clinical Presentation (PT Evaluation Complexity) stable   Clinical Presentation Rationale Pt presentation and clinical judgement   Clinical Decision Making (Complexity) low complexity   Planned Therapy Interventions (PT) balance training;bed mobility training;gait training;home exercise program;neuromuscular re-education;stair training;strengthening;transfer " training   Risk & Benefits of therapy have been explained evaluation/treatment results reviewed;care plan/treatment goals reviewed;risks/benefits reviewed;current/potential barriers reviewed;participants voiced agreement with care plan;participants included;patient   Clinical Impression Comments Pt below baseline for functional mobility. Pt presents with decreased strength and activity tolerance. Pt would benefit from skilled IP PT services to increase functional mobility and IND with mobility.   PT Total Evaluation Time   PT Eval, Low Complexity Minutes (91275) 12   Physical Therapy Goals   PT Frequency 5x/week   PT Predicted Duration/Target Date for Goal Attainment 07/12/24   PT Goals Bed Mobility;Transfers;Gait;Stairs   PT: Bed Mobility Independent;Rolling;Supine to/from sit   PT: Transfers Independent;Sit to/from stand   PT: Gait Modified independent;Assistive device;Straight cane;Greater than 200 feet   PT: Stairs Modified independent;Greater than 10 stairs;Rail on left   PT Discharge Planning   PT Plan Progress transfers, gait with FWW, HEP and stairs   PT Discharge Recommendation (DC Rec) home with assist;home with home care physical therapy;Transitional Care Facility   PT Rationale for DC Rec Pt is below baseline for functional mobility and safety. Pt states that wife is able to provide assistance for most things, but may not be able to provide all necessary assistance. Pt would prefer to return home but is open to going to a TCU. Pending pt progression, pt may be able to return home with assist from wife. Pt would benefit from skilled HH PT services to progress strength and functional mobility.   PT Brief overview of current status Gait with SBA and use of FWW   Total Session Time   Total Session Time (sum of timed and untimed services) 12

## 2024-07-02 NOTE — CONSULTS
"Palliative Care Consultation Note  [facility]      Patient: Saeid Santa  Date of Admission:  7/1/2024    Requesting Clinician / Team:     Hipolito Garcia MD     Reason for consult: Symptom management  Goals of care  \"GOC and quality of life adjustments, metastatic melanoma with functional decline at home\"     Recommendations & Counseling     GOALS OF CARE:   Currently full code and restorative goals  It appears PT assessment is home with PT and OT assessment is home with assist, I get the sense from Tim that wife is looking for more in home support. Wife is glad that he will go home with therapies as he has become very weak.  Wife requests if there are any grants available to build a walk in bathtub she really wants this, all his falls seem to happen around his lack of function in the bathtub/bathroom. Would be helpful for PT/OT to attempt to arrange visits with wife present to discuss her concerns and identify solutions. Wants to make the environment safe and she does not feel its safe currently. Also wife is requesting in home caregiving (PCA) for more assistance. Appreciate unit SW and possible outpatient cancer SW to assist Smiley and Tim with mary for walk-in bathtub, in-home caregivers, setting up possible in home infusions.  Wife wants to get Tim infusions for dehydration in the home, would appreciate medicine and oncology addressing her concern.  He is established in Palliative Care clinic, able to Follow-up PRN for symptoms/GOC/support needs. I dont get the sense that Wife and Tim currently find a in person clinic beneficial given he already has many appointments. Current needs wife has identified are best handled by the IDT.    ADVANCE CARE PLANNING:  No health care directive on file. Per  informed consent policy, next of kin should be involved if patient becomes unable.  There is no POLST form on file, defer to patient and/or next of kin for decisions "   Code status: Full Code    MEDICAL MANAGEMENT:   #Diarrhea : multifactoral, recent c diff, adenovirus, IRAE colitis  Consider if he should be on a slower taper of steroids for his colitis given the severity of symptoms currently  Continue loperamide scheduled  Can add scheduled lomotil for additional control    PSYCHOSOCIAL/SPIRITUAL SUPPORT:  He really appreciates the support of his wife  Wife is very much experiencing caregiver stress and would benefit from support    Palliative Care will continue to follow. Thank you for the consult and allowing us to aid in the care of Saeid Santa.    These recommendations have been discussed with primary team.    ALIREZA Fontanez CNS  [facility], Palliative Care  Securely message with the [employer] Web Console (learn more here) or  Text page via [facility] Paging/Directory         Assessment      Saeid Santa is a 68 year old male with a past medical history of metastatic melanoma to brain s/p multiple craniotomies c/b right hemiparesis, cerebral edema, seizure disorder, hypokalemia who presented on 7/1 with generalized weakness, increased falls, subacute diarrheal illness, found to have mild rhabdomyolysis. Had some delirium last evening.    Today, the patient was seen for:  Diarrhea  Metastatic melanoma  Generalized weakness  Falls    History of Present Illness   Met with patient.   Introduced the role of palliative care as an interdisciplinary team that cares for patients with serious illness to help support symptom management, communication, coping for patients and their families as well as support with medical decision making.    Prognosis, Goals, & Planning:   Functional Status just prior to this current hospitalization:  ECOG3 (Capable of only limited self-care; needs help with ADLs; in bed/chair >50% of waking hours)    Prognosis, Goals, and/or Advance Care Planning:  In discussion with wife today, she says that Tim is not wanting to give up yet and doesn't want  "hospice.    Code Status was addressed today:   No      Patient's decision making preferences: shared with support from loved ones        Patient has decision-making capacity today for complex decisions: Intact          Coping, Meaning, & Spirituality:   Mood, coping, and/or meaning in the context of serious illness were addressed today: Yes dealing with loss of dignity with bowel incontinence. Grateful for his wife \"she is a saint\". Wife extremely overwhelmed, today is working on projects in the home to get things cleaned up.    Social:   Important relationships/caregivers:wife, brother    Medications:  Reviewed this patient's medication profile and medications from this hospitalization. Notable medications: loperamide scheduled    ROS:  Comprehensive ROS is reviewed and is negative except as here & per HPI:     Physical Exam   Vital Signs with Ranges  Temp:  [97.8  F (36.6  C)-99  F (37.2  C)] 98.4  F (36.9  C)  Pulse:  [108-117] 114  Resp:  [15-20] 18  BP: (103-118)/(76-87) 115/80  SpO2:  [92 %-98 %] 96 %  Wt Readings from Last 10 Encounters:   07/01/24 82.6 kg (182 lb)   06/24/24 95.3 kg (210 lb)   06/18/24 93.4 kg (206 lb)   06/17/24 90.7 kg (200 lb)   06/11/24 94.4 kg (208 lb 1.6 oz)   06/05/24 95.2 kg (209 lb 12.8 oz)   06/03/24 99.8 kg (220 lb)   05/21/24 94.8 kg (209 lb)   05/15/24 98.4 kg (217 lb)   05/15/24 95.3 kg (210 lb)     0 lbs 0 oz    PHYSICAL EXAM:  Constitutional: Awake, alert, cooperative, no apparent distress  Lungs: No increased work of breathing, good air exchange  Neurologic: Awake, alert, oriented to name, place and time.    Neuropsychiatric: Normal affect, mood, orientation, memory and insight.  Skin: No rashes, erythema, pallor, petechia or purpura.    Data reviewed:  Results for orders placed or performed during the hospital encounter of 07/01/24 (from the past 24 hour(s))   [place] Draw    Narrative    The following orders were created for panel order [place] Draw.  Procedure             "                   Abnormality         Status                     ---------                               -----------         ------                     Extra Blood Culture Bottle[[id number]]                       Final result               Extra Blue Top Tube[[id number]]                              Final result               Extra Red Top Tube[[id number]]                               Final result               Extra Green Top (Lithium...[[id number]]                      Final result               Extra Purple Top Tube[[id number]]                            Final result                 Please view results for these tests on the individual orders.   Lactic Acid Whole Blood w/ 1x repeat in 2 hrs when >2   Result Value Ref Range    Lactic Acid, Initial 1.6 0.7 - 2.0 mmol/L   Extra Blood Culture Bottle   Result Value Ref Range    Hold Specimen JIC    Extra Blue Top Tube   Result Value Ref Range    Hold Specimen JIC    Extra Red Top Tube   Result Value Ref Range    Hold Specimen JIC    Extra Green Top (Lithium Heparin) Tube   Result Value Ref Range    Hold Specimen JIC    Extra Purple Top Tube   Result Value Ref Range    Hold Specimen JIC    Blood Culture Arm, Left    Specimen: Arm, Left; Blood   Result Value Ref Range    Culture No growth after 12 hours    CBC with platelets differential    Narrative    The following orders were created for panel order CBC with platelets differential.  Procedure                               Abnormality         Status                     ---------                               -----------         ------                     CBC with platelets and d...[[id number]]  Abnormal            Final result               RBC and Platelet Morphology[[id number]]                      Final result                 Please view results for these tests on the individual orders.   CK total   Result Value Ref Range    CK 1,531 (HH) 39 - 308 U/L   Comprehensive metabolic panel   Result Value Ref Range    Sodium 135  135 - 145 mmol/L    Potassium 3.6 3.4 - 5.3 mmol/L    Carbon Dioxide (CO2) 19 (L) 22 - 29 mmol/L    Anion Gap 14 7 - 15 mmol/L    Urea Nitrogen 19.5 8.0 - 23.0 mg/dL    Creatinine 1.88 (H) 0.67 - 1.17 mg/dL    GFR Estimate 38 (L) >60 mL/min/1.73m2    Calcium 7.8 (L) 8.8 - 10.2 mg/dL    Chloride 102 98 - 107 mmol/L    Glucose 90 70 - 99 mg/dL    Alkaline Phosphatase 69 40 - 150 U/L    AST 67 (H) 0 - 45 U/L    ALT 46 0 - 70 U/L    Protein Total 5.5 (L) 6.4 - 8.3 g/dL    Albumin 3.0 (L) 3.5 - 5.2 g/dL    Bilirubin Total 1.1 <=1.2 mg/dL   Magnesium   Result Value Ref Range    Magnesium 1.5 (L) 1.7 - 2.3 mg/dL   CBC with platelets and differential   Result Value Ref Range    WBC Count 13.9 (H) 4.0 - 11.0 10e3/uL    RBC Count 3.88 (L) 4.40 - 5.90 10e6/uL    Hemoglobin 12.1 (L) 13.3 - 17.7 g/dL    Hematocrit 36.4 (L) 40.0 - 53.0 %    MCV 94 78 - 100 fL    MCH 31.2 26.5 - 33.0 pg    MCHC 33.2 31.5 - 36.5 g/dL    RDW 15.2 (H) 10.0 - 15.0 %    Platelet Count 164 150 - 450 10e3/uL    % Neutrophils 80 %    % Lymphocytes 13 %    % Monocytes 4 %    % Eosinophils 0 %    % Basophils 0 %    % Immature Granulocytes 3 %    NRBCs per 100 WBC 0 <1 /100    Absolute Neutrophils 11.0 (H) 1.6 - 8.3 10e3/uL    Absolute Lymphocytes 1.8 0.8 - 5.3 10e3/uL    Absolute Monocytes 0.6 0.0 - 1.3 10e3/uL    Absolute Eosinophils 0.0 0.0 - 0.7 10e3/uL    Absolute Basophils 0.0 0.0 - 0.2 10e3/uL    Absolute Immature Granulocytes 0.5 (H) <=0.4 10e3/uL    Absolute NRBCs 0.0 10e3/uL   RBC and Platelet Morphology   Result Value Ref Range    Platelet Assessment  Automated Count Confirmed. Platelet morphology is normal.     Automated Count Confirmed. Platelet morphology is normal.    Acanthocytes      Tamiko Rods      Basophilic Stippling      Bite Cells      Blister Cells      [place] Cells      Elliptocytes      Hgb C Crystals      Márquez-Jolly Bodies      Hypersegmented Neutrophils      Polychromasia      RBC agglutination      RBC Fragments      Reactive  Lymphocytes      Rouleaux      Sickle Cells      Smudge Cells      Spherocytes      Stomatocytes      Target Cells      Teardrop Cells      Toxic Neutrophils      RBC Morphology Confirmed RBC Indices    EKG 12-lead, tracing only   Result Value Ref Range    Systolic Blood Pressure  mmHg    Diastolic Blood Pressure  mmHg    Ventricular Rate 118 BPM    Atrial Rate 118 BPM    VT Interval 148 ms    QRS Duration 86 ms     ms    QTc 465 ms    P Axis 57 degrees    R AXIS 14 degrees    T Axis 52 degrees    Interpretation ECG       Sinus tachycardia  Possible Left atrial enlargement  Borderline ECG  Unconfirmed report - interpretation of this ECG is computer generated - see medical record for final interpretation  Confirmed by - EMERGENCY ROOM, PHYSICIAN ([id number]),  LATASHA LUIS ([id number]) on 7/1/2024 10:18:40 PM     CBC with platelets differential *Canceled*    Narrative    The following orders were created for panel order CBC with platelets differential.  Procedure                               Abnormality         Status                     ---------                               -----------         ------                       Please view results for these tests on the individual orders.   UA with Microscopic reflex to Culture    Specimen: Urine, Clean Catch   Result Value Ref Range    Color Urine Light Yellow Colorless, Straw, Light Yellow, Yellow    Appearance Urine Clear Clear    Glucose Urine Negative Negative mg/dL    Bilirubin Urine Negative Negative    Ketones Urine Negative Negative mg/dL    Specific Gravity Urine 1.006 1.003 - 1.035    Blood Urine Moderate (A) Negative    pH Urine 6.0 5.0 - 7.0    Protein Albumin Urine 30 (A) Negative mg/dL    Urobilinogen Urine Normal Normal, 2.0 mg/dL    Nitrite Urine Negative Negative    Leukocyte Esterase Urine Negative Negative    RBC Urine 1 <=2 /HPF    WBC Urine 3 <=5 /HPF    Narrative    Urine Culture not indicated   Blood Culture Peripheral Blood    Specimen:  Peripheral Blood   Result Value Ref Range    Culture No growth after 12 hours    Head CT w/o contrast    Narrative    EXAM: CT HEAD W/O CONTRAST  LOCATION: [facility]  DATE: 7/1/2024    INDICATION: Fall, pain, hx brain cancer  COMPARISON: CT head 5/22/2024.  TECHNIQUE: Routine CT Head without IV contrast. Multiplanar reformats. Dose reduction techniques were used.    FINDINGS:  INTRACRANIAL CONTENTS: Redemonstrated multiple intracranial focal lesions located in the right frontal, bilateral occipital lobes, left parietal lobe with moderate surrounding vasogenic edema. No vasogenic edema around the lesion appears mildly improved   compared to prior. No intracranial hemorrhage or extraaxial collection.  No CT evidence of acute infarct. Mild presumed chronic small vessel ischemic changes. Normal ventricles and sulci.     VISUALIZED ORBITS/SINUSES/MASTOIDS: No intraorbital abnormality. No paranasal sinus mucosal disease. No middle ear or mastoid effusion.    BONES/SOFT TISSUES: No acute abnormality. Left parietal, right frontal and midline occipital craniotomies.      Impression    IMPRESSION:  1.  No acute findings.  2.  Redemonstrated multiple intracranial focal abnormalities. Vasogenic edema slightly improved from prior.  3.  No midline shift.   CT Chest/Abdomen/Pelvis w Contrast    Narrative    EXAM: CT CHEST/ABDOMEN/PELVIS W CONTRAST  LOCATION: [facility]  DATE: 7/1/2024    INDICATION: trauma, pain to R chest RUQ  COMPARISON: CT of the abdomen and pelvis on 6/17/2024 and PET CT on 3/4/2024.  TECHNIQUE: CT scan of the chest, abdomen, and pelvis was performed following injection of IV contrast. Multiplanar reformats were obtained. Dose reduction techniques were used.   CONTRAST: iopamidol (ISOVUE 370) solution 99 mL    FINDINGS:   LUNGS AND PLEURA: No infiltrate, pulmonary contusion, pleural effusion, pneumothorax or  hemothorax. A few stable small pulmonary nodules. For example, a right middle lobe subpleural 5 mm nodule (series 3, image 188) is stable and was not hypermetabolic   on the PET CT. No new or enlarging nodules.    MEDIASTINUM/AXILLAE: No lymphadenopathy. No traumatic aortic injury. No central pulmonary emboli. No thoracic aortic aneurysm. No pericardial effusion.    CORONARY ARTERY CALCIFICATION: Mild.    HEPATOBILIARY: Normal.    PANCREAS: Normal.    SPLEEN: Normal.    ADRENAL GLANDS: Normal.    KIDNEYS/BLADDER: Small parapelvic cysts bilaterally requiring no specific follow-up. No calculi, masses or hydronephrosis bilaterally.    BOWEL: Mild pancolonic wall thickening and enhancement, overall improved since 6/17/2024, likely resolving colitis. No small bowel or colonic obstruction or inflammatory changes.    LYMPH NODES: No lymphadenopathy in the abdomen and pelvis.    VASCULATURE: Stable ectatic abdominal aorta measuring 2.7 x 2.6 cm (series 3, image 466).    PELVIC ORGANS: Prostatectomy. No pelvic masses. No free fluid or fluid collections.    MUSCULOSKELETAL: Slight increase in sclerosis in the left posterior fifth and sixth rib at the costovertebral junctions (series 3, image 127 and 143), corresponding to previous foci of hypermetabolic uptake. No acute fractures. No right chest wall   hematoma or mass. Mildly vascular necrosis of the right femoral head is stable since 6/17/2024. Nodular surface collapse. Stable avascular necrosis of the left femoral head..      Impression    IMPRESSION:  1.  No acute traumatic injury in the chest, abdomen and pelvis.  2.  Mild pancolitis is improved since 6/17/2024.  3.  Mild sclerosis in the posterior left fifth and sixth ribs at the costovertebral junction has slightly increased since the PET CT on 3/4/2024.  4.  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   CT Lumbar Spine Reconstructed    Narrative    EXAM: CT LUMBAR SPINE  RECONSTRUCTED, CT THORACIC SPINE RECONSTRUCTED  LOCATION: [facility]  DATE: 7/1/2024    INDICATION: fall pain  COMPARISON: None.  TECHNIQUE:   1) Routine CT Thoracic Spine without IV contrast. Multiplanar reformats. Dose reduction techniques were used.   2) Routine CT Lumbar Spine without IV contrast. Multiplanar reformats. Dose reduction techniques were used.     FINDINGS:   THORACIC SPINE CT:  VERTEBRA: Normal vertebral body heights and alignment. No fracture or posttraumatic subluxation.     CANAL/FORAMINA: No high-grade canal or neural foraminal stenosis.    PARASPINAL: No acute extraspinal abnormality.    LUMBAR SPINE CT:  VERTEBRA: Normal vertebral body heights and alignment. No fracture or posttraumatic subluxation.     CANAL/FORAMINA: No high-grade canal or neural foraminal stenosis.    PARASPINAL: No acute extraspinal abnormality.      Impression    IMPRESSION:  THORACIC SPINE CT:  1.  No fracture or posttraumatic subluxation.    LUMBAR SPINE CT:  1.  No fracture or posttraumatic subluxation.     CT Thoracic Spine Reconstructed    Narrative    EXAM: CT LUMBAR SPINE RECONSTRUCTED, CT THORACIC SPINE RECONSTRUCTED  LOCATION: [facility]  DATE: 7/1/2024    INDICATION: fall pain  COMPARISON: None.  TECHNIQUE:   1) Routine CT Thoracic Spine without IV contrast. Multiplanar reformats. Dose reduction techniques were used.   2) Routine CT Lumbar Spine without IV contrast. Multiplanar reformats. Dose reduction techniques were used.     FINDINGS:   THORACIC SPINE CT:  VERTEBRA: Normal vertebral body heights and alignment. No fracture or posttraumatic subluxation.     CANAL/FORAMINA: No high-grade canal or neural foraminal stenosis.    PARASPINAL: No acute extraspinal abnormality.    LUMBAR SPINE CT:  VERTEBRA: Normal vertebral body heights and alignment. No fracture or posttraumatic subluxation.     CANAL/FORAMINA: No high-grade  canal or neural foraminal stenosis.    PARASPINAL: No acute extraspinal abnormality.      Impression    IMPRESSION:  THORACIC SPINE CT:  1.  No fracture or posttraumatic subluxation.    LUMBAR SPINE CT:  1.  No fracture or posttraumatic subluxation.     C. difficile Toxin B PCR with reflex to C. difficile Antigen and Toxins A/B EIA    Specimen: Per Rectum; Stool   Result Value Ref Range    C Difficile Toxin B by PCR Negative Negative    Narrative    The [employer] Xpert C. difficile Assay, performed on the [employer], is a qualitative in vitro diagnostic test for rapid detection of toxin B gene sequences from unformed (liquid or soft) stool specimens collected from patients suspected of having Clostridioides difficile infection (CDI). The test utilizes automated real-time polymerase chain reaction (PCR) to detect toxin gene sequences associated with toxin producing C. difficile. The Xpert C. difficile Assay is intended as an aid in the diagnosis of CDI.   Comprehensive metabolic panel   Result Value Ref Range    Sodium 139 135 - 145 mmol/L    Potassium 3.5 3.4 - 5.3 mmol/L    Carbon Dioxide (CO2) 23 22 - 29 mmol/L    Anion Gap 9 7 - 15 mmol/L    Urea Nitrogen 16.5 8.0 - 23.0 mg/dL    Creatinine 1.15 0.67 - 1.17 mg/dL    GFR Estimate 68 >60 mL/min/1.73m2    Calcium 8.1 (L) 8.8 - 10.2 mg/dL    Chloride 107 98 - 107 mmol/L    Glucose 76 70 - 99 mg/dL    Alkaline Phosphatase 63 40 - 150 U/L    AST 66 (H) 0 - 45 U/L    ALT 42 0 - 70 U/L    Protein Total 5.6 (L) 6.4 - 8.3 g/dL    Albumin 2.8 (L) 3.5 - 5.2 g/dL    Bilirubin Total 0.9 <=1.2 mg/dL   CBC with platelets differential    Narrative    The following orders were created for panel order CBC with platelets differential.  Procedure                               Abnormality         Status                     ---------                               -----------         ------                     CBC with platelets and d...[[id number]]  Abnormal             Final result                 Please view results for these tests on the individual orders.   Magnesium   Result Value Ref Range    Magnesium 1.9 1.7 - 2.3 mg/dL   Phosphorus   Result Value Ref Range    Phosphorus 3.3 2.5 - 4.5 mg/dL   CK total   Result Value Ref Range    CK 1,071 (HH) 39 - 308 U/L   Keppra (Levetiracetam) Level   Result Value Ref Range    Keppra (Levetiracetam) Level 17.9 10.0 - 40.0  g/mL   CBC with platelets and differential   Result Value Ref Range    WBC Count 11.6 (H) 4.0 - 11.0 10e3/uL    RBC Count 3.89 (L) 4.40 - 5.90 10e6/uL    Hemoglobin 11.9 (L) 13.3 - 17.7 g/dL    Hematocrit 36.6 (L) 40.0 - 53.0 %    MCV 94 78 - 100 fL    MCH 30.6 26.5 - 33.0 pg    MCHC 32.5 31.5 - 36.5 g/dL    RDW 15.3 (H) 10.0 - 15.0 %    Platelet Count 143 (L) 150 - 450 10e3/uL    % Neutrophils 71 %    % Lymphocytes 20 %    % Monocytes 7 %    % Eosinophils 1 %    % Basophils 0 %    % Immature Granulocytes 1 %    NRBCs per 100 WBC 0 <1 /100    Absolute Neutrophils 8.3 1.6 - 8.3 10e3/uL    Absolute Lymphocytes 2.3 0.8 - 5.3 10e3/uL    Absolute Monocytes 0.8 0.0 - 1.3 10e3/uL    Absolute Eosinophils 0.1 0.0 - 0.7 10e3/uL    Absolute Basophils 0.0 0.0 - 0.2 10e3/uL    Absolute Immature Granulocytes 0.1 <=0.4 10e3/uL    Absolute NRBCs 0.0 10e3/uL     Recent Labs   Lab 07/02/24  0649 07/01/24  1650 06/27/24  1429   WBC 11.6* 13.9*  --    HGB 11.9* 12.1*  --    MCV 94 94  --    * 164  --     135 143   POTASSIUM 3.5 3.6 3.2*   CHLORIDE 107 102 105   CO2 23 19* 24   BUN 16.5 19.5 11.9   CR 1.15 1.88* 0.94   ANIONGAP 9 14 14   JOHN PAUL 8.1* 7.8* 8.3*   GLC 76 90 116*   ALBUMIN 2.8* 3.0*  --    PROTTOTAL 5.6* 5.5*  --    BILITOTAL 0.9 1.1  --    ALKPHOS 63 69  --    ALT 42 46  --    AST 66* 67*  --        Recent Results (from the past 24 hour(s))   Head CT w/o contrast    Narrative    EXAM: CT HEAD W/O CONTRAST  LOCATION: [facility]  DATE: 7/1/2024    INDICATION: Fall,  pain, hx brain cancer  COMPARISON: CT head 5/22/2024.  TECHNIQUE: Routine CT Head without IV contrast. Multiplanar reformats. Dose reduction techniques were used.    FINDINGS:  INTRACRANIAL CONTENTS: Redemonstrated multiple intracranial focal lesions located in the right frontal, bilateral occipital lobes, left parietal lobe with moderate surrounding vasogenic edema. No vasogenic edema around the lesion appears mildly improved   compared to prior. No intracranial hemorrhage or extraaxial collection.  No CT evidence of acute infarct. Mild presumed chronic small vessel ischemic changes. Normal ventricles and sulci.     VISUALIZED ORBITS/SINUSES/MASTOIDS: No intraorbital abnormality. No paranasal sinus mucosal disease. No middle ear or mastoid effusion.    BONES/SOFT TISSUES: No acute abnormality. Left parietal, right frontal and midline occipital craniotomies.      Impression    IMPRESSION:  1.  No acute findings.  2.  Redemonstrated multiple intracranial focal abnormalities. Vasogenic edema slightly improved from prior.  3.  No midline shift.   CT Chest/Abdomen/Pelvis w Contrast    Narrative    EXAM: CT CHEST/ABDOMEN/PELVIS W CONTRAST  LOCATION: [facility]  DATE: 7/1/2024    INDICATION: trauma, pain to R chest RUQ  COMPARISON: CT of the abdomen and pelvis on 6/17/2024 and PET CT on 3/4/2024.  TECHNIQUE: CT scan of the chest, abdomen, and pelvis was performed following injection of IV contrast. Multiplanar reformats were obtained. Dose reduction techniques were used.   CONTRAST: iopamidol (ISOVUE 370) solution 99 mL    FINDINGS:   LUNGS AND PLEURA: No infiltrate, pulmonary contusion, pleural effusion, pneumothorax or hemothorax. A few stable small pulmonary nodules. For example, a right middle lobe subpleural 5 mm nodule (series 3, image 188) is stable and was not hypermetabolic   on the PET CT. No new or enlarging nodules.    MEDIASTINUM/AXILLAE: No lymphadenopathy. No  traumatic aortic injury. No central pulmonary emboli. No thoracic aortic aneurysm. No pericardial effusion.    CORONARY ARTERY CALCIFICATION: Mild.    HEPATOBILIARY: Normal.    PANCREAS: Normal.    SPLEEN: Normal.    ADRENAL GLANDS: Normal.    KIDNEYS/BLADDER: Small parapelvic cysts bilaterally requiring no specific follow-up. No calculi, masses or hydronephrosis bilaterally.    BOWEL: Mild pancolonic wall thickening and enhancement, overall improved since 6/17/2024, likely resolving colitis. No small bowel or colonic obstruction or inflammatory changes.    LYMPH NODES: No lymphadenopathy in the abdomen and pelvis.    VASCULATURE: Stable ectatic abdominal aorta measuring 2.7 x 2.6 cm (series 3, image 466).    PELVIC ORGANS: Prostatectomy. No pelvic masses. No free fluid or fluid collections.    MUSCULOSKELETAL: Slight increase in sclerosis in the left posterior fifth and sixth rib at the costovertebral junctions (series 3, image 127 and 143), corresponding to previous foci of hypermetabolic uptake. No acute fractures. No right chest wall   hematoma or mass. Mildly vascular necrosis of the right femoral head is stable since 6/17/2024. Nodular surface collapse. Stable avascular necrosis of the left femoral head..      Impression    IMPRESSION:  1.  No acute traumatic injury in the chest, abdomen and pelvis.  2.  Mild pancolitis is improved since 6/17/2024.  3.  Mild sclerosis in the posterior left fifth and sixth ribs at the costovertebral junction has slightly increased since the PET CT on 3/4/2024.  4.  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   CT Lumbar Spine Reconstructed    Narrative    EXAM: CT LUMBAR SPINE RECONSTRUCTED, CT THORACIC SPINE RECONSTRUCTED  LOCATION: [facility]  DATE: 7/1/2024    INDICATION: fall pain  COMPARISON: None.  TECHNIQUE:   1) Routine CT Thoracic Spine without IV contrast. Multiplanar reformats.  Dose reduction techniques were used.   2) Routine CT Lumbar Spine without IV contrast. Multiplanar reformats. Dose reduction techniques were used.     FINDINGS:   THORACIC SPINE CT:  VERTEBRA: Normal vertebral body heights and alignment. No fracture or posttraumatic subluxation.     CANAL/FORAMINA: No high-grade canal or neural foraminal stenosis.    PARASPINAL: No acute extraspinal abnormality.    LUMBAR SPINE CT:  VERTEBRA: Normal vertebral body heights and alignment. No fracture or posttraumatic subluxation.     CANAL/FORAMINA: No high-grade canal or neural foraminal stenosis.    PARASPINAL: No acute extraspinal abnormality.      Impression    IMPRESSION:  THORACIC SPINE CT:  1.  No fracture or posttraumatic subluxation.    LUMBAR SPINE CT:  1.  No fracture or posttraumatic subluxation.     CT Thoracic Spine Reconstructed    Narrative    EXAM: CT LUMBAR SPINE RECONSTRUCTED, CT THORACIC SPINE RECONSTRUCTED  LOCATION: [facility]  DATE: 7/1/2024    INDICATION: fall pain  COMPARISON: None.  TECHNIQUE:   1) Routine CT Thoracic Spine without IV contrast. Multiplanar reformats. Dose reduction techniques were used.   2) Routine CT Lumbar Spine without IV contrast. Multiplanar reformats. Dose reduction techniques were used.     FINDINGS:   THORACIC SPINE CT:  VERTEBRA: Normal vertebral body heights and alignment. No fracture or posttraumatic subluxation.     CANAL/FORAMINA: No high-grade canal or neural foraminal stenosis.    PARASPINAL: No acute extraspinal abnormality.    LUMBAR SPINE CT:  VERTEBRA: Normal vertebral body heights and alignment. No fracture or posttraumatic subluxation.     CANAL/FORAMINA: No high-grade canal or neural foraminal stenosis.    PARASPINAL: No acute extraspinal abnormality.      Impression    IMPRESSION:  THORACIC SPINE CT:  1.  No fracture or posttraumatic subluxation.    LUMBAR SPINE CT:  1.  No fracture or posttraumatic subluxation.          Medical Decision Making       MANAGEMENT DISCUSSED with the following over the past 24 hours: medicine, oncology   NOTE(S)/MEDICAL RECORDS REVIEWED over the past 24 hours: medicine, nursing, oncology, pharmacy.  Tests REVIEWED in the past 24 hours:  - See lab/imaging results included in the data section of the note  SUPPLEMENTAL HISTORY, in addition to the patient's history, over the past 24 hours obtained from:   - Spouse or significant other

## 2024-07-02 NOTE — PLAN OF CARE
Goal Outcome Evaluation:      Plan of Care Reviewed With: patient    Overall Patient Progress: improvingOverall Patient Progress: improving       Temp: 98.4  F (36.9  C) Temp src: Oral BP: 115/80 Pulse: 114   Resp: 18 SpO2: 96 % O2 Device: None (Room air)         Time of care: 12a-7a  Reason for admission: generalized weakness and increased falls found to have mild rhabdomyolysis admitted for rehydration. 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 Neuro exam intact except for numbness in R. Leg. Denies double/blurry vision. 5/5 motor strength in upper and lower extremities. Sensation intact.   RESPIRATORY: WNL, VSS on room air   CARDIAC: on tele monitor  GI/: diarrhea. 3 episodes of slimy stools. Sample sent to lab. Voiding in urinal.   DIET: combination regular diet   PAIN/NAUSEA: denies  INCISION/DRAINS/SKIN: bruising on R. Lateral abdomen, R. Toes, L. Arm, penile shaft. Brown colored monserrat sized rash/discoloration on lower back/sacrum   IV ACCESS: L. FA (LR @100ml/hr)   ACTIVITY: 1-2x w/ walker  LAB: awaiting stool culture result  CHANGES: no changes overnight  PLAN: continue POC

## 2024-07-02 NOTE — PROGRESS NOTES
Arrived from: ED   Belongings/meds: Cellphone, wedding ring  2 RN Skin Assessment Completed by: Neelam RN, Tim RN    Non-intact findings documented (yes/no/NA): yes    Trach: Removable or Disposable Inner Cannula (check trach plate): n/a  Stroke Dysphagia Screen (complete or not-complete and will do): n/a

## 2024-07-03 NOTE — PHARMACY-CONSULT NOTE
Pharmacy Delirium Chart Review    Upon chart review, the following medications may contribute to possible patient delirium: levetiracetam (confusion), prednisone (may cause psychiatric disturbances), and oxycodone (agitation, confusion).  Please consult unit pharmacist with further questions.    Forrest Mayo, PharmD

## 2024-07-03 NOTE — PLAN OF CARE
Goal Outcome Evaluation:    [id number]    /83 (BP Location: Left arm)   Pulse 98   Temp 98  F (36.7  C) (Oral)   Resp 15   Ht 1.829 m (6')   SpO2 96%   BMI 24.68 kg/m      Neuro: A&Ox4.   Cardiac: SR. VSS.   Respiratory: Sating 96% on RA.  GI/: Adequate urine output. BM X 2.  Diet/appetite: Tolerating regular diet. Eating well.  Activity:  Assist of one, up to chair and in halls.  Pain: At acceptable level on current regimen.   Skin: No new deficits noted.  LDA's:PIV saline locked.  Plan: Continue with POC. Notify primary team with changes.

## 2024-07-03 NOTE — CONSULTS
Care Management Initial Consult    General Information  Assessment completed with: Patient, Brother, Jose Luis   Type of CM/SW Visit: Initial Assessment    Primary Care Provider verified and updated as needed: Yes   Readmission within the last 30 days: previous discharge plan unsuccessful   Return Category: Exacerbation of disease  Reason for Consult: discharge planning  Advance Care Planning: Advance Care Planning Reviewed: no concerns identified          Communication Assessment  Patient's communication style: spoken language (English or Bilingual)    Hearing Difficulty or Deaf: no   Wear Glasses or Blind: yes    Cognitive  Cognitive/Neuro/Behavioral: WDL  Level of Consciousness: alert  Arousal Level: opens eyes spontaneously  Orientation: oriented x 4  Mood/Behavior: calm, cooperative  Best Language: 0 - No aphasia  Speech: clear, spontaneous    Living Environment:   People in home: spouse     Current living Arrangements: house      Able to return to prior arrangements: Home       Family/Social Support:  Care provided by: Wife, Smiley and Brother Jose Luis  Provides care for: no one  Marital Status:   Wife, Sibling(s)  Smiley       Description of Support System: Involved, Supportive         Current Resources:   Patient receiving home care services: Yes  Skilled Home Care Services: Skilled Nursing, Physical Therapy  Community Resources:    Equipment currently used at home: cane, straight, walker, rolling  Supplies currently used at home:  Wipes, chux    Employment/Financial:  Employment Status: retired        Financial Concerns: [facility] , Cyndi Casanova has collaborated with:  -[employer] mary accessed for $200.00 gift cards to [employer] for food and incontinent supplies.  _[employer]- Application and medical verification form completed with spouse approval and emailed to [place].          Does the patient's insurance plan have a 3 day qualifying hospital stay waiver?  No    Lifestyle & Psychosocial  Needs:  Social Determinants of Health     Food Insecurity: Low Risk  (12/18/2023)    Food Insecurity     Within the past 12 months, did you worry that your food would run out before you got money to buy more?: No     Within the past 12 months, did the food you bought just not last and you didn t have money to get more?: No   Depression: Not at risk (4/22/2024)    PHQ-2     PHQ-2 Score: 0   Housing Stability: Low Risk  (12/18/2023)    Housing Stability     Do you have housing? : Yes     Are you worried about losing your housing?: No   Tobacco Use: Medium Risk (7/1/2024)    Patient History     Smoking Tobacco Use: Former     Smokeless Tobacco Use: Never     Passive Exposure: Current   Financial Resource Strain: Low Risk  (12/18/2023)    Financial Resource Strain     Within the past 12 months, have you or your family members you live with been unable to get utilities (heat, electricity) when it was really needed?: No   Alcohol Use: Not on file   Transportation Needs: Low Risk  (12/18/2023)    Transportation Needs     Within the past 12 months, has lack of transportation kept you from medical appointments, getting your medicines, non-medical meetings or appointments, work, or from getting things that you need?: No   Physical Activity: Not on file   Interpersonal Safety: Low Risk  (11/7/2023)    Interpersonal Safety     Do you feel physically and emotionally safe where you currently live?: Yes     Within the past 12 months, have you been hit, slapped, kicked or otherwise physically hurt by someone?: No     Within the past 12 months, have you been humiliated or emotionally abused in other ways by your partner or ex-partner?: No   Stress: Not on file   Social Connections: Not on file   Health Literacy: Not on file       Functional Status:  Prior to admission patient needed assistance:   Dependent ADLs:: Incontinence  Dependent IADLs:: Cleaning, Cooking, Laundry, Shopping, Meal Preparation, Medication Management,  Transportation       Mental Health Status:  Mental Health Status: No Current Concerns       Chemical Dependency Status:  Chemical Dependency Status: No Current Concerns             Values/Beliefs:  Spiritual, Cultural Beliefs, Moravian Practices, Values that affect care:                 Additional Information:  Patient with history of metastatic melanoma with brain mets admitted with increased weakness, falls, dehydration. Patient was recently hospitalized and discharged on 6/23/24 with referral to [facility] Care, Nursing made 1 visit prior to Patient being readmitted.  I have met with Patient and his Brother, Jose Luis to introduce myself and care coordinator role. Patient lives with his Wife, Smiley in a 2 level home. Smiley is the primary caregiver, Jose Luis assists when able.   Patient has been evaluated by Therapy with current recommendation for discharge to home with home therapies. I have added Home Care orders for RN PT/OT and HHA.   I have called and updated [facility] regarding hospital admission and anticipated discharge in 1-2 days.  RNCC will follow.    [facility]  Phone number #[phone]  RN PT/OT MELANIE Pike RN   care coordinator #[phone]

## 2024-07-03 NOTE — PROGRESS NOTES
Physician Attestation   I, Nayla Freeman MD, was present with the medical/AMANDA student who participated in the service and in the documentation of the note.  I have verified the history and personally performed the physical exam and medical decision making.  I agree with the assessment and plan of care as documented in the note.      Key findings: As in jointly edited note    Please see A&P for additional details of medical decision making.        Nayla Freeman MD  Date of Service (when I saw the patient): 07/03/24     [facility]    Progress Note - Medicine Service, MAROON TEAM 1       Date of Admission:  7/1/2024    Assessment & Plan   Saeid Santa is a 68 year old male with a PMH of metastatic melanoma with metastasis to the brain (complicated by seizure disorder, right-sided hemiparesis, and s/p 3x craniotomy), prostate cancer s/p prostatectomy, and HTN presenting with ongoing diarrhea, progressive weakness, and recurrent falls found to have FELICITY and mild rhabdomyolysis.    #Generalized Weakness  #Recurrent falls  Decreasing QOL, increased difficulty meeting ADLs, increased frequency of falls in setting of metastatic melanoma, diarrhea. Patient unaware of events leading to most recent fall. Most likely related to acute on chronic deconditioning but question potential contribution of syncope/presyncope. Nothing on history to suggest seizures. Recommend discharge home with home care. Would benefit from nursing, PT/OT. Per PT/OT notes 7/3, progressing in mobility and completing ADLs including hygiene independently with standby assist for safety. Patient's wife notes environmental concerns regarding tub, stairs, etc. With recurrent falls and unclear modifiable risk factors otherwise, may benefit from transition to alternative living arrangement with increased support. Will discuss options further on 7/4.   -Orthostatics ordered  -PT/OT  consulted    #FELICITY, resolved  #Diarrhea  #Poor PO intake, improved  #Hypovolemia, improved  #Hypomagnesemia  #Rhabdomyolysis, mild, resolved  Found down in household for approximately half a night. Cr 1.88 on admission (7/1) from baseline 0.9-1.1. Has resolved back to baseline; 1.15 (7/2) --> 9 hours (7/3). UA with moderate blood, no RBCs, small protein. CK 1531 (7/1) -> 1071 (7/2) --> 370 (7/3) following IVF. Electrolytes largely within normal limits (no hyperkalemia, hyperphosphatemia, or hypocalcemia). FELICITY was likely multifactorial with contributions of hypovolemia from diarrhea with inadequate PO intake to match losses and additional insult of pigment-induced nephropathy. PO intake improving and frequency of bowel movements decreasing.  - s/p 1L NS followed by 100cc/hr x 6hrs LR  - Additional IVF on 7/2 to ensure adequate hydration  - Daily BMP with Mg, repletion PRN per nursing protocol  - Monitor I&O's to ensure adequate self nutrition    #Colitis, immune-mediated  #Diarrhea due to enteropathic E. 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 Treated with steroids and infliximab (6/21) with some improvement in symptoms prior to discharge. Remains on steroid taper per oncology. Repeat C diff toxin assay this admission negative 7/2.  - Oncology consulted yesterday.  - Continue PTA loperamide  - Continue prednisone 60mg qDay for 1 day (7/2)--> 40mg for 5 days --> 20mg for 5 days --> 10mg 5days  -if minimal improvement, consider a second dose of infliximab ~7/5, can be given inpatient if still admitted per oncology  - Continue Bactrim for PJP ppx if prednisone is for more than 30 days (started 6/17).      #metastatic melanoma with brain metastases  #seizures  Patient found to have ring enhancing lesions on MRI .  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 Has continued to show CNS involvement of disease status post multiple craniotomies and resections (tumor resection 4/2023, L parieto-occipital craniotomy 7/2023, r frontal craniotomy 12/2023, gamma knife 02/2024). Recent PET scan w/ concerns for mets to soft tissues in R lateral calf and L medial thigh as well as liver lesions. Last seizure was 04/2023.   -palliative care consulted, appreciate recommendations   - GOC: remains full code with restorative goals  -oncology consulted; will not give further treatment during admission.  -continue home keppra.    #R Abd/Hip/LE pain after fall  -Tylenol PRN        Diet: Combination Diet Regular Diet Adult    DVT Prophylaxis: Pneumatic Compression Devices  Aragon Catheter: Not present  Fluids: LR 100cc/hr for 6 hours, then reasses.  Lines: 18G IV left forearm   Cardiac Monitoring: None  Code Status: Full Code      Clinically Significant Risk Factors            # Hypomagnesemia: Lowest Mg = 1.5 mg/dL in last 2 days, will replace as needed   # Hypoalbuminemia: Lowest albumin = 2.8 g/dL at 7/2/2024  6:49 AM, will monitor as appropriate     # Hypertension: Noted on problem list                      # Financial/Environmental Concerns:           Disposition Plan      Expected Discharge Date: 07/05/2024,  3:00 PM      Discharge Comments: Need to arrange safe discharge plan with home PT/OT and home health nurse due to wife's concern about taking care of patient at home.        The patient's care was discussed with the Attending Physician, Dr. Freeman .    Juan [place]  Medical Student  [facility], [facility]  [facility]  Securely message with [email or web address] (more info)  Text page via [facility] Paging/Directory   See signed in provider for up to date coverage  "information  ______________________________________________________________________    Interval History   No acute events. Diarrhea improving. Tim states that he had three episodes of diarrhea overnight, with a 4th stool passed that was \"more normal\". He states that his pain has not improved since arrival in the ED but that it is only present with palpation or pressure on the right side of his abdomen, causing 10/10 sharp pain. Tim states that his primary concern is regaining sufficient strength to return home; aside from his bruising, he denies any additional complaints, including dyspnea, headache, chest pain, fevers, or chills. He is also particularly concerned about the toll that caregiving is having on his wife and the fact that she is unable to lift him off the ground anymore when he falls.    Physical Exam   Vital Signs: Temp: 98  F (36.7  C) Temp src: Oral BP: 106/83 Pulse: 98   Resp: 15 SpO2: 96 % O2 Device: None (Room air)    Weight: 0 lbs 0 oz    Constitutional: awake, alert, cooperative, no apparent distress, and appears stated age  Eyes: Lids and lashes normal, pupils equal, round and reactive to light, extra ocular muscles intact, sclera clear, conjunctiva normal  Respiratory: No increased work of breathing, good air exchange, clear to auscultation bilaterally, no crackles or wheezing  Cardiovascular: Normal apical impulse, regular rate and rhythm, normal S1 and S2, no S3 or S4, and no murmur noted  GI: normal bowel sounds, tenderness noted in the right lower quadrant and in the left lower quadrant, and no masses palpated  Skin: normal skin color, texture, turgor. Diffuse bruising on the patient's left posterior forearm, bilateral thighs, and right foot noted to be from prior to admission. Significant bruising on the patient's right lateral abdomen and suprapubic abdomen. Abdominal bruising tender to palpation rated 7/10.  Neurologic: Awake, alert, oriented to name, place and time.  Cranial nerves " II-XII are grossly intact.  Motor is 5 out of 5 bilaterally.  Cerebellar finger to nose, heel to shin intact.  Sensory is intact.  Babinski down going, Romberg negative, and gait is normal.  Extremities: Noted swelling of the right ankle/foot with 2+ pitting edema noted to be chronic secondary to prednisone use per patient.    Medical Decision Making       Please see A&P for additional details of medical decision making.      Data   Pertinent Labs since yesterday/overnight:  CK = 370  Mag = 1.8  Phos = 3.3  Cr = .90      I have personally reviewed the following data over the past 24 hrs:    7.4  \   11.4 (L)   / 151     139 107 12.6 /  119 (H)   3.5 21 (L) 0.90 \     ALT: 36 AST: 52 (H) AP: 63 TBILI: 1.0   ALB: 2.9 (L) TOT PROTEIN: 5.7 (L) LIPASE: N/A       Imaging results reviewed over the past 24 hrs:   No results found for this or any previous visit (from the past 24 hour(s)).

## 2024-07-03 NOTE — PLAN OF CARE
Goal Outcome Evaluation:      Plan of Care Reviewed With: patient    Overall Patient Progress: improvingOverall Patient Progress: improving     Temp: 98  F (36.7  C) Temp src: Oral BP: 105/75 Pulse: 89   Resp: 12 SpO2: 95 % O2 Device: None (Room air)       Time of care: 7p-7a  Reason for admission: generalized weakness and increased falls found to have mild rhabdomyolysis admitted for rehydration. 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 Neuro exam intact except for numbness in R. Leg, 4/5 strength in R. LE, R. Heel toe is sluggish d/t pain w/ movement. Denies double/blurry vision. 5/5 motor strength in upper extremities. Sensation intact.   RESPIRATORY: continuous P.ox, declined CPAP despite education (provider notified) . SPO2 was high 80's >   CARDIAC: on tele monitor  GI/: diarrhea. 3 episodes of soft stools. Voiding in urinal.   DIET: combination regular diet   PAIN/NAUSEA: minimal. Exacerbated with movement of legs  INCISION/DRAINS/SKIN: bruising on R. Lateral abdomen, R. Toes, L. Arm, R. Arm  penile shaft. Brown colored monserrat sized rash/discoloration on lower back/sacrum   IV ACCESS: CAROLIN PONCE (RICHARD)  ACTIVITY: 1-2x w/ walker  LAB: Tok=[id number]  CHANGES: at 9pm, patient said that he could see his dog and a wilfredo in the room. Provider was notified and a delirium workup+protocol was started. By the end of shift, pt denied seeing anything/anyone else in room.   PLAN: continue POC

## 2024-07-04 NOTE — PLAN OF CARE
[id number]  Vitals:  Blood pressure 121/84, pulse 82, temperature 97.8  F (36.6  C), temperature source Oral, resp. rate 15, height 1.829 m (6'), SpO2 95%.  Neuro: A/Ox4, RLE 4/5, other extremities 5/5  Cardiac: VSS  Respiratory: sating >92 on RA  Diet/appetite: reg diet, well tolerated  GI/:  no bm this shift, voids ad perfecto  Activity:  assist x1 w/ walker  Pain: Denies  Skin: R ribcage bruising  Lines: L PIV sl    Plan: cont to monitor pt condition, notify team of changes per POC.    Problem: Adult Inpatient Plan of Care  Goal: Absence of Hospital-Acquired Illness or Injury  Outcome: Progressing  Intervention: Identify and Manage Fall Risk  Recent Flowsheet Documentation  Taken 7/3/2024 2330 by Sylvia Rock RN  Safety Promotion/Fall Prevention:   clutter free environment maintained   patient and family education   room near nurse's station   safety round/check completed  Intervention: Prevent Skin Injury  Recent Flowsheet Documentation  Taken 7/3/2024 2330 by Sylvia Rock RN  Body Position:   position changed independently   legs elevated  Skin Protection: incontinence pads utilized  Device Skin Pressure Protection: absorbent pad utilized/changed  Intervention: Prevent and Manage VTE (Venous Thromboembolism) Risk  Recent Flowsheet Documentation  Taken 7/3/2024 2330 by Sylvia Rock RN  VTE Prevention/Management: SCDs off (sequential compression devices)  Intervention: Prevent Infection  Recent Flowsheet Documentation  Taken 7/3/2024 2330 by Sylvia Rock RN  Infection Prevention:   cohorting utilized   environmental surveillance performed   equipment surfaces disinfected   personal protective equipment utilized   hand hygiene promoted   rest/sleep promoted   single patient room provided     Problem: Adult Inpatient Plan of Care  Goal: Optimal Comfort and Wellbeing  Outcome: Progressing  Intervention: Monitor Pain and Promote Comfort  Recent Flowsheet Documentation  Taken 7/3/2024 2330 by Sylvia Rock RN  Pain  Management Interventions:   repositioned   rest     Problem: Risk for Delirium  Goal: Optimal Coping  Outcome: Progressing  Intervention: Optimize Psychosocial Adjustment to Delirium  Recent Flowsheet Documentation  Taken 7/3/2024 2330 by Sylvia Rock RN  Supportive Measures:   active listening utilized   decision-making supported     Problem: Risk for Delirium  Goal: Improved Attention and Thought Clarity  Outcome: Progressing  Intervention: Maximize Cognitive Function  Recent Flowsheet Documentation  Taken 7/3/2024 2330 by Sylvia Rock RN  Sensory Stimulation Regulation: care clustered  Reorientation Measures:   calendar in view   clock in view     Problem: Risk for Delirium  Goal: Improved Sleep  Outcome: Progressing  Intervention: Promote Sleep  Recent Flowsheet Documentation  Taken 7/3/2024 2330 by Sylvia Rock, RN  Sleep/Rest Enhancement:   awakenings minimized   consistent schedule promoted   regular sleep/rest pattern promoted

## 2024-07-04 NOTE — CONSULTS
"[facility]  Neurology Consultation    Patient Name:  Saeid Santa  MRN:  [id number]    :  1955  Date of Service:  2024  Primary care provider:  Rock Grajeda      Neurology consultation service was asked to see Saeid Santa by Dr. Freeman to evaluate episode concerning for seizure.    Chief Complaint:  \"I woke up on the floor and don't know how I got there\"    History of Present Illness:   Saeid Santa is a 68 year old right-handed male with history of metastatic melanoma who presents with diarrhea, multiple falls, and an episode where he woke up on the ground in his room and is unsure how he got there.    Neurology is consulted regarding the latter episode, and if it represents seizure.    Mr. Santa was diagnosed with melanoma in 2023. His presenting symptom was involuntary right leg shaking for a few minutes while driving home from work, consistent with focal motor seizure. At time of diagnosis he had two discrete ring-enhancing lesions in the left paramedian posterior frontal lobe. Craniotomy with tumor resection was performed.    He was started on pembrolizumab but this was discontinued after complicated by pneumonitis.    He has undergone gamma knife and ipilimumab/nivolumab since 2024.    He denies known clinical seizure since the focal right leg shaking at presentation.    Over the past several months he has had at least 6 falls, most of which have happened while awake when his right leg gives out on him.    However, 3 days ago, he woke up in the morning and realized he was on the floor. He has no memory of how he got there. His wife sleeps across the epps from him and is unsure how he ended up there. His last memory is going to bed. He denies tongue bite or loss of bowel/bladder continence during this episode.    He has been taking Keppra 1,000 mg BID and denies missed doses. Denies significant side effects.    EFRAIN HALL " comprehensive ROS was performed and pertinent findings were included in HPI.     PMH  Past Medical History:   Diagnosis Date    HTN (hypertension)     Metastasis to brain (H) 2023    Metastatic melanoma (H)     Mixed hyperlipidemia     Prostate cancer (H)     Seizures (H)     Sleep apnea      Past Surgical History:   Procedure Laterality Date    INSERT DRAIN LUMBAR N/A 7/14/2023    Procedure: LUMBAR DRAIN;  Surgeon: Kye Garrido MD;  Location: [facility] OR    OPTICAL TRACKING SYSTEM CRANIOTOMY, EXCISE TUMOR, COMBINED Left 04/21/2023    Procedure: stealth assisted awake craniotomy resection of motor strip tumor;  Surgeon: Kye Garrido MD;  Location: [facility] OR    OPTICAL TRACKING SYSTEM CRANIOTOMY, EXCISE TUMOR, COMBINED Left 7/14/2023    Procedure: STEALTH ASSISTED CRANIOTOMY, WITH NEOPLASM EXCISION LEFT PARIETO-OCCIPITAL CRANIOTOMY WITH RESECTION OF MASS,;  Surgeon: Kye Garrido MD;  Location: [facility] OR    OPTICAL TRACKING SYSTEM CRANIOTOMY, EXCISE TUMOR, COMBINED Right 12/21/2023    Procedure: CRANIOTOMY, USING OPTICAL TRACKING SYSTEM, WITH NEOPLASM EXCISION, RIGHT FRONTAL CRANOTOMY WITH RESECTION OF TUMOR;  Surgeon: Kye Garrido MD;  Location: [facility] OR    PROSTATECTOMY      2009    SIGMOIDOSCOPY FLEXIBLE N/A 6/19/2024    Procedure: Sigmoidoscopy flexible;  Surgeon: Prosper Sweet MD;  Location:  GI       Medications   I have personally reviewed the patient's medication list.     Allergies  I have personally reviewed the patient's allergy list.     Social History  .    Family History    No known history of epilepsy.      Physical Examination   Vitals: BP (!) 109/91 (BP Location: Left arm)   Pulse 82   Temp 98  F (36.7  C) (Oral)   Resp 16   Ht 1.829 m (6')   SpO2 99%   BMI 24.68 kg/m    General: sitting up in chair, not in acute distress  Head: normocephalic  Eyes: no icterus, op pink and moist  Cardiac: regular rate on monitor  Respiratory: non-labored on RA. Speaking in complete sentences.  GI:  soft  Skin: No rash or lesion on exposed skin  Psych: Mood pleasant, affect congruent  Neuro:  Mental status: Awake, alert, attentive, oriented to self, time, place, and circumstance. Language is fluent and coherent with intact comprehension of complex commands, naming and repetition. Follows one step commands on both sides of body, as well as crossed body command.  Cranial nerves: Possible right superior quadrant mild visual deficit, otherwise visual fields full on confrontational testing, conjugate gaze with intact extraocular movements, upper and lower face symmetric at rest and with activation, tongue protrusion midline  Motor: Normal bulk and tone. No abnormal movements. 5/5 strength bilaterally throughout shoulder abduction, elbow flexion/extension, finger extension.    In the legs, note right leg weakness - 3+/5 hip flexion, 4/5 hip adduction, 4+/5 hip abduction, with 5/5 strength throughout knee flexion/extension and ankle dorsi/plantarflexion. Left leg with full strength throughout.    Reflexes: Toes mute bilaterally.  Sensory: Light touch sensation intact throughout.  Coordination: FNF coordination intact bilaterally.  Gait: Not assessed.    Investigations   I have personally reviewed pertinent labs, tests, and radiological imaging. Discussion of notable findings is included under Impression.     Was patient transferred from outside hospital?   No    Impression  #Metastatic Melanoma  #Loss of consciousness  Tim Santa is a 68 year old right-handed male with history of metastatic melanoma, diagnosed in April 2023 after a focal motor seizure involving right leg shaking. MRI Brain at that time revealed lesions concerning for mets, which were confirmed to be melanoma after neurosurgical resection. He is s/p therapy with checkpoint inhibitors (most recently ipi/nivo) and Gamma Knife radiation.    Neurology is consulted regarding an episode where Mr. Santa woke up on the floor of his bedroom, unsure of how he  got there.    Exam today shows intact mental status, possible right superior mild visual field deficit but otherwise intact cranial nerves, and weakness of the proximal right leg. Most recent brain imaging (CT Head w/o contrast 7/1) redemonstrated multiple focal lesions including right frontal, bilateral occupital, and left parietal lobe with surrounding vasogenic edema.    Have high suspicion that this nocturnal episode where he awoke on the floor and is unsure how he got there represents an epileptic seizure. Per discussion does not seem to be having significant side effects from Keppra so would increase evening dose (renal function is fine). Considered EEG but given known high risk for seizure with extensive edema, history supports increasing anti-seizure medication regardless of EEG findings. However, would reassess if further episodes occur or patient becomes encephalopathic without alternative cause.    Recommendations  - increase Keppra to 1,000 mg AM + 1,500 mg at bedtime  - no EEG at this time. If further clinical episodes concerning for seizure, or development of encephalopathy without alternative cause, would reconsider need for EEG    Neurology will sign off at this time. Please do not hesitate to contact our service with further questions or concerns.    Thank you for involving Neurology in the care of Saeid Santa.  Please do not hesitate to call with questions/concerns (consult pager [phone]).      Patient was seen and discussed with Dr. Swann.    Peña Molina MD  Neurology Resident PGY-4

## 2024-07-04 NOTE — PLAN OF CARE
Status: Admitted 7/1 with generalized weakness, falls, and subacute diarrhea found to have FELICITY and mild rhabdomyolysis. Hx of metastatic melanoma with brain mets and craniotomy x3  Vitals: VSS on RA  Neuros: A&O x4. RLE 5, AOE 5. N/T to RLE  IV: PIV SL  Labs/Electrolytes: Mag and Phos replaced. Redraws in AM. R leg US ordered.  Resp/trach: WNL  Diet: Regular diet. Strict intake charting  Bowel status: LBM 7/4. Loose BM x1 this shift  : Voiding spontaneously  Skin: Bruising to R rib cage  Pain: R rib pain managed with PRN Tylenol  Activity: A1, GB, walker. Up in recliner all day. Went outside with family today  Social: Wife and family at bedside  Plan: Monitor fluid intake. Neurology consulted for brain mets.  Updates this shift: Family would like to talk to care coordinator to help with resources for discharge. Wife and patient expressed concern about the patient falling at home and not being able to get back up. Wife asking about home care resources and how to apply.    Goal Outcome Evaluation:      Plan of Care Reviewed With: patient    Overall Patient Progress: no change    Outcome Evaluation: Diarrhea continued. Went outside with family today

## 2024-07-04 NOTE — PROGRESS NOTES
[facility]    Progress Note - Medicine Service, BAN TEAM 1       Date of Admission:  7/1/2024    Assessment & Plan   Saeid Santa is a 68 year old male with a PMH of metastatic melanoma with metastasis to the brain (complicated by seizure disorder, right-sided hemiparesis, and s/p 3x craniotomy), prostate cancer s/p prostatectomy, and HTN presenting with ongoing diarrhea, progressive weakness, and recurrent falls found to have FELICITY and mild rhabdomyolysis.    Today  -R sided LE pitting edema, LE doppler ordered  -Consulted neurology due to concern his initial fall was due to seizure in the setting of melanoma with mets to the brain. High concern for seizure and plan to increase evening Keppra to 1500mg, keep morning dose the same. Nursing to do orthostatics today to work up as etiology of fall.  -LE doppler given right sided pitting edema and hypercoagulable (metastatic cancer, limited ambulation). Follow-up final read.   -Mg 1.6, repleted.   -Monitor ins and outs (3 episodes of diarrhea per day and moderate to poor PO intake) -- bolus as needed.       #Generalized Weakness  #Recurrent falls  Decreasing QOL, increased difficulty meeting ADLs, increased frequency of falls in setting of metastatic melanoma, diarrhea. Patient unaware of events leading to most recent fall. Most likely related to acute on chronic deconditioning but question potential contribution of syncope/presyncope. Nothing on history to suggest seizures. Recommend discharge home with home care. Would benefit from nursing, PT/OT. Per PT/OT notes 7/3, progressing in mobility and completing ADLs including hygiene independently with standby assist for safety. Patient's wife notes environmental concerns regarding tub, stairs, etc. With recurrent falls and unclear modifiable risk factors otherwise, may benefit from transition to alternative living arrangement with increased support. Will discuss  options further on 7/4.   -Orthostatics ordered, follow-up with nursing  -PT/OT consulted    #FELICITY, resolved  #Diarrhea  #Poor PO intake, improved  #Hypovolemia, improved  #Hypomagnesemia  #Rhabdomyolysis, mild, resolved  #Elevated AST, likely 2/2 to mild rhabdo  Found down in household for approximately half a night. Cr 1.88 on admission (7/1) from baseline 0.9-1.1. Has resolved back to baseline; 1.15 (7/2) --> 0.90 (7/3). UA with moderate blood, no RBCs, small protein. CK 1531 (7/1) -> 1071 (7/2) --> 370 (7/3) following IVF. Electrolytes largely within normal limits (no hyperkalemia, hyperphosphatemia, or hypocalcemia). FELICITY was likely multifactorial with contributions of hypovolemia from diarrhea with inadequate PO intake to match losses and additional insult of pigment-induced nephropathy. PO intake improving and frequency of bowel movements decreasing.  - s/p 1L NS followed by 100cc/hr x 6hrs LR  - Additional IVF on 7/2 to ensure adequate hydration  - Space BMP with Mg to q48hrs, repletion PRN per nursing protocol  - Monitor I&O's to ensure adequate self nutrition  -Mild elevations in AST which preceded rhabdo.     #Colitis, immune-mediated  #Diarrhea due to enteropathic E. 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 Treated with steroids and infliximab (6/21) with some improvement in symptoms prior to discharge. Remains on steroid taper per oncology. Repeat C diff toxin assay this admission negative 7/2.  - Oncology consulted 7/2.   -Follow-up outpt [facility]ANDA apt on 7/8 and 7/15  - Increased to Loperamide 4mg TID SCHEDULED and 4mg every day PRN ->1-2 BMs/day  - Continue prednisone 60mg qDay for 1 day (7/2)--> 40mg for 5 days --> 20mg for 5 days --> 10mg 5days  -if minimal improvement, consider a second dose of infliximab  ~7/5, can be given inpatient if still admitted per oncology  - Continue Bactrim for PJP ppx if prednisone is for more than 30 days (started 6/17).     #Right Sided LE 2+ Pitting Edema, NEW (7/4/2024)  New onset right sided pitting edema in the setting of reduced ambulation and metastatic melanoma is highly concerning for LE DVT. Pt not on pharmacologic VTE prophylaxis due to risk of bleeding.   [ ] LE Doppler US ordered, follow-up      #metastatic melanoma with brain metastases  #seizures  Patient found to have ring enhancing lesions on MRI . 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 Has continued to show CNS involvement of disease status post multiple craniotomies and resections (tumor resection 4/2023, L parieto-occipital craniotomy 7/2023, r frontal craniotomy 12/2023, gamma knife 02/2024). Recent PET scan w/ concerns for mets to soft tissues in R lateral calf and L medial thigh as well as liver lesions. Last seizure was 04/2023.   -Neurology consulted on 7/4, high concern for seizure causing fall leading up to this admission. Plan to increase evening Keppra to 1,500mg qPM, and continue 1,000mg qAM. Notify neurology if any clinical seizures or new AMS of uncertain etiology.    -palliative care consulted, appreciate recommendations   - GOC: remains full code with restorative goals  -oncology consulted; will not give further treatment during admission.  -continue home keppra.  -Neurology consulted on 7/4 due to c/f fall secondary to seizure, follow-up recs    #Hypomagnesemia   -Repleted with 4g of MgSO4 IV, follow-up BMP tomorrow    #R Abd/Hip/LE pain after fall  -Tylenol PRN    #Leukocytosis  -Resolved 7/4        Diet: Combination Diet Regular Diet Adult    DVT Prophylaxis: Pneumatic Compression Devices  Aragon Catheter: Not present  Fluids: LR 100cc/hr for 6 hours, then reasses.  Lines: 18G IV left forearm   Cardiac Monitoring:  None  Code Status: Full Code      Clinically Significant Risk Factors            # Hypomagnesemia: Lowest Mg = 1.6 mg/dL in last 2 days, will replace as needed   # Hypoalbuminemia: Lowest albumin = 2.8 g/dL at 7/2/2024  6:49 AM, will monitor as appropriate     # Hypertension: Noted on problem list                      # Financial/Environmental Concerns:           Disposition Plan      Expected Discharge Date: 07/06/2024        Discharge Comments: Need to arrange safe discharge plan with home PT/OT and home health nurse due to wife's concern about taking care of patient at home.        The patient's care was discussed with the Attending Physician, Dr. Freeman .    [facility]  Medical Student  Medicine Service, [facility] TEAM [address] [place] [address]  Securely message with [employer] (more info)  Text page via [employer] Paging/Directory   See signed in provider for up to date coverage information  ______________________________________________________________________    Interval History   No acute events. Diarrhea improving. Tim states that he had three episodes of diarrhea a day and continues to endorse right sided pain that is stable from yesterday. Tim states that his primary concern is regaining sufficient strength to return home, and he is most concerned that if he falls again that his wife will not be strong enough to help him off the ground. He has continued concerns over his wife being able to help lift him from the ground if he falls. He denies any additional complaints, including dyspnea, headache, chest pain, fevers, or chills.  Physical Exam   Vital Signs: Temp: 98  F (36.7  C) Temp src: Oral BP: (!) 109/91 Pulse: 82   Resp: 16 SpO2: 99 % O2 Device: None (Room air)    Weight: 0 lbs 0 oz    Constitutional: awake, alert, cooperative, no apparent distress, and appears stated age  Eyes: Lids and lashes normal, pupils equal, round and reactive to light, extra ocular muscles intact,  sclera clear, conjunctiva normal  Respiratory: No increased work of breathing, good air exchange, clear to auscultation bilaterally, no crackles or wheezing  Cardiovascular: Normal apical impulse, tachycardic rate to the 110s, regular rhythm, normal S1 and S2, no S3 or S4, and no murmur noted  GI: normal bowel sounds, tenderness noted in the right lower quadrant and in the left lower quadrant, and no masses palpated  Skin: normal skin color, texture, turgor. Diffuse bruising on the patient's left posterior forearm, bilateral thighs, and right foot noted to be from prior to admission. Significant bruising on the patient's right lateral abdomen and suprapubic abdomen. Abdominal bruising tender to palpation rated 7/10.  Neurologic: Awake, alert, oriented to name, place and time.  Cranial nerves II-XII are grossly intact.  Motor is 5 out of 5 bilaterally.  Cerebellar finger to nose, heel to shin intact.  Sensory is intact.  Babinski down going, Romberg negative, and gait is normal.  Extremities: Noted swelling of the right ankle/foot with 2+ pitting edema. No pitting edema on the left ankle/foot.     Medical Decision Making       Please see A&P for additional details of medical decision making.      Data   Pertinent Labs since yesterday/overnight:  CK = 370  Mag = 1.8  Phos = 3.3  Cr = .90      I have personally reviewed the following data over the past 24 hrs:    N/A  \   N/A   / N/A     139 107 11.8 /  84   3.7 23 0.97 \       Imaging results reviewed over the past 24 hrs:   No results found for this or any previous visit (from the past 24 hour(s)).

## 2024-07-04 NOTE — PLAN OF CARE
Status: Admitted 7/1 w/ ongoing diarrhea, progressive weakness, and recurrent falls found to have FELICITY and mild rhabdomyolysis. Hx metastatic melanoma with brain mets c/b seizure disorder, R hemiparesis, s/p 3x craniotomy   Vitals: VSS on RA  Neuros: A&Ox4, RLE 4/5, all other extremities 5/5, N/T to RLE  IV: PIV SL  Labs/Electrolytes: WNL  Resp/trach: Clear lungs  Diet: Regular, good PO  Bowel status: Loose BM x2 this shift  : Voiding  Skin: Bruising to R rib cage, lido patches in place  Pain: R rib and back pain controlled with PRN tylenol x1, lido patched  Activity: Up w/ 1, walker  Social: Wife involved in care, not present at bedside this shift  Plan/updates: Continue to encourage PO intake, POC

## 2024-07-05 NOTE — PROGRESS NOTES
Oncology  Progress Note  Date of Service: 07/05/2024    Patient: Saeid Santa  MRN: [id number]  Admission Date: 7/1/2024  Hospital Day # 4  Cancer Diagnosis: stage-IV melanoma    Primary Outpatient Oncologist: Dr. Bunn   Current Treatment Plan: Nivolumab + ipilimumab     Reason for Consult: IV melanoma with recent immune mediated colitis       Assessment & Plan:   Saeid Santa is a 68 year old man with a history of HTN, HLD, seizure disorder, prostate cancer (s/p prostatectomy), and metastatic melanoma (mets to brain) who presents with generalized weakness and fall at home. He was found to have rhabdomyolysis and FELICITY on admission, which has been improving so far with IVF. He was recently admitted with immune mediated colitis, but his bowel movements have been fairly normal since discharge.     # Metastatic melanoma  # History of immune mediated colitis   # Fall   # Rhabdomyolysis     IV melanoma who most recently had been treated with ipi/nivo (C2D1 6/5/24), complicated by immune checkpoint inhibitor colitis, requiring steroids and infliximab (6/21/24). His diarrhea has improved since his recent admission. Had 2 BM overnight, none this AM. Infectious work-up negative. Would continue with steroid taper as planned, currently on prednisone 40mg daily. No indication for addition of a second dose of infliximab at this time as diarrhea is improving on steroid taper. Rhapdo resolved. Neuro following, falls like 2/2 seizures. On anti-seizure meds.    Recommendations:   - Continue prednisone taper as planned, currently on 40mg, then 20mg for 5 days, 10mg for 5 days   - No indication for addition of a second dose of infliximab at this time as diarrhea is improving on steroid taper.   - Follow-up with oncology as outpatient, has [facility] appointment 7/8 and 7/15     Thank you for this interesting consult.  Please do not hesitate to page with any questions or concerns.    Patient was seen and plan of care was discussed with  attending physician Dr. Chapman.     Padmini Michelle MD  Hematology/Oncology Fellow      HISTORY AND PHYSICAL  Subjective:   Patient reports improvement in his diarrhea so far.  He said his last bowel movement was last night and he has not had any this morning.  Per nursing report patient had 2 bowel movements last night.  He states his main issue is he thinks he is peeing more frequently.  This could possibly be from his IV hydration.  He has no other complaints.    Oncologic History:  -IV melanoma, BRAF G466A, TMB 48.7554 mut/Mb   -ECOG 2    -Diagnosed 4/2023:    CT-head and MRI brain showed 2 discrete ring-enhancing lesions in the left paramedian posterior frontal lobe near the precentral gyrus measuring approximately 2.2 x 1.7 x 1.9 cm and left occipital lobe near the lingual gyrus measuring approximately 2.2 x 2.1 x 2.0 cm. 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 TMB is high at 48.754 muts/Mb.   -Pembrolizumab (start 6/7/23) for 2 cycles, but complicated by pneumonitis s/p steroid  -3/2/24 brain MR shows increased size of several previously treated metastatic lesions, no new lesions. After discussion with Rad Onc and Radiology, although progression cannot be ruled out, findings can be consistent with treatment effect   repeat MR brain with continued significant increase in size of R frontal lesion (18 x 15 mm, previously 8 x 7 mm), anterior R frontal  lesion and R occipital lesion marginally increased. L frontal lesion decreased. L occipital lesion stable. Vasogenic edema significant but stable  -Last PET/CT showed overall continued concern for metastatic disease involving the soft tissues in both the R lateral calf and L medial thigh. These lesions remain small but slightly increased in both size and avidity, and likely represent sites of metastatic disease.  -On 5/15, he  started on gammaknife and ipi/nivo. He developed some fatigue, nausea, and headaches, as well as mild diarrhea, which improved.  -He completed gammaknife radiation on 5/23/24.   -Cycle 2 ipi/nivo was given on 6/5/24.      Outpatient Medications:  Current Facility-Administered Medications   Medication Dose Route Frequency Provider Last Rate Last Admin    acetaminophen (TYLENOL) tablet 650 mg  650 mg Oral Q4H PRN Linda Guo MD   650 mg at 07/05/24 0809    Or    acetaminophen (TYLENOL) Suppository 650 mg  650 mg Rectal Q4H PRN Linda Guo MD        amLODIPine (NORVASC) tablet 10 mg  10 mg Oral QAM Hayde Bustillo MD   10 mg at 07/05/24 0809    baclofen (LIORESAL) tablet 10 mg  10 mg Oral BID PRN Linda Guo MD        calcium carbonate (TUMS) chewable tablet 1,000 mg  1,000 mg Oral 4x Daily PRN Linda Guo MD        levETIRAcetam (KEPPRA) tablet 1,000 mg  1,000 mg Oral QAM AC DisomNevaeh khoury MD   1,000 mg at 07/05/24 0809    And    levETIRAcetam (KEPPRA) tablet 1,500 mg  1,500 mg Oral QPM Nevaeh Foote MD   1,500 mg at 07/04/24 2049    Lidocaine (LIDOCARE) 4 % Patch 3 patch  3 patch Transdermal Q24H Nayla Freeman MD   3 patch at 07/04/24 1601    lidocaine (LMX4) cream   Topical Q1H PRN Linda Guo MD        lidocaine 1 % 0.1-1 mL  0.1-1 mL Other Q1H PRN Linda Guo MD        loperamide (IMODIUM) capsule 4 mg  4 mg Oral TID Nevaeh Foote MD   4 mg at 07/05/24 0809    And    loperamide (IMODIUM) capsule 4 mg  4 mg Oral Daily PRN Nevaeh Foote MD        naloxone (NARCAN) injection 0.2 mg  0.2 mg Intravenous Q2 Min PRN Asim Smith MD        Or    naloxone (NARCAN) injection 0.4 mg  0.4 mg Intravenous Q2 Min PRN Asim Smith MD        Or    naloxone (NARCAN) injection 0.2 mg  0.2 mg Intramuscular Q2 Min PRN Asim Smith MD        Or    naloxone (NARCAN) injection 0.4 mg  0.4 mg Intramuscular Q2 Min PRN Asim Smith MD        ondansetron  (ZOFRAN) tablet 4 mg  4 mg Oral Q8H PRN Linda Guo MD        oxyCODONE (ROXICODONE) tablet 5 mg  5 mg Oral Q6H PRN Hipolito Garcia MD        pantoprazole (PROTONIX) EC tablet 40 mg  40 mg Oral QAM AC Linda Guo MD   40 mg at 07/05/24 0809    potassium & sodium phosphates (NEUTRA-PHOS) Packet 1 packet  1 packet Oral 4x Daily Nevaeh Foote MD   1 packet at 07/05/24 0809    [Held by provider] potassium chloride terrie ER (KLOR-CON M20) CR tablet 20 mEq  20 mEq Oral Daily Linda Guo MD        predniSONE (DELTASONE) tablet 40 mg  40 mg Oral Daily Linda Guo MD   40 mg at 07/05/24 0809    Followed by    [START ON 7/8/2024] predniSONE (DELTASONE) tablet 20 mg  20 mg Oral Daily Linda Guo MD        Followed by    [START ON 7/13/2024] predniSONE (DELTASONE) tablet 10 mg  10 mg Oral Daily Linda Guo MD        rosuvastatin (CRESTOR) tablet 40 mg  40 mg Oral Daily Linda Guo MD   40 mg at 07/05/24 0809    senna-docusate (SENOKOT-S/PERICOLACE) 8.6-50 MG per tablet 1 tablet  1 tablet Oral BID PRN Linda Guo MD        Or    senna-docusate (SENOKOT-S/PERICOLACE) 8.6-50 MG per tablet 2 tablet  2 tablet Oral BID PRN Linda Guo MD        sodium chloride (PF) 0.9% PF flush 3 mL  3 mL Intracatheter Q8H Linda Guo MD   3 mL at 07/05/24 0814    sodium chloride (PF) 0.9% PF flush 3 mL  3 mL Intracatheter q1 min prn Linda Guo MD            Physical Examination:    Blood pressure 111/84, pulse 80, temperature 97  F (36.1  C), temperature source Axillary, resp. rate 16, height 1.829 m (6'), SpO2 99%.  General: alert and cooperative, lying in bed, sleepy, no acute distress  HEENT: NC/AT  Neck: supple  Resp:normal respiratory effort on ambient air  GI: not distended.  MSK: warm and well-perfused  Skin: no rashes on limited exam  Neuro: Alert and interactive, moves all extremities equally, equal sensation in b/l lower extremities     Labs & Studies: I personally reviewed  the following studies:  ROUTINE LABS (Last four results):  CMP  Recent Labs   Lab 07/05/24  0613 07/04/24  0724 07/03/24  0644 07/02/24  2140 07/02/24  0649 07/01/24  1650    139 139  --  139 135   POTASSIUM 3.2* 3.7 3.5  --  3.5 3.6   CHLORIDE 107 107 107  --  107 102   CO2 22 23 21*  --  23 19*   ANIONGAP 9 9 11  --  9 14   GLC 85 84 119* 124* 76 90   BUN 11.0 11.8 12.6  --  16.5 19.5   CR 0.90 0.97 0.90  --  1.15 1.88*   GFRESTIMATED >90 84 >90  --  68 38*   JOHN PAUL 7.9* 8.2* 8.1*  --  8.1* 7.8*   MAG 2.0 1.6* 1.8  --  1.9 1.5*   PHOS 2.3* 1.9*  --   --  3.3  --    PROTTOTAL  --   --  5.7*  --  5.6* 5.5*   ALBUMIN  --   --  2.9*  --  2.8* 3.0*   BILITOTAL  --   --  1.0  --  0.9 1.1   ALKPHOS  --   --  63  --  63 69   AST  --   --  52*  --  66* 67*   ALT  --   --  36  --  42 46     CBC  Recent Labs   Lab 07/05/24  0613 07/03/24  0644 07/02/24  0649 07/01/24  1650   WBC 10.5 7.4 11.6* 13.9*   RBC 3.47* 3.69* 3.89* 3.88*   HGB 10.6* 11.4* 11.9* 12.1*   HCT 32.7* 34.4* 36.6* 36.4*   MCV 94 93 94 94   MCH 30.5 30.9 30.6 31.2   MCHC 32.4 33.1 32.5 33.2   RDW 15.0 15.0 15.3* 15.2*   * 151 143* 164         ------------------------------------------------------------------------------------  ATTENDING PHYSICIAN ATTESTATION     I, Jak Chapman, saw and evaluated Tim Santa as part of a shared visit.  I have reviewed and discussed with the fellow/ advanced practice provider their history, physical and plan.     I personally reviewed the vital signs, medications, labs and imaging.  I have ascertained key findings from the history, and I have performed key aspects of the physical examination.      Key management decisions made by me:  He is a 68-year-old man with metastatic melanoma to the brain, admitted with seizures as well as worsening immune-related colitis and diarrhea (from ipilimumab/nivolumab immunotherapy).  At this time, his seizures have improved and his volume and frequency of diarrhea has also  improved, per the patient's report.  We advise a relatively brisk steroid taper: currently on 40mg, then 20mg for 5 days, 10mg for 5 days.  We would advise against the addition of infliximab or other second immunosuppressive agents at this time.  He has oncology follow-up scheduled already with Dr Richardson Bunn.  The patient was given the opportunity to ask several questions today, all of which were answered to his satisfaction.  Thank you for allowing us to participate in his care.  I will liaise with Dr Bunn to provide an update.    I spent a total of 60 minutes on this patient on the day of the encounter, of which more than 50% of this time was used for counseling and coordination of care.     Jak Chapman M.D.  Date of Service (when I saw the patient): 7/5/2024

## 2024-07-05 NOTE — PLAN OF CARE
[id number]    Goal Outcome Evaluation:      Plan of Care Reviewed With: patient    Overall Patient Progress: no changeOverall Patient Progress: no change       Status: Admitted 7/1 w/ ongoing diarrhea, progressive weakness, and recurrent falls found to have FELICITY and mild rhabdomyolysis. Hx metastatic melanoma with brain mets c/b seizure disorder, R hemiparesis, s/p 3x craniotomy   Vitals: VSS on RA  Neuros: A/O x4; RLE 4/5 w/ N/T at baseline, all other extremities 5/5  IV: PIV leaking; removed. Will order one to be placed at 0600  Labs/Electrolytes: WNL  Resp/trach: LSC on RA  Diet: Regular  Bowel status: Loose BM x2 this shift  : Voiding  Skin: Bruising to R flank area, lidocaine patches removed. +1-2 RLE edema  Pain: Denies pain  Activity: Up w/ 1, walker  Plan/updates: Increase evening Keppra to 1,500mg qPM, and continue 1,000mg qAM. Oncology consulted; will not give further treatment during admission. Anticipated discharge 07/06 with home PT/OT and home health. Continue POC

## 2024-07-05 NOTE — PROGRESS NOTES
[facility]    Progress Note - Medicine Service, BAN TEAM 1       Date of Admission:  7/1/2024    Assessment & Plan   Saeid Santa is a 68 year old male with a PMH of metastatic melanoma with metastasis to the brain (complicated by seizure disorder, right-sided hemiparesis, and s/p 3x craniotomy), prostate cancer s/p prostatectomy, and HTN presenting with ongoing diarrhea, progressive weakness, and recurrent falls found to have FELICITY and mild rhabdomyolysis. Now admitted for increased diarrhea above his home volume with difficulty keeping up with PO, despite good intake of fluids, and secondary electrolyte derangements that require correction.     Today  -Orthostatics wnl  -Had 4 BMs since midnight, loperamide titrated to goal? Will follow-up tomorrow.  -Continues to require lyte replacement (K and Ph). Can discharge once lytes stable and able to PO enough to keep up with ICI enterocolitis x24hrs; care coordination in the loop (home nurse, hha, and home PT/OT set). Insurance won't cover PICC  -PT assured primary patient is safe to discharge from a PT perspective  -Case coordination maximized home health aid, home health nurse, and home PT/OT. Rest of services would be out of pocket.      #Generalized Weakness  #Recurrent falls  Decreasing QOL, increased difficulty meeting ADLs, increased frequency of falls in setting of metastatic melanoma, diarrhea. Patient unaware of events leading to most recent fall. Most likely related to acute on chronic deconditioning but question potential contribution of syncope/presyncope. Nothing on history to suggest seizures. Recommend discharge home with home care. Would benefit from nursing, PT/OT. Per PT/OT notes 7/3, progressing in mobility and completing ADLs including hygiene independently with standby assist for safety. Patient's wife notes environmental concerns regarding tub, stairs, etc. With recurrent falls and unclear  modifiable risk factors otherwise, may benefit from transition to alternative living arrangement with increased support. Will discuss options further on 7/4.   -Orthostatics wnl on 7/5  -PT/OT consulted and actively following. PT not a barrier to discharge.     #FLEICITY, resolved  #Diarrhea  #Poor PO intake, improved  #Hypovolemia, improved  #Hypomagnesemia  #Rhabdomyolysis, mild, resolved  #Elevated AST, likely 2/2 to mild rhabdo  Found down in household for approximately half a night. Cr 1.88 on admission (7/1) from baseline 0.9-1.1. Has resolved back to baseline; 1.15 (7/2) --> 0.90 (7/3). UA with moderate blood, no RBCs, small protein. CK 1531 (7/1) -> 1071 (7/2) --> 370 (7/3) following IVF. Electrolytes largely within normal limits (no hyperkalemia, hyperphosphatemia, or hypocalcemia). FELICITY was likely multifactorial with contributions of hypovolemia from diarrhea with inadequate PO intake to match losses and additional insult of pigment-induced nephropathy. PO intake improving and frequency of bowel movements decreasing.  - s/p 1L NS followed by 100cc/hr x 6hrs LR  - Additional IVF on 7/2 to ensure adequate hydration  - Space BMP with Mg to q48hrs, repletion PRN per nursing protocol  - Monitor I&O's to ensure adequate self nutrition  -Mild elevations in AST which preceded rhabdo.     #Colitis, immune-mediated  #Diarrhea due to enteropathic E. 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 Treated with steroids and infliximab (6/21) with some improvement in symptoms prior to discharge. Remains on steroid taper per oncology. Repeat C diff toxin assay this admission negative 7/2.  - Oncology consulted 7/2.   -Follow-up outpt AMANDA apt on 7/8 and 7/15  - Increased to Loperamide 4mg TID SCHEDULED and 4mg every day PRN ->GOAL 1-2  BMs/day  - Continue prednisone 60mg qDay for 1 day (7/2)--> 40mg for 5 days --> 20mg for 5 days --> 10mg 5days  -if minimal improvement, consider a second dose of infliximab ~7/5, can be given inpatient if still admitted per oncology  - Continue Bactrim for PJP ppx if prednisone is for more than 30 days (started 6/17).     #Right Sided LE 2+ Pitting Edema, NEW (7/4/2024)  New onset right sided pitting edema in the setting of reduced ambulation and metastatic melanoma is highly concerning for LE DVT. Pt not on pharmacologic VTE prophylaxis due to risk of bleeding.   -LE Doppler US without evidence of DVT     #metastatic melanoma with brain metastases  #seizures  Patient found to have ring enhancing lesions on MRI . 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 Has continued to show CNS involvement of disease status post multiple craniotomies and resections (tumor resection 4/2023, L parieto-occipital craniotomy 7/2023, r frontal craniotomy 12/2023, gamma knife 02/2024). Recent PET scan w/ concerns for mets to soft tissues in R lateral calf and L medial thigh as well as liver lesions. Last seizure was 04/2023.   -Neurology consulted on 7/4, high concern for seizure causing fall leading up to this admission. Plan to increase evening Keppra to 1,500mg qPM, and continue 1,000mg qAM. Notify neurology if any clinical seizures or new AMS of uncertain etiology.    -palliative care consulted, appreciate recommendations   - GOC: remains full code with restorative goals  -oncology consulted; will not give further treatment during admission.  -continue home keppra.  -Neurology consulted on 7/4 due to c/f fall secondary to seizure, follow-up recs    #Hypomagnesemia   -Repleted with 4g of MgSO4 IV, follow-up BMP tomorrow    #R Abd/Hip/LE pain after fall  -Tylenol PRN    #Leukocytosis  -Resolved 7/4        Diet: Combination Diet Regular Diet Adult     DVT Prophylaxis: Pneumatic Compression Devices  Aragon Catheter: Not present  Fluids: LR 100cc/hr for 6 hours, then reasses.  Lines: 18G IV left forearm   Cardiac Monitoring: None  Code Status: Full Code      Clinically Significant Risk Factors        # Hypokalemia: Lowest K = 3.2 mmol/L in last 2 days, will replace as needed     # Hypomagnesemia: Lowest Mg = 1.6 mg/dL in last 2 days, will replace as needed   # Hypoalbuminemia: Lowest albumin = 2.8 g/dL at 7/2/2024  6:49 AM, will monitor as appropriate     # Hypertension: Noted on problem list                      # Financial/Environmental Concerns:           Disposition Plan      Expected Discharge Date: 07/06/2024        Discharge Comments: Need to arrange safe discharge plan with home PT/OT and home health nurse due to wife's concern about taking care of patient at home.        The patient's care was discussed with the Attending Physician, Dr. Freeman .    [facility]  Medical Student  Medicine Service, [address]  Securely message with Vocera (more info)  Text page via [employer] Paging/Directory   See signed in provider for up to date coverage information  ______________________________________________________________________    Interval History   No acute events. Diarrhea improving today and only had one episode since this morning although he had 4 bouts of diarrhea last night. Tim feels physical therapy has been helping, and he is feeling a bit stronger today. He has continued concerns over his wife being able to help lift him from the ground if he falls. He denies any additional complaints, including dyspnea, headache, chest pain, fevers, or chills.  Physical Exam   Vital Signs: Temp: 98.5  F (36.9  C) Temp src: Oral BP: 113/81 Pulse: 82   Resp: 20 SpO2: 99 % O2 Device: None (Room air)    Weight: 0 lbs 0 oz    Constitutional: awake, alert, cooperative, no apparent distress, and appears stated  age  Eyes: Lids and lashes normal, pupils equal, round and reactive to light, extra ocular muscles intact, sclera clear, conjunctiva normal  Respiratory: No increased work of breathing, good air exchange, clear to auscultation bilaterally, no crackles or wheezing  Cardiovascular: Normal apical impulse, regular rate, regular rhythm and no murmur noted  GI: normal bowel sounds, NTND, and no masses palpated  Skin: normal skin color, texture, turgor. Diffuse bruising on the patient's left posterior forearm, bilateral thighs, and right foot noted to be from prior to admission. Significant bruising on the patient's right lateral abdomen and suprapubic abdomen, improving. Abdominal bruising tender to palpation rated 7/10.  Neurologic: Awake, alert, oriented to name, place and time.  Cranial nerves II-XII are grossly intact.  Motor is 4+ out of 5 bilaterally.  Sensory is intact.    Extremities: Noted swelling of the right ankle/foot with 2+ pitting edema. No pitting edema on the left ankle/foot.     Medical Decision Making       Please see A&P for additional details of medical decision making.      Data   Pertinent Labs since yesterday/overnight:  CK = 370  Mag = 1.8  Phos = 3.3  Cr = .90      I have personally reviewed the following data over the past 24 hrs:    10.5  \   10.6 (L)   / 147 (L)     138 107 11.0 /  85   3.2 (L) 22 0.90 \       Imaging results reviewed over the past 24 hrs:   No results found for this or any previous visit (from the past 24 hour(s)).

## 2024-07-05 NOTE — CONSULTS
Care Management Follow Up    Length of Stay (days): 4    Expected Discharge Date: 07/06/2024     Concerns to be Addressed: discharge planning     Patient plan of care discussed at interdisciplinary rounds: Yes    Anticipated Discharge Disposition:  Home  Anticipated Discharge Services:  Resume home care  Anticipated Discharge DME:  None    Patient/family educated on Medicare website which has current facility and service quality ratings:  Yes  Education Provided on the Discharge Plan:  Yes  Patient/Family in Agreement with the Plan:  Yes    Referrals Placed by CM/SW:  NA  Private pay costs discussed: Not applicable    Additional Information:  Met with patient to address consult placed by nursing. Explained home care services previously arranged through [facility]. Discussed patient has the option to pay for additional services if needing more assistance at home. Discussed looking into life alert type bracelet if he has concerns about falling at home and wife not hearing him. Discussed how home IV fluids could be arranged if indicated and ordered by provider. After discussing with provider, this is not the plan at this time. Additionally, per [facility] liaison, home IV fluids are not covered by Medicare. Updated patient who expressed understanding and denied further discharge needs or concerns at this time.    RNCC will continue to follow and assist with discharge planning.     [facility] [place] - resume home RN/PT/OT/HHA  Ph: [phone]  Fax: [phone]    Laura Fatima RN, BSN  6A RN Care Coordinator  Ph: [phone]   Anabel: 6A Neuro RNCC

## 2024-07-05 NOTE — DISCHARGE INSTRUCTIONS
[facility] - [facility] RN/PT/OT/MELANIE  Ph: [phone]  Fax: [phone]

## 2024-07-05 NOTE — PHARMACY-ADMISSION MEDICATION HISTORY
Pharmacy Intern Admission Medication History    Admission medication history is complete. The information provided in this note is only as accurate as the sources available at the time of the update.    Information Source(s): Patient, Hospital records, and CareEverywhere/SureScripts via in-person    Pertinent Information:   Upon my visit:  Patient mentioned he took Tylenol today in the morning.  Patient mentioned he is not taking baclofen.   Patient told he is out of rosuvastatin.     Changes made to PTA medication list:  Added: None  Deleted: None  Changed:   Baclofen (LIORESAL) 10 mg tablet - Patient Not Taking  Rosuvastatin (CRESTOR) 40 MG tablet - Unknown    Allergies reviewed with patient and updates made in EHR: yes    Medication History Completed By: Malina Jacob 7/4/2024 8:31 PM    PTA Med List   Medication Sig Last Dose    acetaminophen (TYLENOL) 325 MG tablet Take 2 tablets (650 mg) by mouth every 4 hours as needed for other (For optimal non-opioid multimodal pain management to improve pain control.) 7/4/2024 at AM    amLODIPine (NORVASC) 10 MG tablet Take 1 tablet (10 mg) by mouth every morning Past Week at AM    levETIRAcetam (KEPPRA) 1000 MG tablet 1 tablet Orally every 12 hrs Past Week at AM    loperamide (IMODIUM) 2 MG capsule Take 1 capsule (2 mg) by mouth 4 times daily Past Week    omeprazole (PRILOSEC) 20 MG DR capsule Take 1 capsule (20 mg) by mouth every morning Take 1 hour prior to prednisone Past Week at No    potassium & sodium phosphates (NEUTRA-PHOS) 280-160-250 MG Packet Take 1 packet by mouth 4 times daily Discuss with PCP and/or oncologist about when to stop (should be able to stop once diarrhea under better control) Past Week    potassium chloride ER (K-TAB) 20 MEQ CR tablet Take 1 tablet (20 mEq) by mouth daily for 7 days Past Week    predniSONE (DELTASONE) 20 MG tablet Take 4 tablets (80 mg) by mouth daily for 5 days, THEN 3 tablets (60 mg) daily for 5 days, THEN 2 tablets (40 mg) daily  for 5 days, THEN 1 tablet (20 mg) daily for 5 days, THEN 0.5 tablets (10 mg) daily for 5 days. 7/4/2024    rosuvastatin (CRESTOR) 40 MG tablet Take 1 tablet (40 mg) by mouth daily Past Week

## 2024-07-05 NOTE — PLAN OF CARE
Status: Admitted 7/1 w/ ongoing diarrhea, progressive weakness, and recurrent falls found to have FELICITY and mild rhabdomyolysis. Hx metastatic melanoma with brain mets c/b seizure disorder, R hemiparesis, s/p 3x craniotomy   Vitals: VSS on RA  Neuros: A&Ox4, labile at times, RLE 4/5, all other extremities 5/5, N/T to RLE  IV: PIV SL  Labs/Electrolytes: WNL  Resp/trach: Clear lungs  Diet: Regular, poor PO. I&Os  Bowel status: Loose BM x1 this shift  : Voiding  Skin: Bruising to R rib cage, lido patches in place  Pain: R rib and back pain controlled with lido patches  Activity: Up w/ 1, walker  Social: Wife involved in care, not present at bedside this shift  Plan/updates: RLE US negative for DVT. Continue POC

## 2024-07-05 NOTE — PLAN OF CARE
Status: Admitted 7/1 with generalized weakness, falls, and subacute diarrhea found to have FELICITY and mild rhabdomyolysis. Hx of metastatic melanoma with brain mets and craniotomy x3  Vitals: VSS on RA  Neuros: A&O x4. RLE 4, AOE 5. N/T to RLE  IV: PIV infusing Phos replacement.  Labs/Electrolytes: IV phos replacement infusing. Redraw in AM  Resp/trach: WNL  Diet: Regular diet. Strict intake charting  Bowel status: LBM 7/5. Loose BM x2  : Voiding spontaneously  Skin: Bruising to R rib cage. RLE pitting edema- elevate on pillows when in bed. Abrasions to R toes from a fall.  Pain: R rib pain managed with PRN Tylenol x2   Activity: A1, GB, walker. Up in recliner x2. Walked halls x1.   Social: Son and brother visited this shift  Plan: Monitor fluid intake. Neurology consulted for brain mets.    Goal Outcome Evaluation:      Plan of Care Reviewed With: patient    Overall Patient Progress: no change    Outcome Evaluation: Diarrhea continues. Showered this shift.

## 2024-07-06 NOTE — PLAN OF CARE
Goal Outcome Evaluation:      Plan of Care Reviewed With: patient    Overall Patient Progress: improvingOverall Patient Progress: improving    Status: Admitted 7/1 w/ ongoing diarrhea, progressive weakness, and recurrent falls found to have FELICITY and mild rhabdomyolysis. Hx metastatic melanoma with brain mets c/b seizure disorder, R hemiparesis, s/p 3x craniotomy   Vitals: VSS on RA  Neuros: A&Ox4, labile at times, RLE 4/5. 5/5 t/o, N/t to RLE  IV: PIV SL  Labs/Electrolytes: WNL  Resp/trach: on RA  Diet: Regular diet  Bowel status: LBM 7/6, x2 BM  : Voiding spont  Skin:  Bruising to R rib cage. RLE pitting edema- elevate on pillows when in bed. Abrasions to R toes from a fall covered with primapore/bandages.   Pain: managed with PRN meds  Activity: Ax1, GB  Plan: Continue to monitor

## 2024-07-06 NOTE — PLAN OF CARE
Care from [id number]    Status: Admitted 7/1 with generalized weakness, falls, and subacute diarrhea found to have FELICITY and mild rhabdomyolysis. Hx of metastatic melanoma with brain mets and craniotomy x3  Vitals: VSS on RA  Neuros: A&O x4. RLE 4, AOE 5. N/T to RLE  IV: PIV SL  Labs/Electrolytes: Phos replacement ordered. Patient preferred PO replacement rather than IV replacement. Redraw in AM  Resp/trach: WNL  Diet: Regular diet. Strict I&O's.  Bowel status: LBM 7/6. Loose BM x1 this shift  : Voiding spontaneously  Skin: Bruising to R rib cage. RLE pitting edema- elevate on pillows when in bed. Abrasions to R toes- cleansed, bacitracin applied, and bandages changed.  Pain: R rib pain managed with PRN Tylenol x2   Activity: A1, GB, walker. Up in recliner x2. Walked halls x1. Went outside in wheelchair with family.  Social: Son and brother visited this shift  Plan: Monitor fluid intake. Discharge home when electrolytes stable.     Goal Outcome Evaluation:      Plan of Care Reviewed With: patient    Overall Patient Progress: improving    Outcome Evaluation: Less diarrhea today. Ambulating well.

## 2024-07-06 NOTE — PROGRESS NOTES
[facility]    Progress Note - Medicine Service, [facility] TEAM 1       Date of Admission:  7/1/2024    Assessment & Plan   Saeid Santa is a 68 year old male with a PMH of metastatic melanoma with metastasis to the brain (complicated by seizure disorder, right-sided hemiparesis, and s/p 3x craniotomy), prostate cancer s/p prostatectomy, and HTN presenting with ongoing diarrhea, progressive weakness, and recurrent falls found to have FELICITY and mild rhabdomyolysis. Now admitted for increased diarrhea above his home volume with difficulty keeping up with PO, despite good intake of fluids, and secondary electrolyte derangements that require correction.     Today  - Had 4 Bms last night and 1 today. Goal is 1-2. Consider going up on loperamide tomorrow  - WBC slowly trending up, ROS negative for all infectious signs and symptoms. Cont to trend  -Talked with patient's wife at the bedside. Continued concerns for diarrhea at home, falls, and being able to get him medical equipment to make caring for Tim easier. Has some concerns for her safety as a caregiver needing to do physical labor.   -Pending lytes and able to keep up with stool volume, potential discharge Monday.   -Diet consult tomorrow    #Generalized Weakness  #Recurrent falls  Decreasing QOL, increased difficulty meeting ADLs, increased frequency of falls in setting of metastatic melanoma, diarrhea. Patient unaware of events leading to most recent fall. Most likely related to acute on chronic deconditioning but question potential contribution of syncope/presyncope. Nothing on history to suggest seizures. Recommend discharge home with home care. Would benefit from nursing, PT/OT. Per PT/OT notes 7/3, progressing in mobility and completing ADLs including hygiene independently with standby assist for safety. Patient's wife notes environmental concerns regarding tub, stairs, etc. With recurrent falls and unclear  modifiable risk factors otherwise, may benefit from transition to alternative living arrangement with increased support. Will discuss options further on 7/4.   -Orthostatics wnl on 7/5  -PT/OT consulted and actively following. PT not a barrier to discharge.     #FELICITY, resolved  #Diarrhea  #Poor PO intake, improved  #Hypovolemia, improved  #Hypomagnesemia  #Rhabdomyolysis, mild, resolved  #Elevated AST, likely 2/2 to mild rhabdo  Found down in household for approximately half a night. Cr 1.88 on admission (7/1) from baseline 0.9-1.1. Has resolved back to baseline; 1.15 (7/2) --> 0.90 (7/3). UA with moderate blood, no RBCs, small protein. CK 1531 (7/1) -> 1071 (7/2) --> 370 (7/3) following IVF. Electrolytes largely within normal limits (no hyperkalemia, hyperphosphatemia, or hypocalcemia). FELICITY was likely multifactorial with contributions of hypovolemia from diarrhea with inadequate PO intake to match losses and additional insult of pigment-induced nephropathy. PO intake improving and frequency of bowel movements decreasing.  - s/p 1L NS followed by 100cc/hr x 6hrs LR  - Additional IVF on 7/2 to ensure adequate hydration  - Space BMP with Mg to q48hrs, repletion PRN per nursing protocol  - Monitor I&O's to ensure adequate self nutrition  -Mild elevations in AST which preceded rhabdo.     #Colitis, immune-mediated  #Diarrhea due to enteropathic E. 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 Treated with steroids and infliximab (6/21) with some improvement in symptoms prior to discharge. Remains on steroid taper per oncology. Repeat C diff toxin assay this admission negative 7/2.  - Oncology consulted 7/2.   -Follow-up outpt AMANDA apt on 7/8 and 7/15  - Increased to Loperamide 4mg TID SCHEDULED and 4mg every day PRN ->GOAL 1-2  BMs/day  - Continue prednisone 60mg qDay for 1 day (7/2)--> 40mg for 5 days --> 20mg for 5 days --> 10mg 5days  -per oncology, will not need infliximab while admited  - Continue Bactrim for PJP ppx if prednisone is for more than 30 days (started 6/17).   -Consider increasing loperamide tomorrow if continues to have >3 stools per day  -Diet consult tomorrow    #Right Sided LE 2+ Pitting Edema, NEW (7/4/2024)  New onset right sided pitting edema in the setting of reduced ambulation and metastatic melanoma is highly concerning for LE DVT. Pt not on pharmacologic VTE prophylaxis due to risk of bleeding.   -LE Doppler US without evidence of DVT     #metastatic melanoma with brain metastases  #seizures  Patient found to have ring enhancing lesions on MRI . 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 Has continued to show CNS involvement of disease status post multiple craniotomies and resections (tumor resection 4/2023, L parieto-occipital craniotomy 7/2023, r frontal craniotomy 12/2023, gamma knife 02/2024). Recent PET scan w/ concerns for mets to soft tissues in R lateral calf and L medial thigh as well as liver lesions. Last seizure was 04/2023.   -Neurology consulted on 7/4, high concern for seizure causing fall leading up to this admission. Plan to increase evening Keppra to 1,500mg qPM, and continue 1,000mg qAM. Notify neurology if any clinical seizures or new AMS of uncertain etiology.    -palliative care consulted, appreciate recommendations   - GOC: remains full code with restorative goals  -oncology consulted; will not give further treatment during admission.  -Neurology consulted on 7/4 due to c/f fall secondary to seizure, follow-up recs    #Hypomagnesemia   -Repleted with 4g of MgSO4 IV, follow-up BMP tomorrow    #R Abd/Hip/LE pain after fall  -Tylenol PRN    #Leukocytosis  -Resolved 7/4        Diet: Combination Diet Regular  Diet Adult    DVT Prophylaxis: Pneumatic Compression Devices  Aragon Catheter: Not present  Fluids: LR 100cc/hr for 6 hours, then reasses.  Lines: 18G IV left forearm   Cardiac Monitoring: None  Code Status: Full Code      Clinically Significant Risk Factors        # Hypokalemia: Lowest K = 3.2 mmol/L in last 2 days, will replace as needed       # Hypoalbuminemia: Lowest albumin = 2.8 g/dL at 7/2/2024  6:49 AM, will monitor as appropriate     # Hypertension: Noted on problem list                  # Overweight: Estimated body mass index is 27.44 kg/m  as calculated from the following:    Height as of this encounter: 1.829 m (6').    Weight as of this encounter: 91.8 kg (202 lb 4.8 oz).      # Financial/Environmental Concerns:           Disposition Plan      Expected Discharge Date: 07/08/2024        Discharge Comments: Need to arrange safe discharge plan with home PT/OT and home health nurse due to wife's concern about taking care of patient at home.        The patient's care was discussed with the Attending Physician, Dr. Freeman .    Juan [employer]  Medical Student  Medicine Service, [address]  Securely message with [employer] (more info)  Text page via [facility] Paging/Directory   See signed in provider for up to date coverage information  ______________________________________________________________________    Interval History   No acute events. Had 5 episodes of diarrhea last night and today. Only one had particles in it and the rest were straight water.  Denies dysuria, new abdominal pain, painful skin lesions anywhere on his body, new headaches, cough, shortness of breath, fevers, or rigors. His wife is at the bedside and has continued concerns over being able to help lift him from the ground if he falls. She also inquired about a diet for ICI colitis and medical equipment (e.g., hospital bed) for home.     Physical Exam   Vital Signs: Temp: 98.1  F (36.7   C) Temp src: Oral BP: 112/76 Pulse: 80   Resp: 16 SpO2: 97 % O2 Device: None (Room air)    Weight: 202 lbs 4.8 oz    Constitutional: awake, alert, cooperative, no apparent distress, and appears stated age  Eyes: Lids and lashes normal, pupils equal, round and reactive to light, extra ocular muscles intact, sclera clear, conjunctiva normal  Respiratory: No increased work of breathing, good air exchange, clear to auscultation bilaterally, no crackles or wheezing  Cardiovascular: Normal apical impulse, regular rate, regular rhythm and no murmur noted  GI: normal bowel sounds, NTND, and no masses palpated  Skin: normal skin color, texture, turgor. Diffuse bruising on the patient's left posterior forearm, bilateral thighs, and right foot noted to be from prior to admission. Significant bruising on the patient's right lateral abdomen and suprapubic abdomen, improving. Abdominal bruising tender to palpation  Neurologic: Awake, alert, oriented to name, place and time.  Cranial nerves II-XII are grossly intact.  Motor is 4+ out of 5 bilaterally in upper and lower extremities.  Sensory is intact.    Extremities: Noted swelling of the right ankle/foot with 2+ pitting edema bilaterally R>L..     Medical Decision Making       Please see A&P for additional details of medical decision making.      Data   Pertinent Labs since yesterday/overnight:  CK = 370  Mag = 1.8  Phos = 3.3  Cr = .90      I have personally reviewed the following data over the past 24 hrs:    12.0 (H)  \   11.3 (L)   / 161     140 108 (H) 11.2 /  79   3.5 21 (L) 0.91 \       Imaging results reviewed over the past 24 hrs:   No results found for this or any previous visit (from the past 24 hour(s)).

## 2024-07-06 NOTE — PLAN OF CARE
Status: Admitted 7/1 with generalized weakness, falls, and subacute diarrhea found to have FELICITY and mild rhabdomyolysis. Hx of metastatic melanoma with brain mets and craniotomy x3   Vitals: VSS on RA  Neuros: A&Ox4. RLE 4, AOE 5. N/T to RLE  IV: L PIV SL  Labs/Electrolytes: K+ and Phos replaced, redraw in AM.  Resp/trach: WNL  Diet: Regular diet. Strict I&O.  Bowel status: LBM 7/5. Loose BM x2  : Voiding spontaneously  Skin: Bruising to R rib cage. RLE pitting edema- elevate on pillows when in bed. Abrasions to R toes from a fall covered with prima pore/bandages.  Pain: R rib pain managed with PRN Tylenol and lidocaine patches.  Activity: A1, GB/Walker  Plan: Continue POC

## 2024-07-07 NOTE — PROGRESS NOTES
Care Management Follow Up    Length of Stay (days): 6    Expected Discharge Date: 07/08/2024     Concerns to be Addressed: discharge planning     Patient plan of care discussed at interdisciplinary rounds: Yes    Anticipated Discharge Disposition: Home Care, Hospice (TBD)     Anticipated Discharge Services: Home Care  Anticipated Discharge DME:  (TBD)    Patient/family educated on Medicare website which has current facility and service quality ratings: no  Education Provided on the Discharge Plan: Yes  Patient/Family in Agreement with the Plan: yes    Referrals Placed by CM/SW:    Private pay costs discussed:  Hospital bed rental, DME, Incontinence Supplies    Additional Information:  Contacted patient's wife per request to assist with resources for providing care to the patient when he discharges (hospital bed rental vs. Purchasing a standalone bed-rail, commode, incontinence supplies, etc.). After discussing options and potential costs, patient's wife stated she doesn't have the monetary resources to afford any of those items, and she doesn't understand why insurance doesn't provide coverage. Education provided.    According to the patient's wife, Smiley,  she is of the understanding that her 's prognosis is very poor and she doesn't know if he will elect to continue receiving treatment at this time. During virtual visit with Oncology provider Meghan Rinaldi PA-C on 7/1/24 prior to this hospitalization, the subject of hospice was discussed. Education provided on hospice benefit eligibility. Smiley states she's very familiar with the services that hospice provides. She requested that Oncology be consulted prior to discharge to obtain a better understanding of what lies ahead in order to help her  make an informed decision.     Paged on-call Oncology provider who agreed to meet with patient/family on Monday morning between 11am-12pm to discuss prognosis and goals of care. Wife Smiley agreed to plan and will  be present, noted no additional questions at this time. Medicine team MD and 6A charge nurse updated on plan as well.     Care coordination team will continue to follow patient for collaboration in the development of a safe discharge plan.        Demario Lima, RN Care Coordinator  7/7/2024    Social Work and Care Management Department       SEARCHABLE in [facility] - search CARE COORDINATOR       [place]    Units: 5A Onc 5201 - 5219,  5A Onc 5220 - 5240, 5C 5401 - 5416 & 5C 5417 - 5432       Units: 6A Neuro, 6B IMC, 6C Card 8947 - 2012, 6C Card 0376 - 2620 & 6C Card 3778 - 7966        Units: 7A SOT, 7B Med Surg, 7C Med Surg 7401 - 7418 & 7C Med Surg 7502 - 1421       Units: 4A CVICU, 4C MICU, & 4E SICU         [facility] [place]     Units: 5 Ortho & 5 Med Surg        Units: 6 Med Surg & 8 Med Surg

## 2024-07-07 NOTE — PLAN OF CARE
BP (!) 117/90 (BP Location: Left arm)   Pulse 96   Temp 98.2  F (36.8  C) (Oral)   Resp 16   Ht 1.829 m (6')   Wt 91.8 kg (202 lb 4.8 oz)   SpO2 98%   BMI 27.44 kg/m        [id number]  Neuro:  Pt. alert & Ox4.  Bed alarm for pt's safety.  Behavior: Pt. calm and cooperative with cares.  Activity:  Pt. up with 1, GB and walker.  Vital: AVSS on RA  BG: N/A  LDAs: Left PIV SL.  Cardiac: WNL  Respiratory: LS clear bilat.   GI/: Pt. voiding spontaneously, stools x1 this shift. Pt. on sched. Imodium.  Skin: Bruise on right ribs.  Pain/Nausea:  Pt. denies pain or nausea.   Diet: Regular  Labs/Imaging: See chart for results.  Plan: Continue to follow POC and notify team with change in status.

## 2024-07-07 NOTE — PLAN OF CARE
Status: Admitted 7/1 with generalized weakness, falls, and subacute diarrhea found to have FELICITY and mild rhabdomyolysis. Hx of metastatic melanoma with brain mets and craniotomy x3  Vitals: VSS on RA  Neuros: A&O x4. RLE 4, AOE 5. N/T to RLE  IV: PIV SL  Labs/Electrolytes: WNL  Resp/trach: WNL  Diet: Regular diet. Strict I&O's.  Bowel status: LBM 7/7. Loose BM x1 this shift  : Voiding spontaneously  Skin: Bruising to R rib cage. RLE pitting edema- elevate on pillows when in bed. Abrasions to R toes.  Pain: R rib pain managed with PRN Tylenol x2 and lidocaine patches on R ribs.  Activity: A1, GB, walker. Up in recliner x2. Walked halls x3  Social: Family visited this shift. Talked to wife Smiley on the phone. See care coordinator note.  Plan: Likely discharge home tomorrow 7/8.    Goal Outcome Evaluation:      Plan of Care Reviewed With: patient, family    Overall Patient Progress: no change    Outcome Evaluation: Discharge home tomorrow 7/8

## 2024-07-07 NOTE — PROGRESS NOTES
CLINICAL NUTRITION SERVICES  Reason for Assessment: RN consult received for nutrition education regarding Wife is requesting some guidance for colitis management diet wise  Diet History:  Met with patient. Wife not in the room. Briefly reviewed low fiber diet guidelines with patient. RN in the room. Left handout at bedside for patient's wife to review.   - chart reviewed. Likely infectious diarrhea and is on bowel regimen  Nutrition Diagnosis:  Food- and nutrition-related knowledge deficit r/t no previous knowledge of low fiber diet as evidenced by patient report  Interventions:  Provided instruction on low fiber diet. Discussed each food group and foods to eat and avoid. Answered patient's questions regarding diet.   Provided handouts : Low Fiber Nutrition Therapy and Low Fiber Diet Hospital Menu  Goals:   Patient will verbalize at least 5 low fiber foods acceptable on diet and 5 high fiber foods to avoid.  Follow-up:    Patient to ask any further nutrition-related questions before discharge.  In addition, pt may request outpatient RD appointment.    Estephania Infante RD/LD  Unit 7C ([id number]) and ([phone]),  Monday-Friday: Vocera -> (7C clinical Dietitian),   Weekend/Holiday: Vocera -> (Weekend Clinical Dietitian)

## 2024-07-08 NOTE — PROGRESS NOTES
Oncology  Progress Note  Date of Service: 07/08/2024    Patient: Saeid Santa  MRN: [id number]  Admission Date: 7/1/2024  Hospital Day # 7  Cancer Diagnosis: stage-IV melanoma    Primary Outpatient Oncologist: Dr. Bunn   Current Treatment Plan: Nivolumab + ipilimumab     Reason for Consult: IV melanoma with recent immune mediated colitis       Assessment & Plan:   Saeid Santa is a 68 year old man with a history of HTN, HLD, seizure disorder, prostate cancer (s/p prostatectomy), and metastatic melanoma (mets to brain) who presents with generalized weakness and fall at home. He was found to have rhabdomyolysis and FELICITY on admission, which has been improving so far with IVF. He was recently admitted with immune mediated colitis, but his bowel movements have been fairly normal since discharge.     # Metastatic melanoma  # History of immune mediated colitis   # Fall   # Rhabdomyolysis     IV melanoma who most recently had been treated with ipi/nivo (C2D1 6/5/24), complicated by immune checkpoint inhibitor colitis, requiring steroids and infliximab (6/21/24). His diarrhea has improved since his recent admission. Had 2 BM overnight, none this AM. Infectious work-up negative. Would continue with steroid taper as planned, currently on prednisone 40mg daily. No indication for addition of a second dose of infliximab at this time as diarrhea is improving on steroid taper. Rhapdo resolved. Neuro following, falls like 2/2 seizures. On anti-seizure meds.     Recommendations:   - Continue prednisone taper as planned.  - No indication for addition of a second dose of infliximab at this time as diarrhea is improving on steroid taper.   - Follow-up with oncology as outpatient, has onc appointment on 7/15 for discussion on next steps in cancer management.     Please do not hesitate to page with any questions or concerns.    Patient was seen and plan of care was discussed with attending physician Dr. Jj.    Padmini Michelle  "MD  Hematology/Oncology Fellow      Subjective:   Patient seen in room today with wife.  Patient appeared upset and was not engaging in conversation.  Wife said that they were getting discharged today and mentioned that they were going home with hospice however  shook his head \"no\" to that comment on hospice.  While trying to discuss current clinical evolution and next steps in management, patient became angry and asked oncology team to leave his room.  Despite trying to inquire about patient's frustration in view of addressing his concerns, patient refused to engage but rather angrily asked oncology team repeatedly to leave his room with angry hand movements.  Oncology team politely left patient's room and updated primary team.     Oncologic History:  -IV melanoma, BRAF G466A, TMB 48.7554 mut/Mb   -ECOG 2    -Diagnosed 4/2023:    CT-head and MRI brain showed 2 discrete ring-enhancing lesions in the left paramedian posterior frontal lobe near the precentral gyrus measuring approximately 2.2 x 1.7 x 1.9 cm and left occipital lobe near the lingual gyrus measuring approximately 2.2 x 2.1 x 2.0 cm. 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 TMB is high at 48.754 muts/Mb.   -Pembrolizumab (start 6/7/23) for 2 cycles, but complicated by pneumonitis s/p steroid  -3/2/24 brain MR shows increased size of several previously treated metastatic lesions, no new lesions. After discussion with Rad Onc and Radiology, although progression cannot be ruled out, findings can be consistent with treatment effect   repeat MR brain with continued significant increase in size of R frontal lesion (18 x 15 mm, previously 8 x 7 mm), anterior R frontal  lesion and R occipital lesion marginally increased. L frontal lesion decreased. L occipital lesion stable. Vasogenic edema significant but stable  -Last PET/CT " showed overall continued concern for metastatic disease involving the soft tissues in both the R lateral calf and L medial thigh. These lesions remain small but slightly increased in both size and avidity, and likely represent sites of metastatic disease.  -On 5/15, he started on gammaknife and ipi/nivo. He developed some fatigue, nausea, and headaches, as well as mild diarrhea, which improved.  -He completed gammaknife radiation on 5/23/24.   -Cycle 2 ipi/nivo was given on 6/5/24.      Outpatient Medications:  No current facility-administered medications for this encounter.     Current Outpatient Medications   Medication Sig Dispense Refill    acetaminophen (TYLENOL) 325 MG tablet Take 2 tablets (650 mg) by mouth every 4 hours as needed for other (For optimal non-opioid multimodal pain management to improve pain control.) 15 tablet 0    amLODIPine (NORVASC) 10 MG tablet Take 0.5 tablets (5 mg) by mouth every morning 90 tablet 3    levETIRAcetam (KEPPRA) 1000 MG tablet Take 1 tablet (1,000 mg) by mouth daily before breakfast AND 1.5 tablets (1,500 mg) every evening. 75 tablet 0    loperamide (IMODIUM) 2 MG capsule Take 2 capsules (4 mg) by mouth 4 times daily 60 capsule 0    omeprazole (PRILOSEC) 20 MG DR capsule Take 1 capsule (20 mg) by mouth every morning Take 1 hour prior to prednisone 30 capsule 1    potassium & sodium phosphates (NEUTRA-PHOS) 280-160-250 MG Packet Take 1 packet by mouth 2 times daily Discuss with PCP and/or oncologist about when to stop (should be able to stop once diarrhea under better control) 100 each 0    [START ON 7/9/2024] predniSONE (DELTASONE) 10 MG tablet Take 2 tablets (20 mg) by mouth daily for 5 days, THEN 1 tablet (10 mg) daily for 5 days. 15 tablet 0    rosuvastatin (CRESTOR) 40 MG tablet Take 1 tablet (40 mg) by mouth daily 90 tablet 3    baclofen (LIORESAL) 10 MG tablet Take 1 tablet by mouth 2 times daily as needed for muscle spasms      naloxone (NARCAN) 4 MG/0.1ML nasal  spray Spray 1 spray (4 mg) into one nostril alternating nostrils as needed for opioid reversal every 2-3 minutes until assistance arrives 0.2 mL 1    ondansetron (ZOFRAN) 4 MG tablet Take 1 tablet (4 mg) by mouth every 8 hours as needed for nausea 10 tablet 5        Physical Examination:    Blood pressure (!) 118/102, pulse 119, temperature 97.9  F (36.6  C), temperature source Oral, resp. rate 16, height 1.829 m (6'), weight 91.8 kg (202 lb 4.8 oz), SpO2 99%.  General: alert, lying in bed, sleepy, no acute distress, looked upset  HEENT: NC/AT  Neck: supple  Resp:normal respiratory effort on ambient air  GI: not distended.  MSK: warm and well-perfused  Skin: no rashes on limited exam  Neuro: Alert and interactive, moves all extremities equally, equal sensation in b/l lower extremities     Labs & Studies: I personally reviewed the following studies:  ROUTINE LABS (Last four results):  CMP  Recent Labs   Lab 07/08/24  0540 07/07/24  0607 07/06/24  0647 07/05/24  0613 07/04/24  0724 07/03/24  0644 07/02/24  2140 07/02/24  0649    139 140 138   < > 139  --  139   POTASSIUM 3.4 4.0 3.5 3.2*   < > 3.5  --  3.5   CHLORIDE 107 107 108* 107   < > 107  --  107   CO2 23 24 21* 22   < > 21*  --  23   ANIONGAP 10 8 11 9   < > 11  --  9   GLC 92 94 79 85   < > 119*   < > 76   BUN 11.8 11.5 11.2 11.0   < > 12.6  --  16.5   CR 0.86 0.97 0.91 0.90   < > 0.90  --  1.15   GFRESTIMATED >90 84 >90 >90   < > >90  --  68   JOHN PAUL 8.3* 8.3* 8.2* 7.9*   < > 8.1*  --  8.1*   MAG 1.6* 1.7 1.8 2.0   < > 1.8  --  1.9   PHOS 2.6 2.7 2.4* 2.3*   < >  --   --  3.3   PROTTOTAL  --   --   --   --   --  5.7*  --  5.6*   ALBUMIN  --   --   --   --   --  2.9*  --  2.8*   BILITOTAL  --   --   --   --   --  1.0  --  0.9   ALKPHOS  --   --   --   --   --  63  --  63   AST  --   --   --   --   --  52*  --  66*   ALT  --   --   --   --   --  36  --  42    < > = values in this interval not displayed.     CBC  Recent Labs   Lab 07/08/24  4368  07/07/24  0607 07/06/24  0647 07/05/24  0613   WBC 12.8* 10.9 12.0* 10.5   RBC 3.71* 3.62* 3.70* 3.47*   HGB 11.3* 11.3* 11.3* 10.6*   HCT 35.4* 34.1* 35.3* 32.7*   MCV 95 94 95 94   MCH 30.5 31.2 30.5 30.5   MCHC 31.9 33.1 32.0 32.4   RDW 15.0 15.0 14.9 15.0    167 161 147*

## 2024-07-08 NOTE — PROGRESS NOTES
"Care Management Discharge Note    Discharge Date: 07/08/2024     Discharge Disposition: Home   Discharge Services: Resume Home Care  Discharge DME:  None    Discharge Transportation: family or friend will provide    Private pay costs discussed: Not applicable    Does the patient's insurance plan have a 3 day qualifying hospital stay waiver?  No    Education Provided on the Discharge Plan: Yes  Persons Notified of Discharge Plans: Patient, wife  Patient/Family in Agreement with the Plan: Yes    Handoff Referral Completed: Yes    Additional Information:  Per chart review, care conference with Oncology and primary team arranged for today between 11am-12pm. Spoke with primary team this morning who has confirmed care conference time with both Oncology and Palliative.      Met with patient prior to scheduled care conference, at 10:55 am. Patient and wife were very upset and demanding to leave. Patient states she was upset at providers for \"stopping by\" this morning. He was upset, saying \"that's it!?\" He was under the impression that provider visit was the care conference. Explained to patient that the providers were likely just doing their rounds and all teams plan to attend 11am care conference. Patient became increasingly upset, stating he was told care conference was arranged for 9am-10am. Ultimately due to patient and wife's frustration the decision was made to no longer hold a care conference and work on discharging the patient per his wishes.    AC liaison updated on patient's discharge and request for timely home visit. Discharge orders faxed to [facility].     IMM given by CHW. Handoff complete.    [facility] - resume home RN/PT/OT/HHA  Ph: [phone]  Fax: [phone]     Laura Fatima, RN, BSN  6A RN Care Coordinator  Ph: [phone]   Vocera: 6A Neuro RNCC  "

## 2024-07-08 NOTE — PLAN OF CARE
Status: Admitted 7/1 with generalized weakness, falls, and subacute diarrhea found to have FELICITY and mild rhabdomyolysis. Hx of metastatic melanoma with brain mets and craniotomy x3  Vitals: VSS on RA  Neuros: A&O x4. RLE 4, AOE 5. N/T to RLE  IV: PIV SL  Labs/Electrolytes: WNL  Resp/trach: WNL  Diet: Regular diet. Strict I&O's.  Bowel status: LBM 7/7.   : Voiding spontaneously  Skin: Bruising to R rib cage. RLE pitting edema- elevate on pillows when in bed. Abrasions to R toes.  Pain: R rib pain managed with PRN Tylenol x2, lidocaine patches removed.  Activity: A1, GB, walker.  Plan: Likely discharge home tomorrow 7/8.

## 2024-07-08 NOTE — PROGRESS NOTES
[facility]    Progress Note - Medicine Service, MAROON TEAM 1       Date of Admission:  7/1/2024    Assessment & Plan   Saeid Santa is a 68 year old male with a PMH of metastatic melanoma with metastasis to the brain (complicated by seizure disorder, right-sided hemiparesis, and s/p 3x craniotomy), prostate cancer s/p prostatectomy, and HTN presenting with ongoing diarrhea, progressive weakness, and recurrent falls found to have FELICITY and mild rhabdomyolysis. Now admitted for increased diarrhea above his home volume with difficulty keeping up with PO, despite good intake of fluids, and secondary electrolyte derangements that require correction.     Today  - Had 4 Bms last night and 1 today. Goal is 1-2. Loperamide QID, Codeine BID PRN  - Smiley (wife) DME concerns, care conf tomorrow at 11am? Pt doesn't want hospice.  - Lytes stable today.   - Discharge tomorrow    #Generalized Weakness  #Recurrent falls  Decreasing QOL, increased difficulty meeting ADLs, increased frequency of falls in setting of metastatic melanoma, diarrhea. Patient unaware of events leading to most recent fall. Most likely related to acute on chronic deconditioning but question potential contribution of syncope/presyncope. Nothing on history to suggest seizures. Recommend discharge home with home care. Would benefit from nursing, PT/OT. Per PT/OT notes 7/3, progressing in mobility and completing ADLs including hygiene independently with standby assist for safety. Patient's wife notes environmental concerns regarding tub, stairs, etc. With recurrent falls and unclear modifiable risk factors otherwise, may benefit from transition to alternative living arrangement with increased support. Will discuss options further on 7/4.   -Orthostatics wnl on 7/5  -PT/OT consulted and actively following. PT not a barrier to discharge.     #FELICITY, resolved  #Diarrhea  #Poor PO intake, improved  #Hypovolemia,  improved  #Hypomagnesemia, resolved  #Rhabdomyolysis, mild, resolved  #Elevated AST, uncertain etiology   Found down in household for approximately half a night. Cr 1.88 on admission (7/1) from baseline 0.9-1.1. Has resolved back to baseline; 1.15 (7/2) --> 0.90 (7/3). UA with moderate blood, no RBCs, small protein. CK 1531 (7/1) -> 1071 (7/2) --> 370 (7/3) following IVF. Electrolytes largely within normal limits (no hyperkalemia, hyperphosphatemia, or hypocalcemia). FELICITY was likely multifactorial with contributions of hypovolemia from diarrhea with inadequate PO intake to match losses and additional insult of pigment-induced nephropathy. PO intake improving and frequency of bowel movements decreasing.   - Still having 4 Bms a day as of 7/7 so scheduled QID loperamide and added Codeine 30mg BID PRN.   - s/p 1L NS followed by 100cc/hr x 6hrs LR  - Additional IVF on 7/2 to ensure adequate hydration  - Space BMP with Mg to q48hrs, repletion PRN per nursing protocol  - Monitor I&O's to ensure adequate self nutrition  - Mild elevations in AST which preceded rhabdo.     #Diarrhea 2/2 Immune Mediated Colitis, improving   Recently discharged on 6/23/24 with GI workup including enteric pathogen panel positive for adenovirus, flex sig with negative CMV, HSV, and EBV stains but with cryptitis concerning for immune checkpoint inhibitor colitis. Treated with steroids and infliximab (6/21) with some improvement in symptoms prior to discharge. Remains on steroid taper per oncology. Repeat C diff toxin assay this admission negative 7/2.  - Oncology consulted 7/2.   -Follow-up outpt AMANDA apt on 7/8 and 7/15  - Increased to Loperamide 4mg TID SCHEDULED and 4mg every day PRN ->GOAL 1-2 BMs/day  - Continue prednisone 60mg qDay for 1 day (7/2)--> 40mg for 5 days --> 20mg for 5 days --> 10mg 5days  -per oncology, will not need infliximab while admited  - Continue Bactrim for PJP ppx if prednisone is for more than 30 days (started 6/17).    -Consider increasing loperamide tomorrow if continues to have >3 stools per day  -Diet consult tomorrow    #Right Sided LE 2+ Pitting Edema, stable  New onset right sided pitting edema in the setting of reduced ambulation and metastatic melanoma is highly concerning for LE DVT. Pt not on pharmacologic VTE prophylaxis due to risk of bleeding.   -LE Doppler US without evidence of DVT     #metastatic melanoma with brain metastases  #seizures  Patient found to have ring enhancing lesions on MRI . 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 Has continued to show CNS involvement of disease status post multiple craniotomies and resections (tumor resection 4/2023, L parieto-occipital craniotomy 7/2023, r frontal craniotomy 12/2023, gamma knife 02/2024). Recent PET scan w/ concerns for mets to soft tissues in R lateral calf and L medial thigh as well as liver lesions. Last seizure was 04/2023.   -Neurology consulted on 7/4, high concern for seizure causing fall leading up to this admission. Plan to increase evening Keppra to 1,500mg qPM, and continue 1,000mg qAM. Notify neurology if any clinical seizures or new AMS of uncertain etiology.    -palliative care consulted, appreciate recommendations   - GOC: remains full code with restorative goals  -oncology consulted; will not give further treatment during admission.  -Neurology consulted on 7/4 due to c/f fall secondary to seizure, follow-up recs    #Hypomagnesemia, resolved    -Repleted with 4g of MgSO4 IV, follow-up BMP tomorrow    #R Abd/Hip/LE pain after fall, improving   -Pain well controlled  -Tylenol PRN    #Leukocytosis  -Resolved 7/4        Diet: Combination Diet Regular Diet Adult    DVT Prophylaxis: Pneumatic Compression Devices  Aragon Catheter: Not present  Fluids: LR 100cc/hr for 6 hours, then reasses.  Lines: 18G IV left forearm   Cardiac Monitoring: None  Code Status: Full Code  "   Dispo: Tomorrow    Clinically Significant Risk Factors              # Hypoalbuminemia: Lowest albumin = 2.8 g/dL at 7/2/2024  6:49 AM, will monitor as appropriate     # Hypertension: Noted on problem list                  # Overweight: Estimated body mass index is 27.44 kg/m  as calculated from the following:    Height as of this encounter: 1.829 m (6').    Weight as of this encounter: 91.8 kg (202 lb 4.8 oz).      # Financial/Environmental Concerns:           Disposition Plan      Expected Discharge Date: 07/08/2024      Destination: home with help/services  Discharge Comments: Need to arrange safe discharge plan with home PT/OT and home health nurse due to wife's concern about taking care of patient at home.        The patient's care was discussed with the Attending Physician, Dr. Freeman .    Juan Burris  Medical Student  Medicine Service, [facility] TEAM [address] [place] [address]  Securely message with [employer] There (more info)  Text page via [facility] Paging/Directory   See signed in provider for up to date coverage information  ______________________________________________________________________    Interval History   No acute events overnight. Had 3 episodes of diarrhea last night and one \"eruptive event\" this morning. The nighttime diarrhea were notably smaller volume than the one this morning. At the bedside his son and step daughter raise concerns for all providers on subspecialty and primary teams being on the same page and would like a care conference. Of note, Tim and the family present at bedside did not mention wanting to discuss Hospice at this meeting. Endorses pain over the craniotomy site, stable from prior days. Denies dysuria, new abdominal pain, painful skin lesions anywhere on his body, new headaches, cough, shortness of breath, fevers, or rigors.      Physical Exam   Vital Signs: Temp: 97.8  F (36.6  C) Temp src: Oral BP: 95/84 Pulse: 113   Resp: 18 SpO2: 98 % O2 " Device: None (Room air)    Weight: 202 lbs 4.8 oz    Constitutional: awake, alert, cooperative, no apparent distress, and appears stated age  Eyes: Lids and lashes normal, pupils equal, round and reactive to light, extra ocular muscles intact, sclera clear, conjunctiva normal  Respiratory: No increased work of breathing, good air exchange, clear to auscultation bilaterally, no crackles or wheezing  Cardiovascular: Normal apical impulse, regular rate, regular rhythm and no murmur noted  GI: normal bowel sounds, NTND, and no masses palpated  Skin: normal skin color, texture, turgor. Diffuse bruising on the patient's left posterior forearm, bilateral thighs, and right foot noted to be from prior to admission. Significant bruising on the patient's right lateral abdomen and suprapubic abdomen, improving. Abdominal bruising tender to palpation  Neurologic: Awake, alert, oriented to name, place and time.  Cranial nerves II-XII are grossly intact.  Motor is 4+ out of 5 bilaterally in upper and lower extremities.  Sensory is intact.    Extremities: Noted swelling of the right ankle/foot with 2+ pitting edema bilaterally R>L..     Medical Decision Making       Please see A&P for additional details of medical decision making.      Data   Pertinent Labs since yesterday/overnight:  CK = 370  Mag = 1.8  Phos = 3.3  Cr = .90      I have personally reviewed the following data over the past 24 hrs:    10.9  \   11.3 (L)   / 167     139 107 11.5 /  94   4.0 24 0.97 \       Imaging results reviewed over the past 24 hrs:   No results found for this or any previous visit (from the past 24 hour(s)).

## 2024-07-08 NOTE — PLAN OF CARE
Status: Admitted 7/1 with generalized weakness, falls, and subacute diarrhea found to have FELICITY and mild rhabdomyolysis. Hx of metastatic melanoma with brain mets and craniotomy x3  Vitals: VSS on RA  Neuros: A&O x4. RLE 4, AOE 5. N/T to RLE  IV: PIV SL removed prior to home  Labs/Electrolytes: Pt refused Mag and K+/Phos replacement  Resp/trach: WNL  Diet: Regular diet. Strict I&O's.  Bowel status: LBM 7/7.   : Voiding spontaneously  Skin: Bruising to R rib cage. RLE pitting edema- elevate on pillows when in bed. Abrasions to R toes. Dressing to right big toe changed. Cleaned wound with N.S, appears to be healing well, wound bed pink.  Pain: R rib pain tolerable with Tylenol x1 this am.  Activity: A1, GB, walker.  Plan: See Case Mgmt note from today. Pt was given discharge instructions and left 6A via wheelchair with his wife at approx 1200. Pt reported having all belongings and aware that med RX will be sent to pharm near their home per their request.

## 2024-07-09 NOTE — PROGRESS NOTES
Clinic Care Coordination Contact  Care Coordination Clinician Chart Review    Situation: Patient chart reviewed by care coordinator.    Background: Clinic Care Coordination Referral received from inpatient care team for transition handoff communication following hospital admission.    Assessment: Upon chart review, patient is not a candidate for Primary Care Clinic Care Coordination enrollment due to reason stated below:  Primary Care Clinic Care Coordination will defer follow-up outreach to Oncology Specialty Clinic Care Coordination team who are already closely following patient. Patient has both RN and SW following him.     Plan/Recommendations: Clinic Care Coordination Referral/order cancelled. RN/SW CC will perform no further monitoring/outreaches at this time and will remain available as needed. If new needs arise, a new Care Coordination Referral may be placed.    Shruthi Mckeon RN, BSN, PHN Care Coordinator  [facility] [place]   Phone: [phone]

## 2024-07-09 NOTE — TELEPHONE ENCOUNTER
[facility] RN is requesting a verbal order for resumption of care as pt was in the hospital.   RN visit 2x per week x 1 week, then  1x per week for 4 weeks, then  1x per week for 2 weeks    PT Maddy  OT Maddy

## 2024-07-09 NOTE — DISCHARGE SUMMARY
[facility]  Discharge Summary - Medicine & Pediatrics       Date of Admission:  7/1/2024  Date of Discharge:  7/8/2024 12:15 PM  Discharging Provider: Dr. Hayde Bustillo PGY-2   Discharge Service: Medicine Service, BAN TEAM 1    Discharge Diagnoses   Acute kidney injury, resolved   Rhabdomyolysis   Immune checkpoint inhibitor mediated colitis   Metastatic melanoma with metastases to the brain   Seizure disorder   Fall   Generalized weakness   Hypertension   Hyperlipidemia     Clinically Significant Risk Factors     # Overweight: Estimated body mass index is 27.44 kg/m  as calculated from the following:    Height as of this encounter: 1.829 m (6').    Weight as of this encounter: 91.8 kg (202 lb 4.8 oz).       Follow-ups Needed After Discharge   Follow-up Appointments     Adult [facility] Follow-up and recommended labs and tests      Follow up with your primary oncologist as already scheduled on 7/15/24.     Recommend to check your labs: CBC and BMP to check blood counts and   electrolytes.     Appointments on [place] and/or [facility] (with [facility] or [facility]   provider or service). Call [phone] if you haven't heard regarding   these appointments within 7 days of discharge.            Discharge Disposition   Discharged to home  Condition at discharge: Stable    Hospital Course   Saeid Santa was admitted on 7/1/2024 for acute kidney injury, weakness, fall, and rhabdomyolysis.  The following problems were addressed during his hospitalization:    #Seizures  #Metastatic melanoma with brain metastases  Patient found to have ring enhancing lesions on MRI . 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 Has continued to show CNS involvement of disease status post multiple craniotomies and resections (tumor resection 4/2023, L parieto-occipital craniotomy 7/2023, r frontal craniotomy  12/2023, gamma knife 02/2024). Recent PET scan w/ concerns for mets to soft tissues in R lateral calf and L medial thigh as well as liver lesions. Last seizure was 04/2023. Neurology evaluated patient and is highly concern for seizure causing the fall leading up to the admission.   - Evening dose of Keppra increased to 1500mg with 1000mg in the am     #Recurrent falls, multifactorial 2/2 seizures as above, dehydration from GI losses, and generalized weakness  #Generalized Weakness  Patient has had increased frequency of falls at home in setting of progressive metastatic cancer and immune-checkpoint inhibitor diarrhea resulting dehydration and weakness. Prior to admission, he had a fall in the night that he is unaware of the events leading up to said fall and was found on the floor in the morning. He was evaluated by PT/OT this admission who recommend home OT. His wife reports many environmental barriers to safety and mobility at home tub, stairs, etc. Patient's strength improved this admission.   - Discharge with home PT     #FELICITY, resolved  #Dehydration  #Rhabdomyolysis, mild, resolved  #Hypomagnesemia, resolved  #Elevated AST, uncertain etiology   Found down in household for approximately half a night. Cr 1.88 on admission (7/1) from baseline 0.9-1.1. Has resolved back to baseline; 1.15 (7/2) --> 0.90 (7/3). UA with moderate blood, no RBCs, small protein. CK 1531 (7/1) -> 1071 (7/2) --> 370 (7/3) following IVF. Electrolytes largely within normal limits (no hyperkalemia, hyperphosphatemia, or hypocalcemia). FELICITY was likely multifactorial with contributions of hypovolemia from diarrhea with inadequate PO intake to match losses and additional insult of pigment-induced nephropathy. FELICITY  resolved with fluid resuscitation.      #Diarrhea 2/2 Immune Mediated Colitis, improving   Recently discharged on 6/23/24 with GI workup including enteric pathogen panel positive for adenovirus, flex sig with negative CMV, HSV, and EBV  stains but with cryptitis concerning for immune checkpoint inhibitor colitis. Treated with steroids and infliximab (6/21) with some improvement in symptoms prior to discharge. Remains on steroid taper per oncology. Repeat C diff toxin assay this admission negative 7/2.  - Loperamide increased to 4mg QID with GOAL 1-2 BMs/day  - Continue prednisone 60mg qDay for 1 day (7/2)--> 40mg for 5 days --> 20mg for 5 days --> 10mg 5days  -per oncology, will not need infliximab while admited  - Continue Bactrim for PJP ppx      #Right Sided LE 2+ Pitting Edema, stable  New onset right sided pitting edema in the setting of reduced ambulation and metastatic melanoma is highly concerning for LE DVT. Pt not on pharmacologic VTE prophylaxis due to risk of bleeding.   -LE Doppler US without evidence of DVT       #R Abd/Hip/LE pain after fall, improving   -Pain well controlled  -Tylenol PRN    There was a plan to have care conference on the day of discharge just to touch base with oncology, palliative, and the primary team.  This care conference was requested by the patient's wife Smiley.  Unfortunately patient and his wife became very upset during morning rounds with oncology.  Smiley was very visibly and verbally frustrated towards multiple members of the team regarding various concerns from not getting to speak with the primary oncologist at the care conference, financial assistance for hospital bed at home, and even the high fructose corn syrup in the juice at the hospital.  She demanded immediate discharge and this request was met.    Consultations This Hospital Stay   PHYSICAL THERAPY ADULT IP CONSULT  OCCUPATIONAL THERAPY ADULT IP CONSULT  PALLIATIVE CARE ADULT IP CONSULT  ONCOLOGY ADULT IP CONSULT  NURSING TO CONSULT FOR VASCULAR ACCESS CARE IP CONSULT  PHARMACY IP CONSULT  CARE MANAGEMENT / SOCIAL WORK IP CONSULT  NEUROLOGY GENERAL ADULT IP CONSULT  CARE MANAGEMENT / SOCIAL WORK IP CONSULT  NURSING TO CONSULT FOR VASCULAR ACCESS  CARE IP CONSULT  NUTRITION SERVICES ADULT IP CONSULT    Code Status   Prior       The patient was [facility] [place] [facility]  [facility] [address] [address]  Phone: [phone]  ______________________________________________________________________    Physical Exam   Vital Signs: Temp: 97.9  F (36.6  C) Temp src: Oral BP: (!) 118/102 Pulse: 119   Resp: 16 SpO2: 99 % O2 Device: None (Room air)    Weight: 202 lbs 4.8 oz    General Appearance: Sitting up in chair. NAD.   Respiratory: Normal effort.   Other: Alert and oriented x3. Able to ambulate.      Exam limited by abrupt decision to discharge and patient's anger.       Primary Care Physician   Rock Grajeda    Discharge Orders      Primary Care - Care Coordination Referral      Home Care Referral      Resume Home Care Services    [facility] home RN/PT/OT/HHA  Ph: [phone]  Fax: [phone]     Activity    Your activity upon discharge: activity as tolerated     Adult [facility] Follow-up and recommended labs and tests    Follow up with your primary oncologist as already scheduled on 7/15/24.     Recommend to check your labs: CBC and BMP to check blood counts and electrolytes.     Appointments on [employer] and/or [facility] (with [facility] or [facility] provider or service). Call [phone] if you haven't heard regarding these appointments within 7 days of discharge.     Reason for your hospital stay    You were admitted because you had several complications from your recent fall and prolonged time down without getting up. You were found to have mild rhabdomyolysis, a condition that leads to acute kidney injury. Your acute kidney injury and rhabdomyolysis resolved with IV fluids. While in the hospital, we were also managing your immune checkpoint inhibitor colitis. We increased your loperamide to 4 times daily, which decreased night time bowel  movements. When you go home, please resume the loperamide 4 times daily, with the goal of having 1-2 bowel movements per day. If you have a day without a bowel movement, stop taking the loperamide until you have a bowel movement. Please follow up with your primary oncologist for further instructions about managing you ICI colitis. Because of the high frequency of your bowel movements, you were low on several electrolytes including magnesium, phosphate, potassium, and magnesium. We have discharged you with electrolyte supplements to replace them at home. You do not need to take electrolyte supplements while at home. At your follow up appointment with your oncologist next week, we request that they check you electrolytes (lab name BMP Mg/Ph). If there are any changes, please follow the instructions given by your primary oncologist at your follow up appointment on 7/15. It was a pleasure being part of your care team during this hospital admission.     Diet    Follow this diet upon discharge: Orders Placed This Encounter      Combination Diet Regular Diet Adult       Significant Results and Procedures   Most Recent 3 BMP's:  Recent Labs   Lab Test 07/08/24  0540 07/07/24  0607 07/06/24  0647    139 140   POTASSIUM 3.4 4.0 3.5   CHLORIDE 107 107 108*   CO2 23 24 21*   BUN 11.8 11.5 11.2   CR 0.86 0.97 0.91   ANIONGAP 10 8 11   JOHN PAUL 8.3* 8.3* 8.2*   GLC 92 94 79       Discharge Medications   Discharge Medication List as of 7/8/2024 11:41 AM        CONTINUE these medications which have CHANGED    Details   amLODIPine (NORVASC) 10 MG tablet Take 0.5 tablets (5 mg) by mouth every morning, Disp-90 tablet, R-3, E-Prescribe      levETIRAcetam (KEPPRA) 1000 MG tablet Take 1 tablet (1,000 mg) by mouth daily before breakfast AND 1.5 tablets (1,500 mg) every evening., Disp-75 tablet, R-0, E-Prescribe      loperamide (IMODIUM) 2 MG capsule Take 2 capsules (4 mg) by mouth 4 times daily, Disp-60 capsule, R-0, E-Prescribe       potassium & sodium phosphates (NEUTRA-PHOS) 280-160-250 MG Packet Take 1 packet by mouth 2 times daily Discuss with PCP and/or oncologist about when to stop (should be able to stop once diarrhea under better control), Disp-100 each, R-0, E-Prescribe      predniSONE (DELTASONE) 10 MG tablet Take 2 tablets (20 mg) by mouth daily for 5 days, THEN 1 tablet (10 mg) daily for 5 days., Disp-15 tablet, R-0, E-Prescribe           CONTINUE these medications which have NOT CHANGED    Details   acetaminophen (TYLENOL) 325 MG tablet Take 2 tablets (650 mg) by mouth every 4 hours as needed for other (For optimal non-opioid multimodal pain management to improve pain control.), Disp-15 tablet, R-0, Historical      omeprazole (PRILOSEC) 20 MG DR capsule Take 1 capsule (20 mg) by mouth every morning Take 1 hour prior to prednisone, Disp-30 capsule, R-1, E-Prescribe      rosuvastatin (CRESTOR) 40 MG tablet Take 1 tablet (40 mg) by mouth daily, Disp-90 tablet, R-3, E-Prescribe      baclofen (LIORESAL) 10 MG tablet Take 1 tablet by mouth 2 times daily as needed for muscle spasms, Historical      naloxone (NARCAN) 4 MG/0.1ML nasal spray Spray 1 spray (4 mg) into one nostril alternating nostrils as needed for opioid reversal every 2-3 minutes until assistance arrives, Disp-0.2 mL, R-1, E-Prescribe      ondansetron (ZOFRAN) 4 MG tablet Take 1 tablet (4 mg) by mouth every 8 hours as needed for nausea, Disp-10 tablet, R-5, E-Prescribe           STOP taking these medications       potassium chloride ER (K-TAB) 20 MEQ CR tablet Comments:   Reason for Stopping:             Allergies   No Known Allergies

## 2024-07-19 ENCOUNTER — PATIENT OUTREACH (OUTPATIENT)
Dept: ONCOLOGY | Facility: CLINIC | Age: 69
End: 2024-07-19
Payer: COMMERCIAL

## 2024-07-19 NOTE — PROGRESS NOTES
[facility]: Cancer Care                                                                                          Phone call with Tim' wife, Smiley. Smiley reports that Tim enrolled in hospice on Monday and passed away yesterday. I offered my condolences and encouraged her to reach out with any further needs.    Samantha Lincoln, RN, BSN  RN Care Coordinator  [facility]

## 2024-07-28 ENCOUNTER — PATIENT OUTREACH (OUTPATIENT)
Dept: ONCOLOGY | Facility: CLINIC | Age: 69
End: 2024-07-28
Payer: COMMERCIAL

## 2024-07-28 NOTE — TELEPHONE ENCOUNTER
"Date of death State file [place]  [id number]    Verified on [email or web address]    \"Notification of \" form completed and emailed to \"[email or web address]\" [email or web address]  on 2024  "

## (undated) DEVICE — SURGICEL HEMOSTAT 4X8" 1952

## (undated) DEVICE — DRAPE CRANIOTOMY W/POUCH 9450

## (undated) DEVICE — SOL WATER IRRIG 1000ML BOTTLE 2F7114

## (undated) DEVICE — DRSG XEROFORM 5X9" CUR253590W

## (undated) DEVICE — SURGICEL HEMOSTAT 2X14" 1951

## (undated) DEVICE — RX SURGIFLO HEMOSTATIC MATRIX W/THROMBIN 8ML 2994

## (undated) DEVICE — PENCIL PRESHARPENED SOFT #2 1/PK  17701/50

## (undated) DEVICE — DRAIN JACKSON PRATT RESERVOIR 100ML SU130-1305

## (undated) DEVICE — SU NUROLON 4-0 TF CR 8X18" C584D

## (undated) DEVICE — DRSG TELFA 3X8" 1238

## (undated) DEVICE — SU VICRYL 2-0 CT-2 CR 8X18" J726D

## (undated) DEVICE — PACK GOWN 3/PK DISP XL SBA32GPFCB

## (undated) DEVICE — SPONGE SURGIFOAM 100 1974

## (undated) DEVICE — IOM SUPPLIES/CASE FEE

## (undated) DEVICE — OINTMENT ANTIBIOTIC BACITRACIN ZINC .9 G 1171

## (undated) DEVICE — SPONGE COTTONOID 1/2X1/2" 20-04S

## (undated) DEVICE — ESU CORD BIPOLAR GREEN 10-4000

## (undated) DEVICE — GLOVE BIOGEL PI MICRO INDICATOR UNDERGLOVE SZ 7.5 48975

## (undated) DEVICE — BUR STRK 2.3MM TAPERED ROUTER - FA2 5407-FA2-023

## (undated) DEVICE — SPONGE COTTONOID TELFA 1/2"X3" 80-09

## (undated) DEVICE — DRAPE CORETEMP FLUID WARM SYSTEM 62X56IN CTD200

## (undated) DEVICE — SYR 30ML LL W/O NDL 302832

## (undated) DEVICE — DRSG TEGADERM 4X4 3/4" 1626W

## (undated) DEVICE — SPONGE COTTONOID 1/2X3" 20-07S

## (undated) DEVICE — PIN SKULL MAYFIELD ADULT TITANIUM 3/PK A1120

## (undated) DEVICE — ESU GROUND PAD ADULT W/CORD E7507

## (undated) DEVICE — SPONGE COTTONOID 1/4X1/4" 80-1399

## (undated) DEVICE — SOL RINGERS LACTATED 1000ML BAG 07953-09

## (undated) DEVICE — STRAP UNIVERSAL POSITIONING 2-PIECE 4X47X76" 91-287

## (undated) DEVICE — TUBING SMOKE EVAC 3/8"X10' 0702-045-023

## (undated) DEVICE — ESU FCP CODMAN 9"X1.0MM VERSATRU 9009100SL

## (undated) DEVICE — DRSG XEROFORM 1X8"

## (undated) DEVICE — CATH LUMBAR 60CM 46419

## (undated) DEVICE — DRSG GAUZE 4X4" TRAY 6939

## (undated) DEVICE — MARKER SPHERES PASSIVE MEDT PACK 5 8801075

## (undated) DEVICE — BUR STRK CARBIDE ROUND 5.0MM 5820-110-050C

## (undated) DEVICE — DRAPE MICROSCOPE LEICA 54X150" AR8033650

## (undated) DEVICE — SU ETHILON 3-0 PS-1 18" 1663H

## (undated) DEVICE — NDL ANGIOCATH 14GA 1.25" 4048

## (undated) DEVICE — LINEN TOWEL PACK X5 5464

## (undated) DEVICE — SYR 20ML LL W/O NDL 302830

## (undated) DEVICE — DRAPE SHEET MED 44X70" 9355

## (undated) DEVICE — SPONGE COTTONOID 1/2X1 1/2" 20-06S

## (undated) DEVICE — SYR 10ML LL W/O NDL 302995

## (undated) DEVICE — DRAPE POUCH INSTRUMENT 1018

## (undated) DEVICE — ESU GROUND PAD UNIVERSAL W/O CORD

## (undated) DEVICE — LINEN TOWEL PACK X6 WHITE 5487

## (undated) DEVICE — DECANTER BAG 2002S

## (undated) DEVICE — SU MONOCRYL 3-0 PS-2 27" Y427H

## (undated) DEVICE — RX BACITRACIN OINTMENT 0.9G 1/32OZ CUR001109

## (undated) DEVICE — GLOVE BIOGEL PI MICRO SZ 7.0 48570

## (undated) DEVICE — WIPES FOLEY CARE SURESTEP PROVON DFC100

## (undated) DEVICE — ESU HOLSTER PLASTIC DISP E2400

## (undated) DEVICE — SOL ISOPROPYL RUBBING ALCOHOL USP 70% 4OZ HDX-20 I0020

## (undated) DEVICE — ESU ELEC BLADE 2.75" COATED/INSULATED E1455

## (undated) DEVICE — SUCTION MANIFOLD NEPTUNE 2 SYS 4 PORT 0702-020-000

## (undated) DEVICE — DRAPE MICROSCOPE LEICA 54X120" 09-MK653

## (undated) DEVICE — MANIFOLD NEPTUNE 4 PORT 700-20

## (undated) DEVICE — SPONGE COTTONOID 1/2X3" 80-1407

## (undated) DEVICE — DRAIN JACKSON PRATT CHANNEL 10FR RND SIL W/TROCAR JP-2227

## (undated) DEVICE — PACK CRANIOTOMY

## (undated) DEVICE — LINEN TOWEL PACK X30 5481

## (undated) DEVICE — DRAPE ISOLATION BAG 1003

## (undated) DEVICE — PREP CHLORAPREP 26ML TINTED HI-LITE ORANGE 930815

## (undated) DEVICE — SPONGE COTTONOID 1/4X1/4" 20-01S

## (undated) DEVICE — TOOL DISSECT MIDAS MR8 9CM BALL 6MM DIA MR8-9BA60

## (undated) DEVICE — HOLSTER ELTRD PNCL STRL LF DISP

## (undated) DEVICE — DRAPE IOBAN INCISE 13X13" 6640EZ

## (undated) DEVICE — SHUNT BECKER EXTERNAL DRAIN 46128

## (undated) DEVICE — ESU CORD BIPOLAR AND IRR TUBING AESCULAP US355

## (undated) DEVICE — PACK NEURO SNE15SNFSA

## (undated) DEVICE — SU MONOCRYL 3-0 PS-1 27" Y936H

## (undated) DEVICE — SPONGE COTTONOID 1X3" 20-10S

## (undated) DEVICE — SU MONOCRYL 3-0 SH 27" UND Y416H

## (undated) DEVICE — DRAPE MAYO STAND 23X54 8337

## (undated) DEVICE — SYR BULB IRRIG DOVER 60 ML LATEX FREE 67000

## (undated) DEVICE — TUBING SET IRR MALIS BIPOLAR CORD INTEGRATE 6790-100-003

## (undated) DEVICE — TUBING SUCTION 12"X1/4" N612

## (undated) DEVICE — ADPT 5 IN 1 360

## (undated) DEVICE — BLADE KNIFE BEAVER MICROSHARP GREEN 377515

## (undated) DEVICE — NDL BLUNT 17GA 1.5" 8881202330

## (undated) DEVICE — SURGICEL HEMOSTAT 3X4" NUKNIT 1943

## (undated) DEVICE — SU ETHILON 3-0 FS-1 18" 669H

## (undated) DEVICE — NDL COUNTER 20CT 31142493

## (undated) DEVICE — DRAPE WARMER 66X44" ORS-300

## (undated) DEVICE — PROTECTOR ARM ONE-STEP TRENDELENBURG 40418

## (undated) DEVICE — CATH TRAY FOLEY SURESTEP 16FR W/TMP PRB STLK LATEX A319416AM

## (undated) DEVICE — STPL SKIN 35W ROTATING HEAD PRW35

## (undated) DEVICE — SPONGE RAY-TEC 4X8" 7318

## (undated) DEVICE — SPONGE COTTONOID 1/2X1/2" 80-1400

## (undated) DEVICE — SYR 10ML FINGER CONTROL W/O NDL 309695

## (undated) DEVICE — SOL NACL 0.9% IRRIG 1000ML BOTTLE 2F7124

## (undated) DEVICE — TUBING SUCTION MEDI-VAC SOFT 3/16"X20' N520A

## (undated) DEVICE — DRSG GAUZE 4X4" TRAY

## (undated) DEVICE — SYR 01ML 27GA 0.5" NDL TBC 309623

## (undated) DEVICE — DECANTER VIAL 2006S

## (undated) DEVICE — PAD CHUX UNDERPAD 23X24" 7136

## (undated) DEVICE — Device

## (undated) RX ORDER — PROPOFOL 10 MG/ML
INJECTION, EMULSION INTRAVENOUS
Status: DISPENSED
Start: 2023-04-21

## (undated) RX ORDER — CEFAZOLIN SODIUM/WATER 2 G/20 ML
SYRINGE (ML) INTRAVENOUS
Status: DISPENSED
Start: 2023-07-14

## (undated) RX ORDER — LORAZEPAM 0.5 MG/1
TABLET ORAL
Status: DISPENSED
Start: 2023-01-01

## (undated) RX ORDER — EPHEDRINE SULFATE 50 MG/ML
INJECTION, SOLUTION INTRAMUSCULAR; INTRAVENOUS; SUBCUTANEOUS
Status: DISPENSED
Start: 2023-01-01

## (undated) RX ORDER — FENTANYL CITRATE 50 UG/ML
INJECTION, SOLUTION INTRAMUSCULAR; INTRAVENOUS
Status: DISPENSED
Start: 2023-04-21

## (undated) RX ORDER — DEXAMETHASONE SODIUM PHOSPHATE 4 MG/ML
INJECTION, SOLUTION INTRA-ARTICULAR; INTRALESIONAL; INTRAMUSCULAR; INTRAVENOUS; SOFT TISSUE
Status: DISPENSED
Start: 2023-01-01

## (undated) RX ORDER — FENTANYL CITRATE 50 UG/ML
INJECTION, SOLUTION INTRAMUSCULAR; INTRAVENOUS
Status: DISPENSED
Start: 2023-01-01

## (undated) RX ORDER — SODIUM CHLORIDE, SODIUM LACTATE, POTASSIUM CHLORIDE, CALCIUM CHLORIDE 600; 310; 30; 20 MG/100ML; MG/100ML; MG/100ML; MG/100ML
INJECTION, SOLUTION INTRAVENOUS
Status: DISPENSED
Start: 2023-04-21

## (undated) RX ORDER — BUPIVACAINE HYDROCHLORIDE AND EPINEPHRINE 2.5; 5 MG/ML; UG/ML
INJECTION, SOLUTION EPIDURAL; INFILTRATION; INTRACAUDAL; PERINEURAL
Status: DISPENSED
Start: 2023-04-21

## (undated) RX ORDER — LIDOCAINE HYDROCHLORIDE AND EPINEPHRINE 10; 10 MG/ML; UG/ML
INJECTION, SOLUTION INFILTRATION; PERINEURAL
Status: DISPENSED
Start: 2023-04-21

## (undated) RX ORDER — SODIUM CHLORIDE 9 MG/ML
INJECTION, SOLUTION INTRAVENOUS
Status: DISPENSED
Start: 2023-04-21

## (undated) RX ORDER — PROPOFOL 10 MG/ML
INJECTION, EMULSION INTRAVENOUS
Status: DISPENSED
Start: 2023-01-01

## (undated) RX ORDER — ONDANSETRON 2 MG/ML
INJECTION INTRAMUSCULAR; INTRAVENOUS
Status: DISPENSED
Start: 2023-04-21

## (undated) RX ORDER — FENTANYL CITRATE-0.9 % NACL/PF 10 MCG/ML
PLASTIC BAG, INJECTION (ML) INTRAVENOUS
Status: DISPENSED
Start: 2023-04-21

## (undated) RX ORDER — ACETAMINOPHEN 325 MG/1
TABLET ORAL
Status: DISPENSED
Start: 2023-04-21

## (undated) RX ORDER — GINSENG 100 MG
CAPSULE ORAL
Status: DISPENSED
Start: 2023-01-01

## (undated) RX ORDER — LIDOCAINE 40 MG/G
CREAM TOPICAL
Status: DISPENSED
Start: 2023-01-01

## (undated) RX ORDER — BUPIVACAINE HYDROCHLORIDE AND EPINEPHRINE 2.5; 5 MG/ML; UG/ML
INJECTION, SOLUTION EPIDURAL; INFILTRATION; INTRACAUDAL; PERINEURAL
Status: DISPENSED
Start: 2023-07-14

## (undated) RX ORDER — LABETALOL HYDROCHLORIDE 5 MG/ML
INJECTION, SOLUTION INTRAVENOUS
Status: DISPENSED
Start: 2023-04-21

## (undated) RX ORDER — FENTANYL CITRATE 50 UG/ML
INJECTION, SOLUTION INTRAMUSCULAR; INTRAVENOUS
Status: DISPENSED
Start: 2023-07-14

## (undated) RX ORDER — FENTANYL CITRATE 50 UG/ML
INJECTION, SOLUTION INTRAMUSCULAR; INTRAVENOUS
Status: DISPENSED
Start: 2024-01-01

## (undated) RX ORDER — CEFAZOLIN SODIUM/WATER 2 G/20 ML
SYRINGE (ML) INTRAVENOUS
Status: DISPENSED
Start: 2023-01-01

## (undated) RX ORDER — CEFAZOLIN SODIUM/WATER 2 G/20 ML
SYRINGE (ML) INTRAVENOUS
Status: DISPENSED
Start: 2023-04-21

## (undated) RX ORDER — ACETAMINOPHEN 325 MG/1
TABLET ORAL
Status: DISPENSED
Start: 2023-07-14

## (undated) RX ORDER — SODIUM CHLORIDE 9 MG/ML
INJECTION, SOLUTION INTRAVENOUS
Status: DISPENSED
Start: 2023-07-14

## (undated) RX ORDER — BUPIVACAINE HYDROCHLORIDE 2.5 MG/ML
INJECTION, SOLUTION EPIDURAL; INFILTRATION; INTRACAUDAL
Status: DISPENSED
Start: 2023-04-21

## (undated) RX ORDER — OXYCODONE HYDROCHLORIDE 5 MG/1
TABLET ORAL
Status: DISPENSED
Start: 2023-04-21

## (undated) RX ORDER — DEXAMETHASONE SODIUM PHOSPHATE 4 MG/ML
INJECTION, SOLUTION INTRA-ARTICULAR; INTRALESIONAL; INTRAMUSCULAR; INTRAVENOUS; SOFT TISSUE
Status: DISPENSED
Start: 2023-07-14

## (undated) RX ORDER — FENTANYL CITRATE 0.05 MG/ML
INJECTION, SOLUTION INTRAMUSCULAR; INTRAVENOUS
Status: DISPENSED
Start: 2023-01-01

## (undated) RX ORDER — SODIUM CHLORIDE, SODIUM LACTATE, POTASSIUM CHLORIDE, CALCIUM CHLORIDE 600; 310; 30; 20 MG/100ML; MG/100ML; MG/100ML; MG/100ML
INJECTION, SOLUTION INTRAVENOUS
Status: DISPENSED
Start: 2023-07-14

## (undated) RX ORDER — LABETALOL HYDROCHLORIDE 5 MG/ML
INJECTION, SOLUTION INTRAVENOUS
Status: DISPENSED
Start: 2023-01-01

## (undated) RX ORDER — GENTAMICIN 40 MG/ML
INJECTION, SOLUTION INTRAMUSCULAR; INTRAVENOUS
Status: DISPENSED
Start: 2023-04-21

## (undated) RX ORDER — GENTAMICIN 40 MG/ML
INJECTION, SOLUTION INTRAMUSCULAR; INTRAVENOUS
Status: DISPENSED
Start: 2023-07-14

## (undated) RX ORDER — HYDROMORPHONE HYDROCHLORIDE 1 MG/ML
INJECTION, SOLUTION INTRAMUSCULAR; INTRAVENOUS; SUBCUTANEOUS
Status: DISPENSED
Start: 2023-07-14

## (undated) RX ORDER — LIDOCAINE HYDROCHLORIDE 20 MG/ML
JELLY TOPICAL
Status: DISPENSED
Start: 2023-04-21

## (undated) RX ORDER — ONDANSETRON 2 MG/ML
INJECTION INTRAMUSCULAR; INTRAVENOUS
Status: DISPENSED
Start: 2023-07-14

## (undated) RX ORDER — HYDRALAZINE HYDROCHLORIDE 20 MG/ML
INJECTION INTRAMUSCULAR; INTRAVENOUS
Status: DISPENSED
Start: 2023-07-14